# Patient Record
Sex: MALE | Race: WHITE | NOT HISPANIC OR LATINO | Employment: OTHER | URBAN - METROPOLITAN AREA
[De-identification: names, ages, dates, MRNs, and addresses within clinical notes are randomized per-mention and may not be internally consistent; named-entity substitution may affect disease eponyms.]

---

## 2017-01-03 ENCOUNTER — TELEPHONE (OUTPATIENT)
Dept: PHARMACY | Facility: CLINIC | Age: 72
End: 2017-01-03

## 2017-01-31 ENCOUNTER — TELEPHONE (OUTPATIENT)
Dept: PHARMACY | Facility: CLINIC | Age: 72
End: 2017-01-31

## 2017-01-31 NOTE — TELEPHONE ENCOUNTER
Patient would like us to ship medication on Monday 1/31/17 for delivery on Tuesday 2/1/17.  Patient would like us to bill them at the end of the month.  Verified shipping address of [address] [address].  Patient verbalized understanding

## 2017-02-21 ENCOUNTER — PROCEDURE VISIT (OUTPATIENT)
Dept: PULMONOLOGY | Facility: CLINIC | Age: 72
End: 2017-02-21
Payer: MEDICARE

## 2017-02-21 ENCOUNTER — HOSPITAL ENCOUNTER (OUTPATIENT)
Dept: RADIOLOGY | Facility: HOSPITAL | Age: 72
Discharge: HOME | End: 2017-02-21
Attending: INTERNAL MEDICINE
Payer: MEDICARE

## 2017-02-21 ENCOUNTER — OFFICE VISIT (OUTPATIENT)
Dept: PULMONOLOGY | Facility: CLINIC | Age: 72
End: 2017-02-21
Payer: MEDICARE

## 2017-02-21 VITALS
SYSTOLIC BLOOD PRESSURE: 124 MMHG | DIASTOLIC BLOOD PRESSURE: 56 MMHG | OXYGEN SATURATION: 96 % | HEIGHT: 68 IN | HEART RATE: 65 BPM | BODY MASS INDEX: 24.05 KG/M2 | RESPIRATION RATE: 18 BRPM | WEIGHT: 158.69 LBS

## 2017-02-21 DIAGNOSIS — J44.9 MODERATE COPD (CHRONIC OBSTRUCTIVE PULMONARY DISEASE): Chronic | ICD-10-CM

## 2017-02-21 DIAGNOSIS — G47.33 OSA ON CPAP: Chronic | ICD-10-CM

## 2017-02-21 DIAGNOSIS — J44.9 MODERATE COPD (CHRONIC OBSTRUCTIVE PULMONARY DISEASE): Primary | ICD-10-CM

## 2017-02-21 LAB
PRE FEV1 FVC: 54.97 % (ref 63.93–83.28)
PRE FEV1: 2.04 L (ref 2.26–3.75)
PRE FVC: 3.71 L (ref 3.22–4.97)
PRE PEF: 3.86 L/S (ref 5.73–10.1)

## 2017-02-21 PROCEDURE — 99999 PR PBB SHADOW E&M-EST. PATIENT-LVL III: CPT | Mod: PBBFAC,,, | Performed by: INTERNAL MEDICINE

## 2017-02-21 PROCEDURE — 71020 XR CHEST PA AND LATERAL: CPT | Mod: 26,,, | Performed by: RADIOLOGY

## 2017-02-21 PROCEDURE — 99214 OFFICE O/P EST MOD 30 MIN: CPT | Mod: 25,S$PBB,, | Performed by: INTERNAL MEDICINE

## 2017-02-21 PROCEDURE — 94060 EVALUATION OF WHEEZING: CPT | Mod: PBBFAC

## 2017-02-21 PROCEDURE — 94060 EVALUATION OF WHEEZING: CPT | Mod: 26,S$PBB,, | Performed by: INTERNAL MEDICINE

## 2017-02-21 RX ORDER — ALBUTEROL SULFATE 90 UG/1
2 AEROSOL, METERED RESPIRATORY (INHALATION) EVERY 4 HOURS PRN
Qty: 1 INHALER | Refills: 11 | Status: SHIPPED | OUTPATIENT
Start: 2017-02-21 | End: 2018-08-22 | Stop reason: SDUPTHER

## 2017-02-21 NOTE — ASSESSMENT & PLAN NOTE
CAT score 6  No cough except with thin liquids  FEV1 declined from 2.52 to 2.04L  FVC  Declined from 4.28 to 3.70L    Adherence with Tudorza and rescue inhaler  Immunisations current

## 2017-02-21 NOTE — PROGRESS NOTES
Subjective:      Noble Tripp is a 71 y.o. male with known COPD who presents without exacerbation.   Current symptoms include: None.  He still has easily provoked coughing when he takes periodically.  He discontinued his speech therapy.  I've encouraged him to consider going back to speech therapy given his prior radiation surgery for vocal cord cancer  CAT score 6.  Spirometry indicates that his measurements have declined overall.  He has Tudorza at home which he hasn't used  He also had a significant improvement with bronchodilator on spirometry today.  I encouraged him to try to use her rescue inhaler   Quit smoking  and currently on  Chantix  CXR reviewed      The following portions of the patient's history were reviewed and updated as appropriate:   He  has a past medical history of Adrenal mass; Aspiration pneumonia (10/15); Benign prostate hyperplasia; Chronic pain; COPD (chronic obstructive pulmonary disease); Ex-smoker; Hyperlipidemia; Osteopenia (1/15 tran 1/18); PVD (peripheral vascular disease); Pyriform sinus cancer; Sleep apnea; Squamous cell carcinoma of skin; Tongue cancer; Vocal cord cancer (2011); and Xerostomia.  He  does not have any pertinent problems on file.  He  has a past surgical history that includes Appendectomy; Vocal cord lateralization, endoscopic approach w/ MLB; and tongue cancer excision (11/14).  His family history includes Cancer in his brother and father.  He  reports that he has quit smoking. He does not have any smokeless tobacco history on file. He reports that he does not drink alcohol or use illicit drugs.  He has a current medication list which includes the following prescription(s): albuterol, aspirin, chantix continuing month box, ferrous sulfate, fish oil-omega-3 fatty acids, hydrocodone-acetaminophen 10-325mg, loratadine, multivitamin, pantoprazole, pilocarpine, repatha sureclick, and gabapentin.  Current Outpatient Prescriptions on File Prior to Visit  "  Medication Sig Dispense Refill    aspirin 81 mg Tab Take 1 tablet by mouth Daily. Over the counter to help prevent stroke/heart attack      CHANTIX CONTINUING MONTH BOX 1 mg Tab TAKE 1 TABLET (1 MG TOTAL) BY MOUTH ONCE DAILY. 56 tablet 3    ferrous sulfate 325 (65 FE) MG EC tablet Take 1 tablet (325 mg total) by mouth 2 times daily 2 hours after meal. 60 tablet 3    fish oil-omega-3 fatty acids 300-1,000 mg capsule Take 2 g by mouth.      hydrocodone-acetaminophen 10-325mg (NORCO)  mg Tab Take 1 tablet by mouth 3 (three) times daily as needed. 90 tablet 0    loratadine (CLARITIN) 10 mg tablet Take 1 tablet by mouth Daily.      multivitamin capsule Take 1 capsule by mouth once daily.      pantoprazole (PROTONIX) 40 MG tablet Take 40 mg by mouth once daily.   5    pilocarpine (SALAGEN) 5 MG Tab TAKE 1/2 HOUR PRIOR TO EACH MEAL 90 tablet 8    REPATHA SURECLICK 140 mg/mL PnIj INJECT 140 MG INTO THE SKIN EVERY 14 DAYS 2 mL 3    [DISCONTINUED] albuterol 90 mcg/actuation inhaler Inhale 2 puffs into the lungs every 4 (four) hours as needed for Wheezing or Shortness of Breath. 1 Inhaler 11    [DISCONTINUED] TUDORZA PRESSAIR 400 mcg/actuation AePB INHALE 1 PUFF INTO THE LUNGS 2 TIMES DAILY. 1 each 5    gabapentin (NEURONTIN) 800 MG tablet Take 1 tablet (800 mg total) by mouth 3 (three) times daily. 90 tablet 2     No current facility-administered medications on file prior to visit.      He is allergic to contrast media; iodinated contrast media - iv dye; neomycin-bacitracin-polymyxin; pravastatin; rosuvastatin; simvastatin; and statins-hmg-coa reductase inhibitors..    Review of Systems  A comprehensive review of systems was negative.       Objective:      FEV1: 2.04 L, 68 % of predicted  FEV1/FVC: 54 %  Visit Vitals    BP (!) 124/56    Pulse 65    Resp 18    Ht 5' 8" (1.727 m)    Wt 72 kg (158 lb 11.2 oz)    SpO2 96%    BMI 24.13 kg/m2     General appearance: alert, appears stated age, cooperative " and no distress  Head: atraumatic  Eyes: negative findings: conjunctivae and sclerae normal  Throat: lips, mucosa, and tongue normal; teeth and gums normal and hoarse voice  Lungs: clear to auscultation bilaterally, normal percussion bilaterally and No wheezes, No rales  Heart: regular rate and rhythm, S1, S2 normal, no murmur, click, rub or gallop  Extremities: extremities normal, atraumatic, no cyanosis or edema  Pulses: 2+ and symmetric  Lymph nodes: Cervical, supraclavicular, and axillary nodes normal.  Neurologic: Grossly normal       Chest x-ray was reviewed with patient  Clear hyperinflation was noted    Spirometry reviewed  FEV1 declined from 2.52 down to 2.04  FVC declined from 4.28 down to 3.70.  Spirometry done today there was a robust improvement in FEV1 by 11%.  FEV1 was 2.04 (60% predicted) FVC was 3.70 (90% predicted) FEV1/FVC was 54.    Home sleep study    Adequate study, duration 5 hours 49 minutes. Apnea-hypopnea index was 16.4 events per hour.Lowest oxygen  saturation was 87%. Total events were 90. Invalid O2 signal 15 minutes, invalid respiratory signal 50 minutes.  Maximal heart rate was 99 bpm, minimum heart rate was 66 bpm. Snoring was recorded greater than 50 dB 69%  of the time.  Obstructive sleep apnea hypopnea syndrome is detected. Continue treatment with CPAP is indicated.  Considering significant weight loss in lab titration required to adjust pressure.  Assessment:      Problem List Items Addressed This Visit     JUANITO on CPAP (Chronic)     No using CPAP  Had HSAT 02/2016  Will let me          Moderate COPD (chronic obstructive pulmonary disease) - Primary (Chronic)     CAT score 6  No cough except with thin liquids  FEV1 declined from 2.52 to 2.04L  FVC  Declined from 4.28 to 3.70L    Adherence with Tudorza and rescue inhaler  Immunisations current         Relevant Medications    albuterol 90 mcg/actuation inhaler    Other Relevant Orders    X-Ray Chest PA And Lateral    Spirometry  with/without bronchodilator         Plan:      Return in about 1 year (around 2/21/2018) for cxr, dali, CPAP titration.    This note was prepared using voice recognition system and is likely to have sound alike errors that may have been overlooked even after proof reading.  Please call me with any questions    Discussed diagnosis, its evaluation, treatment and usual course. All questions answered.    Santos Cardozo MD

## 2017-02-23 ENCOUNTER — TELEPHONE (OUTPATIENT)
Dept: CARDIOLOGY | Facility: CLINIC | Age: 72
End: 2017-02-23

## 2017-02-23 ENCOUNTER — TELEPHONE (OUTPATIENT)
Dept: PHARMACY | Facility: CLINIC | Age: 72
End: 2017-02-23

## 2017-02-23 DIAGNOSIS — E78.2 MIXED HYPERLIPIDEMIA: Primary | ICD-10-CM

## 2017-02-23 NOTE — TELEPHONE ENCOUNTER
----- Message from Jael Jimenes sent at 2/23/2017  9:17 AM CST -----  Contact: Patients wife, Arleen Bacon wants to know if her  needs labs before his appointment on 3/6/17, please call her back at [phone] or [phone]. Thank you

## 2017-02-23 NOTE — TELEPHONE ENCOUNTER
Called and notified patient's wife that we received her payment of $80.00.    Ms. Anton was very thankful for the call.

## 2017-02-28 ENCOUNTER — LAB VISIT (OUTPATIENT)
Dept: LAB | Facility: HOSPITAL | Age: 72
End: 2017-02-28
Attending: INTERNAL MEDICINE
Payer: MEDICARE

## 2017-02-28 ENCOUNTER — OFFICE VISIT (OUTPATIENT)
Dept: PAIN MEDICINE | Facility: CLINIC | Age: 72
End: 2017-02-28
Payer: MEDICARE

## 2017-02-28 VITALS
WEIGHT: 158 LBS | DIASTOLIC BLOOD PRESSURE: 67 MMHG | RESPIRATION RATE: 16 BRPM | SYSTOLIC BLOOD PRESSURE: 117 MMHG | HEART RATE: 81 BPM | HEIGHT: 68 IN | BODY MASS INDEX: 23.95 KG/M2

## 2017-02-28 DIAGNOSIS — D50.9 MICROCYTIC ANEMIA: ICD-10-CM

## 2017-02-28 DIAGNOSIS — G89.4 CHRONIC PAIN DISORDER: ICD-10-CM

## 2017-02-28 DIAGNOSIS — M51.36 DDD (DEGENERATIVE DISC DISEASE), LUMBAR: ICD-10-CM

## 2017-02-28 DIAGNOSIS — M47.817 SPONDYLOSIS OF LUMBOSACRAL REGION WITHOUT MYELOPATHY OR RADICULOPATHY: Primary | ICD-10-CM

## 2017-02-28 DIAGNOSIS — E78.2 MIXED HYPERLIPIDEMIA: ICD-10-CM

## 2017-02-28 DIAGNOSIS — M47.819 FACET ARTHROPATHY: ICD-10-CM

## 2017-02-28 LAB
ALBUMIN SERPL BCP-MCNC: 3.4 G/DL
ALBUMIN SERPL BCP-MCNC: 3.4 G/DL
ALP SERPL-CCNC: 59 U/L
ALP SERPL-CCNC: 59 U/L
ALT SERPL W/O P-5'-P-CCNC: 9 U/L
ALT SERPL W/O P-5'-P-CCNC: 9 U/L
ANION GAP SERPL CALC-SCNC: 7 MMOL/L
ANION GAP SERPL CALC-SCNC: 7 MMOL/L
AST SERPL-CCNC: 17 U/L
AST SERPL-CCNC: 17 U/L
BASOPHILS # BLD AUTO: 0.11 K/UL
BASOPHILS NFR BLD: 0.6 %
BILIRUB SERPL-MCNC: 0.4 MG/DL
BILIRUB SERPL-MCNC: 0.4 MG/DL
BUN SERPL-MCNC: 11 MG/DL
BUN SERPL-MCNC: 11 MG/DL
CALCIUM SERPL-MCNC: 9.6 MG/DL
CALCIUM SERPL-MCNC: 9.6 MG/DL
CHLORIDE SERPL-SCNC: 107 MMOL/L
CHLORIDE SERPL-SCNC: 107 MMOL/L
CHOLEST/HDLC SERPL: 2.5 {RATIO}
CO2 SERPL-SCNC: 27 MMOL/L
CO2 SERPL-SCNC: 27 MMOL/L
CREAT SERPL-MCNC: 0.9 MG/DL
CREAT SERPL-MCNC: 0.9 MG/DL
DIFFERENTIAL METHOD: ABNORMAL
EOSINOPHIL # BLD AUTO: 0.4 K/UL
EOSINOPHIL NFR BLD: 2.1 %
ERYTHROCYTE [DISTWIDTH] IN BLOOD BY AUTOMATED COUNT: 17.6 %
EST. GFR  (AFRICAN AMERICAN): >60 ML/MIN/1.73 M^2
EST. GFR  (AFRICAN AMERICAN): >60 ML/MIN/1.73 M^2
EST. GFR  (NON AFRICAN AMERICAN): >60 ML/MIN/1.73 M^2
EST. GFR  (NON AFRICAN AMERICAN): >60 ML/MIN/1.73 M^2
FERRITIN SERPL-MCNC: 140 NG/ML
GLUCOSE SERPL-MCNC: 103 MG/DL
GLUCOSE SERPL-MCNC: 103 MG/DL
HCT VFR BLD AUTO: 37.9 %
HDL/CHOLESTEROL RATIO: 39.8 %
HDLC SERPL-MCNC: 133 MG/DL
HDLC SERPL-MCNC: 53 MG/DL
HGB BLD-MCNC: 12.6 G/DL
IRON SERPL-MCNC: 24 UG/DL
LDLC SERPL CALC-MCNC: 65 MG/DL
LYMPHOCYTES # BLD AUTO: 1.6 K/UL
LYMPHOCYTES NFR BLD: 8.2 %
MCH RBC QN AUTO: 27.6 PG
MCHC RBC AUTO-ENTMCNC: 33.2 %
MCV RBC AUTO: 83 FL
MONOCYTES # BLD AUTO: 0.9 K/UL
MONOCYTES NFR BLD: 4.8 %
NEUTROPHILS # BLD AUTO: 16.1 K/UL
NEUTROPHILS NFR BLD: 84.3 %
NONHDLC SERPL-MCNC: 80 MG/DL
PLATELET # BLD AUTO: 412 K/UL
PMV BLD AUTO: 10.3 FL
POTASSIUM SERPL-SCNC: 4.8 MMOL/L
POTASSIUM SERPL-SCNC: 4.8 MMOL/L
PROT SERPL-MCNC: 7.6 G/DL
PROT SERPL-MCNC: 7.6 G/DL
RBC # BLD AUTO: 4.57 M/UL
SATURATED IRON: 10 %
SODIUM SERPL-SCNC: 141 MMOL/L
SODIUM SERPL-SCNC: 141 MMOL/L
TOTAL IRON BINDING CAPACITY: 243 UG/DL
TRANSFERRIN SERPL-MCNC: 164 MG/DL
TRIGL SERPL-MCNC: 75 MG/DL
WBC # BLD AUTO: 19.09 K/UL

## 2017-02-28 PROCEDURE — 99999 PR PBB SHADOW E&M-EST. PATIENT-LVL III: CPT | Mod: PBBFAC,,, | Performed by: PHYSICIAN ASSISTANT

## 2017-02-28 PROCEDURE — 99213 OFFICE O/P EST LOW 20 MIN: CPT | Mod: PBBFAC | Performed by: PHYSICIAN ASSISTANT

## 2017-02-28 PROCEDURE — 99214 OFFICE O/P EST MOD 30 MIN: CPT | Mod: S$PBB,,, | Performed by: PHYSICIAN ASSISTANT

## 2017-02-28 RX ORDER — HYDROCODONE BITARTRATE AND ACETAMINOPHEN 10; 325 MG/1; MG/1
1 TABLET ORAL 3 TIMES DAILY PRN
Qty: 90 TABLET | Refills: 0 | Status: SHIPPED | OUTPATIENT
Start: 2017-03-29 | End: 2017-02-28 | Stop reason: SDUPTHER

## 2017-02-28 RX ORDER — HYDROCODONE BITARTRATE AND ACETAMINOPHEN 10; 325 MG/1; MG/1
1 TABLET ORAL 3 TIMES DAILY PRN
Qty: 90 TABLET | Refills: 0 | Status: SHIPPED | OUTPATIENT
Start: 2017-04-27 | End: 2017-05-25 | Stop reason: SDUPTHER

## 2017-02-28 RX ORDER — HYDROCODONE BITARTRATE AND ACETAMINOPHEN 10; 325 MG/1; MG/1
1 TABLET ORAL 3 TIMES DAILY PRN
Qty: 90 TABLET | Refills: 0 | Status: SHIPPED | OUTPATIENT
Start: 2017-02-28 | End: 2017-02-28 | Stop reason: SDUPTHER

## 2017-02-28 RX ORDER — GABAPENTIN 800 MG/1
800 TABLET ORAL 3 TIMES DAILY
Qty: 90 TABLET | Refills: 2 | Status: SHIPPED | OUTPATIENT
Start: 2017-02-28 | End: 2017-05-26 | Stop reason: SDUPTHER

## 2017-02-28 NOTE — MR AVS SNAPSHOT
[facility]  [address]  [place] [address]  Phone: [phone]  Fax: [phone]                  Noble Tripp   2017 8:00 AM   Office Visit    Description:  Male : 1945   Provider:  Joselin Castellon PA-C   Department:  [facility]           Reason for Visit     Low-back Pain           Diagnoses this Visit        Comments    Spondylosis of lumbosacral region without myelopathy or radiculopathy    -  Primary     DDD (degenerative disc disease), lumbar         Facet arthropathy         Chronic pain disorder                To Do List           Future Appointments        Provider Department Dept Phone    2017 10:10 AM LABORATORY, [address] [facility] [phone]    3/6/2017 9:00 AM Sue Cross MD [facility] [phone]    3/6/2017 9:40 AM Andres Whitfield MD '[place] - Cardiology [phone]    2017 9:00 AM Joselin Castellon PA-C [facility] [phone]    11/3/2017 8:40 AM Dwaine Davis MD [facility] - Internal Medicine [phone]      Goals (5 Years of Data)     None       These Medications        Disp Refills Start End    naloxegol 12.5 mg Tab 30 tablet 2 2017 3/30/2017    Take 12.5 mg by mouth once daily. - Oral    Pharmacy: [facility] [address] [place] [address] [address] AT [facility] Ph #: [phone]       gabapentin (NEURONTIN) 800 MG tablet 90 tablet 2 2017 3/30/2017    Take 1 tablet (800 mg total) by mouth 3 (three) times daily. - Oral    [facility] [address] [place] [address] [address] AT [facility] Ph #: [phone]       hydrocodone-acetaminophen 10-325mg (NORCO)  mg Tab 90 tablet 0 2017    Take 1 tablet by mouth 3 (three) times daily as needed. - Oral    Pharmacy: [id number] - [place],  [place] - [address] AT [facility] Ph #: [phone]         [facility] On Call     [facility] Nurse Care Line - 24/7 Assistance  Registered nurses in the [facility] provide clinical advisement, health education, appointment booking, and other advisory services.  Call for this free service at [phone].             Medications           Message regarding Medications     Verify the changes and/or additions to your medication regime listed below are the same as discussed with your clinician today.  If any of these changes or additions are incorrect, please notify your healthcare provider.        START taking these NEW medications        Refills    naloxegol 12.5 mg Tab 2    Sig: Take 12.5 mg by mouth once daily.    Class: Normal    Route: Oral           Verify that the below list of medications is an accurate representation of the medications you are currently taking.  If none reported, the list may be blank. If incorrect, please contact your healthcare provider. Carry this list with you in case of emergency.           Current Medications     albuterol 90 mcg/actuation inhaler Inhale 2 puffs into the lungs every 4 (four) hours as needed for Wheezing or Shortness of Breath.    aspirin 81 mg Tab Take 1 tablet by mouth Daily. Over the counter to help prevent stroke/heart attack    CHANTIX CONTINUING MONTH BOX 1 mg Tab TAKE 1 TABLET (1 MG TOTAL) BY MOUTH ONCE DAILY.    ferrous sulfate 325 (65 FE) MG EC tablet Take 1 tablet (325 mg total) by mouth 2 times daily 2 hours after meal.    fish oil-omega-3 fatty acids 300-1,000 mg capsule Take 2 g by mouth.    gabapentin (NEURONTIN) 800 MG tablet Take 1 tablet (800 mg total) by mouth 3 (three) times daily.    hydrocodone-acetaminophen 10-325mg (NORCO)  mg Tab Starting on Apr 27, 2017. Take 1 tablet by mouth 3 (three) times daily as needed.    loratadine (CLARITIN) 10 mg tablet Take 1 tablet by mouth Daily.    multivitamin  capsule Take 1 capsule by mouth once daily.    naloxegol 12.5 mg Tab Take 12.5 mg by mouth once daily.    pantoprazole (PROTONIX) 40 MG tablet Take 40 mg by mouth once daily.     pilocarpine (SALAGEN) 5 MG Tab TAKE 1/2 HOUR PRIOR TO EACH MEAL    REPATHA SURECLICK 140 mg/mL PnIj INJECT 140 MG INTO THE SKIN EVERY 14 DAYS           Clinical Reference Information           Your Vitals Were     BP                   117/67 (BP Location: Right arm, Patient Position: Sitting, BP Method: Automatic)           Blood Pressure          Most Recent Value    BP  117/67      Allergies as of 2/28/2017     Contrast Media    Iodinated Contrast Media - Iv Dye    Neomycin-bacitracin-polymyxin    Pravastatin    Rosuvastatin    Simvastatin    Statins-hmg-coa Reductase Inhibitors      Immunizations Administered on Date of Encounter - 2/28/2017     None      Language Assistance Services     ATTENTION: Language assistance services are available, free of charge. Please call [phone].      ATENCIÓN: Si tobyla shantanu, tiene a pennington disposición servicios gratuitos de asistencia lingüística. Llame al [phone].     CHÚ Ý: N?u b?n nói Ti?ng Vi?t, có các d?ch v? h? tr? ngôn ng? mi?n phí dành cho b?n. G?i s? [phone].         [facility] complies with applicable Federal civil rights laws and does not discriminate on the basis of race, color, national origin, age, disability, or sex.

## 2017-03-06 ENCOUNTER — OFFICE VISIT (OUTPATIENT)
Dept: CARDIOLOGY | Facility: CLINIC | Age: 72
End: 2017-03-06
Payer: MEDICARE

## 2017-03-06 ENCOUNTER — OFFICE VISIT (OUTPATIENT)
Dept: HEMATOLOGY/ONCOLOGY | Facility: CLINIC | Age: 72
End: 2017-03-06
Payer: MEDICARE

## 2017-03-06 VITALS
SYSTOLIC BLOOD PRESSURE: 100 MMHG | TEMPERATURE: 97 F | HEIGHT: 68 IN | RESPIRATION RATE: 18 BRPM | WEIGHT: 154.75 LBS | DIASTOLIC BLOOD PRESSURE: 60 MMHG | OXYGEN SATURATION: 94 % | BODY MASS INDEX: 23.45 KG/M2 | HEART RATE: 76 BPM

## 2017-03-06 VITALS
WEIGHT: 154.88 LBS | HEIGHT: 68 IN | DIASTOLIC BLOOD PRESSURE: 64 MMHG | BODY MASS INDEX: 23.47 KG/M2 | SYSTOLIC BLOOD PRESSURE: 122 MMHG | HEART RATE: 63 BPM

## 2017-03-06 DIAGNOSIS — I47.10 PSVT (PAROXYSMAL SUPRAVENTRICULAR TACHYCARDIA): ICD-10-CM

## 2017-03-06 DIAGNOSIS — J44.9 MODERATE COPD (CHRONIC OBSTRUCTIVE PULMONARY DISEASE): Chronic | ICD-10-CM

## 2017-03-06 DIAGNOSIS — Z86.010 HISTORY OF COLONIC POLYPS: ICD-10-CM

## 2017-03-06 DIAGNOSIS — D63.8 ANEMIA OF OTHER CHRONIC DISEASE: ICD-10-CM

## 2017-03-06 DIAGNOSIS — I25.10 CORONARY ARTERY DISEASE INVOLVING NATIVE CORONARY ARTERY OF NATIVE HEART WITHOUT ANGINA PECTORIS: Primary | ICD-10-CM

## 2017-03-06 DIAGNOSIS — I10 ESSENTIAL HYPERTENSION: ICD-10-CM

## 2017-03-06 DIAGNOSIS — D72.829 LEUKOCYTOSIS, UNSPECIFIED TYPE: ICD-10-CM

## 2017-03-06 DIAGNOSIS — E78.5 HYPERLIPIDEMIA, UNSPECIFIED HYPERLIPIDEMIA TYPE: ICD-10-CM

## 2017-03-06 DIAGNOSIS — C32.0 VOCAL CORD CANCER: Primary | ICD-10-CM

## 2017-03-06 DIAGNOSIS — C32.0 VOCAL CORD CANCER: ICD-10-CM

## 2017-03-06 PROCEDURE — 99999 PR PBB SHADOW E&M-EST. PATIENT-LVL IV: CPT | Mod: PBBFAC,,, | Performed by: INTERNAL MEDICINE

## 2017-03-06 PROCEDURE — 99999 PR PBB SHADOW E&M-EST. PATIENT-LVL III: CPT | Mod: PBBFAC,,, | Performed by: INTERNAL MEDICINE

## 2017-03-06 PROCEDURE — 99214 OFFICE O/P EST MOD 30 MIN: CPT | Mod: S$PBB,,, | Performed by: INTERNAL MEDICINE

## 2017-03-06 PROCEDURE — 93010 ELECTROCARDIOGRAM REPORT: CPT | Mod: S$PBB,,, | Performed by: NUCLEAR MEDICINE

## 2017-03-06 PROCEDURE — 99213 OFFICE O/P EST LOW 20 MIN: CPT | Mod: PBBFAC,27 | Performed by: INTERNAL MEDICINE

## 2017-03-06 PROCEDURE — 93005 ELECTROCARDIOGRAM TRACING: CPT | Mod: PBBFAC | Performed by: NUCLEAR MEDICINE

## 2017-03-06 NOTE — MR AVS SNAPSHOT
[facility]  [address] [facility] [address]  Phone: [phone]  Fax: [phone]                  Noble Tripp   3/6/2017 9:40 AM   Office Visit    Description:  Male : 1945   Provider:  Andres Whitfield MD   Department:  [facility]           Reason for Visit     Coronary Artery Disease           Diagnoses this Visit        Comments    Coronary artery disease involving native coronary artery of native heart without angina pectoris    -  Primary     Essential hypertension         PSVT (paroxysmal supraventricular tachycardia)         Hyperlipidemia, unspecified hyperlipidemia type         Vocal cord cancer                To Do List           Future Appointments        Provider Department Dept Phone    2017 9:00 AM Joselin Castellon PA-C [facility] - Interventional Pain [phone]    2017 9:50 AM LABORATORY, [facility] [phone]    2017 10:15 AM ONLH XR1- [facility] [phone]    2017 1:00 PM Sue Cross MD [facility] - Hematology Oncology [phone]    11/3/2017 8:40 AM Dwaine Davis MD [facility] Internal Medicine [phone]      Goals (5 Years of Data)     None      Follow-Up and Disposition     Return in about 6 months (around 2017).       These Medications        Disp Refills Start End    evolocumab (REPATHA SURECLICK) 140 mg/mL PnIj 6 Syringe 3 3/6/2017 3/6/2018    Inject 1 Syringe into the skin every 14 (fourteen) days. - Subcutaneous    Pharmacy: [facility] [address] [place] [address] Ph #: [phone]         [facility]     [facility] Care [facility] -  Assistance  Registered nurses in the [facility] provide clinical advisement, health education, appointment booking, and other advisory services.  Call for this free service at [phone].             Medications           Message regarding Medications      Verify the changes and/or additions to your medication regime listed below are the same as discussed with your clinician today.  If any of these changes or additions are incorrect, please notify your healthcare provider.        CHANGE how you are taking these medications     Start Taking Instead of    evolocumab (REPATHA SURECLICK) 140 mg/mL PnIj REPATHA SURECLICK 140 mg/mL PnIj    Dosage:  Inject 1 Syringe into the skin every 14 (fourteen) days. Dosage:  INJECT 140 MG INTO THE SKIN EVERY 14 DAYS    Reason for Change:  Reorder            Verify that the below list of medications is an accurate representation of the medications you are currently taking.  If none reported, the list may be blank. If incorrect, please contact your healthcare provider. Carry this list with you in case of emergency.           Current Medications     albuterol 90 mcg/actuation inhaler Inhale 2 puffs into the lungs every 4 (four) hours as needed for Wheezing or Shortness of Breath.    aspirin 81 mg Tab Take 1 tablet by mouth Daily. Over the counter to help prevent stroke/heart attack    CHANTIX CONTINUING MONTH BOX 1 mg Tab TAKE 1 TABLET (1 MG TOTAL) BY MOUTH ONCE DAILY.    evolocumab (REPATHA SURECLICK) 140 mg/mL PnIj Inject 1 Syringe into the skin every 14 (fourteen) days.    ferrous sulfate 325 (65 FE) MG EC tablet Take 1 tablet (325 mg total) by mouth 2 times daily 2 hours after meal.    fish oil-omega-3 fatty acids 300-1,000 mg capsule Take 2 g by mouth.    gabapentin (NEURONTIN) 800 MG tablet Take 1 tablet (800 mg total) by mouth 3 (three) times daily.    hydrocodone-acetaminophen 10-325mg (NORCO)  mg Tab Starting on Apr 27, 2017. Take 1 tablet by mouth 3 (three) times daily as needed.    loratadine (CLARITIN) 10 mg tablet Take 1 tablet by mouth Daily.    multivitamin capsule Take 1 capsule by mouth once daily.    naloxegol 12.5 mg Tab Take 12.5 mg by mouth once daily.    pantoprazole (PROTONIX) 40 MG tablet Take 40 mg by mouth  "once daily.     pilocarpine (SALAGEN) 5 MG Tab TAKE 1/2 HOUR PRIOR TO EACH MEAL           Clinical Reference Information           Your Vitals Were     BP Pulse Height Weight BMI    122/64 (BP Location: Left arm) 63 5' 8" (1.727 m) 70.2 kg (154 lb 14 oz) 23.55 kg/m2      Blood Pressure          Most Recent Value    BP  122/64      Allergies as of 3/6/2017     Contrast Media    Iodinated Contrast Media - Iv Dye    Neomycin-bacitracin-polymyxin    Pravastatin    Rosuvastatin    Simvastatin    Statins-hmg-coa Reductase Inhibitors      Immunizations Administered on Date of Encounter - 3/6/2017     None      Orders Placed During Today's Visit      Normal Orders This Visit    IN OFFICE EKG 12-LEAD (to [place])     Future Labs/Procedures Expected by Expires    Lipid panel  3/6/2017 5/5/2018      Language Assistance Services     ATTENTION: Language assistance services are available, free of charge. Please call [phone].      ATENCIÓN: Si habla shantanu, tiene a pennington disposición servicios gratuitos de asistencia lingüística. Llame al [phone].     CHÚ Ý: N?u b?n nói Ti?ng Vi?t, có các d?ch v? h? tr? ngôn ng? mi?n phí dành cho b?n. G?i s? [phone].         [facility] complies with applicable Federal civil rights laws and does not discriminate on the basis of race, color, national origin, age, disability, or sex.        "

## 2017-03-06 NOTE — PROGRESS NOTES
Hematology/Oncology Established Visit    [facility] Diagnosis: Head and neck cancer    Stage: Likely Stage I, but pending review of outside records    Pathology: Unknown    Prior Treatment:  1. Laryngeal / vocal cord tumor resection and sublingual tumor resection by Dr. Oleary at [facility]   2. Selective neck dissection by Dr. Oleary  3. Definitive radiation to posterior pharynx vs pyriform sinus x 7 weeks by Dr. Garrett Kam at [facility]  4. Surgical resection of tongue lesion 11/2014 by Dr. Varela  5. Surgical resection of another tongue region lesion 10/2015 by Dr. Varela.  6. Surgical resection of esophagus lesion - found to be noncancerous by Dr. Oleary in 3/2016    Current Treatment: Surveillance    History of Present Illness:  Mr. Tripp is a 71 y/o male with recurrent Head and Neck cancer who comes in to establish care with [facility]. 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 He later had recurrent disease of the posterior pharynx region and was treated with definitive radiation, no concurrent chemotherapy. He was then found to have recurrence on the tongue, which was resected in 11/2014. He was recently found to have a new lesion on his right lateral tongue which demonstrated abnormal findings (noninvasive cancer or high grade dysplasia). He was scheduled to undergo resection by Dr. Meghna Varela at [facility] next week. However, in the meantime, he was admitted with A-fib/SVT with reentrant phenomenon  thought to be 2/2 sepsis, found to have bilateral PNA. CT of the neck was negative for LAD. Patient was treated with vancomycin, Zosyn, Cipro. Given improvement, he was discharged on Levaquin to be completed approx 10/22/15. Today, he states he feels much better with regards to shortness of breath.  He reports a chronic cough ever since radiation.   Denies any productive cough.  He also complains of a rash for cold sores above his upper lip to which he has been applying Abreva ointment.  He denies any unintentional weight loss or recent appetite changes. Patient states that he underwent resection of a tumor under the right side of his tongue as well as a lesion in his esophagus in fall 2015.     Today, patient comes in for follow up. He was recently seen by Dr. Ponce (ENT) at [facility] who states patient is doing well overall based on physical exam. He feels well today without any n/v/d, SOB, CP, weight loss. He does tend to cough when he drinks thin liquids. No recent infections, antibiotics, steroids.     ROS:  General:  No wt loss, fever/chills, fatigue, night sweats  Eyes: No vision problems, pain or inflammation.   Ears/Nose: No difficultly hearing, ear pain, rhinorrhea, or epistaxis  Oropharynx: No ulcers, dysphagia, or odynophagia. Coughs when drinking thin liquids  Cardiovascular: No chest pains, sob, PND or dyspnea on exertion  Pulmonary: No sob, hemoptysis  Gastrointestional: No n/v/d, melena, hematochezia, or change in bowel habits  : No dysuria, hematuria, pelvic pain or flank pain  Musculoskeletal: No myalgias, weakness, or arthralgias  Neurological: No headaches, focal deficits, or seizure activity  Endocrine: No heat or cold intolerance   Skin: No pruritus, or lesions  Psychiatric: No symptoms of mood disorders  Heme/Lymph: No lymph node enlargment    Past Medical History:   Diagnosis Date    Adrenal mass     david    Aspiration pneumonia 10/15    puree/honey    Benign prostate hyperplasia     Chronic pain     dr santos    COPD (chronic obstructive pulmonary disease)     papi    Ex-smoker     11/13    Hyperlipidemia     Osteopenia 1/15 tran 1/18    PVD (peripheral vascular disease)     noobs 07 elizabethuri    Pyriform sinus cancer     dr ponce radiation 1/2-14 dr king perales    Sleep apnea     cpap    Squamous cell carcinoma of  "skin     roberto    Tongue cancer     "superficial" removed 11/14    Vocal cord cancer 2011    Xerostomia     radiation       Social History:  to Arleen. 6 children. Quit smoking 2013 after approx 100 pk-yrs.    Family History: family history includes Cancer in his brother and father. Father and older brother had lung cancer. Younger brother had colon cancer.     Physical Exam:  Vitals:    03/06/17 0850   BP: 100/60   Pulse: 76   Resp: 18   Temp: 97.3 °F (36.3 °C)     Body mass index is 23.53 kg/(m^2).  General:  AAOx4, no acute distress  HEENT: EOMI. Normocephalic and atraumatic. No maxillary sinus tenderness. External auditory canals clear and TMs intact without lesions. Nasal and oral mucosal membranes moist. Normal dentition and gums. No discrete lesions identified in OP.  Neck: no LAD, thyromegaly, normal ROM  Pulmonary: Bilaterally clear to auscultation, Normal effort with no accessory muscle use, no wheezes/rales/rhonchi  CV: Normal rate, regular rhythm, no murmurs/rubs/gallops, no edema  ABD:  Soft, nontender, nondistended, no mass, and without hepatosplenomegaly   Ext: No clubbing, cyanosis, or edema, normal ROM  Skin: No bruising or petechiae, lesions, rashes.  Neurological: No focal deficits, CN II to XII grossly intact, normal coordination    Psychiatric:  Normal mood, affect and judgement  Hem/Lymph:  No submandibular, cervical, supraclavicular, axillary, or inguinal LAD.    Labs:    Lab Results   Component Value Date    WBC 19.09 (H) 02/28/2017    HGB 12.6 (L) 02/28/2017    HCT 37.9 (L) 02/28/2017    MCV 83 02/28/2017     (H) 02/28/2017     Lab Results   Component Value Date     02/28/2017     02/28/2017    K 4.8 02/28/2017    K 4.8 02/28/2017     02/28/2017     02/28/2017    CO2 27 02/28/2017    CO2 27 02/28/2017    BUN 11 02/28/2017    BUN 11 02/28/2017    CREATININE 0.9 02/28/2017    CREATININE 0.9 02/28/2017    CALCIUM 9.6 02/28/2017    CALCIUM 9.6 " 02/28/2017    ANIONGAP 7 (L) 02/28/2017    ANIONGAP 7 (L) 02/28/2017    ESTGFRAFRICA >60 02/28/2017    ESTGFRAFRICA >60 02/28/2017    EGFRNONAA >60 02/28/2017    EGFRNONAA >60 02/28/2017     Lab Results   Component Value Date    ALT 9 (L) 02/28/2017    ALT 9 (L) 02/28/2017    AST 17 02/28/2017    AST 17 02/28/2017    ALKPHOS 59 02/28/2017    ALKPHOS 59 02/28/2017    BILITOT 0.4 02/28/2017    BILITOT 0.4 02/28/2017       Lab Results   Component Value Date    IRON 24 (L) 02/28/2017    TIBC 243 (L) 02/28/2017    FERRITIN 140 02/28/2017     Lab Results   Component Value Date    MSFSCYMJ90 1164 (H) 10/09/2013     Lab Results   Component Value Date    FOLATE 17.6 10/09/2013     Lab Results   Component Value Date    TSH 0.994 05/19/2014       Imaging:  Neck CT 10/10/15:  No prior studies for comparison. No abnormality of the nasopharynx or oropharynx is seen. No abnormality of the airway. No soft tissue fluid collections. No evidence of pathologic cervical lymphadenopathy. No abnormality of the glands. The submandibular glands are either very small or absent.. No abnormality of the thyroid gland. There is atherosclerotic calcification at the carotid bifurcations.    Chest CT 10/10/15:   CTA axial images were obtained following intravenous contrast and MIPS reconstructions obtained.  There is good opacification of vascular structures and no evidence of pulmonary embolus, aortic dissection, or aneurysm. There are bilateral infiltrates throughout the lower lobes and in the posterior left upper lobe, lingula, and middle lobe. Small bilateral pleural effusions. Some of the previously demonstrated small pulmonary nodules in the right lung are visualized and appear stable. No new nodules seen. There are mildly prominent mediastinal lymph nodes, the largest 17 mm, slightly increased in size from prior CT.  Impression: Bilateral infiltrates. Nonspecific mild mediastinal lymphadenopathy increased slightly.    CXR 2/21/17: The lungs  are clear and free of infiltrate.  No pleural effusion or pneumothorax is identified.  The heart is not enlarged.A calcified granulomas noted within the right lung base.  The lungs are hyperexpanded.  There is some mild pleural thickening noted within the right lung apex.  There is a stable calcified granuloma noted within the right upper lung near the apex as well.    Assessment / Plan:  Noble Tripp is a 71 y.o. male with history of recurrent head and neck cancer comes in for HemOnc evaluation.    1.  Recurrent head and neck cancer: Initial diagnosis was likely a stage I laryngeal cancer treated with surgery.  Possible piriform sinus recurrence treated with definitive radiation.  Tongue recurrence treated with surgery.  Will obtain records from [facility] to verify all of these details and treatment history.  Have discussed with patient that head and neck cancers are related to risk factors such as smoking.  Given the field effect from prior smoking history/exposure, patient is at risk for recurrent head and neck cancers as well as new primary lung cancers and needs to undergo active surveillance.  -- Continue to f/u with ENT docs Dr. Varela/Dr. Oleary for surveillance with fiberoptic exam every 3-6 months  -- Recommend CXR every 6 months, next due in Sept 2017. Will f/u with patient afterwards.  -- CT imaging of chest and neck only as clinically indicated based on signs/symptoms  -- Return in 6 months    2. Mild iron deficiency anemia, anemia of chronic disease: Had some improvement in ferritin and Hgb with once a day ferrous sulfate. Recent iron profile consistent with anemia of chronic disease.  -- Continue ferrous sulfate 325mg PO daily with meals. Take stool softeners as needed.  -- Recheck CBC and iron profile in 6 months    3. COPD: Stable and managed by Dr. Cardozo. Uses Albuterol inhaler as needed.    4. Health maintenance, history of colonic polyps: Last colonoscopy 3/2014. Repeat due to  2017.  -- Ordered colonoscopy today    5. Leukocytosis: No signs/symptoms of infection. This is most likely due to occasional aspiration causing a chemical pneumonitis. Asked patient to be careful with thin liquids, take smaller sips, thicken as needed.    Sue Cross M.D.  Hematology Oncology

## 2017-03-06 NOTE — PROGRESS NOTES
"Subjective:   Patient ID:  Noble Tripp is a 71 y.o. male who presents for follow up of Coronary Artery Disease      HPI Comments: 69 yo, male, PMH PSVT, CAD HLD, COPD, JUANITO on CPAP, vocal cord cancer s/p surgery and subsequent XRT in 2011, and recent tongue precancer mass removal.   He was admitted to [facility] for sepsis/PNA and PSVT on 10-. EKG  Showed extensive St depression of 1 mm on anterolateral leads. EF 60%. MPi showed fixed inferior perfusion defect. cTn was up to 0.3. PSVT was covnerted to sinus O/N after BB was added. He had prolonged ATx Rx.  Started Rapetha 3 months ago and LDL 65.  Today, he states that he dose not episode of palpitation.   Feel fatigue, no chest pain, dizziness, dyspnea, dizziness. Do gardening.     MPI in :  A small to moderate size fixed defect of moderate to severe intensity that extends from the apical to the base inferior wall of the left ventricle, consistent with myocardial injury.   ECHO in :  1 - Concentric hypertrophy.   2 - Normal left ventricular systolic function (EF 60-65%).   3 - Left ventricular diastolic dysfunction.   4 - Normal right ventricular systolic function .   5 - The estimated PA systolic pressure is 34 mmHg.   6 - Trivial tricuspid regurgitation.   7 - Increased central venous pressure.   EKG on 10-: PSVT, st depression on V3 to V6, I, II and avL.        Past Medical History:   Diagnosis Date    Adrenal mass     david    Aspiration pneumonia 10/15    puree/honey    Benign prostate hyperplasia     Chronic pain     dr santos    COPD (chronic obstructive pulmonary disease)     papi    Ex-smoker     11/13    Hyperlipidemia     Osteopenia 1/15 tran 1/18    [facility]    Pyriform sinus cancer     dr ponce radiation 1/2-14 dr king perales    Sleep apnea     cpap    Squamous cell carcinoma of skin     roberto    Tongue cancer     "superficial" removed 11/14    Vocal " cord cancer 2011    Xerostomia     radiation       Past Surgical History:   Procedure Laterality Date    APPENDECTOMY      tongue cancer excision  11/14    dr vitale    VOCAL CORD LATERALIZATION, ENDOSCOPIC APPROACH W/ MLB      kris       Social History   Substance Use Topics    Smoking status: Former Smoker    Smokeless tobacco: None    Alcohol use No       Family History   Problem Relation Age of Onset    Cancer Father     Cancer Brother          Review of Systems   Constitution: Positive for malaise/fatigue. Negative for decreased appetite, diaphoresis, fever, weakness and night sweats.   HENT: Negative for headaches and nosebleeds.    Eyes: Negative for blurred vision and double vision.   Cardiovascular: Negative for chest pain, claudication, dyspnea on exertion, irregular heartbeat, leg swelling, near-syncope, orthopnea, palpitations, paroxysmal nocturnal dyspnea and syncope.   Respiratory: Negative for cough, shortness of breath, sleep disturbances due to breathing, snoring, sputum production and wheezing.    Endocrine: Negative for cold intolerance and polyuria.   Hematologic/Lymphatic: Does not bruise/bleed easily.   Skin: Negative for rash.   Musculoskeletal: Negative for back pain, falls, joint pain, joint swelling and neck pain.   Gastrointestinal: Negative for abdominal pain, heartburn, nausea and vomiting.   Genitourinary: Negative for dysuria, frequency and hematuria.   Neurological: Negative for difficulty with concentration, dizziness, focal weakness, light-headedness, numbness and seizures.   Psychiatric/Behavioral: Negative for depression, memory loss and substance abuse. The patient does not have insomnia.    Allergic/Immunologic: Negative for HIV exposure and hives.       Objective:   Physical Exam   Constitutional: He is oriented to person, place, and time. He appears well-nourished.   HENT:   Head: Normocephalic.   Eyes: Pupils are equal, round, and reactive to light.   Neck: Normal  carotid pulses and no JVD present. Carotid bruit is not present. No thyromegaly present.   Cardiovascular: Normal rate, regular rhythm, normal heart sounds and normal pulses.   No extrasystoles are present. PMI is not displaced.  Exam reveals no gallop and no S3.    No murmur heard.  Pulses:       Carotid pulses are 2+ on the right side, and 2+ on the left side.       Radial pulses are 2+ on the right side, and 2+ on the left side.   Pulmonary/Chest: Breath sounds normal. No stridor. No respiratory distress.   B/l decreased BS, no bronchi   Abdominal: Soft. Bowel sounds are normal. There is no tenderness. There is no rebound.   Musculoskeletal: Normal range of motion.   Neurological: He is alert and oriented to person, place, and time.   Skin: Skin is intact. No rash noted.   Psychiatric: His behavior is normal.       Lab Results   Component Value Date    CHOL 133 02/28/2017    CHOL 150 10/04/2016    CHOL 188 08/22/2016     Lab Results   Component Value Date    HDL 53 02/28/2017    HDL 47 10/04/2016    HDL 44 08/22/2016     Lab Results   Component Value Date    LDLCALC 65.0 02/28/2017    LDLCALC 86.2 10/04/2016    LDLCALC 130.2 08/22/2016     Lab Results   Component Value Date    TRIG 75 02/28/2017    TRIG 84 10/04/2016    TRIG 69 08/22/2016     Lab Results   Component Value Date    CHOLHDL 39.8 02/28/2017    CHOLHDL 31.3 10/04/2016    CHOLHDL 23.4 08/22/2016       Chemistry        Component Value Date/Time     02/28/2017 0750     02/28/2017 0750    K 4.8 02/28/2017 0750    K 4.8 02/28/2017 0750     02/28/2017 0750     02/28/2017 0750    CO2 27 02/28/2017 0750    CO2 27 02/28/2017 0750    BUN 11 02/28/2017 0750    BUN 11 02/28/2017 0750    CREATININE 0.9 02/28/2017 0750    CREATININE 0.9 02/28/2017 0750     02/28/2017 0750     02/28/2017 0750        Component Value Date/Time    CALCIUM 9.6 02/28/2017 0750    CALCIUM 9.6 02/28/2017 0750    ALKPHOS 59 02/28/2017 0750    ALKPHOS 59  02/28/2017 0750    AST 17 02/28/2017 0750    AST 17 02/28/2017 0750    ALT 9 (L) 02/28/2017 0750    ALT 9 (L) 02/28/2017 0750    BILITOT 0.4 02/28/2017 0750    BILITOT 0.4 02/28/2017 0750          Lab Results   Component Value Date    TSH 0.994 05/19/2014     Lab Results   Component Value Date    INR 1.1 10/10/2015    INR 1.0 10/10/2015    INR 1.0 07/26/2005     Lab Results   Component Value Date    WBC 19.09 (H) 02/28/2017    HGB 12.6 (L) 02/28/2017    HCT 37.9 (L) 02/28/2017    MCV 83 02/28/2017     (H) 02/28/2017     BMP  Sodium   Date Value Ref Range Status   02/28/2017 141 136 - 145 mmol/L Final   02/28/2017 141 136 - 145 mmol/L Final     Potassium   Date Value Ref Range Status   02/28/2017 4.8 3.5 - 5.1 mmol/L Final   02/28/2017 4.8 3.5 - 5.1 mmol/L Final     Chloride   Date Value Ref Range Status   02/28/2017 107 95 - 110 mmol/L Final   02/28/2017 107 95 - 110 mmol/L Final     CO2   Date Value Ref Range Status   02/28/2017 27 23 - 29 mmol/L Final   02/28/2017 27 23 - 29 mmol/L Final     BUN, Bld   Date Value Ref Range Status   02/28/2017 11 8 - 23 mg/dL Final   02/28/2017 11 8 - 23 mg/dL Final     Creatinine   Date Value Ref Range Status   02/28/2017 0.9 0.5 - 1.4 mg/dL Final   02/28/2017 0.9 0.5 - 1.4 mg/dL Final     Calcium   Date Value Ref Range Status   02/28/2017 9.6 8.7 - 10.5 mg/dL Final   02/28/2017 9.6 8.7 - 10.5 mg/dL Final     Anion Gap   Date Value Ref Range Status   02/28/2017 7 (L) 8 - 16 mmol/L Final   02/28/2017 7 (L) 8 - 16 mmol/L Final     eGFR if    Date Value Ref Range Status   02/28/2017 >60 >60 mL/min/1.73 m^2 Final   02/28/2017 >60 >60 mL/min/1.73 m^2 Final     eGFR if non    Date Value Ref Range Status   02/28/2017 >60 >60 mL/min/1.73 m^2 Final     Comment:     Calculation used to obtain the estimated glomerular filtration  rate (eGFR) is the CKD-EPI equation. Since race is unknown   in our information system, the eGFR values for    -American and Non--American patients are given   for each creatinine result.     02/28/2017 >60 >60 mL/min/1.73 m^2 Final     Comment:     Calculation used to obtain the estimated glomerular filtration  rate (eGFR) is the CKD-EPI equation. Since race is unknown   in our information system, the eGFR values for   -American and Non--American patients are given   for each creatinine result.       Estimated Creatinine Clearance: 73.9 mL/min (based on Cr of 0.9).     Assessment:      1. Coronary artery disease involving native coronary artery of native heart without angina pectoris    2. Essential hypertension    3. PSVT (paroxysmal supraventricular tachycardia)    4. Hyperlipidemia, unspecified hyperlipidemia type    5. Vocal cord cancer        Plan:   Refill Rapetha and repeat lipid profile in  6 months.  Continue current meds.  Recommend heart-healthy diet, weight control and regular exercise.  Yamile. Risk modification.   RTC in 6 months    I have reviewed all pertinent labs and cardiac studies. Plans and recommendations have been formulated under my direct supervision. All questions answered and patient voiced understanding. Patient to continue current medications.

## 2017-03-23 ENCOUNTER — TELEPHONE (OUTPATIENT)
Dept: PHARMACY | Facility: CLINIC | Age: 72
End: 2017-03-23

## 2017-03-23 NOTE — TELEPHONE ENCOUNTER
Called and spoke with patient's wife, Sloane, to notify her Mr. Dickey's Repatha is requiring a reauthorization PA for the new year.  Told Ms. Anton that PA has submitted to his insurance [employer] [phone] and I will call her as soon as I hear back from the insurance company.  Patient and wife are both concerned about the $80.00 copayment.  I explained we can discuss that once we receive the PA decision.  Ms. Anton verbalized understanding.

## 2017-04-19 ENCOUNTER — TELEPHONE (OUTPATIENT)
Dept: PHARMACY | Facility: CLINIC | Age: 72
End: 2017-04-19

## 2017-04-19 NOTE — TELEPHONE ENCOUNTER
Called and spoke with patient's wife, Sloane.  Patient decided not to continue Repatha due to co-pay cost of $80.00 and he is using this next six months off the medication as a trial period to see if the medication really made a difference.    I explained to Ms. Anton that I would send her an application for [employer] for them sign and complete a few questions.  I explained that if approved for the program Mr. Dickey could get free medication.  She said to send it and she would have him look at it.  Verified patient address and application will be mailed on 4/20/17.    Ms. Anton verbalized understanding and will call me with any questions.

## 2017-04-24 ENCOUNTER — TELEPHONE (OUTPATIENT)
Dept: INTERNAL MEDICINE | Facility: CLINIC | Age: 72
End: 2017-04-24

## 2017-04-25 NOTE — TELEPHONE ENCOUNTER
Need clarification: salagen for dry mouth;chlorhexidine is antib (used for gum inflammation)  If related to tongue cancer they may want to check with their surg

## 2017-04-28 ENCOUNTER — TELEPHONE (OUTPATIENT)
Dept: PHARMACY | Facility: CLINIC | Age: 72
End: 2017-04-28

## 2017-04-28 NOTE — TELEPHONE ENCOUNTER
Patient's wife called.    Indicated she received Repatha patient assistance packet including instruction letter, [employer] Application and Extra Help With Medicare Drug Costs (LIS) Application.      Ms. Anton indicated that she showed Mr. Dickey the applications and spoke to him about them.  She said Mr. Dickey has maintained his decision not to restart Repatha at this time.  He was to wait and do labs in six months to see if Repatha made any difference.  She indicated that the patient has made up his mind at this time.  I told her I would relay the message to Dr. Whitfield, patient verbalized understanding.

## 2017-05-01 ENCOUNTER — ANESTHESIA (OUTPATIENT)
Dept: ENDOSCOPY | Facility: HOSPITAL | Age: 72
End: 2017-05-01
Payer: MEDICARE

## 2017-05-01 ENCOUNTER — HOSPITAL ENCOUNTER (OUTPATIENT)
Facility: HOSPITAL | Age: 72
Discharge: HOME | End: 2017-05-01
Attending: INTERNAL MEDICINE | Admitting: INTERNAL MEDICINE
Payer: MEDICARE

## 2017-05-01 ENCOUNTER — ANESTHESIA EVENT (OUTPATIENT)
Dept: ENDOSCOPY | Facility: HOSPITAL | Age: 72
End: 2017-05-01
Payer: MEDICARE

## 2017-05-01 ENCOUNTER — SURGERY (OUTPATIENT)
Age: 72
End: 2017-05-01

## 2017-05-01 VITALS
DIASTOLIC BLOOD PRESSURE: 65 MMHG | SYSTOLIC BLOOD PRESSURE: 136 MMHG | HEART RATE: 59 BPM | OXYGEN SATURATION: 94 % | RESPIRATION RATE: 16 BRPM | TEMPERATURE: 98 F

## 2017-05-01 DIAGNOSIS — Z12.11 COLON CANCER SCREENING: Primary | ICD-10-CM

## 2017-05-01 PROCEDURE — 25000003 PHARM REV CODE 250: Performed by: NURSE ANESTHETIST, CERTIFIED REGISTERED

## 2017-05-01 PROCEDURE — G0105 COLORECTAL SCRN; HI RISK IND: HCPCS | Mod: ,,, | Performed by: INTERNAL MEDICINE

## 2017-05-01 PROCEDURE — 37000008 HC ANESTHESIA 1ST 15 MINUTES: Performed by: INTERNAL MEDICINE

## 2017-05-01 PROCEDURE — 25000003 PHARM REV CODE 250: Performed by: INTERNAL MEDICINE

## 2017-05-01 PROCEDURE — 63600175 PHARM REV CODE 636 W HCPCS: Performed by: NURSE ANESTHETIST, CERTIFIED REGISTERED

## 2017-05-01 PROCEDURE — 37000009 HC ANESTHESIA EA ADD 15 MINS: Performed by: INTERNAL MEDICINE

## 2017-05-01 PROCEDURE — G0105 COLORECTAL SCRN; HI RISK IND: HCPCS | Performed by: INTERNAL MEDICINE

## 2017-05-01 RX ORDER — SODIUM CHLORIDE, SODIUM LACTATE, POTASSIUM CHLORIDE, CALCIUM CHLORIDE 600; 310; 30; 20 MG/100ML; MG/100ML; MG/100ML; MG/100ML
INJECTION, SOLUTION INTRAVENOUS CONTINUOUS
Status: DISCONTINUED | OUTPATIENT
Start: 2017-05-01 | End: 2017-05-01 | Stop reason: HOSPADM

## 2017-05-01 RX ORDER — LIDOCAINE HYDROCHLORIDE 10 MG/ML
INJECTION INFILTRATION; PERINEURAL
Status: DISCONTINUED | OUTPATIENT
Start: 2017-05-01 | End: 2017-05-01

## 2017-05-01 RX ORDER — PROPOFOL 10 MG/ML
VIAL (ML) INTRAVENOUS
Status: DISCONTINUED | OUTPATIENT
Start: 2017-05-01 | End: 2017-05-01

## 2017-05-01 RX ADMIN — PROPOFOL 50 MG: 10 INJECTION, EMULSION INTRAVENOUS at 07:05

## 2017-05-01 RX ADMIN — LIDOCAINE HYDROCHLORIDE 50 MG: 10 INJECTION, SOLUTION INFILTRATION; PERINEURAL at 07:05

## 2017-05-01 RX ADMIN — SODIUM CHLORIDE, SODIUM LACTATE, POTASSIUM CHLORIDE, AND CALCIUM CHLORIDE: .6; .31; .03; .02 INJECTION, SOLUTION INTRAVENOUS at 06:05

## 2017-05-01 NOTE — DISCHARGE INSTRUCTIONS
Diverticulosis    Diverticulosis means that small pouches have formed in the wall of your large intestine (colon). Most often, this problem causes no symptoms and is common as people age. But the pouches in the colon are at risk of becoming infected. When this happens, the condition is called diverticulitis. Although most people with diverticulosis never develop diverticulitis, it is still not uncommon. Rectal bleeding can also occur and in less common situations, a type of colon inflammation called colitis.  While most people do not have symptoms, some people with diverticulosis may have:  · Abdominal cramps and pain  · Bloating  · Constipation  · Change in bowel habits  Causes  The exact cause of diverticulosis (and diverticulitis) has not been proved, but a few things are associated with the condition:  · Low-fiber diet  · Constipation  · Lack of exercise  Your healthcare provider will talk with you about how to manage your condition. Diet changes may be all that are needed to help control diverticulosis and prevent progression to diverticulitis. If you develop diverticulitis, you will likely need other treatments.  Home care  You may be told to take fiber supplements daily. Fiber adds bulk to the stool so that it passes through the colon more easily. Stool softeners may be recommended. You may also be given medications for pain relief. Be sure to take all medications as directed.  In the past, people were told to avoid corn, nuts, and seeds. This is no longer necessary.  Follow these guidelines when caring for yourself at home:  · Eat unprocessed foods that are high in fiber. Whole grains, fruits, and vegetables are good choices.  · Drink 6 to 8 glasses of water every day unless your healthcare provider has you limit how much fluid you should have.  · Watch for changes in your bowel movements. Tell your provider if you notice any changes.  · Begin an exercise program. Ask your provider how to get started.  Generally, walking is the best.  · Get plenty of rest and sleep.  Follow-up care  Follow up with your healthcare provider, or as advised. Regular visits may be needed to check on your health. Sometimes special procedures such as colonoscopy, are needed after an episode of diverticulitis or blooding. Be sure to keep all your appointments.  If a stool sample was taken, or cultures were done, you should be told if they are positive, or if your treatment needs to be changed. You can call as directed for the results.  If X-rays were done, a radiologist will look at them. You will be told if there is a change in your treatment.  If antibiotics were prescribed, be sure to finish them all.  When to seek medical advice  Call your healthcare provider right away if any of these occur:  · Fever of 100.4°F (38°C) or higher, or as directed by your healthcare provider  · Severe cramps in the lower left side of the abdomen or pain that is getting worse  · Tenderness in the lower left side of the abdomen or worsening pain throughout the abdomen  · Diarrhea or constipation that doesn't get better within 24 hours  · Nausea and vomiting  · Bleeding from the rectum  Call 911  Call emergency services if any of the following occur:  · Trouble breathing  · Confusion  · Very drowsy or trouble awakening  · Fainting or loss of consciousness  · Rapid heart rate  · Chest pain  Date Last Reviewed: 12/30/2015 © 2000-2016 [employer]. [address] [place] [id number]. All rights reserved. This information is not intended as a substitute for professional medical care. Always follow your healthcare professional's instructions.        Hemorrhoids    Hemorrhoids are swollen and inflamed veins inside the rectum and near the anus. The rectum is the last several inches of the colon. The anus is the passage between the rectum and the outside of the body.  Causes  The veins can become swollen due to increased pressure in them. This  is most often caused by:  · Chronic constipation or diarrhea  · Straining when having a bowel movement  · Sitting too long on the toilet  · A low-fiber diet  · Pregnancy  Symptoms  · Bleeding from the rectum (this may be noticeable after bowel movements)  · Lump near the anus  · Itching around the anus  · Pain around the anus  There are different types of hemorrhoids. Depending on the type you have and the severity, you may be able to treat yourself at home. In some cases, a procedure may be the best treatment option. Your healthcare provider can tell you more about this, if needed.  Home care  General care  · To get relief from pain or itching, try:  ¨ Topical products. Your healthcare provider may prescribe or recommend creams, ointments, or pads that can be applied to the hemorrhoid. Use these exactly as directed.  ¨ Medicines. Your healthcare provider may recommend stool softeners, suppositories, or laxatives to help manage constipation. Use these exactly as directed.  ¨ Sitz baths. A sitz bath involves sitting in a few inches of warm bath water. Be careful not to make the water so hot that you burn yourself--test it before sitting in it. Soak for about 10 to 15 minutes a few times a day. This may help relieve pain.  Tips to help prevent hemorrhoids  · Eat more fiber. Fiber adds bulk to stool and absorbs water as it moves through your colon. This makes stool softer and easier to pass.  ¨ Increase the fiber in your diet with more fiber-rich foods. These include fresh fruit, vegetables, and whole grains.  ¨ Take a fiber supplement or bulking agent, if advised to by your provider. These include products such as psyllium or methylcellulose.  · Drink plenty of water, if directed to by your provider. This can help keep stool soft.  · Be more active. Frequent exercise aids digestion and helps prevent constipation. It may also help make bowel movements more regular.  · Dont strain during bowel movements. This can make  hemorrhoids more likely. Also, dont sit on the toilet for long periods of time.  Follow-up care  Follow up with your healthcare provider, or as advised. If a culture or imaging tests were done, you will be notified of the results when they are ready. This may take a few days or longer.  When to seek medical advice  Call your healthcare provider right away if any of these occur:  · Increased bleeding from the rectum  · Increased pain around the rectum or anus  · Weakness or dizziness  Call 911  Call 911 or return to the emergency department right away if any of these occur:  · Trouble breathing or swallowing  · Fainting or loss of consciousness  · Unusually fast heart rate  · Vomiting blood  · Large amounts of blood in stool  Date Last Reviewed: 6/22/2015 © 2000-2016 [employer]. [address] [place] [address], [place] [address]. All rights reserved. This information is not intended as a substitute for professional medical care. Always follow your healthcare professional's instructions.

## 2017-05-01 NOTE — H&P
Short Stay Endoscopy History and Physical    PCP - Dwaine Davis MD    Procedure - Colonoscopy    H/O colon polyp(s), denies any acute issues. Needs surveillance colonoscopy.    ROS:  Constitutional: No fevers, chills, No weight loss  ENT: No allergies  CV: No chest pain  Pulm: No cough, No shortness of breath  Ophtho: No vision changes  GI: see HPI  Derm: No rash  Heme: No lymphadenopathy, No bruising  MSK: No arthritis  : No dysuria, No hematuria  Endo: No hot or cold intolerance  Neuro: No syncope, No seizure  Psych: No anxiety, No depression    Medical History:  has a past medical history of Adrenal mass; Aspiration pneumonia (10/15); Benign prostate hyperplasia; Chronic pain; COPD (chronic obstructive pulmonary disease); Ex-smoker; Hyperlipidemia; Osteopenia (1/15 tran 1/18); PVD (peripheral vascular disease); Pyriform sinus cancer; Sleep apnea; Squamous cell carcinoma of skin; Tongue cancer; Vocal cord cancer (2011); and Xerostomia.    Surgical History:  has a past surgical history that includes Appendectomy; Vocal cord lateralization, endoscopic approach w/ MLB; and tongue cancer excision (11/14).    Family History: family history includes Cancer in his brother and father.. Otherwise no colon cancer, inflammatory bowel disease, or GI malignancies.    Social History:  reports that he has quit smoking. He does not have any smokeless tobacco history on file. He reports that he does not drink alcohol or use illicit drugs.    Review of patient's allergies indicates:   Allergen Reactions    Contrast media     Iodinated contrast media - oral and iv dye      Other reaction(s): Itching  Other reaction(s): Hives    Neomycin-bacitracin-polymyxin      Other reaction(s): Rash    Pravastatin      Other reaction(s): fatigue    Rosuvastatin      Other reaction(s): fatigue    Simvastatin      Other reaction(s): fatigue    Statins-hmg-coa reductase inhibitors        Medications:   Prescriptions Prior to Admission    Medication Sig Dispense Refill Last Dose    albuterol 90 mcg/actuation inhaler Inhale 2 puffs into the lungs every 4 (four) hours as needed for Wheezing or Shortness of Breath. 1 Inhaler 11 Past Week at Unknown time    aspirin 81 mg Tab Take 1 tablet by mouth Daily. Over the counter to help prevent stroke/heart attack   4/29/2017 at Unknown time    CHANTIX CONTINUING MONTH BOX 1 mg Tab TAKE 1 TABLET (1 MG TOTAL) BY MOUTH ONCE DAILY. 56 tablet 3 Past Week at Unknown time    evolocumab (REPATHA SURECLICK) 140 mg/mL PnIj Inject 1 Syringe into the skin every 14 (fourteen) days. 6 Syringe 3 Past Month at Unknown time    ferrous sulfate 325 (65 FE) MG EC tablet Take 1 tablet (325 mg total) by mouth 2 times daily 2 hours after meal. 60 tablet 3 4/30/2017 at Unknown time    fish oil-omega-3 fatty acids 300-1,000 mg capsule Take 2 g by mouth.   Past Month at Unknown time    hydrocodone-acetaminophen 10-325mg (NORCO)  mg Tab Take 1 tablet by mouth 3 (three) times daily as needed. 90 tablet 0 4/30/2017 at Unknown time    loratadine (CLARITIN) 10 mg tablet Take 1 tablet by mouth Daily.   Past Week at Unknown time    multivitamin capsule Take 1 capsule by mouth once daily.   Past Month at Unknown time    pilocarpine (SALAGEN) 5 MG Tab TAKE 1/2 HOUR PRIOR TO EACH MEAL 90 tablet 8 4/30/2017 at Unknown time    gabapentin (NEURONTIN) 800 MG tablet Take 1 tablet (800 mg total) by mouth 3 (three) times daily. 90 tablet 2 Taking    pantoprazole (PROTONIX) 40 MG tablet Take 40 mg by mouth once daily.   5 4/29/2017       Objective Findings:    Vital Signs:   Vitals:    05/01/17 0649   BP: (!) 140/69   Pulse:    Resp:    Temp:          Physical Exam:  General Appearance: Well appearing in no acute distress  Eyes:    No scleral icterus  ENT: Neck supple, Lips, mucosa, and tongue normal; teeth and gums normal  Lungs: CTA bilaterally in anterior and posterior fields, no wheezes, no crackles.  Heart:  Regular rate, S1, S2  normal, no murmurs heard.  Abdomen: Soft, non tender, non distended with normal bowel sounds. No hepatosplenomegaly, ascites, or mass.  Extremities: No clubbing, cyanosis or edema  Skin: No rash    Labs:  Lab Results   Component Value Date    WBC 19.09 (H) 02/28/2017    HGB 12.6 (L) 02/28/2017    HCT 37.9 (L) 02/28/2017     (H) 02/28/2017    CHOL 133 02/28/2017    TRIG 75 02/28/2017    HDL 53 02/28/2017    ALT 9 (L) 02/28/2017    ALT 9 (L) 02/28/2017    AST 17 02/28/2017    AST 17 02/28/2017     02/28/2017     02/28/2017    K 4.8 02/28/2017    K 4.8 02/28/2017     02/28/2017     02/28/2017    CREATININE 0.9 02/28/2017    CREATININE 0.9 02/28/2017    BUN 11 02/28/2017    BUN 11 02/28/2017    CO2 27 02/28/2017    CO2 27 02/28/2017    TSH 0.994 05/19/2014    PSA 1.6 10/08/2015    INR 1.1 10/10/2015    GLUF 90 01/14/2010    HGBA1C 6.0 01/17/2013       I have explained the risks and benefits of endoscopy procedures to the patient including but not limited to bleeding, perforation, infection, and death.    Proceed with colonoscopy for h/o colon polyp(s).

## 2017-05-01 NOTE — ANESTHESIA POSTPROCEDURE EVALUATION
Anesthesia Post Evaluation    Patient: Noble Tripp    Procedure(s) Performed: Procedure(s) (LRB):  Due for screening colonoscopy (N/A)    Final Anesthesia Type: MAC  Patient location during evaluation: GI PACU  Patient participation: Yes- Able to Participate  Level of consciousness: awake and alert and oriented  Post-procedure vital signs: reviewed and stable  Pain management: adequate  Airway patency: patent  PONV status at discharge: No PONV  Anesthetic complications: no      Cardiovascular status: blood pressure returned to baseline  Respiratory status: unassisted, room air and spontaneous ventilation  Hydration status: euvolemic  Follow-up not needed.        Visit Vitals    /65    Pulse (!) 59    Temp 36.7 °C (98.1 °F)    Resp 16    SpO2 (!) 94%       Pain/Tal Score: Pain Assessment Performed: Yes (5/1/2017  6:51 AM)  Presence of Pain: denies (5/1/2017  7:48 AM)  Tal Score: 10 (5/1/2017  7:48 AM)

## 2017-05-01 NOTE — TRANSFER OF CARE
Anesthesia Transfer of Care Note    Patient: Noble Tripp    Procedure(s) Performed: Procedure(s) (LRB):  Due for screening colonoscopy (N/A)    Patient location: GI    Anesthesia Type: MAC    Transport from OR: Transported from OR on room air with adequate spontaneous ventilation    Post pain: adequate analgesia    Post assessment: no apparent anesthetic complications    Post vital signs: stable    Level of consciousness: awake, oriented and alert    Nausea/Vomiting: no nausea/vomiting    Complications: none          Last vitals:   Visit Vitals    BP (!) 140/69    Pulse 67    Temp 36.7 °C (98.1 °F) (Oral)    Resp 18    SpO2 96%

## 2017-05-01 NOTE — ANESTHESIA RELEASE NOTE
Anesthesia Release from PACU Note    Patient: Noble Tripp    Procedure(s) Performed: Procedure(s) (LRB):  Due for screening colonoscopy (N/A)    Anesthesia type: MAC    Post pain: Adequate analgesia    Post assessment: no apparent anesthetic complications, tolerated procedure well and no evidence of recall    Last Vitals:   Visit Vitals    /65    Pulse (!) 59    Temp 36.7 °C (98.1 °F)    Resp 16    SpO2 (!) 94%       Post vital signs: stable    Level of consciousness: awake, alert  and oriented    Nausea/Vomiting: no nausea/no vomiting    Complications: none    Airway Patency: patent    Respiratory: unassisted, spontaneous ventilation, room air    Cardiovascular: stable and blood pressure at baseline    Hydration: euvolemic

## 2017-05-01 NOTE — ANESTHESIA PREPROCEDURE EVALUATION
05/01/2017  Noble Tripp is a 71 y.o., male.    Anesthesia Evaluation    I have reviewed the Patient Summary Reports.    I have reviewed the Nursing Notes.   I have reviewed the Medications.     Review of Systems  Anesthesia Hx:  No problems with previous Anesthesia    Social:  Former Smoker    Cardiovascular:   Hypertension CAD   ECG has been reviewed.     TEST DESCRIPTION   Technical Quality: This is a technically challenging study.     Aorta: The aortic root is normal in size, measuring 3.1 cm at sinotubular junction and 3.7 cm at Sinuses of Valsalva.     Left Atrium: The left atrium is normal in size, measuring 5.2 cm across in the apical view.     Left Ventricle: The left ventricle is normal in size, with an end-diastolic diameter of 4.2 cm, and an end-systolic diameter of 3.1 cm. LV wall thickness is normal, with the septum measuring 1.7 cm and the posterior wall measuring 1.4 cm across. Relative   wall thickness was increased at 0.67, and the LV mass index was increased at 171.6 g/m2 consistent with concentric left ventricular hypertrophy. Global left ventricular systolic function appears normal. Visually estimated ejection fraction is 60-65%.   The LV Doppler derived stroke volume equals 66.0 ccs.   Left atrial pressures are normal. The E/e'(lat) is 8.  This along with the following abnormalities (LVMI = 171.60) suggests diastolic dysfunction secondary to relaxation abnormality.     Right Atrium: The right atrium is normal in size, measuring 5.1 cm in length and 4.1 cm in width in the apical view.     Right Ventricle: The right ventricle is normal in size. Global right ventricular systolic function appears normal. The estimated PA systolic pressure is 34 mmHg.     Aortic Valve:  The aortic valve is normal in structure.     Mitral Valve:  The mitral valve is normal in structure.     Tricuspid  Valve:  The tricuspid valve is normal in structure. There is trivial tricuspid regurgitation.     Pulmonary Valve:  The pulmonic valve is not well seen. There is mild pulmonic regurgitation.     IVC: IVC is enlarged and collapses < 50% with a sniff, suggesting high right atrial pressure of 15 mmHg.     Intracavitary: There is no evidence of pericardial effusion, intracavity mass, thrombi, or vegetation.         CONCLUSIONS     1 - Concentric hypertrophy.     2 - Normal left ventricular systolic function (EF 60-65%).     3 - Left ventricular diastolic dysfunction.     4 - Normal right ventricular systolic function .     5 - The estimated PA systolic pressure is 34 mmHg.     6 - Trivial tricuspid regurgitation.     7 - Increased central venous pressure.  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  Obstructive Sleep Apnea (JUANITO), CPAP used.   Hepatic/GI:   Bowel Prep.        Physical Exam  General:  Well nourished    Airway/Jaw/Neck:  Airway Findings: Mouth Opening: Normal Tongue: Normal  General Airway Assessment: Adult       Chest/Lungs:  Chest/Lungs Findings: Normal Respiratory Rate     Heart/Vascular:  Heart Findings: Rate: Normal             Anesthesia Plan  Type of Anesthesia, risks & benefits discussed:  Anesthesia Type:  MAC  Patient's Preference:   Intra-op Monitoring Plan:   Intra-op Monitoring Plan Comments:   Post Op Pain Control Plan:   Post Op Pain Control Plan Comments:   Induction:   IV  Beta Blocker:  Patient is not currently on a Beta-Blocker (No further documentation required).       Informed Consent: Patient understands risks and agrees with Anesthesia plan.  Questions answered. Anesthesia consent signed with patient.  ASA Score: 3     Day of Surgery Review of History & Physical: I have interviewed and examined  the patient. I have reviewed the patient's H&P dated:  There are no significant changes.          Ready For Surgery From Anesthesia Perspective.

## 2017-05-01 NOTE — IP AVS SNAPSHOT
[address] [place] [address]           Patient Discharge Instructions   Our goal is to set you up for success. This packet includes information on your condition, medications, and your home care.  It will help you care for yourself to prevent having to return to the hospital.     Please ask your nurse if you have any questions.      There are many details to remember when preparing to leave the hospital. Here is what you will need to do:    1. Take your medicine. If you are prescribed medications, review your Medication List on the following pages. You may have new medications to  at the pharmacy and others that you'll need to stop taking. Review the instructions for how and when to take your medications. Talk with your doctor or nurses if you are unsure of what to do.     2. Go to your follow-up appointments. Specific follow-up information is listed in the following pages. Your may be contacted by a nurse or clinical provider about future appointments. Be sure we have all of the phone numbers to reach you. Please contact your provider's office if you are unable to make an appointment.     3. Watch for warning signs. Your doctor or nurse will give you detailed warning signs to watch for and when to call for assistance. These instructions may also include educational information about your condition. If you experience any of warning signs to your health, call your doctor.               ** Verify the list of medication(s) below is accurate and up to date. Carry this with you in case of emergency. If your medications have changed, please notify your healthcare provider.             Medication List      CONTINUE taking these medications        Additional Info                      albuterol 90 mcg/actuation inhaler   Quantity:  1 Inhaler   Refills:  11   Dose:  2 puff    Instructions:  Inhale 2 puffs into the lungs every 4 (four) hours as needed for Wheezing or  Shortness of Breath.     Begin Date    AM    Noon    PM    Bedtime       aspirin 81 mg Tab   Refills:  0   Dose:  1 tablet    Instructions:  Take 1 tablet by mouth Daily. Over the counter to help prevent stroke/heart attack     Begin Date    AM    Noon    PM    Bedtime       CHANTIX CONTINUING MONTH BOX 1 mg Tab   Quantity:  56 tablet   Refills:  3   Generic drug:  varenicline    Instructions:  TAKE 1 TABLET (1 MG TOTAL) BY MOUTH ONCE DAILY.     Begin Date    AM    Noon    PM    Bedtime       evolocumab 140 mg/mL Pnij   Commonly known as:  REPATHA SURECLICK   Quantity:  6 Syringe   Refills:  3   Dose:  1 Syringe    Instructions:  Inject 1 Syringe into the skin every 14 (fourteen) days.     Begin Date    AM    Noon    PM    Bedtime       ferrous sulfate 325 (65 FE) MG EC tablet   Quantity:  60 tablet   Refills:  3   Dose:  325 mg    Instructions:  Take 1 tablet (325 mg total) by mouth 2 times daily 2 hours after meal.     Begin Date    AM    Noon    PM    Bedtime       fish oil-omega-3 fatty acids 300-1,000 mg capsule   Refills:  0   Dose:  2 g    Instructions:  Take 2 g by mouth.     Begin Date    AM    Noon    PM    Bedtime       gabapentin 800 MG tablet   Commonly known as:  NEURONTIN   Quantity:  90 tablet   Refills:  2   Dose:  800 mg    Instructions:  Take 1 tablet (800 mg total) by mouth 3 (three) times daily.     Begin Date    AM    Noon    PM    Bedtime       hydrocodone-acetaminophen 10-325mg  mg Tab   Commonly known as:  NORCO   Quantity:  90 tablet   Refills:  0   Dose:  1 tablet    Instructions:  Take 1 tablet by mouth 3 (three) times daily as needed.     Begin Date    AM    Noon    PM    Bedtime       loratadine 10 mg tablet   Commonly known as:  CLARITIN   Refills:  0   Dose:  1 tablet    Instructions:  Take 1 tablet by mouth Daily.     Begin Date    AM    Noon    PM    Bedtime       multivitamin capsule   Refills:  0   Dose:  1 capsule    Instructions:  Take 1 capsule by mouth once daily.      Begin Date    AM    Noon    PM    Bedtime       pantoprazole 40 MG tablet   Commonly known as:  PROTONIX   Refills:  5   Dose:  40 mg    Instructions:  Take 40 mg by mouth once daily.     Begin Date    AM    Noon    PM    Bedtime       pilocarpine 5 MG Tab   Commonly known as:  SALAGEN   Quantity:  90 tablet   Refills:  8    Instructions:  TAKE 1/2 HOUR PRIOR TO EACH MEAL     Begin Date    AM    Noon    PM    Bedtime                  Please bring to all follow up appointments:    1. A copy of your discharge instructions.  2. All medicines you are currently taking in their original bottles.  3. Identification and insurance card.    Please arrive 15 minutes ahead of scheduled appointment time.    Please call 24 hours in advance if you must reschedule your appointment and/or time.        Your Scheduled Appointments     May 26, 2017  9:00 AM CDT   Established Patient Visit with JIMENEZ Zayas - Interventional Pain ([facility])    [address] [place] [address]   [phone]            Aug 24, 2017  9:50 AM CDT   Non-Fasting Lab [facility]   [facility]    [address] [place] [address]   [phone]            Aug 24, 2017 10:15 AM CDT   Diagnostic Xray with ONLMARIO ALBERTO XR1-   [facility]    [address] [place] [address]   [phone]            Aug 31, 2017  1:00 PM CDT   Established Patient Visit with MD Jesse Whitaker - Hematology Oncology ([facility])    [address] [place] [address]   [phone]            Nov 03, 2017  8:40 AM CDT   Established Patient Visit with Dwaine Davis MD   [facility] - Internal Medicine ([facility])    [address] [facility] [address]   [phone]              Follow-up Information     Follow up with Dwaine Davis MD.    Specialty:  Family Medicine    Why:   As needed    Contact information:    [address] [place] [address] [phone]          Discharge Instructions     Future Orders    Activity as tolerated     Call MD for:  severe uncontrolled pain     Call MD for:  temperature >100.4     Diet general     Questions:    Total calories:      Fat restriction, if any:      Protein restriction, if any:      Na restriction, if any:      Fluid restriction:      Additional restrictions:          Discharge Instructions         Diverticulosis    Diverticulosis means that small pouches have formed in the wall of your large intestine (colon). Most often, this problem causes no symptoms and is common as people age. But the pouches in the colon are at risk of becoming infected. When this happens, the condition is called diverticulitis. Although most people with diverticulosis never develop diverticulitis, it is still not uncommon. Rectal bleeding can also occur and in less common situations, a type of colon inflammation called colitis.  While most people do not have symptoms, some people with diverticulosis may have:  · Abdominal cramps and pain  · Bloating  · Constipation  · Change in bowel habits  Causes  The exact cause of diverticulosis (and diverticulitis) has not been proved, but a few things are associated with the condition:  · Low-fiber diet  · Constipation  · Lack of exercise  Your healthcare provider will talk with you about how to manage your condition. Diet changes may be all that are needed to help control diverticulosis and prevent progression to diverticulitis. If you develop diverticulitis, you will likely need other treatments.  Home care  You may be told to take fiber supplements daily. Fiber adds bulk to the stool so that it passes through the colon more easily. Stool softeners may be recommended. You may also be given medications for pain relief. Be sure to take all medications as directed.  In the past, people were told to avoid corn, nuts, and  seeds. This is no longer necessary.  Follow these guidelines when caring for yourself at home:  · Eat unprocessed foods that are high in fiber. Whole grains, fruits, and vegetables are good choices.  · Drink 6 to 8 glasses of water every day unless your healthcare provider has you limit how much fluid you should have.  · Watch for changes in your bowel movements. Tell your provider if you notice any changes.  · Begin an exercise program. Ask your provider how to get started. Generally, walking is the best.  · Get plenty of rest and sleep.  Follow-up care  Follow up with your healthcare provider, or as advised. Regular visits may be needed to check on your health. Sometimes special procedures such as colonoscopy, are needed after an episode of diverticulitis or blooding. Be sure to keep all your appointments.  If a stool sample was taken, or cultures were done, you should be told if they are positive, or if your treatment needs to be changed. You can call as directed for the results.  If X-rays were done, a radiologist will look at them. You will be told if there is a change in your treatment.  If antibiotics were prescribed, be sure to finish them all.  When to seek medical advice  Call your healthcare provider right away if any of these occur:  · Fever of 100.4°F (38°C) or higher, or as directed by your healthcare provider  · Severe cramps in the lower left side of the abdomen or pain that is getting worse  · Tenderness in the lower left side of the abdomen or worsening pain throughout the abdomen  · Diarrhea or constipation that doesn't get better within 24 hours  · Nausea and vomiting  · Bleeding from the rectum  Call 911  Call emergency services if any of the following occur:  · Trouble breathing  · Confusion  · Very drowsy or trouble awakening  · Fainting or loss of consciousness  · Rapid heart rate  · Chest pain  Date Last Reviewed: 12/30/2015  © 2698-4672 [employer]. [address] [facility],  [place] [address]. All rights reserved. This information is not intended as a substitute for professional medical care. Always follow your healthcare professional's instructions.        Hemorrhoids    Hemorrhoids are swollen and inflamed veins inside the rectum and near the anus. The rectum is the last several inches of the colon. The anus is the passage between the rectum and the outside of the body.  Causes  The veins can become swollen due to increased pressure in them. This is most often caused by:  · Chronic constipation or diarrhea  · Straining when having a bowel movement  · Sitting too long on the toilet  · A low-fiber diet  · Pregnancy  Symptoms  · Bleeding from the rectum (this may be noticeable after bowel movements)  · Lump near the anus  · Itching around the anus  · Pain around the anus  There are different types of hemorrhoids. Depending on the type you have and the severity, you may be able to treat yourself at home. In some cases, a procedure may be the best treatment option. Your healthcare provider can tell you more about this, if needed.  Home care  General care  · To get relief from pain or itching, try:  ¨ Topical products. Your healthcare provider may prescribe or recommend creams, ointments, or pads that can be applied to the hemorrhoid. Use these exactly as directed.  ¨ Medicines. Your healthcare provider may recommend stool softeners, suppositories, or laxatives to help manage constipation. Use these exactly as directed.  ¨ Sitz baths. A sitz bath involves sitting in a few inches of warm bath water. Be careful not to make the water so hot that you burn yourself--test it before sitting in it. Soak for about 10 to 15 minutes a few times a day. This may help relieve pain.  Tips to help prevent hemorrhoids  · Eat more fiber. Fiber adds bulk to stool and absorbs water as it moves through your colon. This makes stool softer and easier to pass.  ¨ Increase the fiber in your diet with more  fiber-rich foods. These include fresh fruit, vegetables, and whole grains.  ¨ Take a fiber supplement or bulking agent, if advised to by your provider. These include products such as psyllium or methylcellulose.  · Drink plenty of water, if directed to by your provider. This can help keep stool soft.  · Be more active. Frequent exercise aids digestion and helps prevent constipation. It may also help make bowel movements more regular.  · Dont strain during bowel movements. This can make hemorrhoids more likely. Also, dont sit on the toilet for long periods of time.  Follow-up care  Follow up with your healthcare provider, or as advised. If a culture or imaging tests were done, you will be notified of the results when they are ready. This may take a few days or longer.  When to seek medical advice  Call your healthcare provider right away if any of these occur:  · Increased bleeding from the rectum  · Increased pain around the rectum or anus  · Weakness or dizziness  Call 911  Call 911 or return to the emergency department right away if any of these occur:  · Trouble breathing or swallowing  · Fainting or loss of consciousness  · Unusually fast heart rate  · Vomiting blood  · Large amounts of blood in stool  Date Last Reviewed: 6/22/2015  © 5732-7550 [employer]. [address] [place] [address], [place] [address]. All rights reserved. This information is not intended as a substitute for professional medical care. Always follow your healthcare professional's instructions.            Primary Diagnosis     Your primary diagnosis was:  Screen For Colon Cancer      Admission Information     Date & Time Provider Department CSN    5/1/2017  6:13 AM Anushka Yoder MD [facility] -  [id number]      Care Providers     Provider Role Specialty Primary office phone    Anushka Yoder MD Attending Provider Gastroenterology [phone]    Anushka Yoder MD Surgeon  Gastroenterology [phone]      Your Vitals Were      BP Pulse Temp Resp SpO2       128/60 (BP Location: Left arm, Patient Position: Lying, BP Method: Automatic) 65 98.1 °F (36.7 °C) 18 96%       Recent Lab Values        1/17/2013                           8:04 AM           A1C 6.0                       Allergies as of 5/1/2017        Reactions    Contrast Media     Iodinated Contrast Media - Oral And Iv Dye     Other reaction(s): Itching  Other reaction(s): Hives    Neomycin-bacitracin-polymyxin     Other reaction(s): Rash    Pravastatin     Other reaction(s): fatigue    Rosuvastatin     Other reaction(s): fatigue    Simvastatin     Other reaction(s): fatigue    Statins-hmg-coa Reductase Inhibitors       [facility]     [facility] Nurse Care Line - 24/7 Assistance  Unless otherwise directed by your provider, please contact [facility], our nurse care line that is available for 24/7 assistance.     Registered nurses in the [facility] provide clinical advisement, health education, appointment booking, and other advisory services.  Call for this free service at [phone].        Advance Directives     An advance directive is a document which, in the event you are no longer able to make decisions for yourself, tells your healthcare team what kind of treatment you do or do not want to receive, or who you would like to make those decisions for you.  If you do not currently have an advance directive, [facility] encourages you to create one.  For more information call:  [phone]), [phone] ([phone]),  or log on to [email or web address].        Smoking Cessation     If you would like to quit smoking:   You may be eligible for free services if you are a [place] resident and started smoking cigarettes before September 1, 1988.  Call the Smoking Cessation Trust (SCT) toll free at (249) 241-2386 or (695) 265-0471.   Call 2-718-QUIT-NOW if you do not meet the above criteria.   Contact us via email:  [email or web address]   View our website for more information: [email or web address]        Language Assistance Services     ATTENTION: Language assistance services are available, free of charge. Please call [phone].      ATENCIÓN: Si habla shantanu, tiene a pennington disposición servicios gratuitos de asistencia lingüística. Llame al [phone].     CHÚ Ý: N?u b?n nói Ti?ng Vi?t, có các d?ch v? h? tr? ngôn ng? mi?n phí dành cho b?n. G?i s? [phone].        Pneumonmia Discharge Instructions                 [facility] complies with applicable Federal civil rights laws and does not discriminate on the basis of race, color, national origin, age, disability, or sex.

## 2017-05-01 NOTE — BRIEF OP NOTE
[facility] -   Brief Operative Note     SUMMARY     Surgery Date: 5/1/2017     Surgeon(s) and Role:     * Anushka Yoder MD - Primary    Assisting Surgeon: None    Pre-op Diagnosis:  History of colonic polyps [Z86.010]    Post-op Diagnosis:  Post-Op Diagnosis Codes:     * History of colonic polyps [Z86.010]    Procedure(s) (LRB):  Due for screening colonoscopy (N/A)    Anesthesia: Monitor Anesthesia Care    Description of the findings of the procedure: Patient examined at the end of exam and was stable.  Procedure was well tolerated.  Details of findings can be found in the procedure note.    Findings/Key Components: Details of findings can be found in the procedure note.    Estimated Blood Loss: Minimal         Specimens:   Specimen     None          Discharge Note    SUMMARY     Admit Date: 5/1/2017    Discharge Date and Time: 5/1/2017    Hospital Course (synopsis of major diagnoses, care, treatment, and services provided during the course of the hospital stay): Patient examined at the end of exam and was stable.  Procedure was well tolerated.  Details of findings can be found in the procedure note.    Final Diagnosis: Post-Op Diagnosis Codes:     * History of colonic polyps [Z86.010]    Disposition: Home or Self Care    Follow Up/Patient Instructions:     Medications:  Reconciled Home Medications:   Current Discharge Medication List      CONTINUE these medications which have NOT CHANGED    Details   albuterol 90 mcg/actuation inhaler Inhale 2 puffs into the lungs every 4 (four) hours as needed for Wheezing or Shortness of Breath.  Qty: 1 Inhaler, Refills: 11    Associated Diagnoses: Moderate COPD (chronic obstructive pulmonary disease)      aspirin 81 mg Tab Take 1 tablet by mouth Daily. Over the counter to help prevent stroke/heart attack      CHANTIX CONTINUING MONTH BOX 1 mg Tab TAKE 1 TABLET (1 MG TOTAL) BY MOUTH ONCE DAILY.  Qty: 56 tablet, Refills: 3      evolocumab (REPATHA SURECLICK) 140 mg/mL  PnIj Inject 1 Syringe into the skin every 14 (fourteen) days.  Qty: 6 Syringe, Refills: 3    Associated Diagnoses: Coronary artery disease involving native coronary artery of native heart without angina pectoris; Hyperlipidemia, unspecified hyperlipidemia type      ferrous sulfate 325 (65 FE) MG EC tablet Take 1 tablet (325 mg total) by mouth 2 times daily 2 hours after meal.  Qty: 60 tablet, Refills: 3    Associated Diagnoses: Microcytic anemia      fish oil-omega-3 fatty acids 300-1,000 mg capsule Take 2 g by mouth.      hydrocodone-acetaminophen 10-325mg (NORCO)  mg Tab Take 1 tablet by mouth 3 (three) times daily as needed.  Qty: 90 tablet, Refills: 0    Associated Diagnoses: Facet arthropathy      loratadine (CLARITIN) 10 mg tablet Take 1 tablet by mouth Daily.      multivitamin capsule Take 1 capsule by mouth once daily.      pilocarpine (SALAGEN) 5 MG Tab TAKE 1/2 HOUR PRIOR TO EACH MEAL  Qty: 90 tablet, Refills: 8      gabapentin (NEURONTIN) 800 MG tablet Take 1 tablet (800 mg total) by mouth 3 (three) times daily.  Qty: 90 tablet, Refills: 2    Associated Diagnoses: DDD (degenerative disc disease), lumbar      pantoprazole (PROTONIX) 40 MG tablet Take 40 mg by mouth once daily.   Refills: 5             Discharge Procedure Orders  Diet general     Activity as tolerated     Call MD for:  temperature >100.4     Call MD for:  severe uncontrolled pain       Follow-up Information     Follow up with Dwaine Davis MD.    Specialty:  Family Medicine    Why:  As needed    Contact information:    [address] [place] [address] [phone]

## 2017-05-16 ENCOUNTER — TELEPHONE (OUTPATIENT)
Dept: PHARMACY | Facility: CLINIC | Age: 72
End: 2017-05-16

## 2017-05-16 NOTE — TELEPHONE ENCOUNTER
Patient's wife called to indicate she was going send payment for balance owed on copayment.    She is going to talk to Radha Noble about filling out the [employer] application for [employer] again.  She is  Unsure if patient will complete.

## 2017-05-25 DIAGNOSIS — M47.819 FACET ARTHROPATHY: ICD-10-CM

## 2017-05-25 RX ORDER — HYDROCODONE BITARTRATE AND ACETAMINOPHEN 10; 325 MG/1; MG/1
1 TABLET ORAL 3 TIMES DAILY PRN
Qty: 90 TABLET | Refills: 0 | Status: SHIPPED | OUTPATIENT
Start: 2017-06-24 | End: 2017-07-24

## 2017-05-25 RX ORDER — HYDROCODONE BITARTRATE AND ACETAMINOPHEN 10; 325 MG/1; MG/1
1 TABLET ORAL 3 TIMES DAILY PRN
Qty: 90 TABLET | Refills: 0 | Status: SHIPPED | OUTPATIENT
Start: 2017-05-26 | End: 2017-05-25 | Stop reason: SDUPTHER

## 2017-05-25 RX ORDER — HYDROCODONE BITARTRATE AND ACETAMINOPHEN 10; 325 MG/1; MG/1
1 TABLET ORAL 3 TIMES DAILY PRN
Qty: 90 TABLET | Refills: 0 | Status: SHIPPED | OUTPATIENT
Start: 2017-07-23 | End: 2017-08-18 | Stop reason: SDUPTHER

## 2017-05-26 ENCOUNTER — OFFICE VISIT (OUTPATIENT)
Dept: PAIN MEDICINE | Facility: CLINIC | Age: 72
End: 2017-05-26
Payer: MEDICARE

## 2017-05-26 VITALS
HEART RATE: 74 BPM | SYSTOLIC BLOOD PRESSURE: 130 MMHG | DIASTOLIC BLOOD PRESSURE: 68 MMHG | WEIGHT: 154 LBS | RESPIRATION RATE: 16 BRPM | BODY MASS INDEX: 23.34 KG/M2 | HEIGHT: 68 IN

## 2017-05-26 DIAGNOSIS — M47.817 SPONDYLOSIS OF LUMBOSACRAL REGION WITHOUT MYELOPATHY OR RADICULOPATHY: Primary | ICD-10-CM

## 2017-05-26 DIAGNOSIS — M48.061 LUMBAR CANAL STENOSIS: ICD-10-CM

## 2017-05-26 DIAGNOSIS — M47.819 FACET ARTHROPATHY: ICD-10-CM

## 2017-05-26 DIAGNOSIS — M54.16 LEFT LUMBAR RADICULOPATHY: ICD-10-CM

## 2017-05-26 DIAGNOSIS — M51.36 DDD (DEGENERATIVE DISC DISEASE), LUMBAR: ICD-10-CM

## 2017-05-26 PROCEDURE — 99214 OFFICE O/P EST MOD 30 MIN: CPT | Mod: PBBFAC | Performed by: PHYSICIAN ASSISTANT

## 2017-05-26 PROCEDURE — 99214 OFFICE O/P EST MOD 30 MIN: CPT | Mod: S$PBB,,, | Performed by: PHYSICIAN ASSISTANT

## 2017-05-26 PROCEDURE — 99999 PR PBB SHADOW E&M-EST. PATIENT-LVL IV: CPT | Mod: PBBFAC,,, | Performed by: PHYSICIAN ASSISTANT

## 2017-05-26 RX ORDER — GABAPENTIN 800 MG/1
800 TABLET ORAL 3 TIMES DAILY
Qty: 90 TABLET | Refills: 2 | Status: SHIPPED | OUTPATIENT
Start: 2017-05-26 | End: 2017-06-12 | Stop reason: SDUPTHER

## 2017-05-26 NOTE — TELEPHONE ENCOUNTER
Patient's prescriptions were printed and signed by Dr. Branch prior to the patient's clinic appointment.

## 2017-05-26 NOTE — PROGRESS NOTES
Chief Pain Complaint:  Low Back Pain, Leg pain (left > right)     History of Present Illness:  This patient is a 71 y.o. male who presents today complaining of the above noted pain/s. The patient describes this pain as follows.    - duration of pain: pain for years   - timing: constant   - character: aching, burning  - radiating, dermatomal: extends into left leg, along L5 pattern at times  - antecedent trauma, prior spinal surgery: none  - pertinent negatives: No fever, No chills, No weight loss, No bladder dysfunction, No bowel dysfunction, No extremity weakness, No saddle anesthesia  - pertinent positives: none    - medications, other therapies tried (physical therapy, injections):     >> gabapentin, Norco    >> Has previously undergone Physical Therapy    >> Has previously undergone spinal injection/s: including lumbar MBB and Left TFESI with Dr Taylor in 2014 & 2015 (see EMR Surgeries for full details)      IMAGING / Labs / Studies (reviewed on 5/26/2017):    10/30/14 MRI Lumbar Spine Without Contrast    Narrative Exam: MRI of the lumbar spine without contrast.  History:     Low back pain. Status post SHEY, with S1-S2 radicular symptoms  Comparison: Prior study dated 04/02/13  Findings:     A convex right scoliosis again noted.  No compression fracture or subluxation.  The conus medullaris has a normal appearance.  The paraspinous soft tissues are unremarkable.  The T12 -- L1 and L1 -- 2 disk levels are essentially unremarkable.  L2 -- 3: Mild annular bulging again noted.    L3 -- 4: Moderate narrowing of the right lateral disk space, with right greater than left facet arthropathy, unchanged.    L4 -- 5: Marked bilateral facet arthropathy, with mild annular bulging, causing moderate central canal stenosis, unchanged.  L5 -- S1: Disk desiccation, with moderate disk space narrowing.  Significant degenerative narrowing of the left L5 neural foramen is apparent, unchanged.         Review of  "Systems:  CONSTITUTIONAL: patient denies any fever, chills, or weight loss  SKIN: patient denies any rash or itching  RESPIRATORY: patient denies having any shortness of breath  GASTROINTESTINAL: patient denies having any diarrhea, constipation, or bowel incontinence  GENITOURINARY: patient denies having any abnormal bladder function    MUSCULOSKELETAL:  - patient reports low back pain    NEUROLOGICAL:   - pain as above  - strength in Lower extremities is intact, BILATERALLY  - sensation in Lower extremities is intact, BILATERALLY  - patient denies any loss of bowel or bladder control      PSYCHIATRIC: patient denies any suicidal or homicidal ideations      Physical Exam:    Vitals:  /68 (BP Location: Right arm, Patient Position: Sitting, BP Method: Automatic)   Pulse 74   Resp 16   Ht 5' 8" (1.727 m)   Wt 69.9 kg (154 lb)   BMI 23.42 kg/m²    (reviewed on 5/26/2017)    General: alert and oriented, in no apparent distress  Gait: normal gait  Skin: No rashes, No discoloration, No obvious lesions  HEENT: EOMI  Respiratory: respirations nonlabored    Musculoskeletal:  - Any pain on flexion, extension, rotation:     >> pain on flexion  - Straight Leg Raise:     >> negative on the LEFT    >> negative on the RIGHT  - Any tenderness to palpation across paraspinal muscles, joints, bursae:     >> across lumbar paraspinals     Neuro:  - Lower Extremity Strength:     >> 5/5 throughout on the LEFT    >> 5/5 throughout on the RIGHT  - Lower Extremity Reflexes:    >> 2+ on the LEFT    >> 2+ on the RIGHT  - Sensory (sensation to light touch):    >> intact on the LEFT    >> intact on the RIGHT     Psych:  Mood and affect appropriate    Assessment:  Lumbar Spondylosis  Lumbar radiculopathy  Lumbar DDD  Chronic Pain Syndrome      Plan:  Patient returns for follow-up.  He complains of chronic low back pain.   - Refill Norco 10mg TID PRN pain and gabapentin 800mg TID x 3 months. The combination of medications seem to control " his pain, and he tolerates them well.  - His OIC has been better controlled with Senokot + fiber. I prescribed Movantik 12.5mg QD last visit, but it was too expensive. Will consider Amitiza in the future.   - LA  appropriate, last filled on 4-28-17.  - Last UDS was appropriate. Will order UDS next visit to ensure medication compliance.    RTC in 3 months for med refill. I discussed the risks, benefits, and alternatives to potential treatment options. All questions and concerns were fully addressed today in clinic. Dr. Branch was consulted regarding the patient plan and agrees.            >> Pain Disability Questionnaire:  6-28-16 :: 28  12/20/2016 :: 55    >> Pain Disability Index:  2/28/2017 :: 18  5/26/2017 :: 16    >> UDS:  6-28-16 :: appropriate  2/28/2017 :: appropriate

## 2017-06-12 DIAGNOSIS — M51.36 DDD (DEGENERATIVE DISC DISEASE), LUMBAR: ICD-10-CM

## 2017-06-12 RX ORDER — GABAPENTIN 800 MG/1
TABLET ORAL
Qty: 90 TABLET | Refills: 0 | Status: SHIPPED | OUTPATIENT
Start: 2017-06-12 | End: 2017-07-13 | Stop reason: SDUPTHER

## 2017-06-12 RX ORDER — GABAPENTIN 800 MG/1
TABLET ORAL
Qty: 90 TABLET | Refills: 0 | Status: SHIPPED | OUTPATIENT
Start: 2017-06-12 | End: 2017-06-12 | Stop reason: SDUPTHER

## 2017-07-06 DIAGNOSIS — D50.9 MICROCYTIC ANEMIA: ICD-10-CM

## 2017-07-06 RX ORDER — FERROUS SULFATE 325(65) MG
325 TABLET, DELAYED RELEASE (ENTERIC COATED) ORAL
Qty: 60 TABLET | Refills: 3 | Status: SHIPPED | OUTPATIENT
Start: 2017-07-06 | End: 2018-07-06

## 2017-07-13 DIAGNOSIS — M51.36 DDD (DEGENERATIVE DISC DISEASE), LUMBAR: ICD-10-CM

## 2017-07-14 RX ORDER — GABAPENTIN 800 MG/1
TABLET ORAL
Qty: 90 TABLET | Refills: 0 | Status: SHIPPED | OUTPATIENT
Start: 2017-07-14 | End: 2017-08-11 | Stop reason: SDUPTHER

## 2017-07-17 ENCOUNTER — TELEPHONE (OUTPATIENT)
Dept: HEMATOLOGY/ONCOLOGY | Facility: CLINIC | Age: 72
End: 2017-07-17

## 2017-07-17 NOTE — TELEPHONE ENCOUNTER
----- Message from Liz Travis sent at 7/17/2017  8:51 AM CDT -----  Contact: Arleen/wife  Arleen called to speak with the nurse about his appt in August. She wants to know if the patient can have all his tests on the same day that he sees the doctor. She can be contacted at [phone].    Thanks,  Liz

## 2017-08-07 ENCOUNTER — OFFICE VISIT (OUTPATIENT)
Dept: CARDIOLOGY | Facility: CLINIC | Age: 72
End: 2017-08-07
Payer: MEDICARE

## 2017-08-07 ENCOUNTER — LAB VISIT (OUTPATIENT)
Dept: LAB | Facility: HOSPITAL | Age: 72
End: 2017-08-07
Attending: INTERNAL MEDICINE
Payer: MEDICARE

## 2017-08-07 VITALS
HEIGHT: 68 IN | SYSTOLIC BLOOD PRESSURE: 120 MMHG | DIASTOLIC BLOOD PRESSURE: 64 MMHG | BODY MASS INDEX: 23.29 KG/M2 | HEART RATE: 60 BPM | WEIGHT: 153.69 LBS

## 2017-08-07 DIAGNOSIS — I47.10 PSVT (PAROXYSMAL SUPRAVENTRICULAR TACHYCARDIA): ICD-10-CM

## 2017-08-07 DIAGNOSIS — E78.2 MIXED HYPERLIPIDEMIA: ICD-10-CM

## 2017-08-07 DIAGNOSIS — E78.5 HYPERLIPIDEMIA, UNSPECIFIED HYPERLIPIDEMIA TYPE: ICD-10-CM

## 2017-08-07 DIAGNOSIS — I25.10 CORONARY ARTERY DISEASE INVOLVING NATIVE CORONARY ARTERY OF NATIVE HEART WITHOUT ANGINA PECTORIS: Primary | ICD-10-CM

## 2017-08-07 DIAGNOSIS — I10 ESSENTIAL HYPERTENSION: ICD-10-CM

## 2017-08-07 LAB
CHOLEST/HDLC SERPL: 4.3 {RATIO}
HDL/CHOLESTEROL RATIO: 23.5 %
HDLC SERPL-MCNC: 196 MG/DL
HDLC SERPL-MCNC: 46 MG/DL
LDLC SERPL CALC-MCNC: 128.2 MG/DL
NONHDLC SERPL-MCNC: 150 MG/DL
TRIGL SERPL-MCNC: 109 MG/DL

## 2017-08-07 PROCEDURE — 99213 OFFICE O/P EST LOW 20 MIN: CPT | Mod: PBBFAC | Performed by: INTERNAL MEDICINE

## 2017-08-07 PROCEDURE — 3074F SYST BP LT 130 MM HG: CPT | Mod: ,,, | Performed by: INTERNAL MEDICINE

## 2017-08-07 PROCEDURE — 99999 PR PBB SHADOW E&M-EST. PATIENT-LVL III: CPT | Mod: PBBFAC,,, | Performed by: INTERNAL MEDICINE

## 2017-08-07 PROCEDURE — 3078F DIAST BP <80 MM HG: CPT | Mod: ,,, | Performed by: INTERNAL MEDICINE

## 2017-08-07 PROCEDURE — 99213 OFFICE O/P EST LOW 20 MIN: CPT | Mod: S$PBB,,, | Performed by: INTERNAL MEDICINE

## 2017-08-07 PROCEDURE — 3008F BODY MASS INDEX DOCD: CPT | Mod: ,,, | Performed by: INTERNAL MEDICINE

## 2017-08-07 PROCEDURE — 1159F MED LIST DOCD IN RCRD: CPT | Mod: ,,, | Performed by: INTERNAL MEDICINE

## 2017-08-07 NOTE — PROGRESS NOTES
"Subjective:   Patient ID:  Noble Tripp is a 72 y.o. male who presents for follow up of Coronary Artery Disease      71 yo, male, PMH PSVT, CAD HLD, COPD, JUANITO on CPAP, vocal cord cancer s/p surgery and subsequent XRT in 2011, and recent tongue precancer mass removal.   He was admitted to [facility] for sepsis/PNA and PSVT on 10-. EKG  Showed extensive St depression of 1 mm on anterolateral leads. EF 60%. MPi showed fixed inferior perfusion defect. cTn was up to 0.3. PSVT was covnerted to sinus O/N after BB was added. He had prolonged ATx Rx.  Started Rapetha 3 months ago and LDL 65.  Today, he states that he dose not episode of palpitation.   Feel fatigue, no chest pain, dizziness, dyspnea, dizziness. Exercise regularly daily.    Echo in :normal EF, DD.  MPI in :  A small to moderate size fixed defect of moderate to severe intensity that extends from the apical to the base inferior wall of the left ventricle, consistent with myocardial injury.   EKG on 10-: PSVT, st depression on V3 to V6, I, II and avL.          Past Medical History:   Diagnosis Date    Adrenal mass     david    Aspiration pneumonia 10/15    puree/honey    Benign prostate hyperplasia     Chronic pain     dr santos    COPD (chronic obstructive pulmonary disease)     papi    Ex-smoker     11/13    Hyperlipidemia     Osteopenia 1/15 tran 1/18    PVD (peripheral vascular disease)     noobs 07 khuri    Pyriform sinus cancer     dr ponce radiation 1/2-14 dr king perales    Sleep apnea     cpap    Squamous cell carcinoma of skin     roberto    Tongue cancer     "superficial" removed 11/14    Vocal cord cancer 2011    Xerostomia     radiation       Past Surgical History:   Procedure Laterality Date    APPENDECTOMY      COLONOSCOPY N/A 5/1/2017    Procedure: Due for screening colonoscopy;  Surgeon: Anushka Yoder MD;  Location: [facility];  Service: Endoscopy;  Laterality: N/A;    tongue cancer " excision  11/14    dr vitale    VOCAL CORD LATERALIZATION, ENDOSCOPIC APPROACH W/ MLB      kris       Social History   Substance Use Topics    Smoking status: Former Smoker    Smokeless tobacco: Not on file    Alcohol use No       Family History   Problem Relation Age of Onset    Cancer Father     Cancer Brother          Review of Systems   Constitution: Positive for malaise/fatigue. Negative for decreased appetite, diaphoresis, fever, weakness and night sweats.   HENT: Negative for headaches and nosebleeds.    Eyes: Negative for blurred vision and double vision.   Cardiovascular: Negative for chest pain, claudication, dyspnea on exertion, irregular heartbeat, leg swelling, near-syncope, orthopnea, palpitations, paroxysmal nocturnal dyspnea and syncope.   Respiratory: Negative for cough, shortness of breath, sleep disturbances due to breathing, snoring, sputum production and wheezing.    Endocrine: Negative for cold intolerance and polyuria.   Hematologic/Lymphatic: Does not bruise/bleed easily.   Skin: Negative for rash.   Musculoskeletal: Negative for back pain, falls, joint pain, joint swelling and neck pain.   Gastrointestinal: Negative for abdominal pain, heartburn, nausea and vomiting.   Genitourinary: Negative for dysuria, frequency and hematuria.   Neurological: Negative for difficulty with concentration, dizziness, focal weakness, light-headedness, numbness and seizures.   Psychiatric/Behavioral: Negative for depression, memory loss and substance abuse. The patient does not have insomnia.    Allergic/Immunologic: Negative for HIV exposure and hives.       Objective:   Physical Exam   Constitutional: He is oriented to person, place, and time. He appears well-nourished.   HENT:   Head: Normocephalic.   Eyes: Pupils are equal, round, and reactive to light.   Neck: Normal carotid pulses and no JVD present. Carotid bruit is not present. No thyromegaly present.   Cardiovascular: Normal rate, regular  rhythm, normal heart sounds and normal pulses.   No extrasystoles are present. PMI is not displaced.  Exam reveals no gallop and no S3.    No murmur heard.  Pulses:       Carotid pulses are 2+ on the right side, and 2+ on the left side.       Radial pulses are 2+ on the right side, and 2+ on the left side.   Pulmonary/Chest: Breath sounds normal. No stridor. No respiratory distress.   B/l decreased BS, no bronchi   Abdominal: Soft. Bowel sounds are normal. There is no tenderness. There is no rebound.   Musculoskeletal: Normal range of motion.   Neurological: He is alert and oriented to person, place, and time.   Skin: Skin is intact. No rash noted.   Psychiatric: His behavior is normal.       Lab Results   Component Value Date    CHOL 196 08/07/2017    CHOL 133 02/28/2017    CHOL 150 10/04/2016     Lab Results   Component Value Date    HDL 46 08/07/2017    HDL 53 02/28/2017    HDL 47 10/04/2016     Lab Results   Component Value Date    LDLCALC 128.2 08/07/2017    LDLCALC 65.0 02/28/2017    LDLCALC 86.2 10/04/2016     Lab Results   Component Value Date    TRIG 109 08/07/2017    TRIG 75 02/28/2017    TRIG 84 10/04/2016     Lab Results   Component Value Date    CHOLHDL 23.5 08/07/2017    CHOLHDL 39.8 02/28/2017    CHOLHDL 31.3 10/04/2016       Chemistry        Component Value Date/Time     02/28/2017 0750     02/28/2017 0750    K 4.8 02/28/2017 0750    K 4.8 02/28/2017 0750     02/28/2017 0750     02/28/2017 0750    CO2 27 02/28/2017 0750    CO2 27 02/28/2017 0750    BUN 11 02/28/2017 0750    BUN 11 02/28/2017 0750    CREATININE 0.9 02/28/2017 0750    CREATININE 0.9 02/28/2017 0750     02/28/2017 0750     02/28/2017 0750        Component Value Date/Time    CALCIUM 9.6 02/28/2017 0750    CALCIUM 9.6 02/28/2017 0750    ALKPHOS 59 02/28/2017 0750    ALKPHOS 59 02/28/2017 0750    AST 17 02/28/2017 0750    AST 17 02/28/2017 0750    ALT 9 (L) 02/28/2017 0750    ALT 9 (L) 02/28/2017 0750     BILITOT 0.4 02/28/2017 0750    BILITOT 0.4 02/28/2017 0750    ESTGFRAFRICA >60 02/28/2017 0750    ESTGFRAFRICA >60 02/28/2017 0750    EGFRNONAA >60 02/28/2017 0750    EGFRNONAA >60 02/28/2017 0750          Lab Results   Component Value Date    TSH 0.994 05/19/2014     Lab Results   Component Value Date    INR 1.1 10/10/2015    INR 1.0 10/10/2015    INR 1.0 07/26/2005     Lab Results   Component Value Date    WBC 19.09 (H) 02/28/2017    HGB 12.6 (L) 02/28/2017    HCT 37.9 (L) 02/28/2017    MCV 83 02/28/2017     (H) 02/28/2017     BMP  Sodium   Date Value Ref Range Status   02/28/2017 141 136 - 145 mmol/L Final   02/28/2017 141 136 - 145 mmol/L Final     Potassium   Date Value Ref Range Status   02/28/2017 4.8 3.5 - 5.1 mmol/L Final   02/28/2017 4.8 3.5 - 5.1 mmol/L Final     Chloride   Date Value Ref Range Status   02/28/2017 107 95 - 110 mmol/L Final   02/28/2017 107 95 - 110 mmol/L Final     CO2   Date Value Ref Range Status   02/28/2017 27 23 - 29 mmol/L Final   02/28/2017 27 23 - 29 mmol/L Final     BUN, Bld   Date Value Ref Range Status   02/28/2017 11 8 - 23 mg/dL Final   02/28/2017 11 8 - 23 mg/dL Final     Creatinine   Date Value Ref Range Status   02/28/2017 0.9 0.5 - 1.4 mg/dL Final   02/28/2017 0.9 0.5 - 1.4 mg/dL Final     Calcium   Date Value Ref Range Status   02/28/2017 9.6 8.7 - 10.5 mg/dL Final   02/28/2017 9.6 8.7 - 10.5 mg/dL Final     Anion Gap   Date Value Ref Range Status   02/28/2017 7 (L) 8 - 16 mmol/L Final   02/28/2017 7 (L) 8 - 16 mmol/L Final     eGFR if    Date Value Ref Range Status   02/28/2017 >60 >60 mL/min/1.73 m^2 Final   02/28/2017 >60 >60 mL/min/1.73 m^2 Final     eGFR if non    Date Value Ref Range Status   02/28/2017 >60 >60 mL/min/1.73 m^2 Final     Comment:     Calculation used to obtain the estimated glomerular filtration  rate (eGFR) is the CKD-EPI equation. Since race is unknown   in our information system, the eGFR values for    -American and Non--American patients are given   for each creatinine result.     02/28/2017 >60 >60 mL/min/1.73 m^2 Final     Comment:     Calculation used to obtain the estimated glomerular filtration  rate (eGFR) is the CKD-EPI equation. Since race is unknown   in our information system, the eGFR values for   -American and Non--American patients are given   for each creatinine result.       CrCl cannot be calculated (Patient's most recent sCr result is older than the maximum 7 days allowed.).     Assessment:      1. Coronary artery disease involving native coronary artery of native heart without angina pectoris    2. Mixed hyperlipidemia    3. Essential hypertension    4. PSVT (paroxysmal supraventricular tachycardia)        Plan:   Continue current meds.  Recommend heart-healthy diet, weight control and regular exercise.  Yamile. Risk modification.   RTC in 6 months    I have reviewed all pertinent labs and cardiac studies. Plans and recommendations have been formulated under my direct supervision. All questions answered and patient voiced understanding. Patient to continue current medications.

## 2017-08-10 ENCOUNTER — TELEPHONE (OUTPATIENT)
Dept: CARDIOLOGY | Facility: CLINIC | Age: 72
End: 2017-08-10

## 2017-08-10 ENCOUNTER — TELEPHONE (OUTPATIENT)
Dept: PULMONOLOGY | Facility: CLINIC | Age: 72
End: 2017-08-10

## 2017-08-10 DIAGNOSIS — E78.2 MIXED HYPERLIPIDEMIA: Primary | ICD-10-CM

## 2017-08-10 NOTE — TELEPHONE ENCOUNTER
----- Message from Isabel Pedro sent at 8/10/2017 10:53 AM CDT -----  Contact: mrs [phone]  Two inhalers . Please call back at [phone].      Thanks,  Isabel Pedro

## 2017-08-10 NOTE — TELEPHONE ENCOUNTER
----- Message from Darshana Chung sent at 8/10/2017  8:11 AM CDT -----  Contact: rachel/wife  Would like to speak to nurse about pt labs. Please call back at [phone]. thanks

## 2017-08-11 DIAGNOSIS — M51.36 DDD (DEGENERATIVE DISC DISEASE), LUMBAR: ICD-10-CM

## 2017-08-11 RX ORDER — GABAPENTIN 800 MG/1
TABLET ORAL
Qty: 90 TABLET | Refills: 0 | Status: SHIPPED | OUTPATIENT
Start: 2017-08-11 | End: 2017-08-18 | Stop reason: SDUPTHER

## 2017-08-14 DIAGNOSIS — J44.9 COPD, MILD: ICD-10-CM

## 2017-08-16 DIAGNOSIS — J44.9 CHRONIC OBSTRUCTIVE PULMONARY DISEASE, UNSPECIFIED COPD TYPE: ICD-10-CM

## 2017-08-18 ENCOUNTER — OFFICE VISIT (OUTPATIENT)
Dept: PAIN MEDICINE | Facility: CLINIC | Age: 72
End: 2017-08-18
Payer: MEDICARE

## 2017-08-18 VITALS
SYSTOLIC BLOOD PRESSURE: 118 MMHG | DIASTOLIC BLOOD PRESSURE: 64 MMHG | WEIGHT: 153 LBS | RESPIRATION RATE: 16 BRPM | BODY MASS INDEX: 23.19 KG/M2 | HEART RATE: 70 BPM | HEIGHT: 68 IN

## 2017-08-18 DIAGNOSIS — G89.4 CHRONIC PAIN DISORDER: ICD-10-CM

## 2017-08-18 DIAGNOSIS — M47.817 SPONDYLOSIS OF LUMBOSACRAL REGION WITHOUT MYELOPATHY OR RADICULOPATHY: Primary | ICD-10-CM

## 2017-08-18 DIAGNOSIS — M47.819 FACET ARTHROPATHY: ICD-10-CM

## 2017-08-18 DIAGNOSIS — M54.16 LEFT LUMBAR RADICULOPATHY: ICD-10-CM

## 2017-08-18 DIAGNOSIS — M48.061 LUMBAR CANAL STENOSIS: ICD-10-CM

## 2017-08-18 DIAGNOSIS — M51.36 DDD (DEGENERATIVE DISC DISEASE), LUMBAR: ICD-10-CM

## 2017-08-18 PROCEDURE — 99214 OFFICE O/P EST MOD 30 MIN: CPT | Mod: S$PBB,,, | Performed by: PHYSICIAN ASSISTANT

## 2017-08-18 PROCEDURE — 3078F DIAST BP <80 MM HG: CPT | Mod: ,,, | Performed by: PHYSICIAN ASSISTANT

## 2017-08-18 PROCEDURE — 99999 PR PBB SHADOW E&M-EST. PATIENT-LVL IV: CPT | Mod: PBBFAC,,, | Performed by: PHYSICIAN ASSISTANT

## 2017-08-18 PROCEDURE — 1125F AMNT PAIN NOTED PAIN PRSNT: CPT | Mod: ,,, | Performed by: PHYSICIAN ASSISTANT

## 2017-08-18 PROCEDURE — 3074F SYST BP LT 130 MM HG: CPT | Mod: ,,, | Performed by: PHYSICIAN ASSISTANT

## 2017-08-18 PROCEDURE — 99214 OFFICE O/P EST MOD 30 MIN: CPT | Mod: PBBFAC | Performed by: PHYSICIAN ASSISTANT

## 2017-08-18 PROCEDURE — 1159F MED LIST DOCD IN RCRD: CPT | Mod: ,,, | Performed by: PHYSICIAN ASSISTANT

## 2017-08-18 RX ORDER — HYDROCODONE BITARTRATE AND ACETAMINOPHEN 10; 325 MG/1; MG/1
1 TABLET ORAL 3 TIMES DAILY PRN
Qty: 90 TABLET | Refills: 0 | Status: SHIPPED | OUTPATIENT
Start: 2017-08-23 | End: 2017-09-22

## 2017-08-18 RX ORDER — GABAPENTIN 800 MG/1
800 TABLET ORAL 3 TIMES DAILY
Qty: 90 TABLET | Refills: 2 | Status: SHIPPED | OUTPATIENT
Start: 2017-08-18 | End: 2017-10-05 | Stop reason: SDUPTHER

## 2017-08-18 NOTE — PROGRESS NOTES
Chief Pain Complaint:  Low Back Pain, Leg pain (left > right)     History of Present Illness:  This patient is a 72 y.o. male who presents today complaining of the above noted pain/s. The patient describes this pain as follows.    - duration of pain: pain for years   - timing: constant   - character: aching, burning  - radiating, dermatomal: extends into left leg, along L5 pattern at times  - antecedent trauma, prior spinal surgery: none  - pertinent negatives: No fever, No chills, No weight loss, No bladder dysfunction, No bowel dysfunction, No extremity weakness, No saddle anesthesia  - pertinent positives: none    - medications, other therapies tried (physical therapy, injections):     >> gabapentin, Norco    >> Has previously undergone Physical Therapy    >> Has previously undergone spinal injection/s: including lumbar MBB and Left TFESI with Dr Taylor in 2014 & 2015 (see EMR Surgeries for full details)      IMAGING / Labs / Studies (reviewed on 8/18/2017):    10/30/14 MRI Lumbar Spine Without Contrast    Narrative Exam: MRI of the lumbar spine without contrast.  History:     Low back pain. Status post SHEY, with S1-S2 radicular symptoms  Comparison: Prior study dated 04/02/13  Findings:     A convex right scoliosis again noted.  No compression fracture or subluxation.  The conus medullaris has a normal appearance.  The paraspinous soft tissues are unremarkable.  The T12 -- L1 and L1 -- 2 disk levels are essentially unremarkable.  L2 -- 3: Mild annular bulging again noted.    L3 -- 4: Moderate narrowing of the right lateral disk space, with right greater than left facet arthropathy, unchanged.    L4 -- 5: Marked bilateral facet arthropathy, with mild annular bulging, causing moderate central canal stenosis, unchanged.  L5 -- S1: Disk desiccation, with moderate disk space narrowing.  Significant degenerative narrowing of the left L5 neural foramen is apparent, unchanged.         Review of  "Systems:  CONSTITUTIONAL: patient denies any fever, chills, or weight loss  SKIN: patient denies any rash or itching  RESPIRATORY: patient denies having any shortness of breath  GASTROINTESTINAL: patient denies having any diarrhea, constipation, or bowel incontinence  GENITOURINARY: patient denies having any abnormal bladder function    MUSCULOSKELETAL:  - patient reports low back pain    NEUROLOGICAL:   - pain as above  - strength in Lower extremities is intact, BILATERALLY  - sensation in Lower extremities is intact, BILATERALLY  - patient denies any loss of bowel or bladder control      PSYCHIATRIC: patient denies any suicidal or homicidal ideations      Physical Exam:  Vitals:  /64 (BP Location: Right arm, Patient Position: Sitting, BP Method: Large (Automatic))   Pulse 70   Resp 16   Ht 5' 8" (1.727 m)   Wt 69.4 kg (153 lb)   BMI 23.26 kg/m²    (reviewed on 8/18/2017)    General: alert and oriented, in no apparent distress  Gait: normal gait  Skin: No rashes, No discoloration, No obvious lesions  HEENT: EOMI  Respiratory: respirations nonlabored    Musculoskeletal:  - Any pain on flexion, extension, rotation:     >> pain on flexion  - Straight Leg Raise:     >> negative on the LEFT    >> negative on the RIGHT  - Any tenderness to palpation across paraspinal muscles, joints, bursae:     >> across lumbar paraspinals     Neuro:  - Lower Extremity Strength:     >> 5/5 throughout on the LEFT    >> 5/5 throughout on the RIGHT  - Lower Extremity Reflexes:    >> 2+ on the LEFT    >> 2+ on the RIGHT  - Sensory (sensation to light touch):    >> intact on the LEFT    >> intact on the RIGHT     Psych:  Mood and affect appropriate    Assessment:  Lumbar Spondylosis  Lumbar radiculopathy  Lumbar DDD  Chronic Pain Syndrome      Plan:  Patient returns for follow-up.  He complains of chronic low back pain.   - Refill Norco 10mg TID PRN pain and gabapentin 800mg TID x 3 months. The combination of medications seem to " control his pain, and he tolerates them well. Will have to send Norco in electronically today, and in 4 weeks and 8 weeks when he is due for refill.  - His OIC has been better controlled with Senokot + fiber.  Movantik 12.5mg QD was too expensive. Will consider Amitiza in the future.   - LA  appropriate, last filled on 7-25-17.  - Will order UDS today to ensure medication compliance.      RTC in 3 months for med refill. I discussed the risks, benefits, and alternatives to potential treatment options. All questions and concerns were fully addressed today in clinic. Dr. Branch was consulted regarding the patient plan and agrees.            >> Pain Disability Index:  2/28/2017 :: 18  5/26/2017 :: 16  8/18/2017 :: 26    >> UDS:  6-28-16 :: appropriate  2/28/2017 :: appropriate  8/18/2017 :: pending

## 2017-08-21 ENCOUNTER — HOSPITAL ENCOUNTER (OUTPATIENT)
Dept: RADIOLOGY | Facility: HOSPITAL | Age: 72
Discharge: HOME | End: 2017-08-21
Attending: INTERNAL MEDICINE
Payer: MEDICARE

## 2017-08-21 DIAGNOSIS — Z85.89 HISTORY OF HEAD AND NECK CANCER: ICD-10-CM

## 2017-08-21 DIAGNOSIS — J44.9 CHRONIC OBSTRUCTIVE PULMONARY DISEASE, UNSPECIFIED COPD TYPE: ICD-10-CM

## 2017-08-21 PROCEDURE — 71020 XR CHEST PA AND LATERAL: CPT | Mod: TC

## 2017-08-21 PROCEDURE — 71020 XR CHEST PA AND LATERAL: CPT | Mod: 26,,, | Performed by: RADIOLOGY

## 2017-08-28 ENCOUNTER — OFFICE VISIT (OUTPATIENT)
Dept: HEMATOLOGY/ONCOLOGY | Facility: CLINIC | Age: 72
End: 2017-08-28
Payer: MEDICARE

## 2017-08-28 VITALS
OXYGEN SATURATION: 94 % | TEMPERATURE: 98 F | WEIGHT: 153 LBS | BODY MASS INDEX: 22.66 KG/M2 | HEART RATE: 62 BPM | SYSTOLIC BLOOD PRESSURE: 119 MMHG | DIASTOLIC BLOOD PRESSURE: 68 MMHG | HEIGHT: 69 IN

## 2017-08-28 DIAGNOSIS — C02.9 CANCER OF TONGUE: ICD-10-CM

## 2017-08-28 DIAGNOSIS — C32.0 VOCAL CORD CANCER: Primary | ICD-10-CM

## 2017-08-28 DIAGNOSIS — D50.0 IRON DEFICIENCY ANEMIA DUE TO CHRONIC BLOOD LOSS: ICD-10-CM

## 2017-08-28 PROCEDURE — 99214 OFFICE O/P EST MOD 30 MIN: CPT | Mod: S$PBB,,, | Performed by: INTERNAL MEDICINE

## 2017-08-28 PROCEDURE — 1126F AMNT PAIN NOTED NONE PRSNT: CPT | Mod: ,,, | Performed by: INTERNAL MEDICINE

## 2017-08-28 PROCEDURE — 3078F DIAST BP <80 MM HG: CPT | Mod: ,,, | Performed by: INTERNAL MEDICINE

## 2017-08-28 PROCEDURE — 99999 PR PBB SHADOW E&M-EST. PATIENT-LVL III: CPT | Mod: PBBFAC,,, | Performed by: INTERNAL MEDICINE

## 2017-08-28 PROCEDURE — 3074F SYST BP LT 130 MM HG: CPT | Mod: ,,, | Performed by: INTERNAL MEDICINE

## 2017-08-28 PROCEDURE — 99213 OFFICE O/P EST LOW 20 MIN: CPT | Mod: PBBFAC,PO | Performed by: INTERNAL MEDICINE

## 2017-08-28 PROCEDURE — 1159F MED LIST DOCD IN RCRD: CPT | Mod: ,,, | Performed by: INTERNAL MEDICINE

## 2017-08-28 NOTE — PROGRESS NOTES
Subjective:       Patient ID: Noble Tripp is a 72 y.o. male.    This is  A 2 year old  [facility]\james male who come for follow up of his previously treated head and neck cancers and his iron deficiency anemia  Chief Complaint: No chief complaint on file.    HPI .  History of Present Illness:  Mr. Tripp is a 69 y/o male  who is a former patient of dr Haynes and whom I am seeing today for the first time.   . 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 He later had recurrent disease of the posterior pharynx region and was treated with definitive radiation, no concurrent chemotherapy. He was then found to have recurrence on the tongue, which was resected in 11/2014. He was recently found to have a new lesion on his right lateral tongue which demonstrated abnormal findings (noninvasive cancer or high grade dysplasia). He was scheduled to undergo resection by Dr. Meghna Varela at [facility] next week. However, in the meantime, he was admitted with A-fib/SVT with reentrant phenomenon  thought to be 2/2 sepsis, found to have bilateral PNA. CT of the neck was negative for LAD. Patient was treated with vancomycin, Zosyn, Cipro. Given improvement, he was discharged on Levaquin to be completed approx 10/22/15. Today, he states he feels much better with regards to shortness of breath.  He reports a chronic cough ever since radiation.  Denies any productive cough.  He also complains of a rash for cold sores above his upper lip to which he has been applying Abreva ointment.  He denies any unintentional weight loss or recent appetite changes. Patient states that he underwent resection of a tumor under the right side of his tongue as well as a lesion in his esophagus in fall 2015.        During the last visit he was placed on oral iron because of indexes suggestive of iron  "deficiency anemia Last colonoscopy was 5/2017  Today, patient comes in for follow up. He is followed by Dr. Oleary (ENT) at [facility] who states patient is doing well overall based on physical exam. He feels well today without any n/v/d, SOB, CP, weight loss. He does tend to cough when he drinks thin liquids. No recent infections, antibiotics, steroids.    Laryngeal / vocal cord tumor resection and sublingual tumor resection by Dr. Oleary at [facility]   2. Selective neck dissection by Dr. Oleary  3. Definitive radiation to posterior pharynx vs pyriform sinus x 7 weeks by Dr. Garrett Kam at [facility]  4. Surgical resection of tongue lesion 11/2014 by Dr. Varela  5. Surgical resection of another tongue region lesion 10/2015 by Dr. Varela.  6. Surgical resection of esophagus lesion - found to be noncancerous by Dr. Oleary in 3/2016               Past Medical History:   Diagnosis Date    Adrenal mass       david    Aspiration pneumonia 10/15     puree/honey    Benign prostate hyperplasia      Chronic pain       dr santos    COPD (chronic obstructive pulmonary disease)       papi    Ex-smoker       11/13    Hyperlipidemia      Osteopenia 1/15 tran 1/18    PVD (peripheral vascular disease)       noobs 07 khuri    Pyriform sinus cancer       dr oleary radiation 1/2-14 dr kam [facility]    Sleep apnea       cpap    Squamous cell carcinoma of skin       roberto    Tongue cancer       "superficial" removed 11/14    Vocal cord cancer 2011    Xerostomia       radiation         Social History:  to Arleen. 6 children. Quit smoking 2013 after approx 100 pk-yrs.     Family History: family history includes Cancer in his brother and father. Father and older brother had lung cancer. Younger brother had colon cancer.     Review of Systems   Constitutional: Negative.  Negative for fatigue.   Eyes: Negative.    Cardiovascular: Negative.  Negative for chest pain.   Gastrointestinal: Negative " for abdominal pain and nausea.   Genitourinary: Negative.  Negative for hematuria.   Musculoskeletal: Negative.    Skin: Negative.    Neurological: Negative.    Psychiatric/Behavioral: Negative.        Objective:      Physical Exam   Constitutional: He is oriented to person, place, and time. He appears well-developed and well-nourished.   HENT:   Head: Normocephalic.   Mouth/Throat: No oropharyngeal exudate.   Eyes: Pupils are equal, round, and reactive to light.   Neck: No thyromegaly present.   Cardiovascular: Normal rate, regular rhythm and normal heart sounds.  Exam reveals no gallop.    No murmur heard.  Pulmonary/Chest: No respiratory distress. He has no wheezes. He has no rales.   Abdominal: Soft. Bowel sounds are normal. He exhibits no distension and no mass. There is no rebound and no guarding.   Musculoskeletal: Normal range of motion. He exhibits no edema.   Lymphadenopathy:     He has no cervical adenopathy.   Neurological: He is alert and oriented to person, place, and time.   Skin: Skin is warm and dry.   Psychiatric: He has a normal mood and affect. His behavior is normal.       Wt Readings from Last 3 Encounters:   08/28/17 69.4 kg (153 lb)   08/18/17 69.4 kg (153 lb)   08/07/17 69.7 kg (153 lb 10.6 oz)     Temp Readings from Last 3 Encounters:   08/28/17 97.6 °F (36.4 °C) (Oral)   05/01/17 98.1 °F (36.7 °C)   03/06/17 97.3 °F (36.3 °C) (Oral)     BP Readings from Last 3 Encounters:   08/28/17 119/68   08/18/17 118/64   08/07/17 120/64     Pulse Readings from Last 3 Encounters:   08/28/17 62   08/18/17 70   08/07/17 60       Assessment:       1. Vocal cord cancer    2. Cancer of tongue    3. Iron deficiency anemia due to chronic blood loss        Plan:       Lab Results   Component Value Date    WBC 13.16 (H) 08/21/2017    HGB 11.6 (L) 08/21/2017    HCT 35.1 (L) 08/21/2017    MCV 81 (L) 08/21/2017     (H) 08/21/2017     Lab Results   Component Value Date    CREATININE 0.8 08/21/2017    BUN 11  08/21/2017     08/21/2017    K 5.1 08/21/2017     08/21/2017    CO2 29 08/21/2017   \cr  Lab Results   Component Value Date    ALT 7 (L) 08/21/2017    AST 12 08/21/2017    ALKPHOS 64 08/21/2017    BILITOT 0.2 08/21/2017     Lab Results   Component Value Date    IRON 21 (L) 08/21/2017    TIBC 231 (L) 08/21/2017    FERRITIN 163 08/21/2017     He seems stable from the head and neck cancers point of view. CXR reviewed . Shows stable, chronic changes.  See me in 2 months with na cbc, ferritin and tibc. Continue iron orally for the time being

## 2017-09-07 ENCOUNTER — TELEPHONE (OUTPATIENT)
Dept: INTERNAL MEDICINE | Facility: CLINIC | Age: 72
End: 2017-09-07

## 2017-09-07 NOTE — TELEPHONE ENCOUNTER
----- Message from Yulia Fox sent at 9/7/2017  3:01 PM CDT -----  Contact: Arleen/wife  Arleen called in to see if patient needs to have labs done before next appointment, Please call her at [phone].    Thanks  td

## 2017-09-12 ENCOUNTER — TELEPHONE (OUTPATIENT)
Dept: PAIN MEDICINE | Facility: CLINIC | Age: 72
End: 2017-09-12

## 2017-09-12 NOTE — TELEPHONE ENCOUNTER
Informed patient that he will have to come in for a clinic visit in order to discuss med change. Patient acknowledged and appt made for Thursday.

## 2017-09-12 NOTE — TELEPHONE ENCOUNTER
----- Message from Hugo Sargent sent at 9/12/2017  1:37 PM CDT -----  Contact: Pt   Pt called and requested a call back pt has questions about his back..[phone] (home)   Or  [phone]

## 2017-09-14 ENCOUNTER — OFFICE VISIT (OUTPATIENT)
Dept: PAIN MEDICINE | Facility: CLINIC | Age: 72
End: 2017-09-14
Payer: MEDICARE

## 2017-09-14 ENCOUNTER — TELEPHONE (OUTPATIENT)
Dept: INTERNAL MEDICINE | Facility: CLINIC | Age: 72
End: 2017-09-14

## 2017-09-14 VITALS
RESPIRATION RATE: 16 BRPM | WEIGHT: 153 LBS | BODY MASS INDEX: 22.66 KG/M2 | DIASTOLIC BLOOD PRESSURE: 61 MMHG | HEART RATE: 70 BPM | SYSTOLIC BLOOD PRESSURE: 121 MMHG | HEIGHT: 69 IN

## 2017-09-14 DIAGNOSIS — M54.16 LEFT LUMBAR RADICULOPATHY: Primary | ICD-10-CM

## 2017-09-14 DIAGNOSIS — M51.36 DDD (DEGENERATIVE DISC DISEASE), LUMBAR: ICD-10-CM

## 2017-09-14 DIAGNOSIS — Z12.5 SCREENING FOR PROSTATE CANCER: Primary | ICD-10-CM

## 2017-09-14 DIAGNOSIS — M47.817 SPONDYLOSIS OF LUMBOSACRAL REGION WITHOUT MYELOPATHY OR RADICULOPATHY: ICD-10-CM

## 2017-09-14 DIAGNOSIS — G89.4 CHRONIC PAIN DISORDER: ICD-10-CM

## 2017-09-14 DIAGNOSIS — M48.061 LUMBAR CANAL STENOSIS: ICD-10-CM

## 2017-09-14 DIAGNOSIS — M47.819 FACET ARTHROPATHY: ICD-10-CM

## 2017-09-14 PROCEDURE — 3078F DIAST BP <80 MM HG: CPT | Mod: ,,, | Performed by: PHYSICIAN ASSISTANT

## 2017-09-14 PROCEDURE — 99215 OFFICE O/P EST HI 40 MIN: CPT | Mod: PBBFAC | Performed by: PHYSICIAN ASSISTANT

## 2017-09-14 PROCEDURE — 3074F SYST BP LT 130 MM HG: CPT | Mod: ,,, | Performed by: PHYSICIAN ASSISTANT

## 2017-09-14 PROCEDURE — 99214 OFFICE O/P EST MOD 30 MIN: CPT | Mod: S$PBB,,, | Performed by: PHYSICIAN ASSISTANT

## 2017-09-14 PROCEDURE — 1125F AMNT PAIN NOTED PAIN PRSNT: CPT | Mod: ,,, | Performed by: PHYSICIAN ASSISTANT

## 2017-09-14 PROCEDURE — 1159F MED LIST DOCD IN RCRD: CPT | Mod: ,,, | Performed by: PHYSICIAN ASSISTANT

## 2017-09-14 PROCEDURE — 99999 PR PBB SHADOW E&M-EST. PATIENT-LVL V: CPT | Mod: PBBFAC,,, | Performed by: PHYSICIAN ASSISTANT

## 2017-09-14 RX ORDER — OXYCODONE AND ACETAMINOPHEN 7.5; 325 MG/1; MG/1
1 TABLET ORAL 3 TIMES DAILY PRN
Qty: 90 TABLET | Refills: 0 | Status: SHIPPED | OUTPATIENT
Start: 2017-09-14 | End: 2017-10-05 | Stop reason: SDUPTHER

## 2017-09-14 NOTE — PROGRESS NOTES
Chief Pain Complaint:  Low Back Pain, Leg pain (left > right)     History of Present Illness:  This patient is a 72 y.o. male who presents today complaining of the above noted pain/s. The patient describes this pain as follows.    - duration of pain: pain for years   - timing: constant   - character: aching, burning  - radiating, dermatomal: extends into left leg, along L5 pattern at times, mostly non-radiating  - antecedent trauma, prior spinal surgery: none  - pertinent negatives: No fever, No chills, No weight loss, No bladder dysfunction, No bowel dysfunction, No extremity weakness, No saddle anesthesia  - pertinent positives: none    - medications, other therapies tried (physical therapy, injections):     >> gabapentin, Norco    >> Has previously undergone Physical Therapy    >> Has previously undergone spinal injection/s: including lumbar MBB and Left TFESI with Dr Taylor in 2014 & 2015 (see EMR Surgeries for full details)      IMAGING / Labs / Studies (reviewed on 9/14/2017):    10/30/14 MRI Lumbar Spine Without Contrast    Narrative Exam: MRI of the lumbar spine without contrast.  History:     Low back pain. Status post SHEY, with S1-S2 radicular symptoms  Comparison: Prior study dated 04/02/13  Findings:     A convex right scoliosis again noted.  No compression fracture or subluxation.  The conus medullaris has a normal appearance.  The paraspinous soft tissues are unremarkable.  The T12 -- L1 and L1 -- 2 disk levels are essentially unremarkable.  L2 -- 3: Mild annular bulging again noted.    L3 -- 4: Moderate narrowing of the right lateral disk space, with right greater than left facet arthropathy, unchanged.    L4 -- 5: Marked bilateral facet arthropathy, with mild annular bulging, causing moderate central canal stenosis, unchanged.  L5 -- S1: Disk desiccation, with moderate disk space narrowing.  Significant degenerative narrowing of the left L5 neural foramen is apparent, unchanged.         Review of  "Systems:  CONSTITUTIONAL: patient denies any fever, chills, or weight loss  SKIN: patient denies any rash or itching  RESPIRATORY: patient denies having any shortness of breath  GASTROINTESTINAL: patient denies having any diarrhea, constipation, or bowel incontinence  GENITOURINARY: patient denies having any abnormal bladder function    MUSCULOSKELETAL:  - patient reports low back pain    NEUROLOGICAL:   - pain as above  - strength in Lower extremities is intact, BILATERALLY  - sensation in Lower extremities is intact, BILATERALLY  - patient denies any loss of bowel or bladder control      PSYCHIATRIC: patient denies any suicidal or homicidal ideations      Physical Exam:  Vitals:  /61 (BP Location: Right arm, Patient Position: Sitting, BP Method: Large (Automatic))   Pulse 70   Resp 16   Ht 5' 9" (1.753 m)   Wt 69.4 kg (153 lb)   BMI 22.59 kg/m²    (reviewed on 9/14/2017)    General: alert and oriented, in no apparent distress  Gait: normal gait  Skin: No rashes, No discoloration, No obvious lesions  HEENT: EOMI  Respiratory: respirations nonlabored    Musculoskeletal:  - Any pain on flexion, extension, rotation:     >> pain on flexion  - Straight Leg Raise:     >> negative on the LEFT    >> negative on the RIGHT  - Any tenderness to palpation across paraspinal muscles, joints, bursae:     >> across lumbar paraspinals     Neuro:  - Lower Extremity Strength:     >> 5/5 throughout on the LEFT    >> 5/5 throughout on the RIGHT  - Lower Extremity Reflexes:    >> 2+ on the LEFT    >> 2+ on the RIGHT  - Sensory (sensation to light touch):    >> intact on the LEFT    >> intact on the RIGHT     Psych:  Mood and affect appropriate    Assessment:  Lumbar Spondylosis  Lumbar radiculopathy  Lumbar DDD  Chronic Pain Syndrome      Plan:  Patient returns for follow-up.  He complains of chronic low back pain.   - Start Percocet 7.5mg TID PRN. D/c Norco 10mg TID PRN pain - due to inadequate pain relief.  - Order lumbar " MRI to evaluate worsening back pain.  - Continue gabapentin 800mg TID.  - LA  appropriate, last filled on 8-23-17.  RTC in 3 weeks for med refill + rad review. I discussed the risks, benefits, and alternatives to potential treatment options. All questions and concerns were fully addressed today in clinic. Dr. Branch was consulted regarding the patient plan and agrees.            >> Pain Disability Index:  2/28/2017 :: 18  5/26/2017 :: 16  8/18/2017 :: 26    >> UDS:  6-28-16 :: appropriate  2/28/2017 :: appropriate  8/18/2017 :: pending

## 2017-09-14 NOTE — TELEPHONE ENCOUNTER
----- Message from Darshana Chung sent at 9/14/2017  4:00 PM CDT -----  Contact: rachel/wife  Would like to speak to nurse about pt. Please call back at [phone]. thanks

## 2017-09-15 ENCOUNTER — HOSPITAL ENCOUNTER (OUTPATIENT)
Dept: RADIOLOGY | Facility: HOSPITAL | Age: 72
Discharge: HOME | End: 2017-09-15
Attending: PHYSICIAN ASSISTANT
Payer: MEDICARE

## 2017-09-15 DIAGNOSIS — M54.16 LEFT LUMBAR RADICULOPATHY: ICD-10-CM

## 2017-09-15 PROCEDURE — 72148 MRI LUMBAR SPINE W/O DYE: CPT | Mod: TC

## 2017-09-18 ENCOUNTER — TELEPHONE (OUTPATIENT)
Dept: PULMONOLOGY | Facility: CLINIC | Age: 72
End: 2017-09-18

## 2017-09-18 NOTE — TELEPHONE ENCOUNTER
----- Message from Naomi Pichardoite sent at 9/18/2017 10:04 AM CDT -----  Contact: Sloane aQsim (Spouse)  Sloane called and stated she needed to speak to the nurse. She stated that she is checking the status of a prior auth for [employer]. She can be reached at [phone].    Thanks,  TF

## 2017-10-05 ENCOUNTER — OFFICE VISIT (OUTPATIENT)
Dept: PAIN MEDICINE | Facility: CLINIC | Age: 72
End: 2017-10-05
Payer: MEDICARE

## 2017-10-05 ENCOUNTER — TELEPHONE (OUTPATIENT)
Dept: INTERNAL MEDICINE | Facility: CLINIC | Age: 72
End: 2017-10-05

## 2017-10-05 VITALS
DIASTOLIC BLOOD PRESSURE: 62 MMHG | HEART RATE: 63 BPM | WEIGHT: 153 LBS | RESPIRATION RATE: 18 BRPM | SYSTOLIC BLOOD PRESSURE: 124 MMHG | HEIGHT: 69 IN | BODY MASS INDEX: 22.66 KG/M2

## 2017-10-05 DIAGNOSIS — M47.817 SPONDYLOSIS OF LUMBOSACRAL REGION WITHOUT MYELOPATHY OR RADICULOPATHY: Primary | ICD-10-CM

## 2017-10-05 DIAGNOSIS — M48.061 SPINAL STENOSIS OF LUMBAR REGION WITHOUT NEUROGENIC CLAUDICATION: ICD-10-CM

## 2017-10-05 DIAGNOSIS — M47.819 FACET ARTHROPATHY: ICD-10-CM

## 2017-10-05 DIAGNOSIS — M54.16 LEFT LUMBAR RADICULOPATHY: ICD-10-CM

## 2017-10-05 DIAGNOSIS — M51.36 DDD (DEGENERATIVE DISC DISEASE), LUMBAR: ICD-10-CM

## 2017-10-05 PROCEDURE — 99999 PR PBB SHADOW E&M-EST. PATIENT-LVL III: CPT | Mod: PBBFAC,,, | Performed by: PHYSICIAN ASSISTANT

## 2017-10-05 PROCEDURE — 99213 OFFICE O/P EST LOW 20 MIN: CPT | Mod: PBBFAC | Performed by: PHYSICIAN ASSISTANT

## 2017-10-05 PROCEDURE — 99214 OFFICE O/P EST MOD 30 MIN: CPT | Mod: S$PBB,,, | Performed by: PHYSICIAN ASSISTANT

## 2017-10-05 RX ORDER — OXYCODONE AND ACETAMINOPHEN 7.5; 325 MG/1; MG/1
1 TABLET ORAL 3 TIMES DAILY PRN
Qty: 90 TABLET | Refills: 0 | Status: SHIPPED | OUTPATIENT
Start: 2017-10-13 | End: 2017-10-05 | Stop reason: SDUPTHER

## 2017-10-05 RX ORDER — GABAPENTIN 800 MG/1
800 TABLET ORAL 3 TIMES DAILY
Qty: 90 TABLET | Refills: 1 | Status: SHIPPED | OUTPATIENT
Start: 2017-10-05 | End: 2017-12-12 | Stop reason: SDUPTHER

## 2017-10-05 RX ORDER — OXYCODONE AND ACETAMINOPHEN 7.5; 325 MG/1; MG/1
1 TABLET ORAL 3 TIMES DAILY PRN
Qty: 90 TABLET | Refills: 0 | Status: SHIPPED | OUTPATIENT
Start: 2017-11-11 | End: 2017-12-07 | Stop reason: SDUPTHER

## 2017-10-05 NOTE — PROGRESS NOTES
Chief Pain Complaint:  Low Back Pain, Leg pain (left > right)     History of Present Illness:  This patient is a 72 y.o. male who presents today complaining of the above noted pain/s. The patient describes this pain as follows.    - duration of pain: pain for years   - timing: constant   - character: aching, burning  - radiating, dermatomal: extends into left leg, along L5 pattern at times, mostly non-radiating  - antecedent trauma, prior spinal surgery: none  - pertinent negatives: No fever, No chills, No weight loss, No bladder dysfunction, No bowel dysfunction, No extremity weakness, No saddle anesthesia  - pertinent positives: none    - medications, other therapies tried (physical therapy, injections):     >> gabapentin, Norco    >> Has previously undergone Physical Therapy, which made pain worse    >> Has previously undergone spinal injection/s: including lumbar MBB and Left TFESI with Dr Taylor in 2014 & 2015 (see EMR Surgeries for full details)      IMAGING / Labs / Studies (reviewed on 10/5/2017):    9/15/17 MRI LUMBAR SPINE WITHOUT CONTRAST  History: Lower back pain.  Left lower extremity pain  Technique: Standard multiplanar pulse sequences without IV contrast.  Comparison:  No prior  studies available for comparison.  Findings:   Mild-moderate scoliosis, convex to the left and centered at L3.  All lumbar vertebral bodies are normal in height.  Scattered degenerative endplate marrow signal identified at the L2-L3 and L3-L4 L5-S1 levels.  No fracture.  No suspicious osseous lesion is identified.  Mild retrolisthesis of L2-L3.  Distal spinal cord and conus medullaris have normal appearance but the conus terminates at the T12-L1 level.  T12-L1: Minimal central protrusion.  Negative for focal nerve root impingement or central canal narrowing.  Neural foramina widely patent.  L1-2: Mild disc height loss.  Prominent intraosseous eyes.  Mild disc bulging.  Mild facet arthropathy and ligament flavum hypertrophy.   The central canal neural foramina patent.  L2-L3: Minimal retrolisthesis of L2 on L3.  There is moderate disc height loss.  Prominent anterior osteophytes.  Mild-moderate diffuse generalized disc bulging.  Mild facet arthropathy and ligament flavum hypertrophy.  The central canal is patent.  Neural foramina patent.  L3-L4: Disc desiccation and moderate disc height loss.  Large anterior osteophytes are present.  There is mild disc bulging and osteophytic ridging at the posterior disc margin.  Moderate right and mild left facet arthropathy and ligamentum flavum hypertrophy.  The central canal is patent.  Mild neural foraminal narrowing.  L4-L5: Very minimal grade 1 spondylolisthesis of L4-L5.  Mild generalized disc bulge is present.  Severe facet arthropathy and ligamentum flavum hypertrophy.  There is severe central canal and lateral recess stenosis.  Moderate neural foraminal stenosis.  L5-S1: Disc desiccation and moderate to severe disc height loss.  Prominent intraocular heights.  There is mild disc bulging and osteophytic ridging at the posterior disc margin.  Severe left and moderate right neural foraminal stenosis.  The central canal is patent.   Paravertebral soft tissues and musculature are normal.  The visualized intra-abdominal and intrapelvic contents are normal.       10/30/14 MRI Lumbar Spine Without Contrast    Narrative Exam: MRI of the lumbar spine without contrast.  History:     Low back pain. Status post SHEY, with S1-S2 radicular symptoms  Comparison: Prior study dated 04/02/13  Findings:     A convex right scoliosis again noted.  No compression fracture or subluxation.  The conus medullaris has a normal appearance.  The paraspinous soft tissues are unremarkable.  The T12 -- L1 and L1 -- 2 disk levels are essentially unremarkable.  L2 -- 3: Mild annular bulging again noted.    L3 -- 4: Moderate narrowing of the right lateral disk space, with right greater than left facet arthropathy, unchanged.   "  L4 -- 5: Marked bilateral facet arthropathy, with mild annular bulging, causing moderate central canal stenosis, unchanged.  L5 -- S1: Disk desiccation, with moderate disk space narrowing.  Significant degenerative narrowing of the left L5 neural foramen is apparent, unchanged.         Review of Systems:  CONSTITUTIONAL: patient denies any fever, chills, or weight loss  SKIN: patient denies any rash or itching  RESPIRATORY: patient denies having any shortness of breath  GASTROINTESTINAL: patient denies having any diarrhea, constipation, or bowel incontinence  GENITOURINARY: patient denies having any abnormal bladder function    MUSCULOSKELETAL:  - patient reports low back pain    NEUROLOGICAL:   - pain as above  - strength in Lower extremities is intact, BILATERALLY  - sensation in Lower extremities is intact, BILATERALLY  - patient denies any loss of bowel or bladder control      PSYCHIATRIC: patient denies any suicidal or homicidal ideations      Physical Exam:  Vitals:  /62 (BP Location: Right arm, Patient Position: Sitting, BP Method: Large (Automatic))   Pulse 63   Resp 18   Ht 5' 9" (1.753 m)   Wt 69.4 kg (153 lb)   BMI 22.59 kg/m²    (reviewed on 10/5/2017)    General: alert and oriented, in no apparent distress  Gait: normal gait  Skin: No rashes, No discoloration, No obvious lesions  HEENT: EOMI  Respiratory: respirations nonlabored    Musculoskeletal:  - Any pain on flexion, extension, rotation:     >> pain on flexion  - Straight Leg Raise:     >> negative on the LEFT    >> negative on the RIGHT  - Any tenderness to palpation across paraspinal muscles, joints, bursae:     >> across lumbar paraspinals     Neuro:  - Lower Extremity Strength:     >> 5/5 throughout on the LEFT    >> 5/5 throughout on the RIGHT  - Lower Extremity Reflexes:    >> 2+ on the LEFT    >> 2+ on the RIGHT  - Sensory (sensation to light touch):    >> intact on the LEFT    >> intact on the RIGHT     Psych:  Mood and affect " appropriate        Assessment:  Lumbar Spondylosis  Lumbar radiculopathy  Lumbar DDD  Chronic Pain Syndrome      Plan:  Patient returns for follow-up.  He complains of chronic low back pain.   - Lumbar MRI reviewed, detailed above, which shows advanced diffuse spondylosis, greatest at the L4-L5 level with severe central canal stenosis and lateral recess stenosis.    - We discussed trying an injection for additional pain relief and also physical therapy, but he wants to hold off. He feels injections weren't long lasting in the past.   - Refill Percocet 7.5mg TID PRN and  gabapentin 800mg TID x 2 months.  - LA  appropriate, last filled on 9-14-17.  - Order UDS next visit.  RTC in 9 weeks for medication refill. I discussed the risks, benefits, and alternatives to potential treatment options. All questions and concerns were fully addressed today in clinic. Dr. Branch was consulted regarding the patient plan and agrees.            >> Pain Disability Index:  2/28/2017 :: 18  5/26/2017 :: 16  8/18/2017 :: 26    >> UDS:  6-28-16 :: appropriate  2/28/2017 :: appropriate  8/18/2017 :: not in EMR

## 2017-10-13 ENCOUNTER — LAB VISIT (OUTPATIENT)
Dept: LAB | Facility: HOSPITAL | Age: 72
End: 2017-10-13
Attending: INTERNAL MEDICINE
Payer: MEDICARE

## 2017-10-13 DIAGNOSIS — D50.0 IRON DEFICIENCY ANEMIA DUE TO CHRONIC BLOOD LOSS: ICD-10-CM

## 2017-10-13 DIAGNOSIS — Z12.5 SCREENING FOR PROSTATE CANCER: ICD-10-CM

## 2017-10-13 LAB
ALBUMIN SERPL BCP-MCNC: 3.2 G/DL
ALP SERPL-CCNC: 65 U/L
ALT SERPL W/O P-5'-P-CCNC: 6 U/L
ANION GAP SERPL CALC-SCNC: 9 MMOL/L
AST SERPL-CCNC: 16 U/L
BASOPHILS # BLD AUTO: 0.19 K/UL
BASOPHILS NFR BLD: 2.3 %
BILIRUB SERPL-MCNC: 0.4 MG/DL
BUN SERPL-MCNC: 14 MG/DL
CALCIUM SERPL-MCNC: 9.5 MG/DL
CHLORIDE SERPL-SCNC: 103 MMOL/L
CO2 SERPL-SCNC: 26 MMOL/L
COMPLEXED PSA SERPL-MCNC: 1.2 NG/ML
CREAT SERPL-MCNC: 0.8 MG/DL
DIFFERENTIAL METHOD: ABNORMAL
EOSINOPHIL # BLD AUTO: 0.5 K/UL
EOSINOPHIL NFR BLD: 5.7 %
ERYTHROCYTE [DISTWIDTH] IN BLOOD BY AUTOMATED COUNT: 18.9 %
EST. GFR  (AFRICAN AMERICAN): >60 ML/MIN/1.73 M^2
EST. GFR  (NON AFRICAN AMERICAN): >60 ML/MIN/1.73 M^2
FERRITIN SERPL-MCNC: 137 NG/ML
GLUCOSE SERPL-MCNC: 98 MG/DL
HCT VFR BLD AUTO: 35.8 %
HGB BLD-MCNC: 11.6 G/DL
IRON SERPL-MCNC: 34 UG/DL
LYMPHOCYTES # BLD AUTO: 1.1 K/UL
LYMPHOCYTES NFR BLD: 13.8 %
MCH RBC QN AUTO: 26.3 PG
MCHC RBC AUTO-ENTMCNC: 32.4 G/DL
MCV RBC AUTO: 81 FL
MONOCYTES # BLD AUTO: 0.4 K/UL
MONOCYTES NFR BLD: 5.3 %
NEUTROPHILS # BLD AUTO: 6 K/UL
NEUTROPHILS NFR BLD: 72.9 %
PLATELET # BLD AUTO: 400 K/UL
PMV BLD AUTO: 10.3 FL
POTASSIUM SERPL-SCNC: 4.5 MMOL/L
PROT SERPL-MCNC: 7.9 G/DL
RBC # BLD AUTO: 4.41 M/UL
SATURATED IRON: 14 %
SODIUM SERPL-SCNC: 138 MMOL/L
TOTAL IRON BINDING CAPACITY: 240 UG/DL
TRANSFERRIN SERPL-MCNC: 162 MG/DL
WBC # BLD AUTO: 8.28 K/UL

## 2017-10-13 PROCEDURE — 36415 COLL VENOUS BLD VENIPUNCTURE: CPT | Mod: PO

## 2017-10-13 PROCEDURE — 84153 ASSAY OF PSA TOTAL: CPT

## 2017-10-13 PROCEDURE — 85025 COMPLETE CBC W/AUTO DIFF WBC: CPT

## 2017-10-13 PROCEDURE — 82728 ASSAY OF FERRITIN: CPT

## 2017-10-13 PROCEDURE — 80053 COMPREHEN METABOLIC PANEL: CPT

## 2017-10-13 PROCEDURE — 83540 ASSAY OF IRON: CPT

## 2017-10-17 ENCOUNTER — OFFICE VISIT (OUTPATIENT)
Dept: HEMATOLOGY/ONCOLOGY | Facility: CLINIC | Age: 72
End: 2017-10-17
Payer: MEDICARE

## 2017-10-17 VITALS
DIASTOLIC BLOOD PRESSURE: 68 MMHG | HEART RATE: 57 BPM | OXYGEN SATURATION: 97 % | HEIGHT: 68 IN | TEMPERATURE: 97 F | BODY MASS INDEX: 23.44 KG/M2 | SYSTOLIC BLOOD PRESSURE: 122 MMHG | WEIGHT: 154.63 LBS

## 2017-10-17 DIAGNOSIS — D51.0 VITAMIN B12 DEFICIENCY ANEMIA DUE TO INTRINSIC FACTOR DEFICIENCY: ICD-10-CM

## 2017-10-17 DIAGNOSIS — D50.0 IRON DEFICIENCY ANEMIA DUE TO CHRONIC BLOOD LOSS: Primary | ICD-10-CM

## 2017-10-17 PROCEDURE — 99213 OFFICE O/P EST LOW 20 MIN: CPT | Mod: PBBFAC,PO | Performed by: INTERNAL MEDICINE

## 2017-10-17 PROCEDURE — G0008 ADMIN INFLUENZA VIRUS VAC: HCPCS | Mod: PBBFAC,PO

## 2017-10-17 PROCEDURE — 99214 OFFICE O/P EST MOD 30 MIN: CPT | Mod: S$PBB,,, | Performed by: INTERNAL MEDICINE

## 2017-10-17 PROCEDURE — 99999 PR PBB SHADOW E&M-EST. PATIENT-LVL III: CPT | Mod: PBBFAC,,, | Performed by: INTERNAL MEDICINE

## 2017-10-17 NOTE — PROGRESS NOTES
Subjective:       Patient ID: Noble Tripp is a 72 y.o. male.    Chief Complaint: No chief complaint on file.    HPI Mr. Tripp is a 73 y/o male   who comes for follow up of his previously diagnosed head and neck cancer and his iron deficiency anemia.  He   is a former patient of dr Haynes   . 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 He later had recurrent disease of the posterior pharynx region and was treated with definitive radiation, no concurrent chemotherapy. He was then found to have recurrence on the tongue, which was resected in 11/2014.  He says he is stable from the [facility] and [facility] point of view. Was seen at [facility] in spet 2017 and told things were Ok.     He was on  oral iron because of indexes suggestive of iron deficiency anemia Last colonoscopy was 5/2017  Today, patient comes in for follow up. He is followed by Dr. Oleary (ENT) at [facility] who states patient is doing well overall based on physical exam. He feels well today without any n/v/d, SOB, CP, weight loss. He does tend to cough when he drinks thin liquids. No recent infections, antibiotics, steroids.    Laryngeal / vocal cord tumor resection and sublingual tumor resection by Dr. Oleary at [facility]   2. Selective neck dissection by Dr. Oleary  3. Definitive radiation to posterior pharynx vs pyriform sinus x 7 weeks by Dr. Garrett Kam at [facility]  4. Surgical resection of tongue lesion 11/2014 by Dr. Varela  5. Surgical resection of another tongue region lesion 10/2015 by Dr. Varela.  6. Surgical resection of esophagus lesion - found to be noncancerous by Dr. Oleary in 3/2016                   Past Medical History:   Diagnosis Date    Adrenal mass       david    Aspiration pneumonia 10/15     puree/honey    Benign prostate hyperplasia      Chronic  "pain       dr santos    COPD (chronic obstructive pulmonary disease)       papi    Ex-smoker       11/13    Hyperlipidemia      Osteopenia 1/15 tran 1/18    PVD (peripheral vascular disease)       noobs 07 elizabethuri    Pyriform sinus cancer       dr ponce radiation 1/2-14 dr king perales    Sleep apnea       cpap    Squamous cell carcinoma of skin       roberto    Tongue cancer       "superficial" removed 11/14    Vocal cord cancer 2011    Xerostomia       radiation         Social History:  to Arleen. 6 children. Quit smoking 2013 after approx 100 pk-yrs.     Family History: family history includes Cancer in his brother and father. Father and older brother had lung cancer. Younger brother had colon cancer.     Review of Systems   Constitutional: Negative.  Negative for fatigue.   Eyes: Negative.    Cardiovascular: Negative.  Negative for chest pain.   Gastrointestinal: Negative for abdominal pain and nausea.   Genitourinary: Negative.  Negative for hematuria.   Musculoskeletal: Negative.    Skin: Negative.    Neurological: Negative.    Psychiatric/Behavioral: Negative.        Objective:      Physical Exam   Constitutional: He is oriented to person, place, and time. He appears well-developed and well-nourished.   HENT:   Head: Normocephalic.   Mouth/Throat: No oropharyngeal exudate.   Eyes: Pupils are equal, round, and reactive to light.   Neck: No thyromegaly present.   Cardiovascular: Normal rate, regular rhythm and normal heart sounds.  Exam reveals no gallop.    No murmur heard.  Pulmonary/Chest: No respiratory distress. He has no wheezes. He has no rales.   Abdominal: Soft. Bowel sounds are normal. He exhibits no distension and no mass. There is no rebound and no guarding.   Musculoskeletal: Normal range of motion. He exhibits no edema.   Lymphadenopathy:     He has no cervical adenopathy.   Neurological: He is alert and oriented to person, place, and time.   Skin: Skin is warm and dry. "   Psychiatric: He has a normal mood and affect. His behavior is normal.       Wt Readings from Last 3 Encounters:   10/17/17 70.1 kg (154 lb 10.4 oz)   10/05/17 69.4 kg (153 lb)   09/14/17 69.4 kg (153 lb)     Temp Readings from Last 3 Encounters:   10/17/17 97.3 °F (36.3 °C) (Oral)   08/28/17 97.6 °F (36.4 °C) (Oral)   05/01/17 98.1 °F (36.7 °C)     BP Readings from Last 3 Encounters:   10/17/17 122/68   10/05/17 124/62   09/14/17 121/61     Pulse Readings from Last 3 Encounters:   10/17/17 (!) 57   10/05/17 63   09/14/17 70       Assessment:       1. Iron deficiency anemia due to chronic blood loss      2-hx of head and neck cancer  Plan:       Lab Results   Component Value Date    WBC 8.28 10/13/2017    HGB 11.6 (L) 10/13/2017    HCT 35.8 (L) 10/13/2017    MCV 81 (L) 10/13/2017     (H) 10/13/2017     Lab Results   Component Value Date    IRON 34 (L) 10/13/2017    TIBC 240 (L) 10/13/2017    FERRITIN 137 10/13/2017       He will be asked to continuer the oral  iron fnor the time being  See me in 3 months with a cbc, cmp, ferriitn, tibc, folate and b12     Flu shot today  Contineu follow up with the head and neck cancer   as scheduled

## 2017-10-31 RX ORDER — PILOCARPINE HYDROCHLORIDE 5 MG/1
TABLET, FILM COATED ORAL
Qty: 90 TABLET | Refills: 2 | Status: SHIPPED | OUTPATIENT
Start: 2017-10-31 | End: 2018-04-22 | Stop reason: SDUPTHER

## 2017-11-10 ENCOUNTER — OFFICE VISIT (OUTPATIENT)
Dept: INTERNAL MEDICINE | Facility: CLINIC | Age: 72
End: 2017-11-10
Payer: MEDICARE

## 2017-11-10 VITALS
BODY MASS INDEX: 23.45 KG/M2 | TEMPERATURE: 98 F | HEART RATE: 62 BPM | DIASTOLIC BLOOD PRESSURE: 52 MMHG | OXYGEN SATURATION: 95 % | SYSTOLIC BLOOD PRESSURE: 112 MMHG | WEIGHT: 154.75 LBS | HEIGHT: 68 IN

## 2017-11-10 DIAGNOSIS — J44.9 MODERATE COPD (CHRONIC OBSTRUCTIVE PULMONARY DISEASE): Chronic | ICD-10-CM

## 2017-11-10 DIAGNOSIS — I25.10 CORONARY ARTERY DISEASE, ANGINA PRESENCE UNSPECIFIED, UNSPECIFIED VESSEL OR LESION TYPE, UNSPECIFIED WHETHER NATIVE OR TRANSPLANTED HEART: ICD-10-CM

## 2017-11-10 DIAGNOSIS — I25.10 CORONARY ARTERY DISEASE INVOLVING NATIVE CORONARY ARTERY OF NATIVE HEART WITHOUT ANGINA PECTORIS: ICD-10-CM

## 2017-11-10 DIAGNOSIS — I10 ESSENTIAL HYPERTENSION: Primary | ICD-10-CM

## 2017-11-10 PROCEDURE — 99214 OFFICE O/P EST MOD 30 MIN: CPT | Mod: S$PBB,,, | Performed by: FAMILY MEDICINE

## 2017-11-10 PROCEDURE — 99999 PR PBB SHADOW E&M-EST. PATIENT-LVL III: CPT | Mod: PBBFAC,,, | Performed by: FAMILY MEDICINE

## 2017-11-10 PROCEDURE — 99213 OFFICE O/P EST LOW 20 MIN: CPT | Mod: PBBFAC,PO | Performed by: FAMILY MEDICINE

## 2017-11-10 NOTE — PROGRESS NOTES
"Subjective:       Patient ID: Noble Tripp is a  male.    Chief Complaint: Multiple issues see below    HPI1.Hypertension: blood pressures at home normal. Tolerating medicine.     2Throat ca utd specialists; /Dr guerra  Follow anemia also  3. Hx smoker:see prior notes  . radha intermtt cpap  6.chronic pain well controlled w  pain meds.  Chol prev on repatha  Copd utd dr brown . Stable  PSVT, CAD HLD:admitted to [facility] for sepsis/PNA and PSVT on 10-. EKG  Showed extensive St depression of 1 mm on anterolateral leads. EF 60%. MPi showed fixed inferior perfusion defect; utd dr padilla  Past Medical History:   Diagnosis Date    Adrenal mass     david    Aspiration pneumonia 10/15    puree/honey    Benign prostate hyperplasia     CAD (coronary artery disease)     dr padilla    Chronic pain     dr santos    COPD (chronic obstructive pulmonary disease)     papi    Ex-smoker     11/13    Hyperlipidemia     Osteopenia 1/15 tran 1/18    PVD (peripheral vascular disease)     noobs 07 khuri    Pyriform sinus cancer     dr ponce radiation 1/2-14 dr king perales    Sleep apnea     cpap    Squamous cell carcinoma of skin     roberto    Tongue cancer     "superficial" removed 11/14    Vocal cord cancer 2011    Xerostomia     radiation     Past Surgical History:   Procedure Laterality Date    APPENDECTOMY      COLONOSCOPY N/A 5/1/2017    Procedure: Due for screening colonoscopy;  Surgeon: Anushka Yoder MD;  Location: [facility];  Service: Endoscopy;  Laterality: N/A;    tongue cancer excision  11/14    dr vitale    VOCAL CORD LATERALIZATION, ENDOSCOPIC APPROACH W/ MLB      kris     Family History   Problem Relation Age of Onset    Cancer Father     Cancer Brother      Social History     Social History    Marital status:      Spouse name: JYOTI    Number of children: 6    Years of education: N/A     Social History Main Topics    Smoking status: Former Smoker    Smokeless " tobacco: Never Used    Alcohol use No    Drug use: No    Sexual activity: Not Currently     Other Topics Concern    None     Social History Narrative    None     Review of Systems  no cp  No inc sob  Objective:      Physical Exam wife here  Constitutional: He is oriented to person, place, and time. He appears well-developed and well-nourished.   Head: Normocephalic and atraumatic.   Mouth/Throat: No oropharyngeal exudate.   Eyes: Conjunctivae and EOM are normal. Pupils are equal, round, and reactive to light.   Neck: Normal range of motion. Neck supple. Carotid bruit is not present.   Cardiovascular: Normal rate and regular rhythm.    Pulmonary/Chest: cta bilat  Abdominal: Soft. Bowel sounds are normal. He exhibits no distension and no mass. There is no tenderness. There is no rebound and no guarding.   marcelina to person, place, and time.   Skin: Skin is warm and dry.   Psychiatric: He has a normal mood and affect. His behavior is normal. Judgment and thought content normal.       Assessment:      htn  hyperchol  Throat cancer  bph  anemia     cad  Copd  Ex smoker    Plan:     Due f/u dr brown as sched  *Dr Coyle f.u when due  F/u card when due  F/u one yr          F/u one yr

## 2017-11-14 ENCOUNTER — LAB VISIT (OUTPATIENT)
Dept: LAB | Facility: HOSPITAL | Age: 72
End: 2017-11-14
Attending: INTERNAL MEDICINE
Payer: MEDICARE

## 2017-11-14 DIAGNOSIS — E78.2 MIXED HYPERLIPIDEMIA: ICD-10-CM

## 2017-11-14 LAB
CHOLEST SERPL-MCNC: 204 MG/DL
CHOLEST/HDLC SERPL: 4.7 {RATIO}
HDLC SERPL-MCNC: 43 MG/DL
HDLC SERPL: 21.1 %
LDLC SERPL CALC-MCNC: 147.8 MG/DL
NONHDLC SERPL-MCNC: 161 MG/DL
TRIGL SERPL-MCNC: 66 MG/DL

## 2017-11-14 PROCEDURE — 80061 LIPID PANEL: CPT

## 2017-11-14 PROCEDURE — 36415 COLL VENOUS BLD VENIPUNCTURE: CPT | Mod: PO

## 2017-12-05 ENCOUNTER — OFFICE VISIT (OUTPATIENT)
Dept: INTERNAL MEDICINE | Facility: CLINIC | Age: 72
DRG: 190 | End: 2017-12-05
Payer: MEDICARE

## 2017-12-05 VITALS
WEIGHT: 155.63 LBS | TEMPERATURE: 97 F | DIASTOLIC BLOOD PRESSURE: 60 MMHG | BODY MASS INDEX: 23.59 KG/M2 | HEART RATE: 75 BPM | OXYGEN SATURATION: 90 % | SYSTOLIC BLOOD PRESSURE: 116 MMHG | HEIGHT: 68 IN

## 2017-12-05 DIAGNOSIS — J06.9 VIRAL UPPER RESPIRATORY ILLNESS: Primary | ICD-10-CM

## 2017-12-05 PROCEDURE — 99213 OFFICE O/P EST LOW 20 MIN: CPT | Mod: 25,S$PBB,, | Performed by: INTERNAL MEDICINE

## 2017-12-05 PROCEDURE — 99214 OFFICE O/P EST MOD 30 MIN: CPT | Mod: PBBFAC,PO | Performed by: INTERNAL MEDICINE

## 2017-12-05 PROCEDURE — 99999 PR PBB SHADOW E&M-EST. PATIENT-LVL IV: CPT | Mod: PBBFAC,,, | Performed by: INTERNAL MEDICINE

## 2017-12-05 PROCEDURE — 96372 THER/PROPH/DIAG INJ SC/IM: CPT | Mod: PBBFAC,PO

## 2017-12-05 RX ORDER — HYDROCODONE BITARTRATE AND HOMATROPINE METHYLBROMIDE ORAL SOLUTION 5; 1.5 MG/5ML; MG/5ML
5 LIQUID ORAL EVERY 4 HOURS PRN
Qty: 240 ML | Refills: 0 | Status: SHIPPED | OUTPATIENT
Start: 2017-12-05 | End: 2017-12-15

## 2017-12-05 RX ORDER — METHYLPREDNISOLONE ACETATE 80 MG/ML
80 INJECTION, SUSPENSION INTRA-ARTICULAR; INTRALESIONAL; INTRAMUSCULAR; SOFT TISSUE
Status: COMPLETED | OUTPATIENT
Start: 2017-12-05 | End: 2017-12-05

## 2017-12-05 RX ADMIN — METHYLPREDNISOLONE ACETATE 80 MG: 80 INJECTION, SUSPENSION INTRALESIONAL; INTRAMUSCULAR; INTRASYNOVIAL; SOFT TISSUE at 10:12

## 2017-12-05 NOTE — PROGRESS NOTES
Depo-Medrol 80 mg/ml IM left ventrogluteal.  Lot# [id number] exp 05/2020 [facility] [employer]. Pt advised to wait in clinic 15 minutes to monitor for side effects.  Pt voiced understanding and tolerated injection well.

## 2017-12-05 NOTE — PROGRESS NOTES
"Patient is a 72-year-old man who comes in today with complaints of cough, congestion for last 3 days.  He denies any fever.  He denies any sputum production.  He's been using his rescue inhaler several times per day.  He's been very faithful with his maintenance inhaler.        Current meds have been verified and updated per the EMR  Exam:/60 (BP Location: Right arm)   Pulse 75   Temp 97.2 °F (36.2 °C) (Tympanic)   Ht 5' 8" (1.727 m)   Wt 70.6 kg (155 lb 10.3 oz)   SpO2 (!) 90%   BMI 23.67 kg/m²    nonlabored breathing.  Respirations 16-18   chest completely clear       Lab Results   Component Value Date    WBC 8.28 10/13/2017    HGB 11.6 (L) 10/13/2017    HCT 35.8 (L) 10/13/2017     (H) 10/13/2017    CHOL 204 (H) 11/14/2017    TRIG 66 11/14/2017    HDL 43 11/14/2017    ALT 6 (L) 10/13/2017    AST 16 10/13/2017     10/13/2017    K 4.5 10/13/2017     10/13/2017    CREATININE 0.8 10/13/2017    BUN 14 10/13/2017    CO2 26 10/13/2017    TSH 0.994 05/19/2014    PSA 1.2 10/13/2017    INR 1.1 10/10/2015    GLUF 90 01/14/2010    HGBA1C 6.0 01/17/2013       Impression:   upper respiratory illness   Patient Active Problem List   Diagnosis    Thoracic or lumbosacral neuritis or radiculitis, unspecified    Neuropathy    Benign prostate hyperplasia    Hyperlipidemia    Adrenal mass    Osteopenia    Hypertension    Chronic pain    History of head and neck cancer    Personal history of colonic polyps    JUANITO on CPAP    Cancer of tongue    Adrenal benign neoplasm    Ex-smoker    Hypomagnesemia    Oropharyngeal aspiration    PSVT (paroxysmal supraventricular tachycardia)    Moderate COPD (chronic obstructive pulmonary disease)    Coronary artery disease involving native coronary artery of native heart without angina pectoris    Allergy to statin medication    Statin intolerance    Colon cancer screening    Iron deficiency anemia due to chronic blood loss    CAD (coronary artery " disease)       Plan:  Orders Placed This Encounter    methylPREDNISolone acetate injection 80 mg    hydrocodone-homatropine 5-1.5 mg/5 ml (HYCODAN) 5-1.5 mg/5 mL Syrp      patient was given 1 cc Depo-Medrol 80 and placed on Hycodan cough syrup.  He was told to take over-the-counter Mucinex DM and to force fluids.  If he's not had any improvement within next 3-5 days, he should call his primary care physician

## 2017-12-07 ENCOUNTER — HOSPITAL ENCOUNTER (INPATIENT)
Facility: HOSPITAL | Age: 72
LOS: 2 days | Discharge: HOME | DRG: 190 | End: 2017-12-09
Attending: EMERGENCY MEDICINE | Admitting: FAMILY MEDICINE
Payer: MEDICARE

## 2017-12-07 ENCOUNTER — TELEPHONE (OUTPATIENT)
Dept: PULMONOLOGY | Facility: CLINIC | Age: 72
End: 2017-12-07

## 2017-12-07 DIAGNOSIS — R06.02 SOB (SHORTNESS OF BREATH): Primary | ICD-10-CM

## 2017-12-07 DIAGNOSIS — R50.9 FEVER, UNSPECIFIED FEVER CAUSE: ICD-10-CM

## 2017-12-07 DIAGNOSIS — R09.02 HYPOXIA: Primary | ICD-10-CM

## 2017-12-07 DIAGNOSIS — R07.9 CHEST PAIN: ICD-10-CM

## 2017-12-07 DIAGNOSIS — J44.1 COPD WITH ACUTE EXACERBATION: ICD-10-CM

## 2017-12-07 LAB
ALBUMIN SERPL BCP-MCNC: 2.7 G/DL
ALLENS TEST: ABNORMAL
ALP SERPL-CCNC: 54 U/L
ALT SERPL W/O P-5'-P-CCNC: 11 U/L
ANION GAP SERPL CALC-SCNC: 11 MMOL/L
AST SERPL-CCNC: 15 U/L
BASOPHILS # BLD AUTO: 0.03 K/UL
BASOPHILS NFR BLD: 0.2 %
BILIRUB SERPL-MCNC: 0.5 MG/DL
BNP SERPL-MCNC: 406 PG/ML
BUN SERPL-MCNC: 18 MG/DL
CALCIUM SERPL-MCNC: 9.5 MG/DL
CHLORIDE SERPL-SCNC: 102 MMOL/L
CO2 SERPL-SCNC: 25 MMOL/L
CREAT SERPL-MCNC: 0.7 MG/DL
D DIMER PPP IA.FEU-MCNC: 2.07 MG/L FEU
DACRYOCYTES BLD QL SMEAR: ABNORMAL
DELSYS: ABNORMAL
DIFFERENTIAL METHOD: ABNORMAL
EOSINOPHIL # BLD AUTO: 0.1 K/UL
EOSINOPHIL NFR BLD: 0.3 %
ERYTHROCYTE [DISTWIDTH] IN BLOOD BY AUTOMATED COUNT: 18.8 %
EST. GFR  (AFRICAN AMERICAN): >60 ML/MIN/1.73 M^2
EST. GFR  (NON AFRICAN AMERICAN): >60 ML/MIN/1.73 M^2
FIO2: 21
FLUAV AG SPEC QL IA: NEGATIVE
FLUBV AG SPEC QL IA: NEGATIVE
GLUCOSE SERPL-MCNC: 97 MG/DL
HCO3 UR-SCNC: 24 MMOL/L (ref 24–28)
HCT VFR BLD AUTO: 34.2 %
HGB BLD-MCNC: 11.4 G/DL
LACTATE SERPL-SCNC: 1 MMOL/L
LYMPHOCYTES # BLD AUTO: 0.9 K/UL
LYMPHOCYTES NFR BLD: 4.8 %
MCH RBC QN AUTO: 27 PG
MCHC RBC AUTO-ENTMCNC: 33.3 G/DL
MCV RBC AUTO: 81 FL
MODE: ABNORMAL
MONOCYTES # BLD AUTO: 0.6 K/UL
MONOCYTES NFR BLD: 3.5 %
NEUTROPHILS # BLD AUTO: 16.2 K/UL
NEUTROPHILS NFR BLD: 91.6 %
PCO2 BLDA: 34.4 MMHG (ref 35–45)
PH SMN: 7.45 [PH] (ref 7.35–7.45)
PLATELET # BLD AUTO: 331 K/UL
PMV BLD AUTO: 10 FL
PO2 BLDA: 55 MMHG (ref 80–100)
POC BE: 0 MMOL/L
POC SATURATED O2: 90 % (ref 95–100)
POIKILOCYTOSIS BLD QL SMEAR: SLIGHT
POTASSIUM SERPL-SCNC: 4.4 MMOL/L
PROT SERPL-MCNC: 7.5 G/DL
RBC # BLD AUTO: 4.22 M/UL
SAMPLE: ABNORMAL
SCHISTOCYTES BLD QL SMEAR: PRESENT
SITE: ABNORMAL
SODIUM SERPL-SCNC: 138 MMOL/L
SPECIMEN SOURCE: NORMAL
TROPONIN I SERPL DL<=0.01 NG/ML-MCNC: 0.03 NG/ML
WBC # BLD AUTO: 17.75 K/UL

## 2017-12-07 PROCEDURE — 80053 COMPREHEN METABOLIC PANEL: CPT

## 2017-12-07 PROCEDURE — 25000242 PHARM REV CODE 250 ALT 637 W/ HCPCS: Performed by: NURSE PRACTITIONER

## 2017-12-07 PROCEDURE — 93005 ELECTROCARDIOGRAM TRACING: CPT

## 2017-12-07 PROCEDURE — 99900035 HC TECH TIME PER 15 MIN (STAT)

## 2017-12-07 PROCEDURE — 83605 ASSAY OF LACTIC ACID: CPT

## 2017-12-07 PROCEDURE — 83880 ASSAY OF NATRIURETIC PEPTIDE: CPT

## 2017-12-07 PROCEDURE — 84484 ASSAY OF TROPONIN QUANT: CPT

## 2017-12-07 PROCEDURE — 85025 COMPLETE CBC W/AUTO DIFF WBC: CPT

## 2017-12-07 PROCEDURE — 93010 ELECTROCARDIOGRAM REPORT: CPT | Mod: ,,, | Performed by: INTERNAL MEDICINE

## 2017-12-07 PROCEDURE — 25000003 PHARM REV CODE 250: Performed by: NURSE PRACTITIONER

## 2017-12-07 PROCEDURE — 96374 THER/PROPH/DIAG INJ IV PUSH: CPT

## 2017-12-07 PROCEDURE — 25000242 PHARM REV CODE 250 ALT 637 W/ HCPCS: Performed by: EMERGENCY MEDICINE

## 2017-12-07 PROCEDURE — 63600175 PHARM REV CODE 636 W HCPCS: Performed by: EMERGENCY MEDICINE

## 2017-12-07 PROCEDURE — 85347 COAGULATION TIME ACTIVATED: CPT

## 2017-12-07 PROCEDURE — 36600 WITHDRAWAL OF ARTERIAL BLOOD: CPT

## 2017-12-07 PROCEDURE — 21400001 HC TELEMETRY ROOM

## 2017-12-07 PROCEDURE — 87400 INFLUENZA A/B EACH AG IA: CPT

## 2017-12-07 PROCEDURE — 25000003 PHARM REV CODE 250: Performed by: EMERGENCY MEDICINE

## 2017-12-07 PROCEDURE — 87040 BLOOD CULTURE FOR BACTERIA: CPT

## 2017-12-07 PROCEDURE — 63600175 PHARM REV CODE 636 W HCPCS: Performed by: NURSE PRACTITIONER

## 2017-12-07 PROCEDURE — 36415 COLL VENOUS BLD VENIPUNCTURE: CPT

## 2017-12-07 PROCEDURE — 94640 AIRWAY INHALATION TREATMENT: CPT

## 2017-12-07 PROCEDURE — 85379 FIBRIN DEGRADATION QUANT: CPT

## 2017-12-07 PROCEDURE — 99285 EMERGENCY DEPT VISIT HI MDM: CPT | Mod: 25

## 2017-12-07 RX ORDER — VARENICLINE TARTRATE 1 MG/1
TABLET, FILM COATED ORAL
Qty: 56 TABLET | Refills: 0 | Status: SHIPPED | OUTPATIENT
Start: 2017-12-07 | End: 2017-12-09 | Stop reason: HOSPADM

## 2017-12-07 RX ORDER — GABAPENTIN 400 MG/1
800 CAPSULE ORAL 3 TIMES DAILY
Status: DISCONTINUED | OUTPATIENT
Start: 2017-12-07 | End: 2017-12-09 | Stop reason: HOSPADM

## 2017-12-07 RX ORDER — DIPHENHYDRAMINE HYDROCHLORIDE 50 MG/ML
6.25 INJECTION INTRAMUSCULAR; INTRAVENOUS EVERY 4 HOURS PRN
Status: DISCONTINUED | OUTPATIENT
Start: 2017-12-07 | End: 2017-12-09 | Stop reason: HOSPADM

## 2017-12-07 RX ORDER — METHYLPREDNISOLONE SOD SUCC 125 MG
125 VIAL (EA) INJECTION
Status: COMPLETED | OUTPATIENT
Start: 2017-12-07 | End: 2017-12-07

## 2017-12-07 RX ORDER — METHYLPREDNISOLONE SOD SUCC 125 MG
80 VIAL (EA) INJECTION EVERY 8 HOURS
Status: DISCONTINUED | OUTPATIENT
Start: 2017-12-08 | End: 2017-12-08

## 2017-12-07 RX ORDER — OXYCODONE AND ACETAMINOPHEN 7.5; 325 MG/1; MG/1
1 TABLET ORAL 3 TIMES DAILY PRN
Qty: 90 TABLET | Refills: 0 | Status: SHIPPED | OUTPATIENT
Start: 2017-12-08 | End: 2018-01-07

## 2017-12-07 RX ORDER — HYDROCODONE BITARTRATE AND ACETAMINOPHEN 5; 325 MG/1; MG/1
1 TABLET ORAL EVERY 4 HOURS PRN
Status: DISCONTINUED | OUTPATIENT
Start: 2017-12-07 | End: 2017-12-09 | Stop reason: HOSPADM

## 2017-12-07 RX ORDER — MOXIFLOXACIN HYDROCHLORIDE 400 MG/1
400 TABLET ORAL DAILY
Status: DISCONTINUED | OUTPATIENT
Start: 2017-12-07 | End: 2017-12-09 | Stop reason: HOSPADM

## 2017-12-07 RX ORDER — IPRATROPIUM BROMIDE AND ALBUTEROL SULFATE 2.5; .5 MG/3ML; MG/3ML
3 SOLUTION RESPIRATORY (INHALATION)
Status: COMPLETED | OUTPATIENT
Start: 2017-12-07 | End: 2017-12-07

## 2017-12-07 RX ORDER — NAPROXEN SODIUM 220 MG/1
81 TABLET, FILM COATED ORAL DAILY
Status: DISCONTINUED | OUTPATIENT
Start: 2017-12-08 | End: 2017-12-09 | Stop reason: HOSPADM

## 2017-12-07 RX ORDER — IPRATROPIUM BROMIDE AND ALBUTEROL SULFATE 2.5; .5 MG/3ML; MG/3ML
3 SOLUTION RESPIRATORY (INHALATION) EVERY 4 HOURS PRN
Status: DISCONTINUED | OUTPATIENT
Start: 2017-12-07 | End: 2017-12-07

## 2017-12-07 RX ORDER — ENOXAPARIN SODIUM 100 MG/ML
40 INJECTION SUBCUTANEOUS EVERY 24 HOURS
Status: DISCONTINUED | OUTPATIENT
Start: 2017-12-07 | End: 2017-12-09 | Stop reason: HOSPADM

## 2017-12-07 RX ORDER — BUDESONIDE 0.5 MG/2ML
0.5 INHALANT ORAL EVERY 12 HOURS
Status: DISCONTINUED | OUTPATIENT
Start: 2017-12-07 | End: 2017-12-09 | Stop reason: HOSPADM

## 2017-12-07 RX ORDER — ONDANSETRON 2 MG/ML
4 INJECTION INTRAMUSCULAR; INTRAVENOUS EVERY 8 HOURS PRN
Status: DISCONTINUED | OUTPATIENT
Start: 2017-12-07 | End: 2017-12-09 | Stop reason: HOSPADM

## 2017-12-07 RX ORDER — HYDRALAZINE HYDROCHLORIDE 20 MG/ML
10 INJECTION INTRAMUSCULAR; INTRAVENOUS EVERY 6 HOURS PRN
Status: DISCONTINUED | OUTPATIENT
Start: 2017-12-07 | End: 2017-12-09 | Stop reason: HOSPADM

## 2017-12-07 RX ORDER — ARFORMOTEROL TARTRATE 15 UG/2ML
15 SOLUTION RESPIRATORY (INHALATION) EVERY 12 HOURS
Status: DISCONTINUED | OUTPATIENT
Start: 2017-12-07 | End: 2017-12-09 | Stop reason: HOSPADM

## 2017-12-07 RX ORDER — PILOCARPINE HYDROCHLORIDE 5 MG/1
5 TABLET, FILM COATED ORAL
Status: DISCONTINUED | OUTPATIENT
Start: 2017-12-08 | End: 2017-12-09 | Stop reason: HOSPADM

## 2017-12-07 RX ORDER — IPRATROPIUM BROMIDE 0.5 MG/2.5ML
500 SOLUTION RESPIRATORY (INHALATION) EVERY 6 HOURS
Status: DISCONTINUED | OUTPATIENT
Start: 2017-12-07 | End: 2017-12-07

## 2017-12-07 RX ORDER — IBUPROFEN 400 MG/1
400 TABLET ORAL EVERY 6 HOURS PRN
Status: DISCONTINUED | OUTPATIENT
Start: 2017-12-07 | End: 2017-12-09 | Stop reason: HOSPADM

## 2017-12-07 RX ORDER — IPRATROPIUM BROMIDE AND ALBUTEROL SULFATE 2.5; .5 MG/3ML; MG/3ML
3 SOLUTION RESPIRATORY (INHALATION) EVERY 6 HOURS
Status: DISCONTINUED | OUTPATIENT
Start: 2017-12-07 | End: 2017-12-09 | Stop reason: HOSPADM

## 2017-12-07 RX ORDER — METHYLPREDNISOLONE SOD SUCC 125 MG
125 VIAL (EA) INJECTION EVERY 8 HOURS
Status: DISCONTINUED | OUTPATIENT
Start: 2017-12-07 | End: 2017-12-07

## 2017-12-07 RX ORDER — PANTOPRAZOLE SODIUM 40 MG/1
40 TABLET, DELAYED RELEASE ORAL DAILY
Status: DISCONTINUED | OUTPATIENT
Start: 2017-12-07 | End: 2017-12-09 | Stop reason: HOSPADM

## 2017-12-07 RX ORDER — HYDROCODONE BITARTRATE AND ACETAMINOPHEN 10; 325 MG/1; MG/1
1 TABLET ORAL EVERY 4 HOURS PRN
Status: DISCONTINUED | OUTPATIENT
Start: 2017-12-07 | End: 2017-12-09 | Stop reason: HOSPADM

## 2017-12-07 RX ORDER — PANTOPRAZOLE SODIUM 40 MG/1
40 TABLET, DELAYED RELEASE ORAL DAILY
Status: DISCONTINUED | OUTPATIENT
Start: 2017-12-08 | End: 2017-12-07

## 2017-12-07 RX ORDER — ACETAMINOPHEN 325 MG/1
650 TABLET ORAL EVERY 6 HOURS PRN
Status: DISCONTINUED | OUTPATIENT
Start: 2017-12-07 | End: 2017-12-09 | Stop reason: HOSPADM

## 2017-12-07 RX ORDER — FERROUS SULFATE 325(65) MG
325 TABLET, DELAYED RELEASE (ENTERIC COATED) ORAL
Status: DISCONTINUED | OUTPATIENT
Start: 2017-12-07 | End: 2017-12-09 | Stop reason: HOSPADM

## 2017-12-07 RX ORDER — METHYLPREDNISOLONE SOD SUCC 125 MG
80 VIAL (EA) INJECTION EVERY 8 HOURS
Status: DISCONTINUED | OUTPATIENT
Start: 2017-12-07 | End: 2017-12-07

## 2017-12-07 RX ORDER — MAG HYDROX/ALUMINUM HYD/SIMETH 200-200-20
30 SUSPENSION, ORAL (FINAL DOSE FORM) ORAL EVERY 6 HOURS PRN
Status: DISCONTINUED | OUTPATIENT
Start: 2017-12-07 | End: 2017-12-09 | Stop reason: HOSPADM

## 2017-12-07 RX ORDER — OXYCODONE AND ACETAMINOPHEN 7.5; 325 MG/1; MG/1
1 TABLET ORAL 3 TIMES DAILY PRN
Qty: 90 TABLET | Refills: 0 | Status: ON HOLD | OUTPATIENT
Start: 2018-01-06 | End: 2017-12-09 | Stop reason: HOSPADM

## 2017-12-07 RX ADMIN — HYDROCODONE BITARTRATE AND ACETAMINOPHEN 1 TABLET: 10; 325 TABLET ORAL at 09:12

## 2017-12-07 RX ADMIN — BUDESONIDE 0.5 MG: 0.5 SUSPENSION RESPIRATORY (INHALATION) at 07:12

## 2017-12-07 RX ADMIN — IPRATROPIUM BROMIDE AND ALBUTEROL SULFATE 3 ML: .5; 3 SOLUTION RESPIRATORY (INHALATION) at 02:12

## 2017-12-07 RX ADMIN — ARFORMOTEROL TARTRATE 15 MCG: 15 SOLUTION RESPIRATORY (INHALATION) at 07:12

## 2017-12-07 RX ADMIN — GABAPENTIN 800 MG: 400 CAPSULE ORAL at 09:12

## 2017-12-07 RX ADMIN — IPRATROPIUM BROMIDE AND ALBUTEROL SULFATE 3 ML: .5; 3 SOLUTION RESPIRATORY (INHALATION) at 07:12

## 2017-12-07 RX ADMIN — METHYLPREDNISOLONE SODIUM SUCCINATE 125 MG: 125 INJECTION, POWDER, FOR SOLUTION INTRAMUSCULAR; INTRAVENOUS at 02:12

## 2017-12-07 RX ADMIN — ENOXAPARIN SODIUM 40 MG: 100 INJECTION SUBCUTANEOUS at 06:12

## 2017-12-07 RX ADMIN — MOXIFLOXACIN HYDROCHLORIDE 400 MG: 400 TABLET, FILM COATED ORAL at 03:12

## 2017-12-07 RX ADMIN — FERROUS SULFATE TAB EC 325 MG (65 MG FE EQUIVALENT) 325 MG: 325 (65 FE) TABLET DELAYED RESPONSE at 07:12

## 2017-12-07 RX ADMIN — PANTOPRAZOLE SODIUM 40 MG: 40 TABLET, DELAYED RELEASE ORAL at 06:12

## 2017-12-07 NOTE — ED PROVIDER NOTES
SCRIBE #1 NOTE: I, Corinne Mack, am scribing for, and in the presence of, Chris Fine MD. I have scribed the entire note.      History      Chief Complaint   Patient presents with    Shortness of Breath     shortness of breath, cough, fever       Review of patient's allergies indicates:   Allergen Reactions    Contrast media     Iodinated contrast- oral and iv dye      Other reaction(s): Itching  Other reaction(s): Hives    Neomycin-bacitracin-polymyxin      Other reaction(s): Rash  Other reaction(s): Rash    Pravastatin      Other reaction(s): fatigue  Other reaction(s): fatigue    Rosuvastatin      Other reaction(s): fatigue  Other reaction(s): fatigue    Simvastatin      Other reaction(s): fatigue  Other reaction(s): fatigue    Statins-hmg-coa reductase inhibitors         HPI   HPI    12/7/2017, 2:00 PM   History obtained from the patient      History of Present Illness: Noble Tripp is a 72 y.o. male patient who presents to the Emergency Department for SOB which onset gradually 5 days ago. Symptoms are constant and moderate in severity. Pt was seen by his PCP 2 days ago and given cough medicine and a steroid shot. No mitigating or exacerbating factors reported. Associated sxs include cough and fever(101 at home). Patient denies any chills, congestion, rhinorrhea, CP, N/V/D, back pain, HA, dizziness, and all other sxs at this time. No other prior Tx reported. Pt has Hx of pneumonia, CAD, COPD, and PVD. No further complaints or concerns at this time.         Arrival mode: AAS    PCP: Dwaine Davis MD       Past Medical History:  Past Medical History:   Diagnosis Date    Adrenal mass     david    Aspiration pneumonia 10/15    puree/honey    Benign prostate hyperplasia     CAD (coronary artery disease)     dr padilla    Chronic pain     dr santos    COPD (chronic obstructive pulmonary disease)     papi    Ex-smoker     11/13    Hyperlipidemia     Osteopenia 1/15 tran 1/18    PVD  "(peripheral vascular disease)     noobs 07 latrell    Pyriform sinus cancer     dr ponce radiation 1/2-14 dr king bryson    Sleep apnea     cpap    Squamous cell carcinoma of skin     roberto    Tongue cancer     "superficial" removed 11/14    Vocal cord cancer 2011    Xerostomia     radiation       Past Surgical History:  Past Surgical History:   Procedure Laterality Date    APPENDECTOMY      COLONOSCOPY N/A 5/1/2017    Procedure: Due for screening colonoscopy;  Surgeon: Anushka Yoder MD;  Location: [facility];  Service: Endoscopy;  Laterality: N/A;    tongue cancer excision  11/14    dr vitale    VOCAL CORD LATERALIZATION, ENDOSCOPIC APPROACH W/ MLB      kris         Family History:  Family History   Problem Relation Age of Onset    Cancer Father     Cancer Brother        Social History:  Social History     Social History Main Topics    Smoking status: Former Smoker    Smokeless tobacco: Never Used    Alcohol use No    Drug use: No    Sexual activity: Not Currently       ROS   Review of Systems   Constitutional: Positive for fever (subjective; 101 at home). Negative for chills.   HENT: Negative for congestion, rhinorrhea and sore throat.    Respiratory: Positive for cough and shortness of breath.    Cardiovascular: Negative for chest pain and leg swelling.   Gastrointestinal: Negative for abdominal pain, diarrhea, nausea and vomiting.   Musculoskeletal: Negative for back pain, neck pain and neck stiffness.   Skin: Negative for rash and wound.   Neurological: Negative for dizziness, light-headedness, numbness and headaches.   All other systems reviewed and are negative.    Physical Exam      Initial Vitals [12/07/17 1332]   BP Pulse Resp Temp SpO2   (!) 160/84 88 (!) 22 98.1 °F (36.7 °C) 95 %      MAP       109.33          Physical Exam  Nursing Notes and Vital Signs Reviewed.  Constitutional: Patient is in no apparent distress. Well-developed and well-nourished.  Head: Atraumatic. " Normocephalic.  Eyes: PERRL. EOM intact. Conjunctivae are not pale. No scleral icterus.  ENT: Mucous membranes are moist. Oropharynx is clear and symmetric.    Neck: Supple. Full ROM. No lymphadenopathy.  Cardiovascular: Regular rate. Regular rhythm. No murmurs, rubs, or gallops. Distal pulses are 2+ and symmetric.  Pulmonary/Chest: No respiratory distress. Expiratory wheezes bilaterally with mild accessory muscle use. No rales.  Abdominal: Soft and non-distended.  There is no tenderness.  No rebound, guarding, or rigidity.  Musculoskeletal: Moves all extremities. No obvious deformities. No edema.   Skin: Warm and dry.  Neurological:  Alert, awake, and appropriate.  Normal speech.  No acute focal neurological deficits are appreciated.  Psychiatric: Normal affect. Good eye contact. Appropriate in content.    ED Course    Procedures  ED Vital Signs:  Vitals:    12/07/17 1332 12/07/17 1407 12/07/17 1412 12/07/17 1420   BP: (!) 160/84      Pulse: 88 70 74 74   Resp: (!) 22 16 14 18   Temp: 98.1 °F (36.7 °C)      TempSrc: Oral      SpO2: 95% (!) 92% 97% 100%    12/07/17 1432 12/07/17 1458 12/07/17 1502   BP: (!) 141/59  135/67   Pulse: 80 84 85   Resp: 17 18 17   Temp:      TempSrc:      SpO2: 98% (!) 90% (!) 93%       Abnormal Lab Results:  Labs Reviewed   CBC W/ AUTO DIFFERENTIAL - Abnormal; Notable for the following:        Result Value    WBC 17.75 (*)     RBC 4.22 (*)     Hemoglobin 11.4 (*)     Hematocrit 34.2 (*)     MCV 81 (*)     RDW 18.8 (*)     Gran # 16.2 (*)     Lymph # 0.9 (*)     Gran% 91.6 (*)     Lymph% 4.8 (*)     Mono% 3.5 (*)     All other components within normal limits   COMPREHENSIVE METABOLIC PANEL - Abnormal; Notable for the following:     Albumin 2.7 (*)     Alkaline Phosphatase 54 (*)     All other components within normal limits   TROPONIN I - Abnormal; Notable for the following:     Troponin I 0.031 (*)     All other components within normal limits   B-TYPE NATRIURETIC PEPTIDE - Abnormal;  Notable for the following:      (*)     All other components within normal limits   D DIMER, QUANTITATIVE - Abnormal; Notable for the following:     D-Dimer 2.07 (*)     All other components within normal limits   ISTAT PROCEDURE - Abnormal; Notable for the following:     POC PH 7.452 (*)     POC PCO2 34.4 (*)     POC PO2 55 (*)     POC SATURATED O2 90 (*)     All other components within normal limits   CULTURE, BLOOD   CULTURE, BLOOD   INFLUENZA A AND B ANTIGEN   LACTIC ACID, PLASMA   D DIMER, QUANTITATIVE        All Lab Results:  Results for orders placed or performed during the hospital encounter of 12/07/17   CBC auto differential   Result Value Ref Range    WBC 17.75 (H) 3.90 - 12.70 K/uL    RBC 4.22 (L) 4.60 - 6.20 M/uL    Hemoglobin 11.4 (L) 14.0 - 18.0 g/dL    Hematocrit 34.2 (L) 40.0 - 54.0 %    MCV 81 (L) 82 - 98 fL    MCH 27.0 27.0 - 31.0 pg    MCHC 33.3 32.0 - 36.0 g/dL    RDW 18.8 (H) 11.5 - 14.5 %    Platelets 331 150 - 350 K/uL    MPV 10.0 9.2 - 12.9 fL    Gran # 16.2 (H) 1.8 - 7.7 K/uL    Lymph # 0.9 (L) 1.0 - 4.8 K/uL    Mono # 0.6 0.3 - 1.0 K/uL    Eos # 0.1 0.0 - 0.5 K/uL    Baso # 0.03 0.00 - 0.20 K/uL    Gran% 91.6 (H) 38.0 - 73.0 %    Lymph% 4.8 (L) 18.0 - 48.0 %    Mono% 3.5 (L) 4.0 - 15.0 %    Eosinophil% 0.3 0.0 - 8.0 %    Basophil% 0.2 0.0 - 1.9 %    Poik Slight     Tear Drop Cells Occasional     Schistocytes Present     Differential Method Automated    Comprehensive metabolic panel   Result Value Ref Range    Sodium 138 136 - 145 mmol/L    Potassium 4.4 3.5 - 5.1 mmol/L    Chloride 102 95 - 110 mmol/L    CO2 25 23 - 29 mmol/L    Glucose 97 70 - 110 mg/dL    BUN, Bld 18 8 - 23 mg/dL    Creatinine 0.7 0.5 - 1.4 mg/dL    Calcium 9.5 8.7 - 10.5 mg/dL    Total Protein 7.5 6.0 - 8.4 g/dL    Albumin 2.7 (L) 3.5 - 5.2 g/dL    Total Bilirubin 0.5 0.1 - 1.0 mg/dL    Alkaline Phosphatase 54 (L) 55 - 135 U/L    AST 15 10 - 40 U/L    ALT 11 10 - 44 U/L    Anion Gap 11 8 - 16 mmol/L    eGFR if  African American >60 >60 mL/min/1.73 m^2    eGFR if non African American >60 >60 mL/min/1.73 m^2   Troponin I #1   Result Value Ref Range    Troponin I 0.031 (H) 0.000 - 0.026 ng/mL   B-Type natriuretic peptide (BNP)   Result Value Ref Range     (H) 0 - 99 pg/mL   Influenza antigen Nasopharyngeal Swab   Result Value Ref Range    Influenza A Ag, EIA Negative Negative    Influenza B Ag, EIA Negative Negative    Flu A & B Source Nasopharyngeal Swab    Lactic acid, plasma   Result Value Ref Range    Lactate (Lactic Acid) 1.0 0.5 - 2.2 mmol/L   D dimer, quantitative   Result Value Ref Range    D-Dimer 2.07 (H) <0.50 mg/L FEU   ISTAT PROCEDURE   Result Value Ref Range    POC PH 7.452 (H) 7.35 - 7.45    POC PCO2 34.4 (L) 35 - 45 mmHg    POC PO2 55 (LL) 80 - 100 mmHg    POC HCO3 24.0 24 - 28 mmol/L    POC BE 0 -2 to 2 mmol/L    POC SATURATED O2 90 (L) 95 - 100 %    Sample ARTERIAL     Site LR     Allens Test Pass     DelSys Room Air     Mode SPONT     FiO2 21        Imaging Results:  Imaging Results          X-Ray Chest AP Portable (Final result)  Result time 12/07/17 14:34:52    Final result by Cely Be III, MD (12/07/17 14:34:52)                 Impression:     No acute disease identified in the chest.        Electronically signed by: CELY BE MD  Date:     12/07/17  Time:    14:34              Narrative:    XR CHEST AP PORTABLE    Clinical history: Asthma.      Findings: Cardiomediastinal silhouette is within normal limits for AP technique.  Atherosclerotic ossifications of the aorta are again noted.  Hazy opacity along the left heart border is consistent with an epicardial fat-pad.  Stable small calcified granuloma in the right lower lobe.  The lungs appear clear of active disease. No acute appearing infiltrate, pleural effusion, pneumothorax or other acute disease identified.                             The EKG was ordered, reviewed, and independently interpreted by the ED provider.  Interpretation  time: 1404  Rate: 74 BPM  Rhythm: normal sinus rhythm and 1st degree AV block with occasional PVCs  Interpretation: Nonspecific ST and T wave abnormality. No STEMI.           The Emergency Provider reviewed the vital signs and test results, which are outlined above.    ED Discussion     3:25 PM: Discussed case with DESIREE Farrell ([facility]). Dr. Knowels agrees with current care and management of pt and accepts admission. Pt is hypoxic and may have COPD exacerbation. Pt is allergic to contrast media.  Admitting Service: Hospital medicine   Admitting Physician: Dr. Knowles  Admit to: In-patient Tele    3:31 PM: Re-evaluated pt. I have discussed test results, shared treatment plan, and the need for admission with patient and family at bedside. Pt and family express understanding at this time and agree with all information. All questions answered. Pt and family have no further questions or concerns at this time. Pt is ready for admit.      ED Medication(s):  Medications   moxifloxacin tablet 400 mg (400 mg Oral Given 12/7/17 1541)   albuterol-ipratropium 2.5mg-0.5mg/3mL nebulizer solution 3 mL (3 mLs Nebulization Given 12/7/17 1420)   methylPREDNISolone sodium succinate injection 125 mg (125 mg Intravenous Given 12/7/17 1408)       New Prescriptions    No medications on file             Medical Decision Making    Medical Decision Making:   Clinical Tests:   Lab Tests: Ordered and Reviewed  Radiological Study: Ordered and Reviewed  Medical Tests: Ordered and Reviewed           Scribe Attestation:   Scribe #1: I performed the above scribed service and the documentation accurately describes the services I performed. I attest to the accuracy of the note.    Attending:   Physician Attestation Statement for Scribe #1: I, Chris Fine MD, personally performed the services described in this documentation, as scribed by Corinne Mack, in my presence, and it is both accurate and complete.          Clinical Impression        ICD-10-CM ICD-9-CM   1. COPD with acute exacerbation J44.1 491.21   2. Chest pain R07.9 786.50   3. Hypoxia R09.02 799.02   4. Fever, unspecified fever cause R50.9 780.60       Disposition:   Disposition: Admitted  Condition: Julia Fine MD  12/08/17 7099

## 2017-12-07 NOTE — TELEPHONE ENCOUNTER
----- Message from Omid Gross sent at 12/7/2017  7:57 AM CST -----  Contact: Pt Spouse/Sloane  Caller request a call from the nurse to ask a question about an issue pt is having right now, caller declined to give any further details, please contact the caller at [phone] or [phone]

## 2017-12-07 NOTE — SUBJECTIVE & OBJECTIVE
"Past Medical History:   Diagnosis Date    Adrenal mass     david    Aspiration pneumonia 10/15    puree/honey    Benign prostate hyperplasia     CAD (coronary artery disease)     dr padilla    Chronic pain     dr santos    COPD (chronic obstructive pulmonary disease)     papi    Ex-smoker     11/13    Hyperlipidemia     Osteopenia 1/15 tran 1/18    PVD (peripheral vascular disease)     noobs 07 khuri    Pyriform sinus cancer     dr ponce radiation 1/2-14 dr king bryson    Sleep apnea     cpap    Squamous cell carcinoma of skin     roberto    Tongue cancer     "superficial" removed 11/14    Vocal cord cancer 2011    Xerostomia     radiation       Past Surgical History:   Procedure Laterality Date    APPENDECTOMY      COLONOSCOPY N/A 5/1/2017    Procedure: Due for screening colonoscopy;  Surgeon: Anushka Yoder MD;  Location[facility];  Service: Endoscopy;  Laterality: N/A;    tongue cancer excision  11/14    dr vitale    VOCAL CORD LATERALIZATION, ENDOSCOPIC APPROACH W/ MLB      kris       Review of patient's allergies indicates:   Allergen Reactions    Contrast media     Iodinated contrast- oral and iv dye      Other reaction(s): Itching  Other reaction(s): Hives    Neomycin-bacitracin-polymyxin      Other reaction(s): Rash  Other reaction(s): Rash    Pravastatin      Other reaction(s): fatigue  Other reaction(s): fatigue    Rosuvastatin      Other reaction(s): fatigue  Other reaction(s): fatigue    Simvastatin      Other reaction(s): fatigue  Other reaction(s): fatigue    Statins-hmg-coa reductase inhibitors        No current facility-administered medications on file prior to encounter.      Current Outpatient Prescriptions on File Prior to Encounter   Medication Sig    aclidinium bromide (TUDORZA) 400 mcg/actuation AePB Inhale 1 puff into the lungs 2 (two) times daily.    albuterol 90 mcg/actuation inhaler Inhale 2 puffs into the lungs every 4 (four) hours as needed for " Wheezing or Shortness of Breath.    aspirin 81 mg Tab Take 1 tablet by mouth Daily. Over the counter to help prevent stroke/heart attack    CHANTIX CONTINUING MONTH BOX 1 mg Tab TAKE 1 TABLET (1 MG TOTAL) BY MOUTH ONCE DAILY.    ferrous sulfate 325 (65 FE) MG EC tablet Take 1 tablet (325 mg total) by mouth 2 times daily 2 hours after meal.    fish oil-omega-3 fatty acids 300-1,000 mg capsule Take 2 g by mouth.    gabapentin (NEURONTIN) 800 MG tablet Take 1 tablet (800 mg total) by mouth 3 (three) times daily.    hydrocodone-homatropine 5-1.5 mg/5 ml (HYCODAN) 5-1.5 mg/5 mL Syrp Take 5 mLs by mouth every 4 (four) hours as needed.    loratadine (CLARITIN) 10 mg tablet Take 1 tablet by mouth Daily.    multivitamin capsule Take 1 capsule by mouth once daily.    [START ON 12/8/2017] oxyCODONE-acetaminophen (PERCOCET) 7.5-325 mg per tablet Take 1 tablet by mouth 3 (three) times daily as needed for Pain.    pantoprazole (PROTONIX) 40 MG tablet Take 40 mg by mouth once daily.     pilocarpine (SALAGEN) 5 MG Tab TAKE ONE TABLET BY MOUTH 30 MINUTES PRIOR TO EACH MEAL.    [START ON 1/6/2018] oxyCODONE-acetaminophen (PERCOCET) 7.5-325 mg per tablet Take 1 tablet by mouth 3 (three) times daily as needed for Pain.    [DISCONTINUED] oxycodone-acetaminophen (PERCOCET) 7.5-325 mg per tablet Take 1 tablet by mouth 3 (three) times daily as needed for Pain.     Family History     Problem Relation (Age of Onset)    Cancer Father, Brother        Social History Main Topics    Smoking status: Former Smoker    Smokeless tobacco: Never Used    Alcohol use No    Drug use: No    Sexual activity: Not Currently     Review of Systems   Constitutional: Positive for activity change, appetite change, fatigue and fever.   HENT: Positive for congestion. Negative for ear pain, postnasal drip, rhinorrhea and sore throat.    Eyes: Negative for pain and redness.   Respiratory: Positive for cough, shortness of breath and wheezing.     Cardiovascular: Negative for chest pain, palpitations and leg swelling.   Gastrointestinal: Negative for abdominal pain, diarrhea, nausea and vomiting.   Genitourinary: Negative for dysuria, frequency and hematuria.   Musculoskeletal: Positive for back pain. Negative for gait problem.   Skin: Negative for pallor, rash and wound.   Neurological: Negative for dizziness, weakness, light-headedness and headaches.     Objective:     Vital Signs (Most Recent):  Temp: 97.6 °F (36.4 °C) (12/07/17 1737)  Pulse: 78 (12/07/17 1737)  Resp: (!) 22 (12/07/17 1737)  BP: (!) 148/66 (12/07/17 1737)  SpO2: (!) 94 % (12/07/17 1737) Vital Signs (24h Range):  Temp:  [97.6 °F (36.4 °C)-98.1 °F (36.7 °C)] 97.6 °F (36.4 °C)  Pulse:  [70-88] 78  Resp:  [14-22] 22  SpO2:  [90 %-100 %] 94 %  BP: (135-160)/(59-84) 148/66     Weight: 66.5 kg (146 lb 9.7 oz)  Body mass index is 22.29 kg/m².    Physical Exam   Constitutional: He is oriented to person, place, and time. He appears well-developed and well-nourished. No distress.   HENT:   Head: Normocephalic and atraumatic.   Eyes: Conjunctivae are normal. No scleral icterus.   Neck: Normal range of motion. Neck supple.   Cardiovascular: Normal rate, regular rhythm and normal heart sounds.  Exam reveals no gallop and no friction rub.    No murmur heard.  Pulmonary/Chest: Effort normal. He has wheezes in the left upper field, the left middle field and the left lower field. He has rhonchi.   Abdominal: Soft. Bowel sounds are normal.   Musculoskeletal: Normal range of motion. He exhibits no edema or tenderness.   Neurological: He is alert and oriented to person, place, and time.   Skin: Skin is warm and dry.   Psychiatric: He has a normal mood and affect. His behavior is normal.   Nursing note and vitals reviewed.          Significant Labs:   CBC:   Recent Labs  Lab 12/07/17  1353   WBC 17.75*   HGB 11.4*   HCT 34.2*        CMP:   Recent Labs  Lab 12/07/17  1353      K 4.4      CO2  25   GLU 97   BUN 18   CREATININE 0.7   CALCIUM 9.5   PROT 7.5   ALBUMIN 2.7*   BILITOT 0.5   ALKPHOS 54*   AST 15   ALT 11   ANIONGAP 11   EGFRNONAA >60     All pertinent labs within the past 24 hours have been reviewed.    Significant Imaging: I have reviewed all pertinent imaging results/findings within the past 24 hours.

## 2017-12-07 NOTE — TELEPHONE ENCOUNTER
----- Message from Cash Posadas sent at 12/7/2017  3:07 PM CST -----  Contact: Pt wife Sloane  Pt wife's states that she would like to receive a callback due to the pt had to go to the hospital and missed his appt. Please contact her at [phone]

## 2017-12-07 NOTE — HPI
Noble Tripp is a 73 yo male with PMHx of COPD, Aspiration PNA, hx of Head/Neck Cancer, CAD and chronic lower back pain who presents to the ED with reports of SOB onset 5 days ago with associated cough, wheezing and fever Temp max 101. SOB worsened therefore, he presented for evaluation. He eats a regular diet and does have coughing with clear liquids. In the ED, respiratory rate = 22 and pulse ox 95 % on 2 L. ABGs found pH 7.456, PO2 55 and PCO2 34, CXR is negative for acute findings and VQ scan is pending as d dimer is elevated. Pt is admitted for supplemental oxygen and COPD Exacerbation.

## 2017-12-08 ENCOUNTER — TELEPHONE (OUTPATIENT)
Dept: PULMONOLOGY | Facility: CLINIC | Age: 72
End: 2017-12-08

## 2017-12-08 LAB
ALBUMIN SERPL BCP-MCNC: 2.5 G/DL
ALP SERPL-CCNC: 47 U/L
ALT SERPL W/O P-5'-P-CCNC: 11 U/L
ANION GAP SERPL CALC-SCNC: 10 MMOL/L
AST SERPL-CCNC: 13 U/L
BASOPHILS # BLD AUTO: 0.02 K/UL
BASOPHILS NFR BLD: 0.1 %
BILIRUB SERPL-MCNC: 0.2 MG/DL
BUN SERPL-MCNC: 17 MG/DL
CALCIUM SERPL-MCNC: 9.3 MG/DL
CHLORIDE SERPL-SCNC: 102 MMOL/L
CO2 SERPL-SCNC: 26 MMOL/L
CREAT SERPL-MCNC: 0.8 MG/DL
DIFFERENTIAL METHOD: ABNORMAL
EOSINOPHIL # BLD AUTO: 0 K/UL
EOSINOPHIL NFR BLD: 0 %
ERYTHROCYTE [DISTWIDTH] IN BLOOD BY AUTOMATED COUNT: 19 %
EST. GFR  (AFRICAN AMERICAN): >60 ML/MIN/1.73 M^2
EST. GFR  (NON AFRICAN AMERICAN): >60 ML/MIN/1.73 M^2
GLUCOSE SERPL-MCNC: 156 MG/DL
HCT VFR BLD AUTO: 31.5 %
HGB BLD-MCNC: 10.4 G/DL
LYMPHOCYTES # BLD AUTO: 0.5 K/UL
LYMPHOCYTES NFR BLD: 3.3 %
MCH RBC QN AUTO: 26.6 PG
MCHC RBC AUTO-ENTMCNC: 33 G/DL
MCV RBC AUTO: 81 FL
MONOCYTES # BLD AUTO: 0.4 K/UL
MONOCYTES NFR BLD: 2.6 %
NEUTROPHILS # BLD AUTO: 15.3 K/UL
NEUTROPHILS NFR BLD: 94.6 %
PLATELET # BLD AUTO: 348 K/UL
PMV BLD AUTO: 10.3 FL
POTASSIUM SERPL-SCNC: 3.9 MMOL/L
PROT SERPL-MCNC: 7.2 G/DL
RBC # BLD AUTO: 3.91 M/UL
SODIUM SERPL-SCNC: 138 MMOL/L
WBC # BLD AUTO: 16.27 K/UL

## 2017-12-08 PROCEDURE — 94640 AIRWAY INHALATION TREATMENT: CPT

## 2017-12-08 PROCEDURE — 27000221 HC OXYGEN, UP TO 24 HOURS

## 2017-12-08 PROCEDURE — 36415 COLL VENOUS BLD VENIPUNCTURE: CPT

## 2017-12-08 PROCEDURE — 25000003 PHARM REV CODE 250: Performed by: NURSE PRACTITIONER

## 2017-12-08 PROCEDURE — 25000242 PHARM REV CODE 250 ALT 637 W/ HCPCS: Performed by: NURSE PRACTITIONER

## 2017-12-08 PROCEDURE — 25000003 PHARM REV CODE 250: Performed by: EMERGENCY MEDICINE

## 2017-12-08 PROCEDURE — 63600175 PHARM REV CODE 636 W HCPCS: Performed by: NURSE PRACTITIONER

## 2017-12-08 PROCEDURE — 80053 COMPREHEN METABOLIC PANEL: CPT

## 2017-12-08 PROCEDURE — 92610 EVALUATE SWALLOWING FUNCTION: CPT

## 2017-12-08 PROCEDURE — 85025 COMPLETE CBC W/AUTO DIFF WBC: CPT

## 2017-12-08 PROCEDURE — 21400001 HC TELEMETRY ROOM

## 2017-12-08 RX ORDER — ALBUTEROL SULFATE 90 UG/1
2 AEROSOL, METERED RESPIRATORY (INHALATION) EVERY 6 HOURS PRN
Qty: 1 INHALER | Refills: 0 | Status: SHIPPED | OUTPATIENT
Start: 2017-12-08 | End: 2017-12-13 | Stop reason: SDUPTHER

## 2017-12-08 RX ORDER — SENNOSIDES 8.6 MG/1
8.6 TABLET ORAL NIGHTLY
Status: DISCONTINUED | OUTPATIENT
Start: 2017-12-08 | End: 2017-12-09 | Stop reason: HOSPADM

## 2017-12-08 RX ORDER — DOCUSATE SODIUM 100 MG/1
100 CAPSULE, LIQUID FILLED ORAL DAILY
Status: DISCONTINUED | OUTPATIENT
Start: 2017-12-09 | End: 2017-12-09 | Stop reason: HOSPADM

## 2017-12-08 RX ORDER — METHYLPREDNISOLONE SOD SUCC 125 MG
60 VIAL (EA) INJECTION EVERY 8 HOURS
Status: DISCONTINUED | OUTPATIENT
Start: 2017-12-09 | End: 2017-12-09 | Stop reason: HOSPADM

## 2017-12-08 RX ADMIN — METHYLPREDNISOLONE SODIUM SUCCINATE 80 MG: 125 INJECTION, POWDER, FOR SOLUTION INTRAMUSCULAR; INTRAVENOUS at 05:12

## 2017-12-08 RX ADMIN — HYDROCODONE BITARTRATE AND ACETAMINOPHEN 1 TABLET: 5; 325 TABLET ORAL at 08:12

## 2017-12-08 RX ADMIN — GABAPENTIN 800 MG: 400 CAPSULE ORAL at 02:12

## 2017-12-08 RX ADMIN — ENOXAPARIN SODIUM 40 MG: 100 INJECTION SUBCUTANEOUS at 05:12

## 2017-12-08 RX ADMIN — HYDROCODONE BITARTRATE AND ACETAMINOPHEN 1 TABLET: 5; 325 TABLET ORAL at 05:12

## 2017-12-08 RX ADMIN — GABAPENTIN 800 MG: 400 CAPSULE ORAL at 05:12

## 2017-12-08 RX ADMIN — ARFORMOTEROL TARTRATE 15 MCG: 15 SOLUTION RESPIRATORY (INHALATION) at 07:12

## 2017-12-08 RX ADMIN — METHYLPREDNISOLONE SODIUM SUCCINATE 80 MG: 125 INJECTION, POWDER, FOR SOLUTION INTRAMUSCULAR; INTRAVENOUS at 02:12

## 2017-12-08 RX ADMIN — FERROUS SULFATE TAB EC 325 MG (65 MG FE EQUIVALENT) 325 MG: 325 (65 FE) TABLET DELAYED RESPONSE at 06:12

## 2017-12-08 RX ADMIN — PILOCARPINE HYDROCHLORIDE 5 MG: 5 TABLET, FILM COATED ORAL at 05:12

## 2017-12-08 RX ADMIN — FERROUS SULFATE TAB EC 325 MG (65 MG FE EQUIVALENT) 325 MG: 325 (65 FE) TABLET DELAYED RESPONSE at 09:12

## 2017-12-08 RX ADMIN — PANTOPRAZOLE SODIUM 40 MG: 40 TABLET, DELAYED RELEASE ORAL at 08:12

## 2017-12-08 RX ADMIN — BUDESONIDE 0.5 MG: 0.5 SUSPENSION RESPIRATORY (INHALATION) at 07:12

## 2017-12-08 RX ADMIN — IPRATROPIUM BROMIDE AND ALBUTEROL SULFATE 3 ML: .5; 3 SOLUTION RESPIRATORY (INHALATION) at 01:12

## 2017-12-08 RX ADMIN — MOXIFLOXACIN HYDROCHLORIDE 400 MG: 400 TABLET, FILM COATED ORAL at 08:12

## 2017-12-08 RX ADMIN — IPRATROPIUM BROMIDE AND ALBUTEROL SULFATE 3 ML: .5; 3 SOLUTION RESPIRATORY (INHALATION) at 07:12

## 2017-12-08 RX ADMIN — METHYLPREDNISOLONE SODIUM SUCCINATE 80 MG: 125 INJECTION, POWDER, FOR SOLUTION INTRAMUSCULAR; INTRAVENOUS at 09:12

## 2017-12-08 RX ADMIN — GABAPENTIN 800 MG: 400 CAPSULE ORAL at 09:12

## 2017-12-08 RX ADMIN — SENNOSIDES 8.6 MG: 8.6 TABLET, FILM COATED ORAL at 10:12

## 2017-12-08 RX ADMIN — PILOCARPINE HYDROCHLORIDE 5 MG: 5 TABLET, FILM COATED ORAL at 11:12

## 2017-12-08 RX ADMIN — HYDROCODONE BITARTRATE AND ACETAMINOPHEN 1 TABLET: 5; 325 TABLET ORAL at 09:12

## 2017-12-08 RX ADMIN — HYDROCODONE BITARTRATE AND ACETAMINOPHEN 1 TABLET: 5; 325 TABLET ORAL at 12:12

## 2017-12-08 RX ADMIN — ASPIRIN 81 MG CHEWABLE TABLET 81 MG: 81 TABLET CHEWABLE at 08:12

## 2017-12-08 NOTE — TELEPHONE ENCOUNTER
----- Message from Dayton Mccall sent at 12/8/2017  7:13 AM CST -----  Contact: pt wife - john   States she's calling rg pt going to the hospital on [address] yesterday. Pt is currently in pt there   and can be reached at [phone] or pt's number [phone] or home [phone]//thanks/dbw

## 2017-12-08 NOTE — PROGRESS NOTES
Home Oxygen Evaluation    Date Performed: 2017    1) Patient's Home O2 Sat on room air, while at rest: 94        If O2 sats on room air at rest are 88% or below, patient qualifies. No additional testing needed. Document N/A in steps 2 and 3. If 89% or above, complete steps 2.      2) Patient's O2 Sat on room air while exercisin        If O2 sats on room air while exercising remain 89% or above patient does not qualify, no further testing needed Document N/A in step 3. If O2 sats on room air while exercising are 88% or below, continue to step 3.      3) Patient's O2 Sat while exercising on O2: na   at na LPM         (Must show improvement from #2 for patients to qualify)    If O2 sats improve on oxygen, patient qualifies for portable oxygen. If not, the patient does not qualify.

## 2017-12-08 NOTE — PLAN OF CARE
Problem: Patient Care Overview  Goal: Plan of Care Review  Outcome: Ongoing (interventions implemented as appropriate)  Pt free of falls during shift. VSS.  PIV intact and saline locked. SR on tele monitor. Pt on Nasal cannula, no SOB noted. Pt c/o back pain (chronic) PRN pain meds given. Call light in reach, hourly rounding made, wife at bedside, will continue to monitor.

## 2017-12-08 NOTE — ASSESSMENT & PLAN NOTE
COPD - oxygen therapy to maintain sats > 92 %, budesonide treatments, Solumedrol 80 mg IV every 8 hours, Avelox and Duo Nebs

## 2017-12-08 NOTE — PLAN OF CARE
Problem: Patient Care Overview  Goal: Plan of Care Review  Outcome: Ongoing (interventions implemented as appropriate)  Received to room 218 from ED. DX: COPD exac. BBS diminished , expiratory wheezes. Family at bedside.tolerated diet.  Will monitor.

## 2017-12-08 NOTE — H&P
"[facility] Hospital Medicine  History & Physical    Patient Name: Noble Tripp  MRN: [id number]  Admission Date: 12/7/2017  Attending Physician: Stan Knowles MD   Primary Care Provider: Dwaine Davis MD         Patient information was obtained from patient, spouse/SO and ER records.     Subjective:     Principal Problem:COPD with acute exacerbation    Chief Complaint:   Chief Complaint   Patient presents with    Shortness of Breath     shortness of breath, cough, fever        HPI: Noble Tripp is a 71 yo male with PMHx of COPD, Aspiration PNA, hx of Head/Neck Cancer, CAD and chronic lower back pain who presents to the ED with reports of SOB onset 5 days ago with associated cough, wheezing and fever Temp max 101. SOB worsened therefore, he presented for evaluation. He eats a regular diet and does have coughing with clear liquids. In the ED, respiratory rate = 22 and pulse ox 95 % on 2 L. ABGs found pH 7.456, PO2 55 and PCO2 34, CXR is negative for acute findings and VQ scan is pending as d dimer is elevated. Pt is admitted for supplemental oxygen and COPD Exacerbation.     Past Medical History:   Diagnosis Date    Adrenal mass     david    Aspiration pneumonia 10/15    puree/honey    Benign prostate hyperplasia     CAD (coronary artery disease)     dr padilla    Chronic pain     dr santos    COPD (chronic obstructive pulmonary disease)     papi    Ex-smoker     11/13    Hyperlipidemia     Osteopenia 1/15 tran 1/18    PVD (peripheral vascular disease)     noobs 07 khuri    Pyriform sinus cancer     dr ponce radiation 1/2-14 dr king perales    Sleep apnea     cpap    Squamous cell carcinoma of skin     roberto    Tongue cancer     "superficial" removed 11/14    Vocal cord cancer 2011    Xerostomia     radiation       Past Surgical History:   Procedure Laterality Date    APPENDECTOMY      COLONOSCOPY N/A 5/1/2017    Procedure: Due for screening colonoscopy;  " Surgeon: Anushka Yoder MD;  Location: [facility];  Service: Endoscopy;  Laterality: N/A;    tongue cancer excision  11/14    dr vitale    VOCAL CORD LATERALIZATION, ENDOSCOPIC APPROACH W/ MORENA ponce       Review of patient's allergies indicates:   Allergen Reactions    Contrast media     Iodinated contrast- oral and iv dye      Other reaction(s): Itching  Other reaction(s): Hives    Neomycin-bacitracin-polymyxin      Other reaction(s): Rash  Other reaction(s): Rash    Pravastatin      Other reaction(s): fatigue  Other reaction(s): fatigue    Rosuvastatin      Other reaction(s): fatigue  Other reaction(s): fatigue    Simvastatin      Other reaction(s): fatigue  Other reaction(s): fatigue    Statins-hmg-coa reductase inhibitors        No current facility-administered medications on file prior to encounter.      Current Outpatient Prescriptions on File Prior to Encounter   Medication Sig    aclidinium bromide (TUDORZA) 400 mcg/actuation AePB Inhale 1 puff into the lungs 2 (two) times daily.    albuterol 90 mcg/actuation inhaler Inhale 2 puffs into the lungs every 4 (four) hours as needed for Wheezing or Shortness of Breath.    aspirin 81 mg Tab Take 1 tablet by mouth Daily. Over the counter to help prevent stroke/heart attack    CHANTIX CONTINUING MONTH BOX 1 mg Tab TAKE 1 TABLET (1 MG TOTAL) BY MOUTH ONCE DAILY.    ferrous sulfate 325 (65 FE) MG EC tablet Take 1 tablet (325 mg total) by mouth 2 times daily 2 hours after meal.    fish oil-omega-3 fatty acids 300-1,000 mg capsule Take 2 g by mouth.    gabapentin (NEURONTIN) 800 MG tablet Take 1 tablet (800 mg total) by mouth 3 (three) times daily.    hydrocodone-homatropine 5-1.5 mg/5 ml (HYCODAN) 5-1.5 mg/5 mL Syrp Take 5 mLs by mouth every 4 (four) hours as needed.    loratadine (CLARITIN) 10 mg tablet Take 1 tablet by mouth Daily.    multivitamin capsule Take 1 capsule by mouth once daily.    [START ON 12/8/2017] oxyCODONE-acetaminophen  (PERCOCET) 7.5-325 mg per tablet Take 1 tablet by mouth 3 (three) times daily as needed for Pain.    pantoprazole (PROTONIX) 40 MG tablet Take 40 mg by mouth once daily.     pilocarpine (SALAGEN) 5 MG Tab TAKE ONE TABLET BY MOUTH 30 MINUTES PRIOR TO EACH MEAL.    [START ON 1/6/2018] oxyCODONE-acetaminophen (PERCOCET) 7.5-325 mg per tablet Take 1 tablet by mouth 3 (three) times daily as needed for Pain.    [DISCONTINUED] oxycodone-acetaminophen (PERCOCET) 7.5-325 mg per tablet Take 1 tablet by mouth 3 (three) times daily as needed for Pain.     Family History     Problem Relation (Age of Onset)    Cancer Father, Brother        Social History Main Topics    Smoking status: Former Smoker    Smokeless tobacco: Never Used    Alcohol use No    Drug use: No    Sexual activity: Not Currently     Review of Systems   Constitutional: Positive for activity change, appetite change, fatigue and fever.   HENT: Positive for congestion. Negative for ear pain, postnasal drip, rhinorrhea and sore throat.    Eyes: Negative for pain and redness.   Respiratory: Positive for cough, shortness of breath and wheezing.    Cardiovascular: Negative for chest pain, palpitations and leg swelling.   Gastrointestinal: Negative for abdominal pain, diarrhea, nausea and vomiting.   Genitourinary: Negative for dysuria, frequency and hematuria.   Musculoskeletal: Positive for back pain. Negative for gait problem.   Skin: Negative for pallor, rash and wound.   Neurological: Negative for dizziness, weakness, light-headedness and headaches.     Objective:     Vital Signs (Most Recent):  Temp: 97.6 °F (36.4 °C) (12/07/17 1737)  Pulse: 78 (12/07/17 1737)  Resp: (!) 22 (12/07/17 1737)  BP: (!) 148/66 (12/07/17 1737)  SpO2: (!) 94 % (12/07/17 1737) Vital Signs (24h Range):  Temp:  [97.6 °F (36.4 °C)-98.1 °F (36.7 °C)] 97.6 °F (36.4 °C)  Pulse:  [70-88] 78  Resp:  [14-22] 22  SpO2:  [90 %-100 %] 94 %  BP: (135-160)/(59-84) 148/66     Weight: 66.5 kg  (146 lb 9.7 oz)  Body mass index is 22.29 kg/m².    Physical Exam   Constitutional: He is oriented to person, place, and time. He appears well-developed and well-nourished. No distress.   HENT:   Head: Normocephalic and atraumatic.   Eyes: Conjunctivae are normal. No scleral icterus.   Neck: Normal range of motion. Neck supple.   Cardiovascular: Normal rate, regular rhythm and normal heart sounds.  Exam reveals no gallop and no friction rub.    No murmur heard.  Pulmonary/Chest: Effort normal. He has wheezes in the left upper field, the left middle field and the left lower field. He has rhonchi.   Abdominal: Soft. Bowel sounds are normal.   Musculoskeletal: Normal range of motion. He exhibits no edema or tenderness.   Neurological: He is alert and oriented to person, place, and time.   Skin: Skin is warm and dry.   Psychiatric: He has a normal mood and affect. His behavior is normal.   Nursing note and vitals reviewed.          Significant Labs:   CBC:   Recent Labs  Lab 12/07/17  1353   WBC 17.75*   HGB 11.4*   HCT 34.2*        CMP:   Recent Labs  Lab 12/07/17  1353      K 4.4      CO2 25   GLU 97   BUN 18   CREATININE 0.7   CALCIUM 9.5   PROT 7.5   ALBUMIN 2.7*   BILITOT 0.5   ALKPHOS 54*   AST 15   ALT 11   ANIONGAP 11   EGFRNONAA >60     All pertinent labs within the past 24 hours have been reviewed.    Significant Imaging: I have reviewed all pertinent imaging results/findings within the past 24 hours.    Assessment/Plan:     * COPD with acute exacerbation    COPD - oxygen therapy to maintain sats > 92 %, budesonide treatments, Solumedrol 80 mg IV every 8 hours, Avelox and Duo Nebs          CAD (coronary artery disease)    Continue ASA          JUANITO on CPAP    Resume CPAP at night          Chronic pain    Continue prn analgesia            VTE Risk Mitigation         Ordered     enoxaparin injection 40 mg  Daily     Route:  Subcutaneous        12/07/17 1805     Medium Risk of VTE  Once       12/07/17 1728     Place JULITO hose  Until discontinued      12/07/17 1728     Place sequential compression device  Until discontinued      12/07/17 1728             Марина Melgar NP  Department of Hospital Medicine   [facility]

## 2017-12-08 NOTE — PT/OT/SLP PROGRESS
Speech Language Pathology Treatment    Patient Name:  Noble Tripp   MRN:  [id number]  Admitting Diagnosis: COPD with acute exacerbation    Recommendations:                 General Recommendations:  Pt refuses ST services  Diet recommendations:   ,     Aspiration Precautions: 1 bite/sip at a time, HOB to 90 degrees and Remain upright 30 minutes post meal   General Precautions: Standard,    Communication strategies:  none    Subjective     Pt was seen at bedside with no family present.   Patient goals: to go home     Pain/Comfort:  ·  Pt did not c/o pain    Objective:     Pt currently on nasal cannula which he is not on at home.  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  He received radiation tx and chemo which he completed over 2 years ago.  He also stated that he received ST services for dysphagia at that time and was recommended to be NPO with PEG, however he refused.  Currently Pt reports difficulty with thin liquids but is not interested in ST services or alternative diet.  ST educated pt on dysphagia, aspiration, safe swallow strategies, and role of ST.  Pt verbalized understanding and politely declined ST services.     Assessment:     Noble Tripp is a 72 y.o. male with an SLP diagnosis of Dysphagia.  He presents with choosing to continue to eat a regular diet knowing the risks.        Plan:   No further ST    Time Tracking:     SLP Treatment Date:   12/08/17  Speech Start Time:  0950  Speech Stop Time:  1000     Speech Total Time (min):  10 min    Billable Minutes: Nusrat 10     Tabby Addison CCC-SLP  12/08/2017

## 2017-12-08 NOTE — PLAN OF CARE
CM met with patient at the bedside to assess for discharge needs.  Patient lives at home with his wife.  He is independent and does not anticipate any discharge needs.  CM provided a transitional care folder, information on advanced directives, information on pharmacy bedside delivery, and discharge planning begins on admission with contact information for YUSUF Tyson    D/C Plan:  Home with no needs    KATYH handed off to YUSUF Tyson     12/08/17 1140   Discharge Assessment   Assessment Type Discharge Planning Assessment   Confirmed/corrected address and phone number on facesheet? Yes   Assessment information obtained from? Patient;Medical Record   Expected Length of Stay (days) (TBD)   Communicated expected length of stay with patient/caregiver yes   Prior to hospitilization cognitive status: Alert/Oriented   Prior to hospitalization functional status: Independent   Current cognitive status: Alert/Oriented   Current Functional Status: Independent   Facility Arrived From: home   Lives With spouse   Able to Return to Prior Arrangements yes   Is patient able to care for self after discharge? Yes   Who are your caregiver(s) and their phone number(s)? Sloane Tripp, spouse [phone]   Patient's perception of discharge disposition home or selfcare   Readmission Within The Last 30 Days no previous admission in last 30 days   Patient currently being followed by outpatient case management? No   Patient currently receives any other outside agency services? No   Equipment Currently Used at Home CPAP   Do you have any problems affording any of your prescribed medications? No   Is the patient taking medications as prescribed? yes   Does the patient have transportation home? Yes   Transportation Available family or friend will provide;car   Dialysis Name and Scheduled days NA   Does the patient receive services at the [facility]? No   Discharge Plan A Home with family   Discharge Plan B Home with family   Patient/Family In  Agreement With Plan yes

## 2017-12-08 NOTE — PLAN OF CARE
Problem: Patient Care Overview  Goal: Plan of Care Review  Outcome: Ongoing (interventions implemented as appropriate)  Pt tolerates txs and oxygen therapy well.

## 2017-12-08 NOTE — PLAN OF CARE
Problem: Patient Care Overview  Goal: Plan of Care Review  Outcome: Ongoing (interventions implemented as appropriate)  The patient has been sinus rhythm on the monitor. Pt has had an uneventful night and is resting quietly, will continue to monitor.

## 2017-12-09 VITALS
RESPIRATION RATE: 18 BRPM | SYSTOLIC BLOOD PRESSURE: 131 MMHG | HEART RATE: 57 BPM | DIASTOLIC BLOOD PRESSURE: 65 MMHG | OXYGEN SATURATION: 92 % | WEIGHT: 146.19 LBS | HEIGHT: 68 IN | BODY MASS INDEX: 22.16 KG/M2 | TEMPERATURE: 98 F

## 2017-12-09 PROBLEM — R09.02 HYPOXIA: Status: RESOLVED | Noted: 2017-12-09 | Resolved: 2017-12-09

## 2017-12-09 PROBLEM — R09.02 HYPOXIA: Status: ACTIVE | Noted: 2017-12-09

## 2017-12-09 PROBLEM — J44.1 COPD WITH ACUTE EXACERBATION: Status: RESOLVED | Noted: 2017-12-07 | Resolved: 2017-12-09

## 2017-12-09 LAB
ALBUMIN SERPL BCP-MCNC: 2.4 G/DL
ALP SERPL-CCNC: 45 U/L
ALT SERPL W/O P-5'-P-CCNC: 13 U/L
ANION GAP SERPL CALC-SCNC: 9 MMOL/L
AST SERPL-CCNC: 17 U/L
BASOPHILS # BLD AUTO: 0.04 K/UL
BASOPHILS NFR BLD: 0.2 %
BILIRUB SERPL-MCNC: 0.2 MG/DL
BUN SERPL-MCNC: 25 MG/DL
CALCIUM SERPL-MCNC: 9 MG/DL
CHLORIDE SERPL-SCNC: 102 MMOL/L
CO2 SERPL-SCNC: 26 MMOL/L
CREAT SERPL-MCNC: 0.8 MG/DL
DIFFERENTIAL METHOD: ABNORMAL
EOSINOPHIL # BLD AUTO: 0 K/UL
EOSINOPHIL NFR BLD: 0 %
ERYTHROCYTE [DISTWIDTH] IN BLOOD BY AUTOMATED COUNT: 19 %
EST. GFR  (AFRICAN AMERICAN): >60 ML/MIN/1.73 M^2
EST. GFR  (NON AFRICAN AMERICAN): >60 ML/MIN/1.73 M^2
GLUCOSE SERPL-MCNC: 144 MG/DL
HCT VFR BLD AUTO: 31.1 %
HGB BLD-MCNC: 10.5 G/DL
LYMPHOCYTES # BLD AUTO: 0.7 K/UL
LYMPHOCYTES NFR BLD: 3.8 %
MCH RBC QN AUTO: 26.9 PG
MCHC RBC AUTO-ENTMCNC: 33.8 G/DL
MCV RBC AUTO: 80 FL
MONOCYTES # BLD AUTO: 0.3 K/UL
MONOCYTES NFR BLD: 1.7 %
NEUTROPHILS # BLD AUTO: 17.6 K/UL
NEUTROPHILS NFR BLD: 96.7 %
PLATELET # BLD AUTO: 374 K/UL
PMV BLD AUTO: 10.4 FL
POTASSIUM SERPL-SCNC: 4 MMOL/L
PROT SERPL-MCNC: 6.9 G/DL
RBC # BLD AUTO: 3.9 M/UL
SODIUM SERPL-SCNC: 137 MMOL/L
WBC # BLD AUTO: 18.61 K/UL

## 2017-12-09 PROCEDURE — 25000003 PHARM REV CODE 250: Performed by: NURSE PRACTITIONER

## 2017-12-09 PROCEDURE — 80053 COMPREHEN METABOLIC PANEL: CPT

## 2017-12-09 PROCEDURE — 27000221 HC OXYGEN, UP TO 24 HOURS

## 2017-12-09 PROCEDURE — 63600175 PHARM REV CODE 636 W HCPCS: Performed by: FAMILY MEDICINE

## 2017-12-09 PROCEDURE — 94761 N-INVAS EAR/PLS OXIMETRY MLT: CPT

## 2017-12-09 PROCEDURE — 36415 COLL VENOUS BLD VENIPUNCTURE: CPT

## 2017-12-09 PROCEDURE — 25000242 PHARM REV CODE 250 ALT 637 W/ HCPCS: Performed by: NURSE PRACTITIONER

## 2017-12-09 PROCEDURE — 25000003 PHARM REV CODE 250: Performed by: EMERGENCY MEDICINE

## 2017-12-09 PROCEDURE — 94640 AIRWAY INHALATION TREATMENT: CPT

## 2017-12-09 PROCEDURE — 85025 COMPLETE CBC W/AUTO DIFF WBC: CPT

## 2017-12-09 RX ORDER — PREDNISONE 10 MG/1
10 TABLET ORAL 2 TIMES DAILY
Qty: 30 TABLET | Refills: 0 | Status: SHIPPED | OUTPATIENT
Start: 2017-12-09 | End: 2017-12-19

## 2017-12-09 RX ORDER — AMOXICILLIN 250 MG
2 CAPSULE ORAL DAILY
Qty: 30 TABLET | Refills: 1 | Status: ON HOLD | OUTPATIENT
Start: 2017-12-09 | End: 2018-08-29 | Stop reason: HOSPADM

## 2017-12-09 RX ORDER — LEVOFLOXACIN 500 MG/1
500 TABLET, FILM COATED ORAL DAILY
Qty: 7 TABLET | Refills: 0 | Status: SHIPPED | OUTPATIENT
Start: 2017-12-09 | End: 2018-02-06

## 2017-12-09 RX ADMIN — BUDESONIDE 0.5 MG: 0.5 SUSPENSION RESPIRATORY (INHALATION) at 07:12

## 2017-12-09 RX ADMIN — PANTOPRAZOLE SODIUM 40 MG: 40 TABLET, DELAYED RELEASE ORAL at 08:12

## 2017-12-09 RX ADMIN — HYDROCODONE BITARTRATE AND ACETAMINOPHEN 1 TABLET: 5; 325 TABLET ORAL at 01:12

## 2017-12-09 RX ADMIN — ASPIRIN 81 MG CHEWABLE TABLET 81 MG: 81 TABLET CHEWABLE at 08:12

## 2017-12-09 RX ADMIN — IPRATROPIUM BROMIDE AND ALBUTEROL SULFATE 3 ML: .5; 3 SOLUTION RESPIRATORY (INHALATION) at 12:12

## 2017-12-09 RX ADMIN — DOCUSATE SODIUM 100 MG: 100 CAPSULE, LIQUID FILLED ORAL at 08:12

## 2017-12-09 RX ADMIN — GABAPENTIN 800 MG: 400 CAPSULE ORAL at 05:12

## 2017-12-09 RX ADMIN — MOXIFLOXACIN HYDROCHLORIDE 400 MG: 400 TABLET, FILM COATED ORAL at 08:12

## 2017-12-09 RX ADMIN — HYDROCODONE BITARTRATE AND ACETAMINOPHEN 1 TABLET: 5; 325 TABLET ORAL at 05:12

## 2017-12-09 RX ADMIN — PILOCARPINE HYDROCHLORIDE 5 MG: 5 TABLET, FILM COATED ORAL at 10:12

## 2017-12-09 RX ADMIN — FERROUS SULFATE TAB EC 325 MG (65 MG FE EQUIVALENT) 325 MG: 325 (65 FE) TABLET DELAYED RESPONSE at 08:12

## 2017-12-09 RX ADMIN — GABAPENTIN 800 MG: 400 CAPSULE ORAL at 01:12

## 2017-12-09 RX ADMIN — METHYLPREDNISOLONE SODIUM SUCCINATE 60 MG: 125 INJECTION, POWDER, FOR SOLUTION INTRAMUSCULAR; INTRAVENOUS at 05:12

## 2017-12-09 RX ADMIN — PILOCARPINE HYDROCHLORIDE 5 MG: 5 TABLET, FILM COATED ORAL at 05:12

## 2017-12-09 RX ADMIN — METHYLPREDNISOLONE SODIUM SUCCINATE 60 MG: 125 INJECTION, POWDER, FOR SOLUTION INTRAMUSCULAR; INTRAVENOUS at 01:12

## 2017-12-09 RX ADMIN — ARFORMOTEROL TARTRATE 15 MCG: 15 SOLUTION RESPIRATORY (INHALATION) at 07:12

## 2017-12-09 RX ADMIN — IPRATROPIUM BROMIDE AND ALBUTEROL SULFATE 3 ML: .5; 3 SOLUTION RESPIRATORY (INHALATION) at 07:12

## 2017-12-09 NOTE — SUBJECTIVE & OBJECTIVE
Review of Systems   Constitutional: Positive for activity change, appetite change, fatigue and fever.   HENT: Positive for congestion. Negative for ear pain, postnasal drip, rhinorrhea and sore throat.    Eyes: Negative for pain and redness.   Respiratory: Positive for cough, shortness of breath and wheezing.    Cardiovascular: Negative for chest pain, palpitations and leg swelling.   Gastrointestinal: Negative for abdominal pain, diarrhea, nausea and vomiting.   Genitourinary: Negative for dysuria, frequency and hematuria.   Musculoskeletal: Positive for back pain. Negative for gait problem.   Skin: Negative for pallor, rash and wound.   Neurological: Negative for dizziness, weakness, light-headedness and headaches.     Objective:     Vital Signs (Most Recent):  Temp: 97.7 °F (36.5 °C) (12/08/17 1958)  Pulse: 76 (12/08/17 1958)  Resp: 18 (12/08/17 1958)  BP: 129/60 (12/08/17 1958)  SpO2: (!) 93 % (12/08/17 1958) Vital Signs (24h Range):  Temp:  [97.3 °F (36.3 °C)-97.8 °F (36.6 °C)] 97.7 °F (36.5 °C)  Pulse:  [66-78] 76  Resp:  [18-20] 18  SpO2:  [91 %-97 %] 93 %  BP: (128-149)/(60-67) 129/60     Weight: 66.3 kg (146 lb 2.6 oz)  Body mass index is 22.22 kg/m².  No intake or output data in the 24 hours ending 12/08/17 9531   Physical Exam   Constitutional: He is oriented to person, place, and time. He appears well-developed and well-nourished. No distress.   HENT:   Head: Normocephalic and atraumatic.   Eyes: Conjunctivae are normal. No scleral icterus.   Neck: Normal range of motion. Neck supple.   Cardiovascular: Normal rate, regular rhythm and normal heart sounds.  Exam reveals no gallop and no friction rub.    No murmur heard.  Pulmonary/Chest: Effort normal. He has wheezes in the left upper field, the left middle field and the left lower field. He has rhonchi.   wheezing on expiration worse on R, + rhonchi, good flow volume to bilat bases   Abdominal: Soft. Bowel sounds are normal.   Musculoskeletal: Normal  range of motion. He exhibits no edema or tenderness.   Neurological: He is alert and oriented to person, place, and time.   Skin: Skin is warm and dry.   Psychiatric: He has a normal mood and affect. His behavior is normal.   Nursing note and vitals reviewed.      Significant Labs: All pertinent labs within the past 24 hours have been reviewed.    Significant Imaging: I have reviewed and interpreted all pertinent imaging results/findings within the past 24 hours.

## 2017-12-09 NOTE — PLAN OF CARE
Problem: Patient Care Overview  Goal: Plan of Care Review  Outcome: Ongoing (interventions implemented as appropriate)  POC discussed w/patient, verbalized understanding. NSR on monitor. VSS. Voids per BRP. Pt c/o pain Norco administered relief obtained. Frequent weight change encouraged. IV steroids administered. Patient turns independently in bed. Fall precautions in place, bed alarm on. Patient denies needs at this time.

## 2017-12-09 NOTE — PLAN OF CARE
Problem: Patient Care Overview  Goal: Plan of Care Review  Outcome: Ongoing (interventions implemented as appropriate)  Patient on O2 tolerating well. No distress noted at this time.

## 2017-12-09 NOTE — ASSESSMENT & PLAN NOTE
COPD - oxygen therapy to maintain sats > 92 %, budesonide treatments, Solumedrol 80 mg IV every 8 hours, Avelox and Duo Nebs    12/08 - Rx for albuterol with spacer sent, will begin to taper steroids

## 2017-12-09 NOTE — PLAN OF CARE
Problem: Patient Care Overview  Goal: Plan of Care Review  Outcome: Ongoing (interventions implemented as appropriate)  The patient has been sinus rhythm on the monitor. Pt has remained free from falls this shift. Pain managed with PRN medications. Pt has had an uneventful night, pt is resting quietly, will continue to monitor.

## 2017-12-09 NOTE — HOSPITAL COURSE
Tx for L lung PNA, no PE by VQ scan. Pt WBC improving, continune to require 4-5L O2, not on O2 at home, f/u home eval. No fever, will taper steroids dose and resume avelox.  Pt not compliant with home inhalers, and requesting spacer for rescue inhaler (sent to pharmacy).   Pt continued to improve and is feeling lot better, walking around well without SOB or SUAREZ. No cough or sputum or CP and all his Sx have significantly improved. His Pulse ox was 95% on RA. He requests to be discharged home today. He was seen and examined today and deemed stable for discharge.

## 2017-12-09 NOTE — DISCHARGE SUMMARY
[facility] Hospital Medicine  Discharge Summary      Patient Name: Noble Tripp  MRN: [id number]  Admission Date: 12/7/2017  Hospital Length of Stay: 2 days  Discharge Date and Time:  12/09/2017 4:17 PM  Attending Physician: Harish Mak MD   Discharging Provider: Harish Mak MD  Primary Care Provider: Dwaine Davis MD      HPI:   Noble Tripp is a 71 yo male with PMHx of COPD, Aspiration PNA, hx of Head/Neck Cancer, CAD and chronic lower back pain who presents to the ED with reports of SOB onset 5 days ago with associated cough, wheezing and fever Temp max 101. SOB worsened therefore, he presented for evaluation. He eats a regular diet and does have coughing with clear liquids. In the ED, respiratory rate = 22 and pulse ox 95 % on 2 L. ABGs found pH 7.456, PO2 55 and PCO2 34, CXR is negative for acute findings and VQ scan is pending as d dimer is elevated. Pt is admitted for supplemental oxygen and COPD Exacerbation.     * No surgery found *      Hospital Course:   Tx for L lung PNA, no PE by VQ scan. Pt WBC improving, continune to require 4-5L O2, not on O2 at home, f/u home eval. No fever, will taper steroids dose and resume avelox.  Pt not compliant with home inhalers, and requesting spacer for rescue inhaler (sent to pharmacy).   Pt continued to improve and is feeling lot better, walking around well without SOB or SUAREZ. No cough or sputum or CP and all his Sx have significantly improved. His Pulse ox was 95% on RA. He requests to be discharged home today. He was seen and examined today and deemed stable for discharge.       Consults:   Consults         Status Ordering Provider     Inpatient consult to Hospitalist  Once     Provider:  (Not yet assigned)    Acknowledged LILY KENNEDY          No new Assessment & Plan notes have been filed under this hospital service since the last note was generated.  Service: Hospital Medicine    Final Active Diagnoses:    Diagnosis Date Noted POA     CAD (coronary artery disease) [I25.10]  Yes    Statin intolerance [Z78.9] 09/06/2016 Yes    JUANITO on CPAP [G47.33, Z99.89] 10/07/2014 Not Applicable     Chronic    Chronic pain [G89.29]  Yes      Problems Resolved During this Admission:    Diagnosis Date Noted Date Resolved POA    PRINCIPAL PROBLEM:  COPD with acute exacerbation [J44.1] 12/07/2017 12/09/2017 Yes    Hypoxia [R09.02] 12/09/2017 12/09/2017 Yes       Discharged Condition: stable    Disposition: Home or Self Care    Follow Up:  Follow-up Information     Dwaine Davis MD. Call in 3 days.    Specialty:  Family Medicine  Why:  Hospital follow up  Contact information:  [address] [place] [address] [phone]             Santos Cardozo MD. Schedule an appointment as soon as possible for a visit in 2 weeks.    Specialty:  Pulmonary Disease  Why:  Hospital follow up for COPD exacerbation  Contact information:  [address]  [place] [id number] [phone]                 Patient Instructions:     Diet general   Order Specific Question Answer Comments   Additional restrictions: Cardiac (Low Na/Chol)      Activity as tolerated         Significant Diagnostic Studies: Labs:   BMP:   Recent Labs  Lab 12/08/17  0353 12/09/17  0403   * 144*    137   K 3.9 4.0    102   CO2 26 26   BUN 17 25*   CREATININE 0.8 0.8   CALCIUM 9.3 9.0   , CMP   Recent Labs  Lab 12/08/17  0353 12/09/17  0403    137   K 3.9 4.0    102   CO2 26 26   * 144*   BUN 17 25*   CREATININE 0.8 0.8   CALCIUM 9.3 9.0   PROT 7.2 6.9   ALBUMIN 2.5* 2.4*   BILITOT 0.2 0.2   ALKPHOS 47* 45*   AST 13 17   ALT 11 13   ANIONGAP 10 9   ESTGFRAFRICA >60 >60   EGFRNONAA >60 >60   , CBC   Recent Labs  Lab 12/08/17  0353 12/09/17  0403   WBC 16.27* 18.61*   HGB 10.4* 10.5*   HCT 31.5* 31.1*    374*   , Lipid Panel   Lab Results   Component Value Date    CHOL 204 (H) 11/14/2017    HDL 43 11/14/2017    LDLCALC 147.8 11/14/2017    TRIG  66 11/14/2017    CHOLHDL 21.1 11/14/2017   , Troponin   Recent Labs  Lab 12/07/17  1353   TROPONINI 0.031*     All labs within the past 24 hours have been reviewed    Imaging Results          NM Lung Ventilation Perfusion Imaging (Final result)  Result time 12/07/17 19:18:38    Final result by BRIANNA Grace Sr., MD (12/07/17 19:18:38)                 Impression:      1. The perfusion images are normal in appearance. There is no evidence of a pulmonary embolus.  2. There are patchy areas of photopenia scattered throughout both lungs on the ventilation portion of the study. This is characteristic of chronic obstructive pulmonary disease.  3. There is a mild amount of haziness in the base of the left lung on the chest x-ray. This is characteristic of atelectasis or pneumonia.  4. There is a 6 mm oval-shaped calcification projected over the base of the right lung on the chest x-ray. This is characteristic of a prior granulomatous infection.      Electronically signed by: BRIANNA GRACE MD  Date:     12/07/17  Time:    19:18              Narrative:    Nuclear Medicine V/Q Scan    History: Hypoxia    Technique: After the administration of 1 mCi of technetium  labeled DTPA, scintigraphic images of the lungs were obtained. After the administration of 6 mCi of technetium 99m labeled MAA, scintigraphic images of the lungs were obtained.    Finding: Comparison was made to a chest x-ray performed on 12/7/2017. There is a mild amount of haziness in the base of the left lung on the chest x-ray. There is a 6 mm oval-shaped calcification projected over the base of the right lung on the chest x-ray. There are patchy areas of photopenia scattered throughout both lungs on the ventilation portion of the study. The perfusion images are normal in appearance.                             X-Ray Chest AP Portable (Final result)  Result time 12/07/17 14:34:52    Final result by David Han III, MD (12/07/17 14:34:52)                  Impression:     No acute disease identified in the chest.        Electronically signed by: CELY BE MD  Date:     12/07/17  Time:    14:34              Narrative:    XR CHEST AP PORTABLE    Clinical history: Asthma.      Findings: Cardiomediastinal silhouette is within normal limits for AP technique.  Atherosclerotic ossifications of the aorta are again noted.  Hazy opacity along the left heart border is consistent with an epicardial fat-pad.  Stable small calcified granuloma in the right lower lobe.  The lungs appear clear of active disease. No acute appearing infiltrate, pleural effusion, pneumothorax or other acute disease identified.                                Pending Diagnostic Studies:     None         Medications:  Reconciled Home Medications:   Current Discharge Medication List      START taking these medications    Details   !! albuterol 90 mcg/actuation inhaler Inhale 2 puffs into the lungs every 6 (six) hours as needed for Wheezing. Rescue.  Please include spacer with instructions  Qty: 1 Inhaler, Refills: 0      predniSONE (DELTASONE) 10 MG tablet Take 1 tablet (10 mg total) by mouth 2 (two) times daily. Take 2 tab twice daily for 3 days then 1 tab twice daily x 3 days then 1 tab daily x 3 days and stop  Qty: 30 tablet, Refills: 0      senna-docusate 8.6-50 mg (GARCIA-COLACE) 8.6-50 mg per tablet Take 2 tablets by mouth once daily.  Qty: 30 tablet, Refills: 1       !! - Potential duplicate medications found. Please discuss with provider.      CONTINUE these medications which have NOT CHANGED    Details   aclidinium bromide (TUDORZA) 400 mcg/actuation AePB Inhale 1 puff into the lungs 2 (two) times daily.  Qty: 1 each, Refills: 11    Comments: Please call patient to pick prescription once it is ready.  Associated Diagnoses: Chronic obstructive pulmonary disease, unspecified COPD type      !! albuterol 90 mcg/actuation inhaler Inhale 2 puffs into the lungs every 4 (four) hours as needed for Wheezing  or Shortness of Breath.  Qty: 1 Inhaler, Refills: 11    Associated Diagnoses: Moderate COPD (chronic obstructive pulmonary disease)      aspirin 81 mg Tab Take 1 tablet by mouth Daily. Over the counter to help prevent stroke/heart attack      CHANTIX CONTINUING MONTH BOX 1 mg Tab TAKE 1 TABLET (1 MG TOTAL) BY MOUTH ONCE DAILY.  Qty: 56 tablet, Refills: 3      ferrous sulfate 325 (65 FE) MG EC tablet Take 1 tablet (325 mg total) by mouth 2 times daily 2 hours after meal.  Qty: 60 tablet, Refills: 3    Associated Diagnoses: Microcytic anemia      fish oil-omega-3 fatty acids 300-1,000 mg capsule Take 2 g by mouth.      gabapentin (NEURONTIN) 800 MG tablet Take 1 tablet (800 mg total) by mouth 3 (three) times daily.  Qty: 90 tablet, Refills: 1    Associated Diagnoses: DDD (degenerative disc disease), lumbar      hydrocodone-homatropine 5-1.5 mg/5 ml (HYCODAN) 5-1.5 mg/5 mL Syrp Take 5 mLs by mouth every 4 (four) hours as needed.  Qty: 240 mL, Refills: 0      loratadine (CLARITIN) 10 mg tablet Take 1 tablet by mouth Daily.      multivitamin capsule Take 1 capsule by mouth once daily.      oxyCODONE-acetaminophen (PERCOCET) 7.5-325 mg per tablet Take 1 tablet by mouth 3 (three) times daily as needed for Pain.  Qty: 90 tablet, Refills: 0      pantoprazole (PROTONIX) 40 MG tablet Take 40 mg by mouth once daily.   Refills: 5      pilocarpine (SALAGEN) 5 MG Tab TAKE ONE TABLET BY MOUTH 30 MINUTES PRIOR TO EACH MEAL.  Qty: 90 tablet, Refills: 2    Comments: Please consider 90 day supplies to promote better adherence       !! - Potential duplicate medications found. Please discuss with provider.          Indwelling Lines/Drains at time of discharge:   Lines/Drains/Airways          No matching active lines, drains, or airways          Time spent on the discharge of patient: 37 minutes  Patient was seen and examined on the date of discharge and determined to be suitable for discharge.         Harish Mak MD  Department of  [facility]  [facility]

## 2017-12-09 NOTE — PROGRESS NOTES
Discharge instructions explained. PT verbalized understanding. IV removed. Tele monitor removed. No signs of acute distress noted. PT transported via wheelchair to front entrance. Assisted to car with family member.

## 2017-12-11 ENCOUNTER — TELEPHONE (OUTPATIENT)
Dept: INTERNAL MEDICINE | Facility: CLINIC | Age: 72
End: 2017-12-11

## 2017-12-11 ENCOUNTER — TELEPHONE (OUTPATIENT)
Dept: PULMONOLOGY | Facility: CLINIC | Age: 72
End: 2017-12-11

## 2017-12-11 ENCOUNTER — PATIENT OUTREACH (OUTPATIENT)
Dept: ADMINISTRATIVE | Facility: CLINIC | Age: 72
End: 2017-12-11

## 2017-12-11 NOTE — TELEPHONE ENCOUNTER
----- Message from Candace Yeager sent at 12/11/2017  8:13 AM CST -----  Contact: pt   Pt wife calling to get a hospital follow up appt ,, refused for me to schedule wanted to speak with office ,,,  please call pt wife back at [phone]  Or   [phone]

## 2017-12-11 NOTE — PLAN OF CARE
12/11/17 0901   Final Note   Assessment Type Final Discharge Note   Discharge Disposition Home

## 2017-12-11 NOTE — TELEPHONE ENCOUNTER
----- Message from Candace Yeager sent at 12/11/2017  8:09 AM CST -----  Contact: pt wife  Pt wife calling to get a sooner hospital follow up appt before this friday,,, please call pt wife back at [phone]  Or   [phone]

## 2017-12-11 NOTE — PATIENT INSTRUCTIONS
Discharge Instructions: COPD  You have been diagnosed with chronic obstructive pulmonary disease (COPD). This is a name given to a group of diseases that limit the flow of air in and out of your lungs. This makes it harder to breathe. With COPD, you are also more likely to get lung infections. COPD includes chronic bronchitis and emphysema. COPD is most often caused by heavy, long-term cigarette smoking.  Home care  Quit smoking  · If you smoke, quit. It is the best thing you can do for your COPD and your overall health.  · Join a stop-smoking program. There are even telephone, text message, and Internet programs to help you quit.  · Ask your healthcare provider about medicines or other methods to help you quit.  · Ask family members to quit smoking as well.  · Don't allow people to smoke in your home, in your car, or when they are around you.  Protect yourself from infection  · Wash your hands often. Do your best to keep your hands away from your face. Most germs are spread from your hands to your mouth.  · Get a flu shot every year. Also ask your provider about pneumonia vaccines.  · Avoid crowds. It's especially important to do this in the winter when more people have colds and flu.  · To stay healthy, get enough sleep, exercise regularly, and eat a balanced diet. You should:  ¨ Get about 8 hours of sleep every night.  ¨ Try to exercise for at least 30 minutes on most days.  ¨ Have healthy foods including fruits and vegetables, 100% whole grains, lean meats and fish, and low-fat dairy products. Try to stay away from foods high in fats and sugar.  Take your medicines  Take your medicines exactly as directed. Don't skip doses.  Manage your stress  Stress can make COPD worse. Use this stress management technique:  · Find a quiet place and sit or lie in a comfortable position.  · Close your eyes and perform breathing exercises for several minutes. Ask your provider about the best way to breathe.  Pulmonary  rehabilitation  · Pulmonary rehab can help you feel better. These programs include exercise, breathing techniques, information about COPD, counseling, and help for smokers.  · Ask your provider or your local hospital about programs in your area.  When to call your healthcare provider  Call your provider immediately if you have any of the following:  · Shortness of breath, wheezing, or coughing  · Increased mucus  · Yellow, green, bloody, or smelly mucus  · Fever or chills  · Tightness in your chest that does not go away with rest or medicine  · An irregular heartbeat or a feeling that your heart is beating very fast  · Swollen ankles   Date Last Reviewed: 5/1/2016  © 8474-3977 [employer]. [address] [place] [address], [place] [address]. All rights reserved. This information is not intended as a substitute for professional medical care. Always follow your healthcare professional's instructions.

## 2017-12-12 ENCOUNTER — OFFICE VISIT (OUTPATIENT)
Dept: PAIN MEDICINE | Facility: CLINIC | Age: 72
End: 2017-12-12
Payer: MEDICARE

## 2017-12-12 VITALS
SYSTOLIC BLOOD PRESSURE: 137 MMHG | WEIGHT: 146 LBS | HEART RATE: 82 BPM | HEIGHT: 68 IN | BODY MASS INDEX: 22.13 KG/M2 | RESPIRATION RATE: 16 BRPM | DIASTOLIC BLOOD PRESSURE: 71 MMHG

## 2017-12-12 DIAGNOSIS — M51.36 DDD (DEGENERATIVE DISC DISEASE), LUMBAR: ICD-10-CM

## 2017-12-12 DIAGNOSIS — M54.16 LEFT LUMBAR RADICULOPATHY: ICD-10-CM

## 2017-12-12 DIAGNOSIS — M48.061 SPINAL STENOSIS OF LUMBAR REGION WITHOUT NEUROGENIC CLAUDICATION: ICD-10-CM

## 2017-12-12 DIAGNOSIS — M47.817 SPONDYLOSIS OF LUMBOSACRAL REGION WITHOUT MYELOPATHY OR RADICULOPATHY: Primary | ICD-10-CM

## 2017-12-12 DIAGNOSIS — G89.4 CHRONIC PAIN DISORDER: ICD-10-CM

## 2017-12-12 DIAGNOSIS — M47.819 FACET ARTHROPATHY: ICD-10-CM

## 2017-12-12 LAB
BACTERIA BLD CULT: NORMAL
BACTERIA BLD CULT: NORMAL

## 2017-12-12 PROCEDURE — 99214 OFFICE O/P EST MOD 30 MIN: CPT | Mod: S$PBB,,, | Performed by: PHYSICIAN ASSISTANT

## 2017-12-12 PROCEDURE — 99999 PR PBB SHADOW E&M-EST. PATIENT-LVL IV: CPT | Mod: PBBFAC,,, | Performed by: PHYSICIAN ASSISTANT

## 2017-12-12 PROCEDURE — 99214 OFFICE O/P EST MOD 30 MIN: CPT | Mod: PBBFAC | Performed by: PHYSICIAN ASSISTANT

## 2017-12-12 RX ORDER — GABAPENTIN 800 MG/1
800 TABLET ORAL 3 TIMES DAILY
Qty: 90 TABLET | Refills: 1 | Status: SHIPPED | OUTPATIENT
Start: 2017-12-12 | End: 2018-02-09 | Stop reason: SDUPTHER

## 2017-12-12 NOTE — PROGRESS NOTES
Chief Pain Complaint:  Low Back Pain, Leg pain (left > right)     History of Present Illness:  This patient is a 72 y.o. male who presents today complaining of the above noted pain/s. The patient describes this pain as follows.    - duration of pain: pain for years   - timing: constant   - character: aching, burning  - radiating, dermatomal: extends into left leg, along L5 pattern at times, mostly non-radiating  - antecedent trauma, prior spinal surgery: none  - pertinent negatives: No fever, No chills, No weight loss, No bladder dysfunction, No bowel dysfunction, No extremity weakness, No saddle anesthesia  - pertinent positives: none    - medications, other therapies tried (physical therapy, injections):     >> gabapentin, Norco    >> Has previously undergone Physical Therapy, which made pain worse    >> Has previously undergone spinal injection/s: including lumbar MBB and Left TFESI with Dr Taylor in 2014 & 2015 (see EMR Surgeries for full details)      IMAGING / Labs / Studies (reviewed on 12/12/2017):    9/15/17 MRI LUMBAR SPINE WITHOUT CONTRAST  History: Lower back pain.  Left lower extremity pain  Technique: Standard multiplanar pulse sequences without IV contrast.  Comparison:  No prior  studies available for comparison.  Findings:   Mild-moderate scoliosis, convex to the left and centered at L3.  All lumbar vertebral bodies are normal in height.  Scattered degenerative endplate marrow signal identified at the L2-L3 and L3-L4 L5-S1 levels.  No fracture.  No suspicious osseous lesion is identified.  Mild retrolisthesis of L2-L3.  Distal spinal cord and conus medullaris have normal appearance but the conus terminates at the T12-L1 level.  T12-L1: Minimal central protrusion.  Negative for focal nerve root impingement or central canal narrowing.  Neural foramina widely patent.  L1-2: Mild disc height loss.  Prominent intraosseous eyes.  Mild disc bulging.  Mild facet arthropathy and ligament flavum hypertrophy.   The central canal neural foramina patent.  L2-L3: Minimal retrolisthesis of L2 on L3.  There is moderate disc height loss.  Prominent anterior osteophytes.  Mild-moderate diffuse generalized disc bulging.  Mild facet arthropathy and ligament flavum hypertrophy.  The central canal is patent.  Neural foramina patent.  L3-L4: Disc desiccation and moderate disc height loss.  Large anterior osteophytes are present.  There is mild disc bulging and osteophytic ridging at the posterior disc margin.  Moderate right and mild left facet arthropathy and ligamentum flavum hypertrophy.  The central canal is patent.  Mild neural foraminal narrowing.  L4-L5: Very minimal grade 1 spondylolisthesis of L4-L5.  Mild generalized disc bulge is present.  Severe facet arthropathy and ligamentum flavum hypertrophy.  There is severe central canal and lateral recess stenosis.  Moderate neural foraminal stenosis.  L5-S1: Disc desiccation and moderate to severe disc height loss.  Prominent intraocular heights.  There is mild disc bulging and osteophytic ridging at the posterior disc margin.  Severe left and moderate right neural foraminal stenosis.  The central canal is patent.   Paravertebral soft tissues and musculature are normal.  The visualized intra-abdominal and intrapelvic contents are normal.       10/30/14 MRI Lumbar Spine Without Contrast    Narrative Exam: MRI of the lumbar spine without contrast.  History:     Low back pain. Status post SHEY, with S1-S2 radicular symptoms  Comparison: Prior study dated 04/02/13  Findings:     A convex right scoliosis again noted.  No compression fracture or subluxation.  The conus medullaris has a normal appearance.  The paraspinous soft tissues are unremarkable.  The T12 -- L1 and L1 -- 2 disk levels are essentially unremarkable.  L2 -- 3: Mild annular bulging again noted.    L3 -- 4: Moderate narrowing of the right lateral disk space, with right greater than left facet arthropathy, unchanged.   "  L4 -- 5: Marked bilateral facet arthropathy, with mild annular bulging, causing moderate central canal stenosis, unchanged.  L5 -- S1: Disk desiccation, with moderate disk space narrowing.  Significant degenerative narrowing of the left L5 neural foramen is apparent, unchanged.         Review of Systems:  CONSTITUTIONAL: patient denies any fever, chills, or weight loss  SKIN: patient denies any rash or itching  RESPIRATORY: patient denies having any shortness of breath  GASTROINTESTINAL: patient denies having any diarrhea, constipation, or bowel incontinence  GENITOURINARY: patient denies having any abnormal bladder function    MUSCULOSKELETAL:  - patient reports low back pain    NEUROLOGICAL:   - pain as above  - strength in Lower extremities is intact, BILATERALLY  - sensation in Lower extremities is intact, BILATERALLY  - patient denies any loss of bowel or bladder control      PSYCHIATRIC: patient denies any suicidal or homicidal ideations      Physical Exam:  Vitals:  /71 (BP Location: Right arm, Patient Position: Sitting, BP Method: Medium (Automatic))   Pulse 82   Resp 16   Ht 5' 8" (1.727 m)   Wt 66.2 kg (146 lb)   BMI 22.20 kg/m²    (reviewed on 12/12/2017)    General: alert and oriented, in no apparent distress  Gait: normal gait  Skin: No rashes, No discoloration, No obvious lesions  HEENT: EOMI  Respiratory: respirations nonlabored    Musculoskeletal:  - Any pain on flexion, extension, rotation:     >> pain on flexion of lumbar spine    >> mild pain with extension/rotation  - Straight Leg Raise:     >> negative on the LEFT    >> negative on the RIGHT  - Any tenderness to palpation across paraspinal muscles, joints, bursae:     >> across lumbar paraspinals     Neuro:  - Lower Extremity Strength:     >> 5/5 throughout on the LEFT    >> 5/5 throughout on the RIGHT  - Lower Extremity Reflexes:    >> 2+ on the LEFT    >> 2+ on the RIGHT  - Sensory (sensation to light touch):    >> intact on the " LEFT    >> intact on the RIGHT     Psych:  Mood and affect appropriate        Assessment:  Lumbar Spondylosis  Lumbar radiculopathy  Lumbar DDD  Chronic Pain Syndrome      Plan:  Patient returns for follow-up.  He complains of chronic low back pain. Lumbar MRI shows advanced diffuse spondylosis, greatest at the L4-L5 level with severe central canal stenosis and lateral recess stenosis.    - He recently had pneumonia, was hospitalized for this. He is doing much better now.  - Refill Percocet 7.5mg TID PRN and  gabapentin 800mg TID x 2 months. He has been stable on this, doing better than previously on Norco.   - We discussed trying an injection for additional pain relief and also physical therapy, but he wants to hold off. He feels injections weren't long lasting in the past. We will consider this if pain not controlled with medication.  - [place]  appropriate, last filled on 11-10-17. He has not run out though because he was given his normal dose in the hospital admission.  - Will order UDS today to ensure medication compliance.    RTC in 9 weeks for medication refill. I discussed the risks, benefits, and alternatives to potential treatment options. All questions and concerns were fully addressed today in clinic. Dr. Branch was consulted regarding the patient plan and agrees.            >> Pain Disability Index:  2/28/2017 :: 18  5/26/2017 :: 16  8/18/2017 :: 26  12/12/2017 :: 22    >> UDS:  6-28-16 :: appropriate  2/28/2017 :: appropriate  8/18/2017 :: not in EMR  12/12/2017 :: pending

## 2017-12-13 ENCOUNTER — OFFICE VISIT (OUTPATIENT)
Dept: PULMONOLOGY | Facility: CLINIC | Age: 72
End: 2017-12-13
Payer: MEDICARE

## 2017-12-13 VITALS
HEIGHT: 68 IN | WEIGHT: 156.31 LBS | DIASTOLIC BLOOD PRESSURE: 64 MMHG | BODY MASS INDEX: 23.69 KG/M2 | HEART RATE: 60 BPM | OXYGEN SATURATION: 91 % | SYSTOLIC BLOOD PRESSURE: 118 MMHG | RESPIRATION RATE: 18 BRPM

## 2017-12-13 DIAGNOSIS — J44.9 MODERATE COPD (CHRONIC OBSTRUCTIVE PULMONARY DISEASE): Primary | Chronic | ICD-10-CM

## 2017-12-13 DIAGNOSIS — G47.33 OSA ON CPAP: Chronic | ICD-10-CM

## 2017-12-13 PROCEDURE — 99215 OFFICE O/P EST HI 40 MIN: CPT | Mod: PBBFAC,25 | Performed by: INTERNAL MEDICINE

## 2017-12-13 PROCEDURE — G0009 ADMIN PNEUMOCOCCAL VACCINE: HCPCS | Mod: PBBFAC

## 2017-12-13 PROCEDURE — 99214 OFFICE O/P EST MOD 30 MIN: CPT | Mod: 25,S$PBB,, | Performed by: INTERNAL MEDICINE

## 2017-12-13 PROCEDURE — 99999 PR PBB SHADOW E&M-EST. PATIENT-LVL V: CPT | Mod: PBBFAC,,, | Performed by: INTERNAL MEDICINE

## 2017-12-13 PROCEDURE — 90732 PPSV23 VACC 2 YRS+ SUBQ/IM: CPT | Mod: PBBFAC

## 2017-12-13 NOTE — ASSESSMENT & PLAN NOTE
COPD ROS: taking medications as instructed, no medication side effects noted, no significant ongoing wheezing or shortness of breath, using bronchodilator MDI less than twice a week.   CAT score 9  FEV1 was 2.04 ( 68%)  Recent exacerbation discharged on Levaquin and prednisone    New concerns: None.     Exam: appears well, vitals normal, no respiratory distress, acyanotic, normal RR, chest clear, no wheezing, crepitations, rhonchi, normal symmetric air entry.   Meds: Tudorza and Albuterol HFA  Assessment: COPD reasonably well controlled.   immunizations current  Plan: current treatment plan is effective, no change in therapy.

## 2017-12-13 NOTE — PROGRESS NOTES
Subjective:       Patient ID: Noble Tripp is a 72 y.o. male.    Chief Complaint: Moderate , COPD and radha on cpap    Discharge note: reveiwed  Patient Name: Noble Tripp  MRN: [id number]  Admission Date: 12/7/2017  Hospital Length of Stay: 2 days  Discharge Date and Time:  12/09/2017 4:17 PM  Attending Physician: Harish Mak MD   Discharging Provider: Harish Mak MD  Primary Care Provider: Dwaine Davis MD        HPI:   Noble Tripp is a 73 yo male with PMHx of COPD, Aspiration PNA, hx of Head/Neck Cancer, CAD and chronic lower back pain who presents to the ED with reports of SOB onset 5 days ago with associated cough, wheezing and fever Temp max 101. SOB worsened therefore, he presented for evaluation. He eats a regular diet and does have coughing with clear liquids. In the ED, respiratory rate = 22 and pulse ox 95 % on 2 L. ABGs found pH 7.456, PO2 55 and PCO2 34, CXR is negative for acute findings and VQ scan is pending as d dimer is elevated. Pt is admitted for supplemental oxygen and COPD Exacerbation.      * No surgery found *       Hospital Course:   Tx for L lung PNA, no PE by VQ scan. Pt WBC improving, continune to require 4-5L O2, not on O2 at home, f/u home eval. No fever, will taper steroids dose and resume avelox.  Pt not compliant with home inhalers, and requesting spacer for rescue inhaler (sent to pharmacy).   Pt continued to improve and is feeling lot better, walking around well without SOB or SUAREZ. No cough or sputum or CP and all his Sx have significantly improved. His Pulse ox was 95% on RA. He requests to be discharged home today. He was seen and examined today and deemed stable for discharge.         Review of Systems   Constitutional: Positive for fatigue.   HENT: Negative.    Eyes: Negative.    Respiratory: Positive for dyspnea on extertion. Negative for snoring, sputum production, shortness of breath and wheezing.    Cardiovascular: Negative.    Genitourinary: Negative.   "  Endocrine: endocrine negative   Musculoskeletal: Negative.    Skin: Negative.    Gastrointestinal: Negative.    Neurological: Negative.    Psychiatric/Behavioral: Negative.        Objective:       Vitals:    12/13/17 1417   BP: 118/64   Pulse: 60   Resp: 18   SpO2: (!) 91%   Weight: 70.9 kg (156 lb 4.9 oz)   Height: 5' 8" (1.727 m)     Physical Exam   Constitutional: He is oriented to person, place, and time.   Cardiovascular: Normal rate, regular rhythm and normal heart sounds.    Pulmonary/Chest: Normal expansion and effort normal. He has decreased breath sounds in the left lower field.           Abdominal: Bowel sounds are normal.   Musculoskeletal: Normal range of motion. He exhibits no edema.   Lymphadenopathy:     He has no cervical adenopathy.   Neurological: He is alert and oriented to person, place, and time. He has normal reflexes. No cranial nerve deficit.   Skin: Skin is warm and dry.   Psychiatric: He has a normal mood and affect.   Nursing note and vitals reviewed.    Personal Diagnostic Review  Chest x-ray:   Findings: Cardiomediastinal silhouette is within normal limits for AP technique.  Atherosclerotic ossifications of the aorta are again noted.  Hazy opacity along the left heart border is consistent with an epicardial fat-pad.  Stable small calcified granuloma in the right lower lobe.  The lungs appear clear of active disease. No acute appearing infiltrate, pleural effusion, pneumothorax or other acute disease identified.  No flowsheet data found.      Assessment:       Problem List Items Addressed This Visit     JUANITO on CPAP (Chronic)     CPAP 14cm  Has not used CPAP for a while  Mask leak  [place] score 11           Relevant Orders    MyChart Patient Entered CPAP Usage    CPAP/BIPAP SUPPLIES    Moderate COPD (chronic obstructive pulmonary disease) - Primary (Chronic)     COPD ROS: taking medications as instructed, no medication side effects noted, no significant ongoing wheezing or shortness of " breath, using bronchodilator MDI less than twice a week.   CAT score 9  FEV1 was 2.04 ( 68%)  Recent exacerbation discharged on Levaquin and prednisone    New concerns: None.     Exam: appears well, vitals normal, no respiratory distress, acyanotic, normal RR, chest clear, no wheezing, crepitations, rhonchi, normal symmetric air entry.   Meds: Tudorza and Albuterol HFA  Assessment: COPD reasonably well controlled.   immunizations current  Plan: current treatment plan is effective, no change in therapy.              Relevant Orders    X-Ray Chest PA And Lateral    Spirometry without Bronchodilator    Pneumococcal polysaccharide vaccine 23-valent greater than or equal to 3yo subcutaneous/IM (Completed)    X-Ray Chest PA And Lateral    Complete PFT without bronchodilator - Clinic    Stress test, pulmonary        Plan:       Complete Abx and steroids  Call if fever > 101     Return in about 6 weeks (around 1/24/2018) for PFT, 6MWD test, pneumo 23 vacination, Spirometry and CXR next visit.    This note was prepared using voice recognition system and is likely to have sound alike errors that may have been overlooked even after proof reading.  Please call me with any questions    Discussed diagnosis, its evaluation, treatment and usual course. All questions answered.    Thank you for the courtesy of participating in the care of this patient    Santos Cardozo MD

## 2017-12-15 ENCOUNTER — OFFICE VISIT (OUTPATIENT)
Dept: INTERNAL MEDICINE | Facility: CLINIC | Age: 72
End: 2017-12-15
Payer: MEDICARE

## 2017-12-15 VITALS
HEIGHT: 68 IN | TEMPERATURE: 97 F | BODY MASS INDEX: 23.95 KG/M2 | WEIGHT: 158.06 LBS | SYSTOLIC BLOOD PRESSURE: 128 MMHG | HEART RATE: 70 BPM | DIASTOLIC BLOOD PRESSURE: 56 MMHG

## 2017-12-15 DIAGNOSIS — J18.9 COMMUNITY ACQUIRED PNEUMONIA OF LEFT LOWER LOBE OF LUNG: ICD-10-CM

## 2017-12-15 PROCEDURE — 99999 PR PBB SHADOW E&M-EST. PATIENT-LVL II: CPT | Mod: PBBFAC,,, | Performed by: FAMILY MEDICINE

## 2017-12-15 PROCEDURE — 99214 OFFICE O/P EST MOD 30 MIN: CPT | Mod: S$PBB,,, | Performed by: FAMILY MEDICINE

## 2017-12-15 PROCEDURE — 99212 OFFICE O/P EST SF 10 MIN: CPT | Mod: PBBFAC,PO | Performed by: FAMILY MEDICINE

## 2017-12-15 NOTE — PROGRESS NOTES
"Subjective:       Patient ID: Noble Tripp is a 72 y.o. male.    Chief Complaint: No chief complaint on file.    HPIs/p hosp pneumonia req o2 but able to come off; no home health. Wife here. Finishing prd and levaquin. Feeling back to baseline; also saw dr brown    Past Medical History:   Diagnosis Date    Adrenal mass     david    Aspiration pneumonia 10/15    puree/honey    Benign prostate hyperplasia     CAD (coronary artery disease)     dr padilla    Chronic pain     dr santos    COPD (chronic obstructive pulmonary disease)     papi    Ex-smoker     11/13    Hyperlipidemia     Osteopenia 1/15 tran 1/18    PVD (peripheral vascular disease)     noobs 07 khuri    Pyriform sinus cancer     dr ponce radiation 1/2-14 dr king perales    Sleep apnea     cpap    Squamous cell carcinoma of skin     roberto    Tongue cancer     "superficial" removed 11/14    Vocal cord cancer 2011    Xerostomia     radiation     Past Surgical History:   Procedure Laterality Date    APPENDECTOMY      COLONOSCOPY N/A 5/1/2017    Procedure: Due for screening colonoscopy;  Surgeon: Anushka Yoder MD;  Location: [facility];  Service: Endoscopy;  Laterality: N/A;    tongue cancer excision  11/14    dr vitale    VOCAL CORD LATERALIZATION, ENDOSCOPIC APPROACH W/ MLB      kris     Family History   Problem Relation Age of Onset    Cancer Father     Cancer Brother      Social History     Social History    Marital status:      Spouse name: JYOIT    Number of children: 6    Years of education: N/A     Social History Main Topics    Smoking status: Former Smoker    Smokeless tobacco: Never Used    Alcohol use No    Drug use: No    Sexual activity: Not Currently     Other Topics Concern    None     Social History Narrative    None       Review of Systems  no new sob no c/o cp  Objective:      Physical Exam  cvrrr  Chest no wheeze. lll crackles    Assessment:       1. Community acquired pneumonia of " left lower lobe of lung      copd exac  Plan:       **has f/u pulm feb w cxr  F/u next fall*    Cont regular inhal

## 2017-12-20 ENCOUNTER — TELEPHONE (OUTPATIENT)
Dept: PULMONOLOGY | Facility: CLINIC | Age: 72
End: 2017-12-20

## 2017-12-20 NOTE — TELEPHONE ENCOUNTER
----- Message from Darshana Ortiz sent at 12/20/2017 11:57 AM CST -----  Contact: Kiley Cao would like to speak to the nurse regarding CPAP machine and supplies. Please adv/call [phone].//thanks. cw

## 2017-12-20 NOTE — TELEPHONE ENCOUNTER
Pt wife calling to notify you that since [employer] does not take Medicare they were [place] [facility]

## 2017-12-28 ENCOUNTER — TELEPHONE (OUTPATIENT)
Dept: PULMONOLOGY | Facility: CLINIC | Age: 72
End: 2017-12-28

## 2017-12-28 NOTE — TELEPHONE ENCOUNTER
Returned patient call, [place] [facility] [place].

## 2017-12-29 ENCOUNTER — TELEPHONE (OUTPATIENT)
Dept: PULMONOLOGY | Facility: CLINIC | Age: 72
End: 2017-12-29

## 2017-12-29 NOTE — TELEPHONE ENCOUNTER
----- Message from Arelis Gillespie sent at 12/29/2017 10:11 AM CST -----  Contact: Arleen, pt's wife  She's calling to discuss CPAP face mask for pt, please advise [phone] (home)

## 2018-01-08 ENCOUNTER — TELEPHONE (OUTPATIENT)
Dept: PULMONOLOGY | Facility: CLINIC | Age: 73
End: 2018-01-08

## 2018-01-08 NOTE — TELEPHONE ENCOUNTER
----- Message from Omid Gross sent at 1/8/2018  9:53 AM CST -----  Contact: Pt Spouse  Caller request a call from the nurse to get orders for a c pap mask and supplies, please contact the caller at [phone] or [phone]

## 2018-01-09 ENCOUNTER — TELEPHONE (OUTPATIENT)
Dept: CARDIOLOGY | Facility: CLINIC | Age: 73
End: 2018-01-09

## 2018-01-09 NOTE — TELEPHONE ENCOUNTER
----- Message from Adrianna Tamez sent at 1/9/2018  3:49 PM CST -----  Contact: Arleen - wife  Patient has an appointment in February and she wants to know if he needs labs?  Call her at [phone].                                                 teran

## 2018-01-11 RX ORDER — PANTOPRAZOLE SODIUM 40 MG/1
40 TABLET, DELAYED RELEASE ORAL DAILY
Qty: 90 TABLET | Refills: 3 | Status: SHIPPED | OUTPATIENT
Start: 2018-01-11 | End: 2019-05-13 | Stop reason: SDUPTHER

## 2018-01-30 ENCOUNTER — OFFICE VISIT (OUTPATIENT)
Dept: HEMATOLOGY/ONCOLOGY | Facility: CLINIC | Age: 73
End: 2018-01-30
Payer: MEDICARE

## 2018-01-30 ENCOUNTER — LAB VISIT (OUTPATIENT)
Dept: LAB | Facility: HOSPITAL | Age: 73
End: 2018-01-30
Attending: INTERNAL MEDICINE
Payer: MEDICARE

## 2018-01-30 VITALS
DIASTOLIC BLOOD PRESSURE: 61 MMHG | WEIGHT: 162.94 LBS | BODY MASS INDEX: 24.7 KG/M2 | HEART RATE: 63 BPM | HEIGHT: 68 IN | SYSTOLIC BLOOD PRESSURE: 111 MMHG | TEMPERATURE: 97 F | OXYGEN SATURATION: 97 %

## 2018-01-30 DIAGNOSIS — D50.0 IRON DEFICIENCY ANEMIA DUE TO CHRONIC BLOOD LOSS: Primary | ICD-10-CM

## 2018-01-30 DIAGNOSIS — D51.0 VITAMIN B12 DEFICIENCY ANEMIA DUE TO INTRINSIC FACTOR DEFICIENCY: ICD-10-CM

## 2018-01-30 DIAGNOSIS — D50.0 IRON DEFICIENCY ANEMIA DUE TO CHRONIC BLOOD LOSS: ICD-10-CM

## 2018-01-30 DIAGNOSIS — E87.5 HYPERKALEMIA: ICD-10-CM

## 2018-01-30 DIAGNOSIS — Z85.89 HISTORY OF HEAD AND NECK CANCER: ICD-10-CM

## 2018-01-30 LAB
ALBUMIN SERPL BCP-MCNC: 3.2 G/DL
ALP SERPL-CCNC: 59 U/L
ALT SERPL W/O P-5'-P-CCNC: 11 U/L
ANION GAP SERPL CALC-SCNC: 10 MMOL/L
AST SERPL-CCNC: 13 U/L
BASOPHILS # BLD AUTO: 0.13 K/UL
BASOPHILS NFR BLD: 1.4 %
BILIRUB SERPL-MCNC: 0.3 MG/DL
BUN SERPL-MCNC: 14 MG/DL
CALCIUM SERPL-MCNC: 9.6 MG/DL
CHLORIDE SERPL-SCNC: 103 MMOL/L
CO2 SERPL-SCNC: 28 MMOL/L
CREAT SERPL-MCNC: 0.8 MG/DL
DIFFERENTIAL METHOD: ABNORMAL
EOSINOPHIL # BLD AUTO: 0.4 K/UL
EOSINOPHIL NFR BLD: 4 %
ERYTHROCYTE [DISTWIDTH] IN BLOOD BY AUTOMATED COUNT: 19.6 %
EST. GFR  (AFRICAN AMERICAN): >60 ML/MIN/1.73 M^2
EST. GFR  (NON AFRICAN AMERICAN): >60 ML/MIN/1.73 M^2
FERRITIN SERPL-MCNC: 187 NG/ML
FOLATE SERPL-MCNC: 19.2 NG/ML
GLUCOSE SERPL-MCNC: 92 MG/DL
HCT VFR BLD AUTO: 34.4 %
HGB BLD-MCNC: 11 G/DL
IRON SERPL-MCNC: 52 UG/DL
LYMPHOCYTES # BLD AUTO: 1.7 K/UL
LYMPHOCYTES NFR BLD: 18.6 %
MCH RBC QN AUTO: 27 PG
MCHC RBC AUTO-ENTMCNC: 32 G/DL
MCV RBC AUTO: 84 FL
MONOCYTES # BLD AUTO: 0.6 K/UL
MONOCYTES NFR BLD: 6 %
NEUTROPHILS # BLD AUTO: 6.4 K/UL
NEUTROPHILS NFR BLD: 70 %
PLATELET # BLD AUTO: 263 K/UL
PMV BLD AUTO: 9.7 FL
POTASSIUM SERPL-SCNC: 5.2 MMOL/L
PROT SERPL-MCNC: 7.3 G/DL
RBC # BLD AUTO: 4.08 M/UL
SATURATED IRON: 20 %
SODIUM SERPL-SCNC: 141 MMOL/L
TOTAL IRON BINDING CAPACITY: 258 UG/DL
TRANSFERRIN SERPL-MCNC: 174 MG/DL
VIT B12 SERPL-MCNC: 1427 PG/ML
WBC # BLD AUTO: 9.15 K/UL

## 2018-01-30 PROCEDURE — 99999 PR PBB SHADOW E&M-EST. PATIENT-LVL III: CPT | Mod: PBBFAC,,, | Performed by: INTERNAL MEDICINE

## 2018-01-30 PROCEDURE — 83540 ASSAY OF IRON: CPT

## 2018-01-30 PROCEDURE — 82607 VITAMIN B-12: CPT

## 2018-01-30 PROCEDURE — 1126F AMNT PAIN NOTED NONE PRSNT: CPT | Mod: ,,, | Performed by: INTERNAL MEDICINE

## 2018-01-30 PROCEDURE — 99213 OFFICE O/P EST LOW 20 MIN: CPT | Mod: PBBFAC | Performed by: INTERNAL MEDICINE

## 2018-01-30 PROCEDURE — 85025 COMPLETE CBC W/AUTO DIFF WBC: CPT

## 2018-01-30 PROCEDURE — 82746 ASSAY OF FOLIC ACID SERUM: CPT

## 2018-01-30 PROCEDURE — 36415 COLL VENOUS BLD VENIPUNCTURE: CPT

## 2018-01-30 PROCEDURE — 1159F MED LIST DOCD IN RCRD: CPT | Mod: ,,, | Performed by: INTERNAL MEDICINE

## 2018-01-30 PROCEDURE — 99215 OFFICE O/P EST HI 40 MIN: CPT | Mod: S$PBB,,, | Performed by: INTERNAL MEDICINE

## 2018-01-30 PROCEDURE — 80053 COMPREHEN METABOLIC PANEL: CPT

## 2018-01-30 PROCEDURE — 82728 ASSAY OF FERRITIN: CPT

## 2018-01-30 RX ORDER — GUAIFENESIN AND PHENYLEPHRINE HCL 400; 10 MG/1; MG/1
1 TABLET ORAL 2 TIMES DAILY
Status: ON HOLD | COMMUNITY
End: 2019-10-27 | Stop reason: HOSPADM

## 2018-01-30 RX ORDER — OXYCODONE AND ACETAMINOPHEN 7.5; 325 MG/1; MG/1
1 TABLET ORAL
COMMUNITY
End: 2018-02-09 | Stop reason: SDUPTHER

## 2018-01-30 NOTE — PROGRESS NOTES
Reason for visit: Follow-up for iron deficiency anemia and history of head and neck cancer    HPI:   The patient is a 72-year-old  male who presents to the hematology oncology clinic today for follow-up for iron deficiency anemia and history of head and neck cancer.  The patient is followed in the outpatient hematology oncology clinic by Dr. Hussein Coyle and I'm evaluating the patient today for the first time.  I have reviewed all of the patient's relevant clinical history available in the medical record and have utilized this in my evaluation and management recommendations today.  Today the patient reports that he feels well and has no specific complaints.  He reports that he is up-to-date with follow-up with his ENT physician Dr. Varela at [facility].  He denies any fevers, chills or night sweats.  He denies any loss of appetite or unintentional weight loss.  He denies any chest pain or shortness of breath.  He denies any melena, hematochezia, hematemesis, hemoptysis or hematuria.  He denies any bowel or urinary complains.  He denies any nausea, vomiting or abdominal pain.    PAST MEDICAL HISTORY/SURGICAL HISTORY/FAMILY HISTORY/SOCIAL HISTORY: Reviewed and updated from Dr. Coyle's prior clinic notes.    ALLERGIES: Reviewed on medication card.    MEDICATIONS: [Medcard has been reviewed and/or reconciled.]    REVIEW OF SYSTEMS:   GENERAL: [No fevers, chills or sweats. No fatigue, weight loss or loss of appetite.]  HEENT: [No blurred vision, tinnitus, nasal discharge, sorethroat or dysphagia.]  HEART: [No chest pain, palpitations or shortness of breath.]    LUNGS: [No cough, hemoptysis or breathing problems.]  ABDOMEN: [No abdominal pain, nausea, vomiting, diarrhea, constipation or melena.]  GENITOURINARY: [No dysuria, bleeding or malodorous discharge.]  NEURO: [No headache, dizziness or vertigo.]  HEMATOLOGY: [No easy bruising, spontaneous bleeding or blood clots in the past].  MUSCULOSKELETAL: [No arthralgias,  myalgias or bone pains.]  SKIN: [No rashes or skin lesions.]  PSYCHIATRY: [No depression or anxiety.]    PHYSICAL EXAMINATION:   VS: Reviewed on nurse's notes.  APPEARANCE: The patient is a well-developed, well-nourished and well-groomed  male who appears in no acute distress.  HEENT: No scleral icterus. Both external auditory canals clear. No oral ulcers, lesions. Throat clear  HEAD: No sinus tenderness.  NECK: Supple. No palpable lymphadenopathy.   CHEST: Breath sounds clear bilaterally. No rales. No rhonchi. Unlabored respirations.  CARDIOVASCULAR: Normal S1, S2. Normal rate. Regular rhythm.  ABDOMEN: Bowel sounds normal. No tenderness. No abdominal distention. No hepatomegaly. No splenomegaly.  LYMPHATIC: No palpable supraclavicular, axillary nodes  EXTREMITIES: No clubbing, cyanosis, edema  SKIN: No lesions. No petechiae. No ecchymoses. No induration or nodules  NEUROLOGIC: No focal findings. Alert & Oriented x 3. Mood appropriate to affect    LABS:   Reviewed    IMAGING:  Reviewed    IMPRESSION:  1.  Iron deficiency anemia  2.  History of head and neck cancer    PLAN:  1.  I had a detailed discussion with the patient today with regard to his current clinical situation.  His CBC from today shows stable hemoglobin/hematocrit.  All other lab results essentially look unremarkable.  He does have mild hyperkalemia.  He states that he likes and eat a lot of bananas.  He will stop/moderate this.  2.  He will continue follow-up with Dr. Varela as scheduled for his history of head and neck cancer to continue surveillance.  3.  I will follow-up with all pending labs from today including iron studies.  4.  He knows to seek immediate medical attention for any signs/symptoms of increased bleeding.    Return to clinic in 3 months with labs 2 days before clinic visit to follow-up with Dr. Coyle.  He knows to call sooner for any new problems or questions.    Jaxson Perry MD

## 2018-01-31 ENCOUNTER — PATIENT MESSAGE (OUTPATIENT)
Dept: HEMATOLOGY/ONCOLOGY | Facility: CLINIC | Age: 73
End: 2018-01-31

## 2018-02-02 ENCOUNTER — PROCEDURE VISIT (OUTPATIENT)
Dept: PULMONOLOGY | Facility: CLINIC | Age: 73
End: 2018-02-02
Payer: MEDICARE

## 2018-02-02 VITALS — HEIGHT: 68 IN | WEIGHT: 160.06 LBS | BODY MASS INDEX: 24.26 KG/M2

## 2018-02-02 DIAGNOSIS — J44.9 MODERATE COPD (CHRONIC OBSTRUCTIVE PULMONARY DISEASE): Chronic | ICD-10-CM

## 2018-02-02 LAB
POST FEF 25 75: 0.75 L/S (ref 1.49–3.01)
POST FET 100: 18.4 S
POST FEV1 FVC: 58 %
POST FEV1: 2.33 L (ref 2.61–3.35)
POST FIF 50: 2.15 L/S
POST FVC: 3.99 L (ref 3.63–4.52)
POST PEF: 6.04 L/S (ref 6.74–8.93)
PRE DLCO: 12.16 ML/MMHG/MIN (ref 14.57–22.86)
PRE ERV: 1.36 L
PRE FEF 25 75: 0.55 L/S (ref 1.49–3.01)
PRE FET 100: 24.84 S
PRE FEV1 FVC: 56 %
PRE FEV1: 2.28 L (ref 2.61–3.35)
PRE FIF 50: 4.03 L/S
PRE FRC PL: 4.91 L (ref 2.96–4.17)
PRE FVC: 4.09 L (ref 3.63–4.52)
PRE KROGHS K: 2.13 1/MIN
PRE PEF: 5.61 L/S (ref 6.74–8.93)
PRE RV: 3.56 L (ref 2.03–2.82)
PRE SVC: 4.09 L
PRE TLC: 7.64 L (ref 5.52–6.52)
PREDICTED DLCO: 18.72 ML/MMHG/MIN (ref 14.57–22.86)
PREDICTED FEV1 FVC: 73.4 % (ref 68.56–78.24)
PREDICTED FEV1: 2.98 L (ref 2.61–3.35)
PREDICTED FRC N2: 3.57 L (ref 2.96–4.17)
PREDICTED FRC PL: 3.57 L (ref 2.96–4.17)
PREDICTED FVC: 4.08 L (ref 3.63–4.52)
PREDICTED RV: 2.42 L (ref 2.03–2.82)
PREDICTED SVC: 3.7 L
PREDICTED TLC: 6.02 L (ref 5.52–6.52)
PROVOCATION PROTOCOL: ABNORMAL

## 2018-02-02 PROCEDURE — 94060 EVALUATION OF WHEEZING: CPT | Mod: PBBFAC

## 2018-02-02 PROCEDURE — 94729 DIFFUSING CAPACITY: CPT | Mod: PBBFAC

## 2018-02-02 PROCEDURE — 94060 EVALUATION OF WHEEZING: CPT | Mod: 26,59,S$PBB, | Performed by: INTERNAL MEDICINE

## 2018-02-02 PROCEDURE — 94618 PULMONARY STRESS TESTING: CPT | Mod: PBBFAC

## 2018-02-02 PROCEDURE — 94618 PULMONARY STRESS TESTING: CPT | Mod: 26,S$PBB,, | Performed by: INTERNAL MEDICINE

## 2018-02-02 PROCEDURE — 94726 PLETHYSMOGRAPHY LUNG VOLUMES: CPT | Mod: 26,S$PBB,, | Performed by: INTERNAL MEDICINE

## 2018-02-02 PROCEDURE — 94729 DIFFUSING CAPACITY: CPT | Mod: 26,S$PBB,, | Performed by: INTERNAL MEDICINE

## 2018-02-02 PROCEDURE — 94726 PLETHYSMOGRAPHY LUNG VOLUMES: CPT | Mod: PBBFAC

## 2018-02-02 NOTE — PROCEDURES
"[facility]  Six Minute Walk     SUMMARY     Ordering Provider: Dr. Cardozo   Interpreting Provider: Dr. Cardozo  Performing nurse/tech/RT: Luisa RRT  Diagnosis: COPD  Height: 5' 8" (172.7 cm)  Weight: 72.6 kg (160 lb 0.9 oz)  BMI (Calculated): 24.4   Patient Race:             Phase Oxygen Assessment Supplemental O2 Heart   Rate Blood Pressure Tasneem Dyspnea Scale Rating   Resting 98 % Room Air 66 bpm 131/59 0   Exercise        Minute        1 96 % Room Air 76 bpm     2 96 % Room Air 77 bpm     3 96 % Room Air 82 bpm     4 97 % Room Air 85 bpm     5 97 % Room Air 86 bpm     6  97 % Room Air 87 bpm 129/57 3   Recovery        Minute        1 97 % Room Air 78 bpm     2 100 % Room Air 77 bpm     3 100 % Room Air 68 bpm     4 99 % Room Air 65 bpm 122/58 2     Six Minute Walk Summary  6MWT Status: completed without stopping  Patient Reported: Leg pain, Dyspnea     Interpretation:  Did the patient stop or pause?: No              Total Time Walked (Calculated): 360 seconds  Final Partial Lap Distance (feet): 0 feet  Total Distance Meters (Calculated): 365.76 meters  Predicted Distance Meters (Calculated): 514.14 meters  Percentage of Predicted (Calculated): 71.14  Peak VO2 (Calculated): 14.95  Mets: 4.27  Has The Patient Had a Previous Six Minute Walk Test?: No       Previous 6MWT Results  Has The Patient Had a Previous Six Minute Walk Test?: No      REPORT  No exercise desaturation  Correlate for leg pain symptoms  Mil dyspnea  Systolic BP dropped slightly  Normal exercise capacity    Santos Cardozo MD    "

## 2018-02-05 ENCOUNTER — OFFICE VISIT (OUTPATIENT)
Dept: CARDIOLOGY | Facility: CLINIC | Age: 73
End: 2018-02-05
Payer: MEDICARE

## 2018-02-05 VITALS
BODY MASS INDEX: 24.83 KG/M2 | HEIGHT: 68 IN | SYSTOLIC BLOOD PRESSURE: 118 MMHG | HEART RATE: 72 BPM | WEIGHT: 163.81 LBS | DIASTOLIC BLOOD PRESSURE: 58 MMHG

## 2018-02-05 DIAGNOSIS — I10 ESSENTIAL HYPERTENSION: ICD-10-CM

## 2018-02-05 DIAGNOSIS — I47.10 PSVT (PAROXYSMAL SUPRAVENTRICULAR TACHYCARDIA): ICD-10-CM

## 2018-02-05 DIAGNOSIS — J44.9 MODERATE COPD (CHRONIC OBSTRUCTIVE PULMONARY DISEASE): Chronic | ICD-10-CM

## 2018-02-05 DIAGNOSIS — Z78.9 STATIN INTOLERANCE: ICD-10-CM

## 2018-02-05 DIAGNOSIS — I25.10 CORONARY ARTERY DISEASE INVOLVING NATIVE CORONARY ARTERY OF NATIVE HEART WITHOUT ANGINA PECTORIS: Primary | ICD-10-CM

## 2018-02-05 DIAGNOSIS — E78.2 MIXED HYPERLIPIDEMIA: ICD-10-CM

## 2018-02-05 PROCEDURE — 99213 OFFICE O/P EST LOW 20 MIN: CPT | Mod: PBBFAC | Performed by: INTERNAL MEDICINE

## 2018-02-05 PROCEDURE — 99999 PR PBB SHADOW E&M-EST. PATIENT-LVL III: CPT | Mod: PBBFAC,,, | Performed by: INTERNAL MEDICINE

## 2018-02-05 PROCEDURE — 99214 OFFICE O/P EST MOD 30 MIN: CPT | Mod: S$PBB,,, | Performed by: INTERNAL MEDICINE

## 2018-02-05 PROCEDURE — 1159F MED LIST DOCD IN RCRD: CPT | Mod: ,,, | Performed by: INTERNAL MEDICINE

## 2018-02-05 RX ORDER — VARENICLINE TARTRATE 1 MG/1
TABLET, FILM COATED ORAL
Qty: 56 TABLET | Refills: 0 | Status: SHIPPED | OUTPATIENT
Start: 2018-02-05 | End: 2018-02-06 | Stop reason: SDUPTHER

## 2018-02-05 NOTE — PROGRESS NOTES
"Subjective:   Patient ID:  Noble Tripp is a 72 y.o. male who presents for follow up of Carotid Artery Disease and Follow-up      69 yo, male, PMH PSVT, CAD HLD, COPD, JUANITO on CPAP, vocal cord cancer s/p surgery and subsequent XRT in 2011, and recent tongue precancer mass removal.   He was admitted to [facility] for sepsis/PNA and PSVT on 10-. EKG  Showed extensive St depression of 1 mm on anterolateral leads. EF 60%. MPi showed fixed inferior perfusion defect. cTn was up to 0.3. PSVT was covnerted to sinus O/N after BB was added. He had prolonged ATx Rx.  Today, he states that he dose not episode of palpitation.   Feel fatigue, no chest pain, dizziness, dyspnea, dizziness. Exercise regularly daily.  Off [place] for a while.    Echo in :normal EF, DD.  MPI in :  A small to moderate size fixed defect of moderate to severe intensity that extends from the apical to the base inferior wall of the left ventricle, consistent with myocardial injury.   EKG on 10-: PSVT, st depression on V3 to V6, I, II and avL.          Past Medical History:   Diagnosis Date    Adrenal mass     david    Aspiration pneumonia 10/15    puree/honey    Benign prostate hyperplasia     CAD (coronary artery disease)     dr padilla    Chronic pain     dr santos    COPD (chronic obstructive pulmonary disease)     papi    Ex-smoker     11/13    Hyperlipidemia     Osteopenia 1/15 tran 1/18    PVD (peripheral vascular disease)     noobs 07 khuri    Pyriform sinus cancer     dr ponce radiation 1/2-14 dr king perales    Sleep apnea     cpap    Squamous cell carcinoma of skin     roberto    Tongue cancer     "superficial" removed 11/14    Vocal cord cancer 2011    Xerostomia     radiation       Past Surgical History:   Procedure Laterality Date    APPENDECTOMY      COLONOSCOPY N/A 5/1/2017    Procedure: Due for screening colonoscopy;  Surgeon: Anushka Yoder MD;  Location: [facility];  Service: Endoscopy; "  Laterality: N/A;    tongue cancer excision  11/14    dr vitale    VOCAL CORD LATERALIZATION, ENDOSCOPIC APPROACH W/ MLB      kris       Social History   Substance Use Topics    Smoking status: Former Smoker    Smokeless tobacco: Never Used    Alcohol use No       Family History   Problem Relation Age of Onset    Cancer Father     Cancer Brother          Review of Systems   Constitution: Positive for malaise/fatigue. Negative for decreased appetite, diaphoresis, fever, weakness and night sweats.   HENT: Negative for nosebleeds.    Eyes: Negative for blurred vision and double vision.   Cardiovascular: Negative for chest pain, claudication, dyspnea on exertion, irregular heartbeat, leg swelling, near-syncope, orthopnea, palpitations, paroxysmal nocturnal dyspnea and syncope.   Respiratory: Negative for cough, shortness of breath, sleep disturbances due to breathing, snoring, sputum production and wheezing.    Endocrine: Negative for cold intolerance and polyuria.   Hematologic/Lymphatic: Does not bruise/bleed easily.   Skin: Negative for rash.   Musculoskeletal: Negative for back pain, falls, joint pain, joint swelling and neck pain.   Gastrointestinal: Negative for abdominal pain, heartburn, nausea and vomiting.   Genitourinary: Negative for dysuria, frequency and hematuria.   Neurological: Negative for difficulty with concentration, dizziness, focal weakness, headaches, light-headedness, numbness and seizures.   Psychiatric/Behavioral: Negative for depression, memory loss and substance abuse. The patient does not have insomnia.    Allergic/Immunologic: Negative for HIV exposure and hives.       Objective:   Physical Exam   Constitutional: He is oriented to person, place, and time. He appears well-nourished.   HENT:   Head: Normocephalic.   Eyes: Pupils are equal, round, and reactive to light.   Neck: Normal carotid pulses and no JVD present. Carotid bruit is not present. No thyromegaly present.    Cardiovascular: Normal rate, regular rhythm, normal heart sounds and normal pulses.   No extrasystoles are present. PMI is not displaced.  Exam reveals no gallop and no S3.    No murmur heard.  Pulses:       Carotid pulses are 2+ on the right side, and 2+ on the left side.       Radial pulses are 2+ on the right side, and 2+ on the left side.   Pulmonary/Chest: Breath sounds normal. No stridor. No respiratory distress.   B/l decreased BS, no bronchi   Abdominal: Soft. Bowel sounds are normal. There is no tenderness. There is no rebound.   Musculoskeletal: Normal range of motion.   Neurological: He is alert and oriented to person, place, and time.   Skin: Skin is intact. No rash noted.   Psychiatric: His behavior is normal.       Lab Results   Component Value Date    CHOL 204 (H) 11/14/2017    CHOL 196 08/07/2017    CHOL 133 02/28/2017     Lab Results   Component Value Date    HDL 43 11/14/2017    HDL 46 08/07/2017    HDL 53 02/28/2017     Lab Results   Component Value Date    LDLCALC 147.8 11/14/2017    LDLCALC 128.2 08/07/2017    LDLCALC 65.0 02/28/2017     Lab Results   Component Value Date    TRIG 66 11/14/2017    TRIG 109 08/07/2017    TRIG 75 02/28/2017     Lab Results   Component Value Date    CHOLHDL 21.1 11/14/2017    CHOLHDL 23.5 08/07/2017    CHOLHDL 39.8 02/28/2017       Chemistry        Component Value Date/Time     01/30/2018 0915    K 5.2 (H) 01/30/2018 0915     01/30/2018 0915    CO2 28 01/30/2018 0915    BUN 14 01/30/2018 0915    CREATININE 0.8 01/30/2018 0915    GLU 92 01/30/2018 0915        Component Value Date/Time    CALCIUM 9.6 01/30/2018 0915    ALKPHOS 59 01/30/2018 0915    AST 13 01/30/2018 0915    ALT 11 01/30/2018 0915    BILITOT 0.3 01/30/2018 0915    ESTGFRAFRICA >60 01/30/2018 0915    EGFRNONAA >60 01/30/2018 0915          Lab Results   Component Value Date    TSH 0.994 05/19/2014     Lab Results   Component Value Date    INR 1.1 10/10/2015    INR 1.0 10/10/2015    INR 1.0  07/26/2005     Lab Results   Component Value Date    WBC 9.15 01/30/2018    HGB 11.0 (L) 01/30/2018    HCT 34.4 (L) 01/30/2018    MCV 84 01/30/2018     01/30/2018     BMP  Sodium   Date Value Ref Range Status   01/30/2018 141 136 - 145 mmol/L Final     Potassium   Date Value Ref Range Status   01/30/2018 5.2 (H) 3.5 - 5.1 mmol/L Final     Chloride   Date Value Ref Range Status   01/30/2018 103 95 - 110 mmol/L Final     CO2   Date Value Ref Range Status   01/30/2018 28 23 - 29 mmol/L Final     BUN, Bld   Date Value Ref Range Status   01/30/2018 14 8 - 23 mg/dL Final     Creatinine   Date Value Ref Range Status   01/30/2018 0.8 0.5 - 1.4 mg/dL Final     Calcium   Date Value Ref Range Status   01/30/2018 9.6 8.7 - 10.5 mg/dL Final     Anion Gap   Date Value Ref Range Status   01/30/2018 10 8 - 16 mmol/L Final     eGFR if    Date Value Ref Range Status   01/30/2018 >60 >60 mL/min/1.73 m^2 Final     eGFR if non    Date Value Ref Range Status   01/30/2018 >60 >60 mL/min/1.73 m^2 Final     Comment:     Calculation used to obtain the estimated glomerular filtration  rate (eGFR) is the CKD-EPI equation.        Estimated Creatinine Clearance: 81.9 mL/min (based on SCr of 0.8 mg/dL).     Assessment:      1. Coronary artery disease involving native coronary artery of native heart without angina pectoris    2. Mixed hyperlipidemia    3. Essential hypertension    4. PSVT (paroxysmal supraventricular tachycardia)    5. Moderate COPD (chronic obstructive pulmonary disease)    6. Statin intolerance        Plan:   Resume Repatha  Continue ASA  Continue current meds.  Recommend heart-healthy diet, weight control and regular exercise.  Yamile. Risk modification.   RTC in 6 months  I have reviewed all pertinent labs and cardiac studies. Plans and recommendations have been formulated under my direct supervision. All questions answered and patient voiced understanding. Patient to continue current  medications.

## 2018-02-05 NOTE — PROGRESS NOTES
"Subjective:       Patient ID: Noble Tripp is a 72 y.o. male.    Chief Complaint: COPD (rev cxr pft walk)    Noble Tripp is 72 years old  Follow-up appointment.  Accompanied by his wife.    Last visit in December he had been the hospital with pneumonia  No interval exacerbations  COPD test score is 0  MRC score 1  History on oxygen  Immunizations are up-to-date  Current Spirometry is reviewed mild obstructive defect  He is compliant and using his medications TUDORZAR and albuterol  He has not been smoking  He asked for refill for Chantix; This was sent  Download of his CPAP was reviewed  His machine is set at 14 cm water pressure  Usage greater than 4 hours was 63%  This is slightly below the threshold but I believe patient is doing well and benefits from the machine  Patient is COPD group A           Review of Systems   Constitutional: Positive for fatigue.   HENT: Negative.    Eyes: Negative.    Respiratory: Positive for dyspnea on extertion. Negative for snoring, sputum production, shortness of breath and wheezing.    Cardiovascular: Negative.    Genitourinary: Negative.    Endocrine: endocrine negative   Musculoskeletal: Negative.    Skin: Negative.    Gastrointestinal: Negative.    Neurological: Negative.    Psychiatric/Behavioral: Negative.        Objective:       Vitals:    02/06/18 0821   BP: (!) 120/56   Pulse: 61   Resp: 18   SpO2: 97%   Weight: 73.2 kg (161 lb 7.8 oz)   Height: 5' 8" (1.727 m)     Physical Exam   Constitutional: He is oriented to person, place, and time. He appears well-nourished. He is not obese.   HENT:   Head: Normocephalic.   Nose: Nose normal.   Neck: Normal range of motion. Neck supple.   Cardiovascular: Normal rate, regular rhythm and normal heart sounds.    No murmur heard.  Pulmonary/Chest: Normal expansion and effort normal. He has no decreased breath sounds. He has no wheezes. He has no rhonchi. Negative for tactile fremitus.   Abdominal: Bowel sounds are " normal.   Musculoskeletal: Normal range of motion. He exhibits no edema.   Lymphadenopathy:     He has no cervical adenopathy.   Neurological: He is alert and oriented to person, place, and time. He has normal reflexes. No cranial nerve deficit.   Skin: Skin is warm and dry.   Psychiatric: He has a normal mood and affect.   Nursing note and vitals reviewed.    Personal Diagnostic Review  Chest x-ray:   Findings: There has been partial clearing of prior infiltrate in the retrocardiac left lung base.    Some persistent reticular opacity noted which may represent residual infiltrate versus fibrotic change.    Hyperexpansion of lungs suggesting COPD.  Small calcified granulomas in the right lung.    No pleural effusion.  No acute osseous abnormality      6MWD  Total Time Walked (Calculated): 360 seconds  Final Partial Lap Distance (feet): 0 feet  Total Distance Meters (Calculated): 365.76 meters  Predicted Distance Meters (Calculated): 514.14 meters  Percentage of Predicted (Calculated): 71.14  Peak VO2 (Calculated): 14.95  Mets: 4.27  Has The Patient Had a Previous Six Minute Walk Test?:     Lab Results   Component Value Date    PREFVC 4.09 02/02/2018    QOCQTM3828 0.55 (L) 02/02/2018    PREPEF 5.61 (L) 02/02/2018    GZLQWQ092 24.84 02/02/2018    GUNWLF1OXI 56 02/02/2018    POSTFVC 3.99 02/02/2018    POSTFEV1 2.33 (L) 02/02/2018    JMCKORD0025 0.75 (L) 02/02/2018    POSTPEF 6.04 (L) 02/02/2018    WSTUKLA084 18.4 02/02/2018    SSOJCOJ73 2.15 02/02/2018    DQYRUKX8JPT 58 02/02/2018    XJJQBPT9RDQ 73.4 02/02/2018    PREDICTEDFVC 4.08 02/02/2018    PREDICTEDFEV 2.98 02/02/2018       Flow volume loop: Obstructive defect  Mild Obstructive Defect / Physiology  There is no significant response to inhaled bronchodilators, although the lack of response does not preclude clinical use  Plethysmographic lung volumes show moderate overinflation.  Diffusing capacity is mildly decreased ( Adjusted for Hb)  (Physician 02/02/  Ashley  flowsheet data found.        DOWNLOAD  Compliance Report  Usage 12/19/2017 - 02/05/2018  Usage days 33/49 days (67%)  >= 4 hours 31 days (63%)  < 4 hours 2 days (4%)  Usage hours 196 hours 31 minutes  Average usage (total days) 4 hours 1 minutes  Average usage (days used) 5 hours 57 minutes  Median usage (days used) 6 hours 18 minutes  S9 Escape  Serial number [id number]  Mode CPAP  Set pressure 14 cmH2O  EPR Fulltime  EPR level 2  Assessment:       Problem List Items Addressed This Visit     JUANITO on CPAP (Chronic)     Strawberry score 11  Bed time 10-11 pm  Wake time 4 am to 7 am  CPAP 14 cm  Using machine and benefits  Encourage adherence         Moderate COPD (chronic obstructive pulmonary disease) - Primary (Chronic)     COPD ROS: taking medications as instructed, no medication side effects noted, no significant ongoing wheezing or shortness of breath, using bronchodilator MDI less than twice a week.   CAT score 0  FEV1 was 2.28 ( 77%)  mRC 1  Not on oxygen  Tudorza and Albuterol HFA  New concerns: None.      Exam: appears well, vitals normal, no respiratory distress, acyanotic, normal RR, chest clear, no wheezing, crepitations, rhonchi, normal symmetric air entry.     Meds: Tudorza and Albuterol HFA  Assessment: COPD reasonably well controlled.   immunizations current  Plan: current treatment plan is effective, no change in therapy.          Ex-smoker     Refill Chantix  Adherence to cessation         Relevant Medications    varenicline (CHANTIX CONTINUING MONTH BOX) 1 mg Tab        Plan:       Doing well : Group A: mRC 1, Gold 1  Normal exercise capacity  Improved CPAP use to 70%( currently 63%)    Follow-up in about 6 months (around 8/6/2018) for Spirometry and CXR next visit, Download, CPAP supplies.    This note was prepared using voice recognition system and is likely to have sound alike errors that may have been overlooked even after proof reading.  Please call me with any questions    Discussed diagnosis,  its evaluation, treatment and usual course. All questions answered.    Thank you for the courtesy of participating in the care of this patient    Santos Cardozo MD

## 2018-02-06 ENCOUNTER — OFFICE VISIT (OUTPATIENT)
Dept: PULMONOLOGY | Facility: CLINIC | Age: 73
End: 2018-02-06
Payer: MEDICARE

## 2018-02-06 ENCOUNTER — HOSPITAL ENCOUNTER (OUTPATIENT)
Dept: RADIOLOGY | Facility: HOSPITAL | Age: 73
Discharge: HOME | End: 2018-02-06
Attending: INTERNAL MEDICINE
Payer: MEDICARE

## 2018-02-06 VITALS
WEIGHT: 161.5 LBS | RESPIRATION RATE: 18 BRPM | HEIGHT: 68 IN | BODY MASS INDEX: 24.48 KG/M2 | SYSTOLIC BLOOD PRESSURE: 120 MMHG | DIASTOLIC BLOOD PRESSURE: 56 MMHG | HEART RATE: 61 BPM | OXYGEN SATURATION: 97 %

## 2018-02-06 DIAGNOSIS — Z87.891 EX-SMOKER: ICD-10-CM

## 2018-02-06 DIAGNOSIS — J44.9 MODERATE COPD (CHRONIC OBSTRUCTIVE PULMONARY DISEASE): ICD-10-CM

## 2018-02-06 DIAGNOSIS — J44.9 MODERATE COPD (CHRONIC OBSTRUCTIVE PULMONARY DISEASE): Primary | Chronic | ICD-10-CM

## 2018-02-06 DIAGNOSIS — G47.33 OSA ON CPAP: Chronic | ICD-10-CM

## 2018-02-06 PROBLEM — J18.9 COMMUNITY ACQUIRED PNEUMONIA OF LEFT LUNG: Status: RESOLVED | Noted: 2017-12-15 | Resolved: 2018-02-06

## 2018-02-06 PROCEDURE — 71046 X-RAY EXAM CHEST 2 VIEWS: CPT | Mod: 26,,, | Performed by: RADIOLOGY

## 2018-02-06 PROCEDURE — 99213 OFFICE O/P EST LOW 20 MIN: CPT | Mod: PBBFAC | Performed by: INTERNAL MEDICINE

## 2018-02-06 PROCEDURE — 1159F MED LIST DOCD IN RCRD: CPT | Mod: ,,, | Performed by: INTERNAL MEDICINE

## 2018-02-06 PROCEDURE — 99999 PR PBB SHADOW E&M-EST. PATIENT-LVL III: CPT | Mod: PBBFAC,,, | Performed by: INTERNAL MEDICINE

## 2018-02-06 PROCEDURE — 71046 X-RAY EXAM CHEST 2 VIEWS: CPT | Mod: TC

## 2018-02-06 PROCEDURE — 99214 OFFICE O/P EST MOD 30 MIN: CPT | Mod: S$PBB,,, | Performed by: INTERNAL MEDICINE

## 2018-02-06 RX ORDER — VARENICLINE TARTRATE 1 MG/1
TABLET, FILM COATED ORAL
Qty: 56 TABLET | Refills: 3 | Status: SHIPPED | OUTPATIENT
Start: 2018-02-06 | End: 2018-10-29 | Stop reason: SDUPTHER

## 2018-02-06 NOTE — ASSESSMENT & PLAN NOTE
Giltner score 11  Bed time 10-11 pm  Wake time 4 am to 7 am  CPAP 14 cm  Using machine and benefits  Encourage adherence

## 2018-02-06 NOTE — ASSESSMENT & PLAN NOTE
COPD ROS: taking medications as instructed, no medication side effects noted, no significant ongoing wheezing or shortness of breath, using bronchodilator MDI less than twice a week.   CAT score 0  FEV1 was 2.28 ( 77%)  mRC 1  Not on oxygen  Tudorza and Albuterol HFA  New concerns: None.      Exam: appears well, vitals normal, no respiratory distress, acyanotic, normal RR, chest clear, no wheezing, crepitations, rhonchi, normal symmetric air entry.     Meds: Tudorza and Albuterol HFA  Assessment: COPD reasonably well controlled.   immunizations current  Plan: current treatment plan is effective, no change in therapy.

## 2018-02-07 ENCOUNTER — TELEPHONE (OUTPATIENT)
Dept: PHARMACY | Facility: CLINIC | Age: 73
End: 2018-02-07

## 2018-02-09 ENCOUNTER — OFFICE VISIT (OUTPATIENT)
Dept: PAIN MEDICINE | Facility: CLINIC | Age: 73
End: 2018-02-09
Payer: MEDICARE

## 2018-02-09 VITALS
HEIGHT: 68 IN | SYSTOLIC BLOOD PRESSURE: 116 MMHG | RESPIRATION RATE: 16 BRPM | HEART RATE: 64 BPM | DIASTOLIC BLOOD PRESSURE: 65 MMHG | BODY MASS INDEX: 24.25 KG/M2 | WEIGHT: 160 LBS

## 2018-02-09 DIAGNOSIS — G89.4 CHRONIC PAIN DISORDER: ICD-10-CM

## 2018-02-09 DIAGNOSIS — M48.061 SPINAL STENOSIS OF LUMBAR REGION, UNSPECIFIED WHETHER NEUROGENIC CLAUDICATION PRESENT: ICD-10-CM

## 2018-02-09 DIAGNOSIS — M54.16 LEFT LUMBAR RADICULOPATHY: ICD-10-CM

## 2018-02-09 DIAGNOSIS — M51.36 DDD (DEGENERATIVE DISC DISEASE), LUMBAR: ICD-10-CM

## 2018-02-09 DIAGNOSIS — M47.817 SPONDYLOSIS OF LUMBOSACRAL REGION WITHOUT MYELOPATHY OR RADICULOPATHY: Primary | ICD-10-CM

## 2018-02-09 DIAGNOSIS — M48.061 SPINAL STENOSIS OF LUMBAR REGION WITHOUT NEUROGENIC CLAUDICATION: ICD-10-CM

## 2018-02-09 DIAGNOSIS — M47.819 FACET ARTHROPATHY: ICD-10-CM

## 2018-02-09 PROCEDURE — 99214 OFFICE O/P EST MOD 30 MIN: CPT | Mod: S$PBB,,, | Performed by: PHYSICIAN ASSISTANT

## 2018-02-09 PROCEDURE — 1125F AMNT PAIN NOTED PAIN PRSNT: CPT | Mod: ,,, | Performed by: PHYSICIAN ASSISTANT

## 2018-02-09 PROCEDURE — 99999 PR PBB SHADOW E&M-EST. PATIENT-LVL III: CPT | Mod: PBBFAC,,, | Performed by: PHYSICIAN ASSISTANT

## 2018-02-09 PROCEDURE — 99213 OFFICE O/P EST LOW 20 MIN: CPT | Performed by: PHYSICIAN ASSISTANT

## 2018-02-09 PROCEDURE — 1159F MED LIST DOCD IN RCRD: CPT | Mod: ,,, | Performed by: PHYSICIAN ASSISTANT

## 2018-02-09 RX ORDER — GABAPENTIN 800 MG/1
800 TABLET ORAL 3 TIMES DAILY
Qty: 90 TABLET | Refills: 1 | Status: SHIPPED | OUTPATIENT
Start: 2018-02-09 | End: 2018-03-20 | Stop reason: SDUPTHER

## 2018-02-09 NOTE — PROGRESS NOTES
Chief Pain Complaint:  Low Back Pain, Leg pain (left > right)     History of Present Illness:  This patient is a 72 y.o. male who presents today complaining of the above noted pain/s. The patient describes this pain as follows.    - duration of pain: pain for years   - timing: constant   - character: aching, burning  - radiating, dermatomal: extends into left leg, along L5 pattern at times, mostly non-radiating  - antecedent trauma, prior spinal surgery: none  - pertinent negatives: No fever, No chills, No weight loss, No bladder dysfunction, No bowel dysfunction, No extremity weakness, No saddle anesthesia  - pertinent positives: none    - medications, other therapies tried (physical therapy, injections):     >> gabapentin, Norco    >> Has previously undergone Physical Therapy, which made pain worse    >> Has previously undergone spinal injection/s: including lumbar MBB and Left TFESI with Dr Taylor in 2014 & 2015 (see EMR Surgeries for full details) with only short term relief      IMAGING / Labs / Studies (reviewed on 2/9/2018):    9/15/17 MRI LUMBAR SPINE WITHOUT CONTRAST  History: Lower back pain.  Left lower extremity pain  Technique: Standard multiplanar pulse sequences without IV contrast.  Comparison:  No prior  studies available for comparison.  Findings:   Mild-moderate scoliosis, convex to the left and centered at L3.  All lumbar vertebral bodies are normal in height.  Scattered degenerative endplate marrow signal identified at the L2-L3 and L3-L4 L5-S1 levels.  No fracture.  No suspicious osseous lesion is identified.  Mild retrolisthesis of L2-L3.  Distal spinal cord and conus medullaris have normal appearance but the conus terminates at the T12-L1 level.  T12-L1: Minimal central protrusion.  Negative for focal nerve root impingement or central canal narrowing.  Neural foramina widely patent.  L1-2: Mild disc height loss.  Prominent intraosseous eyes.  Mild disc bulging.  Mild facet arthropathy and  ligament flavum hypertrophy.  The central canal neural foramina patent.  L2-L3: Minimal retrolisthesis of L2 on L3.  There is moderate disc height loss.  Prominent anterior osteophytes.  Mild-moderate diffuse generalized disc bulging.  Mild facet arthropathy and ligament flavum hypertrophy.  The central canal is patent.  Neural foramina patent.  L3-L4: Disc desiccation and moderate disc height loss.  Large anterior osteophytes are present.  There is mild disc bulging and osteophytic ridging at the posterior disc margin.  Moderate right and mild left facet arthropathy and ligamentum flavum hypertrophy.  The central canal is patent.  Mild neural foraminal narrowing.  L4-L5: Very minimal grade 1 spondylolisthesis of L4-L5.  Mild generalized disc bulge is present.  Severe facet arthropathy and ligamentum flavum hypertrophy.  There is severe central canal and lateral recess stenosis.  Moderate neural foraminal stenosis.  L5-S1: Disc desiccation and moderate to severe disc height loss.  Prominent intraocular heights.  There is mild disc bulging and osteophytic ridging at the posterior disc margin.  Severe left and moderate right neural foraminal stenosis.  The central canal is patent.   Paravertebral soft tissues and musculature are normal.  The visualized intra-abdominal and intrapelvic contents are normal.       10/30/14 MRI Lumbar Spine Without Contrast    Narrative Exam: MRI of the lumbar spine without contrast.  History:     Low back pain. Status post SHEY, with S1-S2 radicular symptoms  Comparison: Prior study dated 04/02/13  Findings:     A convex right scoliosis again noted.  No compression fracture or subluxation.  The conus medullaris has a normal appearance.  The paraspinous soft tissues are unremarkable.  The T12 -- L1 and L1 -- 2 disk levels are essentially unremarkable.  L2 -- 3: Mild annular bulging again noted.    L3 -- 4: Moderate narrowing of the right lateral disk space, with right greater than left  "facet arthropathy, unchanged.    L4 -- 5: Marked bilateral facet arthropathy, with mild annular bulging, causing moderate central canal stenosis, unchanged.  L5 -- S1: Disk desiccation, with moderate disk space narrowing.  Significant degenerative narrowing of the left L5 neural foramen is apparent, unchanged.         Review of Systems:  CONSTITUTIONAL: patient denies any fever, chills, or weight loss  SKIN: patient denies any rash or itching  RESPIRATORY: patient denies having any shortness of breath  GASTROINTESTINAL: patient denies having any diarrhea, constipation, or bowel incontinence  GENITOURINARY: patient denies having any abnormal bladder function    MUSCULOSKELETAL:  - patient reports low back pain    NEUROLOGICAL:   - pain as above  - strength in Lower extremities is intact, BILATERALLY  - sensation in Lower extremities is intact, BILATERALLY  - patient denies any loss of bowel or bladder control      PSYCHIATRIC: patient denies any suicidal or homicidal ideations      Physical Exam:  Vitals:  /65 (BP Location: Right arm, Patient Position: Sitting)   Pulse 64   Resp 16   Ht 5' 8" (1.727 m)   Wt 72.6 kg (160 lb)   BMI 24.33 kg/m²    (reviewed on 2/9/2018)    General: alert and oriented, in no apparent distress  Gait: normal gait  Skin: No rashes, No discoloration, No obvious lesions  HEENT: EOMI  Respiratory: respirations nonlabored    Musculoskeletal:  - Any pain on flexion, extension, rotation:     >> pain on flexion of lumbar spine    >> mild pain with extension/rotation  - Straight Leg Raise:     >> negative on the LEFT    >> negative on the RIGHT  - Any tenderness to palpation across paraspinal muscles, joints, bursae:     >> across lumbar paraspinals     Neuro:  - Lower Extremity Strength:     >> 5/5 throughout on the LEFT    >> 5/5 throughout on the RIGHT  - Lower Extremity Reflexes:    >> 2+ on the LEFT    >> 2+ on the RIGHT  - Sensory (sensation to light touch):    >> intact on the " LEFT    >> intact on the RIGHT     Psych:  Mood and affect appropriate          Assessment:  Lumbar Spondylosis  Lumbar radiculopathy  Lumbar DDD  Chronic Pain Syndrome      Plan:  Patient returns for follow-up.  He complains of chronic low back pain. Lumbar MRI shows advanced diffuse spondylosis, greatest at the L4-L5 level with severe central canal stenosis and lateral recess stenosis.    - Refill Percocet 7.5mg TID PRN and gabapentin 800mg TID x 1 month. He has been stable on this, doing better than previously on [facility]. He will see Dr. Mueller on 3-20-18 to establish care. We may have to send in short-term Rx in the meantime if needed.   - We discussed trying an injection for additional pain relief and also physical therapy, but he wants to hold off. He feels injections weren't long lasting in the past. We will consider this if pain not controlled with medication.  - LA  appropriate, last filled on 1-11-18 from Dr. Branch.  - UDS results from last visit are not in system. Will check these once uploaded.   - I discussed the risks, benefits, and alternatives to potential treatment options. All questions and concerns were fully addressed today in clinic. Dr. Cabrera consulted regarding the patient plan and agrees.            >> Pain Disability Index:  2/28/2017 :: 18  5/26/2017 :: 16  8/18/2017 :: 26  12/12/2017 :: 22    >> UDS:  6-28-16 :: appropriate  2/28/2017 :: appropriate  8/18/2017 :: not in EMR  12/12/2017 :: not uploaded into EMR yet

## 2018-02-12 RX ORDER — OXYCODONE AND ACETAMINOPHEN 7.5; 325 MG/1; MG/1
1 TABLET ORAL 3 TIMES DAILY PRN
Qty: 90 TABLET | Refills: 0 | Status: SHIPPED | OUTPATIENT
Start: 2018-02-12 | End: 2018-03-09 | Stop reason: SDUPTHER

## 2018-02-14 NOTE — TELEPHONE ENCOUNTER
Documentation only:    Prior authorization for Repatha on file through 03/22/2018.    Patient co-pay: $80.00

## 2018-02-21 NOTE — TELEPHONE ENCOUNTER
Called  to inform patient of [employer] Hicksmaulik approval through insurance - $80 copay. He states that this would not be affordable. He was informed of [facility] [employer], and is interested in applying with [employer]'s assistance. He will look for his tax documents/SSI award letter. [employer] FA team will reach out to patient to assist in filling out the application. TTN    Previously filled with [facility] - [facility] DAS 2/14/17 - been off for almost a year. Would like to proceed with [employer].

## 2018-03-07 NOTE — TELEPHONE ENCOUNTER
FOR DOCUMENTATION ONLY:  Financial Assistance for Repatha approved from 3/6/18 to 12/31/18  Source: [employer] [facility]  Case ID: [id number]  Phone: [phone]  Fax: [phone]

## 2018-03-07 NOTE — TELEPHONE ENCOUNTER
Patient notified of Repatha assistance approval. He will call to schedule delivery. Made patient aware to call [employer] or MD if renewal assistance is needed. Patient verbalized understanding.

## 2018-03-09 RX ORDER — OXYCODONE AND ACETAMINOPHEN 7.5; 325 MG/1; MG/1
1 TABLET ORAL 3 TIMES DAILY PRN
Qty: 30 TABLET | Refills: 0 | Status: SHIPPED | OUTPATIENT
Start: 2018-03-11 | End: 2018-03-20 | Stop reason: SDUPTHER

## 2018-03-20 ENCOUNTER — OFFICE VISIT (OUTPATIENT)
Dept: PAIN MEDICINE | Facility: CLINIC | Age: 73
End: 2018-03-20
Payer: MEDICARE

## 2018-03-20 VITALS
HEART RATE: 73 BPM | WEIGHT: 160 LBS | BODY MASS INDEX: 24.25 KG/M2 | HEIGHT: 68 IN | RESPIRATION RATE: 16 BRPM | DIASTOLIC BLOOD PRESSURE: 70 MMHG | SYSTOLIC BLOOD PRESSURE: 133 MMHG

## 2018-03-20 DIAGNOSIS — M47.816 LUMBAR SPONDYLOSIS: Primary | ICD-10-CM

## 2018-03-20 DIAGNOSIS — M51.36 DDD (DEGENERATIVE DISC DISEASE), LUMBAR: ICD-10-CM

## 2018-03-20 DIAGNOSIS — M53.3 SACROILIAC JOINT PAIN: ICD-10-CM

## 2018-03-20 DIAGNOSIS — M47.816 LUMBAR FACET ARTHROPATHY: ICD-10-CM

## 2018-03-20 DIAGNOSIS — G89.4 CHRONIC PAIN DISORDER: ICD-10-CM

## 2018-03-20 PROCEDURE — 99214 OFFICE O/P EST MOD 30 MIN: CPT | Mod: S$PBB,,, | Performed by: ANESTHESIOLOGY

## 2018-03-20 PROCEDURE — 99213 OFFICE O/P EST LOW 20 MIN: CPT | Mod: PBBFAC,PO | Performed by: ANESTHESIOLOGY

## 2018-03-20 PROCEDURE — 99999 PR PBB SHADOW E&M-EST. PATIENT-LVL III: CPT | Mod: PBBFAC,,, | Performed by: ANESTHESIOLOGY

## 2018-03-20 RX ORDER — GABAPENTIN 800 MG/1
800 TABLET ORAL 3 TIMES DAILY
Qty: 90 TABLET | Refills: 1 | Status: SHIPPED | OUTPATIENT
Start: 2018-03-20 | End: 2018-05-16 | Stop reason: SDUPTHER

## 2018-03-20 RX ORDER — OXYCODONE AND ACETAMINOPHEN 7.5; 325 MG/1; MG/1
1 TABLET ORAL EVERY 8 HOURS PRN
Qty: 90 TABLET | Refills: 0 | Status: SHIPPED | OUTPATIENT
Start: 2018-04-20 | End: 2018-05-14 | Stop reason: SDUPTHER

## 2018-03-20 RX ORDER — OXYCODONE AND ACETAMINOPHEN 7.5; 325 MG/1; MG/1
1 TABLET ORAL EVERY 8 HOURS PRN
Qty: 90 TABLET | Refills: 0 | Status: SHIPPED | OUTPATIENT
Start: 2018-03-20 | End: 2018-03-20 | Stop reason: SDUPTHER

## 2018-03-20 NOTE — PROGRESS NOTES
Chief Pain Complaint:  Low Back Pain, Leg pain (left > right)     History of Present Illness:  This patient is a 72 y.o. male who presents today complaining of the above noted pain/s. The patient describes this pain as follows.    - duration of pain: pain for years   - timing: constant   - character: aching, burning  - radiating, dermatomal: extends into left leg, along L5 pattern at times, mostly non-radiating  - antecedent trauma, prior spinal surgery: none  - pertinent negatives: No fever, No chills, No weight loss, No bladder dysfunction, No bowel dysfunction, No extremity weakness, No saddle anesthesia  - pertinent positives: none    - medications, other therapies tried (physical therapy, injections):     >> gabapentin, Norco    >> Has previously undergone Physical Therapy, which made pain worse    >> Has previously undergone spinal injection/s: including lumbar MBB and Left TFESI with Dr Taylor in 2014 & 2015 (see EMR Surgeries for full details) with only short term relief      IMAGING / Labs / Studies (reviewed on 3/20/2018):    9/15/17 MRI LUMBAR SPINE WITHOUT CONTRAST  History: Lower back pain.  Left lower extremity pain  Technique: Standard multiplanar pulse sequences without IV contrast.  Comparison:  No prior  studies available for comparison.  Findings:   Mild-moderate scoliosis, convex to the left and centered at L3.  All lumbar vertebral bodies are normal in height.  Scattered degenerative endplate marrow signal identified at the L2-L3 and L3-L4 L5-S1 levels.  No fracture.  No suspicious osseous lesion is identified.  Mild retrolisthesis of L2-L3.  Distal spinal cord and conus medullaris have normal appearance but the conus terminates at the T12-L1 level.  T12-L1: Minimal central protrusion.  Negative for focal nerve root impingement or central canal narrowing.  Neural foramina widely patent.  L1-2: Mild disc height loss.  Prominent intraosseous eyes.  Mild disc bulging.  Mild facet arthropathy and  ligament flavum hypertrophy.  The central canal neural foramina patent.  L2-L3: Minimal retrolisthesis of L2 on L3.  There is moderate disc height loss.  Prominent anterior osteophytes.  Mild-moderate diffuse generalized disc bulging.  Mild facet arthropathy and ligament flavum hypertrophy.  The central canal is patent.  Neural foramina patent.  L3-L4: Disc desiccation and moderate disc height loss.  Large anterior osteophytes are present.  There is mild disc bulging and osteophytic ridging at the posterior disc margin.  Moderate right and mild left facet arthropathy and ligamentum flavum hypertrophy.  The central canal is patent.  Mild neural foraminal narrowing.  L4-L5: Very minimal grade 1 spondylolisthesis of L4-L5.  Mild generalized disc bulge is present.  Severe facet arthropathy and ligamentum flavum hypertrophy.  There is severe central canal and lateral recess stenosis.  Moderate neural foraminal stenosis.  L5-S1: Disc desiccation and moderate to severe disc height loss.  Prominent intraocular heights.  There is mild disc bulging and osteophytic ridging at the posterior disc margin.  Severe left and moderate right neural foraminal stenosis.  The central canal is patent.   Paravertebral soft tissues and musculature are normal.  The visualized intra-abdominal and intrapelvic contents are normal.       10/30/14 MRI Lumbar Spine Without Contrast    Narrative Exam: MRI of the lumbar spine without contrast.  History:     Low back pain. Status post SHEY, with S1-S2 radicular symptoms  Comparison: Prior study dated 04/02/13  Findings:     A convex right scoliosis again noted.  No compression fracture or subluxation.  The conus medullaris has a normal appearance.  The paraspinous soft tissues are unremarkable.  The T12 -- L1 and L1 -- 2 disk levels are essentially unremarkable.  L2 -- 3: Mild annular bulging again noted.    L3 -- 4: Moderate narrowing of the right lateral disk space, with right greater than left  "facet arthropathy, unchanged.    L4 -- 5: Marked bilateral facet arthropathy, with mild annular bulging, causing moderate central canal stenosis, unchanged.  L5 -- S1: Disk desiccation, with moderate disk space narrowing.  Significant degenerative narrowing of the left L5 neural foramen is apparent, unchanged.         Review of Systems:  CONSTITUTIONAL: patient denies any fever, chills, or weight loss  SKIN: patient denies any rash or itching  RESPIRATORY: patient denies having any shortness of breath  GASTROINTESTINAL: patient denies having any diarrhea, constipation, or bowel incontinence  GENITOURINARY: patient denies having any abnormal bladder function    MUSCULOSKELETAL:  - patient reports low back pain    NEUROLOGICAL:   - pain as above  - strength in Lower extremities is intact, BILATERALLY  - sensation in Lower extremities is intact, BILATERALLY  - patient denies any loss of bowel or bladder control      PSYCHIATRIC: patient denies any suicidal or homicidal ideations    Physical Exam:  /70 (BP Location: Right arm, Patient Position: Sitting, BP Method: Medium (Automatic))   Pulse 73   Resp 16   Ht 5' 8" (1.727 m)   Wt 72.6 kg (160 lb)   BMI 24.33 kg/m²  (reviewed on 3/20/2018)  General: Alert and oriented, in no apparent distress.  Gait: normal gait.  Skin: No rashes, No discoloration, No obvious lesions  HEENT: Normocephalic, atraumatic. Pupils equal and round.  Cardiovascular: Regular rate and rhythm , no significant peripheral edema present  Respiratory: Without audible wheezing, without use of accessory muscles of respiration.    Musculoskeletal:    Lumbar Spine    - Pain on flexion of lumbar spine Absent  - Straight Leg Raise:  Absent    - Pain on extension of lumbar spine Present   - TTP over the lumbar facet joints Present  Bilateral L5-S1   - Lumbar facet loading Present     -Pain on palpation over the SI joint  Present on left   - NOELLE: Present      Neuro:    Strength:  LE R/L: HF: 5/5, " HE: 5/5, KF: 5/5; KE: 5/5; FE: 5/5; FF: 5/5    Extremity Reflexes: Brisk and symmetric throughout.      Extremity Sensory: Sensation to pinprick and temperature symmetric. Proprioception intact.      Psych:  Mood and affect is appropriate      Assessment:    Noble Tripp is a 72 y.o. year old male who is presenting with     Encounter Diagnosis   Name Primary?    DDD (degenerative disc disease), lumbar      Patient returns for follow-up.  He complains of chronic low back pain. Lumbar MRI shows advanced diffuse spondylosis, greatest at the L4-L5 level with severe central canal stenosis and lateral recess stenosis. We discussed trying an injection for additional pain relief and also physical therapy, but he wants to hold off. He feels injections weren't long lasting in the past. We will consider this if pain not controlled with medication.    Plan:    1. Interventional: None for now. Consider Bilateral L3, L4, L5 MBB with local.     2. Pharmacologic: Continue Percocet 7.5/325 mg PO TID PRN (90tabs) x 1 refill. Continue Gabapentin 800mg TID (90 tabs) x 1 refill.     3. Rehabilitative: Encouraged Pt.     4. Diagnostic: None for now.     5. Follow up: Follow-up in about 8 weeks (around 5/15/2018).    >> Pain Disability Index:  2/28/2017 :: 18  5/26/2017 :: 16  8/18/2017 :: 26  12/12/2017 :: 22    >> UDS:  6-28-16 :: appropriate  2/28/2017 :: appropriate  8/18/2017 :: not in EMR    20 minutes were spent in this encounter with more than 50% of the time used for counseling and review of the plan.  Imaging / studies reviewed, detailed above.  I discussed in detail the risks, benefits, and alternatives to any and all potential treatment options.  All questions and concerns were fully addressed today in clinic. Medical decision making moderate.    Thank you for the opportunity to assist in the care of this patient.    Best wishes,    Signed:    Yves Mueller MD          Disclaimer:  This note may have been prepared  using voice recognition software, it may have not been extensively proofed, as such there could be errors within the text such as sound alike errors.

## 2018-04-18 ENCOUNTER — LAB VISIT (OUTPATIENT)
Dept: LAB | Facility: HOSPITAL | Age: 73
End: 2018-04-18
Attending: INTERNAL MEDICINE
Payer: MEDICARE

## 2018-04-18 DIAGNOSIS — D50.0 IRON DEFICIENCY ANEMIA DUE TO CHRONIC BLOOD LOSS: ICD-10-CM

## 2018-04-18 DIAGNOSIS — Z85.89 HISTORY OF HEAD AND NECK CANCER: ICD-10-CM

## 2018-04-18 LAB
ALBUMIN SERPL BCP-MCNC: 3.3 G/DL
ALP SERPL-CCNC: 64 U/L
ALT SERPL W/O P-5'-P-CCNC: 8 U/L
ANION GAP SERPL CALC-SCNC: 8 MMOL/L
AST SERPL-CCNC: 12 U/L
BASOPHILS # BLD AUTO: 0.31 K/UL
BASOPHILS NFR BLD: 3.2 %
BILIRUB SERPL-MCNC: 0.3 MG/DL
BUN SERPL-MCNC: 15 MG/DL
CALCIUM SERPL-MCNC: 9 MG/DL
CHLORIDE SERPL-SCNC: 106 MMOL/L
CO2 SERPL-SCNC: 28 MMOL/L
CREAT SERPL-MCNC: 0.8 MG/DL
CRP SERPL-MCNC: 18.8 MG/L
DIFFERENTIAL METHOD: ABNORMAL
EOSINOPHIL # BLD AUTO: 0.6 K/UL
EOSINOPHIL NFR BLD: 5.8 %
ERYTHROCYTE [DISTWIDTH] IN BLOOD BY AUTOMATED COUNT: 17.4 %
EST. GFR  (AFRICAN AMERICAN): >60 ML/MIN/1.73 M^2
EST. GFR  (NON AFRICAN AMERICAN): >60 ML/MIN/1.73 M^2
FERRITIN SERPL-MCNC: 74 NG/ML
GLUCOSE SERPL-MCNC: 112 MG/DL
HCT VFR BLD AUTO: 37.9 %
HGB BLD-MCNC: 12.3 G/DL
IRON SERPL-MCNC: 43 UG/DL
LYMPHOCYTES # BLD AUTO: 1.5 K/UL
LYMPHOCYTES NFR BLD: 15.5 %
MCH RBC QN AUTO: 27.6 PG
MCHC RBC AUTO-ENTMCNC: 32.5 G/DL
MCV RBC AUTO: 85 FL
MONOCYTES # BLD AUTO: 0.7 K/UL
MONOCYTES NFR BLD: 7.3 %
NEUTROPHILS # BLD AUTO: 6.6 K/UL
NEUTROPHILS NFR BLD: 68.2 %
PLATELET # BLD AUTO: 333 K/UL
PMV BLD AUTO: 9.8 FL
POTASSIUM SERPL-SCNC: 4 MMOL/L
PROT SERPL-MCNC: 7.2 G/DL
RBC # BLD AUTO: 4.46 M/UL
SATURATED IRON: 16 %
SODIUM SERPL-SCNC: 142 MMOL/L
TOTAL IRON BINDING CAPACITY: 266 UG/DL
TRANSFERRIN SERPL-MCNC: 180 MG/DL
WBC # BLD AUTO: 9.66 K/UL

## 2018-04-18 PROCEDURE — 36415 COLL VENOUS BLD VENIPUNCTURE: CPT

## 2018-04-18 PROCEDURE — 85025 COMPLETE CBC W/AUTO DIFF WBC: CPT

## 2018-04-18 PROCEDURE — 82728 ASSAY OF FERRITIN: CPT

## 2018-04-18 PROCEDURE — 83540 ASSAY OF IRON: CPT

## 2018-04-18 PROCEDURE — 86140 C-REACTIVE PROTEIN: CPT

## 2018-04-18 PROCEDURE — 80053 COMPREHEN METABOLIC PANEL: CPT

## 2018-04-20 ENCOUNTER — OFFICE VISIT (OUTPATIENT)
Dept: HEMATOLOGY/ONCOLOGY | Facility: CLINIC | Age: 73
End: 2018-04-20
Payer: MEDICARE

## 2018-04-20 VITALS
BODY MASS INDEX: 25.96 KG/M2 | HEART RATE: 67 BPM | OXYGEN SATURATION: 95 % | TEMPERATURE: 98 F | WEIGHT: 171.31 LBS | HEIGHT: 68 IN | DIASTOLIC BLOOD PRESSURE: 61 MMHG | SYSTOLIC BLOOD PRESSURE: 121 MMHG

## 2018-04-20 DIAGNOSIS — D50.0 IRON DEFICIENCY ANEMIA DUE TO CHRONIC BLOOD LOSS: Primary | ICD-10-CM

## 2018-04-20 DIAGNOSIS — Z85.89 HISTORY OF HEAD AND NECK CANCER: ICD-10-CM

## 2018-04-20 PROCEDURE — 99214 OFFICE O/P EST MOD 30 MIN: CPT | Mod: S$PBB,,, | Performed by: INTERNAL MEDICINE

## 2018-04-20 PROCEDURE — 99999 PR PBB SHADOW E&M-EST. PATIENT-LVL IV: CPT | Mod: PBBFAC,,, | Performed by: INTERNAL MEDICINE

## 2018-04-20 PROCEDURE — 99214 OFFICE O/P EST MOD 30 MIN: CPT | Mod: PBBFAC | Performed by: INTERNAL MEDICINE

## 2018-04-23 RX ORDER — PILOCARPINE HYDROCHLORIDE 5 MG/1
TABLET, FILM COATED ORAL
Qty: 90 TABLET | Refills: 2 | Status: SHIPPED | OUTPATIENT
Start: 2018-04-23 | End: 2018-09-23 | Stop reason: SDUPTHER

## 2018-04-25 NOTE — PROGRESS NOTES
Reason for visit: Follow-up for iron deficiency anemia and history of head and neck cancer    HPI:   The patient is a 72-year-old  male who presents to the hematology oncology clinic today for follow-up for iron deficiency anemia and history of head and neck cancer.  The patient is followed in the outpatient hematology oncology clinic by Dr. Hussein Coyle.  I have reviewed all of the patient's relevant clinical history available in the medical record and have utilized this in my evaluation and management recommendations today.  Today the patient reports that he feels well and has no specific complaints.  He reports that he is up-to-date with follow-up with his ENT physician Dr. Varela at [facility].  He denies any fevers, chills or night sweats.  He denies any loss of appetite or unintentional weight loss.  He denies any chest pain or shortness of breath.  He denies any melena, hematochezia, hematemesis, hemoptysis or hematuria.  He denies any bowel or urinary complains.  He denies any nausea, vomiting or abdominal pain.    PAST MEDICAL HISTORY/SURGICAL HISTORY/FAMILY HISTORY/SOCIAL HISTORY: Reviewed and updated from Dr. Coyle's prior clinic notes.    ALLERGIES: Reviewed on medication card.    MEDICATIONS: [Medcard has been reviewed and/or reconciled.]    REVIEW OF SYSTEMS:   GENERAL: [No fevers, chills or sweats. No fatigue, weight loss or loss of appetite.]  HEENT: [No blurred vision, tinnitus, nasal discharge, sorethroat or dysphagia.]  HEART: [No chest pain, palpitations or shortness of breath.]    LUNGS: [No cough, hemoptysis or breathing problems.]  ABDOMEN: [No abdominal pain, nausea, vomiting, diarrhea, constipation or melena.]  GENITOURINARY: [No dysuria, bleeding or malodorous discharge.]  NEURO: [No headache, dizziness or vertigo.]  HEMATOLOGY: [No easy bruising, spontaneous bleeding or blood clots in the past].  MUSCULOSKELETAL: [No arthralgias, myalgias or bone pains.]  SKIN: [No rashes or skin  lesions.]  PSYCHIATRY: [No depression or anxiety.]    PHYSICAL EXAMINATION:   VS: Reviewed on nurse's notes.  APPEARANCE: The patient is a well-developed, well-nourished and well-groomed  male who appears in no acute distress.  He was accompanied to this clinic visit by his wife.  HEENT: No scleral icterus. Both external auditory canals clear. No oral ulcers, lesions. Throat clear  HEAD: No sinus tenderness.  NECK: Supple. No palpable lymphadenopathy.   CHEST: Breath sounds clear bilaterally. No rales. No rhonchi. Unlabored respirations.  CARDIOVASCULAR: Normal S1, S2. Normal rate. Regular rhythm.  ABDOMEN: Bowel sounds normal. No tenderness. No abdominal distention. No hepatomegaly. No splenomegaly.  LYMPHATIC: No palpable supraclavicular, axillary nodes  EXTREMITIES: No clubbing, cyanosis, edema  SKIN: No lesions. No petechiae. No ecchymoses. No induration or nodules  NEUROLOGIC: No focal findings. Alert & Oriented x 3. Mood appropriate to affect    LABS:   Reviewed    IMAGING:  Reviewed    IMPRESSION:  1.  Iron deficiency anemia  2.  History of head and neck cancer    PLAN:  1.  I had a detailed discussion with the patient today with regard to his current clinical situation.  Review of the patient's lab results from April 2018 shows stable iron levels and stable hemoglobin/hematocrit.  He will continue with ferrous sulfate 325 mg by mouth once daily.  He reports that he is tolerating this medication well without any significant side effects.  2.  He will continue follow-up with Dr. aVrela as scheduled for his history of head and neck cancer to continue surveillance.  3.  He knows to seek immediate medical attention for any signs/symptoms of increased bleeding.  4.  GI consult to discuss endoscopic evaluation for iron deficiency.  The rationale for this was discussed in detail with the patient and his wife and they expressed understanding and agreement with this recommendation.  Appointment will be  scheduled.    Return to clinic in 3 months with labs 2 days before clinic visit to follow-up.  He knows to call sooner for any new problems or questions.    Jaxson Perry MD

## 2018-04-25 NOTE — PROGRESS NOTES
Subjective:       Patient ID: Noble Tripp is a 72 y.o. male.    Chief Complaint: Anemia    HPI   The patient has been referred for iron deficiency anemia. This is a chronic problem and is followed by Dr. Coyle. Iron studies suggest a mixed picture. He had a colonoscopy 05/2017 which showed hemorrhoids and diverticulosis. No prior EGD on file here. Last Hgb 12.3 which is a slight improvement. The patient denies NSAID use. He has dysphagia with thin liquids and did speech therapy for awhile. He is supposed to use a thickener but doesn't. He has been taking a daily iron supplement for years, but recently increased it to two. He takes Protonix 40 mg daily. He denies nausea, vomiting, abdominal pain, hematochezia or melena.     Review of Systems    As per HPI.     Objective:      Physical Exam   Constitutional: He is oriented to person, place, and time. He appears well-developed and well-nourished. No distress.   HENT:   Head: Normocephalic and atraumatic.   Eyes: EOM are normal.   Cardiovascular: Normal rate and regular rhythm.    Pulmonary/Chest: Effort normal and breath sounds normal. No respiratory distress. He has no wheezes.   Abdominal: Soft. Bowel sounds are normal. He exhibits no distension. There is no tenderness.   Neurological: He is alert and oriented to person, place, and time. No cranial nerve deficit.   Skin: He is not diaphoretic.   Psychiatric: His behavior is normal.       Assessment:       1. Iron deficiency anemia, unspecified iron deficiency anemia type        Plan:       No significant change in Hgb since his recent colonoscopy. Therefore, I would recommend only an EGD. If negative, could consider VCE. Will defer to Hematology. Follow with them as scheduled.   EGD -  I have explained the risks, benefits, and alternatives of the procedure(s) in detail. The patient voices understanding and all questions have been answered. The patient agrees to proceed as planned.     Follow-up if symptoms  worsen or fail to improve.    Thank you for the opportunity to participate in the care of this patient.   Shawn Virgen PA-C.

## 2018-04-26 ENCOUNTER — INITIAL CONSULT (OUTPATIENT)
Dept: GASTROENTEROLOGY | Facility: CLINIC | Age: 73
End: 2018-04-26
Payer: MEDICARE

## 2018-04-26 VITALS
WEIGHT: 170 LBS | HEIGHT: 68 IN | HEART RATE: 77 BPM | BODY MASS INDEX: 25.76 KG/M2 | DIASTOLIC BLOOD PRESSURE: 69 MMHG | SYSTOLIC BLOOD PRESSURE: 124 MMHG

## 2018-04-26 DIAGNOSIS — D50.9 IRON DEFICIENCY ANEMIA, UNSPECIFIED IRON DEFICIENCY ANEMIA TYPE: Primary | ICD-10-CM

## 2018-04-26 PROCEDURE — 99999 PR PBB SHADOW E&M-EST. PATIENT-LVL IV: CPT | Mod: PBBFAC,,, | Performed by: PHYSICIAN ASSISTANT

## 2018-04-26 PROCEDURE — 99214 OFFICE O/P EST MOD 30 MIN: CPT | Mod: S$PBB,,, | Performed by: PHYSICIAN ASSISTANT

## 2018-04-26 PROCEDURE — 99214 OFFICE O/P EST MOD 30 MIN: CPT | Mod: PBBFAC | Performed by: PHYSICIAN ASSISTANT

## 2018-04-26 NOTE — LETTER
April 30, 2018      Jaxson Perry MD  [address] [facility] [address]           [facility]  [address] [place], [place] [address]on [facility] [address]  Phone: [phone]  Fax: [phone]          Patient: Noble Tripp   MR Number: [id number]   YOB: 1945   Date of Visit: 4/26/2018       Dear Dr. Jaxson Perry:    Thank you for referring Noble Tripp to me for evaluation. Attached you will find relevant portions of my assessment and plan of care.    If you have questions, please do not hesitate to call me. I look forward to following Noble Tripp along with you.    Sincerely,    Shawn Virgen PA-C    Enclosure  CC:  No Recipients    If you would like to receive this communication electronically, please contact [email or web address] or [phone] to request more information on [email or web address] Link access.    For providers and/or their staff who would like to refer a patient to [facility], please contact us through our one-stop-shop provider referral line, [facility] , at [phone].    If you feel you have received this communication in error or would no longer like to receive these types of communications, please e-mail [email or web address]

## 2018-05-14 RX ORDER — OXYCODONE AND ACETAMINOPHEN 7.5; 325 MG/1; MG/1
1 TABLET ORAL EVERY 8 HOURS PRN
Qty: 90 TABLET | Refills: 0 | Status: SHIPPED | OUTPATIENT
Start: 2018-05-21 | End: 2018-05-16 | Stop reason: SDUPTHER

## 2018-05-14 RX ORDER — OXYCODONE AND ACETAMINOPHEN 7.5; 325 MG/1; MG/1
1 TABLET ORAL EVERY 8 HOURS PRN
Qty: 90 TABLET | Refills: 0 | Status: SHIPPED | OUTPATIENT
Start: 2018-06-16 | End: 2018-05-16 | Stop reason: SDUPTHER

## 2018-05-14 NOTE — TELEPHONE ENCOUNTER
Patient's prescriptions were printed and signed by Dr. Mueller prior to the patient's clinic appointment.

## 2018-05-16 ENCOUNTER — OFFICE VISIT (OUTPATIENT)
Dept: PAIN MEDICINE | Facility: CLINIC | Age: 73
End: 2018-05-16
Payer: MEDICARE

## 2018-05-16 VITALS
BODY MASS INDEX: 25.46 KG/M2 | HEART RATE: 67 BPM | SYSTOLIC BLOOD PRESSURE: 137 MMHG | DIASTOLIC BLOOD PRESSURE: 66 MMHG | RESPIRATION RATE: 16 BRPM | WEIGHT: 168 LBS | HEIGHT: 68 IN

## 2018-05-16 DIAGNOSIS — M47.816 LUMBAR SPONDYLOSIS: Primary | ICD-10-CM

## 2018-05-16 DIAGNOSIS — M47.819 FACET ARTHROPATHY: ICD-10-CM

## 2018-05-16 DIAGNOSIS — M53.3 SACROILIAC JOINT PAIN: ICD-10-CM

## 2018-05-16 DIAGNOSIS — M48.061 SPINAL STENOSIS OF LUMBAR REGION WITHOUT NEUROGENIC CLAUDICATION: ICD-10-CM

## 2018-05-16 DIAGNOSIS — M47.817 SPONDYLOSIS OF LUMBOSACRAL REGION WITHOUT MYELOPATHY OR RADICULOPATHY: ICD-10-CM

## 2018-05-16 DIAGNOSIS — M51.36 DDD (DEGENERATIVE DISC DISEASE), LUMBAR: ICD-10-CM

## 2018-05-16 DIAGNOSIS — M47.816 LUMBAR FACET ARTHROPATHY: ICD-10-CM

## 2018-05-16 PROCEDURE — 99999 PR PBB SHADOW E&M-EST. PATIENT-LVL IV: CPT | Mod: PBBFAC,,, | Performed by: PHYSICIAN ASSISTANT

## 2018-05-16 PROCEDURE — 99214 OFFICE O/P EST MOD 30 MIN: CPT | Mod: PBBFAC | Performed by: PHYSICIAN ASSISTANT

## 2018-05-16 PROCEDURE — 99214 OFFICE O/P EST MOD 30 MIN: CPT | Mod: S$PBB,,, | Performed by: PHYSICIAN ASSISTANT

## 2018-05-16 RX ORDER — OXYCODONE AND ACETAMINOPHEN 7.5; 325 MG/1; MG/1
1 TABLET ORAL EVERY 8 HOURS PRN
Qty: 90 TABLET | Refills: 0 | Status: ON HOLD | OUTPATIENT
Start: 2018-06-16 | End: 2018-05-29 | Stop reason: HOSPADM

## 2018-05-16 RX ORDER — OXYCODONE AND ACETAMINOPHEN 7.5; 325 MG/1; MG/1
1 TABLET ORAL EVERY 8 HOURS PRN
Qty: 90 TABLET | Refills: 0 | Status: SHIPPED | OUTPATIENT
Start: 2018-05-21 | End: 2018-06-20

## 2018-05-16 RX ORDER — GABAPENTIN 800 MG/1
800 TABLET ORAL 3 TIMES DAILY
Qty: 90 TABLET | Refills: 1 | Status: SHIPPED | OUTPATIENT
Start: 2018-05-16 | End: 2018-09-11 | Stop reason: SDUPTHER

## 2018-05-16 NOTE — PROGRESS NOTES
Chief Pain Complaint:  Low Back Pain, Leg pain (left > right)       History of Present Illness:  This patient is a 72 y.o. male who presents today complaining of the above noted pain/s. The patient describes this pain as follows.    - duration of pain: pain for years   - timing: constant   - character: aching, burning  - radiating, dermatomal: extends into left leg, along L5 pattern at times, mostly non-radiating  - antecedent trauma, prior spinal surgery: none  - pertinent negatives: No fever, No chills, No weight loss, No bladder dysfunction, No bowel dysfunction, No extremity weakness, No saddle anesthesia  - pertinent positives: none    - medications, other therapies tried (physical therapy, injections):     >> gabapentin, Norco    >> Has previously undergone Physical Therapy, which made pain worse    >> Has previously undergone spinal injection/s: including lumbar MBB and Left TFESI with Dr Taylor in 2014 & 2015 (see EMR Surgeries for full details) with only short term relief      IMAGING / Labs / Studies (reviewed on 5/16/2018):    9/15/17 MRI LUMBAR SPINE WITHOUT CONTRAST  History: Lower back pain.  Left lower extremity pain  Technique: Standard multiplanar pulse sequences without IV contrast.  Comparison:  No prior  studies available for comparison.  Findings:   Mild-moderate scoliosis, convex to the left and centered at L3.  All lumbar vertebral bodies are normal in height.  Scattered degenerative endplate marrow signal identified at the L2-L3 and L3-L4 L5-S1 levels.  No fracture.  No suspicious osseous lesion is identified.  Mild retrolisthesis of L2-L3.  Distal spinal cord and conus medullaris have normal appearance but the conus terminates at the T12-L1 level.  T12-L1: Minimal central protrusion.  Negative for focal nerve root impingement or central canal narrowing.  Neural foramina widely patent.  L1-2: Mild disc height loss.  Prominent intraosseous eyes.  Mild disc bulging.  Mild facet arthropathy and  ligament flavum hypertrophy.  The central canal neural foramina patent.  L2-L3: Minimal retrolisthesis of L2 on L3.  There is moderate disc height loss.  Prominent anterior osteophytes.  Mild-moderate diffuse generalized disc bulging.  Mild facet arthropathy and ligament flavum hypertrophy.  The central canal is patent.  Neural foramina patent.  L3-L4: Disc desiccation and moderate disc height loss.  Large anterior osteophytes are present.  There is mild disc bulging and osteophytic ridging at the posterior disc margin.  Moderate right and mild left facet arthropathy and ligamentum flavum hypertrophy.  The central canal is patent.  Mild neural foraminal narrowing.  L4-L5: Very minimal grade 1 spondylolisthesis of L4-L5.  Mild generalized disc bulge is present.  Severe facet arthropathy and ligamentum flavum hypertrophy.  There is severe central canal and lateral recess stenosis.  Moderate neural foraminal stenosis.  L5-S1: Disc desiccation and moderate to severe disc height loss.  Prominent intraocular heights.  There is mild disc bulging and osteophytic ridging at the posterior disc margin.  Severe left and moderate right neural foraminal stenosis.  The central canal is patent.   Paravertebral soft tissues and musculature are normal.  The visualized intra-abdominal and intrapelvic contents are normal.       10/30/14 MRI Lumbar Spine Without Contrast    Narrative Exam: MRI of the lumbar spine without contrast.  History:     Low back pain. Status post SHEY, with S1-S2 radicular symptoms  Comparison: Prior study dated 04/02/13  Findings:     A convex right scoliosis again noted.  No compression fracture or subluxation.  The conus medullaris has a normal appearance.  The paraspinous soft tissues are unremarkable.  The T12 -- L1 and L1 -- 2 disk levels are essentially unremarkable.  L2 -- 3: Mild annular bulging again noted.    L3 -- 4: Moderate narrowing of the right lateral disk space, with right greater than left  "facet arthropathy, unchanged.    L4 -- 5: Marked bilateral facet arthropathy, with mild annular bulging, causing moderate central canal stenosis, unchanged.  L5 -- S1: Disk desiccation, with moderate disk space narrowing.  Significant degenerative narrowing of the left L5 neural foramen is apparent, unchanged.         Review of Systems:  CONSTITUTIONAL: patient denies any fever, chills, or weight loss  SKIN: patient denies any rash or itching  RESPIRATORY: patient denies having any shortness of breath  GASTROINTESTINAL: patient denies having any diarrhea, constipation, or bowel incontinence  GENITOURINARY: patient denies having any abnormal bladder function    MUSCULOSKELETAL:  - patient reports low back pain    NEUROLOGICAL:   - pain as above  - strength in Lower extremities is intact, BILATERALLY  - sensation in Lower extremities is intact, BILATERALLY  - patient denies any loss of bowel or bladder control      PSYCHIATRIC: patient denies any suicidal or homicidal ideations        Physical Exam:  Vitals:  /66 (BP Location: Right arm, Patient Position: Sitting, BP Method: Medium (Automatic))   Pulse 67   Resp 16   Ht 5' 8" (1.727 m)   Wt 76.2 kg (168 lb)   BMI 25.54 kg/m²   (reviewed on 5/16/2018)    General: alert and oriented, in no apparent distress.  Gait: normal gait.  Skin: no rashes, no discoloration, no obvious lesions  HEENT: normocephalic, atraumatic. Pupils equal and round.  Cardiovascular: no significant peripheral edema present.  Respiratory: without use of accessory muscles of respiration.    Musculoskeletal - Lumbar Spine:  - Pain on flexion of lumbar spine: Absent   - Pain on extension of lumbar spine: Present  - Lumbar facet loading: Present  - TTP over the lumbar facet joints: Present Bilaterally at L5-S1   - TTP over the SI joints:  Present on left   - Straight Leg Raise: Negative  - NOELLE: Present    Neuro - Lower Extremities:  - BLE Strength: R/L: HF: 5/5, HE: 5/5, KF: 5/5; KE: 5/5; " FE: 5/5; FF: 5/5  - Extremity Reflexes: Brisk and symmetric throughout  - Sensory: Sensation to light touch intact bilaterally      Psych:  Mood and affect is appropriate        Assessment:  Noble Tripp is a 72 y.o. year old male who is presenting with     Encounter Diagnoses   Name Primary?    Lumbar spondylosis Yes    DDD (degenerative disc disease), lumbar     Lumbar facet arthropathy     Sacroiliac joint pain     Spondylosis of lumbosacral region without myelopathy or radiculopathy     Facet arthropathy     Spinal stenosis of lumbar region without neurogenic claudication      Patient returns for follow-up.  He complains of chronic low back pain. Lumbar MRI shows advanced diffuse spondylosis, greatest at the L4-L5 level with severe central canal stenosis and lateral recess stenosis. We discussed trying an injection for additional pain relief and also physical therapy, but he wants to hold off. He feels injections weren't long lasting in the past. We will consider this if pain not controlled with medication.      Plan:  1. Interventional: None for now. Consider Bilateral L3, L4, L5 MBB with local.     2. Pharmacologic:   - Refill Percocet 7.5/325 mg PO TID PRN (90tabs) x 2 months. Paper Rx given. Patient tolerating opioids with no side effects, obtaining good pain control with functional improvement.   - Refill Gabapentin 800mg TID.  - Opioid contract signed on 3/20/2018.     - LA  reviewed and appropriate. Last filled 4-22-18.  - Will order UDS today to ensure medication compliance.      3. Rehabilitative: Encouraged regular exercise.    4. Diagnostic: None for now.    5. Follow up: 8 weeks for med refill.    - I discussed the risks, benefits, and alternatives to potential treatment options. All questions and concerns were fully addressed today in clinic. Dr. Mueller was consulted regarding the patient plan and agrees.             >> UDS:  6-28-16 :: appropriate  2/28/2017 ::  appropriate  8/18/2017 :: not in EMR  5/16/2018 :: pending    >> Pain Disability Index:  2/28/2017 :: 18  5/26/2017 :: 16  8/18/2017 :: 26  12/12/2017 :: 22

## 2018-05-29 ENCOUNTER — HOSPITAL ENCOUNTER (OUTPATIENT)
Facility: HOSPITAL | Age: 73
Discharge: HOME | End: 2018-05-29
Attending: INTERNAL MEDICINE | Admitting: INTERNAL MEDICINE
Payer: MEDICARE

## 2018-05-29 ENCOUNTER — ANESTHESIA (OUTPATIENT)
Dept: ENDOSCOPY | Facility: HOSPITAL | Age: 73
End: 2018-05-29
Payer: MEDICARE

## 2018-05-29 ENCOUNTER — SURGERY (OUTPATIENT)
Age: 73
End: 2018-05-29

## 2018-05-29 ENCOUNTER — ANESTHESIA EVENT (OUTPATIENT)
Dept: ENDOSCOPY | Facility: HOSPITAL | Age: 73
End: 2018-05-29
Payer: MEDICARE

## 2018-05-29 VITALS
DIASTOLIC BLOOD PRESSURE: 65 MMHG | BODY MASS INDEX: 25.16 KG/M2 | HEART RATE: 61 BPM | TEMPERATURE: 98 F | WEIGHT: 166 LBS | SYSTOLIC BLOOD PRESSURE: 119 MMHG | RESPIRATION RATE: 18 BRPM | OXYGEN SATURATION: 96 % | HEIGHT: 68 IN

## 2018-05-29 DIAGNOSIS — K29.30 CHRONIC SUPERFICIAL GASTRITIS WITHOUT BLEEDING: ICD-10-CM

## 2018-05-29 DIAGNOSIS — K22.70 BARRETT'S ESOPHAGUS WITHOUT DYSPLASIA: ICD-10-CM

## 2018-05-29 DIAGNOSIS — L90.5 HEALING SCAR: ICD-10-CM

## 2018-05-29 DIAGNOSIS — K21.9 GASTROESOPHAGEAL REFLUX DISEASE WITHOUT ESOPHAGITIS: ICD-10-CM

## 2018-05-29 DIAGNOSIS — D50.0 IRON DEFICIENCY ANEMIA DUE TO CHRONIC BLOOD LOSS: Primary | ICD-10-CM

## 2018-05-29 PROCEDURE — 43239 EGD BIOPSY SINGLE/MULTIPLE: CPT | Performed by: INTERNAL MEDICINE

## 2018-05-29 PROCEDURE — 25000003 PHARM REV CODE 250: Performed by: INTERNAL MEDICINE

## 2018-05-29 PROCEDURE — 88305 TISSUE EXAM BY PATHOLOGIST: CPT | Mod: 26,,, | Performed by: PATHOLOGY

## 2018-05-29 PROCEDURE — 88305 TISSUE EXAM BY PATHOLOGIST: CPT | Mod: 59 | Performed by: PATHOLOGY

## 2018-05-29 PROCEDURE — 63600175 PHARM REV CODE 636 W HCPCS: Performed by: NURSE ANESTHETIST, CERTIFIED REGISTERED

## 2018-05-29 PROCEDURE — 27201012 HC FORCEPS, HOT/COLD, DISP: Performed by: INTERNAL MEDICINE

## 2018-05-29 PROCEDURE — 43239 EGD BIOPSY SINGLE/MULTIPLE: CPT | Mod: ,,, | Performed by: INTERNAL MEDICINE

## 2018-05-29 PROCEDURE — 37000009 HC ANESTHESIA EA ADD 15 MINS: Performed by: INTERNAL MEDICINE

## 2018-05-29 PROCEDURE — 37000008 HC ANESTHESIA 1ST 15 MINUTES: Performed by: INTERNAL MEDICINE

## 2018-05-29 RX ORDER — PROPOFOL 10 MG/ML
INJECTION, EMULSION INTRAVENOUS
Status: DISCONTINUED | OUTPATIENT
Start: 2018-05-29 | End: 2018-05-29

## 2018-05-29 RX ORDER — SODIUM CHLORIDE, SODIUM LACTATE, POTASSIUM CHLORIDE, CALCIUM CHLORIDE 600; 310; 30; 20 MG/100ML; MG/100ML; MG/100ML; MG/100ML
INJECTION, SOLUTION INTRAVENOUS CONTINUOUS
Status: DISCONTINUED | OUTPATIENT
Start: 2018-05-29 | End: 2018-05-29 | Stop reason: HOSPADM

## 2018-05-29 RX ORDER — LIDOCAINE HCL/PF 100 MG/5ML
SYRINGE (ML) INTRAVENOUS
Status: DISCONTINUED | OUTPATIENT
Start: 2018-05-29 | End: 2018-05-29

## 2018-05-29 RX ADMIN — SODIUM CHLORIDE, SODIUM LACTATE, POTASSIUM CHLORIDE, AND CALCIUM CHLORIDE: 600; 310; 30; 20 INJECTION, SOLUTION INTRAVENOUS at 07:05

## 2018-05-29 RX ADMIN — PROPOFOL 130 MG: 10 INJECTION, EMULSION INTRAVENOUS at 07:05

## 2018-05-29 RX ADMIN — PROPOFOL 30 MG: 10 INJECTION, EMULSION INTRAVENOUS at 07:05

## 2018-05-29 RX ADMIN — LIDOCAINE HYDROCHLORIDE 100 MG: 20 INJECTION, SOLUTION INTRAVENOUS at 07:05

## 2018-05-29 NOTE — TRANSFER OF CARE
"Anesthesia Transfer of Care Note    Patient: Noble Tripp    Procedure(s) Performed: Procedure(s) (LRB):  ESOPHAGOGASTRODUODENOSCOPY (EGD) (N/A)    Patient location: PACU    Anesthesia Type: MAC    Transport from OR: Transported from OR on room air with adequate spontaneous ventilation    Post pain: adequate analgesia    Post assessment: no apparent anesthetic complications    Post vital signs: stable    Level of consciousness: sedated    Nausea/Vomiting: no nausea/vomiting    Complications: none    Transfer of care protocol was followed      Last vitals:   Visit Vitals  BP (!) 93/49 (BP Location: Left arm, Patient Position: Lying)   Pulse 60   Temp 36.7 °C (98.1 °F) (Oral)   Resp 16   Ht 5' 8" (1.727 m)   Wt 75.3 kg (166 lb)   SpO2 95%   BMI 25.24 kg/m²     "

## 2018-05-29 NOTE — H&P
"  Short Stay Endoscopy History and Physical    PCP - Dwaine Davis MD    Procedure - EGD  ASA - 3  Mallampati - per anesthesia  History of Anesthesia problems - no  Family history Anesthesia problems -  no     HPI:  This is a 72 y.o.male here for evaluation of : Iron Deficiency  Anemia    Reflux - no  Dysphagia - no  Abdominal pain - no  Diarrhea - no  Anemia - yes  GI bleeding - no  Nausea and vomiting-no  Early satiety-no  aversion to sight or smell of food-no    ROS:  Constitutional: No fevers, chills, No weight loss  ENT: No allergies  CV: No chest pain  Pulm: No cough, No shortness of breath  Ophtho: No vision changes  GI: see HPI  Derm: No rash  Heme: No lymphadenopathy, No bruising  MSK: No arthritis  : No dysuria, No hematuria  Endo: No hot or cold intolerance  Neuro: No syncope, No seizure  Psych: No anxiety, No depression    Medical History:  Past Medical History:   Diagnosis Date    Adrenal mass     david    Aspiration pneumonia 10/15    puree/honey    Benign prostate hyperplasia     CAD (coronary artery disease)     dr padilla    Chronic pain     dr santos    COPD (chronic obstructive pulmonary disease)     papi    Ex-smoker     11/13    Hyperlipidemia     Osteopenia 1/15 tran 1/18    PVD (peripheral vascular disease)     noobs 07 khuri    Pyriform sinus cancer     dr ponce radiation 1/2-14 dr king perales    Sleep apnea     cpap    Squamous cell carcinoma of skin     roberto    Tongue cancer     "superficial" removed 11/14    Vocal cord cancer 2011    Xerostomia     radiation       Surgical History:  Past Surgical History:   Procedure Laterality Date    APPENDECTOMY      COLONOSCOPY N/A 5/1/2017    Procedure: Due for screening colonoscopy;  Surgeon: Anushka Yoder MD;  Location: [facility];  Service: Endoscopy;  Laterality: N/A;    tongue cancer excision  11/14    dr vitale    VOCAL CORD LATERALIZATION, ENDOSCOPIC APPROACH W/ SIMONEB      kris       Family History:  Family " History   Problem Relation Age of Onset    Cancer Father     Cancer Brother        Social History:  Social History     Social History    Marital status:      Spouse name: JYOTI    Number of children: 6    Years of education: N/A     Occupational History    Not on file.     Social History Main Topics    Smoking status: Former Smoker    Smokeless tobacco: Never Used    Alcohol use No    Drug use: No    Sexual activity: Not Currently     Other Topics Concern    Not on file     Social History Narrative    No narrative on file       Allergies:   Review of patient's allergies indicates:   Allergen Reactions    Contrast media     Iodinated contrast- oral and iv dye      Other reaction(s): Itching  Other reaction(s): Hives    Neomycin-bacitracin-polymyxin      Other reaction(s): Rash  Other reaction(s): Rash    Pravastatin      Other reaction(s): fatigue  Other reaction(s): fatigue    Rosuvastatin      Other reaction(s): fatigue  Other reaction(s): fatigue    Simvastatin      Other reaction(s): fatigue  Other reaction(s): fatigue    Statins-hmg-coa reductase inhibitors        Medications:   No current facility-administered medications on file prior to encounter.      Current Outpatient Prescriptions on File Prior to Encounter   Medication Sig Dispense Refill    aclidinium bromide (TUDORZA) 400 mcg/actuation AePB Inhale 1 puff into the lungs 2 (two) times daily. 1 each 11    albuterol 90 mcg/actuation inhaler Inhale 2 puffs into the lungs every 4 (four) hours as needed for Wheezing or Shortness of Breath. 1 Inhaler 11    aspirin 81 mg Tab Take 1 tablet by mouth Daily. Over the counter to help prevent stroke/heart attack      evolocumab 420 mg/3.5 mL Injt Inject into the skin.      ferrous sulfate 325 (65 FE) MG EC tablet Take 1 tablet (325 mg total) by mouth 2 times daily 2 hours after meal. 60 tablet 3    fish oil-omega-3 fatty acids 300-1,000 mg capsule Take 2 g by mouth.      garlic  2,000 mg Cap Take by mouth.      Lactobacillus rhamnosus GG (CULTURELLE) 10 billion cell capsule Take 1 capsule by mouth once daily.      loratadine (CLARITIN) 10 mg tablet Take 1 tablet by mouth Daily.      multivitamin capsule Take 1 capsule by mouth once daily.      pantoprazole (PROTONIX) 40 MG tablet Take 1 tablet (40 mg total) by mouth once daily. 90 tablet 3    pilocarpine (SALAGEN) 5 MG Tab TAKE 1 TABLET BY MOUTH 30 MINUTES PRIOR TO EACH MEAL 90 tablet 2    senna-docusate 8.6-50 mg (GARCIA-COLACE) 8.6-50 mg per tablet Take 2 tablets by mouth once daily. 30 tablet 1    turmeric root extract 500 mg Cap Take by mouth.      varenicline (CHANTIX CONTINUING MONTH BOX) 1 mg Tab TAKE 1 TABLET (1 MG TOTAL) BY MOUTH ONCE DAILY. 56 tablet 3       Objective Findings:    Vital Signs:There were no vitals filed for this visit.        Physical Exam:  General Appearance: Well appearing in no acute distress  Eyes:    No scleral icterus  ENT: Neck supple, Lips, mucosa, and tongue normal; teeth and gums normal  Lungs: CTA bilaterally in anterior and posterior fields, no wheezes, no crackles.  Heart:  Regular rate, S1, S2 normal, no murmurs heard.  Abdomen: Soft, non tender, non distended with normal bowel sounds. No hepatosplenomegaly, ascites, or mass.  Extremities: No clubbing, cyanosis or edema  Skin: No rash    Labs:  Reviewed    Plan:EGD  I have explained the risks and benefits of endoscopy procedures to the patient including but not limited to bleeding, perforation, infection, and death. The patient wishes to proceed.

## 2018-05-29 NOTE — ANESTHESIA PREPROCEDURE EVALUATION
05/29/2018  Noble Tripp is a 72 y.o., male.    Pre-op Assessment    I have reviewed the Patient Summary Reports.     I have reviewed the Nursing Notes.   I have reviewed the Medications.     Review of Systems  Anesthesia Hx:  No problems with previous Anesthesia  Denies Family Hx of Anesthesia complications.   Denies Personal Hx of Anesthesia complications.   Social:  Former Smoker    Hematology/Oncology:         -- Cancer in past history: Oncology Comments: H/O vocal cord CA coughs when drinking thin liquids     Cardiovascular:   Exercise tolerance: good Hypertension (denies HBP) Denies Valvular problems/Murmurs. Past MI (Silent MI did not seek treatment) CAD   Dysrhythmias (PSVT)   Denies Angina. hyperlipidemia ECG has been reviewed. See Cards every 6 months no changes last time he saw them    TEST DESCRIPTION   Technical Quality: This is a technically challenging study.     Aorta: The aortic root is normal in size, measuring 3.1 cm at sinotubular junction and 3.7 cm at Sinuses of Valsalva.     Left Atrium: The left atrium is normal in size, measuring 5.2 cm across in the apical view.     Left Ventricle: The left ventricle is normal in size, with an end-diastolic diameter of 4.2 cm, and an end-systolic diameter of 3.1 cm. LV wall thickness is normal, with the septum measuring 1.7 cm and the posterior wall measuring 1.4 cm across. Relative   wall thickness was increased at 0.67, and the LV mass index was increased at 171.6 g/m2 consistent with concentric left ventricular hypertrophy. Global left ventricular systolic function appears normal. Visually estimated ejection fraction is 60-65%.   The LV Doppler derived stroke volume equals 66.0 ccs.   Left atrial pressures are normal. The E/e'(lat) is 8.  This along with the following abnormalities (LVMI = 171.60) suggests diastolic dysfunction secondary  to relaxation abnormality.     Right Atrium: The right atrium is normal in size, measuring 5.1 cm in length and 4.1 cm in width in the apical view.     Right Ventricle: The right ventricle is normal in size. Global right ventricular systolic function appears normal. The estimated PA systolic pressure is 34 mmHg.     Aortic Valve:  The aortic valve is normal in structure.     Mitral Valve:  The mitral valve is normal in structure.     Tricuspid Valve:  The tricuspid valve is normal in structure. There is trivial tricuspid regurgitation.     Pulmonary Valve:  The pulmonic valve is not well seen. There is mild pulmonic regurgitation.     IVC: IVC is enlarged and collapses < 50% with a sniff, suggesting high right atrial pressure of 15 mmHg.     Intracavitary: There is no evidence of pericardial effusion, intracavity mass, thrombi, or vegetation.         CONCLUSIONS     1 - Concentric hypertrophy.     2 - Normal left ventricular systolic function (EF 60-65%).     3 - Left ventricular diastolic dysfunction.     4 - Normal right ventricular systolic function .     5 - The estimated PA systolic pressure is 34 mmHg.     6 - Trivial tricuspid regurgitation.     7 - Increased central venous pressure.  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  Obstructive Sleep Apnea (JUANITO), CPAP used.   Renal/:  Renal/ Normal     Hepatic/GI:  Hepatic/GI Normal    Musculoskeletal:  Musculoskeletal Normal    Neurological:  Neurology Normal    Endocrine:  Endocrine Normal    Psych:  Psychiatric Normal           Physical Exam  General:  Well nourished    Airway/Jaw/Neck:  Airway Findings: Mouth Opening: Normal Tongue: Normal  General Airway Assessment: Adult       Chest/Lungs:  Chest/Lungs Findings: Normal Respiratory Rate      Heart/Vascular:  Heart Findings: Rate: Normal             Anesthesia Plan  Type of Anesthesia, risks & benefits discussed:  Anesthesia Type:  MAC  Patient's Preference:   Intra-op Monitoring Plan:   Intra-op Monitoring Plan Comments:   Post Op Pain Control Plan:   Post Op Pain Control Plan Comments:   Induction:   IV  Beta Blocker:  Patient is not currently on a Beta-Blocker (No further documentation required).       Informed Consent: Patient understands risks and agrees with Anesthesia plan.  Questions answered. Anesthesia consent signed with patient.  ASA Score: 3     Day of Surgery Review of History & Physical: I have interviewed and examined the patient. I have reviewed the patient's H&P dated:  There are no significant changes.          Ready For Surgery From Anesthesia Perspective.

## 2018-05-29 NOTE — DISCHARGE SUMMARY
[facility]  Brief Operative Note     SUMMARY     Surgery Date: 5/29/2018     Surgeon(s) and Role:     * Audie Hill III, MD - Primary    Assisting Surgeon: None    Pre-op Diagnosis:  Iron deficiency anemia, unspecified iron deficiency anemia type [D50.9]    Post-op Diagnosis:  Post-Op Diagnosis Codes:     * Iron deficiency anemia, unspecified iron deficiency anemia type [D50.9]      - GERD      - Gastritis      - Rai's Esophagus      - Ulcer Scar  Procedure(s) (LRB):  ESOPHAGOGASTRODUODENOSCOPY (EGD) (N/A)    Anesthesia: Choice    Description of the findings of the procedure: Procedures completed. See Procedure note for full details.    Findings/Key Components: Procedures completed. See Procedure note for full details.    Prosthetics/Devices: None    Estimated Blood Loss: * No values recorded between 5/29/2018 12:00 AM and 5/29/2018  8:23 AM *         Specimens:   Specimen (12h ago through future)    Start     Ordered    05/29/18 0759  Specimen to Pathology - Surgery  Once     Comments:  1. Antrum gastritis bx, r/o H.pylori2. Barretts esophagus bx, 39cm      05/29/18 0804          Discharge Note    SUMMARY     Admit Date: 5/29/2018    Discharge Date and Time: 5/29/2018    Hospital Course (synopsis of major diagnoses, care, treatment, and services provided during the course of the hospital stay):  Procedures completed. See Procedure note for full details. Discharge patient when discharge criteria met.    Final Diagnosis: Post-Op Diagnosis Codes:     * Iron deficiency anemia, unspecified iron deficiency anemia type [D50.9]      - Rai's Esophagus      - GERD      - Gastritis      - Ulcer scar  Disposition: Discharge patient when discharge criteria met.    Follow Up/Patient Instructions:       Medications:  Reconciled Home Medications: Current Discharge Medication List      CONTINUE these medications which have NOT CHANGED    Details   aclidinium bromide (TUDORZA) 400 mcg/actuation AePB  Inhale 1 puff into the lungs 2 (two) times daily.  Qty: 1 each, Refills: 11    Comments: Please call patient to pick prescription once it is ready.  Associated Diagnoses: Chronic obstructive pulmonary disease, unspecified COPD type      albuterol 90 mcg/actuation inhaler Inhale 2 puffs into the lungs every 4 (four) hours as needed for Wheezing or Shortness of Breath.  Qty: 1 Inhaler, Refills: 11    Associated Diagnoses: Moderate COPD (chronic obstructive pulmonary disease)      aspirin 81 mg Tab Take 1 tablet by mouth Daily. Over the counter to help prevent stroke/heart attack      evolocumab 420 mg/3.5 mL Injt Inject into the skin.      ferrous sulfate 325 (65 FE) MG EC tablet Take 1 tablet (325 mg total) by mouth 2 times daily 2 hours after meal.  Qty: 60 tablet, Refills: 3    Associated Diagnoses: Microcytic anemia      fish oil-omega-3 fatty acids 300-1,000 mg capsule Take 2 g by mouth.      gabapentin (NEURONTIN) 800 MG tablet Take 1 tablet (800 mg total) by mouth 3 (three) times daily.  Qty: 90 tablet, Refills: 1    Associated Diagnoses: DDD (degenerative disc disease), lumbar      garlic 2,000 mg Cap Take by mouth.      Lactobacillus rhamnosus GG (CULTURELLE) 10 billion cell capsule Take 1 capsule by mouth once daily.      loratadine (CLARITIN) 10 mg tablet Take 1 tablet by mouth Daily.      multivitamin capsule Take 1 capsule by mouth once daily.      oxyCODONE-acetaminophen (PERCOCET) 7.5-325 mg per tablet Take 1 tablet by mouth every 8 (eight) hours as needed for Pain.  Qty: 90 tablet, Refills: 0      pantoprazole (PROTONIX) 40 MG tablet Take 1 tablet (40 mg total) by mouth once daily.  Qty: 90 tablet, Refills: 3      pilocarpine (SALAGEN) 5 MG Tab TAKE 1 TABLET BY MOUTH 30 MINUTES PRIOR TO EACH MEAL  Qty: 90 tablet, Refills: 2    Comments: Please consider 90 day supplies to promote better adherence      senna-docusate 8.6-50 mg (GARCIA-COLACE) 8.6-50 mg per tablet Take 2 tablets by mouth once daily.  Qty:  30 tablet, Refills: 1      turmeric root extract 500 mg Cap Take by mouth.      varenicline (CHANTIX CONTINUING MONTH BOX) 1 mg Tab TAKE 1 TABLET (1 MG TOTAL) BY MOUTH ONCE DAILY.  Qty: 56 tablet, Refills: 3    Associated Diagnoses: Ex-smoker              Discharge Procedure Orders  Diet general     Activity as tolerated

## 2018-05-29 NOTE — OR NURSING
Final time out performed, patient and procedure verification agreed on by all staff - RN, Tech, MD and CRNA.

## 2018-05-29 NOTE — ANESTHESIA RELEASE NOTE
"Anesthesia Release from PACU Note    Patient: Noble Tripp    Procedure(s) Performed: Procedure(s) (LRB):  ESOPHAGOGASTRODUODENOSCOPY (EGD) (N/A)    Anesthesia type: MAC    Post pain: Adequate analgesia    Post assessment: no apparent anesthetic complications, tolerated procedure well and no evidence of recall    Last Vitals:   Visit Vitals  BP (!) 93/49 (BP Location: Left arm, Patient Position: Lying)   Pulse 60   Temp 36.7 °C (98.1 °F) (Oral)   Resp 16   Ht 5' 8" (1.727 m)   Wt 75.3 kg (166 lb)   SpO2 95%   BMI 25.24 kg/m²       Post vital signs: stable    Level of consciousness: awake and alert     Nausea/Vomiting: no nausea/no vomiting    Complications: none    Airway Patency: patent    Respiratory: unassisted, spontaneous ventilation, room air    Cardiovascular: stable    Hydration: euvolemic  "

## 2018-05-29 NOTE — INTERVAL H&P NOTE
The patient has been examined and the H&P has been reviewed:I have reviewed this note and I agree with this assessment. The patient remains stable for endoscopy at the time of this present evaluation.         Anesthesia/Surgery risks, benefits and alternative options discussed and understood by patient/family.          Active Hospital Problems    Diagnosis  POA    Iron deficiency anemia due to chronic blood loss [D50.0]  Yes      Resolved Hospital Problems    Diagnosis Date Resolved POA   No resolved problems to display.

## 2018-05-29 NOTE — DISCHARGE INSTRUCTIONS
Gastritis (Adult)    Gastritis is inflammation and irritation of the stomach lining. It can be present for a short time (acute) or be long lasting (chronic). Gastritis is often caused by infection with bacteria called H pylori. More than a third of people in the [place] have this bacteria in their bodies. In many cases, H pylori causes no problems or symptoms. In some people, though, the infection irritates the stomach lining and causes gastritis. Other causes of stomach irritation include drinking alcohol or taking pain-relieving medicines called NSAIDs (such as aspirin or ibuprofen).   Symptoms of gastritis can include:  · Abdominal pain or bloating  · Loss of appetite  · Nausea or vomiting  · Vomiting blood or having black stools  · Feeling more tired than usual  An inflamed and irritated stomach lining is more likely to develop a sore called an ulcer. To help prevent this, gastritis should be treated.  Home care  If needed, medicines may be prescribed. If you have H pylori infection, treating it will likely relieve your symptoms. Other changes can help reduce stomach irritation and help it heal.  · If you have been prescribed medicines for H pylori infection, take them as directed. Take all of the medicine until it is finished or your healthcare provider tells you to stop, even if you feel better.  · Your healthcare provider may recommend avoiding NSAIDs. If you take daily aspirin for your heart or other medical reasons, do not stop without talking to your healthcare provider first.  · Avoid drinking alcohol.  · Stop smoking. Smoking can irritate the stomach and delay healing. As much as possible, stay away from second hand smoke.  Follow-up care  Follow up with your healthcare provider, or as advised by our staff. Testing may be needed to check for inflammation or an ulcer.  When to seek medical advice  Call your healthcare provider for any of the following:  · Stomach pain that gets worse or moves to the lower  right abdomen (appendix area)  · Chest pain that appears or gets worse, or spreads to the back, neck, shoulder, or arm  · Frequent vomiting (cant keep down liquids)  · Blood in the stool or vomit (red or black in color)  · Feeling weak or dizzy  · Fever of 100.4ºF (38ºC) or higher, or as directed by your healthcare provider  Date Last Reviewed: 6/22/2015  © 6073-2642 [employer]. [address] [place] [address]. All rights reserved. This information is not intended as a substitute for professional medical care. Always follow your healthcare professional's instructions.

## 2018-05-29 NOTE — ANESTHESIA POSTPROCEDURE EVALUATION
"Anesthesia Post Evaluation    Patient: Noble Tripp    Procedure(s) Performed: Procedure(s) (LRB):  ESOPHAGOGASTRODUODENOSCOPY (EGD) (N/A)    Final Anesthesia Type: MAC  Patient location during evaluation: PACU  Patient participation: Yes- Able to Participate  Level of consciousness: awake and alert and oriented  Post-procedure vital signs: reviewed and stable  Pain management: adequate  Airway patency: patent  PONV status at discharge: No PONV  Anesthetic complications: no      Cardiovascular status: blood pressure returned to baseline  Respiratory status: unassisted, room air and spontaneous ventilation  Hydration status: euvolemic  Follow-up not needed.        Visit Vitals  BP (!) 93/49 (BP Location: Left arm, Patient Position: Lying)   Pulse 60   Temp 36.7 °C (98.1 °F) (Oral)   Resp 16   Ht 5' 8" (1.727 m)   Wt 75.3 kg (166 lb)   SpO2 95%   BMI 25.24 kg/m²       Pain/Tal Score: Pain Assessment Performed: Yes (5/29/2018  7:03 AM)  Presence of Pain: complains of pain/discomfort (5/29/2018  7:03 AM)  Tal Score: 9 (5/29/2018  8:09 AM)      "

## 2018-05-29 NOTE — [employer] PATIENT INSTRUCTIONS
Discharge Summary/Instructions after an Endoscopic Procedure  Patient Name: Noble Tripp  Patient MRN: [id number]  Patient YOB: 1945  Tuesday, May 29, 2018 Audie Hill III, MD  RESTRICTIONS:  During your procedure today, you received medications for sedation.  These   medications may affect your judgment, balance and coordination.  Therefore,   for 24 hours, you have the following restrictions:   - DO NOT drive a car, operate machinery, make legal/financial decisions,   sign important papers or drink alcohol.    ACTIVITY:  The following day: return to full activity including work, except no heavy   lifting, straining or running for 3 days if polyps were removed.  DIET:  Eat and drink normally unless instructed otherwise.     TREATMENT FOR COMMON SIDE EFFECTS:  - Mild abdominal pain, nausea, belching, bloating or excessive gas:  rest,   eat lightly and use a heating pad.  - Sore Throat: treat with throat lozenges and/or gargle with warm salt   water.  - Because air was used during the procedure, expelling large amounts of air   from your rectum or belching is normal.  - If a bowel prep was taken, you may not have a bowel movement for 1-3 days.    This is normal.  SYMPTOMS TO WATCH FOR AND REPORT TO YOUR PHYSICIAN:  1. Abdominal pain or bloating, other than gas cramps.  2. Chest pain.  3. Back pain.  4. Signs of infection such as: chills or fever occurring within 24 hours   after the procedure.  5. Rectal bleeding, which would show as bright red, maroon, or black stools.   (A tablespoon of blood from the rectum is not serious, especially if   hemorrhoids are present.)  6. Vomiting.  7. Weakness or dizziness.  GO DIRECTLY TO THE NEAREST EMERGENCY ROOM IF YOU HAVE ANY OF THE FOLLOWING:      Difficulty breathing              Chills and/or fever over 101 F   Persistent vomiting and/or vomiting blood   Severe abdominal pain   Severe chest pain   Black, tarry stools   Bleeding- more than one tablespoon   Any  other symptom or condition that you feel may need urgent attention  Your doctor recommends these additional instructions:  If any biopsies were taken, your doctors clinic will contact you in 1 to 2   weeks with any results.  - Discharge patient to home (via wheelchair).   - Resume previous diet.   - Continue present medications.   - Await pathology results.   - Return to GI clinic as previously scheduled.   - Return to referring physician as previously scheduled.  For questions, problems or results please call your physician Audie Hill III, MD at Work:  [phone]  If you have any questions about the above instructions, call the GI   department at [phone] or call the endoscopy unit at [phone]   from 7am until 3 pm.  [facility], EMERGENCY ROOM PHONE NUMBER:   [phone]  IF A COMPLICATION OR EMERGENCY SITUATION ARISES AND YOU ARE UNABLE TO REACH   YOUR PHYSICIAN - GO DIRECTLY TO THE EMERGENCY ROOM.  I have read or have had read to me these discharge instructions for my   procedure and have received a written copy.  I understand these   instructions and will follow-up with my physician if I have any questions.     __________________________________       _____________________________________  Nurse Signature                                          Patient/Designated   Responsible Party Signature  Audie Hill III, MD  5/29/2018 8:18:10 AM  This report has been verified and signed electronically.  [employer]

## 2018-06-07 DIAGNOSIS — K20.90 ESOPHAGITIS: Primary | ICD-10-CM

## 2018-06-22 ENCOUNTER — TELEPHONE (OUTPATIENT)
Dept: PULMONOLOGY | Facility: CLINIC | Age: 73
End: 2018-06-22

## 2018-06-22 NOTE — TELEPHONE ENCOUNTER
----- Message from Bhumika Muse sent at 6/22/2018  2:20 PM CDT -----  Contact: leila/wife [phone]  States that she is returning call. Please call back at [phone]//thank you acc

## 2018-07-05 ENCOUNTER — TELEPHONE (OUTPATIENT)
Dept: PULMONOLOGY | Facility: CLINIC | Age: 73
End: 2018-07-05

## 2018-07-05 DIAGNOSIS — J44.9 MODERATE COPD (CHRONIC OBSTRUCTIVE PULMONARY DISEASE): Primary | Chronic | ICD-10-CM

## 2018-07-05 RX ORDER — TIOTROPIUM BROMIDE 18 UG/1
18 CAPSULE ORAL; RESPIRATORY (INHALATION) DAILY
Qty: 90 CAPSULE | Refills: 3 | Status: SHIPPED | OUTPATIENT
Start: 2018-07-05 | End: 2018-08-07

## 2018-07-05 NOTE — TELEPHONE ENCOUNTER
Pt wife requesting to change Turdorza to Symbicort, Spririva, Combivent or Advair because they cost less.

## 2018-07-05 NOTE — TELEPHONE ENCOUNTER
----- Message from She Hernandez sent at 7/5/2018  9:23 AM CDT -----  Pt is requesting a call from nurse to discuss a prescription.        Please call pt back at [phone]

## 2018-07-12 RX ORDER — OXYCODONE AND ACETAMINOPHEN 7.5; 325 MG/1; MG/1
1 TABLET ORAL EVERY 8 HOURS PRN
Qty: 90 TABLET | Refills: 0 | Status: SHIPPED | OUTPATIENT
Start: 2018-07-20 | End: 2018-07-12 | Stop reason: SDUPTHER

## 2018-07-12 RX ORDER — OXYCODONE AND ACETAMINOPHEN 7.5; 325 MG/1; MG/1
1 TABLET ORAL EVERY 8 HOURS PRN
Qty: 90 TABLET | Refills: 0 | Status: SHIPPED | OUTPATIENT
Start: 2018-08-19 | End: 2018-09-18

## 2018-07-12 RX ORDER — OXYCODONE AND ACETAMINOPHEN 7.5; 325 MG/1; MG/1
1 TABLET ORAL EVERY 8 HOURS PRN
Qty: 90 TABLET | Refills: 0 | Status: SHIPPED | OUTPATIENT
Start: 2018-08-19 | End: 2018-07-12 | Stop reason: SDUPTHER

## 2018-07-12 RX ORDER — OXYCODONE AND ACETAMINOPHEN 7.5; 325 MG/1; MG/1
1 TABLET ORAL EVERY 8 HOURS PRN
Qty: 90 TABLET | Refills: 0 | Status: SHIPPED | OUTPATIENT
Start: 2018-07-20 | End: 2018-08-07 | Stop reason: SDUPTHER

## 2018-07-13 ENCOUNTER — OFFICE VISIT (OUTPATIENT)
Dept: PAIN MEDICINE | Facility: CLINIC | Age: 73
End: 2018-07-13
Payer: MEDICARE

## 2018-07-13 VITALS
WEIGHT: 166 LBS | BODY MASS INDEX: 25.16 KG/M2 | RESPIRATION RATE: 16 BRPM | DIASTOLIC BLOOD PRESSURE: 70 MMHG | SYSTOLIC BLOOD PRESSURE: 130 MMHG | HEART RATE: 76 BPM | HEIGHT: 68 IN

## 2018-07-13 DIAGNOSIS — M53.3 SACROILIAC JOINT PAIN: ICD-10-CM

## 2018-07-13 DIAGNOSIS — M47.816 LUMBAR FACET ARTHROPATHY: ICD-10-CM

## 2018-07-13 DIAGNOSIS — M47.816 LUMBAR SPONDYLOSIS: Primary | ICD-10-CM

## 2018-07-13 DIAGNOSIS — M47.819 FACET ARTHROPATHY: ICD-10-CM

## 2018-07-13 DIAGNOSIS — M48.061 SPINAL STENOSIS OF LUMBAR REGION WITHOUT NEUROGENIC CLAUDICATION: ICD-10-CM

## 2018-07-13 DIAGNOSIS — M51.36 DDD (DEGENERATIVE DISC DISEASE), LUMBAR: ICD-10-CM

## 2018-07-13 PROCEDURE — 99214 OFFICE O/P EST MOD 30 MIN: CPT | Mod: S$PBB,,, | Performed by: PHYSICIAN ASSISTANT

## 2018-07-13 PROCEDURE — 99214 OFFICE O/P EST MOD 30 MIN: CPT | Mod: PBBFAC | Performed by: PHYSICIAN ASSISTANT

## 2018-07-13 PROCEDURE — 99999 PR PBB SHADOW E&M-EST. PATIENT-LVL IV: CPT | Mod: PBBFAC,,, | Performed by: PHYSICIAN ASSISTANT

## 2018-07-13 NOTE — PROGRESS NOTES
Chief Pain Complaint:  Low Back Pain, Leg pain (left > right)       History of Present Illness:  This patient is a 72 y.o. male who presents today complaining of the above noted pain/s. The patient describes this pain as follows.    - duration of pain: pain for years   - timing: constant   - character: aching, burning  - radiating, dermatomal: extends into left leg, along L5 pattern at times, mostly non-radiating  - antecedent trauma, prior spinal surgery: none  - pertinent negatives: No fever, No chills, No weight loss, No bladder dysfunction, No bowel dysfunction, No extremity weakness, No saddle anesthesia  - pertinent positives: none    - medications, other therapies tried (physical therapy, injections):     >> gabapentin, Norco    >> Has previously undergone Physical Therapy, which made pain worse    >> Has previously undergone spinal injection/s: including lumbar MBB and Left TFESI with Dr Taylor in 2014 & 2015 (see EMR Surgeries for full details) with only short term relief    _________________________________________________________________________________________________________________________________________________________________________________________________________________________      IMAGING / Labs / Studies (reviewed on 7/13/2018):    9/15/17 MRI LUMBAR SPINE WITHOUT CONTRAST  History: Lower back pain.  Left lower extremity pain  Technique: Standard multiplanar pulse sequences without IV contrast.  Comparison:  No prior  studies available for comparison.  Findings:   Mild-moderate scoliosis, convex to the left and centered at L3.  All lumbar vertebral bodies are normal in height.  Scattered degenerative endplate marrow signal identified at the L2-L3 and L3-L4 L5-S1 levels.  No fracture.  No suspicious osseous lesion is identified.  Mild retrolisthesis of L2-L3.  Distal spinal cord and conus medullaris have normal appearance but the conus terminates at the T12-L1 level.  T12-L1: Minimal central  protrusion.  Negative for focal nerve root impingement or central canal narrowing.  Neural foramina widely patent.  L1-2: Mild disc height loss.  Prominent intraosseous eyes.  Mild disc bulging.  Mild facet arthropathy and ligament flavum hypertrophy.  The central canal neural foramina patent.  L2-L3: Minimal retrolisthesis of L2 on L3.  There is moderate disc height loss.  Prominent anterior osteophytes.  Mild-moderate diffuse generalized disc bulging.  Mild facet arthropathy and ligament flavum hypertrophy.  The central canal is patent.  Neural foramina patent.  L3-L4: Disc desiccation and moderate disc height loss.  Large anterior osteophytes are present.  There is mild disc bulging and osteophytic ridging at the posterior disc margin.  Moderate right and mild left facet arthropathy and ligamentum flavum hypertrophy.  The central canal is patent.  Mild neural foraminal narrowing.  L4-L5: Very minimal grade 1 spondylolisthesis of L4-L5.  Mild generalized disc bulge is present.  Severe facet arthropathy and ligamentum flavum hypertrophy.  There is severe central canal and lateral recess stenosis.  Moderate neural foraminal stenosis.  L5-S1: Disc desiccation and moderate to severe disc height loss.  Prominent intraocular heights.  There is mild disc bulging and osteophytic ridging at the posterior disc margin.  Severe left and moderate right neural foraminal stenosis.  The central canal is patent.   Paravertebral soft tissues and musculature are normal.  The visualized intra-abdominal and intrapelvic contents are normal.       10/30/14 MRI Lumbar Spine Without Contrast    Narrative Exam: MRI of the lumbar spine without contrast.  History:     Low back pain. Status post SHEY, with S1-S2 radicular symptoms  Comparison: Prior study dated 04/02/13  Findings:     A convex right scoliosis again noted.  No compression fracture or subluxation.  The conus medullaris has a normal appearance.  The paraspinous soft tissues are  "unremarkable.  The T12 -- L1 and L1 -- 2 disk levels are essentially unremarkable.  L2 -- 3: Mild annular bulging again noted.    L3 -- 4: Moderate narrowing of the right lateral disk space, with right greater than left facet arthropathy, unchanged.    L4 -- 5: Marked bilateral facet arthropathy, with mild annular bulging, causing moderate central canal stenosis, unchanged.  L5 -- S1: Disk desiccation, with moderate disk space narrowing.  Significant degenerative narrowing of the left L5 neural foramen is apparent, unchanged.     _________________________________________________________________________________________________________________________________________________________________________________________________________________________      Review of Systems:  CONSTITUTIONAL: patient denies any fever, chills, or weight loss  SKIN: patient denies any rash or itching  RESPIRATORY: patient denies having any shortness of breath  GASTROINTESTINAL: patient denies having any diarrhea, constipation, or bowel incontinence  GENITOURINARY: patient denies having any abnormal bladder function    MUSCULOSKELETAL:  - patient reports low back pain    NEUROLOGICAL:   - pain as above  - strength in Lower extremities is intact, BILATERALLY  - sensation in Lower extremities is intact, BILATERALLY  - patient denies any loss of bowel or bladder control      PSYCHIATRIC: patient denies any suicidal or homicidal ideations    _________________________________________________________________________________________________________________________________________________________________________________________________________________________      Physical Exam:  Vitals:  /70 (BP Location: Right arm, Patient Position: Sitting, BP Method: Medium (Automatic))   Pulse 76   Resp 16   Ht 5' 8" (1.727 m)   Wt 75.3 kg (166 lb)   BMI 25.24 kg/m²   (reviewed on 7/13/2018)    General: alert and oriented, in no apparent " distress.  Gait: normal gait.  Skin: no rashes, no discoloration, no obvious lesions  HEENT: normocephalic, atraumatic. Pupils equal and round.  Cardiovascular: no significant peripheral edema present.  Respiratory: without use of accessory muscles of respiration.    Musculoskeletal - Lumbar Spine:  - Pain on flexion of lumbar spine: Absent   - Pain on extension of lumbar spine: Present  - Lumbar facet loading: Present  - TTP over the lumbar facet joints: Present Bilaterally at L5-S1   - TTP over the SI joints:  Present on left   - Straight Leg Raise: Negative  - NOELLE: Present    Neuro - Lower Extremities:  - BLE Strength: R/L: HF: 5/5, HE: 5/5, KF: 5/5; KE: 5/5; FE: 5/5; FF: 5/5  - Extremity Reflexes: Brisk and symmetric throughout  - Sensory: Sensation to light touch intact bilaterally      Psych:  Mood and affect is appropriate    _________________________________________________________________________________________________________________________________________________________________________________________________________________________     Assessment:  Noble Tripp is a 72 y.o. year old male who is presenting with   Encounter Diagnoses   Name Primary?    Lumbar spondylosis Yes    DDD (degenerative disc disease), lumbar     Sacroiliac joint pain     Lumbar facet arthropathy     Spinal stenosis of lumbar region without neurogenic claudication     Facet arthropathy      Patient returns for follow-up.  He complains of chronic low back pain. Lumbar MRI shows advanced diffuse spondylosis, greatest at the L4-L5 level with severe central canal stenosis and lateral recess stenosis.       Plan:  1. Interventional: None for now. Consider Bilateral L3, L4, L5 MBB with local. He feels injections weren't long lasting in the past. We will consider this if pain not controlled with medication.    2. Pharmacologic:   - Refill Percocet 7.5/325 mg PO TID PRN (90 tabs) x 2 months. Paper Rx given. Patient  tolerating opioids with no side effects, obtaining good pain control with functional improvement.   - Refill Gabapentin 800mg TID.  - Opioid contract signed on 3/20/2018.     - LA  reviewed and appropriate. Last filled 6-20-18. Last UDS was appropriate.  - Will order UDS next visit to ensure medication compliance.      3. Rehabilitative: Encouraged regular exercise.    4. Diagnostic: None for now.    5. Follow up: 8 weeks for med refill.    - I discussed the risks, benefits, and alternatives to potential treatment options. All questions and concerns were fully addressed today in clinic. Dr. Mueller was consulted regarding the patient plan and agrees.             >> UDS:  6-28-16 :: appropriate  2/28/2017 :: appropriate  8/18/2017 :: not in EMR  5/16/2018 :: appropriate    >> Pain Disability Index:  2/28/2017 :: 18  5/26/2017 :: 16  8/18/2017 :: 26  12/12/2017 :: 22

## 2018-07-18 ENCOUNTER — LAB VISIT (OUTPATIENT)
Dept: LAB | Facility: HOSPITAL | Age: 73
End: 2018-07-18
Attending: INTERNAL MEDICINE
Payer: MEDICARE

## 2018-07-18 DIAGNOSIS — D50.0 IRON DEFICIENCY ANEMIA DUE TO CHRONIC BLOOD LOSS: ICD-10-CM

## 2018-07-18 LAB
ALBUMIN SERPL BCP-MCNC: 3.4 G/DL
ALP SERPL-CCNC: 62 U/L
ALT SERPL W/O P-5'-P-CCNC: 11 U/L
ANION GAP SERPL CALC-SCNC: 7 MMOL/L
AST SERPL-CCNC: 13 U/L
BASOPHILS # BLD AUTO: 0.27 K/UL
BASOPHILS NFR BLD: 3.1 %
BILIRUB SERPL-MCNC: 0.6 MG/DL
BUN SERPL-MCNC: 12 MG/DL
CALCIUM SERPL-MCNC: 9.3 MG/DL
CHLORIDE SERPL-SCNC: 106 MMOL/L
CO2 SERPL-SCNC: 28 MMOL/L
CREAT SERPL-MCNC: 0.8 MG/DL
CRP SERPL-MCNC: 15.8 MG/L
DIFFERENTIAL METHOD: ABNORMAL
EOSINOPHIL # BLD AUTO: 0.6 K/UL
EOSINOPHIL NFR BLD: 6.6 %
ERYTHROCYTE [DISTWIDTH] IN BLOOD BY AUTOMATED COUNT: 19.6 %
EST. GFR  (AFRICAN AMERICAN): >60 ML/MIN/1.73 M^2
EST. GFR  (NON AFRICAN AMERICAN): >60 ML/MIN/1.73 M^2
FERRITIN SERPL-MCNC: 101 NG/ML
GLUCOSE SERPL-MCNC: 94 MG/DL
HCT VFR BLD AUTO: 37.6 %
HGB BLD-MCNC: 12.2 G/DL
IRON SERPL-MCNC: 57 UG/DL
LYMPHOCYTES # BLD AUTO: 1.5 K/UL
LYMPHOCYTES NFR BLD: 17.6 %
MCH RBC QN AUTO: 27.7 PG
MCHC RBC AUTO-ENTMCNC: 32.4 G/DL
MCV RBC AUTO: 86 FL
MONOCYTES # BLD AUTO: 0.6 K/UL
MONOCYTES NFR BLD: 7.1 %
NEUTROPHILS # BLD AUTO: 5.6 K/UL
NEUTROPHILS NFR BLD: 65.6 %
PLATELET # BLD AUTO: 330 K/UL
PMV BLD AUTO: 9.9 FL
POTASSIUM SERPL-SCNC: 4.5 MMOL/L
PROT SERPL-MCNC: 7.2 G/DL
RBC # BLD AUTO: 4.4 M/UL
SATURATED IRON: 22 %
SODIUM SERPL-SCNC: 141 MMOL/L
TOTAL IRON BINDING CAPACITY: 260 UG/DL
TRANSFERRIN SERPL-MCNC: 176 MG/DL
WBC # BLD AUTO: 8.58 K/UL

## 2018-07-18 PROCEDURE — 36415 COLL VENOUS BLD VENIPUNCTURE: CPT

## 2018-07-18 PROCEDURE — 86140 C-REACTIVE PROTEIN: CPT

## 2018-07-18 PROCEDURE — 85025 COMPLETE CBC W/AUTO DIFF WBC: CPT

## 2018-07-18 PROCEDURE — 83540 ASSAY OF IRON: CPT

## 2018-07-18 PROCEDURE — 82728 ASSAY OF FERRITIN: CPT

## 2018-07-18 PROCEDURE — 80053 COMPREHEN METABOLIC PANEL: CPT

## 2018-07-23 ENCOUNTER — OFFICE VISIT (OUTPATIENT)
Dept: HEMATOLOGY/ONCOLOGY | Facility: CLINIC | Age: 73
End: 2018-07-23
Payer: MEDICARE

## 2018-07-23 VITALS
OXYGEN SATURATION: 94 % | BODY MASS INDEX: 25.76 KG/M2 | WEIGHT: 170 LBS | DIASTOLIC BLOOD PRESSURE: 61 MMHG | HEIGHT: 68 IN | TEMPERATURE: 97 F | SYSTOLIC BLOOD PRESSURE: 128 MMHG | HEART RATE: 64 BPM

## 2018-07-23 DIAGNOSIS — Z86.39 HISTORY OF IRON DEFICIENCY: ICD-10-CM

## 2018-07-23 DIAGNOSIS — D64.9 CHRONIC ANEMIA: Primary | ICD-10-CM

## 2018-07-23 PROCEDURE — 99214 OFFICE O/P EST MOD 30 MIN: CPT | Mod: S$PBB,,, | Performed by: INTERNAL MEDICINE

## 2018-07-23 PROCEDURE — 99213 OFFICE O/P EST LOW 20 MIN: CPT | Mod: PBBFAC | Performed by: INTERNAL MEDICINE

## 2018-07-23 PROCEDURE — 99999 PR PBB SHADOW E&M-EST. PATIENT-LVL III: CPT | Mod: PBBFAC,,, | Performed by: INTERNAL MEDICINE

## 2018-07-23 NOTE — PROGRESS NOTES
Reason for visit: Follow-up for iron deficiency anemia and history of head and neck cancer    HPI:   The patient is a 72-year-old  male who presents to the hematology oncology clinic today for follow-up for iron deficiency anemia and history of head and neck cancer.  The patient is followed in the outpatient hematology oncology clinic by Dr. Hussein Coyle.  I have reviewed all of the patient's relevant clinical history available in the medical record and have utilized this in my evaluation and management recommendations today.  Today the patient reports that he feels well and has no specific complaints.  He reports that he is up-to-date with follow-up with his ENT physician Dr. V[facility]la at [facility].  He denies any fevers, chills or night sweats.  He denies any loss of appetite or unintentional weight loss.  He denies any chest pain or shortness of breath.  He denies any melena, hematochezia, hematemesis, hemoptysis or hematuria.  He denies any bowel or urinary complains.  He denies any nausea, vomiting or abdominal pain.  Result of interval EGD and follow up recommendations noted.    PAST MEDICAL HISTORY/SURGICAL HISTORY/FAMILY HISTORY/SOCIAL HISTORY: Reviewed and updated from Dr. Coyle's prior clinic notes.    ALLERGIES: Reviewed on medication card.    MEDICATIONS: [Medcard has been reviewed and/or reconciled.]    REVIEW OF SYSTEMS:   GENERAL: [No fevers, chills or sweats. No fatigue, weight loss or loss of appetite.]  HEENT: [No blurred vision, tinnitus, nasal discharge, sorethroat or dysphagia.]  HEART: [No chest pain, palpitations or shortness of breath.]    LUNGS: [No cough, hemoptysis or breathing problems.]  ABDOMEN: [No abdominal pain, nausea, vomiting, diarrhea, constipation or melena.]  GENITOURINARY: [No dysuria, bleeding or malodorous discharge.]  NEURO: [No headache, dizziness or vertigo.]  HEMATOLOGY: [No easy bruising, spontaneous bleeding or blood clots in the past].  MUSCULOSKELETAL: [No  arthralgias, myalgias or bone pains.]  SKIN: [No rashes or skin lesions.]  PSYCHIATRY: [No depression or anxiety.]    PHYSICAL EXAMINATION:   VS: Reviewed on nurse's notes.  APPEARANCE: The patient is a well-developed, well-nourished and well-groomed  male who appears in no acute distress.  He was accompanied to this clinic visit by his wife.  HEENT: No scleral icterus. Both external auditory canals clear. No oral ulcers, lesions. Throat clear  HEAD: No sinus tenderness.  NECK: Supple. No palpable lymphadenopathy.   CHEST: Breath sounds clear bilaterally. No rales. No rhonchi. Unlabored respirations.  CARDIOVASCULAR: Normal S1, S2. Normal rate. Regular rhythm.  ABDOMEN: Bowel sounds normal. No tenderness. No abdominal distention. No hepatomegaly. No splenomegaly.  LYMPHATIC: No palpable supraclavicular, axillary nodes  EXTREMITIES: No clubbing, cyanosis, edema  SKIN: No lesions. No petechiae. No ecchymoses. No induration or nodules  NEUROLOGIC: No focal findings. Alert & Oriented x 3. Mood appropriate to affect    LABS:   Reviewed    IMAGING:  Reviewed    IMPRESSION:  1.  Iron deficiency anemia  2.  History of head and neck cancer    PLAN:  1.  I had a detailed discussion with the patient today with regard to his current clinical situation.  Review of the patient's lab results from April 2018 shows stable iron levels and stable hemoglobin/hematocrit.  He will continue with ferrous sulfate 325 mg by mouth once daily.  He reports that he is tolerating this medication well without any significant side effects.  2.  He will continue follow-up with Dr. Oleary as scheduled for his history of head and neck cancer to continue surveillance.  3.  He knows to seek immediate medical attention for any signs/symptoms of increased bleeding.    Return to clinic in 4 months with labs 2 days before clinic visit to follow-up.  He knows to call sooner for any new problems or questions.    Jaxson Perry MD

## 2018-08-06 NOTE — PROGRESS NOTES
"Subjective:       Patient ID: Noble Tripp is a 73 y.o. male.    Chief Complaint: COPD    Noble Tripp is 73 years old  Follow-up appointment.  Accompanied by his wife.    Last viosit was 02/16/2018  CXR shows left pleural effusion  No cough, no wheezing, No fever  Has aspiration risk since radiation for neck cancer  FEV1 and FVC declined also  Has difficultly with medication cost  Will switch to TRELEGY  CAT score 12  [place] score 13  Downlaod not available however validates using and benefits from machine  He is on Chantix; This was sent           Review of Systems   Constitutional: Negative for fatigue.   HENT: Negative.    Eyes: Negative.    Respiratory: Negative for snoring, sputum production, shortness of breath, wheezing and dyspnea on extertion.    Cardiovascular: Negative.    Genitourinary: Negative.    Endocrine: endocrine negative   Musculoskeletal: Negative.    Skin: Negative.    Gastrointestinal: Negative.    Neurological: Negative.    Psychiatric/Behavioral: Negative.        Objective:       Vitals:    08/07/18 0941   BP: 120/64   Pulse: 62   Resp: 18   SpO2: 95%   Weight: 76.7 kg (169 lb)   Height: 5' 8" (1.727 m)     Physical Exam   Constitutional: He is oriented to person, place, and time. He appears well-nourished. He is not obese.   HENT:   Head: Normocephalic.   Nose: Nose normal.   Neck: Normal range of motion. Neck supple.   Cardiovascular: Normal rate, regular rhythm and normal heart sounds.    No murmur heard.  Pulmonary/Chest: Normal expansion and effort normal. He has no decreased breath sounds. He has no wheezes. He has no rhonchi.         Negative for tactile fremitus.   Abdominal: Bowel sounds are normal.   Musculoskeletal: Normal range of motion. He exhibits no edema.   Lymphadenopathy:     He has no cervical adenopathy.   Neurological: He is alert and oriented to person, place, and time. He has normal reflexes. No cranial nerve deficit.   Skin: Skin is warm and dry. "   Psychiatric: He has a normal mood and affect.   Nursing note and vitals reviewed.    Personal Diagnostic Review  Chest x-ray:   There is interval development of a left-sided pleural effusion as well as patchy opacities at the left lung base.  Findings can be seen in the setting of pneumonia.  Recommend follow-up to resolution.  Right lung is clear aside from a calcified granuloma at the lung base.  Aortic atherosclerosis is noted.  Cardiomediastinal silhouette and osseous structures are stable in appearance.  Bones are demineralized.    Decubitus  Chest x-ray from earlier the same day    FINDINGS:  On decubitus views, there is some layering of the effusion.  Again seen are ill-defined parenchymal opacities in the left lower lobe.  Right lung is clear.  Remainder stable      Spirometry:  FEv1  1.70 ( 57.5%), FVC 2.86( 73.2%)  FEV1/FVC 59    Lab Results   Component Value Date    PREFVC 4.09 02/02/2018    [id number] 0.55 (L) 02/02/2018    PREPEF 5.61 (L) 02/02/2018    HWTNCO261 24.84 02/02/2018    ALADWN2MUZ 56 02/02/2018    POSTFVC 3.99 02/02/2018    POSTFEV1 2.33 (L) 02/02/2018    [id number] 0.75 (L) 02/02/2018    POSTPEF 6.04 (L) 02/02/2018    MVZBTDJ939 18.4 02/02/2018    SXCKCOR11 2.15 02/02/2018    BEMEPFQ3NBU 58 02/02/2018    HTXCWIK5JPM 73.4 02/02/2018    PREDICTEDFVC 4.08 02/02/2018    PREDICTEDFEV 2.98 02/02/2018         Assessment:       Problem List Items Addressed This Visit     JUANITO on CPAP (Chronic)     [place] score 12  Bed time 10-11 pm  Wake time 4 am to 7 am  CPAP 14 cm  Using machine and benefits  Encourage adherence         Moderate COPD (chronic obstructive pulmonary disease) (Chronic)     COPD ROS: taking medications as instructed, no medication side effects noted, no significant ongoing wheezing or shortness of breath, using bronchodilator MDI less than twice a week.   CAT score 12  FEV1 was 1.70 (  57.5%)  mRC 1  Not on oxygen  Tudorza ( too expensive) Spiriva hardly using and Albuterol  HFA  New concerns: FEV1 and FVC declined.      Exam: appears well, vitals normal, no respiratory distress, acyanotic, normal RR, chest clear, no wheezing, crepitations, rhonchi, normal symmetric air entry.      Meds: Added TRELEGY  Assessment: COPD stable but flow declined.   immunizations current  Plan: change in therapy.            Other Visit Diagnoses     Pleural effusion on left    -  Primary    Relevant Orders    Case request GI: Thoracentesis (Completed)        Plan:       Complications of the procedure discussed in detail with patient. Complications including but not limited to infection that may require hospital admission, bleeding that may require blood transfusion and or hospital admission, perforation of the lung which may require surgery. Patient expressed and verbalized understanding. Alternate treatments and material risks associated with such alternatives were discussed with pateint. These include radiologic surveillance with minimal risk and sugery with an indeterminate risk. The material risks of refusing the procedure was discussed in detail. This includes no diagnosis or confirmation of diagnisis and rendering of appropriate treatment the risk of which depends on the nature of the diagnosed illness. Patient expressed and verbalized understanding. Pleural fluid will be sent for chenistry, microbiology and cytology.      Follow-up in about 3 months (around 11/7/2018) for Spirometry and CXR next visit, Incruse/breo/anoro / Trelegy coupon, Labs today.    This note was prepared using voice recognition system and is likely to have sound alike errors that may have been overlooked even after proof reading.  Please call me with any questions    Discussed diagnosis, its evaluation, treatment and usual course. All questions answered.    Thank you for the courtesy of participating in the care of this patient    Santos Cardozo MD

## 2018-08-07 ENCOUNTER — HOSPITAL ENCOUNTER (OUTPATIENT)
Dept: RADIOLOGY | Facility: HOSPITAL | Age: 73
Discharge: HOME | End: 2018-08-07
Attending: INTERNAL MEDICINE
Payer: MEDICARE

## 2018-08-07 ENCOUNTER — SURGERY (OUTPATIENT)
Age: 73
End: 2018-08-07

## 2018-08-07 ENCOUNTER — PROCEDURE VISIT (OUTPATIENT)
Dept: PULMONOLOGY | Facility: CLINIC | Age: 73
End: 2018-08-07
Payer: MEDICARE

## 2018-08-07 ENCOUNTER — HOSPITAL ENCOUNTER (OUTPATIENT)
Facility: HOSPITAL | Age: 73
Discharge: HOME | End: 2018-08-07
Attending: INTERNAL MEDICINE | Admitting: INTERNAL MEDICINE
Payer: MEDICARE

## 2018-08-07 ENCOUNTER — OFFICE VISIT (OUTPATIENT)
Dept: PULMONOLOGY | Facility: CLINIC | Age: 73
End: 2018-08-07
Payer: MEDICARE

## 2018-08-07 VITALS
OXYGEN SATURATION: 95 % | SYSTOLIC BLOOD PRESSURE: 120 MMHG | DIASTOLIC BLOOD PRESSURE: 64 MMHG | HEIGHT: 68 IN | BODY MASS INDEX: 25.61 KG/M2 | RESPIRATION RATE: 18 BRPM | HEART RATE: 62 BPM | WEIGHT: 169 LBS

## 2018-08-07 DIAGNOSIS — J44.9 MODERATE COPD (CHRONIC OBSTRUCTIVE PULMONARY DISEASE): Chronic | ICD-10-CM

## 2018-08-07 DIAGNOSIS — J90 PLEURAL EFFUSION: ICD-10-CM

## 2018-08-07 DIAGNOSIS — J90 PLEURAL EFFUSION: Primary | ICD-10-CM

## 2018-08-07 DIAGNOSIS — J90 PLEURAL EFFUSION ON LEFT: Primary | ICD-10-CM

## 2018-08-07 DIAGNOSIS — J90 PLEURAL EFFUSION ON LEFT: ICD-10-CM

## 2018-08-07 DIAGNOSIS — G47.33 OSA ON CPAP: Chronic | ICD-10-CM

## 2018-08-07 LAB
ALBUMIN FLD-MCNC: 2.5 G/DL
AMYLASE, BODY FLUID: 25 U/L
APPEARANCE FLD: NORMAL
BASOPHILS NFR FLD MANUAL: 1 %
BODY FLD TYPE: NORMAL
BODY FLUID SOURCE AMYLASE: NORMAL
BODY FLUID SOURCE, LDH: NORMAL
BRPFT: ABNORMAL
COLOR FLD: YELLOW
EOSINOPHIL NFR FLD MANUAL: 2 %
FEF 25 75 CHG: 10.9 %
FEF 25 75 LLN: 0.95
FEF 25 75 POST REF: 33.1 %
FEF 25 75 POST: 0.74 L/S (ref 0.95–3.54)
FEF 25 75 PRE REF: 29.8 %
FEF 25 75 PRE: 0.67 L/S (ref 0.95–3.54)
FEF 25 75 REF: 2.25
FET100 CHG: 23.2 %
FET100 POST: 15.73 SEC
FET100 PRE: 12.77 SEC
FEV1 CHG: 8.3 %
FEV1 FVC CHG: 2.2 %
FEV1 FVC LLN: 62
FEV1 FVC POST REF: 79.9 %
FEV1 FVC POST: 60.74 % (ref 62.29–89.75)
FEV1 FVC PRE REF: 78.2 %
FEV1 FVC PRE: 59.44 % (ref 62.29–89.75)
FEV1 FVC REF: 76
FEV1 LLN: 2.12
FEV1 POST REF: 62.2 %
FEV1 POST: 1.84 L (ref 2.12–3.8)
FEV1 PRE REF: 57.5 %
FEV1 PRE: 1.7 L (ref 2.12–3.8)
FEV1 REF: 2.96
FEV6 CHG: 6.9 %
FEV6 LLN: 2.93
FEV6 POST REF: 72.2 %
FEV6 POST: 2.74 L (ref 2.93–4.65)
FEV6 PRE REF: 67.6 %
FEV6 PRE: 2.56 L (ref 2.93–4.65)
FEV6 REF: 3.79
FVC CHG: 6 %
FVC LLN: 2.88
FVC POST REF: 77.6 %
FVC POST: 3.03 L (ref 2.88–4.93)
FVC PRE REF: 73.2 %
FVC PRE: 2.86 L (ref 2.88–4.93)
FVC REF: 3.91
GLUCOSE FLD-MCNC: 98 MG/DL
GLUCOSE SERPL-MCNC: 94 MG/DL
KOH PREP SPEC: NORMAL
LDH FLD L TO P-CCNC: 220 U/L
LDH SERPL L TO P-CCNC: 173 U/L
LYMPHOCYTES NFR FLD MANUAL: 33 %
MONOS+MACROS NFR FLD MANUAL: 60 %
MVV LLN: 97
MVV REF: 114
NEUTROPHILS NFR FLD MANUAL: 4 %
PEF CHG: 16.7 %
PEF LLN: 5.54
PEF POST REF: 48.8 %
PEF POST: 3.77 L/S (ref 5.54–9.93)
PEF PRE REF: 41.8 %
PEF PRE: 3.23 L/S (ref 5.54–9.93)
PEF REF: 7.73
PROT FLD-MCNC: 4.2 G/DL
PROT SERPL-MCNC: 7 G/DL
SPECIMEN SOURCE: NORMAL
WBC # FLD: 1295 /CU MM

## 2018-08-07 PROCEDURE — 83615 LACTATE (LD) (LDH) ENZYME: CPT

## 2018-08-07 PROCEDURE — 99999 PR PBB SHADOW E&M-EST. PATIENT-LVL IV: CPT | Mod: PBBFAC,,, | Performed by: INTERNAL MEDICINE

## 2018-08-07 PROCEDURE — 87205 SMEAR GRAM STAIN: CPT

## 2018-08-07 PROCEDURE — 99214 OFFICE O/P EST MOD 30 MIN: CPT | Mod: 25,S$PBB,, | Performed by: INTERNAL MEDICINE

## 2018-08-07 PROCEDURE — 82042 OTHER SOURCE ALBUMIN QUAN EA: CPT

## 2018-08-07 PROCEDURE — 82150 ASSAY OF AMYLASE: CPT

## 2018-08-07 PROCEDURE — 32555 ASPIRATE PLEURA W/ IMAGING: CPT | Performed by: INTERNAL MEDICINE

## 2018-08-07 PROCEDURE — 87206 SMEAR FLUORESCENT/ACID STAI: CPT

## 2018-08-07 PROCEDURE — 82945 GLUCOSE OTHER FLUID: CPT

## 2018-08-07 PROCEDURE — 88112 CYTOPATH CELL ENHANCE TECH: CPT | Mod: 26,,, | Performed by: PATHOLOGY

## 2018-08-07 PROCEDURE — 88305 TISSUE EXAM BY PATHOLOGIST: CPT | Performed by: PATHOLOGY

## 2018-08-07 PROCEDURE — 83615 LACTATE (LD) (LDH) ENZYME: CPT | Mod: 91

## 2018-08-07 PROCEDURE — 89051 BODY FLUID CELL COUNT: CPT

## 2018-08-07 PROCEDURE — 71046 X-RAY EXAM CHEST 2 VIEWS: CPT | Mod: 26,,, | Performed by: RADIOLOGY

## 2018-08-07 PROCEDURE — 94060 EVALUATION OF WHEEZING: CPT | Mod: PBBFAC

## 2018-08-07 PROCEDURE — 36415 COLL VENOUS BLD VENIPUNCTURE: CPT

## 2018-08-07 PROCEDURE — 71046 X-RAY EXAM CHEST 2 VIEWS: CPT | Mod: TC

## 2018-08-07 PROCEDURE — 32555 ASPIRATE PLEURA W/ IMAGING: CPT | Mod: LT,,, | Performed by: INTERNAL MEDICINE

## 2018-08-07 PROCEDURE — 88112 CYTOPATH CELL ENHANCE TECH: CPT | Performed by: PATHOLOGY

## 2018-08-07 PROCEDURE — 71046 X-RAY EXAM CHEST 2 VIEWS: CPT | Mod: 26,76,, | Performed by: RADIOLOGY

## 2018-08-07 PROCEDURE — 87116 MYCOBACTERIA CULTURE: CPT

## 2018-08-07 PROCEDURE — 99214 OFFICE O/P EST MOD 30 MIN: CPT | Mod: PBBFAC,25 | Performed by: INTERNAL MEDICINE

## 2018-08-07 PROCEDURE — 71046 X-RAY EXAM CHEST 2 VIEWS: CPT | Mod: 50,TC,59

## 2018-08-07 PROCEDURE — 87070 CULTURE OTHR SPECIMN AEROBIC: CPT

## 2018-08-07 PROCEDURE — 94060 EVALUATION OF WHEEZING: CPT | Mod: 26,S$PBB,, | Performed by: INTERNAL MEDICINE

## 2018-08-07 PROCEDURE — 84157 ASSAY OF PROTEIN OTHER: CPT

## 2018-08-07 PROCEDURE — 88305 TISSUE EXAM BY PATHOLOGIST: CPT | Mod: 26,,, | Performed by: PATHOLOGY

## 2018-08-07 PROCEDURE — 87102 FUNGUS ISOLATION CULTURE: CPT

## 2018-08-07 PROCEDURE — 82947 ASSAY GLUCOSE BLOOD QUANT: CPT

## 2018-08-07 PROCEDURE — 84311 SPECTROPHOTOMETRY: CPT

## 2018-08-07 PROCEDURE — 87210 SMEAR WET MOUNT SALINE/INK: CPT

## 2018-08-07 PROCEDURE — 84155 ASSAY OF PROTEIN SERUM: CPT

## 2018-08-07 RX ORDER — LIDOCAINE HYDROCHLORIDE 10 MG/ML
1 INJECTION, SOLUTION EPIDURAL; INFILTRATION; INTRACAUDAL; PERINEURAL ONCE
Status: DISCONTINUED | OUTPATIENT
Start: 2018-08-07 | End: 2018-08-07 | Stop reason: HOSPADM

## 2018-08-07 RX ORDER — LIDOCAINE HYDROCHLORIDE 10 MG/ML
1 INJECTION, SOLUTION EPIDURAL; INFILTRATION; INTRACAUDAL; PERINEURAL ONCE
Status: CANCELLED | OUTPATIENT
Start: 2018-08-07 | End: 2018-08-07

## 2018-08-07 NOTE — DISCHARGE INSTRUCTIONS
Discharge Instructions for Thoracentesis  Thoracentesis is a procedure that removes extra fluid (pleural effusion) from the pleural space. This space is between the outside surface of the lungs (pleura) and the chest wall. The procedure may be done to take a sample of the fluid for testing to help find the cause. Or it may be done to drain the extra fluid if you are having trouble breathing.  Home care  · You may have some pain after the procedure. Your doctor can prescribe or recommend pain medicine for you to take at home, if needed. Take these exactly as directed. If you stopped taking other medicines before the procedure, ask your doctor when you can start them again.  · Take it easy for 48 hours after the procedure. Don't do anything active until your doctor says its OK.  · Don't do strenuous activities, such as lifting, until your doctor says its OK.  · You will have a small bandage over the puncture site. You may remove the bandage in 24 hours.  · Check the puncture site for the signs of infection listed below.  Follow-up  Make a follow-up appointment with your doctor as directed. During your follow-up visit, your doctor will check your healing. Be sure to let your doctor know how you are feeling.  When to call your doctor  Call your doctor right away if you have any of the following:  · Coughing up blood  · Chest pain. If chest pain suddenly gets worse, it may be an emergency.  · Shortness of breath  · Fever of 100.4°F (38°C) or higher, or as directed by your healthcare provider  · Pain that doesn't get better after taking pain medicine  · Signs of infection at the puncture site. These include increased pain, redness, swelling, or warmth.  · Fluid draining from the puncture site   Date Last Reviewed: 10/1/2016  © 6169-9868 [employer]. [address] [place] [address], [place] [address]. All rights reserved. This information is not intended as a substitute for professional medical care. Always follow  your healthcare professional's instructions.

## 2018-08-07 NOTE — ASSESSMENT & PLAN NOTE
[place] score 12  Bed time 10-11 pm  Wake time 4 am to 7 am  CPAP 14 cm  Using machine and benefits  Encourage adherence

## 2018-08-07 NOTE — DISCHARGE SUMMARY
"[facility] -   Discharge Summary     Patient ID:  Noble Tripp  [id number]  73 y.o.  1945    Admit date: 8/7/2018    Discharge Date and Time:  08/07/2018 4:10 PM    Admitting Physician: Santos Cardozo MD     Discharge Provider: Santos Cardozo    Reason for Admission: Pleural effusion on left [J90]  Pleural effusion on left [J90]    Admission Condition: good    Procedures Performed: Procedure(s) (LRB):  Thoracentesis (Left)    Hospital Course (synopsis of major diagnoses, care, treatment, and services provided during the course of the hospital stay):       Consults: None    Significant Diagnostic Studies: Left thoracentesis    Final Diagnoses:    Principal Problem: Pleural effusion on left   Secondary Diagnoses:   Active Hospital Problems    Diagnosis  POA    *Pleural effusion on left [J90]  Yes      Resolved Hospital Problems    Diagnosis Date Resolved POA   No resolved problems to display.       Discharged Condition: good    Discharge Exam:  BP (!) 150/69   Pulse 65   Temp 98.1 °F (36.7 °C) (Oral)   Resp 18   Ht 5' 8" (1.727 m)   Wt 75.8 kg (167 lb 1.6 oz)   SpO2 97%   BMI 25.41 kg/m²   Lungs: clear to auscultation bilaterally    Disposition: Home or Self Care    Follow Up/Patient Instructions:     Medications:  Reconciled Home Medications:      Medication List      ASK your doctor about these medications    albuterol 90 mcg/actuation inhaler  Inhale 2 puffs into the lungs every 4 (four) hours as needed for Wheezing or Shortness of Breath.     aspirin 81 mg Tab  Take 1 tablet by mouth Daily. Over the counter to help prevent stroke/heart attack     evolocumab 420 mg/3.5 mL Injt  Inject into the skin.     fish oil-omega-3 fatty acids 300-1,000 mg capsule  Take 2 g by mouth.     fluticasone-umeclidin-vilanter 100-62.5-25 mcg Dsdv  Commonly known as:  TRELEGY ELLIPTA  Inhale 1 puff into the lungs once daily.     gabapentin 800 MG tablet  Commonly known as:  NEURONTIN  Take 1 " tablet (800 mg total) by mouth 3 (three) times daily.     garlic 2,000 mg Cap  Take by mouth.     Lactobacillus rhamnosus GG 10 billion cell capsule  Commonly known as:  CULTURELLE  Take 1 capsule by mouth once daily.     loratadine 10 mg tablet  Commonly known as:  CLARITIN  Take 1 tablet by mouth Daily.     multivitamin capsule  Take 1 capsule by mouth once daily.     oxyCODONE-acetaminophen 7.5-325 mg per tablet  Commonly known as:  PERCOCET  Take 1 tablet by mouth every 8 (eight) hours as needed for Pain.  Start taking on:  8/19/2018     pantoprazole 40 MG tablet  Commonly known as:  PROTONIX  Take 1 tablet (40 mg total) by mouth once daily.     pilocarpine 5 MG Tab  Commonly known as:  SALAGEN  TAKE 1 TABLET BY MOUTH 30 MINUTES PRIOR TO EACH MEAL     senna-docusate 8.6-50 mg 8.6-50 mg per tablet  Commonly known as:  GARCIA-COLACE  Take 2 tablets by mouth once daily.     tiotropium 18 mcg inhalation capsule  Commonly known as:  SPIRIVA WITH HANDIHALER  Inhale 1 capsule (18 mcg total) into the lungs once daily.     turmeric root extract 500 mg Cap  Take by mouth.     varenicline 1 mg Tab  Commonly known as:  CHANTIX CONTINUING MONTH BOX  TAKE 1 TABLET (1 MG TOTAL) BY MOUTH ONCE DAILY.          No discharge procedures on file.    Activity: activity as tolerated  Diet: regular diet  Wound Care: keep wound clean and dry    Follow-up with ME in 2 weeks.    Signed:  Santos Cardozo  8/7/2018  4:10 PM

## 2018-08-07 NOTE — H&P (VIEW-ONLY)
"Subjective:       Patient ID: Noble Tripp is a 73 y.o. male.    Chief Complaint: COPD    Noble Tripp is 73 years old  Follow-up appointment.  Accompanied by his wife.    Last viosit was 02/16/2018  CXR shows left pleural effusion  No cough, no wheezing, No fever  Has aspiration risk since radiation for neck cancer  FEV1 and FVC declined also  Has difficultly with medication cost  Will switch to TRELEGY  CAT score 12  [place] score 13  Downlaod not available however validates using and benefits from machine  He is on Chantix; This was sent           Review of Systems   Constitutional: Negative for fatigue.   HENT: Negative.    Eyes: Negative.    Respiratory: Negative for snoring, sputum production, shortness of breath, wheezing and dyspnea on extertion.    Cardiovascular: Negative.    Genitourinary: Negative.    Endocrine: endocrine negative   Musculoskeletal: Negative.    Skin: Negative.    Gastrointestinal: Negative.    Neurological: Negative.    Psychiatric/Behavioral: Negative.        Objective:       Vitals:    08/07/18 0941   BP: 120/64   Pulse: 62   Resp: 18   SpO2: 95%   Weight: 76.7 kg (169 lb)   Height: 5' 8" (1.727 m)     Physical Exam   Constitutional: He is oriented to person, place, and time. He appears well-nourished. He is not obese.   HENT:   Head: Normocephalic.   Nose: Nose normal.   Neck: Normal range of motion. Neck supple.   Cardiovascular: Normal rate, regular rhythm and normal heart sounds.    No murmur heard.  Pulmonary/Chest: Normal expansion and effort normal. He has no decreased breath sounds. He has no wheezes. He has no rhonchi.         Negative for tactile fremitus.   Abdominal: Bowel sounds are normal.   Musculoskeletal: Normal range of motion. He exhibits no edema.   Lymphadenopathy:     He has no cervical adenopathy.   Neurological: He is alert and oriented to person, place, and time. He has normal reflexes. No cranial nerve deficit.   Skin: Skin is warm and dry. "   Psychiatric: He has a normal mood and affect.   Nursing note and vitals reviewed.    Personal Diagnostic Review  Chest x-ray:   There is interval development of a left-sided pleural effusion as well as patchy opacities at the left lung base.  Findings can be seen in the setting of pneumonia.  Recommend follow-up to resolution.  Right lung is clear aside from a calcified granuloma at the lung base.  Aortic atherosclerosis is noted.  Cardiomediastinal silhouette and osseous structures are stable in appearance.  Bones are demineralized.    Decubitus  Chest x-ray from earlier the same day    FINDINGS:  On decubitus views, there is some layering of the effusion.  Again seen are ill-defined parenchymal opacities in the left lower lobe.  Right lung is clear.  Remainder stable      Spirometry:  FEv1  1.70 ( 57.5%), FVC 2.86( 73.2%)  FEV1/FVC 59    Lab Results   Component Value Date    PREFVC 4.09 02/02/2018    QLZHEO0968 0.55 (L) 02/02/2018    PREPEF 5.61 (L) 02/02/2018    IJDNZQ658 24.84 02/02/2018    JBYVFZ3PAQ 56 02/02/2018    POSTFVC 3.99 02/02/2018    POSTFEV1 2.33 (L) 02/02/2018    WAKCSDB0655 0.75 (L) 02/02/2018    POSTPEF 6.04 (L) 02/02/2018    TVHHOJV105 18.4 02/02/2018    LCKNYLW26 2.15 02/02/2018    VFMEKWO8GXC 58 02/02/2018    TCFPVFL5UVJ 73.4 02/02/2018    PREDICTEDFVC 4.08 02/02/2018    PREDICTEDFEV 2.98 02/02/2018         Assessment:       Problem List Items Addressed This Visit     JUANITO on CPAP (Chronic)     [place] score 12  Bed time 10-11 pm  Wake time 4 am to 7 am  CPAP 14 cm  Using machine and benefits  Encourage adherence         Moderate COPD (chronic obstructive pulmonary disease) (Chronic)     COPD ROS: taking medications as instructed, no medication side effects noted, no significant ongoing wheezing or shortness of breath, using bronchodilator MDI less than twice a week.   CAT score 12  FEV1 was 1.70 (  57.5%)  mRC 1  Not on oxygen  Tudorza ( too expensive) Spiriva hardly using and Albuterol  HFA  New concerns: FEV1 and FVC declined.      Exam: appears well, vitals normal, no respiratory distress, acyanotic, normal RR, chest clear, no wheezing, crepitations, rhonchi, normal symmetric air entry.      Meds: Added TRELEGY  Assessment: COPD stable but flow declined.   immunizations current  Plan: change in therapy.            Other Visit Diagnoses     Pleural effusion on left    -  Primary    Relevant Orders    Case request GI: Thoracentesis (Completed)        Plan:       Complications of the procedure discussed in detail with patient. Complications including but not limited to infection that may require hospital admission, bleeding that may require blood transfusion and or hospital admission, perforation of the lung which may require surgery. Patient expressed and verbalized understanding. Alternate treatments and material risks associated with such alternatives were discussed with pateint. These include radiologic surveillance with minimal risk and sugery with an indeterminate risk. The material risks of refusing the procedure was discussed in detail. This includes no diagnosis or confirmation of diagnisis and rendering of appropriate treatment the risk of which depends on the nature of the diagnosed illness. Patient expressed and verbalized understanding. Pleural fluid will be sent for chenistry, microbiology and cytology.      Follow-up in about 3 months (around 11/7/2018) for Spirometry and CXR next visit, Incruse/breo/anoro / Trelegy coupon, Labs today.    This note was prepared using voice recognition system and is likely to have sound alike errors that may have been overlooked even after proof reading.  Please call me with any questions    Discussed diagnosis, its evaluation, treatment and usual course. All questions answered.    Thank you for the courtesy of participating in the care of this patient    Santos Cardozo MD

## 2018-08-07 NOTE — PLAN OF CARE
Pt discharged from unit in stable condition. Driven per family. No distress noted, denies pain or discomfort. All discharge criteria met.

## 2018-08-07 NOTE — PLAN OF CARE
VSS. No GI bleeding noted. Discharge instructions provided to pt/family w/understanding verbalized.

## 2018-08-07 NOTE — ASSESSMENT & PLAN NOTE
COPD ROS: taking medications as instructed, no medication side effects noted, no significant ongoing wheezing or shortness of breath, using bronchodilator MDI less than twice a week.   CAT score 12  FEV1 was 1.70 ( 57.5%)  mRC 1  Not on oxygen  Tudorza ( too expensive) Spiriva hardly using and Albuterol HFA  New concerns: FEV1 and FVC declined.      Exam: appears well, vitals normal, no respiratory distress, acyanotic, normal RR, chest clear, no wheezing, crepitations, rhonchi, normal symmetric air entry.      Meds: Added TRELEGY  Assessment: COPD stable but flow declined.   immunizations current  Plan: change in therapy.

## 2018-08-08 VITALS
RESPIRATION RATE: 18 BRPM | HEIGHT: 68 IN | WEIGHT: 167.13 LBS | DIASTOLIC BLOOD PRESSURE: 66 MMHG | TEMPERATURE: 98 F | HEART RATE: 68 BPM | BODY MASS INDEX: 25.33 KG/M2 | SYSTOLIC BLOOD PRESSURE: 142 MMHG | OXYGEN SATURATION: 97 %

## 2018-08-08 LAB — PATH INTERP FLD-IMP: NORMAL

## 2018-08-09 LAB
CHOLEST FLD-MCNC: 53 MG/DL
SPECIMEN SOURCE: NORMAL

## 2018-08-10 ENCOUNTER — TELEPHONE (OUTPATIENT)
Dept: CARDIOLOGY | Facility: CLINIC | Age: 73
End: 2018-08-10

## 2018-08-10 NOTE — TELEPHONE ENCOUNTER
Spoke with pt and rescheduled his appointment.    ----- Message from Lydia Browne sent at 8/10/2018  1:59 PM CDT -----  Contact: WifeSidra BaconUvaddq-[phone] or [phone]  Would like to consult with the nurse about labs.  Please call back at [phone] or [phone].  x-

## 2018-08-11 LAB
BACTERIA FLD AEROBE CULT: NO GROWTH
GRAM STN SPEC: NORMAL
GRAM STN SPEC: NORMAL

## 2018-08-13 ENCOUNTER — LAB VISIT (OUTPATIENT)
Dept: LAB | Facility: HOSPITAL | Age: 73
End: 2018-08-13
Attending: INTERNAL MEDICINE
Payer: MEDICARE

## 2018-08-13 ENCOUNTER — OFFICE VISIT (OUTPATIENT)
Dept: CARDIOLOGY | Facility: CLINIC | Age: 73
End: 2018-08-13
Payer: MEDICARE

## 2018-08-13 VITALS
BODY MASS INDEX: 26.06 KG/M2 | HEART RATE: 62 BPM | DIASTOLIC BLOOD PRESSURE: 70 MMHG | HEIGHT: 68 IN | WEIGHT: 171.94 LBS | SYSTOLIC BLOOD PRESSURE: 120 MMHG

## 2018-08-13 DIAGNOSIS — I25.10 CORONARY ARTERY DISEASE INVOLVING NATIVE CORONARY ARTERY OF NATIVE HEART WITHOUT ANGINA PECTORIS: ICD-10-CM

## 2018-08-13 DIAGNOSIS — Z78.9 STATIN INTOLERANCE: ICD-10-CM

## 2018-08-13 DIAGNOSIS — I10 ESSENTIAL HYPERTENSION: ICD-10-CM

## 2018-08-13 DIAGNOSIS — I25.10 CORONARY ARTERY DISEASE INVOLVING NATIVE CORONARY ARTERY OF NATIVE HEART WITHOUT ANGINA PECTORIS: Primary | ICD-10-CM

## 2018-08-13 DIAGNOSIS — E78.2 MIXED HYPERLIPIDEMIA: ICD-10-CM

## 2018-08-13 DIAGNOSIS — I47.10 PSVT (PAROXYSMAL SUPRAVENTRICULAR TACHYCARDIA): ICD-10-CM

## 2018-08-13 LAB
CHOLEST SERPL-MCNC: 98 MG/DL
CHOLEST/HDLC SERPL: 2.3 {RATIO}
HDLC SERPL-MCNC: 43 MG/DL
HDLC SERPL: 43.9 %
LDLC SERPL CALC-MCNC: 39.8 MG/DL
NONHDLC SERPL-MCNC: 55 MG/DL
TRIGL SERPL-MCNC: 76 MG/DL

## 2018-08-13 PROCEDURE — 99213 OFFICE O/P EST LOW 20 MIN: CPT | Mod: S$PBB,,, | Performed by: INTERNAL MEDICINE

## 2018-08-13 PROCEDURE — 80061 LIPID PANEL: CPT

## 2018-08-13 PROCEDURE — 99999 PR PBB SHADOW E&M-EST. PATIENT-LVL IV: CPT | Mod: PBBFAC,,, | Performed by: INTERNAL MEDICINE

## 2018-08-13 PROCEDURE — 36415 COLL VENOUS BLD VENIPUNCTURE: CPT

## 2018-08-13 PROCEDURE — 99214 OFFICE O/P EST MOD 30 MIN: CPT | Mod: PBBFAC | Performed by: INTERNAL MEDICINE

## 2018-08-13 NOTE — PROGRESS NOTES
"Subjective:   Patient ID:  Noble Tripp is a 73 y.o. male who presents for follow up of Follow-up and Coronary Artery Disease      71 yo, male, PMH PSVT, CAD HLD, COPD, JUANITO on CPAP, vocal cord cancer s/p surgery and subsequent XRT in 2011, and recent tongue precancer mass removal.   He was admitted to [facility] for sepsis/PNA and PSVT on 10-. EKG  Showed extensive St depression of 1 mm on anterolateral leads. EF 60%. MPi showed fixed inferior perfusion defect. cTn was up to 0.3. PSVT was covnerted to sinus O/N after BB was added.   s/p left thoracentesis d/t pleural effusion.  Feel fatigue, no chest pain, palpitation, dizziness, dyspnea, dizziness. Exercise regularly daily.  Echo in :normal EF, DD.  MPI in :  A small to moderate size fixed defect of moderate to severe intensity that extends from the apical to the base inferior wall of the left ventricle.  EKG on 10-: PSVT, st depression on V3 to V6, I, II and avL.          Past Medical History:   Diagnosis Date    Adrenal mass     david    Aspiration pneumonia 10/15    puree/honey    Benign prostate hyperplasia     CAD (coronary artery disease)     dr padilla    Chronic pain     dr santos    COPD (chronic obstructive pulmonary disease)     papi    Ex-smoker     11/13    Hyperlipidemia     Osteopenia 1/15 tran 1/18    PVD (peripheral vascular disease)     noobs 07 elizabethuri    Pyriform sinus cancer     dr ponce radiation 1/2-14 dr king bryson    Sleep apnea     cpap    Squamous cell carcinoma of skin     roberto    Tongue cancer     "superficial" removed 11/14    Vocal cord cancer 2011    Xerostomia     radiation       Past Surgical History:   Procedure Laterality Date    APPENDECTOMY      tongue cancer excision  11/14    dr vitale    VOCAL CORD LATERALIZATION, ENDOSCOPIC APPROACH W/ MLB      kris       Social History     Tobacco Use    Smoking status: Former Smoker    Smokeless tobacco: Never Used "   Substance Use Topics    Alcohol use: No    Drug use: No       Family History   Problem Relation Age of Onset    Cancer Father     Cancer Brother          Review of Systems   Constitution: Positive for malaise/fatigue. Negative for decreased appetite, diaphoresis, fever, weakness and night sweats.   HENT: Negative for nosebleeds.    Eyes: Negative for blurred vision and double vision.   Cardiovascular: Negative for chest pain, claudication, dyspnea on exertion, irregular heartbeat, leg swelling, near-syncope, orthopnea, palpitations, paroxysmal nocturnal dyspnea and syncope.   Respiratory: Negative for cough, shortness of breath, sleep disturbances due to breathing, snoring, sputum production and wheezing.    Endocrine: Negative for cold intolerance and polyuria.   Hematologic/Lymphatic: Does not bruise/bleed easily.   Skin: Negative for rash.   Musculoskeletal: Negative for back pain, falls, joint pain, joint swelling and neck pain.   Gastrointestinal: Negative for abdominal pain, heartburn, nausea and vomiting.   Genitourinary: Negative for dysuria, frequency and hematuria.   Neurological: Negative for difficulty with concentration, dizziness, focal weakness, headaches, light-headedness, numbness and seizures.   Psychiatric/Behavioral: Negative for depression, memory loss and substance abuse. The patient does not have insomnia.    Allergic/Immunologic: Negative for HIV exposure and hives.       Objective:   Physical Exam   Constitutional: He is oriented to person, place, and time. He appears well-nourished.   HENT:   Head: Normocephalic.   Eyes: Pupils are equal, round, and reactive to light.   Neck: Normal carotid pulses and no JVD present. Carotid bruit is not present. No thyromegaly present.   Cardiovascular: Normal rate, regular rhythm, normal heart sounds and normal pulses.  No extrasystoles are present. PMI is not displaced. Exam reveals no gallop and no S3.   No murmur heard.  Pulses:       Carotid  pulses are 2+ on the right side, and 2+ on the left side.       Radial pulses are 2+ on the right side, and 2+ on the left side.   Pulmonary/Chest: Breath sounds normal. No stridor. No respiratory distress.   B/l decreased BS, no bronchi   Abdominal: Soft. Bowel sounds are normal. There is no tenderness. There is no rebound.   Musculoskeletal: Normal range of motion.   Neurological: He is alert and oriented to person, place, and time.   Skin: Skin is intact. No rash noted.   Psychiatric: His behavior is normal.       Lab Results   Component Value Date    CHOL 204 (H) 11/14/2017    CHOL 196 08/07/2017    CHOL 133 02/28/2017     Lab Results   Component Value Date    HDL 43 11/14/2017    HDL 46 08/07/2017    HDL 53 02/28/2017     Lab Results   Component Value Date    LDLCALC 147.8 11/14/2017    LDLCALC 128.2 08/07/2017    LDLCALC 65.0 02/28/2017     Lab Results   Component Value Date    TRIG 66 11/14/2017    TRIG 109 08/07/2017    TRIG 75 02/28/2017     Lab Results   Component Value Date    CHOLHDL 21.1 11/14/2017    CHOLHDL 23.5 08/07/2017    CHOLHDL 39.8 02/28/2017       Chemistry        Component Value Date/Time     07/18/2018 0928    K 4.5 07/18/2018 0928     07/18/2018 0928    CO2 28 07/18/2018 0928    BUN 12 07/18/2018 0928    CREATININE 0.8 07/18/2018 0928    GLU 94 08/07/2018 1518        Component Value Date/Time    CALCIUM 9.3 07/18/2018 0928    ALKPHOS 62 07/18/2018 0928    AST 13 07/18/2018 0928    ALT 11 07/18/2018 0928    BILITOT 0.6 07/18/2018 0928    ESTGFRAFRICA >60 07/18/2018 0928    EGFRNONAA >60 07/18/2018 0928          Lab Results   Component Value Date    HGBA1C 6.0 01/17/2013     Lab Results   Component Value Date    TSH 0.994 05/19/2014     Lab Results   Component Value Date    INR 1.1 10/10/2015    INR 1.0 10/10/2015    INR 1.0 07/26/2005     Lab Results   Component Value Date    WBC 8.58 07/18/2018    HGB 12.2 (L) 07/18/2018    HCT 37.6 (L) 07/18/2018    MCV 86 07/18/2018      07/18/2018     BMP  Sodium   Date Value Ref Range Status   07/18/2018 141 136 - 145 mmol/L Final     Potassium   Date Value Ref Range Status   07/18/2018 4.5 3.5 - 5.1 mmol/L Final     Chloride   Date Value Ref Range Status   07/18/2018 106 95 - 110 mmol/L Final     CO2   Date Value Ref Range Status   07/18/2018 28 23 - 29 mmol/L Final     BUN, Bld   Date Value Ref Range Status   07/18/2018 12 8 - 23 mg/dL Final     Creatinine   Date Value Ref Range Status   07/18/2018 0.8 0.5 - 1.4 mg/dL Final     Calcium   Date Value Ref Range Status   07/18/2018 9.3 8.7 - 10.5 mg/dL Final     Anion Gap   Date Value Ref Range Status   07/18/2018 7 (L) 8 - 16 mmol/L Final     eGFR if    Date Value Ref Range Status   07/18/2018 >60 >60 mL/min/1.73 m^2 Final     eGFR if non    Date Value Ref Range Status   07/18/2018 >60 >60 mL/min/1.73 m^2 Final     Comment:     Calculation used to obtain the estimated glomerular filtration  rate (eGFR) is the CKD-EPI equation.        BNP  @LABRCNTIP(BNP,BNPTRIAGEBLO)@  @LABRCNTIP(troponini)@  CrCl cannot be calculated (Patient's most recent lab result is older than the maximum 7 days allowed.).  No results found in the last 24 hours.  No results found in the last 24 hours.  No results found in the last 24 hours.    Assessment:      1. Coronary artery disease involving native coronary artery of native heart without angina pectoris    2. Mixed hyperlipidemia    3. Essential hypertension    4. PSVT (paroxysmal supraventricular tachycardia)    5. Statin intolerance      CP free  No c/o CHF   BP wnl  Had thoracentesis recently.   Plan:   Lipid profile  Continue ASA and Rapetha  Continue current meds.  Recommend heart-healthy diet, weight control and regular exercise.  Yamile. Risk modification.     I have reviewed all pertinent labs and cardiac studies. Plans and recommendations have been formulated under my direct supervision. All questions answered and patient voiced  understanding. Patient to continue current medications.   RTC in 1 yr

## 2018-08-22 DIAGNOSIS — J44.9 MODERATE COPD (CHRONIC OBSTRUCTIVE PULMONARY DISEASE): ICD-10-CM

## 2018-08-22 RX ORDER — ALBUTEROL SULFATE 90 UG/1
AEROSOL, METERED RESPIRATORY (INHALATION)
Qty: 9 EACH | Refills: 9 | Status: SHIPPED | OUTPATIENT
Start: 2018-08-22 | End: 2019-06-05 | Stop reason: ALTCHOICE

## 2018-08-29 ENCOUNTER — ANESTHESIA EVENT (OUTPATIENT)
Dept: ENDOSCOPY | Facility: HOSPITAL | Age: 73
End: 2018-08-29
Payer: MEDICARE

## 2018-08-29 ENCOUNTER — ANESTHESIA (OUTPATIENT)
Dept: ENDOSCOPY | Facility: HOSPITAL | Age: 73
End: 2018-08-29
Payer: MEDICARE

## 2018-08-29 ENCOUNTER — HOSPITAL ENCOUNTER (OUTPATIENT)
Facility: HOSPITAL | Age: 73
Discharge: HOME | End: 2018-08-29
Attending: INTERNAL MEDICINE | Admitting: INTERNAL MEDICINE
Payer: MEDICARE

## 2018-08-29 DIAGNOSIS — K31.89 DEFORMED PYLORUS, ACQUIRED: ICD-10-CM

## 2018-08-29 DIAGNOSIS — K29.60 EROSIVE GASTRITIS: Primary | ICD-10-CM

## 2018-08-29 DIAGNOSIS — K20.90 ESOPHAGITIS: ICD-10-CM

## 2018-08-29 DIAGNOSIS — B37.81 CANDIDA ESOPHAGITIS: ICD-10-CM

## 2018-08-29 PROCEDURE — 43239 EGD BIOPSY SINGLE/MULTIPLE: CPT | Performed by: INTERNAL MEDICINE

## 2018-08-29 PROCEDURE — 25000003 PHARM REV CODE 250: Performed by: NURSE ANESTHETIST, CERTIFIED REGISTERED

## 2018-08-29 PROCEDURE — 88305 TISSUE EXAM BY PATHOLOGIST: CPT | Mod: 59 | Performed by: PATHOLOGY

## 2018-08-29 PROCEDURE — 88305 TISSUE EXAM BY PATHOLOGIST: CPT | Mod: 26,,, | Performed by: PATHOLOGY

## 2018-08-29 PROCEDURE — 37000008 HC ANESTHESIA 1ST 15 MINUTES: Performed by: INTERNAL MEDICINE

## 2018-08-29 PROCEDURE — 37000009 HC ANESTHESIA EA ADD 15 MINS: Performed by: INTERNAL MEDICINE

## 2018-08-29 PROCEDURE — 27201012 HC FORCEPS, HOT/COLD, DISP: Performed by: INTERNAL MEDICINE

## 2018-08-29 PROCEDURE — 25000003 PHARM REV CODE 250: Performed by: INTERNAL MEDICINE

## 2018-08-29 PROCEDURE — 63600175 PHARM REV CODE 636 W HCPCS: Performed by: NURSE ANESTHETIST, CERTIFIED REGISTERED

## 2018-08-29 PROCEDURE — 43239 EGD BIOPSY SINGLE/MULTIPLE: CPT | Mod: ,,, | Performed by: INTERNAL MEDICINE

## 2018-08-29 RX ORDER — SODIUM CHLORIDE, SODIUM LACTATE, POTASSIUM CHLORIDE, CALCIUM CHLORIDE 600; 310; 30; 20 MG/100ML; MG/100ML; MG/100ML; MG/100ML
INJECTION, SOLUTION INTRAVENOUS ONCE
Status: COMPLETED | OUTPATIENT
Start: 2018-08-29 | End: 2018-08-29

## 2018-08-29 RX ORDER — SODIUM CHLORIDE, SODIUM LACTATE, POTASSIUM CHLORIDE, CALCIUM CHLORIDE 600; 310; 30; 20 MG/100ML; MG/100ML; MG/100ML; MG/100ML
INJECTION, SOLUTION INTRAVENOUS CONTINUOUS PRN
Status: DISCONTINUED | OUTPATIENT
Start: 2018-08-29 | End: 2018-08-29

## 2018-08-29 RX ORDER — LIDOCAINE HYDROCHLORIDE 20 MG/ML
INJECTION, SOLUTION EPIDURAL; INFILTRATION; INTRACAUDAL; PERINEURAL
Status: DISCONTINUED | OUTPATIENT
Start: 2018-08-29 | End: 2018-08-29

## 2018-08-29 RX ORDER — SODIUM CHLORIDE, SODIUM LACTATE, POTASSIUM CHLORIDE, CALCIUM CHLORIDE 600; 310; 30; 20 MG/100ML; MG/100ML; MG/100ML; MG/100ML
INJECTION, SOLUTION INTRAVENOUS CONTINUOUS
Status: CANCELLED | OUTPATIENT
Start: 2018-08-29

## 2018-08-29 RX ORDER — SODIUM CHLORIDE 0.9 % (FLUSH) 0.9 %
3 SYRINGE (ML) INJECTION
Status: DISCONTINUED | OUTPATIENT
Start: 2018-08-29 | End: 2018-08-29 | Stop reason: HOSPADM

## 2018-08-29 RX ORDER — PROPOFOL 10 MG/ML
VIAL (ML) INTRAVENOUS
Status: DISCONTINUED | OUTPATIENT
Start: 2018-08-29 | End: 2018-08-29

## 2018-08-29 RX ADMIN — PROPOFOL 20 MG: 10 INJECTION, EMULSION INTRAVENOUS at 08:08

## 2018-08-29 RX ADMIN — SODIUM CHLORIDE, SODIUM LACTATE, POTASSIUM CHLORIDE, AND CALCIUM CHLORIDE: .6; .31; .03; .02 INJECTION, SOLUTION INTRAVENOUS at 07:08

## 2018-08-29 RX ADMIN — SODIUM CHLORIDE, SODIUM LACTATE, POTASSIUM CHLORIDE, AND CALCIUM CHLORIDE: 600; 310; 30; 20 INJECTION, SOLUTION INTRAVENOUS at 08:08

## 2018-08-29 RX ADMIN — LIDOCAINE HYDROCHLORIDE 100 MG: 20 INJECTION, SOLUTION EPIDURAL; INFILTRATION; INTRACAUDAL; PERINEURAL at 08:08

## 2018-08-29 RX ADMIN — PROPOFOL 150 MG: 10 INJECTION, EMULSION INTRAVENOUS at 08:08

## 2018-08-29 NOTE — H&P
"  Short Stay Endoscopy History and Physical    PCP - Dwaine Davis MD    Procedure - EGD  ASA - 3  Mallampati - per anesthesia  History of Anesthesia problems - no  Family history Anesthesia problems -  no     HPI:  This is a 73 y.o.male here for evaluation of : Esophagitis    Reflux - yes  Dysphagia - no  Abdominal pain - no  Diarrhea - no  Anemia - no  GI bleeding - no  Nausea and vomiting-no  Early satiety-no  aversion to sight or smell of food-no    ROS:  Constitutional: No fevers, chills, No weight loss  ENT: No allergies  CV: No chest pain  Pulm: No cough, No shortness of breath  Ophtho: No vision changes  GI: see HPI  Derm: No rash  Heme: No lymphadenopathy, No bruising  MSK: No arthritis  : No dysuria, No hematuria  Endo: No hot or cold intolerance  Neuro: No syncope, No seizure  Psych: No anxiety, No depression    Medical History:  Past Medical History:   Diagnosis Date    Adrenal mass     david    Aspiration pneumonia 10/15    puree/honey    Benign prostate hyperplasia     CAD (coronary artery disease)     dr padilla    Chronic pain     dr santos    COPD (chronic obstructive pulmonary disease)     papi    Ex-smoker     11/13    Hyperlipidemia     Osteopenia 1/15 tran 1/18    PVD (peripheral vascular disease)     noobs 07 khuri    Pyriform sinus cancer     dr ponce radiation 1/2-14 dr king perales    Sleep apnea     cpap    Squamous cell carcinoma of skin     roberto    Tongue cancer     "superficial" removed 11/14    Vocal cord cancer 2011    Xerostomia     radiation       Surgical History:  Past Surgical History:   Procedure Laterality Date    APPENDECTOMY      tongue cancer excision  11/14    dr vitale    VOCAL CORD LATERALIZATION, ENDOSCOPIC APPROACH W/ SIMONEB      kris       Family History:  Family History   Problem Relation Age of Onset    Cancer Father     Cancer Brother        Social History:  Social History     Socioeconomic History    Marital status:      " Spouse name: JYOTI    Number of children: 6    Years of education: Not on file    Highest education level: Not on file   Social Needs    Financial resource strain: Not on file    Food insecurity - worry: Not on file    Food insecurity - inability: Not on file    Transportation needs - medical: Not on file    Transportation needs - non-medical: Not on file   Occupational History    Not on file   Tobacco Use    Smoking status: Former Smoker    Smokeless tobacco: Never Used   Substance and Sexual Activity    Alcohol use: No    Drug use: No    Sexual activity: Not Currently   Other Topics Concern    Not on file   Social History Narrative    Not on file       Allergies:   Review of patient's allergies indicates:   Allergen Reactions    Contrast media     Iodinated contrast- oral and iv dye      Other reaction(s): Itching  Other reaction(s): Hives    Neomycin-bacitracin-polymyxin      Other reaction(s): Rash  Other reaction(s): Rash    Pravastatin      Other reaction(s): fatigue  Other reaction(s): fatigue    Rosuvastatin      Other reaction(s): fatigue  Other reaction(s): fatigue    Simvastatin      Other reaction(s): fatigue  Other reaction(s): fatigue    Statins-hmg-coa reductase inhibitors        Medications:   No current facility-administered medications on file prior to encounter.      Current Outpatient Medications on File Prior to Encounter   Medication Sig Dispense Refill    aspirin 81 mg Tab Take 1 tablet by mouth Daily. Over the counter to help prevent stroke/heart attack      evolocumab 420 mg/3.5 mL Injt Inject into the skin.      gabapentin (NEURONTIN) 800 MG tablet Take 1 tablet (800 mg total) by mouth 3 (three) times daily. 90 tablet 1    garlic 2,000 mg Cap Take by mouth.      Lactobacillus rhamnosus GG (CULTURELLE) 10 billion cell capsule Take 1 capsule by mouth once daily.      multivitamin capsule Take 1 capsule by mouth once daily.      pantoprazole (PROTONIX) 40 MG  tablet Take 1 tablet (40 mg total) by mouth once daily. 90 tablet 3    pilocarpine (SALAGEN) 5 MG Tab TAKE 1 TABLET BY MOUTH 30 MINUTES PRIOR TO EACH MEAL 90 tablet 2    turmeric root extract 500 mg Cap Take by mouth.      varenicline (CHANTIX CONTINUING MONTH BOX) 1 mg Tab TAKE 1 TABLET (1 MG TOTAL) BY MOUTH ONCE DAILY. 56 tablet 3    fish oil-omega-3 fatty acids 300-1,000 mg capsule Take 2 g by mouth.      loratadine (CLARITIN) 10 mg tablet Take 1 tablet by mouth Daily.      senna-docusate 8.6-50 mg (GARCIA-COLACE) 8.6-50 mg per tablet Take 2 tablets by mouth once daily. 30 tablet 1       Objective Findings:    Vital Signs:  Vitals:    08/29/18 0734   BP: 124/70   Pulse: 64   Resp: 18   Temp: 98 °F (36.7 °C)           Physical Exam:  General Appearance: Well appearing in no acute distress  Eyes:    No scleral icterus  ENT: Neck supple, Lips, mucosa, and tongue normal; teeth and gums normal  Lungs: CTA bilaterally in anterior and posterior fields, no wheezes, no crackles.  Heart:  Regular rate, S1, S2 normal, no murmurs heard.  Abdomen: Soft, non tender, non distended with normal bowel sounds. No hepatosplenomegaly, ascites, or mass.  Extremities: No clubbing, cyanosis or edema  Skin: No rash    Labs:  Reviewed    Plan:EGD  I have explained the risks and benefits of endoscopy procedures to the patient including but not limited to bleeding, perforation, infection, and death. The patient wishes to proceed.

## 2018-08-29 NOTE — DISCHARGE INSTRUCTIONS
What Is a Hiatal Hernia?    Hiatal hernia is when the area where the stomach and esophagus meet bulges up through the diaphragm into the chest cavity. In some cases, part of the stomach may bulge above the diaphragm. Stomach acid may move up into the esophagus and cause symptoms. The symptoms are often blamed on gastroesophageal reflux disease (GERD). You may only know about the hernia when it shows up on an X-ray taken for other reasons.   What you may feel  The hiatus is a normal hole in the diaphragm. The esophagus passes through this hole and leads to the stomach. In some cases, part of the stomach may bulge above the diaphragm. This bulge is called a hernia. Stomach acid may move up into the esophagus and cause symptoms.  When you eat, the muscle at the hiatus relaxes to allow food to pass into the stomach. It tightens again to keep food and digestive acids in the stomach.  Many people with hiatal hernias have mild symptoms. You may notice the following GERD symptoms:  · Heartburn or other chest discomfort  · A feeling of chest fullness after a meal  · Frequent burping  · Acid taste in the mouth  · Trouble swallowing  Treating symptoms  If you have been diagnosed with hiatal hernia, these suggestions may help improve symptoms:  · Lose excess weight. Extra weight puts pressure on the stomach and esophagus.  · Dont lie down after eating. Sit up for at least an hour after eating. Lying down after eating can increase symptoms.  · Avoid certain foods and drinks. These include fatty foods, chocolate, coffee, mint, and other foods that cause symptoms for you.  · Dont smoke or drink alcohol. These can worsen symptoms.  · Look at your medicines. Discuss your medicines with your healthcare provider. Many medicines can cause symptoms.  · Consider an antacid medicine. Ask your healthcare provider about over-the-counter and prescription medicines that may help.  · Ask about surgery, if needed. Surgery is a treatment  choice for some people. Your healthcare provider can determine if surgery is an option for you.    Date Last Reviewed: 10/1/2016  © 0197-3192 [employer]. [address] [place] [address], [place] [address]. All rights reserved. This information is not intended as a substitute for professional medical care. Always follow your healthcare professional's instructions.        Gastritis (Adult)    Gastritis is inflammation and irritation of the stomach lining. It can be present for a short time (acute) or be long lasting (chronic). Gastritis is often caused by infection with bacteria called H pylori. More than a third of people in the [place] have this bacteria in their bodies. In many cases, H pylori causes no problems or symptoms. In some people, though, the infection irritates the stomach lining and causes gastritis. Other causes of stomach irritation include drinking alcohol or taking pain-relieving medicines called NSAIDs (such as aspirin or ibuprofen).   Symptoms of gastritis can include:  · Abdominal pain or bloating  · Loss of appetite  · Nausea or vomiting  · Vomiting blood or having black stools  · Feeling more tired than usual  An inflamed and irritated stomach lining is more likely to develop a sore called an ulcer. To help prevent this, gastritis should be treated.  Home care  If needed, medicines may be prescribed. If you have H pylori infection, treating it will likely relieve your symptoms. Other changes can help reduce stomach irritation and help it heal.  · If you have been prescribed medicines for H pylori infection, take them as directed. Take all of the medicine until it is finished or your healthcare provider tells you to stop, even if you feel better.  · Your healthcare provider may recommend avoiding NSAIDs. If you take daily aspirin for your heart or other medical reasons, do not stop without talking to your healthcare provider first.  · Avoid drinking alcohol.  · Stop smoking. Smoking can  irritate the stomach and delay healing. As much as possible, stay away from second hand smoke.  Follow-up care  Follow up with your healthcare provider, or as advised by our staff. Testing may be needed to check for inflammation or an ulcer.  When to seek medical advice  Call your healthcare provider for any of the following:  · Stomach pain that gets worse or moves to the lower right abdomen (appendix area)  · Chest pain that appears or gets worse, or spreads to the back, neck, shoulder, or arm  · Frequent vomiting (cant keep down liquids)  · Blood in the stool or vomit (red or black in color)  · Feeling weak or dizzy  · Fever of 100.4ºF (38ºC) or higher, or as directed by your healthcare provider  Date Last Reviewed: 6/22/2015 © 2000-2017 [employer]. [address] [place] [address], [place] [address]. All rights reserved. This information is not intended as a substitute for professional medical care. Always follow your healthcare professional's instructions.        Esophagitis     With esophagitis, the lining of the esophagus is inflamed.   Do you often have burning pain in your chest? You may have esophagitis. This is when the lining of the esophagus becomes red and swollen (inflamed). The esophagus is the tube that connects your throat to your stomach. This sheet tells you more about esophagitis. It also explains your treatment options.  Main types of esophagitis  Reflux esophagitis. This is the more common type. It is caused by GERD (gastroesophageal reflux disease). Stomach contents with stomach acid flow back up into the esophagus. This happens over and over. It leads to inflammation. Risk factors can include:  · Being overweight  · Asthma  · Smoking  · Pregnancy  · Frequent vomiting  · Certain medicines (such as aspirin and other anti-inflammatories)  · Hiatal hernia  Infectious esophagitis. This is caused by an infection. You are more at risk for this if you have a weakened immune system and poor  nutrition. Antibiotic use can also be a factor. The infection is often due to the following:  · A type of fungus (typically candida)  · A virus, such as herpes simplex virus 1 (HSV-1) or cytomegalovirus (CMV)  Eosinophilic esophagitis. Foods or other things around you can give you an allergic reaction. This triggers an immune response and leads to esophagitis.  Pill-induced esophagitis. Certain types of medicines can cause inflammation and ulcers in the esophagus. These include doxycycline, aspirin, NSAIDs, alendronate, potassium, quinidine, iron.  Symptoms of esophagitis  The following symptoms can occur with esophagitis:  · Pain when swallowing, or trouble swallowing  · Pain behind your breastbone (heartburn)  · Acid regurgitation  · Chronic sore throat  · Gum Inflammation  · Cavities  · Bad breath  · Nausea  · Pain in your upper belly (abdomen)  · Bleeding (indicated by bright red vomit or black, tarry stool)  These symptoms occur more often with reflux esophagitis:  · Coughing, wheezing, or asthma  · Hoarseness  Diagnosis of esophagitis  Your healthcare provider will ask about your health history and symptoms. Youll also be examined. Sometimes certain tests are needed. These may include:  · Upper endoscopy. A thin, flexible tube with a tiny light and camera is used. It is inserted through the mouth down into the esophagus. This lets the provider look for damage. A small sample of tissue (biopsy) may also be removed. The sample is sent to a lab for testing.  · Upper GI X-ray with barium. An X-ray is done after you drink a substance called barium. Barium may make problems in the esophagus easier to see on an x-ray.  · Esophageal pH. A soft, thin tube is passed into the esophagus through the nose or mouth for 24 hours. It measures the acid level in the esophagus.  · Esophageal manometry. A soft, thin tube is passed into the esophagus through the nose or mouth. It measures muscle contractions in the  esophagus.  Treatment of esophagitis  Medicines. Different medicines can help treat esophagitis. The medicine used will depend on the type of esophagitis you have. Talk with your healthcare provider.  Lifestyle changes. Making the following changes can help reduce irritation and ease your symptoms:  · Avoid spicy foods (pepper, chili powder, dempsey). Also avoid hard foods (nuts, crackers, raw vegetables) and acidic foods and drinks (tomatoes, citrus fruits and juices). Other problem foods include chocolate, peppermint, nutmeg, and foods high in fat.  · Until you can swallow without pain, follow a combined liquid and soft diet. Try foods such as cooked cereals, mashed potatoes, and soups.  · Take small bites and chew your food thoroughly.  · Avoid large meals and heavy evening meals. Don't lie down within 2 to 3 hours of eating.  · Get to or stay at a healthy weight.  · Avoid alcohol, caffeine, and smoking or tobacco products.  · Brush and floss your teeth  · Raise your upper body by 4 to 6 inches when lying in bed. This can be done using a foam wedge. Or put blocks under the legs at the head of your bed.  Surgery. This may be needed for severe reflux esophagitis. Other noninvasive procedures to treat GERD and esophagitis are being studied. Your provider can tell you more.  Why treatment Is important  Without treatment, esophagitis can get worse. This is especially true with severe reflux esophagitis. For instance, continued symptoms can cause scarring of the esophagus. Over time, this can cause a narrowing the esophagus (stricture). This can make it hard to pass food down to the stomach. As symptoms go on they can also cause changes in the lining of the esophagus. These changes can put you at a slightly higher risk of cancer of the esophagus.   Date Last Reviewed: 7/1/2016  © 3565-9016 [employer]. [address] [place] [address], [place] [address]. All rights reserved. This information is not intended as a  substitute for professional medical care. Always follow your healthcare professional's instructions.

## 2018-08-29 NOTE — ANESTHESIA RELEASE NOTE
"Anesthesia Release from PACU Note    Patient: Noble Tripp    Procedure(s) Performed: Procedure(s) (LRB):  ESOPHAGOGASTRODUODENOSCOPY (EGD) (N/A)    Anesthesia type: MAC    Post pain: Adequate analgesia    Post assessment: no apparent anesthetic complications, tolerated procedure well and no evidence of recall    Last Vitals:   Visit Vitals  BP (!) 91/52   Pulse 67   Temp 36.7 °C (98 °F) (Oral)   Resp 14   Ht 5' 8" (1.727 m)   Wt 77.1 kg (170 lb)   SpO2 (!) 94%   BMI 25.85 kg/m²       Post vital signs: stable    Level of consciousness: awake, alert  and oriented    Nausea/Vomiting: no nausea/no vomiting    Complications: none    Airway Patency: patent    Respiratory: unassisted, spontaneous ventilation, room air    Cardiovascular: stable and blood pressure at baseline    Hydration: euvolemic  "

## 2018-08-29 NOTE — ANESTHESIA PREPROCEDURE EVALUATION
08/29/2018  Noble Tripp is a 73 y.o., male.    Pre-op Assessment    I have reviewed the Patient Summary Reports.     I have reviewed the Nursing Notes.   I have reviewed the Medications.     Review of Systems  Anesthesia Hx:  No problems with previous Anesthesia  Denies Family Hx of Anesthesia complications.   Denies Personal Hx of Anesthesia complications.   Social:  Former Smoker    Hematology/Oncology:         -- Cancer in past history: Oncology Comments: H/O vocal cord CA coughs when drinking thin liquids     Cardiovascular:   Exercise tolerance: good Hypertension (denies HBP) Denies Valvular problems/Murmurs. Past MI (Silent MI did not seek treatment) CAD   Dysrhythmias (PSVT)   Denies Angina. hyperlipidemia ECG has been reviewed. See Cards every 6 months no changes last time he saw them    TEST DESCRIPTION   Technical Quality: This is a technically challenging study.     Aorta: The aortic root is normal in size, measuring 3.1 cm at sinotubular junction and 3.7 cm at Sinuses of Valsalva.     Left Atrium: The left atrium is normal in size, measuring 5.2 cm across in the apical view.     Left Ventricle: The left ventricle is normal in size, with an end-diastolic diameter of 4.2 cm, and an end-systolic diameter of 3.1 cm. LV wall thickness is normal, with the septum measuring 1.7 cm and the posterior wall measuring 1.4 cm across. Relative   wall thickness was increased at 0.67, and the LV mass index was increased at 171.6 g/m2 consistent with concentric left ventricular hypertrophy. Global left ventricular systolic function appears normal. Visually estimated ejection fraction is 60-65%.   The LV Doppler derived stroke volume equals 66.0 ccs.   Left atrial pressures are normal. The E/e'(lat) is 8.  This along with the following abnormalities (LVMI = 171.60) suggests diastolic dysfunction secondary  to relaxation abnormality.     Right Atrium: The right atrium is normal in size, measuring 5.1 cm in length and 4.1 cm in width in the apical view.     Right Ventricle: The right ventricle is normal in size. Global right ventricular systolic function appears normal. The estimated PA systolic pressure is 34 mmHg.     Aortic Valve:  The aortic valve is normal in structure.     Mitral Valve:  The mitral valve is normal in structure.     Tricuspid Valve:  The tricuspid valve is normal in structure. There is trivial tricuspid regurgitation.     Pulmonary Valve:  The pulmonic valve is not well seen. There is mild pulmonic regurgitation.     IVC: IVC is enlarged and collapses < 50% with a sniff, suggesting high right atrial pressure of 15 mmHg.     Intracavitary: There is no evidence of pericardial effusion, intracavity mass, thrombi, or vegetation.         CONCLUSIONS     1 - Concentric hypertrophy.     2 - Normal left ventricular systolic function (EF 60-65%).     3 - Left ventricular diastolic dysfunction.     4 - Normal right ventricular systolic function .     5 - The estimated PA systolic pressure is 34 mmHg.     6 - Trivial tricuspid regurgitation.     7 - Increased central venous pressure.  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  Obstructive Sleep Apnea (JUANITO), CPAP used.   Renal/:  Renal/ Normal     Hepatic/GI:  Hepatic/GI Normal    Musculoskeletal:  Musculoskeletal Normal    Neurological:  Neurology Normal    Endocrine:  Endocrine Normal    Psych:  Psychiatric Normal           Physical Exam  General:  Well nourished    Airway/Jaw/Neck:  Airway Findings: Mouth Opening: Normal Tongue: Normal  General Airway Assessment: Adult       Chest/Lungs:  Chest/Lungs Findings: Normal Respiratory Rate      Heart/Vascular:  Heart Findings: Rate: Normal             Anesthesia Plan  Type of Anesthesia, risks & benefits discussed:  Anesthesia Type:  MAC  Patient's Preference:   Intra-op Monitoring Plan:   Intra-op Monitoring Plan Comments:   Post Op Pain Control Plan:   Post Op Pain Control Plan Comments:   Induction:   IV  Beta Blocker:  Patient is not currently on a Beta-Blocker (No further documentation required).       Informed Consent: Patient understands risks and agrees with Anesthesia plan.  Questions answered. Anesthesia consent signed with patient.  ASA Score: 3     Day of Surgery Review of History & Physical: I have interviewed and examined the patient. I have reviewed the patient's H&P dated:  There are no significant changes.          Ready For Surgery From Anesthesia Perspective.

## 2018-08-29 NOTE — PLAN OF CARE
Dr Hill came to bedside and discussed findings. NO N/V,  no abdominal pain, no GI bleeding, and vitals stable.  Pt discharged from unit.

## 2018-08-29 NOTE — [employer] PATIENT INSTRUCTIONS
Discharge Summary/Instructions after an Endoscopic Procedure  Patient Name: Noble Tripp  Patient MRN: [id number]  Patient YOB: 1945 Wednesday, August 29, 2018 Audie Hill III, MD  RESTRICTIONS:  During your procedure today, you received medications for sedation.  These   medications may affect your judgment, balance and coordination.  Therefore,   for 24 hours, you have the following restrictions:   - DO NOT drive a car, operate machinery, make legal/financial decisions,   sign important papers or drink alcohol.    ACTIVITY:  Today: no heavy lifting, straining or running due to procedural   sedation/anesthesia.  The following day: return to full activity including work.  DIET:  Eat and drink normally unless instructed otherwise.     TREATMENT FOR COMMON SIDE EFFECTS:  - Mild abdominal pain, nausea, belching, bloating or excessive gas:  rest,   eat lightly and use a heating pad.  - Sore Throat: treat with throat lozenges and/or gargle with warm salt   water.  - Because air was used during the procedure, expelling large amounts of air   from your rectum or belching is normal.  - If a bowel prep was taken, you may not have a bowel movement for 1-3 days.    This is normal.  SYMPTOMS TO WATCH FOR AND REPORT TO YOUR PHYSICIAN:  1. Abdominal pain or bloating, other than gas cramps.  2. Chest pain.  3. Back pain.  4. Signs of infection such as: chills or fever occurring within 24 hours   after the procedure.  5. Rectal bleeding, which would show as bright red, maroon, or black stools.   (A tablespoon of blood from the rectum is not serious, especially if   hemorrhoids are present.)  6. Vomiting.  7. Weakness or dizziness.  GO DIRECTLY TO THE NEAREST EMERGENCY ROOM IF YOU HAVE ANY OF THE FOLLOWING:      Difficulty breathing              Chills and/or fever over 101 F   Persistent vomiting and/or vomiting blood   Severe abdominal pain   Severe chest pain   Black, tarry stools   Bleeding- more than one  tablespoon   Any other symptom or condition that you feel may need urgent attention  Your doctor recommends these additional instructions:  If any biopsies were taken, your doctors clinic will contact you in 1 to 2   weeks with any results.  - Discharge patient to home (via wheelchair).   - Resume previous diet.   - Continue present medications.   - Await pathology results.   - Return to GI clinic in 3 weeks.  For questions, problems or results please call your physician Audie Hill III, MD at Work:  [phone]  If you have any questions about the above instructions, call the GI   department at [phone] or call the endoscopy unit at [phone]   from 7am until 3 pm.  [facility] [place], EMERGENCY ROOM PHONE NUMBER:   [phone]  IF A COMPLICATION OR EMERGENCY SITUATION ARISES AND YOU ARE UNABLE TO REACH   YOUR PHYSICIAN - GO DIRECTLY TO THE EMERGENCY ROOM.  I have read or have had read to me these discharge instructions for my   procedure and have received a written copy.  I understand these   instructions and will follow-up with my physician if I have any questions.     __________________________________       _____________________________________  Nurse Signature                                          Patient/Designated   Responsible Party Signature  Audie Hill III, MD  8/29/2018 8:31:16 AM  This report has been verified and signed electronically.  [employer]

## 2018-08-29 NOTE — ANESTHESIA POSTPROCEDURE EVALUATION
"Anesthesia Post Evaluation    Patient: Noble Tripp    Procedure(s) Performed: Procedure(s) (LRB):  ESOPHAGOGASTRODUODENOSCOPY (EGD) (N/A)    Final Anesthesia Type: MAC  Patient location during evaluation: PACU  Patient participation: Yes- Able to Participate  Level of consciousness: awake and alert and oriented  Post-procedure vital signs: reviewed and stable  Pain management: adequate  Airway patency: patent  PONV status at discharge: No PONV  Anesthetic complications: no      Cardiovascular status: blood pressure returned to baseline, hemodynamically stable and stable  Respiratory status: unassisted, spontaneous ventilation and room air  Hydration status: euvolemic  Follow-up not needed.        Visit Vitals  BP (!) 91/52   Pulse 67   Temp 36.7 °C (98 °F) (Oral)   Resp 14   Ht 5' 8" (1.727 m)   Wt 77.1 kg (170 lb)   SpO2 (!) 94%   BMI 25.85 kg/m²       Pain/Tal Score: Pain Assessment Performed: Yes (8/29/2018  7:32 AM)  Presence of Pain: complains of pain/discomfort (8/29/2018  7:32 AM)  Tal Score: 9 (8/29/2018  8:24 AM)        "

## 2018-08-29 NOTE — DISCHARGE SUMMARY
[facility]  Brief Operative Note     SUMMARY     Surgery Date: 8/29/2018     Surgeon(s) and Role:     * Audie Hill III, MD - Primary    Assisting Surgeon: None    Pre-op Diagnosis:  Esophagitis [K20.9]    Post-op Diagnosis:  Post-Op Diagnosis Codes:     * Esophagitis [K20.9]      - Candida Esophagitis      - Erosive Gastritis      - Deformed Pylorus  Procedure(s) (LRB):  ESOPHAGOGASTRODUODENOSCOPY (EGD) (N/A)    Anesthesia: Choice    Description of the findings of the procedure: Procedures completed. See Procedure note for full details.    Findings/Key Components: Procedures completed. See Procedure note for full details.    Prosthetics/Devices: None    Estimated Blood Loss: * No values recorded between 8/29/2018 12:00 AM and 8/29/2018  8:34 AM *         Specimens:   Specimen (12h ago, onward)    Start     Ordered    08/29/18 0808  Specimen to Pathology - Surgery  Once     Comments:  Jar 1 Antrum Gastritis BxJar 2 Corpus Gastritis BXJar 3 GE junction bx r/o Rai'sJar 4 Esophagus bx for candida     Start Status   08/29/18 0808 Collected (08/29/18 0818)       08/29/18 0817          Discharge Note    SUMMARY     Admit Date: 8/29/2018    Discharge Date and Time: 8/29/2018    Hospital Course (synopsis of major diagnoses, care, treatment, and services provided during the course of the hospital stay):  Procedures completed. See Procedure note for full details. Discharge patient when discharge criteria met.    Final Diagnosis: Post-Op Diagnosis Codes:     * Esophagitis [K20.9]      - Candida Esophagitis      - Erosive gastritis      - Deformed Pylorus  Disposition: Discharge patient when discharge criteria met.    Follow Up/Patient Instructions:       Medications:  Reconciled Home Medications:   Current Discharge Medication List      CONTINUE these medications which have NOT CHANGED    Details   aspirin 81 mg Tab Take 1 tablet by mouth Daily. Over the counter to help prevent stroke/heart attack       evolocumab 420 mg/3.5 mL Injt Inject into the skin.      fluticasone-umeclidin-vilanter (TRELEGY ELLIPTA) 100-62.5-25 mcg DsDv Inhale 1 puff into the lungs once daily.  Qty: 60 each, Refills: 11    Associated Diagnoses: Moderate COPD (chronic obstructive pulmonary disease)      gabapentin (NEURONTIN) 800 MG tablet Take 1 tablet (800 mg total) by mouth 3 (three) times daily.  Qty: 90 tablet, Refills: 1    Associated Diagnoses: DDD (degenerative disc disease), lumbar      garlic 2,000 mg Cap Take by mouth.      Lactobacillus rhamnosus GG (CULTURELLE) 10 billion cell capsule Take 1 capsule by mouth once daily.      multivitamin capsule Take 1 capsule by mouth once daily.      oxyCODONE-acetaminophen (PERCOCET) 7.5-325 mg per tablet Take 1 tablet by mouth every 8 (eight) hours as needed for Pain.  Qty: 90 tablet, Refills: 0      pantoprazole (PROTONIX) 40 MG tablet Take 1 tablet (40 mg total) by mouth once daily.  Qty: 90 tablet, Refills: 3      pilocarpine (SALAGEN) 5 MG Tab TAKE 1 TABLET BY MOUTH 30 MINUTES PRIOR TO EACH MEAL  Qty: 90 tablet, Refills: 2    Comments: Please consider 90 day supplies to promote better adherence      PROAIR HFA 90 mcg/actuation inhaler INHALE TWO PUFFS BY MOUTH EVERY 4 HOURS AS NEEDED FOR WHEEZING OR SHORTNESS OF BREATH  Qty: 9 each, Refills: 9    Comments: Please consider 90 day supplies to promote better adherence  Associated Diagnoses: Moderate COPD (chronic obstructive pulmonary disease)      turmeric root extract 500 mg Cap Take by mouth.      varenicline (CHANTIX CONTINUING MONTH BOX) 1 mg Tab TAKE 1 TABLET (1 MG TOTAL) BY MOUTH ONCE DAILY.  Qty: 56 tablet, Refills: 3    Associated Diagnoses: Ex-smoker      loratadine (CLARITIN) 10 mg tablet Take 1 tablet by mouth Daily.         STOP taking these medications       fish oil-omega-3 fatty acids 300-1,000 mg capsule Comments:   Reason for Stopping:         senna-docusate 8.6-50 mg (GARCIA-COLACE) 8.6-50 mg per tablet Comments:   Reason  for Stopping:              Discharge Procedure Orders   Diet general     Activity as tolerated

## 2018-08-29 NOTE — TRANSFER OF CARE
"Anesthesia Transfer of Care Note    Patient: Noble Tripp    Procedure(s) Performed: Procedure(s) (LRB):  ESOPHAGOGASTRODUODENOSCOPY (EGD) (N/A)    Patient location: PACU    Anesthesia Type: MAC    Transport from OR: Transported from OR on room air with adequate spontaneous ventilation    Post pain: adequate analgesia    Post assessment: no apparent anesthetic complications and tolerated procedure well    Post vital signs: stable    Level of consciousness: awake and responds to stimulation    Nausea/Vomiting: no nausea/vomiting    Complications: none    Transfer of care protocol was followed      Last vitals:   Visit Vitals  BP (!) 91/52   Pulse 67   Temp 36.7 °C (98 °F) (Oral)   Resp 14   Ht 5' 8" (1.727 m)   Wt 77.1 kg (170 lb)   SpO2 (!) 94%   BMI 25.85 kg/m²     "

## 2018-08-30 VITALS
SYSTOLIC BLOOD PRESSURE: 130 MMHG | TEMPERATURE: 98 F | HEART RATE: 68 BPM | DIASTOLIC BLOOD PRESSURE: 71 MMHG | OXYGEN SATURATION: 95 % | WEIGHT: 170 LBS | RESPIRATION RATE: 17 BRPM | HEIGHT: 68 IN | BODY MASS INDEX: 25.76 KG/M2

## 2018-09-05 LAB — FUNGUS SPEC CULT: NORMAL

## 2018-09-06 RX ORDER — OXYCODONE AND ACETAMINOPHEN 7.5; 325 MG/1; MG/1
1 TABLET ORAL EVERY 8 HOURS PRN
Qty: 90 TABLET | Refills: 0 | Status: SHIPPED | OUTPATIENT
Start: 2018-09-19 | End: 2018-10-19

## 2018-09-06 RX ORDER — OXYCODONE AND ACETAMINOPHEN 7.5; 325 MG/1; MG/1
1 TABLET ORAL EVERY 8 HOURS PRN
Qty: 90 TABLET | Refills: 0 | Status: SHIPPED | OUTPATIENT
Start: 2018-10-18 | End: 2018-11-14 | Stop reason: SDUPTHER

## 2018-09-11 ENCOUNTER — OFFICE VISIT (OUTPATIENT)
Dept: PAIN MEDICINE | Facility: CLINIC | Age: 73
End: 2018-09-11
Payer: MEDICARE

## 2018-09-11 VITALS
HEIGHT: 68 IN | WEIGHT: 160 LBS | RESPIRATION RATE: 18 BRPM | SYSTOLIC BLOOD PRESSURE: 113 MMHG | BODY MASS INDEX: 24.25 KG/M2 | DIASTOLIC BLOOD PRESSURE: 56 MMHG | HEART RATE: 72 BPM

## 2018-09-11 DIAGNOSIS — M51.36 DDD (DEGENERATIVE DISC DISEASE), LUMBAR: ICD-10-CM

## 2018-09-11 DIAGNOSIS — M47.816 LUMBAR FACET ARTHROPATHY: ICD-10-CM

## 2018-09-11 DIAGNOSIS — M48.061 SPINAL STENOSIS OF LUMBAR REGION WITHOUT NEUROGENIC CLAUDICATION: ICD-10-CM

## 2018-09-11 DIAGNOSIS — M53.3 SACROILIAC JOINT PAIN: ICD-10-CM

## 2018-09-11 DIAGNOSIS — M47.816 LUMBAR SPONDYLOSIS: Primary | ICD-10-CM

## 2018-09-11 PROCEDURE — 99999 PR PBB SHADOW E&M-EST. PATIENT-LVL IV: CPT | Mod: PBBFAC,,, | Performed by: PHYSICIAN ASSISTANT

## 2018-09-11 PROCEDURE — 99214 OFFICE O/P EST MOD 30 MIN: CPT | Mod: S$PBB,,, | Performed by: PHYSICIAN ASSISTANT

## 2018-09-11 PROCEDURE — 99214 OFFICE O/P EST MOD 30 MIN: CPT | Mod: PBBFAC | Performed by: PHYSICIAN ASSISTANT

## 2018-09-11 RX ORDER — GABAPENTIN 800 MG/1
800 TABLET ORAL 3 TIMES DAILY
Qty: 90 TABLET | Refills: 1 | Status: SHIPPED | OUTPATIENT
Start: 2018-09-11 | End: 2018-11-15 | Stop reason: SDUPTHER

## 2018-09-11 NOTE — PROGRESS NOTES
-discharge from [facility] [place] 9/28/2017 with bilateral MCA SAQIB watershed infarct     Chief Pain Complaint:  Low Back Pain, Leg pain (left > right)       History of Present Illness:  This patient is a 73 y.o. male who presents today complaining of the above noted pain/s. The patient describes this pain as follows.    - duration of pain: pain for years   - timing: constant   - character: aching, burning  - radiating, dermatomal: extends into left leg, along L5 pattern at times, mostly non-radiating  - antecedent trauma, prior spinal surgery: none  - pertinent negatives: No fever, No chills, No weight loss, No bladder dysfunction, No bowel dysfunction, No extremity weakness, No saddle anesthesia  - pertinent positives: none    - medications, other therapies tried (physical therapy, injections):     >> gabapentin, Norco    >> Has previously undergone Physical Therapy, which made pain worse    >> Has previously undergone spinal injection/s: including lumbar MBB and Left TFESI with Dr Taylor in 2014 & 2015 (see EMR Surgeries for full details) with only short term relief    _________________________________________________________________________________________________________________________________________________________________________________________________________________________      IMAGING / Labs / Studies (reviewed on 9/11/2018):    9/15/17 MRI LUMBAR SPINE WITHOUT CONTRAST  History: Lower back pain.  Left lower extremity pain  Technique: Standard multiplanar pulse sequences without IV contrast.  Comparison:  No prior  studies available for comparison.  Findings:   Mild-moderate scoliosis, convex to the left and centered at L3.  All lumbar vertebral bodies are normal in height.  Scattered degenerative endplate marrow signal identified at the L2-L3 and L3-L4 L5-S1 levels.  No fracture.  No suspicious osseous lesion is identified.  Mild retrolisthesis of L2-L3.  Distal spinal cord and conus medullaris have normal appearance but the conus terminates at the T12-L1 level.  T12-L1: Minimal central  protrusion.  Negative for focal nerve root impingement or central canal narrowing.  Neural foramina widely patent.  L1-2: Mild disc height loss.  Prominent intraosseous eyes.  Mild disc bulging.  Mild facet arthropathy and ligament flavum hypertrophy.  The central canal neural foramina patent.  L2-L3: Minimal retrolisthesis of L2 on L3.  There is moderate disc height loss.  Prominent anterior osteophytes.  Mild-moderate diffuse generalized disc bulging.  Mild facet arthropathy and ligament flavum hypertrophy.  The central canal is patent.  Neural foramina patent.  L3-L4: Disc desiccation and moderate disc height loss.  Large anterior osteophytes are present.  There is mild disc bulging and osteophytic ridging at the posterior disc margin.  Moderate right and mild left facet arthropathy and ligamentum flavum hypertrophy.  The central canal is patent.  Mild neural foraminal narrowing.  L4-L5: Very minimal grade 1 spondylolisthesis of L4-L5.  Mild generalized disc bulge is present.  Severe facet arthropathy and ligamentum flavum hypertrophy.  There is severe central canal and lateral recess stenosis.  Moderate neural foraminal stenosis.  L5-S1: Disc desiccation and moderate to severe disc height loss.  Prominent intraocular heights.  There is mild disc bulging and osteophytic ridging at the posterior disc margin.  Severe left and moderate right neural foraminal stenosis.  The central canal is patent.   Paravertebral soft tissues and musculature are normal.  The visualized intra-abdominal and intrapelvic contents are normal.       10/30/14 MRI Lumbar Spine Without Contrast    Narrative Exam: MRI of the lumbar spine without contrast.  History:     Low back pain. Status post SHEY, with S1-S2 radicular symptoms  Comparison: Prior study dated 04/02/13  Findings:     A convex right scoliosis again noted.  No compression fracture or subluxation.  The conus medullaris has a normal appearance.  The paraspinous soft tissues are  "unremarkable.  The T12 -- L1 and L1 -- 2 disk levels are essentially unremarkable.  L2 -- 3: Mild annular bulging again noted.    L3 -- 4: Moderate narrowing of the right lateral disk space, with right greater than left facet arthropathy, unchanged.    L4 -- 5: Marked bilateral facet arthropathy, with mild annular bulging, causing moderate central canal stenosis, unchanged.  L5 -- S1: Disk desiccation, with moderate disk space narrowing.  Significant degenerative narrowing of the left L5 neural foramen is apparent, unchanged.     _________________________________________________________________________________________________________________________________________________________________________________________________________________________      Review of Systems:  CONSTITUTIONAL: patient denies any fever, chills, or weight loss  SKIN: patient denies any rash or itching  RESPIRATORY: patient denies having any shortness of breath  GASTROINTESTINAL: patient denies having any diarrhea, constipation, or bowel incontinence  GENITOURINARY: patient denies having any abnormal bladder function    MUSCULOSKELETAL:  - patient reports low back pain    NEUROLOGICAL:   - pain as above  - strength in Lower extremities is intact, BILATERALLY  - sensation in Lower extremities is intact, BILATERALLY  - patient denies any loss of bowel or bladder control      PSYCHIATRIC: patient denies any suicidal or homicidal ideations    _________________________________________________________________________________________________________________________________________________________________________________________________________________________      Physical Exam:  Vitals:  BP (!) 113/56 (BP Location: Right arm, Patient Position: Sitting, BP Method: Medium (Automatic))   Pulse 72   Resp 18   Ht 5' 8" (1.727 m)   Wt 72.6 kg (160 lb)   BMI 24.33 kg/m²   (reviewed on 9/11/2018)    General: alert and oriented, in no apparent " distress.  Gait: normal gait.  Skin: no rashes, no discoloration, no obvious lesions  HEENT: normocephalic, atraumatic. Pupils equal and round.  Cardiovascular: no significant peripheral edema present.  Respiratory: without use of accessory muscles of respiration.    Musculoskeletal - Lumbar Spine:  - Pain on extension of lumbar spine: Present  - Lumbar facet loading: Present  - TTP over the lumbar facet joints: Present Bilaterally at L5-S1   - TTP over the SI joints:  Present on left   - Straight Leg Raise: Negative  - NOELLE: Present    Neuro - Lower Extremities:  - BLE Strength: R/L: HF: 5/5, HE: 5/5, KF: 5/5; KE: 5/5; FE: 5/5; FF: 5/5  - Extremity Reflexes: Brisk and symmetric throughout  - Sensory: Sensation to light touch intact bilaterally      Psych:  Mood and affect is appropriate    _________________________________________________________________________________________________________________________________________________________________________________________________________________________     Assessment:  Noble Tripp is a 73 y.o. year old male who is presenting with   Encounter Diagnoses   Name Primary?    Lumbar spondylosis Yes    DDD (degenerative disc disease), lumbar     Sacroiliac joint pain     Lumbar facet arthropathy     Spinal stenosis of lumbar region without neurogenic claudication      Patient returns for follow-up.  He complains of chronic low back pain. Lumbar MRI shows advanced diffuse spondylosis, greatest at the L4-L5 level with severe central canal stenosis and lateral recess stenosis.       Plan:  1. Interventional: None for now. Consider Bilateral L3, L4, L5 MBB with local. He feels injections weren't long lasting in the past. We will consider this if pain not controlled with medication, but he is stable at this time.    2. Pharmacologic:   - Refill Percocet 7.5/325 mg PO TID PRN (90 tabs) x 2 months. Paper Rx given. Patient tolerating opioids with no side effects,  obtaining good pain control with functional improvement.   - Refill Gabapentin 800mg TID.  - Opioid contract signed on 3/20/2018.     - LA  reviewed and appropriate. Last filled 8-21-18.  Will post date accordingly.  - Will order UDS today to ensure medication compliance.      3. Rehabilitative: Encouraged regular exercise.    4. Diagnostic: None for now.    5. Follow up: 9 weeks for med refill.    - I discussed the risks, benefits, and alternatives to potential treatment options. All questions and concerns were fully addressed today in clinic. Dr. Mueller was consulted regarding the patient plan and agrees.             >> UDS:  6-28-16 :: appropriate  2/28/2017 :: appropriate  8/18/2017 :: not in EMR  5/16/2018 :: appropriate  9/11/2018 :: pending

## 2018-09-16 DIAGNOSIS — B37.81 CANDIDA ESOPHAGITIS: Primary | ICD-10-CM

## 2018-09-16 RX ORDER — FLUCONAZOLE 100 MG/1
100 TABLET ORAL DAILY
Qty: 15 TABLET | Refills: 1 | Status: SHIPPED | OUTPATIENT
Start: 2018-09-16 | End: 2018-10-16

## 2018-09-20 ENCOUNTER — TELEPHONE (OUTPATIENT)
Dept: GASTROENTEROLOGY | Facility: CLINIC | Age: 73
End: 2018-09-20

## 2018-09-20 NOTE — TELEPHONE ENCOUNTER
----- Message from Toño Servin sent at 9/20/2018  9:34 AM CDT -----  Contact: Pt. Wife   Pt. Wife is calling regarding requesting that Dr. Hill call Pt. Pt wife did not state the reason for the call. ..[phone] (home)

## 2018-09-24 RX ORDER — PILOCARPINE HYDROCHLORIDE 5 MG/1
TABLET, FILM COATED ORAL
Qty: 90 TABLET | Refills: 2 | Status: SHIPPED | OUTPATIENT
Start: 2018-09-24 | End: 2019-02-03 | Stop reason: SDUPTHER

## 2018-10-02 ENCOUNTER — TELEPHONE (OUTPATIENT)
Dept: GASTROENTEROLOGY | Facility: CLINIC | Age: 73
End: 2018-10-02

## 2018-10-02 NOTE — TELEPHONE ENCOUNTER
----- Message from Bhumika Muse sent at 10/2/2018  8:55 AM CDT -----  Contact: Arleen/wife [phone]  States that she needs to speak to nurse regarding pt upper GI and medication. Please call back at [phone]//thank you acc

## 2018-10-10 LAB
ACID FAST MOD KINY STN SPEC: NORMAL
MYCOBACTERIUM SPEC QL CULT: NORMAL

## 2018-10-25 ENCOUNTER — OFFICE VISIT (OUTPATIENT)
Dept: GASTROENTEROLOGY | Facility: CLINIC | Age: 73
End: 2018-10-25
Payer: MEDICARE

## 2018-10-25 VITALS
SYSTOLIC BLOOD PRESSURE: 118 MMHG | DIASTOLIC BLOOD PRESSURE: 68 MMHG | BODY MASS INDEX: 25.23 KG/M2 | HEIGHT: 68 IN | WEIGHT: 166.44 LBS

## 2018-10-25 DIAGNOSIS — B37.81 CANDIDA ESOPHAGITIS: Primary | ICD-10-CM

## 2018-10-25 PROCEDURE — 99212 OFFICE O/P EST SF 10 MIN: CPT | Mod: S$PBB,,, | Performed by: INTERNAL MEDICINE

## 2018-10-25 PROCEDURE — 99999 PR PBB SHADOW E&M-EST. PATIENT-LVL II: CPT | Mod: PBBFAC,,, | Performed by: INTERNAL MEDICINE

## 2018-10-25 PROCEDURE — 99212 OFFICE O/P EST SF 10 MIN: CPT | Mod: PBBFAC | Performed by: INTERNAL MEDICINE

## 2018-10-25 RX ORDER — AMOXICILLIN 250 MG
1 CAPSULE ORAL
COMMUNITY
End: 2018-10-25

## 2018-10-25 RX ORDER — DOCUSATE SODIUM 100 MG/1
100 CAPSULE, LIQUID FILLED ORAL
Status: ON HOLD | COMMUNITY
End: 2019-02-05 | Stop reason: HOSPADM

## 2018-10-25 RX ORDER — HYDROCODONE BITARTRATE AND ACETAMINOPHEN 7.5; 325 MG/1; MG/1
1 TABLET ORAL EVERY 8 HOURS PRN
COMMUNITY
End: 2018-10-25

## 2018-10-25 RX ORDER — FERROUS SULFATE 324(65)MG
324 TABLET, DELAYED RELEASE (ENTERIC COATED) ORAL
COMMUNITY
End: 2018-11-27 | Stop reason: ALTCHOICE

## 2018-10-25 NOTE — PROGRESS NOTES
Subjective:       Patient ID: Noble Tripp is a 73 y.o. male.    Chief Complaint: EGD and Follow-up    The patient is known to our service from previous encounters.  He underwent EGD in May of this year and was found to have esophageal changes suspicious for short-segment Rai's esophagus. Biopsies were negative for Rai's, however, there were inflammatory changes suspected of being fertile ground for Rai's to develop.  He was therefore scheduled for repeat EGD which was carried on oral August 29th.    On repeat EGD, no substantial evidence of reflux damage was noted; however, there were changes compatible with Candida esophagitis.  Biopsy results are documented as being given to the patient in our records, the patient states he never received these findings.  He states he went to the pharmacy to  his other medication and that is when he found there was a prescription waiting for him that he was not aware of.  We had discussed the possibility of fungal infection at the time of the findings of his EGD.  He was told at that time that if findings were confirmed treatment would be initiated.  Things that would make him a setup for a fungal infection of this type were reviewed with the patient and his wife.  Because of the need for certain types of therapy for his COPD including steroids, the patient may come to need repeat therapy for this process at some point in the future.  This episode was unusual in that the patient had no symptomatology at the time this process was identified.      Review of Systems    Objective:      Physical Exam   Vitals reviewed.      Assessment:   Candida Esophagitis  No diagnosis found.    Plan:   Situation discussed with the patient and his wife as noted above. They were concerned about knowing if this should happen again what they should look for and what they should do. They were reminded again that this was unusual situation in that we stumbled onto this finding  and he was totally asymptomatic. If he gets complaints related to this we will have to reassess; otherwise there would be no reason to. He is presently stable.

## 2018-10-29 DIAGNOSIS — Z87.891 EX-SMOKER: ICD-10-CM

## 2018-10-29 RX ORDER — VARENICLINE TARTRATE 1 MG/1
TABLET, FILM COATED ORAL
Qty: 56 TABLET | Refills: 3 | Status: SHIPPED | OUTPATIENT
Start: 2018-10-29 | End: 2019-08-11 | Stop reason: SDUPTHER

## 2018-11-05 ENCOUNTER — PATIENT OUTREACH (OUTPATIENT)
Dept: ADMINISTRATIVE | Facility: HOSPITAL | Age: 73
End: 2018-11-05

## 2018-11-05 ENCOUNTER — TELEPHONE (OUTPATIENT)
Dept: PULMONOLOGY | Facility: CLINIC | Age: 73
End: 2018-11-05

## 2018-11-05 DIAGNOSIS — J44.9 MODERATE COPD (CHRONIC OBSTRUCTIVE PULMONARY DISEASE): Primary | Chronic | ICD-10-CM

## 2018-11-05 RX ORDER — TIOTROPIUM BROMIDE 18 UG/1
18 CAPSULE ORAL; RESPIRATORY (INHALATION) DAILY
Qty: 90 CAPSULE | Refills: 3 | Status: SHIPPED | OUTPATIENT
Start: 2018-11-05 | End: 2019-11-08 | Stop reason: SDUPTHER

## 2018-11-05 NOTE — TELEPHONE ENCOUNTER
----- Message from Blessing Isaacs sent at 11/5/2018  7:11 AM CST -----  Contact: wife  Please call pt wife @ [phone] regarding pt medication.

## 2018-11-05 NOTE — PROGRESS NOTES
Health Maintenance reviewed.  PREVISIT CHART AUDIT LETTER SENT VIA PATIENT PORTAL  Dexa order pended.

## 2018-11-05 NOTE — TELEPHONE ENCOUNTER
Pt requesting to stop Trelegy and get back on Spiriva  Also, needs rx for oral thrush from inhaler.     3

## 2018-11-07 ENCOUNTER — TELEPHONE (OUTPATIENT)
Dept: INTERNAL MEDICINE | Facility: CLINIC | Age: 73
End: 2018-11-07

## 2018-11-07 NOTE — TELEPHONE ENCOUNTER
----- Message from Ayanna Horta MA sent at 11/7/2018  1:11 PM CST -----  Contact: pt wife      ----- Message -----  From: Chica Ferreira  Sent: 11/7/2018  11:18 AM  To: Ryan REECE Staff    Caller is requesting a call back from the nurse in regards to the caller knowing if the pt needs any labs for his up coming apt  [phone]  Or [phone] (home)

## 2018-11-07 NOTE — TELEPHONE ENCOUNTER
----- Message from Ayanna Horta MA sent at 11/7/2018  9:46 AM CST -----  Contact: [phone]      ----- Message -----  From: Latanya Greenwood  Sent: 11/6/2018   2:12 PM  To: Ryan REECE Staff    Would like to know if labs are needed before appointment on 11/19.  Please call back at [phone]. Md Rich

## 2018-11-14 ENCOUNTER — TELEPHONE (OUTPATIENT)
Dept: PULMONOLOGY | Facility: CLINIC | Age: 73
End: 2018-11-14

## 2018-11-14 RX ORDER — OXYCODONE AND ACETAMINOPHEN 7.5; 325 MG/1; MG/1
1 TABLET ORAL EVERY 8 HOURS PRN
Qty: 90 TABLET | Refills: 0 | Status: SHIPPED | OUTPATIENT
Start: 2018-11-21 | End: 2018-12-21

## 2018-11-14 RX ORDER — OXYCODONE AND ACETAMINOPHEN 7.5; 325 MG/1; MG/1
1 TABLET ORAL EVERY 8 HOURS PRN
Qty: 90 TABLET | Refills: 0 | Status: SHIPPED | OUTPATIENT
Start: 2018-12-21 | End: 2019-01-20

## 2018-11-14 NOTE — TELEPHONE ENCOUNTER
----- Message from Bethanie Torrez sent at 11/14/2018  9:55 AM CST -----  Contact: Peggy pt wife   Caller would like nurse to contact her regarding appointment this afternoon.

## 2018-11-15 ENCOUNTER — OFFICE VISIT (OUTPATIENT)
Dept: PAIN MEDICINE | Facility: CLINIC | Age: 73
End: 2018-11-15
Payer: MEDICARE

## 2018-11-15 VITALS — DIASTOLIC BLOOD PRESSURE: 71 MMHG | SYSTOLIC BLOOD PRESSURE: 125 MMHG | HEART RATE: 65 BPM

## 2018-11-15 DIAGNOSIS — M51.36 DDD (DEGENERATIVE DISC DISEASE), LUMBAR: ICD-10-CM

## 2018-11-15 DIAGNOSIS — M47.819 FACET ARTHROPATHY: ICD-10-CM

## 2018-11-15 DIAGNOSIS — M53.3 SACROILIAC JOINT PAIN: ICD-10-CM

## 2018-11-15 DIAGNOSIS — M48.061 SPINAL STENOSIS OF LUMBAR REGION WITHOUT NEUROGENIC CLAUDICATION: ICD-10-CM

## 2018-11-15 DIAGNOSIS — M47.816 LUMBAR FACET ARTHROPATHY: ICD-10-CM

## 2018-11-15 DIAGNOSIS — M47.816 LUMBAR SPONDYLOSIS: Primary | ICD-10-CM

## 2018-11-15 PROCEDURE — 99999 PR PBB SHADOW E&M-EST. PATIENT-LVL III: CPT | Mod: PBBFAC,,, | Performed by: PHYSICIAN ASSISTANT

## 2018-11-15 PROCEDURE — 99213 OFFICE O/P EST LOW 20 MIN: CPT | Mod: PBBFAC | Performed by: PHYSICIAN ASSISTANT

## 2018-11-15 PROCEDURE — 99214 OFFICE O/P EST MOD 30 MIN: CPT | Mod: S$PBB,,, | Performed by: PHYSICIAN ASSISTANT

## 2018-11-15 RX ORDER — GABAPENTIN 800 MG/1
800 TABLET ORAL 3 TIMES DAILY
Qty: 90 TABLET | Refills: 1 | Status: SHIPPED | OUTPATIENT
Start: 2018-11-15 | End: 2019-01-10 | Stop reason: SDUPTHER

## 2018-11-15 NOTE — PROGRESS NOTES
Chief Pain Complaint:  Low Back Pain, Leg pain (left > right)       History of Present Illness:  This patient is a 73 y.o. male who presents today complaining of the above noted pain/s. The patient describes this pain as follows.    - duration of pain: pain for years   - timing: constant   - character: aching, burning  - radiating, dermatomal: extends into left leg, along L5 pattern at times, mostly non-radiating  - antecedent trauma, prior spinal surgery: none  - pertinent negatives: No fever, No chills, No weight loss, No bladder dysfunction, No bowel dysfunction, No extremity weakness, No saddle anesthesia  - pertinent positives: none    - medications, other therapies tried (physical therapy, injections):     >> gabapentin, Norco    >> Has previously undergone Physical Therapy, which made pain worse    >> Has previously undergone spinal injection/s: including lumbar MBB and Left TFESI with Dr Taylor in 2014 & 2015 (see EMR Surgeries for full details) with only short term relief    _________________________________________________________________________________________________________________________________________________________________________________________________________________________      IMAGING / Labs / Studies (reviewed on 11/15/2018):    9/15/17 MRI LUMBAR SPINE WITHOUT CONTRAST  History: Lower back pain.  Left lower extremity pain  Technique: Standard multiplanar pulse sequences without IV contrast.  Comparison:  No prior  studies available for comparison.  Findings:   Mild-moderate scoliosis, convex to the left and centered at L3.  All lumbar vertebral bodies are normal in height.  Scattered degenerative endplate marrow signal identified at the L2-L3 and L3-L4 L5-S1 levels.  No fracture.  No suspicious osseous lesion is identified.  Mild retrolisthesis of L2-L3.  Distal spinal cord and conus medullaris have normal appearance but the conus terminates at the T12-L1 level.  T12-L1: Minimal central  protrusion.  Negative for focal nerve root impingement or central canal narrowing.  Neural foramina widely patent.  L1-2: Mild disc height loss.  Prominent intraosseous eyes.  Mild disc bulging.  Mild facet arthropathy and ligament flavum hypertrophy.  The central canal neural foramina patent.  L2-L3: Minimal retrolisthesis of L2 on L3.  There is moderate disc height loss.  Prominent anterior osteophytes.  Mild-moderate diffuse generalized disc bulging.  Mild facet arthropathy and ligament flavum hypertrophy.  The central canal is patent.  Neural foramina patent.  L3-L4: Disc desiccation and moderate disc height loss.  Large anterior osteophytes are present.  There is mild disc bulging and osteophytic ridging at the posterior disc margin.  Moderate right and mild left facet arthropathy and ligamentum flavum hypertrophy.  The central canal is patent.  Mild neural foraminal narrowing.  L4-L5: Very minimal grade 1 spondylolisthesis of L4-L5.  Mild generalized disc bulge is present.  Severe facet arthropathy and ligamentum flavum hypertrophy.  There is severe central canal and lateral recess stenosis.  Moderate neural foraminal stenosis.  L5-S1: Disc desiccation and moderate to severe disc height loss.  Prominent intraocular heights.  There is mild disc bulging and osteophytic ridging at the posterior disc margin.  Severe left and moderate right neural foraminal stenosis.  The central canal is patent.   Paravertebral soft tissues and musculature are normal.  The visualized intra-abdominal and intrapelvic contents are normal.       10/30/14 MRI Lumbar Spine Without Contrast    Narrative Exam: MRI of the lumbar spine without contrast.  History:     Low back pain. Status post SHEY, with S1-S2 radicular symptoms  Comparison: Prior study dated 04/02/13  Findings:     A convex right scoliosis again noted.  No compression fracture or subluxation.  The conus medullaris has a normal appearance.  The paraspinous soft tissues are  unremarkable.  The T12 -- L1 and L1 -- 2 disk levels are essentially unremarkable.  L2 -- 3: Mild annular bulging again noted.    L3 -- 4: Moderate narrowing of the right lateral disk space, with right greater than left facet arthropathy, unchanged.    L4 -- 5: Marked bilateral facet arthropathy, with mild annular bulging, causing moderate central canal stenosis, unchanged.  L5 -- S1: Disk desiccation, with moderate disk space narrowing.  Significant degenerative narrowing of the left L5 neural foramen is apparent, unchanged.     _________________________________________________________________________________________________________________________________________________________________________________________________________________________      Review of Systems:  CONSTITUTIONAL: patient denies any fever, chills, or weight loss  SKIN: patient denies any rash or itching  RESPIRATORY: patient denies having any shortness of breath  GASTROINTESTINAL: patient denies having any diarrhea, constipation, or bowel incontinence  GENITOURINARY: patient denies having any abnormal bladder function    MUSCULOSKELETAL:  - patient reports low back pain    NEUROLOGICAL:   - pain as above  - strength in Lower extremities is intact, BILATERALLY  - sensation in Lower extremities is intact, BILATERALLY  - patient denies any loss of bowel or bladder control      PSYCHIATRIC: patient denies any suicidal or homicidal ideations    _________________________________________________________________________________________________________________________________________________________________________________________________________________________      Physical Exam:  Vitals:  /71 (BP Location: Left arm, Patient Position: Sitting, BP Method: Small (Automatic))   Pulse 65   (reviewed on 11/15/2018)    General: alert and oriented, in no apparent distress.  Gait: normal gait.  Skin: no rashes, no discoloration, no obvious  lesions  HEENT: normocephalic, atraumatic. Pupils equal and round.  Cardiovascular: no significant peripheral edema present.  Respiratory: without use of accessory muscles of respiration.    Musculoskeletal - Lumbar Spine:  - Pain on flexion of lumbar spine: Absent   - Pain on extension of lumbar spine: Present  - Lumbar facet loading: Present bilaterally  - TTP over the lumbar facet joints: Present Bilaterally, worse at L5-S1   - TTP over the SI joints: Present on left   - Straight Leg Raise: Negative  - NOELLE: Present    Neuro - Lower Extremities:  - BLE Strength: R/L: HF: 5/5, HE: 5/5, KF: 5/5; KE: 5/5; FE: 5/5; FF: 5/5  - Extremity Reflexes: Brisk and symmetric throughout  - Sensory: Sensation to light touch intact bilaterally      Psych:  Mood and affect is appropriate    _________________________________________________________________________________________________________________________________________________________________________________________________________________________     Assessment:  Noble Tripp is a 73 y.o. year old male who is presenting with   Encounter Diagnoses   Name Primary?    Lumbar spondylosis Yes    DDD (degenerative disc disease), lumbar     Lumbar facet arthropathy     Sacroiliac joint pain     Spinal stenosis of lumbar region without neurogenic claudication     Facet arthropathy      Patient returns for follow-up.  He complains of chronic low back pain. Lumbar MRI shows advanced diffuse spondylosis, greatest at the L4-L5 level with severe central canal stenosis and lateral recess stenosis.       Plan:  1. Interventional: Consider Bilateral L3, L4, L5 MBB with local. He feels injections weren't long lasting in the past. We will consider this if pain not controlled with medication, but he is stable at this time.    2. Pharmacologic:   - Refill Percocet 7.5/325 mg PO TID PRN (90 tabs) x 2 months. Paper Rx given. Patient tolerating opioids with no side effects,  obtaining good pain control with functional improvement.   - Refill Gabapentin 800mg TID.  - Opioid contract signed on 3/20/2018.     - [place]  reviewed and appropriate. Last filled 10-23-18.  Will post date accordingly.  - Will order UDS today to ensure medication compliance.      3. Rehabilitative: Encouraged regular exercise.    4. Diagnostic: None for now.    5. Follow up: 8 weeks for med refill.    - I discussed the risks, benefits, and alternatives to potential treatment options. All questions and concerns were fully addressed today in clinic. Dr. Mueller was consulted regarding the patient plan and agrees.             >> UDS:  6-28-16 :: appropriate  2/28/2017 :: appropriate  8/18/2017 :: not in EMR  5/16/2018 :: appropriate  9/11/2018 :: appropriate

## 2018-11-19 ENCOUNTER — OFFICE VISIT (OUTPATIENT)
Dept: INTERNAL MEDICINE | Facility: CLINIC | Age: 73
End: 2018-11-19
Payer: MEDICARE

## 2018-11-19 ENCOUNTER — TELEPHONE (OUTPATIENT)
Dept: RADIOLOGY | Facility: HOSPITAL | Age: 73
End: 2018-11-19

## 2018-11-19 VITALS
SYSTOLIC BLOOD PRESSURE: 134 MMHG | HEIGHT: 68 IN | TEMPERATURE: 98 F | WEIGHT: 164.69 LBS | HEART RATE: 65 BPM | DIASTOLIC BLOOD PRESSURE: 70 MMHG | OXYGEN SATURATION: 98 % | BODY MASS INDEX: 24.96 KG/M2

## 2018-11-19 DIAGNOSIS — M89.9 BONE DISORDER: ICD-10-CM

## 2018-11-19 DIAGNOSIS — J44.9 MODERATE COPD (CHRONIC OBSTRUCTIVE PULMONARY DISEASE): Chronic | ICD-10-CM

## 2018-11-19 DIAGNOSIS — E27.8 ADRENAL MASS: ICD-10-CM

## 2018-11-19 DIAGNOSIS — Z12.5 SCREENING FOR PROSTATE CANCER: ICD-10-CM

## 2018-11-19 DIAGNOSIS — D35.00 ADRENAL ADENOMA, UNSPECIFIED LATERALITY: ICD-10-CM

## 2018-11-19 DIAGNOSIS — I10 ESSENTIAL HYPERTENSION: Primary | ICD-10-CM

## 2018-11-19 DIAGNOSIS — Z12.9 SCREENING FOR CANCER: ICD-10-CM

## 2018-11-19 DIAGNOSIS — I25.10 CORONARY ARTERY DISEASE INVOLVING NATIVE CORONARY ARTERY OF NATIVE HEART WITHOUT ANGINA PECTORIS: ICD-10-CM

## 2018-11-19 DIAGNOSIS — C02.9 CANCER OF TONGUE: ICD-10-CM

## 2018-11-19 PROCEDURE — 99213 OFFICE O/P EST LOW 20 MIN: CPT | Mod: PBBFAC,PO | Performed by: FAMILY MEDICINE

## 2018-11-19 PROCEDURE — 90662 IIV NO PRSV INCREASED AG IM: CPT | Mod: PBBFAC,PO

## 2018-11-19 PROCEDURE — 99999 PR PBB SHADOW E&M-EST. PATIENT-LVL III: CPT | Mod: PBBFAC,,, | Performed by: FAMILY MEDICINE

## 2018-11-19 PROCEDURE — 99214 OFFICE O/P EST MOD 30 MIN: CPT | Mod: S$PBB,,, | Performed by: FAMILY MEDICINE

## 2018-11-19 RX ORDER — MUPIROCIN 20 MG/G
OINTMENT TOPICAL
COMMUNITY
Start: 2018-11-14 | End: 2019-02-11

## 2018-11-19 NOTE — PROGRESS NOTES
"Subjective:       Patient ID: Noble Tripp is a  male.    Chief Complaint: Multiple issues see below    HPI1.Hypertension: blood pressures at home normal. Tolerating medicine.     2Throat ca utd specialists; /Dr Perry;  Follow anemia also  3. Hx smoker:still taking chantix feels like it keeps him from smoking  . radha / cpap  6.chronic pain well controlled w  pain meds.    Copd utd dr brown . Stable/ s/p thoracentesis 8/18 pleural fluid no infxn or cancer    Coronary artery disease: up to date with cardiology. No chest pain, palpitations or shortness of breath. Tolerating medication.     Hx adrenal adenoma 4/13 dr hernandez note:nl fxnl w/u . Plan tran ct 6 months (couple small new adenomas?) but main adenoma stable x 5 yrs; dont see tran done there as planned      Past Medical History:   Diagnosis Date    Adrenal mass     david    Aspiration pneumonia 10/15    puree/honey    Benign prostate hyperplasia     CAD (coronary artery disease)     dr padilla    Chronic pain     dr santos    COPD (chronic obstructive pulmonary disease)     papi    Ex-smoker     11/13    Hyperlipidemia     Osteopenia 1/15 tran 1/18    PVD (peripheral vascular disease)     noobs 07 khuri    Pyriform sinus cancer     dr ponce radiation 1/2-14 dr king bryson    Sleep apnea     cpap    Squamous cell carcinoma of skin     roberto    Tongue cancer     "superficial" removed 11/14    Vocal cord cancer 2011    Xerostomia     radiation     Past Surgical History:   Procedure Laterality Date    APPENDECTOMY      COLONOSCOPY N/A 5/1/2017    Procedure: Due for screening colonoscopy;  Surgeon: Anushka Yoder MD;  Location[facility];  Service: Endoscopy;  Laterality: N/A;    tongue cancer excision  11/14    dr vitale    VOCAL CORD LATERALIZATION, ENDOSCOPIC APPROACH W/ MLB      kris     Family History   Problem Relation Age of Onset    Cancer Father     Cancer Brother      Social History     Social History    " Marital status:      Spouse name: JYOTI    Number of children: 6    Years of education: N/A     Social History Main Topics    Smoking status: Former Smoker    Smokeless tobacco: Never Used    Alcohol use No    Drug use: No    Sexual activity: Not Currently     Other Topics Concern    None     Social History Narrative    None     Review of Systems  no cp  No inc sob  Objective:      Physical Exam wife here  Constitutional: He is oriented to person, place, and time. He appears well-developed and well-nourished.   Head: Normocephalic and atraumatic.   Mouth/Throat: No oropharyngeal exudate.   Eyes: Conjunctivae and EOM are normal. Pupils are equal, round, and reactive to light.   Neck: Normal range of motion. Neck supple. Carotid bruit is not present.   Cardiovascular: Normal rate and regular rhythm.    Pulmonary/Chest: cta bilat  Abdominal: Soft. Bowel sounds are normal. He exhibits no distension and no mass. There is no tenderness. There is no rebound and no guarding.   marcelina to person, place, and time.   Skin: Skin is warm and dry.   Psychiatric: He has a normal mood and affect. His behavior is normal. Judgment and thought content normal.       Assessment:      htn  hyperchol  Throat cancer hx  bph  anemia     cad  Copd  Ex smoker  Adrenal adenoma  Plan:     Due f/u dr cardozo as sched (add: I spoke with Dr Cardozo about pleural effusion seen on recent ct. Says ok to wait til celia appjyoti)  *f/u hemonc as sched  F/u card when due  F/u one yr  Please add psa to 11/21 lab  Flu shot  Shingrix new shingles vaccine  via a pharmacy  Upon review adrenal adenoma determine tran ct if needed (note 2013 ct mentioned in endocr 4/13 note w ?2 small new adenoma)    Essential hypertension    Bone disorder  -     DXA Bone Density Spine And Hip; Future; Expected date: 11/19/2018    Screening for prostate cancer  -     PSA, Screening; Future; Expected date: 11/19/2018    Adrenal mass    Coronary artery disease involving  native coronary artery of native heart without angina pectoris    Moderate COPD (chronic obstructive pulmonary disease)    Adrenal adenoma, unspecified laterality  -     CT Abdomen Without Contrast; Future; Expected date: 11/19/2018    Screening for cancer  -     CT Chest Lung Screening Low Dose; Future; Expected date: 11/19/2018    Cancer of tongue

## 2018-11-20 ENCOUNTER — HOSPITAL ENCOUNTER (OUTPATIENT)
Dept: RADIOLOGY | Facility: HOSPITAL | Age: 73
Discharge: HOME | End: 2018-11-20
Attending: FAMILY MEDICINE
Payer: MEDICARE

## 2018-11-20 DIAGNOSIS — D35.00 ADRENAL ADENOMA, UNSPECIFIED LATERALITY: ICD-10-CM

## 2018-11-20 PROCEDURE — 74150 CT ABDOMEN W/O CONTRAST: CPT | Mod: 26,,, | Performed by: RADIOLOGY

## 2018-11-20 PROCEDURE — 74150 CT ABDOMEN W/O CONTRAST: CPT | Mod: TC,PO

## 2018-11-21 ENCOUNTER — LAB VISIT (OUTPATIENT)
Dept: LAB | Facility: HOSPITAL | Age: 73
End: 2018-11-21
Attending: INTERNAL MEDICINE
Payer: MEDICARE

## 2018-11-21 DIAGNOSIS — D64.9 CHRONIC ANEMIA: ICD-10-CM

## 2018-11-21 DIAGNOSIS — Z86.39 HISTORY OF IRON DEFICIENCY: ICD-10-CM

## 2018-11-21 DIAGNOSIS — Z12.5 SCREENING FOR PROSTATE CANCER: ICD-10-CM

## 2018-11-21 LAB
ALBUMIN SERPL BCP-MCNC: 3 G/DL
ALP SERPL-CCNC: 59 U/L
ALT SERPL W/O P-5'-P-CCNC: 7 U/L
ANION GAP SERPL CALC-SCNC: 6 MMOL/L
AST SERPL-CCNC: 13 U/L
BASOPHILS # BLD AUTO: 0.24 K/UL
BASOPHILS NFR BLD: 3 %
BILIRUB SERPL-MCNC: 0.3 MG/DL
BUN SERPL-MCNC: 9 MG/DL
CALCIUM SERPL-MCNC: 9.1 MG/DL
CHLORIDE SERPL-SCNC: 104 MMOL/L
CO2 SERPL-SCNC: 30 MMOL/L
COMPLEXED PSA SERPL-MCNC: 1.1 NG/ML
CREAT SERPL-MCNC: 0.8 MG/DL
CRP SERPL-MCNC: 27.4 MG/L
DIFFERENTIAL METHOD: ABNORMAL
EOSINOPHIL # BLD AUTO: 0.8 K/UL
EOSINOPHIL NFR BLD: 9.9 %
ERYTHROCYTE [DISTWIDTH] IN BLOOD BY AUTOMATED COUNT: 18.4 %
EST. GFR  (AFRICAN AMERICAN): >60 ML/MIN/1.73 M^2
EST. GFR  (NON AFRICAN AMERICAN): >60 ML/MIN/1.73 M^2
FERRITIN SERPL-MCNC: 373 NG/ML
GLUCOSE SERPL-MCNC: 94 MG/DL
HCT VFR BLD AUTO: 36 %
HGB BLD-MCNC: 11.3 G/DL
IRON SERPL-MCNC: 53 UG/DL
LYMPHOCYTES # BLD AUTO: 1.4 K/UL
LYMPHOCYTES NFR BLD: 17.8 %
MCH RBC QN AUTO: 27.4 PG
MCHC RBC AUTO-ENTMCNC: 31.4 G/DL
MCV RBC AUTO: 87 FL
MONOCYTES # BLD AUTO: 0.5 K/UL
MONOCYTES NFR BLD: 6.7 %
NEUTROPHILS # BLD AUTO: 5.1 K/UL
NEUTROPHILS NFR BLD: 62.6 %
PLATELET # BLD AUTO: 413 K/UL
PMV BLD AUTO: 10 FL
POTASSIUM SERPL-SCNC: 4.8 MMOL/L
PROT SERPL-MCNC: 7.4 G/DL
RBC # BLD AUTO: 4.13 M/UL
SATURATED IRON: 23 %
SODIUM SERPL-SCNC: 140 MMOL/L
TOTAL IRON BINDING CAPACITY: 226 UG/DL
TRANSFERRIN SERPL-MCNC: 153 MG/DL
WBC # BLD AUTO: 8.09 K/UL

## 2018-11-21 PROCEDURE — 80053 COMPREHEN METABOLIC PANEL: CPT

## 2018-11-21 PROCEDURE — 86140 C-REACTIVE PROTEIN: CPT

## 2018-11-21 PROCEDURE — 84153 ASSAY OF PSA TOTAL: CPT

## 2018-11-21 PROCEDURE — 85025 COMPLETE CBC W/AUTO DIFF WBC: CPT

## 2018-11-21 PROCEDURE — 82728 ASSAY OF FERRITIN: CPT

## 2018-11-21 PROCEDURE — 83540 ASSAY OF IRON: CPT

## 2018-11-21 PROCEDURE — 36415 COLL VENOUS BLD VENIPUNCTURE: CPT

## 2018-11-27 ENCOUNTER — OFFICE VISIT (OUTPATIENT)
Dept: HEMATOLOGY/ONCOLOGY | Facility: CLINIC | Age: 73
End: 2018-11-27
Payer: MEDICARE

## 2018-11-27 VITALS
DIASTOLIC BLOOD PRESSURE: 75 MMHG | TEMPERATURE: 98 F | WEIGHT: 166.25 LBS | BODY MASS INDEX: 25.2 KG/M2 | HEIGHT: 68 IN | SYSTOLIC BLOOD PRESSURE: 138 MMHG | OXYGEN SATURATION: 94 % | HEART RATE: 81 BPM

## 2018-11-27 DIAGNOSIS — Z86.39 HISTORY OF IRON DEFICIENCY: ICD-10-CM

## 2018-11-27 DIAGNOSIS — D64.9 CHRONIC ANEMIA: Primary | ICD-10-CM

## 2018-11-27 PROCEDURE — 99214 OFFICE O/P EST MOD 30 MIN: CPT | Mod: PBBFAC | Performed by: INTERNAL MEDICINE

## 2018-11-27 PROCEDURE — 99214 OFFICE O/P EST MOD 30 MIN: CPT | Mod: S$PBB,,, | Performed by: INTERNAL MEDICINE

## 2018-11-27 PROCEDURE — 99999 PR PBB SHADOW E&M-EST. PATIENT-LVL IV: CPT | Mod: PBBFAC,,, | Performed by: INTERNAL MEDICINE

## 2018-11-27 NOTE — PROGRESS NOTES
Reason for visit: Follow-up for iron deficiency anemia and history of head and neck cancer    HPI:   The patient is a 73-year-old  male who presents to the hematology oncology clinic today for follow-up for iron deficiency anemia and history of head and neck cancer.  The patient is followed in the outpatient hematology oncology clinic by Dr. Hussein Coyle.  I have reviewed all of the patient's relevant clinical history available in the medical record and have utilized this in my evaluation and management recommendations today.  Today the patient reports that he feels well and has no specific complaints.  He reports that he is up-to-date with follow-up with his ENT physician Dr. Varela at [facility].  He denies any fevers, chills or night sweats.  He denies any loss of appetite or unintentional weight loss.  He denies any chest pain or shortness of breath.  He denies any melena, hematochezia, hematemesis, hemoptysis or hematuria.  He denies any bowel or urinary complains.  He denies any nausea, vomiting or abdominal pain.  Result of interval EGD and follow up recommendations noted.    PAST MEDICAL HISTORY/SURGICAL HISTORY/FAMILY HISTORY/SOCIAL HISTORY: Reviewed and updated from Dr. Coyle's prior clinic notes.    ALLERGIES: Reviewed on medication card.    MEDICATIONS: [Medcard has been reviewed and/or reconciled.]    REVIEW OF SYSTEMS:   GENERAL: [No fevers, chills or sweats. No fatigue, weight loss or loss of appetite.]  HEENT: [No blurred vision, tinnitus, nasal discharge, sorethroat or dysphagia.]  HEART: [No chest pain, palpitations or shortness of breath.]    LUNGS: [No cough, hemoptysis or breathing problems.]  ABDOMEN: [No abdominal pain, nausea, vomiting, diarrhea, constipation or melena.]  GENITOURINARY: [No dysuria, bleeding or malodorous discharge.]  NEURO: [No headache, dizziness or vertigo.]  HEMATOLOGY: [No easy bruising, spontaneous bleeding or blood clots in the past].  MUSCULOSKELETAL: [No  arthralgias, myalgias or bone pains.]  SKIN: [No rashes or skin lesions.]  PSYCHIATRY: [No depression or anxiety.]    PHYSICAL EXAMINATION:   VS: Reviewed on nurse's notes.  APPEARANCE: The patient is a well-developed, well-nourished and well-groomed  male who appears in no acute distress.    HEENT: No scleral icterus. Both external auditory canals clear. No oral ulcers, lesions. Throat clear  HEAD: No sinus tenderness.  NECK: Supple. No palpable lymphadenopathy.   CHEST: Breath sounds clear bilaterally. No rales. No rhonchi. Unlabored respirations.  CARDIOVASCULAR: Normal S1, S2. Normal rate. Regular rhythm.  ABDOMEN: Bowel sounds normal. No tenderness. No abdominal distention. No hepatomegaly. No splenomegaly.  LYMPHATIC: No palpable supraclavicular, axillary nodes  EXTREMITIES: No clubbing, cyanosis, edema  SKIN: No lesions. No petechiae. No ecchymoses. No induration or nodules  NEUROLOGIC: No focal findings. Alert & Oriented x 3. Mood appropriate to affect    LABS:   Reviewed    IMAGING:  Reviewed    IMPRESSION:  1.  Chronic anemia  2.  History of head and neck cancer    PLAN:  1.  I had a detailed discussion with the patient today with regard to his current clinical situation.  Review of the patient's lab results from April 2018 shows stable iron levels and stable hemoglobin/hematocrit.  He will stop ferrous sulfate 325 mg by mouth once daily as his iron levels are normal at this time.  He reports that he is tolerating this medication well without any significant side effects.  2.  He will continue follow-up with Dr. Oleary as scheduled for his history of head and neck cancer to continue surveillance.  3.  He knows to seek immediate medical attention for any signs/symptoms of increased bleeding.    Return to clinic in 4 months with labs 2 days before clinic visit to follow-up.  He knows to call sooner for any new problems or questions.    Jaxson Perry MD

## 2018-11-29 ENCOUNTER — OFFICE VISIT (OUTPATIENT)
Dept: DERMATOLOGY | Facility: CLINIC | Age: 73
End: 2018-11-29
Payer: MEDICARE

## 2018-11-29 DIAGNOSIS — L57.0 ACTINIC KERATOSES: ICD-10-CM

## 2018-11-29 DIAGNOSIS — Z85.828 HISTORY OF SKIN CANCER: ICD-10-CM

## 2018-11-29 DIAGNOSIS — D48.5 NEOPLASM OF UNCERTAIN BEHAVIOR OF SKIN: ICD-10-CM

## 2018-11-29 DIAGNOSIS — L90.5 SCAR CONDITIONS/SKIN FIBROSIS: Primary | ICD-10-CM

## 2018-11-29 DIAGNOSIS — Z12.83 SCREENING, MALIGNANT NEOPLASM, SKIN: ICD-10-CM

## 2018-11-29 PROCEDURE — 99202 OFFICE O/P NEW SF 15 MIN: CPT | Mod: 25,S$PBB,, | Performed by: DERMATOLOGY

## 2018-11-29 PROCEDURE — 88305 TISSUE EXAM BY PATHOLOGIST: CPT | Mod: 26,,, | Performed by: PATHOLOGY

## 2018-11-29 PROCEDURE — 17000 DESTRUCT PREMALG LESION: CPT | Mod: PBBFAC,PO | Performed by: DERMATOLOGY

## 2018-11-29 PROCEDURE — 88305 TISSUE EXAM BY PATHOLOGIST: CPT | Performed by: PATHOLOGY

## 2018-11-29 PROCEDURE — 99999 PR PBB SHADOW E&M-EST. PATIENT-LVL II: CPT | Mod: PBBFAC,,, | Performed by: DERMATOLOGY

## 2018-11-29 PROCEDURE — 11100 PR BIOPSY OF SKIN LESION: CPT | Mod: 59,PBBFAC,PO | Performed by: DERMATOLOGY

## 2018-11-29 PROCEDURE — 17000 DESTRUCT PREMALG LESION: CPT | Mod: S$PBB,51,59, | Performed by: DERMATOLOGY

## 2018-11-29 PROCEDURE — 11100 PR BIOPSY OF SKIN LESION: CPT | Mod: 59,S$PBB,, | Performed by: DERMATOLOGY

## 2018-11-29 PROCEDURE — 99212 OFFICE O/P EST SF 10 MIN: CPT | Mod: PBBFAC,PO | Performed by: DERMATOLOGY

## 2018-11-29 NOTE — PATIENT INSTRUCTIONS
CRYOSURGERY      Your doctor has used a method called cryosurgery to treat your skin condition. Cryosurgery refers to the use of very cold substances to treat a variety of skin conditions such as warts, pre-skin cancers, molluscum contagiosum, sun spots, and several benign growths. The substance we use in cryosurgery is liquid nitrogen and is so cold (-195 degrees Celsius) that is burns when administered.     Following treatment in the office, the skin may immediately burn and become red. You may find the area around the lesion is affected as well. It is sometimes necessary to treat not only the lesion, but a small area of the surrounding normal skin to achieve a good response.     A blister, and even a blood filled blister, may form after treatment.   This is a normal response. If the blister is painful, it is acceptable to sterilize a needle and with rubbing alcohol and gently pop the blister. It is important that you gently wash the area with soap and warm water as the blister fluid may contain wart virus if a wart was treated. Do no remove the roof of the blister.     The area treated can take anywhere from 1-3 weeks to heal. Healing time depends on the kind of skin lesion treated, the location, and how aggressively the lesion was treated. It is recommended that the areas treated are covered with Vaseline or bacitracin ointment and a band-aid. If a band-aid is not practical, just ointment applied several times per day will do. Keeping these areas moist will speed the healing time.    Treatment with liquid nitrogen can leave a scar. In dark skin, it may be a light or dark scar, in light skin it may be a white or pink scar. These will generally fade with time.    If you have any concerns after your treatment, please feel free to call the office.         [facility] DERMATOLOGY  [address]  [place] [address]  Dept: [phone]  Dept Fax: [phone]     Shave Biopsy Wound Care    Your  doctor has performed a shave biopsy today.  A band aid and vaseline ointment has been placed over the site.  This should remain in place for 24 hours.  It is recommended that you keep the area dry for the first 24 hours.  After 24 hours, you may remove the band aid and wash the area with warm soap and water and apply Vaseline jelly.  Many patients prefer to use Neosporin or Bacitracin ointment.  This is acceptable; however, know that you can develop an allergy to this medication even if you have used it safely for years.  It is important to keep the area moist.  Letting it dry out and get air slows healing time, and will worsen the scar.  Band aid is optional after first 24 hours.      If you notice increasing redness, tenderness, pain, or yellow drainage at the biopsy site, please notify your doctor.  These are signs of an infection.    If your biopsy site is bleeding, apply firm pressure for 15 minutes straight.  Repeat for another 15 minutes, if it is still bleeding.   If the surgical site continues to bleed, then please contact your doctor.      [facility]   DERMATOLOGY  [address]   [place] [address]   Dept: [phone]   Dept Fax: [phone]

## 2018-11-29 NOTE — PROGRESS NOTES
Subjective:       Patient ID:  Noble Tripp is a 73 y.o. male who presents for   Chief Complaint   Patient presents with    Sores     sore of left ear x 9 months, wont heal      Hx of NMSC of the right forearm and chest    History of Present Illness: The patient presents with chief complaint of sore.  Location: left ear  Duration: 9 months  Signs/Symptoms: non healing     Prior treatments: neosporin            Review of Systems   Constitutional: Negative for fever and chills.   Gastrointestinal: Negative for nausea and vomiting.   Skin: Negative for daily sunscreen use, activity-related sunscreen use and recent sunburn.   Hematologic/Lymphatic: Does not bruise/bleed easily.        Objective:    Physical Exam   Constitutional: He appears well-developed and well-nourished. No distress.   Neurological: He is alert and oriented to person, place, and time. He is not disoriented.   Psychiatric: He has a normal mood and affect.   Skin:   Areas Examined (abnormalities noted in diagram):   Scalp / Hair Palpated and Inspected  Head / Face Inspection Performed  Neck Inspection Performed  Chest / Axilla Inspection Performed  Abdomen Inspection Performed  Back Inspection Performed  RUE Inspected  LUE Inspection Performed  Nails and Digits Inspection Performed                   Diagram Legend     Erythematous scaling macule/papule c/w actinic keratosis       Vascular papule c/w angioma      Pigmented verrucoid papule/plaque c/w seborrheic keratosis      Yellow umbilicated papule c/w sebaceous hyperplasia      Irregularly shaped tan macule c/w lentigo     1-2 mm smooth white papules consistent with Milia      Movable subcutaneous cyst with punctum c/w epidermal inclusion cyst      Subcutaneous movable cyst c/w pilar cyst      Firm pink to brown papule c/w dermatofibroma      Pedunculated fleshy papule(s) c/w skin tag(s)      Evenly pigmented macule c/w junctional nevus     Mildly variegated pigmented, slightly  irregular-bordered macule c/w mildly atypical nevus      Flesh colored to evenly pigmented papule c/w intradermal nevus       Pink pearly papule/plaque c/w basal cell carcinoma      Erythematous hyperkeratotic cursted plaque c/w SCC      Surgical scar with no sign of skin cancer recurrence      Open and closed comedones      Inflammatory papules and pustules      Verrucoid papule consistent consistent with wart     Erythematous eczematous patches and plaques     Dystrophic onycholytic nail with subungual debris c/w onychomycosis     Umbilicated papule    Erythematous-base heme-crusted tan verrucoid plaque consistent with inflamed seborrheic keratosis     Erythematous Silvery Scaling Plaque c/w Psoriasis     See annotation            Assessment / Plan:      Pathology Orders:     Normal Orders This Visit    Tissue Specimen To Pathology, Dermatology     Questions:    Directional Terms:  Other(comment)    Clinical information:  r/o SCC Vs. other    Specific Site:  left posterior ear        Scar conditions/skin fibrosis  Screening, malignant neoplasm, skin  History of skin cancer  Scar of the right forearm and chest, hx of NMSC.  No evidence of recurrence on physical exam today.  Continue routine skin surveillance. Daily sunscreen advised.    Actinic keratoses  Cryosurgery Procedure Note    The patient is informed of the precancerous quality and need for treatment of these lesions. After risks, benefits and alternatives explained, including blistering, pain, hyper- and hypopigmentation, patient verbally consents to cryotherapy to precancerous lesions. Liquid nitrogen cryosurgery is applied to the 1 actinic keratoses, as detailed in the physical exam, to produce a freeze injury. The patient is aware that blisters may form and is instructed on wound care with gentle cleansing and use of vaseline ointment to keep moist until healed. The patient is supplied a handout on cryosurgery and is instructed to call if lesions do not  completely resolve.    Neoplasm of uncertain behavior of skin  -     Tissue Specimen To Pathology, Dermatology  -     Shave biopsy(-ies) done of 1 site(s).   Patient informed to call for results within 2 weeks if have not received notification via telephone call or [facility]           Follow-up for call for results.     PROCEDURE NOTE - SHAVE BIOPSY   Location: see above    After risk, benefits, and alternatives were discussed with the patient, the patient agrees to the procedure by verbal informed consent.  The area(s) were cleansed with alcohol. 2 cc of lidocaine 1% with epinephrine was injected for local anesthesia into each lesion(s).  A sharp dermablade was used to remove part or all of the lesion(s).  The specimen(s) will be sent for tissue pathology.  Hemostasis was obtained with aluminum chloride and/or hyfrecation.  The area(s) were dressed with vaseline ointment and bandaged.  The patient tolerated the procedure well without adverse events.  Wound care instructions were given to the patient on the AVS.  The patient will be notified of pathology results once available. Results will also be available in Epic.

## 2018-12-07 ENCOUNTER — TELEPHONE (OUTPATIENT)
Dept: CARDIOLOGY | Facility: CLINIC | Age: 73
End: 2018-12-07

## 2018-12-07 NOTE — TELEPHONE ENCOUNTER
Spoke with pt who will be faxing over paper work for Dr Whitfield to sign for Repatha.    ----- Message from Sierra Brooks sent at 12/7/2018  9:54 AM CST -----  Contact: spouse  Requesting call back regarding paperwork that has been faxed over for doctor to complete for pt. Please call back at [phone] or [phone].    Thanks,  Sierra Brooks

## 2018-12-10 ENCOUNTER — TELEPHONE (OUTPATIENT)
Dept: CARDIOLOGY | Facility: CLINIC | Age: 73
End: 2018-12-10

## 2018-12-11 ENCOUNTER — TELEPHONE (OUTPATIENT)
Dept: CARDIOLOGY | Facility: CLINIC | Age: 73
End: 2018-12-11

## 2018-12-11 NOTE — TELEPHONE ENCOUNTER
Faxed application for Repatha to [phone] today.    ----- Message from She Hernandez sent at 12/10/2018  7:45 AM CST -----  Sloane ( Pt wife ) is requesting a call from nurse to f/u on a fax.        Please call pt back at [phone]

## 2018-12-12 ENCOUNTER — TELEPHONE (OUTPATIENT)
Dept: DERMATOLOGY | Facility: CLINIC | Age: 73
End: 2018-12-12

## 2018-12-12 NOTE — TELEPHONE ENCOUNTER
----- Message from Blessing Isaacs sent at 12/12/2018 12:09 PM CST -----  Contact: wife/Renatoleeroy  Please call pt wife @ [phone], have questions regarding pt surgery.

## 2018-12-13 ENCOUNTER — TELEPHONE (OUTPATIENT)
Dept: INTERNAL MEDICINE | Facility: CLINIC | Age: 73
End: 2018-12-13

## 2018-12-13 ENCOUNTER — TELEPHONE (OUTPATIENT)
Dept: DERMATOLOGY | Facility: CLINIC | Age: 73
End: 2018-12-13

## 2018-12-13 NOTE — TELEPHONE ENCOUNTER
Returned call to pt and explained the importance of having the Mohs surgery for his skin cancer.  Pt stated he will think about it some more and then make a decision.  Explained to pt he will need to contact Dr Morgan's office to inform them of his decision.  Pt verbalized understanding of all information given and had no further questions.

## 2018-12-13 NOTE — TELEPHONE ENCOUNTER
----- Message from Shannan Fox sent at 12/13/2018  7:13 AM CST -----  Contact: opt   Call pt regarding pt surgery.    [phone] ([place]) or [phone]

## 2018-12-13 NOTE — TELEPHONE ENCOUNTER
Please see my more recent chart note where I ordered ct for lung cancer screening and please contact him about appt;please remind him this is the test that might require $99 copay to pay at time of ct  thanks

## 2018-12-13 NOTE — TELEPHONE ENCOUNTER
S/w pt. Pt stated that he wanted to know if he was supposed to have a ct scan of his chest. Please advise

## 2018-12-13 NOTE — TELEPHONE ENCOUNTER
----- Message from Shannan Fox sent at 12/13/2018  7:14 AM CST -----  Contact: pt   Pt states that he has a question about CAT Scan that was done.   [phone] (home) or [phone]

## 2018-12-14 ENCOUNTER — TELEPHONE (OUTPATIENT)
Dept: INTERNAL MEDICINE | Facility: CLINIC | Age: 73
End: 2018-12-14

## 2018-12-14 DIAGNOSIS — F17.219 CIGARETTE NICOTINE DEPENDENCE WITH NICOTINE-INDUCED DISORDER: ICD-10-CM

## 2018-12-14 DIAGNOSIS — Z12.9 SCREENING FOR CANCER: ICD-10-CM

## 2018-12-14 NOTE — TELEPHONE ENCOUNTER
----- Message from Ayanna Horta MA sent at 12/14/2018  8:21 AM CST -----  Contact: Wife/Ezequiel      ----- Message -----  From: Jos Ignacio  Sent: 12/14/2018   8:11 AM  To: [facility] Staff    Ezequiel called in regarding the attached patient ().  Ezequiel stated patient had called yesterday to see if Dr. Wood could order CT of the lungs?  Ezequiel wanted to remind office to please call back today at [phone] or [phone]

## 2018-12-17 ENCOUNTER — TELEPHONE (OUTPATIENT)
Dept: INTERNAL MEDICINE | Facility: CLINIC | Age: 73
End: 2018-12-17

## 2018-12-17 NOTE — TELEPHONE ENCOUNTER
----- Message from Ivonne Ortiz sent at 12/17/2018 11:47 AM CST -----  Contact: wife  Request a call concerning a CT appt for this pt, no additional info given, can be reached at [phone] or [phone]

## 2018-12-18 ENCOUNTER — TELEPHONE (OUTPATIENT)
Dept: RADIOLOGY | Facility: HOSPITAL | Age: 73
End: 2018-12-18

## 2018-12-19 ENCOUNTER — HOSPITAL ENCOUNTER (OUTPATIENT)
Dept: RADIOLOGY | Facility: HOSPITAL | Age: 73
Discharge: HOME | End: 2018-12-19
Attending: FAMILY MEDICINE
Payer: MEDICARE

## 2018-12-19 ENCOUNTER — TELEPHONE (OUTPATIENT)
Dept: INTERNAL MEDICINE | Facility: CLINIC | Age: 73
End: 2018-12-19

## 2018-12-19 DIAGNOSIS — F17.219 CIGARETTE NICOTINE DEPENDENCE WITH NICOTINE-INDUCED DISORDER: ICD-10-CM

## 2018-12-19 DIAGNOSIS — R91.1 LUNG NODULE: ICD-10-CM

## 2018-12-19 DIAGNOSIS — Z12.9 SCREENING FOR CANCER: ICD-10-CM

## 2018-12-19 PROCEDURE — 71250 CT THORAX DX C-: CPT | Mod: 26,,, | Performed by: RADIOLOGY

## 2018-12-19 PROCEDURE — G0297 LDCT FOR LUNG CA SCREEN: HCPCS | Mod: TC,PO

## 2018-12-19 NOTE — TELEPHONE ENCOUNTER
Please let him know chest ct showed small new area not alarming but needs monitoring. Need tran ct chest 6 months  Please let her know I discussed with Dr Cardozo as well

## 2018-12-21 ENCOUNTER — TELEPHONE (OUTPATIENT)
Dept: PAIN MEDICINE | Facility: CLINIC | Age: 73
End: 2018-12-21

## 2019-01-09 RX ORDER — OXYCODONE AND ACETAMINOPHEN 7.5; 325 MG/1; MG/1
1 TABLET ORAL EVERY 8 HOURS PRN
Qty: 90 TABLET | Refills: 0 | Status: SHIPPED | OUTPATIENT
Start: 2019-02-18 | End: 2019-03-20

## 2019-01-09 RX ORDER — OXYCODONE AND ACETAMINOPHEN 7.5; 325 MG/1; MG/1
1 TABLET ORAL EVERY 8 HOURS PRN
Qty: 90 TABLET | Refills: 0 | Status: SHIPPED | OUTPATIENT
Start: 2019-01-19 | End: 2019-02-11 | Stop reason: SDUPTHER

## 2019-01-10 ENCOUNTER — OFFICE VISIT (OUTPATIENT)
Dept: PAIN MEDICINE | Facility: CLINIC | Age: 74
End: 2019-01-10
Payer: MEDICARE

## 2019-01-10 VITALS
HEIGHT: 68 IN | SYSTOLIC BLOOD PRESSURE: 131 MMHG | HEART RATE: 58 BPM | DIASTOLIC BLOOD PRESSURE: 51 MMHG | BODY MASS INDEX: 25.16 KG/M2 | WEIGHT: 166 LBS | RESPIRATION RATE: 16 BRPM

## 2019-01-10 DIAGNOSIS — M47.816 LUMBAR SPONDYLOSIS: Primary | ICD-10-CM

## 2019-01-10 DIAGNOSIS — M53.3 SACROILIAC JOINT PAIN: ICD-10-CM

## 2019-01-10 DIAGNOSIS — M48.061 SPINAL STENOSIS OF LUMBAR REGION WITHOUT NEUROGENIC CLAUDICATION: ICD-10-CM

## 2019-01-10 DIAGNOSIS — M47.816 LUMBAR FACET ARTHROPATHY: ICD-10-CM

## 2019-01-10 DIAGNOSIS — M51.36 DDD (DEGENERATIVE DISC DISEASE), LUMBAR: ICD-10-CM

## 2019-01-10 PROCEDURE — 99214 OFFICE O/P EST MOD 30 MIN: CPT | Mod: S$PBB,,, | Performed by: PHYSICIAN ASSISTANT

## 2019-01-10 PROCEDURE — 99999 PR PBB SHADOW E&M-EST. PATIENT-LVL IV: ICD-10-PCS | Mod: PBBFAC,,, | Performed by: PHYSICIAN ASSISTANT

## 2019-01-10 PROCEDURE — 99214 PR OFFICE/OUTPT VISIT, EST, LEVL IV, 30-39 MIN: ICD-10-PCS | Mod: S$PBB,,, | Performed by: PHYSICIAN ASSISTANT

## 2019-01-10 PROCEDURE — 99214 OFFICE O/P EST MOD 30 MIN: CPT | Mod: PBBFAC | Performed by: PHYSICIAN ASSISTANT

## 2019-01-10 PROCEDURE — 99999 PR PBB SHADOW E&M-EST. PATIENT-LVL IV: CPT | Mod: PBBFAC,,, | Performed by: PHYSICIAN ASSISTANT

## 2019-01-10 RX ORDER — GABAPENTIN 800 MG/1
800 TABLET ORAL 3 TIMES DAILY
Qty: 90 TABLET | Refills: 1 | Status: SHIPPED | OUTPATIENT
Start: 2019-01-10 | End: 2019-03-11 | Stop reason: SDUPTHER

## 2019-01-10 NOTE — PROGRESS NOTES
Chief Pain Complaint:  Low Back Pain, Leg pain (left > right)       History of Present Illness:  This patient is a 73 y.o. male who presents today complaining of the above noted pain/s. The patient describes this pain as follows.    - duration of pain: pain for years   - timing: constant   - character: aching, burning  - radiating, dermatomal: extends into left leg, along L5 pattern at times, mostly non-radiating  - antecedent trauma, prior spinal surgery: none  - pertinent negatives: No fever, No chills, No weight loss, No bladder dysfunction, No bowel dysfunction, No extremity weakness, No saddle anesthesia  - pertinent positives: none    - medications, other therapies tried (physical therapy, injections):     >> gabapentin, Norco    >> Has previously undergone Physical Therapy, which made pain worse    >> Has previously undergone spinal injection/s: including lumbar MBB and Left TFESI with Dr Taylor in 2014 & 2015 (see EMR Surgeries for full details) with only short term relief    _________________________________________________________________________________________________________________________________________________________________________________________________________________________      IMAGING / Labs / Studies (reviewed on 1/10/2019):    9/15/17 MRI LUMBAR SPINE WITHOUT CONTRAST  History: Lower back pain.  Left lower extremity pain  Technique: Standard multiplanar pulse sequences without IV contrast.  Comparison:  No prior  studies available for comparison.  Findings:   Mild-moderate scoliosis, convex to the left and centered at L3.  All lumbar vertebral bodies are normal in height.  Scattered degenerative endplate marrow signal identified at the L2-L3 and L3-L4 L5-S1 levels.  No fracture.  No suspicious osseous lesion is identified.  Mild retrolisthesis of L2-L3.  Distal spinal cord and conus medullaris have normal appearance but the conus terminates at the T12-L1 level.  T12-L1: Minimal central  protrusion.  Negative for focal nerve root impingement or central canal narrowing.  Neural foramina widely patent.  L1-2: Mild disc height loss.  Prominent intraosseous eyes.  Mild disc bulging.  Mild facet arthropathy and ligament flavum hypertrophy.  The central canal neural foramina patent.  L2-L3: Minimal retrolisthesis of L2 on L3.  There is moderate disc height loss.  Prominent anterior osteophytes.  Mild-moderate diffuse generalized disc bulging.  Mild facet arthropathy and ligament flavum hypertrophy.  The central canal is patent.  Neural foramina patent.  L3-L4: Disc desiccation and moderate disc height loss.  Large anterior osteophytes are present.  There is mild disc bulging and osteophytic ridging at the posterior disc margin.  Moderate right and mild left facet arthropathy and ligamentum flavum hypertrophy.  The central canal is patent.  Mild neural foraminal narrowing.  L4-L5: Very minimal grade 1 spondylolisthesis of L4-L5.  Mild generalized disc bulge is present.  Severe facet arthropathy and ligamentum flavum hypertrophy.  There is severe central canal and lateral recess stenosis.  Moderate neural foraminal stenosis.  L5-S1: Disc desiccation and moderate to severe disc height loss.  Prominent intraocular heights.  There is mild disc bulging and osteophytic ridging at the posterior disc margin.  Severe left and moderate right neural foraminal stenosis.  The central canal is patent.   Paravertebral soft tissues and musculature are normal.  The visualized intra-abdominal and intrapelvic contents are normal.       10/30/14 MRI Lumbar Spine Without Contrast    Narrative Exam: MRI of the lumbar spine without contrast.  History:     Low back pain. Status post SHEY, with S1-S2 radicular symptoms  Comparison: Prior study dated 04/02/13  Findings:     A convex right scoliosis again noted.  No compression fracture or subluxation.  The conus medullaris has a normal appearance.  The paraspinous soft tissues are  "unremarkable.  The T12 -- L1 and L1 -- 2 disk levels are essentially unremarkable.  L2 -- 3: Mild annular bulging again noted.    L3 -- 4: Moderate narrowing of the right lateral disk space, with right greater than left facet arthropathy, unchanged.    L4 -- 5: Marked bilateral facet arthropathy, with mild annular bulging, causing moderate central canal stenosis, unchanged.  L5 -- S1: Disk desiccation, with moderate disk space narrowing.  Significant degenerative narrowing of the left L5 neural foramen is apparent, unchanged.     _________________________________________________________________________________________________________________________________________________________________________________________________________________________      Review of Systems:  CONSTITUTIONAL: patient denies any fever, chills, or weight loss  SKIN: patient denies any rash or itching  RESPIRATORY: patient denies having any shortness of breath  GASTROINTESTINAL: patient denies having any diarrhea, constipation, or bowel incontinence  GENITOURINARY: patient denies having any abnormal bladder function    MUSCULOSKELETAL:  - patient reports low back pain    NEUROLOGICAL:   - pain as above  - strength in Lower extremities is intact, BILATERALLY  - sensation in Lower extremities is intact, BILATERALLY  - patient denies any loss of bowel or bladder control      PSYCHIATRIC: patient denies any suicidal or homicidal ideations    _________________________________________________________________________________________________________________________________________________________________________________________________________________________      Physical Exam:  Vitals:  BP (!) 131/51 (BP Location: Right arm, Patient Position: Sitting, BP Method: Medium (Automatic))   Pulse (!) 58   Resp 16   Ht 5' 8" (1.727 m)   Wt 75.3 kg (166 lb)   BMI 25.24 kg/m²   (reviewed on 1/10/2019)    General: alert and oriented, in no apparent " distress.  Gait: normal gait.  Skin: no rashes, no discoloration, no obvious lesions  HEENT: normocephalic, atraumatic. Pupils equal and round.  Cardiovascular: no significant peripheral edema present.  Respiratory: without use of accessory muscles of respiration.    Musculoskeletal - Lumbar Spine:  - Pain on extension of lumbar spine: Present  - Lumbar facet loading: Present bilaterally  - TTP over the lumbar facet joints: Present Bilaterally, worse at L5-S1   - TTP over the SI joints: Present on left   - Straight Leg Raise: Negative  - NOELLE: Present  - Pain with internal and external rotation of hip    Neuro - Lower Extremities:  - BLE Strength: R/L: HF: 5/5, HE: 5/5, KF: 5/5; KE: 5/5; FE: 5/5; FF: 5/5  - Extremity Reflexes: Brisk and symmetric throughout  - Sensory: Sensation to light touch intact bilaterally      Psych:  Mood and affect is appropriate    _________________________________________________________________________________________________________________________________________________________________________________________________________________________     Assessment:  Noble Tripp is a 73 y.o. year old male who is presenting with   Encounter Diagnoses   Name Primary?    Lumbar spondylosis Yes    DDD (degenerative disc disease), lumbar     Lumbar facet arthropathy     Sacroiliac joint pain     Spinal stenosis of lumbar region without neurogenic claudication      Patient returns for follow-up.  He complains of chronic low back pain. Lumbar MRI shows advanced diffuse spondylosis, greatest at the L4-L5 level with severe central canal stenosis and lateral recess stenosis.       Plan:  1. Interventional: Consider Bilateral L3, L4, L5 MBB with local. He feels injections weren't long lasting in the past. We will consider this if pain not controlled with medication, but he is stable at this time.    2. Pharmacologic:   - Refill Percocet 7.5/325 mg PO TID PRN (90 tabs) x 2 months. Paper Rx  given. Patient tolerating opioids with no side effects, obtaining good pain control with functional improvement.   - Refill Gabapentin 800mg TID.  - Opioid contract signed on 3/20/2018.     - LA  reviewed and appropriate. Last filled 12-21-18.  Will post date accordingly.  - Will order UDS next visit to ensure medication compliance.      3. Rehabilitative: Encouraged regular exercise.    4. Diagnostic: None for now.    5. Follow up: 9 weeks for med refill.    - I discussed the risks, benefits, and alternatives to potential treatment options. All questions and concerns were fully addressed today in clinic. Dr. Mueller was consulted regarding the patient plan and agrees.             >> UDS:  6-28-16 :: appropriate  2/28/2017 :: appropriate  8/18/2017 :: not in EMR  5/16/2018 :: appropriate  9/11/2018 :: appropriate

## 2019-01-14 ENCOUNTER — TELEPHONE (OUTPATIENT)
Dept: CARDIOLOGY | Facility: CLINIC | Age: 74
End: 2019-01-14

## 2019-01-14 NOTE — TELEPHONE ENCOUNTER
Spoke with pts wife who needed safety net info completed for pt.  [employer] was called at [phone].  Needed NPI number and new  address. Spoke with pts wife and let her know it was done and application was moved forward.      ----- Message from Joycelyn Lopez sent at 1/14/2019  7:57 AM CST -----  Contact: ms almonte-wife  states that tommy sent to [employer] is incomplete, pls return call..[phone] (home)

## 2019-01-16 ENCOUNTER — OFFICE VISIT (OUTPATIENT)
Dept: PULMONOLOGY | Facility: CLINIC | Age: 74
End: 2019-01-16
Payer: MEDICARE

## 2019-01-16 ENCOUNTER — TELEPHONE (OUTPATIENT)
Dept: PULMONOLOGY | Facility: CLINIC | Age: 74
End: 2019-01-16

## 2019-01-16 ENCOUNTER — HOSPITAL ENCOUNTER (OUTPATIENT)
Dept: RADIOLOGY | Facility: HOSPITAL | Age: 74
Discharge: HOME | End: 2019-01-16
Attending: INTERNAL MEDICINE
Payer: MEDICARE

## 2019-01-16 ENCOUNTER — CLINICAL SUPPORT (OUTPATIENT)
Dept: PULMONOLOGY | Facility: CLINIC | Age: 74
End: 2019-01-16
Payer: MEDICARE

## 2019-01-16 VITALS
BODY MASS INDEX: 25.31 KG/M2 | HEART RATE: 69 BPM | RESPIRATION RATE: 16 BRPM | OXYGEN SATURATION: 94 % | DIASTOLIC BLOOD PRESSURE: 60 MMHG | HEIGHT: 68 IN | WEIGHT: 167 LBS | SYSTOLIC BLOOD PRESSURE: 120 MMHG

## 2019-01-16 DIAGNOSIS — T17.208S: ICD-10-CM

## 2019-01-16 DIAGNOSIS — R59.0 MEDIASTINAL ADENOPATHY: ICD-10-CM

## 2019-01-16 DIAGNOSIS — J90 PLEURAL EFFUSION ON LEFT: ICD-10-CM

## 2019-01-16 DIAGNOSIS — J44.9 MODERATE COPD (CHRONIC OBSTRUCTIVE PULMONARY DISEASE): Chronic | ICD-10-CM

## 2019-01-16 DIAGNOSIS — G47.33 OSA ON CPAP: Chronic | ICD-10-CM

## 2019-01-16 DIAGNOSIS — R91.8 LUNG MASS: ICD-10-CM

## 2019-01-16 DIAGNOSIS — C02.9 CANCER OF TONGUE: Primary | ICD-10-CM

## 2019-01-16 DIAGNOSIS — Z85.89 HISTORY OF HEAD AND NECK CANCER: ICD-10-CM

## 2019-01-16 LAB
BRPFT: ABNORMAL
FEF 25 75 CHG: -33 %
FEF 25 75 LLN: 0.93
FEF 25 75 POST REF: 38 %
FEF 25 75 PRE REF: 56.7 %
FEF 25 75 REF: 2.22
FET100 CHG: 70.6 %
FEV1 CHG: 3.2 %
FEV1 FVC CHG: -11.7 %
FEV1 FVC LLN: 62
FEV1 FVC POST REF: 79.8 %
FEV1 FVC PRE REF: 90.4 %
FEV1 FVC REF: 76
FEV1 LLN: 2.1
FEV1 POST REF: 65.8 %
FEV1 PRE REF: 63.8 %
FEV1 REF: 2.93
FEV6 CHG: 7.8 %
FEV6 LLN: 2.92
FEV6 POST REF: 76.9 %
FEV6 POST: 2.9 L (ref 2.92–4.63)
FEV6 PRE REF: 71.3 %
FEV6 PRE: 2.69 L (ref 2.92–4.63)
FEV6 REF: 3.77
FVC CHG: 16.8 %
FVC LLN: 2.86
FVC POST REF: 82 %
FVC PRE REF: 70.2 %
FVC REF: 3.88
MVV LLN: 97
MVV REF: 114
PEF CHG: 41.5 %
PEF LLN: 5.52
PEF POST REF: 62.8 %
PEF PRE REF: 44.4 %
PEF REF: 7.71
POST FEF 25 75: 0.84 L/S (ref 0.93–4.05)
POST FET 100: 12.77 SEC
POST FEV1 FVC: 60.67 % (ref 62.18–88.25)
POST FEV1: 1.93 L (ref 2.1–3.71)
POST FVC: 3.18 L (ref 2.86–4.91)
POST PEF: 4.84 L/S (ref 5.52–9.9)
PRE FEF 25 75: 1.26 L/S (ref 0.93–4.05)
PRE FET 100: 7.49 SEC
PRE FEV1 FVC: 68.71 % (ref 62.18–88.25)
PRE FEV1: 1.87 L (ref 2.1–3.71)
PRE FVC: 2.72 L (ref 2.86–4.91)
PRE PEF: 3.42 L/S (ref 5.52–9.9)

## 2019-01-16 PROCEDURE — 99214 PR OFFICE/OUTPT VISIT, EST, LEVL IV, 30-39 MIN: ICD-10-PCS | Mod: 25,S$PBB,, | Performed by: INTERNAL MEDICINE

## 2019-01-16 PROCEDURE — 99214 OFFICE O/P EST MOD 30 MIN: CPT | Mod: 25,S$PBB,, | Performed by: INTERNAL MEDICINE

## 2019-01-16 PROCEDURE — 94060 EVALUATION OF WHEEZING: CPT | Mod: 26,S$PBB,, | Performed by: INTERNAL MEDICINE

## 2019-01-16 PROCEDURE — 99999 PR PBB SHADOW E&M-EST. PATIENT-LVL IV: CPT | Mod: PBBFAC,,, | Performed by: INTERNAL MEDICINE

## 2019-01-16 PROCEDURE — 71046 X-RAY EXAM CHEST 2 VIEWS: CPT | Mod: 26,,, | Performed by: RADIOLOGY

## 2019-01-16 PROCEDURE — 99214 OFFICE O/P EST MOD 30 MIN: CPT | Mod: PBBFAC,25 | Performed by: INTERNAL MEDICINE

## 2019-01-16 PROCEDURE — 71046 XR CHEST PA AND LATERAL: ICD-10-PCS | Mod: 26,,, | Performed by: RADIOLOGY

## 2019-01-16 PROCEDURE — 99999 PR PBB SHADOW E&M-EST. PATIENT-LVL IV: ICD-10-PCS | Mod: PBBFAC,,, | Performed by: INTERNAL MEDICINE

## 2019-01-16 PROCEDURE — 71046 X-RAY EXAM CHEST 2 VIEWS: CPT | Mod: TC

## 2019-01-16 PROCEDURE — 94060 EVALUATION OF WHEEZING: CPT | Mod: PBBFAC

## 2019-01-16 PROCEDURE — 94060 PR EVAL OF BRONCHOSPASM: ICD-10-PCS | Mod: 26,S$PBB,, | Performed by: INTERNAL MEDICINE

## 2019-01-16 RX ORDER — ALBUTEROL SULFATE 0.83 MG/ML
2.5 SOLUTION RESPIRATORY (INHALATION)
Qty: 1 BOX | Refills: 11 | Status: SHIPPED | OUTPATIENT
Start: 2019-01-16 | End: 2020-01-16

## 2019-01-16 NOTE — PATIENT INSTRUCTIONS
Using a Nebulizer (Adult)    A nebulizer turns medicine into a mist. You breathe the mist in through a mask or a mouthpiece. To use your nebulizer, follow the steps below.  With a mask  · Put the correct dose of medicine in the cup. Attach the top as directed.  · Connect one end of the tubing to the cup and the other end to the nebulizer.  · Attach the mask to the cup.  · Place the mask over your nose and mouth. Make sure it fits securely and comfortably.  · Turn on the nebulizer.  · Take slow, deep breaths, keeping the nebulizer upright, until all the medicine is gone. This takes 10-15 minutes.  Be sure to follow directions you are given for cleaning the nebulizer and the mask.   With a mouthpiece    · Put the correct dose of medicine in the cup. Attach the top as directed.  · Connect one end of the tubing to the cup and the other end to the nebulizer.  · Attach the mouthpiece to the cup.  · Put the mouthpiece between your teeth and close your lips around it. Keep your tongue below the mouthpiece.  · Turn on the nebulizer.  · Take slow, deep breaths through the mouthpiece, keeping the nebulizer upright, until all the medicine is gone. This takes 10-15 minutes.  Be sure to follow directions you are given for cleaning the nebulizer and the mouthpiece.   Date Last Reviewed: 10/1/2016  © 8823-1084 [employer]. [address] [place] [address], [place] [address]. All rights reserved. This information is not intended as a substitute for professional medical care. Always follow your healthcare professional's instructions.

## 2019-01-16 NOTE — PROGRESS NOTES
"Subjective:       Patient ID: Noble Tripp is a 73 y.o. male.    Chief Complaint: Sleep Apnea and COPD    Noble Tripp is 73 years old  Seen PCP and had screening chest CT 12/2018  Hx laryngeal cancer, chr aspiration  Moderate COPD : FEV1 1.87L( 63.8%), CAT score 0, mRC score 1-2  Waldorf : 13: CPAP 14 cm, using CPAP   Follow-up appointment.  Accompanied by his wife.    Cough, poor expectoration, Discontinued speech therapy sometime back  Suspect chr aspiration continues  Last viosit was 08/07/2018  CXR shows left pleural effusion  Has aspiration risk since radiation for neck cancer  Meds: Spiriva, PROAIR HFA , Discontuinued TRELEGY, got thrush.  Download not available however validates using and benefits from machine  He is still on Chantix;    Chest CT was revewied: pleural densitiy and mediastinal adenopathy. Unclear if atelectasis on new growth or infection         Review of Systems   Constitutional: Negative for fatigue.   HENT: Positive for trouble swallowing.    Eyes: Negative.    Respiratory: Negative for snoring, sputum production, shortness of breath, wheezing and dyspnea on extertion.    Cardiovascular: Negative.    Genitourinary: Negative.    Endocrine: endocrine negative   Musculoskeletal: Negative.    Skin: Negative.    Gastrointestinal: Negative.    Neurological: Negative.    Psychiatric/Behavioral: Negative.        Objective:       Vitals:    01/16/19 0922   BP: 120/60   Pulse: 69   Resp: 16   SpO2: (!) 94%   Weight: 75.8 kg (167 lb)   Height: 5' 8" (1.727 m)     Physical Exam   Constitutional: He is oriented to person, place, and time. Vital signs are normal. He appears ill. He is not obese.   HENT:   Head: Normocephalic.   Nose: Nose normal.   Neck: Normal range of motion. Neck supple.   Cardiovascular: Normal rate, regular rhythm and normal heart sounds.   No murmur heard.  Pulmonary/Chest: Normal expansion and effort normal. He has no decreased breath sounds. He has no wheezes. He " has rhonchi in the right lower field.         Negative for tactile fremitus.   Abdominal: Bowel sounds are normal.   Musculoskeletal: Normal range of motion. He exhibits no edema.   Lymphadenopathy:     He has no cervical adenopathy.   Neurological: He is alert and oriented to person, place, and time. He has normal reflexes. No cranial nerve deficit.   Skin: Skin is warm and dry.   Psychiatric: He has a normal mood and affect.   Nursing note and vitals reviewed.    Personal Diagnostic Review  Chest x-ray:   There Is obscuration of the left costophrenic angle secondary to a pleural effusion.  There is adjacent area of atelectasis and/or scarring.  This does appear more prominent than the chest x-ray from 08/07/2018 but similar to that noted on the chest CT from 12/19/2018.  Calcified granuloma seen in the right lower lobe.  No new areas of focal pulmonary consolidation.  There are biapical areas of scarring.  Remainder stable    Chest CT   Lung-RADS Category:  3  Lungs/pleural: There are bilateral pleural effusions, left slightly greater than right.  There is an adjacent area of round masslike consolidation which contacts the pleural surface.  This may relate to round atelectasis.  This was also present on the recent CT of the abdomen and pelvis.  An additional 2nd similar somewhat rounded area of consolidation is seen in the inferior lingula with adjacent linear strands also likely related to atelectasis and adjacent scarring.  Bronchial wall thickening in the left lower lobe and mild traction bronchiectasis is seen.  The aforementioned findings are all new since the chest CT from 02/02/2016.  Biapical areas of pleuroparenchymal scarring are noted.  Mild subsegmental atelectasis or scarring at the right lung base is visualized.  A calcified granuloma is seen in the right middle lobe.  Reticular nodule lung markings seen on the study from 02/02/2016 have resolved.  No new pulmonary nodule or mass is  identified.    Mediastinum: Mild mediastinal adenopathy is present.  For reference, there is a precarinal lymph node measuring 1.3 cm in short axis on image 41 of series 2.    Spirometry:  FEv1  1.87 ( 63.8%), FVC 2.76( 70.2%)  FEV1/FVC 69    Lab Results   Component Value Date    PREFVC 2.72 (L) 2019    YILRBZ4509 1.26 2019    PREPEF 3.42 (L) 2019    GSQKKR870 7.49 2019    IPLDTD2VTM 68.71 2019    POSTFVC 3.18 2019    POSTFEV1 1.93 (L) 2019    [id number] 0.84 (L) 2019    POSTPEF 4.84 (L) 2019    [id number] 12.77 2019    PWSRPRE91 2.15 2018    REBZZVD1HSK 60.67 (L) 2019    ULKMYXG7RJL 73.4 2018    PREDICTEDFVC 4.08 2018    PREDICTEDFEV 2.98 2018     DOWNLOAD  10/18/2018 - 01/15/2019  Patient ID: MRN: [id number]  : 1945  Age: 73 years  [address]  [place] [address]  Phone: [phone]  Email: [email or web address]  Compliance Report  Usage 10/18/2018 - 01/15/2019  Usage days 90/90 days (100%)  >= 4 hours 89 days (99%)  < 4 hours 1 days (1%)  Usage hours 609 hours 39 minutes  Average usage (total days) 6 hours 46 minutes  Average usage (days used) 6 hours 46 minutes  Median usage (days used) 6 hours 55 minutes  [address]  Serial number [id number]  Mode CPAP  Set pressure 14 cmH2O  EPR Fulltime        Assessment:       Problem List Items Addressed This Visit     JUANITO on CPAP (Chronic)    Moderate COPD (chronic obstructive pulmonary disease) (Chronic)    Relevant Medications    albuterol (PROVENTIL) 2.5 mg /3 mL (0.083 %) nebulizer solution    Other Relevant Orders    Culture, Respiratory with Gram Stain    Culture, Respiratory with Gram Stain    Culture, Respiratory with Gram Stain    NEBULIZER FOR HOME USE    History of head and neck cancer    Relevant Orders    NM PET CT Routine Skull to Mid Thigh    Cancer of tongue - Primary    Relevant Orders    NM PET CT Routine Skull to Mid Thigh     Fl Modified Barium Swallow Speech    Oropharyngeal aspiration    Relevant Orders    Fl Modified Barium Swallow Speech    Culture, Respiratory with Gram Stain    Culture, Respiratory with Gram Stain    Culture, Respiratory with Gram Stain    Pleural effusion on left      Other Visit Diagnoses     Lung mass        Relevant Orders    NM PET CT Routine Skull to Mid Thigh    Procalcitonin    CBC auto differential    Culture, Respiratory with Gram Stain    Culture, Respiratory with Gram Stain    Culture, Respiratory with Gram Stain    Mediastinal adenopathy        Relevant Orders    NM PET CT Routine Skull to Mid Thigh        Plan:       Patient self discontinued speech therapy  Swallow eval  Added neb  CPAP works well CPAP 14 cm. Usage > 4 hrs was 99%  May need bronch and biopsy    Requested Prescriptions     Signed Prescriptions Disp Refills    albuterol (PROVENTIL) 2.5 mg /3 mL (0.083 %) nebulizer solution 1 Box 11     Sig: Take 3 mLs (2.5 mg total) by nebulization every 8 (eight) hours while awake.     .      Follow-up in about 4 weeks (around 2/13/2019), or labs today, swallow eval, PET CT. , for Aspiration precaution, sputum x 3, home nebulizer, Practice Breathing Techniques, Incentive spiromet.    This note was prepared using voice recognition system and is likely to have sound alike errors that may have been overlooked even after proof reading.  Please call me with any questions    Discussed diagnosis, its evaluation, treatment and usual course. All questions answered.    Thank you for the courtesy of participating in the care of this patient    Santos Cardozo MD

## 2019-01-17 ENCOUNTER — LAB VISIT (OUTPATIENT)
Dept: LAB | Facility: HOSPITAL | Age: 74
End: 2019-01-17
Attending: INTERNAL MEDICINE
Payer: MEDICARE

## 2019-01-17 DIAGNOSIS — T17.208S: ICD-10-CM

## 2019-01-17 DIAGNOSIS — J44.9 MODERATE COPD (CHRONIC OBSTRUCTIVE PULMONARY DISEASE): Chronic | ICD-10-CM

## 2019-01-17 DIAGNOSIS — R91.8 LUNG MASS: ICD-10-CM

## 2019-01-17 PROCEDURE — 87205 SMEAR GRAM STAIN: CPT | Mod: 59

## 2019-01-17 PROCEDURE — 87070 CULTURE OTHR SPECIMN AEROBIC: CPT

## 2019-01-18 ENCOUNTER — TELEPHONE (OUTPATIENT)
Dept: PULMONOLOGY | Facility: CLINIC | Age: 74
End: 2019-01-18

## 2019-01-18 NOTE — TELEPHONE ENCOUNTER
----- Message from Ivonne Ortiz sent at 1/18/2019  9:44 AM CST -----  Contact: wife  Request a return call, no additional info given, can be reached at [phone]///thxMW

## 2019-01-21 DIAGNOSIS — J14 PNEUMONIA DUE TO HAEMOPHILUS INFLUENZAE, UNSPECIFIED LATERALITY, UNSPECIFIED PART OF LUNG: Primary | ICD-10-CM

## 2019-01-21 LAB
BACTERIA SPEC AEROBE CULT: NORMAL
GRAM STN SPEC: NORMAL

## 2019-01-21 RX ORDER — AMOXICILLIN AND CLAVULANATE POTASSIUM 875; 125 MG/1; MG/1
1 TABLET, FILM COATED ORAL EVERY 12 HOURS
Qty: 20 TABLET | Refills: 0 | Status: SHIPPED | OUTPATIENT
Start: 2019-01-21 | End: 2019-01-31

## 2019-01-22 ENCOUNTER — TELEPHONE (OUTPATIENT)
Dept: RADIOLOGY | Facility: HOSPITAL | Age: 74
End: 2019-01-22

## 2019-01-22 ENCOUNTER — TELEPHONE (OUTPATIENT)
Dept: PULMONOLOGY | Facility: CLINIC | Age: 74
End: 2019-01-22

## 2019-01-22 NOTE — TELEPHONE ENCOUNTER
----- Message from Santos Cardozo MD sent at 1/21/2019  7:41 AM CST -----  HAEMOPHILUS INFLUENZAE   Moderate   Beta Lactamase positive   Normal respiratory alexandrea also present     WILL SEND ABX to pharmacy    Santos Cardozo MD

## 2019-01-23 ENCOUNTER — HOSPITAL ENCOUNTER (OUTPATIENT)
Dept: RADIOLOGY | Facility: HOSPITAL | Age: 74
Discharge: HOME | End: 2019-01-23
Attending: INTERNAL MEDICINE
Payer: MEDICARE

## 2019-01-23 DIAGNOSIS — R59.0 MEDIASTINAL ADENOPATHY: ICD-10-CM

## 2019-01-23 DIAGNOSIS — R91.8 LUNG MASS: ICD-10-CM

## 2019-01-23 DIAGNOSIS — C02.9 CANCER OF TONGUE: ICD-10-CM

## 2019-01-23 DIAGNOSIS — Z85.89 HISTORY OF HEAD AND NECK CANCER: ICD-10-CM

## 2019-01-23 PROCEDURE — 78815 PET IMAGE W/CT SKULL-THIGH: CPT | Mod: 26,PI,, | Performed by: RADIOLOGY

## 2019-01-23 PROCEDURE — 78815 NM PET CT ROUTINE: ICD-10-PCS | Mod: 26,PI,, | Performed by: RADIOLOGY

## 2019-01-23 PROCEDURE — A9552 F18 FDG: HCPCS

## 2019-01-23 PROCEDURE — 78815 PET IMAGE W/CT SKULL-THIGH: CPT | Mod: TC,PI

## 2019-01-28 ENCOUNTER — TELEPHONE (OUTPATIENT)
Dept: PULMONOLOGY | Facility: CLINIC | Age: 74
End: 2019-01-28

## 2019-01-28 NOTE — TELEPHONE ENCOUNTER
----- Message from Santos Cardozo MD sent at 1/28/2019  8:51 AM CST -----  This week    Santos Cardozo MD    ----- Message -----  From: Howard Bourne LPN  Sent: 1/28/2019   8:46 AM  To: Santos Cardozo MD    Pt has appt 2/19/19.  That soon enough?    ----- Message -----  From: Santos Cardozo MD  Sent: 1/25/2019   6:50 PM  To: Juliane Graham Staff    Would like to see him to discuss PEtCT, will need Bronch

## 2019-01-30 ENCOUNTER — HOSPITAL ENCOUNTER (OUTPATIENT)
Dept: RADIOLOGY | Facility: HOSPITAL | Age: 74
Discharge: HOME | End: 2019-01-30
Attending: INTERNAL MEDICINE
Payer: MEDICARE

## 2019-01-30 ENCOUNTER — OFFICE VISIT (OUTPATIENT)
Dept: PULMONOLOGY | Facility: CLINIC | Age: 74
End: 2019-01-30
Payer: MEDICARE

## 2019-01-30 VITALS
HEIGHT: 68 IN | DIASTOLIC BLOOD PRESSURE: 62 MMHG | SYSTOLIC BLOOD PRESSURE: 136 MMHG | HEART RATE: 69 BPM | WEIGHT: 169.88 LBS | RESPIRATION RATE: 18 BRPM | OXYGEN SATURATION: 97 % | BODY MASS INDEX: 25.75 KG/M2

## 2019-01-30 DIAGNOSIS — C02.9 CANCER OF TONGUE: ICD-10-CM

## 2019-01-30 DIAGNOSIS — J90 PLEURAL EFFUSION ON LEFT: ICD-10-CM

## 2019-01-30 DIAGNOSIS — R91.8 LUNG MASS: Primary | ICD-10-CM

## 2019-01-30 DIAGNOSIS — R91.8 LUNG MASS: ICD-10-CM

## 2019-01-30 PROCEDURE — 71046 X-RAY EXAM CHEST 2 VIEWS: CPT | Mod: 26,,, | Performed by: RADIOLOGY

## 2019-01-30 PROCEDURE — 99999 PR PBB SHADOW E&M-EST. PATIENT-LVL IV: ICD-10-PCS | Mod: PBBFAC,,, | Performed by: INTERNAL MEDICINE

## 2019-01-30 PROCEDURE — 99214 OFFICE O/P EST MOD 30 MIN: CPT | Mod: S$PBB,,, | Performed by: INTERNAL MEDICINE

## 2019-01-30 PROCEDURE — 71046 X-RAY EXAM CHEST 2 VIEWS: CPT | Mod: TC

## 2019-01-30 PROCEDURE — 99999 PR PBB SHADOW E&M-EST. PATIENT-LVL IV: CPT | Mod: PBBFAC,,, | Performed by: INTERNAL MEDICINE

## 2019-01-30 PROCEDURE — 99214 OFFICE O/P EST MOD 30 MIN: CPT | Mod: PBBFAC,25 | Performed by: INTERNAL MEDICINE

## 2019-01-30 PROCEDURE — 71046 XR CHEST PA AND LATERAL: ICD-10-PCS | Mod: 26,,, | Performed by: RADIOLOGY

## 2019-01-30 PROCEDURE — 99214 PR OFFICE/OUTPT VISIT, EST, LEVL IV, 30-39 MIN: ICD-10-PCS | Mod: S$PBB,,, | Performed by: INTERNAL MEDICINE

## 2019-01-30 RX ORDER — SODIUM CHLORIDE, SODIUM LACTATE, POTASSIUM CHLORIDE, CALCIUM CHLORIDE 600; 310; 30; 20 MG/100ML; MG/100ML; MG/100ML; MG/100ML
INJECTION, SOLUTION INTRAVENOUS CONTINUOUS
Status: CANCELLED | OUTPATIENT
Start: 2019-01-30

## 2019-01-30 RX ORDER — LIDOCAINE HYDROCHLORIDE 40 MG/ML
4 SOLUTION TOPICAL ONCE
Status: CANCELLED | OUTPATIENT
Start: 2019-01-30 | End: 2019-01-30

## 2019-01-30 NOTE — PATIENT INSTRUCTIONS
Bronchoscopy   Call your doctor if you have shortness of breath, a temperature above 101.0° F for more than 24 hours, or bleeding from your nose or throat. If you have chest pain or severe shortness of breath, call right away.      Bronchoscopy is an exam used to help diagnose lung problems. A thin, flexible tube called a bronchoscope is used. A special light and a tiny camera are attached to the tube. This equipment lets your doctor view your breathing passages.   Before Your Test   Follow your doctors instructions carefully. If you dont, the exam may be cancelled or need to be repeated.   Have no food or drink for 6-12 hours before the test. Also, avoid smoking for 24 hours before the test.   Remove any dentures or bridgework.   Before changing into a hospital gown, empty your bladder.   Right before the test, you will be given medications to help you relax and to prevent gagging. These medications may be given by IV. In addition, your nose and throat may be numbed with a special spray.   Make sure you have an adult friend or family member available to drive you home.  During Your Test   You will lie on a table with your head raised or sit in a special chair. The room is likely to be darkened. Bronchoscopy takes 45-60 minutes and includes the following steps:   The doctor inserts the tube into your nose or mouth. You might feel a gagging sensation. To help relieve this feeling, you will be told to swallow or take deep breaths. Your airway will remain open even with the tube in place. But you wont be able to talk.   The doctor examines your breathing passages. He or she may also remove tiny tissue samples for biopsy.  After Your Test   To avoid choking, spit out any mouth secretions instead of swallowing.   Do not eat or drink until the anesthetic wears off fully.   If you had a biopsy, avoid coughing hard and clearing your throat.   The doctor will discuss your results over the phone or at your next  visit.    Please call office [phone] for any questions

## 2019-01-30 NOTE — PROGRESS NOTES
"Subjective:       Patient ID: Noble Tripp is a 73 y.o. male.    Chief Complaint: COPD (rev PET)    Noble Tripp is 73 years old  Seen PCP and had screening chest CT 12/2018  Serial Chest CT reveiwed  Hx laryngeal cancer, chr aspiration  PET shows LLL pleural based mass  Swallow study pending  Biopsy options discussed  Chest CT was reveiwed: pleural densitiy and mediastinal adenopathy. Unclear if atelectasis on new growth or infection         Review of Systems   Constitutional: Negative for fatigue.   HENT: Positive for trouble swallowing.    Eyes: Negative.    Respiratory: Negative for snoring, sputum production, shortness of breath, wheezing and dyspnea on extertion.    Cardiovascular: Negative.    Genitourinary: Negative.    Endocrine: endocrine negative   Musculoskeletal: Negative.    Skin: Negative.    Gastrointestinal: Negative.    Neurological: Negative.    Psychiatric/Behavioral: Negative.        Objective:       Vitals:    01/30/19 1259   BP: 136/62   Pulse: 69   Resp: 18   SpO2: 97%   Weight: 77 kg (169 lb 13.8 oz)   Height: 5' 8" (1.727 m)     Physical Exam   Constitutional: He is oriented to person, place, and time. Vital signs are normal. He appears ill. He is not obese.   HENT:   Head: Normocephalic.   Nose: Nose normal.   Neck: Normal range of motion. Neck supple.   Cardiovascular: Normal rate, regular rhythm and normal heart sounds.   No murmur heard.  Pulmonary/Chest: Normal expansion and effort normal. He has no decreased breath sounds. He has no wheezes. He has rhonchi in the right lower field.         Negative for tactile fremitus.   Abdominal: Bowel sounds are normal.   Musculoskeletal: Normal range of motion. He exhibits no edema.   Lymphadenopathy:     He has no cervical adenopathy.   Neurological: He is alert and oriented to person, place, and time. He has normal reflexes. No cranial nerve deficit.   Skin: Skin is warm and dry.   Psychiatric: He has a normal mood and affect. "   Nursing note and vitals reviewed.    Personal Diagnostic Review      PET CT  Head/neck: There is normal physiologic FDG uptake noted within the visualized brain parenchyma. Borderline enlarged left supraclavicular lymph node noted with SUV max of 3.6.    Chest: There is redemonstration of several rounded areas of parenchymal opacity in the periphery of the left lower lobe and lingula which exhibit no significant hypermetabolism.  Findings are favored to represent rounded atelectasis or confluent fibrosis.  Additional note is made of mildly FDG avid hazy opacity in the right lower lobe and scattered hypermetabolic areas of ill-defined subpleural nodularity and patchy parenchymal density in the lateral periphery of the right upper, lower, and middle lobes.  SUV max is 2.9. There is a mildly enlarged and weakly FDG avid precarinal lymph node measuring 1.9 x 1.6 cm and exhibiting an SUV max of only 2.1.  There are small to moderate sized left greater than right pleural effusions.    Abdomen/Pelvis: Normal physiologic FDG uptake noted within the liver, spleen, urinary tract, and bowel.Non FDG avid hypodense lesion consistent with a cyst noted in the right hepatic lobe.  Asymmetric nodular thickening of the left adrenal gland noted with low-density non FDG avid 2.4 cm nodule most likely an adenoma.  No pathologic retroperitoneal or mesenteric lymphadenopathy.    Skeletal: No FDG avid osseus lesions identified.          Assessment:       Problem List Items Addressed This Visit     Cancer of tongue    Relevant Orders    Comprehensive metabolic panel    Case request GI: Bronchoscopy (Completed)    X-Ray Chest PA And Lateral (Completed)    Pleural effusion on left    Relevant Orders    Comprehensive metabolic panel    Case request GI: Bronchoscopy (Completed)    X-Ray Chest PA And Lateral (Completed)      Other Visit Diagnoses     Lung mass    -  Primary    Relevant Orders    Comprehensive metabolic panel    Case request  GI: Bronchoscopy (Completed)    X-Ray Chest PA And Lateral (Completed)        Plan:       Complete Abx  Swallow eval  Bronch Feb 5th    My diagnostic impression and work-up plan were discussed at length with patient. Risks were discussed. BRONCHOSCOPY procedure. Complications of the procedure discussed in detail with patient. Complications including but not limited to infection that may require hospital admission, bleeding that may require blood transfusion and or hospital admission, perforation of the lung which may require surgery,chance of injury to the throat, windpipe or bronchial tubes, laryngospam, coughing, aspiration, hypoxemia or cardiac arrythmias. Patient expressed and verbalized understanding. The material risks of anesthesia in connection with the procedure including brain damage, paralysis from the neck downwards, paralysis from the waist downwards, loss of function of an arm or a leg, disfigurement (incluing scars)and death were discussed with patient who expressedand verbalized understanding. Alternate treatments and material risks associated with such alternatives were discussed with pateint. These include radiologic surveillance with minimal risk and sugery with an indeterminate risk. The material risks of refusing the procedure was discussed in detail. This includes no diagnosis or confirmation of diagnisis and rendering of appropriate treatment the risk of which depends on the nature of the diagnosed illness. Patient expressed and verbalized understanding. Procedure scheduled for date Feb 5th 2019. Consent signed. Orders entered. Coagulation studies per orders.   All questions were answered and the patient expressed understanding    .      Follow-up in about 4 weeks (around 2/27/2019), or cxr today, labs today, for Bronchoscopy consent and prep, Hold asa 5 days before procedure.    This note was prepared using voice recognition system and is likely to have sound alike errors that may have been  overlooked even after proof reading.  Please call me with any questions    Discussed diagnosis, its evaluation, treatment and usual course. All questions answered.    Thank you for the courtesy of participating in the care of this patient    Santos Cardozo MD

## 2019-01-31 ENCOUNTER — ANESTHESIA EVENT (OUTPATIENT)
Dept: ENDOSCOPY | Facility: HOSPITAL | Age: 74
DRG: 853 | End: 2019-01-31
Payer: MEDICARE

## 2019-02-04 ENCOUNTER — TELEPHONE (OUTPATIENT)
Dept: PULMONOLOGY | Facility: CLINIC | Age: 74
End: 2019-02-04

## 2019-02-04 RX ORDER — PILOCARPINE HYDROCHLORIDE 5 MG/1
TABLET, FILM COATED ORAL
Qty: 90 TABLET | Refills: 2 | Status: SHIPPED | OUTPATIENT
Start: 2019-02-04 | End: 2019-06-02 | Stop reason: SDUPTHER

## 2019-02-04 NOTE — TELEPHONE ENCOUNTER
Returned call and spoke with wife, advised that per Dr Cardozo if the patient must take pain, that it would interfere with his muscle strength. Wife voiced understanding.

## 2019-02-04 NOTE — TELEPHONE ENCOUNTER
----- Message from Santos Cardozo MD sent at 2/1/2019  4:14 PM CST -----  Contact: spouse  That may interfere with his muscle strength  If he must then proceed but needs to know that    Santos Cardozo MD      ----- Message -----  From: Марина Medel LPN  Sent: 2/1/2019   2:30 PM  To: Santos Cardozo MD    Patient wants to know if he can take his pain med (percocet and neurontin) on day of his procedure, 2/12/19  (swallow study)   ----- Message -----  From: Isabel Pedro  Sent: 2/1/2019   1:26 PM  To: Juliane Graham Staff    Patient requesting a call back regarding prescriptions and procedure. Please call back at [phone].    Thanks,  Isabel Pedro

## 2019-02-05 ENCOUNTER — ANESTHESIA (OUTPATIENT)
Dept: ENDOSCOPY | Facility: HOSPITAL | Age: 74
DRG: 853 | End: 2019-02-05
Payer: MEDICARE

## 2019-02-05 ENCOUNTER — HOSPITAL ENCOUNTER (INPATIENT)
Facility: HOSPITAL | Age: 74
LOS: 2 days | Discharge: HOME | DRG: 853 | End: 2019-02-08
Attending: EMERGENCY MEDICINE | Admitting: INTERNAL MEDICINE
Payer: MEDICARE

## 2019-02-05 DIAGNOSIS — A41.9 SEPSIS: ICD-10-CM

## 2019-02-05 DIAGNOSIS — R06.02 SOB (SHORTNESS OF BREATH): ICD-10-CM

## 2019-02-05 DIAGNOSIS — A41.9 SEPSIS, DUE TO UNSPECIFIED ORGANISM: Primary | ICD-10-CM

## 2019-02-05 DIAGNOSIS — I47.10 SVT (SUPRAVENTRICULAR TACHYCARDIA): ICD-10-CM

## 2019-02-05 DIAGNOSIS — R91.8 LUNG MASS: ICD-10-CM

## 2019-02-05 DIAGNOSIS — J90 PLEURAL EFFUSION, BILATERAL: ICD-10-CM

## 2019-02-05 DIAGNOSIS — J14 PNEUMONIA DUE TO HAEMOPHILUS INFLUENZAE, UNSPECIFIED LATERALITY, UNSPECIFIED PART OF LUNG: ICD-10-CM

## 2019-02-05 LAB
ALBUMIN SERPL BCP-MCNC: 3.3 G/DL
ALLENS TEST: ABNORMAL
ALP SERPL-CCNC: 80 U/L
ALT SERPL W/O P-5'-P-CCNC: 9 U/L
ANION GAP SERPL CALC-SCNC: 14 MMOL/L
AST SERPL-CCNC: 22 U/L
BACTERIA #/AREA URNS HPF: ABNORMAL /HPF
BASOPHILS # BLD AUTO: 0.08 K/UL
BASOPHILS NFR BLD: 0.4 %
BILIRUB SERPL-MCNC: 0.9 MG/DL
BILIRUB UR QL STRIP: NEGATIVE
BNP SERPL-MCNC: 954 PG/ML
BUN SERPL-MCNC: 12 MG/DL
CALCIUM SERPL-MCNC: 9.4 MG/DL
CHLORIDE SERPL-SCNC: 104 MMOL/L
CK SERPL-CCNC: 47 U/L
CLARITY UR: CLEAR
CO2 SERPL-SCNC: 24 MMOL/L
COLOR UR: YELLOW
CREAT SERPL-MCNC: 0.8 MG/DL
DELSYS: ABNORMAL
DIFFERENTIAL METHOD: ABNORMAL
EOSINOPHIL # BLD AUTO: 0.2 K/UL
EOSINOPHIL NFR BLD: 0.9 %
ERYTHROCYTE [DISTWIDTH] IN BLOOD BY AUTOMATED COUNT: 18.1 %
EST. GFR  (AFRICAN AMERICAN): >60 ML/MIN/1.73 M^2
EST. GFR  (NON AFRICAN AMERICAN): >60 ML/MIN/1.73 M^2
FIO2: 100
FLOW: 15
GLUCOSE SERPL-MCNC: 116 MG/DL
GLUCOSE UR QL STRIP: NEGATIVE
HCO3 UR-SCNC: 23 MMOL/L (ref 24–28)
HCT VFR BLD AUTO: 40 %
HGB BLD-MCNC: 12.8 G/DL
HGB UR QL STRIP: ABNORMAL
HYALINE CASTS #/AREA URNS LPF: 2 /LPF
KETONES UR QL STRIP: ABNORMAL
LACTATE SERPL-SCNC: 1 MMOL/L
LEUKOCYTE ESTERASE UR QL STRIP: NEGATIVE
LYMPHOCYTES # BLD AUTO: 1.7 K/UL
LYMPHOCYTES NFR BLD: 7.8 %
MCH RBC QN AUTO: 28.3 PG
MCHC RBC AUTO-ENTMCNC: 32 G/DL
MCV RBC AUTO: 88 FL
MICROSCOPIC COMMENT: ABNORMAL
MODE: ABNORMAL
MONOCYTES # BLD AUTO: 1.1 K/UL
MONOCYTES NFR BLD: 4.9 %
NEUTROPHILS # BLD AUTO: 19.3 K/UL
NEUTROPHILS NFR BLD: 86 %
NITRITE UR QL STRIP: NEGATIVE
PCO2 BLDA: 36.4 MMHG (ref 35–45)
PH SMN: 7.41 [PH] (ref 7.35–7.45)
PH UR STRIP: 7 [PH] (ref 5–8)
PLATELET # BLD AUTO: 442 K/UL
PMV BLD AUTO: 10.2 FL
PO2 BLDA: 56 MMHG (ref 80–100)
POC BE: -2 MMOL/L
POC SATURATED O2: 89 % (ref 95–100)
POTASSIUM SERPL-SCNC: 3.4 MMOL/L
PROT SERPL-MCNC: 7.8 G/DL
PROT UR QL STRIP: ABNORMAL
RBC # BLD AUTO: 4.53 M/UL
RBC #/AREA URNS HPF: 12 /HPF (ref 0–4)
SAMPLE: ABNORMAL
SITE: ABNORMAL
SODIUM SERPL-SCNC: 142 MMOL/L
SP GR UR STRIP: 1.02 (ref 1–1.03)
SP02: 90
TROPONIN I SERPL DL<=0.01 NG/ML-MCNC: 0.03 NG/ML
URN SPEC COLLECT METH UR: ABNORMAL
UROBILINOGEN UR STRIP-ACNC: NEGATIVE EU/DL
WBC # BLD AUTO: 22.45 K/UL
WBC #/AREA URNS HPF: 0 /HPF (ref 0–5)
YEAST URNS QL MICRO: ABNORMAL

## 2019-02-05 PROCEDURE — 25000003 PHARM REV CODE 250: Performed by: NURSE ANESTHETIST, CERTIFIED REGISTERED

## 2019-02-05 PROCEDURE — 84484 ASSAY OF TROPONIN QUANT: CPT

## 2019-02-05 PROCEDURE — 25000003 PHARM REV CODE 250: Performed by: EMERGENCY MEDICINE

## 2019-02-05 PROCEDURE — 27000190 HC CPAP FULL FACE MASK W/VALVE

## 2019-02-05 PROCEDURE — 63600175 PHARM REV CODE 636 W HCPCS: Performed by: EMERGENCY MEDICINE

## 2019-02-05 PROCEDURE — 63600175 PHARM REV CODE 636 W HCPCS: Performed by: NURSE ANESTHETIST, CERTIFIED REGISTERED

## 2019-02-05 PROCEDURE — 63600175 PHARM REV CODE 636 W HCPCS

## 2019-02-05 PROCEDURE — 85025 COMPLETE CBC W/AUTO DIFF WBC: CPT

## 2019-02-05 PROCEDURE — 83880 ASSAY OF NATRIURETIC PEPTIDE: CPT

## 2019-02-05 PROCEDURE — 82550 ASSAY OF CK (CPK): CPT

## 2019-02-05 PROCEDURE — 25500020 PHARM REV CODE 255: Performed by: EMERGENCY MEDICINE

## 2019-02-05 PROCEDURE — 99291 CRITICAL CARE FIRST HOUR: CPT | Mod: 25

## 2019-02-05 PROCEDURE — 81000 URINALYSIS NONAUTO W/SCOPE: CPT

## 2019-02-05 PROCEDURE — 96365 THER/PROPH/DIAG IV INF INIT: CPT

## 2019-02-05 PROCEDURE — 83605 ASSAY OF LACTIC ACID: CPT

## 2019-02-05 PROCEDURE — 93010 ELECTROCARDIOGRAM REPORT: CPT | Mod: ,,, | Performed by: INTERNAL MEDICINE

## 2019-02-05 PROCEDURE — 25000003 PHARM REV CODE 250

## 2019-02-05 PROCEDURE — 87040 BLOOD CULTURE FOR BACTERIA: CPT | Mod: 59

## 2019-02-05 PROCEDURE — 99900035 HC TECH TIME PER 15 MIN (STAT)

## 2019-02-05 PROCEDURE — 96361 HYDRATE IV INFUSION ADD-ON: CPT

## 2019-02-05 PROCEDURE — 36600 WITHDRAWAL OF ARTERIAL BLOOD: CPT

## 2019-02-05 PROCEDURE — 86803 HEPATITIS C AB TEST: CPT

## 2019-02-05 PROCEDURE — 94660 CPAP INITIATION&MGMT: CPT

## 2019-02-05 PROCEDURE — 96375 TX/PRO/DX INJ NEW DRUG ADDON: CPT

## 2019-02-05 PROCEDURE — 93010 EKG 12-LEAD: ICD-10-PCS | Mod: 76,,, | Performed by: INTERNAL MEDICINE

## 2019-02-05 PROCEDURE — 80053 COMPREHEN METABOLIC PANEL: CPT

## 2019-02-05 PROCEDURE — 93005 ELECTROCARDIOGRAM TRACING: CPT

## 2019-02-05 PROCEDURE — 93010 ELECTROCARDIOGRAM REPORT: CPT | Mod: 76,,, | Performed by: INTERNAL MEDICINE

## 2019-02-05 PROCEDURE — 82803 BLOOD GASES ANY COMBINATION: CPT

## 2019-02-05 RX ORDER — IPRATROPIUM BROMIDE AND ALBUTEROL SULFATE 2.5; .5 MG/3ML; MG/3ML
3 SOLUTION RESPIRATORY (INHALATION)
Status: ACTIVE | OUTPATIENT
Start: 2019-02-05 | End: 2019-02-05

## 2019-02-05 RX ORDER — ADENOSINE 3 MG/ML
12 INJECTION, SOLUTION INTRAVENOUS
Status: COMPLETED | OUTPATIENT
Start: 2019-02-05 | End: 2019-02-05

## 2019-02-05 RX ORDER — LIDOCAINE HCL/PF 100 MG/5ML
SYRINGE (ML) INTRAVENOUS
Status: DISCONTINUED | OUTPATIENT
Start: 2019-02-05 | End: 2019-02-05

## 2019-02-05 RX ORDER — ADENOSINE 3 MG/ML
INJECTION, SOLUTION INTRAVENOUS
Status: DISPENSED
Start: 2019-02-05 | End: 2019-02-06

## 2019-02-05 RX ORDER — ADENOSINE 3 MG/ML
INJECTION, SOLUTION INTRAVENOUS
Status: COMPLETED
Start: 2019-02-05 | End: 2019-02-05

## 2019-02-05 RX ORDER — ADENOSINE 3 MG/ML
6 INJECTION INTRAVENOUS
Status: COMPLETED | OUTPATIENT
Start: 2019-02-05 | End: 2019-02-05

## 2019-02-05 RX ORDER — ACETAMINOPHEN 500 MG
1000 TABLET ORAL
Status: COMPLETED | OUTPATIENT
Start: 2019-02-05 | End: 2019-02-05

## 2019-02-05 RX ORDER — DILTIAZEM HYDROCHLORIDE 5 MG/ML
15 INJECTION INTRAVENOUS
Status: COMPLETED | OUTPATIENT
Start: 2019-02-05 | End: 2019-02-05

## 2019-02-05 RX ORDER — SUCCINYLCHOLINE CHLORIDE 20 MG/ML
INJECTION INTRAMUSCULAR; INTRAVENOUS
Status: DISCONTINUED | OUTPATIENT
Start: 2019-02-05 | End: 2019-02-05

## 2019-02-05 RX ORDER — VANCOMYCIN HCL IN 5 % DEXTROSE 1.5G/250ML
1500 PLASTIC BAG, INJECTION (ML) INTRAVENOUS ONCE
Status: COMPLETED | OUTPATIENT
Start: 2019-02-05 | End: 2019-02-06

## 2019-02-05 RX ORDER — DILTIAZEM HCL 1 MG/ML
5 INJECTION, SOLUTION INTRAVENOUS CONTINUOUS
Status: DISCONTINUED | OUTPATIENT
Start: 2019-02-05 | End: 2019-02-06

## 2019-02-05 RX ORDER — FENTANYL CITRATE 50 UG/ML
INJECTION, SOLUTION INTRAMUSCULAR; INTRAVENOUS
Status: DISCONTINUED | OUTPATIENT
Start: 2019-02-05 | End: 2019-02-05

## 2019-02-05 RX ORDER — SODIUM CHLORIDE, SODIUM LACTATE, POTASSIUM CHLORIDE, CALCIUM CHLORIDE 600; 310; 30; 20 MG/100ML; MG/100ML; MG/100ML; MG/100ML
INJECTION, SOLUTION INTRAVENOUS CONTINUOUS PRN
Status: DISCONTINUED | OUTPATIENT
Start: 2019-02-05 | End: 2019-02-05

## 2019-02-05 RX ORDER — PROPOFOL 10 MG/ML
VIAL (ML) INTRAVENOUS
Status: DISCONTINUED | OUTPATIENT
Start: 2019-02-05 | End: 2019-02-05

## 2019-02-05 RX ORDER — DIPHENHYDRAMINE HYDROCHLORIDE 50 MG/ML
25 INJECTION INTRAMUSCULAR; INTRAVENOUS
Status: COMPLETED | OUTPATIENT
Start: 2019-02-05 | End: 2019-02-05

## 2019-02-05 RX ADMIN — SUCCINYLCHOLINE CHLORIDE 160 MG: 20 INJECTION, SOLUTION INTRAMUSCULAR; INTRAVENOUS at 01:02

## 2019-02-05 RX ADMIN — SODIUM CHLORIDE, SODIUM LACTATE, POTASSIUM CHLORIDE, AND CALCIUM CHLORIDE: .6; .31; .03; .02 INJECTION, SOLUTION INTRAVENOUS at 01:02

## 2019-02-05 RX ADMIN — ADENOSINE 12 MG: 3 INJECTION, SOLUTION INTRAVENOUS at 07:02

## 2019-02-05 RX ADMIN — IOHEXOL 100 ML: 350 INJECTION, SOLUTION INTRAVENOUS at 11:02

## 2019-02-05 RX ADMIN — ADENOSINE 6 MG: 3 INJECTION INTRAVENOUS at 07:02

## 2019-02-05 RX ADMIN — LIDOCAINE HYDROCHLORIDE 100 MG: 20 INJECTION, SOLUTION INTRAVENOUS at 01:02

## 2019-02-05 RX ADMIN — PROPOFOL 150 MG: 10 INJECTION, EMULSION INTRAVENOUS at 01:02

## 2019-02-05 RX ADMIN — DILTIAZEM HCL 5 MG/HR: 1 INJECTION, SOLUTION INTRAVENOUS at 08:02

## 2019-02-05 RX ADMIN — FENTANYL CITRATE 100 MCG: 50 INJECTION, SOLUTION INTRAMUSCULAR; INTRAVENOUS at 01:02

## 2019-02-05 RX ADMIN — DILTIAZEM HYDROCHLORIDE 15 MG: 5 INJECTION INTRAVENOUS at 08:02

## 2019-02-05 RX ADMIN — SODIUM CHLORIDE 1000 ML: 0.9 INJECTION, SOLUTION INTRAVENOUS at 07:02

## 2019-02-05 RX ADMIN — DIPHENHYDRAMINE HYDROCHLORIDE 25 MG: 50 INJECTION INTRAMUSCULAR; INTRAVENOUS at 09:02

## 2019-02-05 RX ADMIN — ACETAMINOPHEN 1000 MG: 500 TABLET ORAL at 09:02

## 2019-02-05 NOTE — ANESTHESIA PREPROCEDURE EVALUATION
02/05/2019  Noble Tripp is a 73 y.o., male.    Pre-op Assessment    I have reviewed the Patient Summary Reports.     I have reviewed the Nursing Notes.   I have reviewed the Medications.     Review of Systems  Anesthesia Hx:  No problems with previous Anesthesia  Denies Family Hx of Anesthesia complications.   Denies Personal Hx of Anesthesia complications.   Social:  Former Smoker    Hematology/Oncology:         -- Cancer in past history: Oncology Comments: H/O vocal cord CA coughs when drinking thin liquids     Cardiovascular:   Exercise tolerance: good Hypertension (denies HBP) Denies Valvular problems/Murmurs. Past MI (Silent MI did not seek treatment) CAD   Dysrhythmias (PSVT)   Denies Angina. hyperlipidemia ECG has been reviewed. See Cards every 6 months no changes last time he saw them    TEST DESCRIPTION   Technical Quality: This is a technically challenging study.     Aorta: The aortic root is normal in size, measuring 3.1 cm at sinotubular junction and 3.7 cm at Sinuses of Valsalva.     Left Atrium: The left atrium is normal in size, measuring 5.2 cm across in the apical view.     Left Ventricle: The left ventricle is normal in size, with an end-diastolic diameter of 4.2 cm, and an end-systolic diameter of 3.1 cm. LV wall thickness is normal, with the septum measuring 1.7 cm and the posterior wall measuring 1.4 cm across. Relative   wall thickness was increased at 0.67, and the LV mass index was increased at 171.6 g/m2 consistent with concentric left ventricular hypertrophy. Global left ventricular systolic function appears normal. Visually estimated ejection fraction is 60-65%.   The LV Doppler derived stroke volume equals 66.0 ccs.   Left atrial pressures are normal. The E/e'(lat) is 8.  This along with the following abnormalities (LVMI = 171.60) suggests diastolic dysfunction secondary  to relaxation abnormality.     Right Atrium: The right atrium is normal in size, measuring 5.1 cm in length and 4.1 cm in width in the apical view.     Right Ventricle: The right ventricle is normal in size. Global right ventricular systolic function appears normal. The estimated PA systolic pressure is 34 mmHg.     Aortic Valve:  The aortic valve is normal in structure.     Mitral Valve:  The mitral valve is normal in structure.     Tricuspid Valve:  The tricuspid valve is normal in structure. There is trivial tricuspid regurgitation.     Pulmonary Valve:  The pulmonic valve is not well seen. There is mild pulmonic regurgitation.     IVC: IVC is enlarged and collapses < 50% with a sniff, suggesting high right atrial pressure of 15 mmHg.     Intracavitary: There is no evidence of pericardial effusion, intracavity mass, thrombi, or vegetation.         CONCLUSIONS     1 - Concentric hypertrophy.     2 - Normal left ventricular systolic function (EF 60-65%).     3 - Left ventricular diastolic dysfunction.     4 - Normal right ventricular systolic function .     5 - The estimated PA systolic pressure is 34 mmHg.     6 - Trivial tricuspid regurgitation.     7 - Increased central venous pressure.  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  Obstructive Sleep Apnea (JUANITO), CPAP used.   Renal/:  Renal/ Normal     Hepatic/GI:  Hepatic/GI Normal    Musculoskeletal:  Musculoskeletal Normal    Neurological:  Neurology Normal    Endocrine:  Endocrine Normal    Psych:  Psychiatric Normal           Physical Exam  General:  Well nourished    Airway/Jaw/Neck:  Airway Findings: Mouth Opening: Normal Tongue: Normal  General Airway Assessment: Adult       Chest/Lungs:  Chest/Lungs Findings: Normal Respiratory Rate      Heart/Vascular:  Heart Findings: Rate: Normal             Anesthesia Plan  Type of Anesthesia, risks & benefits discussed:  Anesthesia Type:  general  Patient's Preference:   Intra-op Monitoring Plan:   Intra-op Monitoring Plan Comments:   Post Op Pain Control Plan:   Post Op Pain Control Plan Comments:   Induction:   IV  Beta Blocker:  Patient is not currently on a Beta-Blocker (No further documentation required).       Informed Consent: Patient understands risks and agrees with Anesthesia plan.  Questions answered. Anesthesia consent signed with patient.  ASA Score: 3     Day of Surgery Review of History & Physical: I have interviewed and examined the patient. I have reviewed the patient's H&P dated:  There are no significant changes.          Ready For Surgery From Anesthesia Perspective.

## 2019-02-05 NOTE — ANESTHESIA POSTPROCEDURE EVALUATION
"Anesthesia Post Evaluation    Patient: Noble Tripp    Procedure(s) Performed: Procedure(s) (LRB):  Bronchoscopy (Bilateral)    Final Anesthesia Type: general  Patient location during evaluation: PACU  Patient participation: Yes- Able to Participate  Level of consciousness: awake and alert and oriented  Post-procedure vital signs: reviewed and stable  Pain management: adequate  Airway patency: patent  PONV status at discharge: No PONV  Anesthetic complications: no      Cardiovascular status: blood pressure returned to baseline, stable and hemodynamically stable  Respiratory status: unassisted, spontaneous ventilation and room air  Hydration status: euvolemic  Follow-up not needed.        Visit Vitals  BP (!) 147/84 (BP Location: Right arm, Patient Position: Sitting)   Pulse 92   Temp 36.8 °C (98.2 °F) (Temporal)   Resp 14   Ht 5' 8" (1.727 m)   Wt 76.7 kg (169 lb)   SpO2 (!) 92%   BMI 25.70 kg/m²       Pain/Tal Score: Tal Score: 9 (2/5/2019  2:01 PM)        "

## 2019-02-05 NOTE — TRANSFER OF CARE
"Anesthesia Transfer of Care Note    Patient: Noble Tripp    Procedure(s) Performed: Procedure(s) (LRB):  Bronchoscopy (Bilateral)    Patient location: PACU    Anesthesia Type: general    Transport from OR: Transported from OR on room air with adequate spontaneous ventilation    Post pain: adequate analgesia    Post assessment: no apparent anesthetic complications and tolerated procedure well    Post vital signs: stable    Level of consciousness: awake and responds to stimulation    Nausea/Vomiting: no nausea/vomiting    Complications: none    Transfer of care protocol was followed      Last vitals:   Visit Vitals  BP (!) 147/84 (BP Location: Right arm, Patient Position: Sitting)   Pulse 92   Temp 36.8 °C (98.2 °F) (Temporal)   Resp 14   Ht 5' 8" (1.727 m)   Wt 76.7 kg (169 lb)   SpO2 (!) 92%   BMI 25.70 kg/m²     "

## 2019-02-05 NOTE — ANESTHESIA RELEASE NOTE
"Anesthesia Release from PACU Note    Patient: Noble Tripp    Procedure(s) Performed: Procedure(s) (LRB):  Bronchoscopy (Bilateral)    Anesthesia type: general    Post pain: Adequate analgesia    Post assessment: no apparent anesthetic complications, tolerated procedure well and no evidence of recall    Last Vitals:   Visit Vitals  BP (!) 147/84 (BP Location: Right arm, Patient Position: Sitting)   Pulse 92   Temp 36.8 °C (98.2 °F) (Temporal)   Resp 14   Ht 5' 8" (1.727 m)   Wt 76.7 kg (169 lb)   SpO2 (!) 92%   BMI 25.70 kg/m²       Post vital signs: stable    Level of consciousness: awake, alert  and oriented    Nausea/Vomiting: no nausea/no vomiting    Complications: none    Airway Patency: patent    Respiratory: unassisted, spontaneous ventilation, room air    Cardiovascular: stable and blood pressure at baseline    Hydration: euvolemic  "

## 2019-02-06 ENCOUNTER — DOCUMENTATION ONLY (OUTPATIENT)
Dept: PULMONOLOGY | Facility: CLINIC | Age: 74
End: 2019-02-06

## 2019-02-06 PROBLEM — A41.9 SEPSIS: Status: ACTIVE | Noted: 2019-02-06

## 2019-02-06 LAB
ANION GAP SERPL CALC-SCNC: 11 MMOL/L
BASOPHILS # BLD AUTO: ABNORMAL K/UL
BASOPHILS NFR BLD: 0 %
BUN SERPL-MCNC: 15 MG/DL
CALCIUM SERPL-MCNC: 8.2 MG/DL
CHLORIDE SERPL-SCNC: 105 MMOL/L
CHOLEST SERPL-MCNC: 77 MG/DL
CHOLEST/HDLC SERPL: 1.8 {RATIO}
CO2 SERPL-SCNC: 24 MMOL/L
CREAT SERPL-MCNC: 0.8 MG/DL
DACRYOCYTES BLD QL SMEAR: ABNORMAL
DIFFERENTIAL METHOD: ABNORMAL
EOSINOPHIL # BLD AUTO: ABNORMAL K/UL
EOSINOPHIL NFR BLD: 0 %
ERYTHROCYTE [DISTWIDTH] IN BLOOD BY AUTOMATED COUNT: 18.2 %
EST. GFR  (AFRICAN AMERICAN): >60 ML/MIN/1.73 M^2
EST. GFR  (NON AFRICAN AMERICAN): >60 ML/MIN/1.73 M^2
ESTIMATED AVG GLUCOSE: 108 MG/DL
GLUCOSE SERPL-MCNC: 98 MG/DL
HBA1C MFR BLD HPLC: 5.4 %
HCT VFR BLD AUTO: 33 %
HCV AB SERPL QL IA: NEGATIVE
HDLC SERPL-MCNC: 44 MG/DL
HDLC SERPL: 57.1 %
HGB BLD-MCNC: 10.4 G/DL
LDLC SERPL CALC-MCNC: 25 MG/DL
LYMPHOCYTES # BLD AUTO: ABNORMAL K/UL
LYMPHOCYTES NFR BLD: 2 %
MAGNESIUM SERPL-MCNC: 1.4 MG/DL
MCH RBC QN AUTO: 27.8 PG
MCHC RBC AUTO-ENTMCNC: 31.5 G/DL
MCV RBC AUTO: 88 FL
MONOCYTES # BLD AUTO: ABNORMAL K/UL
MONOCYTES NFR BLD: 4 %
NEUTROPHILS NFR BLD: 83 %
NEUTS BAND NFR BLD MANUAL: 11 %
NONHDLC SERPL-MCNC: 33 MG/DL
OVALOCYTES BLD QL SMEAR: ABNORMAL
PHOSPHATE SERPL-MCNC: 3.1 MG/DL
PLATELET # BLD AUTO: 312 K/UL
PMV BLD AUTO: 10.2 FL
POIKILOCYTOSIS BLD QL SMEAR: SLIGHT
POTASSIUM SERPL-SCNC: 3.3 MMOL/L
RBC # BLD AUTO: 3.74 M/UL
SODIUM SERPL-SCNC: 140 MMOL/L
TRIGL SERPL-MCNC: 40 MG/DL
TROPONIN I SERPL DL<=0.01 NG/ML-MCNC: 0.24 NG/ML
TROPONIN I SERPL DL<=0.01 NG/ML-MCNC: 0.35 NG/ML
TSH SERPL DL<=0.005 MIU/L-ACNC: 1.26 UIU/ML
WBC # BLD AUTO: 31.75 K/UL

## 2019-02-06 PROCEDURE — 25000003 PHARM REV CODE 250: Performed by: INTERNAL MEDICINE

## 2019-02-06 PROCEDURE — 83735 ASSAY OF MAGNESIUM: CPT

## 2019-02-06 PROCEDURE — 80061 LIPID PANEL: CPT

## 2019-02-06 PROCEDURE — 21400001 HC TELEMETRY ROOM

## 2019-02-06 PROCEDURE — 25000242 PHARM REV CODE 250 ALT 637 W/ HCPCS: Performed by: NURSE PRACTITIONER

## 2019-02-06 PROCEDURE — 63600175 PHARM REV CODE 636 W HCPCS: Performed by: EMERGENCY MEDICINE

## 2019-02-06 PROCEDURE — 27000221 HC OXYGEN, UP TO 24 HOURS

## 2019-02-06 PROCEDURE — 84100 ASSAY OF PHOSPHORUS: CPT

## 2019-02-06 PROCEDURE — 25000003 PHARM REV CODE 250: Performed by: EMERGENCY MEDICINE

## 2019-02-06 PROCEDURE — 99291 CRITICAL CARE FIRST HOUR: CPT | Mod: ,,, | Performed by: INTERNAL MEDICINE

## 2019-02-06 PROCEDURE — 92507 TX SP LANG VOICE COMM INDIV: CPT

## 2019-02-06 PROCEDURE — 96365 THER/PROPH/DIAG IV INF INIT: CPT | Mod: 59

## 2019-02-06 PROCEDURE — 94660 CPAP INITIATION&MGMT: CPT

## 2019-02-06 PROCEDURE — 80048 BASIC METABOLIC PNL TOTAL CA: CPT

## 2019-02-06 PROCEDURE — 25000003 PHARM REV CODE 250: Performed by: NURSE PRACTITIONER

## 2019-02-06 PROCEDURE — 96367 TX/PROPH/DG ADDL SEQ IV INF: CPT | Mod: 59

## 2019-02-06 PROCEDURE — 85027 COMPLETE CBC AUTOMATED: CPT

## 2019-02-06 PROCEDURE — 83036 HEMOGLOBIN GLYCOSYLATED A1C: CPT

## 2019-02-06 PROCEDURE — 99291 PR CRITICAL CARE, E/M 30-74 MINUTES: ICD-10-PCS | Mod: ,,, | Performed by: INTERNAL MEDICINE

## 2019-02-06 PROCEDURE — 99900035 HC TECH TIME PER 15 MIN (STAT)

## 2019-02-06 PROCEDURE — 94640 AIRWAY INHALATION TREATMENT: CPT

## 2019-02-06 PROCEDURE — 85007 BL SMEAR W/DIFF WBC COUNT: CPT

## 2019-02-06 PROCEDURE — 92610 EVALUATE SWALLOWING FUNCTION: CPT

## 2019-02-06 PROCEDURE — 36415 COLL VENOUS BLD VENIPUNCTURE: CPT

## 2019-02-06 PROCEDURE — 25000242 PHARM REV CODE 250 ALT 637 W/ HCPCS: Performed by: INTERNAL MEDICINE

## 2019-02-06 PROCEDURE — 63600175 PHARM REV CODE 636 W HCPCS: Performed by: NURSE PRACTITIONER

## 2019-02-06 PROCEDURE — 84484 ASSAY OF TROPONIN QUANT: CPT | Mod: 91

## 2019-02-06 PROCEDURE — 93005 ELECTROCARDIOGRAM TRACING: CPT

## 2019-02-06 PROCEDURE — 84443 ASSAY THYROID STIM HORMONE: CPT

## 2019-02-06 PROCEDURE — 27000190 HC CPAP FULL FACE MASK W/VALVE

## 2019-02-06 PROCEDURE — 63600175 PHARM REV CODE 636 W HCPCS: Performed by: INTERNAL MEDICINE

## 2019-02-06 RX ORDER — GABAPENTIN 400 MG/1
400 CAPSULE ORAL 3 TIMES DAILY
Status: DISCONTINUED | OUTPATIENT
Start: 2019-02-06 | End: 2019-02-07

## 2019-02-06 RX ORDER — SODIUM CHLORIDE 0.9 % (FLUSH) 0.9 %
5 SYRINGE (ML) INJECTION
Status: DISCONTINUED | OUTPATIENT
Start: 2019-02-06 | End: 2019-02-08 | Stop reason: HOSPADM

## 2019-02-06 RX ORDER — LEVALBUTEROL INHALATION SOLUTION 0.63 MG/3ML
0.63 SOLUTION RESPIRATORY (INHALATION) EVERY 12 HOURS
Status: DISCONTINUED | OUTPATIENT
Start: 2019-02-06 | End: 2019-02-08 | Stop reason: HOSPADM

## 2019-02-06 RX ORDER — IPRATROPIUM BROMIDE 0.5 MG/2.5ML
0.5 SOLUTION RESPIRATORY (INHALATION) EVERY 6 HOURS
Status: DISCONTINUED | OUTPATIENT
Start: 2019-02-06 | End: 2019-02-08 | Stop reason: HOSPADM

## 2019-02-06 RX ORDER — ALBUTEROL SULFATE 0.83 MG/ML
2.5 SOLUTION RESPIRATORY (INHALATION) EVERY 4 HOURS
Status: DISCONTINUED | OUTPATIENT
Start: 2019-02-06 | End: 2019-02-06

## 2019-02-06 RX ORDER — CEFEPIME HYDROCHLORIDE 1 G/50ML
1 INJECTION, SOLUTION INTRAVENOUS
Status: DISCONTINUED | OUTPATIENT
Start: 2019-02-06 | End: 2019-02-08

## 2019-02-06 RX ORDER — PANTOPRAZOLE SODIUM 40 MG/1
40 TABLET, DELAYED RELEASE ORAL DAILY
Status: DISCONTINUED | OUTPATIENT
Start: 2019-02-06 | End: 2019-02-08 | Stop reason: HOSPADM

## 2019-02-06 RX ORDER — TIOTROPIUM BROMIDE 18 UG/1
18 CAPSULE ORAL; RESPIRATORY (INHALATION) DAILY
Status: DISCONTINUED | OUTPATIENT
Start: 2019-02-06 | End: 2019-02-06

## 2019-02-06 RX ORDER — VANCOMYCIN HCL IN 5 % DEXTROSE 1G/250ML
1000 PLASTIC BAG, INJECTION (ML) INTRAVENOUS
Status: DISCONTINUED | OUTPATIENT
Start: 2019-02-06 | End: 2019-02-07

## 2019-02-06 RX ORDER — DILTIAZEM HCL 1 MG/ML
5 INJECTION, SOLUTION INTRAVENOUS CONTINUOUS
Status: DISCONTINUED | OUTPATIENT
Start: 2019-02-06 | End: 2019-02-06

## 2019-02-06 RX ORDER — OXYCODONE AND ACETAMINOPHEN 7.5; 325 MG/1; MG/1
1 TABLET ORAL EVERY 4 HOURS PRN
Status: DISCONTINUED | OUTPATIENT
Start: 2019-02-06 | End: 2019-02-07

## 2019-02-06 RX ORDER — VANCOMYCIN HCL IN 5 % DEXTROSE 1G/250ML
1000 PLASTIC BAG, INJECTION (ML) INTRAVENOUS
Status: DISCONTINUED | OUTPATIENT
Start: 2019-02-06 | End: 2019-02-06

## 2019-02-06 RX ORDER — BUDESONIDE 0.5 MG/2ML
0.5 INHALANT ORAL EVERY 12 HOURS
Status: DISCONTINUED | OUTPATIENT
Start: 2019-02-06 | End: 2019-02-08 | Stop reason: HOSPADM

## 2019-02-06 RX ORDER — NAPROXEN SODIUM 220 MG/1
81 TABLET, FILM COATED ORAL DAILY
Status: DISCONTINUED | OUTPATIENT
Start: 2019-02-06 | End: 2019-02-08 | Stop reason: HOSPADM

## 2019-02-06 RX ORDER — LEVALBUTEROL INHALATION SOLUTION 0.63 MG/3ML
0.63 SOLUTION RESPIRATORY (INHALATION) EVERY 12 HOURS PRN
Status: DISCONTINUED | OUTPATIENT
Start: 2019-02-06 | End: 2019-02-08 | Stop reason: HOSPADM

## 2019-02-06 RX ADMIN — GABAPENTIN 400 MG: 300 CAPSULE ORAL at 03:02

## 2019-02-06 RX ADMIN — PANTOPRAZOLE SODIUM 40 MG: 40 TABLET, DELAYED RELEASE ORAL at 09:02

## 2019-02-06 RX ADMIN — IPRATROPIUM BROMIDE 0.5 MG: 0.5 SOLUTION RESPIRATORY (INHALATION) at 01:02

## 2019-02-06 RX ADMIN — LEVALBUTEROL INHALATION 0.63MG/3ML 0.63 MG: 0.63 SOLUTION RESPIRATORY (INHALATION) at 08:02

## 2019-02-06 RX ADMIN — ASPIRIN 81 MG CHEWABLE TABLET 81 MG: 81 TABLET CHEWABLE at 09:02

## 2019-02-06 RX ADMIN — OXYCODONE HYDROCHLORIDE AND ACETAMINOPHEN 1 TABLET: 7.5; 325 TABLET ORAL at 10:02

## 2019-02-06 RX ADMIN — GABAPENTIN 400 MG: 300 CAPSULE ORAL at 08:02

## 2019-02-06 RX ADMIN — OXYCODONE HYDROCHLORIDE AND ACETAMINOPHEN 1 TABLET: 7.5; 325 TABLET ORAL at 11:02

## 2019-02-06 RX ADMIN — OXYCODONE HYDROCHLORIDE AND ACETAMINOPHEN 1 TABLET: 7.5; 325 TABLET ORAL at 07:02

## 2019-02-06 RX ADMIN — GABAPENTIN 400 MG: 300 CAPSULE ORAL at 09:02

## 2019-02-06 RX ADMIN — VANCOMYCIN HYDROCHLORIDE 1000 MG: 1 INJECTION, POWDER, LYOPHILIZED, FOR SOLUTION INTRAVENOUS at 06:02

## 2019-02-06 RX ADMIN — OXYCODONE HYDROCHLORIDE AND ACETAMINOPHEN 1 TABLET: 7.5; 325 TABLET ORAL at 03:02

## 2019-02-06 RX ADMIN — BUDESONIDE 0.5 MG: 0.5 SUSPENSION RESPIRATORY (INHALATION) at 08:02

## 2019-02-06 RX ADMIN — CEFEPIME HYDROCHLORIDE 1 G: 1 INJECTION, SOLUTION INTRAVENOUS at 03:02

## 2019-02-06 RX ADMIN — CEFEPIME HYDROCHLORIDE 2 G: 2 INJECTION, POWDER, FOR SOLUTION INTRAVENOUS at 12:02

## 2019-02-06 RX ADMIN — IPRATROPIUM BROMIDE 0.5 MG: 0.5 SOLUTION RESPIRATORY (INHALATION) at 08:02

## 2019-02-06 RX ADMIN — CEFEPIME HYDROCHLORIDE 1 G: 1 INJECTION, SOLUTION INTRAVENOUS at 11:02

## 2019-02-06 RX ADMIN — CEFEPIME HYDROCHLORIDE 1 G: 1 INJECTION, SOLUTION INTRAVENOUS at 09:02

## 2019-02-06 RX ADMIN — VANCOMYCIN HYDROCHLORIDE 1500 MG: 10 INJECTION, POWDER, LYOPHILIZED, FOR SOLUTION INTRAVENOUS at 12:02

## 2019-02-06 RX ADMIN — LACTOBACILLUS TAB 4 TABLET: TAB at 04:02

## 2019-02-06 NOTE — PROGRESS NOTES
Patient wore NC with no distress this morning to eat . Currently wearing bipap to take a nap , no distress noted at this time

## 2019-02-06 NOTE — ASSESSMENT & PLAN NOTE
Hold home medications. Patient low normal with cardizem drip  Monitor trends  Further evaluation/diagnostics/interventions/consults pending course

## 2019-02-06 NOTE — CONSULTS
"  [facility]  Adult Nutrition  Consult Note    SUMMARY     Recommendations    Recommendation/Intervention: 1) Continue Cardiac diet   2) Monitor/encourage  PO intake   3) RD to follow   Goals: Patient to meet >/=85% of EEN/EPN   Nutrition Goal Status: new  Communication of RD Recs: other (comment)(Reviewed with patient + family members )    Reason for Assessment    Reason For Assessment: consult  Diagnosis: (Sepsis)  Relevant Medical History: CAD, COPD, HLD, PVD   Interdisciplinary Rounds: did not attend  General Information Comments: Patient states his appetite has been good prior to admit and that he has not lost weight in the past year.  Stated that ~5 years ago he lost ~40# due to radiation treatments but gained back 20#.    Nutrition Discharge Planning: Home on PO diet with adequate intake     Nutrition Risk Screen    Nutrition Risk Screen: no indicators present    Nutrition/Diet History    Patient Reported Diet/Restrictions/Preferences: general  Typical Food/Fluid Intake: Good prior to admit per patient   Food Preferences: Reviewed   Spiritual, Cultural Beliefs, Confucianism Practices, Values that Affect Care: no  Food Allergies: NKFA  Factors Affecting Nutritional Intake: None identified at this time    Anthropometrics    Temp: 98.3 °F (36.8 °C)  Height Method: Stated  Height: 5' 8" (172.7 cm)  Height (inches): 68 in  Weight Method: Bed Scale  Weight: 77.4 kg (170 lb 10.2 oz)  Weight (lb): 170.64 lb  Ideal Body Weight (IBW), Male: 154 lb  % Ideal Body Weight, Male (lb): 110.81 lb  BMI (Calculated): 26  BMI Grade: 25 - 29.9 - overweight       Lab/Procedures/Meds    Pertinent Labs Reviewed: reviewed  Pertinent Medications Reviewed: reviewed    Physical Findings/Assessment         Estimated/Assessed Needs    Weight Used For Calorie Calculations: 77.4 kg (170 lb 10.2 oz)  Energy Calorie Requirements (kcal): 3630-0806(20-25kcals/kg/BW )  Energy Need Method: Kcal/kg  Protein Requirements: " 75-85(1.0-1.1gmsProtein/kg/BW )  Weight Used For Protein Calculations: 77.4 kg (170 lb 10.2 oz)  Fluid Requirements (mL): 1ml/1kcal or MD Rx   Estimated Fluid Requirement Method: RDA Method  RDA Method (mL): 1550         Nutrition Prescription Ordered    Current Diet Order: Cardiac   Nutrition Order Comments: Breakfast was first meal since admit.  Consumed ~50%     Evaluation of Received Nutrient/Fluid Intake    Energy Calories Required: not meeting needs  Protein Required: not meeting needs  Fluid Required: meeting needs  Tolerance: tolerating  % Intake of Estimated Energy Needs: 50 - 75 %  % Meal Intake: 50 - 75 %    Nutrition Risk    Level of Risk/Frequency of Follow-up: moderate     Assessment and Plan    No new Assessment & Plan notes have been filed under this hospital service since the last note was generated.  Service: Nutrition       Monitor and Evaluation    Food and Nutrient Intake: food and beverage intake  Food and Nutrient Adminstration: diet order  Physical Activity and Function: nutrition-related ADLs and IADLs  Anthropometric Measurements: weight, weight change  Biochemical Data, Medical Tests and Procedures: electrolyte and renal panel, gastrointestinal profile, glucose/endocrine profile, inflammatory profile  Nutrition-Focused Physical Findings: overall appearance     Malnutrition Assessment                 Orbital Region (Subcutaneous Fat Loss): well nourished   [place]man Region (Muscle Loss): well nourished  Clavicle Bone Region (Muscle Loss): well nourished                 Nutrition Follow-Up    RD Follow-up?: Yes

## 2019-02-06 NOTE — ED NOTES
Brief moment (seconds) after adenosine administration where HR lowered into 120s and pt. appeared to be converting out of SVT. This was followed by pt. Going immediately back into SVT. Third dose will be given.

## 2019-02-06 NOTE — ED NOTES
Pt. Taken off bipap and placed on 4L O2 via NC. If pt. Tolerates well, pt. Can eat per MD. MD states O2 sat goal 92% or better. RT made aware.

## 2019-02-06 NOTE — SUBJECTIVE & OBJECTIVE
"Past Medical History:   Diagnosis Date    Adrenal mass     david;nl fxn w/u    Aspiration pneumonia 10/15    puree/honey    Benign prostate hyperplasia     CAD (coronary artery disease)     dr padilla    Chronic pain     dr santos    COPD (chronic obstructive pulmonary disease)     papi    Ex-smoker     11/13    Hyperlipidemia     Osteopenia 1/15 tran 1/18    PVD (peripheral vascular disease)     noobs 07 khuri    Pyriform sinus cancer     dr ponce radiation 1/2-14 dr king perales    Sleep apnea     cpap    Squamous cell carcinoma of skin     roberto    Tongue cancer     "superficial" removed 11/14    Vocal cord cancer 2011    Xerostomia     radiation       Past Surgical History:   Procedure Laterality Date    APPENDECTOMY      COLONOSCOPY N/A 3/11/2014    Performed by Joe Goodman [facility] at [facility]    Due for screening colonoscopy N/A 5/1/2017    Performed by Anushka Yoder MD at [facility]    ESOPHAGOGASTRODUODENOSCOPY (EGD) N/A 8/29/2018    Performed by Audie Hill III, MD at [facility]    ESOPHAGOGASTRODUODENOSCOPY (EGD) N/A 5/29/2018    Performed by Audie Hill III, MD at [facility]    Thoracentesis Left 8/7/2018    Performed by Santos Cardozo MD at [facility]    tongue cancer excision  11/14    dr vitale    VOCAL CORD LATERALIZATION, ENDOSCOPIC APPROACH W/ MORENA ponce       Review of patient's allergies indicates:   Allergen Reactions    Contrast media     Iodinated contrast- oral and iv dye      Other reaction(s): Itching  Other reaction(s): Hives    Neomycin-bacitracin-polymyxin      Other reaction(s): Rash  Other reaction(s): Rash    Pravastatin      Other reaction(s): fatigue  Other reaction(s): fatigue    Rosuvastatin      Other reaction(s): fatigue  Other reaction(s): fatigue    Simvastatin      Other reaction(s): fatigue  Other reaction(s): fatigue    Statins-hmg-coa reductase inhibitors        Current Facility-Administered Medications on File " Prior to Encounter   Medication    [DISCONTINUED] fentaNYL injection    [DISCONTINUED] lactated ringers infusion    [DISCONTINUED] lactated ringers infusion    [DISCONTINUED] lidocaine (cardiac) injection    [DISCONTINUED] lidocaine HCL 4% external solution 4 mL    [DISCONTINUED] propofol (DIPRIVAN) 10 mg/mL infusion    [DISCONTINUED] succinylcholine injection     Current Outpatient Medications on File Prior to Encounter   Medication Sig    albuterol (PROVENTIL) 2.5 mg /3 mL (0.083 %) nebulizer solution Take 3 mLs (2.5 mg total) by nebulization every 8 (eight) hours while awake.    aspirin 81 mg Tab Take 1 tablet by mouth Daily. Over the counter to help prevent stroke/heart attack    CHANTIX CONTINUING MONTH BOX 1 mg Tab TAKE ONE TABLET BY MOUTH ONCE DAILY    evolocumab (REPATHA SURECLICK) 140 mg/mL PnIj Inject 140 mg into the skin every 14 (fourteen) days.    gabapentin (NEURONTIN) 800 MG tablet Take 1 tablet (800 mg total) by mouth 3 (three) times daily.    garlic 2,000 mg Cap Take 1 tablet by mouth once daily.     Lactobacillus rhamnosus GG (CULTURELLE) 10 billion cell capsule Take 1 capsule by mouth once daily.    loratadine (CLARITIN) 10 mg tablet Take 1 tablet by mouth Daily.    multivitamin capsule Take 1 capsule by mouth once daily.    nebulizer and compressor Bailey Use as directed    [START ON 2/18/2019] oxyCODONE-acetaminophen (PERCOCET) 7.5-325 mg per tablet Take 1 tablet by mouth every 8 (eight) hours as needed for Pain.    pantoprazole (PROTONIX) 40 MG tablet Take 1 tablet (40 mg total) by mouth once daily.    pilocarpine (SALAGEN) 5 MG Tab TAKE 1 TABLET BY MOUTH 30 MINUTES PRIOR TO EACH MEAL    PROAIR HFA 90 mcg/actuation inhaler INHALE TWO PUFFS BY MOUTH EVERY 4 HOURS AS NEEDED FOR WHEEZING OR SHORTNESS OF BREATH    tiotropium (SPIRIVA WITH HANDIHALER) 18 mcg inhalation capsule Inhale 1 capsule (18 mcg total) into the lungs once daily.    turmeric root extract 500 mg Cap Take 1  capsule by mouth 2 (two) times daily.     mupirocin (BACTROBAN) 2 % ointment     oxyCODONE-acetaminophen (PERCOCET) 7.5-325 mg per tablet Take 1 tablet by mouth every 8 (eight) hours as needed for Pain.     Family History     Problem Relation (Age of Onset)    Cancer Father, Brother        Tobacco Use    Smoking status: Former Smoker     Packs/day: 2.00     Years: 55.00     Pack years: 110.00     Last attempt to quit: 10/2014     Years since quittin.3    Smokeless tobacco: Never Used   Substance and Sexual Activity    Alcohol use: No    Drug use: No    Sexual activity: Not Currently     Review of Systems   Constitutional: Positive for fatigue and fever. Negative for chills and diaphoresis.   HENT: Negative for congestion, sore throat and voice change.    Eyes: Negative for photophobia and visual disturbance.   Respiratory: Positive for cough and shortness of breath. Negative for wheezing and stridor.    Cardiovascular: Negative for chest pain and leg swelling.   Gastrointestinal: Negative for abdominal distention, abdominal pain, constipation, diarrhea, nausea and vomiting.   Endocrine: Negative for polydipsia, polyphagia and polyuria.   Genitourinary: Negative for difficulty urinating, dysuria, flank pain, testicular pain and urgency.   Musculoskeletal: Negative for back pain, joint swelling, neck pain and neck stiffness.   Skin: Negative for color change and rash.   Allergic/Immunologic: Negative for immunocompromised state.   Neurological: Negative for dizziness, syncope, weakness, numbness and headaches.   Hematological: Does not bruise/bleed easily.   Psychiatric/Behavioral: Negative for agitation, behavioral problems and confusion.     Objective:     Vital Signs (Most Recent):  Temp: 97.5 °F (36.4 °C) (19 0115)  Pulse: 74 (19)  Resp: 19 (19)  BP: (!) 102/39 (19)  SpO2: 97 % (19) Vital Signs (24h Range):  Temp:  [97.5 °F (36.4 °C)-101.8 °F (38.8 °C)]  97.5 °F (36.4 °C)  Pulse:  [] 74  Resp:  [14-29] 19  SpO2:  [86 %-98 %] 97 %  BP: (102-197)/() 102/39     Weight: 77.4 kg (170 lb 10.2 oz)  Body mass index is 25.95 kg/m².    Physical Exam   Constitutional: He is oriented to person, place, and time. He appears well-developed. He has a sickly appearance. He appears ill. No distress.   eldelry   HENT:   Head: Normocephalic and atraumatic.   Nose: Nose normal.   Eyes: Conjunctivae and EOM are normal. Pupils are equal, round, and reactive to light. No scleral icterus.   Neck: Normal range of motion. Neck supple. No tracheal deviation present.   Cardiovascular: Normal rate, regular rhythm and intact distal pulses.   Murmur heard.  Pulmonary/Chest: Effort normal. No stridor. No respiratory distress. He has no wheezes. He has rales.   Barrel chest  Course, worse lower  On bipap     Abdominal: Soft. Bowel sounds are normal. He exhibits no distension. There is no tenderness. There is no guarding.   Genitourinary:   Genitourinary Comments: ua neg     Musculoskeletal: Normal range of motion. He exhibits no edema or deformity.   Neurological: He is alert and oriented to person, place, and time. No cranial nerve deficit.   Skin: Skin is warm and dry. Capillary refill takes 2 to 3 seconds. No rash noted. He is not diaphoretic.   Psychiatric: He has a normal mood and affect. His behavior is normal. Judgment and thought content normal.   Nursing note and vitals reviewed.        CRANIAL NERVES     CN III, IV, VI   Pupils are equal, round, and reactive to light.  Extraocular motions are normal.        Significant Labs: All pertinent labs within the past 24 hours have been reviewed.  Results for orders placed or performed during the hospital encounter of 02/05/19   CBC auto differential   Result Value Ref Range    WBC 22.45 (H) 3.90 - 12.70 K/uL    RBC 4.53 (L) 4.60 - 6.20 M/uL    Hemoglobin 12.8 (L) 14.0 - 18.0 g/dL    Hematocrit 40.0 40.0 - 54.0 %    MCV 88 82 - 98 fL     MCH 28.3 27.0 - 31.0 pg    MCHC 32.0 32.0 - 36.0 g/dL    RDW 18.1 (H) 11.5 - 14.5 %    Platelets 442 (H) 150 - 350 K/uL    MPV 10.2 9.2 - 12.9 fL    Gran # (ANC) 19.3 (H) 1.8 - 7.7 K/uL    Lymph # 1.7 1.0 - 4.8 K/uL    Mono # 1.1 (H) 0.3 - 1.0 K/uL    Eos # 0.2 0.0 - 0.5 K/uL    Baso # 0.08 0.00 - 0.20 K/uL    Gran% 86.0 (H) 38.0 - 73.0 %    Lymph% 7.8 (L) 18.0 - 48.0 %    Mono% 4.9 4.0 - 15.0 %    Eosinophil% 0.9 0.0 - 8.0 %    Basophil% 0.4 0.0 - 1.9 %    Differential Method Automated    Comprehensive metabolic panel   Result Value Ref Range    Sodium 142 136 - 145 mmol/L    Potassium 3.4 (L) 3.5 - 5.1 mmol/L    Chloride 104 95 - 110 mmol/L    CO2 24 23 - 29 mmol/L    Glucose 116 (H) 70 - 110 mg/dL    BUN, Bld 12 8 - 23 mg/dL    Creatinine 0.8 0.5 - 1.4 mg/dL    Calcium 9.4 8.7 - 10.5 mg/dL    Total Protein 7.8 6.0 - 8.4 g/dL    Albumin 3.3 (L) 3.5 - 5.2 g/dL    Total Bilirubin 0.9 0.1 - 1.0 mg/dL    Alkaline Phosphatase 80 55 - 135 U/L    AST 22 10 - 40 U/L    ALT 9 (L) 10 - 44 U/L    Anion Gap 14 8 - 16 mmol/L    eGFR if African American >60 >60 mL/min/1.73 m^2    eGFR if non African American >60 >60 mL/min/1.73 m^2   Urinalysis, Reflex to Urine Culture Urine, Clean Catch   Result Value Ref Range    Specimen UA Urine, Clean Catch     Color, UA Yellow Yellow, Straw, Annelise    Appearance, UA Clear Clear    pH, UA 7.0 5.0 - 8.0    Specific Gravity, UA 1.020 1.005 - 1.030    Protein, UA 1+ (A) Negative    Glucose, UA Negative Negative    Ketones, UA 1+ (A) Negative    Bilirubin (UA) Negative Negative    Occult Blood UA 2+ (A) Negative    Nitrite, UA Negative Negative    Urobilinogen, UA Negative <2.0 EU/dL    Leukocytes, UA Negative Negative   Brain natriuretic peptide   Result Value Ref Range     (H) 0 - 99 pg/mL   CK   Result Value Ref Range    CPK 47 20 - 200 U/L   Troponin I   Result Value Ref Range    Troponin I 0.032 (H) 0.000 - 0.026 ng/mL   Lactic acid, plasma   Result Value Ref Range    Lactate  (Lactic Acid) 1.0 0.5 - 2.2 mmol/L   Urinalysis Microscopic   Result Value Ref Range    RBC, UA 12 (H) 0 - 4 /hpf    WBC, UA 0 0 - 5 /hpf    Bacteria, UA Rare None-Occ /hpf    Yeast, UA None None    Hyaline Casts, UA 2 (A) 0-1/lpf /lpf    Microscopic Comment SEE COMMENT    ISTAT PROCEDURE   Result Value Ref Range    POC PH 7.410 7.35 - 7.45    POC PCO2 36.4 35 - 45 mmHg    POC PO2 56 (LL) 80 - 100 mmHg    POC HCO3 23.0 (L) 24 - 28 mmol/L    POC BE -2 -2 to 2 mmol/L    POC SATURATED O2 89 (L) 95 - 100 %    Sample ARTERIAL     Site LR     Allens Test Pass     DelSys NRB     Mode SPONT     Flow 15     FiO2 100     Sp02 90        Significant Imaging: I have reviewed all pertinent imaging results/findings within the past 24 hours.   Imaging Results          CTA Chest Non-Coronary - PE Study (Final result)  Result time 02/05/19 23:33:31    Final result by Bharathi Velez III, MD (02/05/19 23:33:31)                 Impression:      1.  Bilateral pleural effusions, greater on the right with further extensive infiltrates and atelectatic changes in the mid to lower lung fields as well as extensive underlying scarring.  Small patches of infiltrate suggested right mid to upper lung field.    2.  Grossly negative study for acute pulmonary emboli.  No evidence of thoracic aortic aneurysm or dissection.  Otherwise as above    All CT scans at this facility are performed  using dose modulation techniques as appropriate to performed exam including the following:  automated exposure control; adjustment of mA and/or kV according to the patients size (this includes techniques or standardized protocols for targeted exams where dose is matched to indication/reason for exam: i.e. extremities or head);  iterative reconstruction technique.      Electronically signed by: Bharathi Velez MD  Date:    02/05/2019  Time:    23:33             Narrative:    EXAMINATION:  CTA CHEST NON CORONARY    CLINICAL HISTORY:  Chest pain, acute, PE  suspected, high pretest prob;    TECHNIQUE:  Axial images. Multiplanar thick slab MIP images were performed utilizing MPR reformats.    Contrast:  Omnipaque <350> <100> ml    COMPARISON:  December    FINDINGS:  Multiplanar imaging obtained through the cervical chest shows scattered small nodes but no mass or bulky adenopathy.  Heart size is unchanged.  There is atheromatous change along the aorta without evidence of aneurysm formation or dissection.  Pulmonary arterial structures are moderately well opacified.  There are no gross filling defects within the major branches.    There are bilateral pleural effusions, greater on the right with moderate infiltrates and atelectatic changes in the right mid to lower lung field and to a lesser extent at the left base.  This shows significant change since December.  There is hilar adenopathy, greater on the right which may be reactive.    There is minimal patchy scattered infiltrative densities in the right mid to upper lung field and there is scarring again noted at both apices.                               X-Ray Chest AP Portable (Final result)  Result time 02/05/19 19:57:58    Final result by Rachel Mix MD (Timothy) (02/05/19 19:57:58)                 Impression:      Interval worsening with new dense consolidation involving the periphery of the right lower lobe.  Stable patchy consolidation at the left base.      Electronically signed by: Rachel Mix MD  Date:    02/05/2019  Time:    19:57             Narrative:    EXAMINATION:  XR CHEST AP PORTABLE    CLINICAL HISTORY:  <Diagnosis>, sob;    COMPARISON:  Comparison 02/05/2019.    FINDINGS:  Stable heart size.  Patchy consolidation at the left base appears stable.  There is now new dense consolidation involving the periphery of the right lower lobe.

## 2019-02-06 NOTE — ED NOTES
Pharmacy called by RN - spoke with Enedina - made aware that pt. Has order for cefepime and vancomycin and neither available in ED pyxis. Enedina to bring meds to ED.

## 2019-02-06 NOTE — ED PROVIDER NOTES
SCRIBE #1 NOTE: I, Carmen Xiong, am scribing for, and in the presence of, Ron Alvarez MD. I have scribed the HPI, ROS, and PEx.     SCRIBE #2 NOTE: I, Michelle Yang, am scribing for, and in the presence of,  Joe Mendez MD. I have scribed the remaining portions of the note not scribed by Scribe #1.     History      Chief Complaint   Patient presents with    Shortness of Breath     reports increased shortness of breath after lung biopsy today, reports decreased O2 sats on arrival        Review of patient's allergies indicates:   Allergen Reactions    Contrast media     Iodinated contrast- oral and iv dye      Other reaction(s): Itching  Other reaction(s): Hives    Neomycin-bacitracin-polymyxin      Other reaction(s): Rash  Other reaction(s): Rash    Pravastatin      Other reaction(s): fatigue  Other reaction(s): fatigue    Rosuvastatin      Other reaction(s): fatigue  Other reaction(s): fatigue    Simvastatin      Other reaction(s): fatigue  Other reaction(s): fatigue    Statins-hmg-coa reductase inhibitors         HPI   HPI    2/5/2019, 7:10 PM   History obtained from the patient      History of Present Illness: Noble Tripp is a 73 y.o. male patient with a PMHx of CAD, COPD, HLD, PVD who presents to the Emergency Department s/p bronchoscopy/lung mass biopsy today for SOB which onset suddenly this AM. Symptoms are constant and moderate in severity. No mitigating or exacerbating factors reported. Associated sxs include palpitations, fever (Tnow 101.8). Patient denies any cough, BLE edema/pain, CP, n/v, extremity weakness/numbness, dizziness, and all other sxs at this time. No further complaints or concerns at this time.     Arrival mode: EMS    PCP: Dwaine Davis MD       Past Medical History:  Past Medical History:   Diagnosis Date    Adrenal mass     david;nl fxn w/u    Aspiration pneumonia 10/15    puree/honey    Benign prostate hyperplasia     CAD (coronary artery disease)   "   dr padilla    Chronic pain     dr santos    COPD (chronic obstructive pulmonary disease)     papi    Ex-smoker         Hyperlipidemia     Osteopenia 1/15 tran     PVD (peripheral vascular disease)     noobs 07 khuri    Pyriform sinus cancer     dr ponce radiation - dr king perales    Sleep apnea     cpap    Squamous cell carcinoma of skin     roberto    Tongue cancer     "superficial" removed     Vocal cord cancer     Xerostomia     radiation       Past Surgical History:  Past Surgical History:   Procedure Laterality Date    APPENDECTOMY      COLONOSCOPY N/A 3/11/2014    Performed by [facility] at [facility]    Due for screening colonoscopy N/A 2017    Performed by Anushka Yoder MD at [facility]    ESOPHAGOGASTRODUODENOSCOPY (EGD) N/A 2018    Performed by Audie Hill III, MD at [facility]    ESOPHAGOGASTRODUODENOSCOPY (EGD) N/A 2018    Performed by Audie Hill III, MD at [facility]    Thoracentesis Left 2018    Performed by Santos Cardozo MD at [facility]    tongue cancer excision      dr vitale    VOCAL CORD LATERALIZATION, ENDOSCOPIC APPROACH W/ MLB      kris         Family History:  Family History   Problem Relation Age of Onset    Cancer Father     Cancer Brother        Social History:  Social History     Tobacco Use    Smoking status: Former Smoker     Packs/day: 2.00     Years: 55.00     Pack years: 110.00     Last attempt to quit: 10/2014     Years since quittin.3    Smokeless tobacco: Never Used   Substance and Sexual Activity    Alcohol use: No    Drug use: No    Sexual activity: Not Currently       ROS   Review of Systems   Constitutional: Positive for fever (Tnow 101.8). Negative for diaphoresis.   HENT: Negative for congestion and sore throat.    Eyes: Negative for visual disturbance.   Respiratory: Positive for shortness of breath. Negative for cough.    Cardiovascular: Positive for palpitations. " Negative for chest pain and leg swelling.   Gastrointestinal: Negative for nausea and vomiting.   Genitourinary: Negative for dysuria.   Musculoskeletal: Negative for back pain and myalgias.   Skin: Negative for rash.   Neurological: Negative for dizziness, weakness and numbness.   Hematological: Does not bruise/bleed easily.   All other systems reviewed and are negative.    Physical Exam      Initial Vitals [02/05/19 1908]   BP Pulse Resp Temp SpO2   (!) 146/104 (!) 130 (!) 26 (!) 101.8 °F (38.8 °C) (!) 94 %      MAP       --          Physical Exam  Nursing Notes and Vital Signs Reviewed.  Constitutional: Patient is in moderate distress. Well-developed and well-nourished. Elderly.   Head: Atraumatic. Normocephalic.  Eyes: PERRL. EOM intact. Conjunctivae are not pale. No scleral icterus.  ENT: Mucous membranes are moist. Oropharynx is clear and symmetric.    Neck: Supple. Full ROM. No lymphadenopathy.  Cardiovascular: Tachycardic. Regular rhythm. No murmurs, rubs, or gallops. Distal pulses are 2+ and symmetric.  Pulmonary/Chest: Tachypneic. Wheezes bilaterally.  Abdominal: Soft and non-distended.  There is no tenderness.  No rebound, guarding, or rigidity.   Musculoskeletal: Moves all extremities. No obvious deformities. No edema. No calf tenderness.  Skin: Warm and dry.  Neurological:  Alert, awake, and appropriate.  Normal speech.  No acute focal neurological deficits are appreciated.  Psychiatric: Normal affect. Good eye contact. Appropriate in content.    ED Course    Critical Care  Date/Time: 2/5/2019 7:53 PM  Performed by: Ron Alvarez MD  Authorized by: Ron Alvarez MD   Direct patient critical care time: 15 minutes  Additional history critical care time: 5 minutes  Ordering / reviewing critical care time: 10 minutes  Documentation critical care time: 5 minutes  Total critical care time (exclusive of procedural time) : 35 minutes  Critical care time was exclusive of separately billable procedures  and treating other patients and teaching time.  Critical care was necessary to treat or prevent imminent or life-threatening deterioration of the following conditions: SVT.  Critical care was time spent personally by me on the following activities: blood draw for specimens, development of treatment plan with patient or surrogate, interpretation of cardiac output measurements, evaluation of patient's response to treatment, examination of patient, obtaining history from patient or surrogate, ordering and performing treatments and interventions, ordering and review of laboratory studies, ordering and review of radiographic studies, pulse oximetry, re-evaluation of patient's condition and review of old charts.    Critical Care  Date/Time: 2/6/2019 12:06 AM  Performed by: Joe Mendez MD  Authorized by: Joe Mednez MD   Direct patient critical care time: 10 minutes  Additional history critical care time: 4 minutes  Ordering / reviewing critical care time: 4 minutes  Documentation critical care time: 4 minutes  Consulting other physicians critical care time: 4 minutes  Consult with family critical care time: 4 minutes  Total critical care time (exclusive of procedural time) : 30 minutes  Critical care time was exclusive of separately billable procedures and treating other patients and teaching time.  Critical care was necessary to treat or prevent imminent or life-threatening deterioration of the following conditions: sepsis (hypoxia, H cath).  Critical care was time spent personally by me on the following activities: blood draw for specimens, development of treatment plan with patient or surrogate, discussions with consultants, interpretation of cardiac output measurements, evaluation of patient's response to treatment, examination of patient, obtaining history from patient or surrogate, ordering and performing treatments and interventions, ordering and review of laboratory studies, ordering and review of  "radiographic studies, re-evaluation of patient's condition, review of old charts and pulse oximetry.        ED Vital Signs:  Vitals:    02/05/19 2002 02/05/19 2017 02/05/19 2122 02/05/19 2159   BP: (!) 173/79 (!) 145/65 125/60    Pulse: (!) 128 (!) 113 104    Resp: (!) 29 (!) 29 (!) 24    Temp:       TempSrc:       SpO2: 98% 96% 95%    Weight:    81.6 kg (179 lb 14.3 oz)   Height:        02/05/19 2307 02/05/19 2317 02/05/19 2332 02/05/19 2338   BP: (!) 116/57 (!) 113/59 (!) 104/56    Pulse: 83 82 81 80   Resp: 20 19 17 20   Temp:       TempSrc:       SpO2: 97% 97% (!) 91% (!) 87%   Weight:       Height:        02/05/19 2344 02/05/19 2347 02/06/19 0002 02/06/19 0017   BP:  (!) 105/59 (!) 107/59 (!) 106/58   Pulse: 79 78 78 79   Resp: 19 19 19 18   Temp: 99 °F (37.2 °C)      TempSrc: Tympanic      SpO2: (!) 94% 96% 97% 98%   Weight:       Height:        02/06/19 0032 02/06/19 0115 02/06/19 0130   BP: (!) 107/57 108/60 (!) 105/40   Pulse: 78 84 75   Resp: 20 (!) 22 20   Temp:  97.5 °F (36.4 °C)    TempSrc:  Oral    SpO2: 96% 97% 96%   Weight:  77.4 kg (170 lb 10.2 oz)    Height:  5' 8" (1.727 m)        Abnormal Lab Results:  Labs Reviewed   CBC W/ AUTO DIFFERENTIAL - Abnormal; Notable for the following components:       Result Value    WBC 22.45 (*)     RBC 4.53 (*)     Hemoglobin 12.8 (*)     RDW 18.1 (*)     Platelets 442 (*)     Gran # (ANC) 19.3 (*)     Mono # 1.1 (*)     Gran% 86.0 (*)     Lymph% 7.8 (*)     All other components within normal limits   COMPREHENSIVE METABOLIC PANEL - Abnormal; Notable for the following components:    Potassium 3.4 (*)     Glucose 116 (*)     Albumin 3.3 (*)     ALT 9 (*)     All other components within normal limits   URINALYSIS, REFLEX TO URINE CULTURE - Abnormal; Notable for the following components:    Protein, UA 1+ (*)     Ketones, UA 1+ (*)     Occult Blood UA 2+ (*)     All other components within normal limits    Narrative:     Preferred Collection Type->Urine, Clean Catch "   B-TYPE NATRIURETIC PEPTIDE - Abnormal; Notable for the following components:     (*)     All other components within normal limits   TROPONIN I - Abnormal; Notable for the following components:    Troponin I 0.032 (*)     All other components within normal limits   URINALYSIS MICROSCOPIC - Abnormal; Notable for the following components:    RBC, UA 12 (*)     Hyaline Casts, UA 2 (*)     All other components within normal limits    Narrative:     Preferred Collection Type->Urine, Clean Catch   ISTAT PROCEDURE - Abnormal; Notable for the following components:    POC PO2 56 (*)     POC HCO3 23.0 (*)     POC SATURATED O2 89 (*)     All other components within normal limits   CK   LACTIC ACID, PLASMA   HEPATITIS C ANTIBODY        All Lab Results:  Results for orders placed or performed during the hospital encounter of 02/05/19   CBC auto differential   Result Value Ref Range    WBC 22.45 (H) 3.90 - 12.70 K/uL    RBC 4.53 (L) 4.60 - 6.20 M/uL    Hemoglobin 12.8 (L) 14.0 - 18.0 g/dL    Hematocrit 40.0 40.0 - 54.0 %    MCV 88 82 - 98 fL    MCH 28.3 27.0 - 31.0 pg    MCHC 32.0 32.0 - 36.0 g/dL    RDW 18.1 (H) 11.5 - 14.5 %    Platelets 442 (H) 150 - 350 K/uL    MPV 10.2 9.2 - 12.9 fL    Gran # (ANC) 19.3 (H) 1.8 - 7.7 K/uL    Lymph # 1.7 1.0 - 4.8 K/uL    Mono # 1.1 (H) 0.3 - 1.0 K/uL    Eos # 0.2 0.0 - 0.5 K/uL    Baso # 0.08 0.00 - 0.20 K/uL    Gran% 86.0 (H) 38.0 - 73.0 %    Lymph% 7.8 (L) 18.0 - 48.0 %    Mono% 4.9 4.0 - 15.0 %    Eosinophil% 0.9 0.0 - 8.0 %    Basophil% 0.4 0.0 - 1.9 %    Differential Method Automated    Comprehensive metabolic panel   Result Value Ref Range    Sodium 142 136 - 145 mmol/L    Potassium 3.4 (L) 3.5 - 5.1 mmol/L    Chloride 104 95 - 110 mmol/L    CO2 24 23 - 29 mmol/L    Glucose 116 (H) 70 - 110 mg/dL    BUN, Bld 12 8 - 23 mg/dL    Creatinine 0.8 0.5 - 1.4 mg/dL    Calcium 9.4 8.7 - 10.5 mg/dL    Total Protein 7.8 6.0 - 8.4 g/dL    Albumin 3.3 (L) 3.5 - 5.2 g/dL    Total Bilirubin  0.9 0.1 - 1.0 mg/dL    Alkaline Phosphatase 80 55 - 135 U/L    AST 22 10 - 40 U/L    ALT 9 (L) 10 - 44 U/L    Anion Gap 14 8 - 16 mmol/L    eGFR if African American >60 >60 mL/min/1.73 m^2    eGFR if non African American >60 >60 mL/min/1.73 m^2   Urinalysis, Reflex to Urine Culture Urine, Clean Catch   Result Value Ref Range    Specimen UA Urine, Clean Catch     Color, UA Yellow Yellow, Straw, Annelise    Appearance, UA Clear Clear    pH, UA 7.0 5.0 - 8.0    Specific Gravity, UA 1.020 1.005 - 1.030    Protein, UA 1+ (A) Negative    Glucose, UA Negative Negative    Ketones, UA 1+ (A) Negative    Bilirubin (UA) Negative Negative    Occult Blood UA 2+ (A) Negative    Nitrite, UA Negative Negative    Urobilinogen, UA Negative <2.0 EU/dL    Leukocytes, UA Negative Negative   Brain natriuretic peptide   Result Value Ref Range     (H) 0 - 99 pg/mL   CK   Result Value Ref Range    CPK 47 20 - 200 U/L   Troponin I   Result Value Ref Range    Troponin I 0.032 (H) 0.000 - 0.026 ng/mL   Lactic acid, plasma   Result Value Ref Range    Lactate (Lactic Acid) 1.0 0.5 - 2.2 mmol/L   Urinalysis Microscopic   Result Value Ref Range    RBC, UA 12 (H) 0 - 4 /hpf    WBC, UA 0 0 - 5 /hpf    Bacteria, UA Rare None-Occ /hpf    Yeast, UA None None    Hyaline Casts, UA 2 (A) 0-1/lpf /lpf    Microscopic Comment SEE COMMENT    ISTAT PROCEDURE   Result Value Ref Range    POC PH 7.410 7.35 - 7.45    POC PCO2 36.4 35 - 45 mmHg    POC PO2 56 (LL) 80 - 100 mmHg    POC HCO3 23.0 (L) 24 - 28 mmol/L    POC BE -2 -2 to 2 mmol/L    POC SATURATED O2 89 (L) 95 - 100 %    Sample ARTERIAL     Site LR     Allens Test Pass     DelSys NRB     Mode SPONT     Flow 15     FiO2 100     Sp02 90        Imaging Results:  Imaging Results          CTA Chest Non-Coronary - PE Study (Final result)  Result time 02/05/19 23:33:31    Final result by Bharathi Velez III, MD (02/05/19 23:33:31)                 Impression:      1.  Bilateral pleural effusions, greater  on the right with further extensive infiltrates and atelectatic changes in the mid to lower lung fields as well as extensive underlying scarring.  Small patches of infiltrate suggested right mid to upper lung field.    2.  Grossly negative study for acute pulmonary emboli.  No evidence of thoracic aortic aneurysm or dissection.  Otherwise as above    All CT scans at this facility are performed  using dose modulation techniques as appropriate to performed exam including the following:  automated exposure control; adjustment of mA and/or kV according to the patients size (this includes techniques or standardized protocols for targeted exams where dose is matched to indication/reason for exam: i.e. extremities or head);  iterative reconstruction technique.      Electronically signed by: Bharathi Velez MD  Date:    02/05/2019  Time:    23:33             Narrative:    EXAMINATION:  CTA CHEST NON CORONARY    CLINICAL HISTORY:  Chest pain, acute, PE suspected, high pretest prob;    TECHNIQUE:  Axial images. Multiplanar thick slab MIP images were performed utilizing MPR reformats.    Contrast:  Omnipaque <350> <100> ml    COMPARISON:  December    FINDINGS:  Multiplanar imaging obtained through the cervical chest shows scattered small nodes but no mass or bulky adenopathy.  Heart size is unchanged.  There is atheromatous change along the aorta without evidence of aneurysm formation or dissection.  Pulmonary arterial structures are moderately well opacified.  There are no gross filling defects within the major branches.    There are bilateral pleural effusions, greater on the right with moderate infiltrates and atelectatic changes in the right mid to lower lung field and to a lesser extent at the left base.  This shows significant change since December.  There is hilar adenopathy, greater on the right which may be reactive.    There is minimal patchy scattered infiltrative densities in the right mid to upper lung field and  there is scarring again noted at both apices.                               X-Ray Chest AP Portable (Final result)  Result time 02/05/19 19:57:58    Final result by Rachel Mix MD (Timothy) (02/05/19 19:57:58)                 Impression:      Interval worsening with new dense consolidation involving the periphery of the right lower lobe.  Stable patchy consolidation at the left base.      Electronically signed by: Rachel Mix MD  Date:    02/05/2019  Time:    19:57             Narrative:    EXAMINATION:  XR CHEST AP PORTABLE    CLINICAL HISTORY:  <Diagnosis>, sob;    COMPARISON:  Comparison 02/05/2019.    FINDINGS:  Stable heart size.  Patchy consolidation at the left base appears stable.  There is now new dense consolidation involving the periphery of the right lower lobe.                               The EKG was ordered, reviewed, and independently interpreted by the ED provider.  Interpretation time: 1919  Rate: 148 BPM  Rhythm: SVT with PVC's or fusion complexes  Interpretation: Septal infarct. No STEMI.         The Emergency Provider reviewed the vital signs and test results, which are outlined above.    ED Discussion     7:50 PM: Pt given 6mg Adenosine with no response, 12 of Adenosine with no response, and another 12 of Adenosine with no response. Pt still in SVT. Will start pt on cardizem ddt.    8:04 PM: Dr. Alvarez transfers care of pt to Dr. Mendez, pending lab/imaging results.    8:26 PM: Dr. Mendez evaluated pt. Pt is resting comfortably and is in no acute distress.  Pt states he feels better. Reports he is on CPAP every night. D/w pt all pertinent results. D/w pt any concerns expressed at this time. Answered all questions. Pt expresses understanding at this time.    11:55 PM: Discussed case with Dr. Wilkins ([facility]). Dr. Wilkins agrees with current care and management of pt and accepts admission.   Admitting Service: Hospital medicine   Admitting Physician: Dr. Wilkins  Admit to:  ICU    12:00 AM: Re-evaluated pt. I have discussed test results, shared treatment plan, and the need for admission with patient and family at bedside. Pt and family express understanding at this time and agree with all information. All questions answered. Pt and family have no further questions or concerns at this time. Pt is ready for admit.    ED Medication(s):  Medications   albuterol-ipratropium 2.5 mg-0.5 mg/3 mL nebulizer solution 3 mL ( Nebulization Canceled Entry 2/5/19 1925)   diltiaZEM 125 mg in D5W 125 mL infusion (7.5 mg/hr Intravenous Verify Only 2/6/19 0115)   vancomycin 1.5 g in 5 % dextrose 250 mL IVPB (1,500 mg Intravenous New Bag 2/6/19 0053)   albuterol nebulizer solution 2.5 mg (not administered)   aspirin chewable tablet 81 mg (not administered)   sodium chloride 0.9% flush 5 mL (not administered)   ipratropium 0.02 % nebulizer solution 0.5 mg (not administered)   adenosine injection 6 mg (6 mg Intravenous Given 2/5/19 1935)   diltiaZEM injection 15 mg (15 mg Intravenous Given 2/5/19 2005)   adenosine injection 12 mg (12 mg Intravenous Given 2/5/19 1942)   adenosine injection 12 mg (12 mg Intravenous Given 2/5/19 1948)   diphenhydrAMINE injection 25 mg (25 mg Intravenous Given 2/5/19 2125)   sodium chloride 0.9% bolus 1,000 mL (0 mLs Intravenous Stopped 2/6/19 0039)   acetaminophen tablet 1,000 mg (1,000 mg Oral Given 2/5/19 2124)   cefepime 2 g in dextrose 5% 50 mL IVPB (ready to mix system) (0 g Intravenous Stopped 2/6/19 0052)   omnipaque 350 iohexol 100 mL (100 mLs Intravenous Given 2/5/19 2300)             Medical Decision Making    Medical Decision Making:   Clinical Tests:   Lab Tests: Ordered and Reviewed  Radiological Study: Ordered and Reviewed  Medical Tests: Ordered and Reviewed           Scribe Attestation:   Scribe #1: I performed the above scribed service and the documentation accurately describes the services I performed. I attest to the accuracy of the note.    Attending:    Physician Attestation Statement for Scribe #1: I, Ron Alvarez MD, personally performed the services described in this documentation, as scribed by Carmen Xiong, in my presence, and it is both accurate and complete.       Scribe Attestation:   Scribe #2: I performed the above scribed service and the documentation accurately describes the services I performed. I attest to the accuracy of the note.    Attending Attestation:           Physician Attestation for Scribe:    Physician Attestation Statement for Scribe #2: I, Joe Mendez MD, reviewed documentation, as scribed by Michelle Yang in my presence, and it is both accurate and complete. I also acknowledge and confirm the content of the note done by Scribe #1.          Clinical Impression       ICD-10-CM ICD-9-CM   1. Sepsis, due to unspecified organism A41.9 038.9     995.91   2. SVT (supraventricular tachycardia) I47.1 427.89   3. Lung mass R91.8 786.6       Disposition:   Disposition: Admitted  Condition: Serious         Joe Mendez MD  02/06/19 0153

## 2019-02-06 NOTE — ED NOTES
Crash cart at BS. MD; this RN; Darline, RN and RT at BS for adenosine admin. Pt. On cardiac, BP, and SPO2 monitors. On O2. GCS 15.

## 2019-02-06 NOTE — CONSULTS
"[facility] -   Critical Care Medicine  Consult Note    Patient Name: Noble Tripp  MRN: [id number]  Admission Date: 2/5/2019  Hospital Length of Stay: 0 days  Code Status: Full Code  Attending Physician: Aristides Israel MD   Primary Care Provider: Dwaine Davis MD   Principal Problem: Sepsis      Subjective:     HPI:  72 y/o WM presents to the ED for acute onset shortness of breath.  Reports bronchoscopy with transbronchial lung biopsy  today. On arrival to ED patient was febrile, tachycardic, and tachypneic. EKG shows SVT at a rate ~150 bpm that was refractory to 3 doses of adenosine.  Cardiazem drip initiated and BiPap initiated to assist ventilation. CTA was negative for PE, but reports Bilateral pleural effusions and small patchy infiltrates to mid and upper lobes of right lung. This is a significant change since previous CTA 12/2018. Patient Tmax 101.8 with WBC 22.45, Trop 0.032, and .    Hospital/ICU Course:  Admitted to ICU on Cardizem drip with NSR on monitor, BiPAP continues for ventilatory assist.   02/06 - Seen and examined at bedside. No acute interval events noted. Rested overnight on NIPPV. Cardiazem drip weaned off @ 0300 this am. Remains in NSR on monitor. Tolerating nasal cannula.    Past Medical History:   Diagnosis Date    Adrenal mass     david;nl fxn w/u    Aspiration pneumonia 10/15    puree/honey    Benign prostate hyperplasia     CAD (coronary artery disease)     dr padilla    Chronic pain     dr santos    COPD (chronic obstructive pulmonary disease)     papi    Ex-smoker     11/13    Hyperlipidemia     Osteopenia 1/15 tran 1/18    PVD (peripheral vascular disease)     noobs 07 elizabethuri    Pyriform sinus cancer     dr ponce radiation 1/2-14 dr king perales    Sleep apnea     cpap    Squamous cell carcinoma of skin     roberto    Tongue cancer     "superficial" removed 11/14    Vocal cord cancer 2011    Xerostomia     radiation "       Past Surgical History:   Procedure Laterality Date    APPENDECTOMY      Bronchoscopy Bilateral 2/5/2019    [facility]    COLONOSCOPY N/A 3/11/2014    Performed by Joe Goodman [facility] at [facility]    Due for screening colonoscopy N/A 5/1/2017    Performed by Anushka Yoder MD at [facility]    ESOPHAGOGASTRODUODENOSCOPY (EGD) N/A 8/29/2018    Performed by Audie Hill III, MD at [facility]    ESOPHAGOGASTRODUODENOSCOPY (EGD) N/A 5/29/2018    Performed by Audie Hill III, MD at [facility]    Thoracentesis Left 8/7/2018    Performed by [facility] at [facility]    tongue cancer excision  11/14    dr vitale    VOCAL CORD LATERALIZATION, ENDOSCOPIC APPROACH W/ B      [facility]       Review of patient's allergies indicates:   Allergen Reactions    Contrast media     Iodinated contrast- oral and iv dye      Other reaction(s): Itching  Other reaction(s): Hives    Neomycin-bacitracin-polymyxin      Other reaction(s): Rash  Other reaction(s): Rash    Pravastatin      Other reaction(s): fatigue  Other reaction(s): fatigue    Rosuvastatin      Other reaction(s): fatigue  Other reaction(s): fatigue    Simvastatin      Other reaction(s): fatigue  Other reaction(s): fatigue    Statins-hmg-coa reductase inhibitors        Scheduled Meds:   aspirin  81 mg Oral Daily    budesonide  0.5 mg Nebulization Q12H    ceFEPime (MAXIPIME) IVPB  1 g Intravenous Q8H    gabapentin  400 mg Oral TID    ipratropium  0.5 mg Nebulization Q6H    levalbuterol  0.63 mg Nebulization Q12H    pantoprazole  40 mg Oral Daily    vancomycin (VANCOCIN) IVPB  1,000 mg Intravenous Q18H     Continuous Infusions:   dilTIAZem Stopped (02/06/19 0300)     PRN Meds:.levalbuterol, oxyCODONE-acetaminophen, sodium chloride 0.9%       Family History     Problem Relation (Age of Onset)    Cancer Father, Brother        Tobacco Use    Smoking status: Former Smoker     Packs/day: 2.00     Years:  55.00     Pack years: 110.00     Last attempt to quit: 10/2014     Years since quittin.3    Smokeless tobacco: Never Used   Substance and Sexual Activity    Alcohol use: No    Drug use: No    Sexual activity: Not Currently         Review of Systems   HENT: Positive for trouble swallowing.    Respiratory: Positive for cough and shortness of breath. Negative for chest tightness.         Patient reports difficulty swallowing secondary to radiation therapy and has hx of frequent aspiration pneumonia.   Cardiovascular: Negative for chest pain.   Musculoskeletal: Positive for back pain.   Skin: Negative.      Objective:     Vital Signs (Most Recent):  Temp: 98.3 °F (36.8 °C) (19 0715)  Pulse: 83 (19 1130)  Resp: 19 (19 1130)  BP: (!) 123/50 (19 1030)  SpO2: 97 % (19 1130) Vital Signs (24h Range):  Temp:  [97.5 °F (36.4 °C)-101.8 °F (38.8 °C)] 98.3 °F (36.8 °C)  Pulse:  [] 83  Resp:  [14-29] 19  SpO2:  [86 %-100 %] 97 %  BP: ()/() 123/50     Weight: 77.4 kg (170 lb 10.2 oz)  Body mass index is 25.95 kg/m².      Intake/Output Summary (Last 24 hours) at 2019 1133  Last data filed at 2019 0908  Gross per 24 hour   Intake 1492.8 ml   Output 275 ml   Net 1217.8 ml       Physical Exam   Constitutional: He is oriented to person, place, and time. He appears well-nourished. Nasal cannula in place.   Rested overnight on NIPPV   HENT:   Head: Normocephalic.   Eyes: Conjunctivae are normal. Pupils are equal, round, and reactive to light.   Neck: Neck supple.   Cardiovascular: Normal rate, regular rhythm and normal heart sounds.   Pulses:       Radial pulses are 2+ on the right side, and 2+ on the left side.   NSR with 1st degree block on EKG.   Pulmonary/Chest: Effort normal. Tachypnea noted. He has rales.   Severely diminished bilateral lower lobes, with rhonchi appreciated. With rate 22-24. Conversational without any distress.   Abdominal: Soft. Bowel sounds are normal.    Musculoskeletal: Normal range of motion.   Neurological: He is alert and oriented to person, place, and time.   Skin: Skin is warm and dry. Capillary refill takes 2 to 3 seconds.   Psychiatric: He has a normal mood and affect.       Vents:  Oxygen Concentration (%): 45 (02/06/19 0836)    Lines/Drains/Airways     Peripheral Intravenous Line                 Peripheral IV - Single Lumen 02/05/19 1900 Right Hand less than 1 day         Peripheral IV - Single Lumen 02/05/19 1930 Right Antecubital less than 1 day                Significant Labs:    CBC/Anemia Profile:  Recent Labs   Lab 02/05/19 1930 02/06/19 0358   WBC 22.45* 31.75*   HGB 12.8* 10.4*   HCT 40.0 33.0*   * 312   MCV 88 88   RDW 18.1* 18.2*        Chemistries:  Recent Labs   Lab 02/05/19 1930 02/06/19 0358    140   K 3.4* 3.3*    105   CO2 24 24   BUN 12 15   CREATININE 0.8 0.8   CALCIUM 9.4 8.2*   ALBUMIN 3.3*  --    PROT 7.8  --    BILITOT 0.9  --    ALKPHOS 80  --    ALT 9*  --    AST 22  --    MG  --  1.4*   PHOS  --  3.1       Troponin:   Recent Labs   Lab 02/05/19 1930 02/06/19 0358 02/06/19  0910   TROPONINI 0.032* 0.346* 0.245*     All pertinent labs within the past 24 hours have been reviewed.    Significant Imaging:   I have reviewed all pertinent imaging results/findings within the past 24 hours.      ABG  Recent Labs   Lab 02/05/19 1926   PH 7.410   PO2 56*   PCO2 36.4   HCO3 23.0*   BE -2     Assessment/Plan:     I have reviewed all labs and imaging studies and compared to previous results. I have also discussed labs with all the teams in the medical care of the patient and my plan is outlined below     Neuro    Neurochecks per routine.     Chronic pain    Maintain home medications for analgesia.     ENT   Oropharyngeal aspiration    ST consulted, discussed aspiration prevention strategies with patient. Pateint has confirmed aspiration by MBBS.  Patient is aware that he needs thickened fluids and PEG tube but  declines recommendations.  Wife and daughter at bedside, also understand recommendations, but respect patients wishes.      Pulmonary   Lung mass    Bronchoscopy results: 02/05  Gram stain reveals GNR> Identification is pending.   Pathology and cytology are pending.     Pleural effusion, bilateral    Likely para pneumonic. Continue IV antibiotics. Continue to monitor.      Moderate COPD (chronic obstructive pulmonary disease)    Continue nasal oxygen supplementation. Wean from oxygen as tolerated. Keep SAO2 > 92%.  Continue KATYA, and LAMA as ordered with HHN's ordered prn.       Cardiac/Vascular   Sustained SVT    SVT currently resolved. Off Cardiazem. Continue cardiac monitoring. Troponin trending down. Cardiology consulted.      Hyperlipidemia    Repatha on hold. Resume on discharge.      ID   * Sepsis        Vitals:    02/06/19 1100 02/06/19 1115 02/06/19 1130 02/06/19 1325   BP:       BP Location:       Patient Position:       Pulse: 82 80 83 77   Resp: (!) 24 (!) 22 19 16   Temp:       TempSrc:       SpO2: 96% 96% 97% 98%   Weight:       Height:                 [x] Respiratory rate >20 breaths/min or PaCO2 <32 mmHg   [x]  WBC >12,000 cells/mm3, <4000 cells/mm3, or >10 percent immature (band) forms  [x] Heart rate >90 beats/min   [x] Temperature >38ºC or <36ºC    Microbiology: Panculture.   Oxygenation: Supplemental oxygen by nasal canula. Keep SAO2 > = 92%  Hemodynamics: Hemodynamically stable. Keep MAP > = 65mmHg  Source control: IV Ceftazidime, IV Vancomycin.         Hematology    Monitor hemogram. Transfuse as needed.     Endocrine    Stable Euglycemic . SBGM. Blood glucose target 60 - 180 mg/dl         GI     Pateint has confirmed aspiration by MBBS.  Patient is aware that he needs thickened fluids and PEG tube but declines recommendations.  Cardiac diet per RD recommendations.  Stress ulcer prophylaxis.      Other   Ex-smoker    Commended on quitting smoking.      JUANITO on CPAP    Contiue nocturnal CPAP 14  CMWP.          Critical Care Daily Checklist:    A: Awake: RASS Goal/Actual Goal: RASS Goal: 0-->alert and calm  Actual: Pete Agitation Sedation Scale (RASS): Alert and calm   B: Spontaneous Breathing Trial Performed?  N/A   C: SAT & SBT Coordinated?  N/A                  D: Delirium: CAM-ICU Overall CAM-ICU: Negative   E: Early Mobility Performed? Yes   F: Feeding Goal: Goals: Patient to meet >/=85% of EEN/EPN   Status: Nutrition Goal Status: new   Current Diet Order   Procedures    Diet Cardiac      AS: Analgesia/Sedation PERCOCET PRN   T: Thromboembolic Prophylaxis SCD   H: HOB > 300 Yes   U: Stress Ulcer Prophylaxis (if needed) PROTONIX   G: Glucose Control SBGM   B: Bowel Function     I: Indwelling Catheter (Lines & Cruz) Necessity NO   D: De-escalation of Antimicrobials/Pharmacotherapies YES    Plan for the day/ETD CONTIUE CURRENT    Code Status:  Family/Goals of Care: Full Code  HOME SELF CARE     Critical Care Time: 50 minutes  Critical secondary to SEPSIS, ASPIRATION, PNEUMONIA, SVT.      Critical care was time spent personally by me on the following activities: development of treatment plan with patient or surrogate and bedside caregivers, discussions with consultants, evaluation of patient's response to treatment, examination of patient, ordering and performing treatments and interventions, ordering and review of laboratory studies, ordering and review of radiographic studies, pulse oximetry, re-evaluation of patient's condition. This critical care time did not overlap with that of any other provider or involve time for any procedures.    Thank you for your consult. I will follow-up with patient. Please contact us if you have any additional questions.     Mihir Valero MD  Critical Care Medicine  [facility] -

## 2019-02-06 NOTE — ASSESSMENT & PLAN NOTE
Pateint has confirmed aspiration by MBBS.  Patient is aware that he needs thickened fluids and PEG tube but declines recommendations.  Cardiac diet per RD recommendations.  Stress ulcer prophylaxis.

## 2019-02-06 NOTE — PROGRESS NOTES
Spoke with wife  Was admitted last night  Feels better, no pneumo  Bronch findings suggest chr aspiration  Will benefit from hospital bed and Speech therapy

## 2019-02-06 NOTE — SUBJECTIVE & OBJECTIVE
"Past Medical History:   Diagnosis Date    Adrenal mass     david;nl fxn w/u    Aspiration pneumonia 10/15    puree/honey    Benign prostate hyperplasia     CAD (coronary artery disease)     dr padilla    Chronic pain     dr santos    COPD (chronic obstructive pulmonary disease)     papi    Ex-smoker     11/13    Hyperlipidemia     Osteopenia 1/15 tran 1/18    PVD (peripheral vascular disease)     noobs 07 khuri    Pyriform sinus cancer     dr ponce radiation 1/2-14 dr king perales    Sleep apnea     cpap    Squamous cell carcinoma of skin     roberto    Tongue cancer     "superficial" removed 11/14    Vocal cord cancer 2011    Xerostomia     radiation       Past Surgical History:   Procedure Laterality Date    APPENDECTOMY      Bronchoscopy Bilateral 2/5/2019    [facility]    COLONOSCOPY N/A 3/11/2014    Performed by Joe Goodman [facility] at [facility]    Due for screening colonoscopy N/A 5/1/2017    Performed by [facility]ushka Yoder MD at [facility]    ESOPHAGOGASTRODUODENOSCOPY (EGD) N/A 8/29/2018    Performed by Audie Hill III, MD at [facility]    ESOPHAGOGASTRODUODENOSCOPY (EGD) N/A 5/29/2018    Performed by Audie Hill III, MD at [facility]    Thoracentesis Left 8/7/2018    Performed by [facility] at [facility]    tongue cancer excision  11/14    dr vitale    VOCAL CORD LATERALIZATION, ENDOSCOPIC APPROACH W/ MLB      kris       Review of patient's allergies indicates:   Allergen Reactions    Contrast media     Iodinated contrast- oral and iv dye      Other reaction(s): Itching  Other reaction(s): Hives    Neomycin-bacitracin-polymyxin      Other reaction(s): Rash  Other reaction(s): Rash    Pravastatin      Other reaction(s): fatigue  Other reaction(s): fatigue    Rosuvastatin      Other reaction(s): fatigue  Other reaction(s): fatigue    Simvastatin      Other reaction(s): fatigue  Other reaction(s): fatigue    " Statins-hmg-coa reductase inhibitors        Scheduled Meds:   aspirin  81 mg Oral Daily    budesonide  0.5 mg Nebulization Q12H    ceFEPime (MAXIPIME) IVPB  1 g Intravenous Q8H    gabapentin  400 mg Oral TID    ipratropium  0.5 mg Nebulization Q6H    levalbuterol  0.63 mg Nebulization Q12H    pantoprazole  40 mg Oral Daily    vancomycin (VANCOCIN) IVPB  1,000 mg Intravenous Q18H     Continuous Infusions:   dilTIAZem Stopped (19 0300)     PRN Meds:.levalbuterol, oxyCODONE-acetaminophen, sodium chloride 0.9%       Family History     Problem Relation (Age of Onset)    Cancer Father, Brother        Tobacco Use    Smoking status: Former Smoker     Packs/day: 2.00     Years: 55.00     Pack years: 110.00     Last attempt to quit: 10/2014     Years since quittin.3    Smokeless tobacco: Never Used   Substance and Sexual Activity    Alcohol use: No    Drug use: No    Sexual activity: Not Currently         Review of Systems   HENT: Positive for trouble swallowing.    Respiratory: Positive for cough and shortness of breath. Negative for chest tightness.         Patient reports difficulty swallowing secondary to radiation therapy and has hx of frequent aspiration pneumonia.   Cardiovascular: Negative for chest pain.   Musculoskeletal: Positive for back pain.   Skin: Negative.      Objective:     Vital Signs (Most Recent):  Temp: 98.3 °F (36.8 °C) (19 0715)  Pulse: 83 (19 1130)  Resp: 19 (19 1130)  BP: (!) 123/50 (19 1030)  SpO2: 97 % (19 1130) Vital Signs (24h Range):  Temp:  [97.5 °F (36.4 °C)-101.8 °F (38.8 °C)] 98.3 °F (36.8 °C)  Pulse:  [] 83  Resp:  [14-29] 19  SpO2:  [86 %-100 %] 97 %  BP: ()/() 123/50     Weight: 77.4 kg (170 lb 10.2 oz)  Body mass index is 25.95 kg/m².      Intake/Output Summary (Last 24 hours) at 2019 1133  Last data filed at 2019 0908  Gross per 24 hour   Intake 1492.8 ml   Output 275 ml   Net 1217.8 ml       Physical Exam    Constitutional: He is oriented to person, place, and time. He appears well-nourished. Nasal cannula in place.   Rested overnight on NIPPV   HENT:   Head: Normocephalic.   Eyes: Conjunctivae are normal. Pupils are equal, round, and reactive to light.   Neck: Neck supple.   Cardiovascular: Normal rate, regular rhythm and normal heart sounds.   Pulses:       Radial pulses are 2+ on the right side, and 2+ on the left side.   NSR with 1st degree block on EKG.   Pulmonary/Chest: Effort normal. Tachypnea noted. He has rales.   Severely diminished bilateral lower lobes, with rhonchi appreciated. With rate 22-24. Conversational without any distress.   Abdominal: Soft. Bowel sounds are normal.   Musculoskeletal: Normal range of motion.   Neurological: He is alert and oriented to person, place, and time.   Skin: Skin is warm and dry. Capillary refill takes 2 to 3 seconds.   Psychiatric: He has a normal mood and affect.       Vents:  Oxygen Concentration (%): 45 (02/06/19 0836)    Lines/Drains/Airways     Peripheral Intravenous Line                 Peripheral IV - Single Lumen 02/05/19 1900 Right Hand less than 1 day         Peripheral IV - Single Lumen 02/05/19 1930 Right Antecubital less than 1 day                Significant Labs:    CBC/Anemia Profile:  Recent Labs   Lab 02/05/19 1930 02/06/19  0358   WBC 22.45* 31.75*   HGB 12.8* 10.4*   HCT 40.0 33.0*   * 312   MCV 88 88   RDW 18.1* 18.2*        Chemistries:  Recent Labs   Lab 02/05/19 1930 02/06/19  0358    140   K 3.4* 3.3*    105   CO2 24 24   BUN 12 15   CREATININE 0.8 0.8   CALCIUM 9.4 8.2*   ALBUMIN 3.3*  --    PROT 7.8  --    BILITOT 0.9  --    ALKPHOS 80  --    ALT 9*  --    AST 22  --    MG  --  1.4*   PHOS  --  3.1       Troponin:   Recent Labs   Lab 02/05/19 1930 02/06/19  0358 02/06/19  0910   TROPONINI 0.032* 0.346* 0.245*     All pertinent labs within the past 24 hours have been reviewed.    Significant Imaging:   I have reviewed all  pertinent imaging results/findings within the past 24 hours.

## 2019-02-06 NOTE — ASSESSMENT & PLAN NOTE
Pulmonary source. CT Chest:Bilateral pleural effusions, greater on the right with further extensive infiltrates and atelectatic changes in the mid to lower lung fields as well as extensive underlying scarring.  Small patches of infiltrate suggested right mid to upper lung field.  Cultures pending   Continue vanc, cefepime  Consult to pulmonary in AM  Monitor labs  Further evaluation/diagnostics/interventions/consults pending course

## 2019-02-06 NOTE — PLAN OF CARE
Assessment completed.  Met with the patient/ family. CM explained and left info in blue transition of care folder regarding Advance Directives, Living Will ( FULL CODE ) , Pamphlet on D/C planning on admission and Pharmacy bedside delivery.  The role of CM explained for ICU transitions of care/ discharge planning. Per EMR, PMHx of CAD, COPD, HLD, PVD who  for sob that stated this morning after having bronchoscopy/lung mass biopsy. No mitigating or exacerbating factors reported. Associated sxs include palpitations, fever (Tnow 101.8).  No further complaints or concerns at this time. Patient evaluted in Er. On arrival had tachycardia, fever, resp distress. Was given adenosine, improvement in heart rate and placed on Bipap. Attempt to wean off bipap in Er not able. Sepsis workup done. Blood culture pending. Cefepime and Vanc. WBC 22.45, lactic neg. ABG PO2 56. HM consulted and patient admitted to ICU.  Patient has family support of his or  her spouse , kid. Patient has Medicare insurance. Patient has no needs at this time. CM to f/u for safe transition    Dwaine Davis MD       [facility] [address] [place] [address]  [id number] [place] [id number]  Phone: [phone] Fax: [phone]         02/06/19 1347   Discharge Assessment   Assessment Type Discharge Planning Assessment   Confirmed/corrected address and phone number on facesheet? No   Assessment information obtained from? Patient;Caregiver   Expected Length of Stay (days) (TBD)   Communicated expected length of stay with patient/caregiver no   Prior to hospitilization cognitive status: Alert/Oriented   Prior to hospitalization functional status: Independent   Current cognitive status: Alert/Oriented   Current Functional Status: Independent   Lives With spouse   Able to Return to Prior Arrangements yes   Is patient able to care for self after discharge? Yes   Patient's perception of discharge disposition home or selfcare    Readmission Within the Last 30 Days no previous admission in last 30 days   Patient currently being followed by outpatient case management? No   Patient currently receives any other outside agency services? No   Equipment Currently Used at Home CPAP  (CPAP with [facility])   Do you have any problems affording any of your prescribed medications? No   Is the patient taking medications as prescribed? yes   Does the patient have transportation home? Yes   Transportation Anticipated car, drives self;family or friend will provide   Does the patient receive services at the Coumadin Clinic? No   Discharge Plan A Home with family;Home Health   Discharge Plan B Home with family   DME Needed Upon Discharge  none   Patient/Family in Agreement with Plan yes

## 2019-02-06 NOTE — PLAN OF CARE
Problem: Adult Inpatient Plan of Care  Goal: Plan of Care Review  Outcome: Ongoing (interventions implemented as appropriate)  Pt aaox3.  Pt is NSR with 1st degree AV block and PACs on the heart monitor.  Cardizem stopped this AM d/t SBP<100; HR remained stable.  Resp: pt wore bipap throughout the night, mepilex on nose to prevent breakdown.  : pt voiding via urinal.  Pt turned and repositioned independently frequently.  PIV intact with no redness, swelling or drainage.  Bed low, wheels locked, call light in reach.  Pt instructed to call for assistance.  Pt wife sleeping at bedside.  Plan of care reviewed, pt to have swallow eval today.  Pt and wife verbalizes understanding. Will continue to monitor.

## 2019-02-06 NOTE — H&P
"[facility] Hospital Medicine  History & Physical    Patient Name: Noble Tripp  MRN: [id number]  Admission Date: 2/5/2019  Attending Physician: Pranay Wilkins MD  Primary Care Provider: Dwaine Davis MD         Patient information was obtained from patient, past medical records and ER records.     Subjective:     Principal Problem:Sepsis    Chief Complaint:   Chief Complaint   Patient presents with    Shortness of Breath     reports increased shortness of breath after lung biopsy today, reports decreased O2 sats on arrival         HPI: Noble Tripp is a 73 y.o. male patient with a PMHx of CAD, COPD, HLD, PVD who  for sob that stated this morning after having bronchoscopy/lung mass biopsy. No mitigating or exacerbating factors reported. Associated sxs include palpitations, fever (Tnow 101.8).  No further complaints or concerns at this time. Patient evaluted in Er. On arrival had tachycardia, fever, resp distress. Was given adenosine, improvement in heart rate and placed on Bipap. Attempt to wean off bipap in Er not able. Sepsis workup done. Blood culture pending. Cefepime and Vanc. WBC 22.45, lactic neg. ABG PO2 56. HM consulted and patient admitted to ICU.         Past Medical History:   Diagnosis Date    Adrenal mass     david;nl fxn w/u    Aspiration pneumonia 10/15    puree/honey    Benign prostate hyperplasia     CAD (coronary artery disease)     dr padilla    Chronic pain     dr santos    COPD (chronic obstructive pulmonary disease)     papi    Ex-smoker     11/13    Hyperlipidemia     Osteopenia 1/15 tran 1/18    PVD (peripheral vascular disease)     noobs 07 latrell    Pyriform sinus cancer     dr ponce radiation 1/2-14 dr king bryson    Sleep apnea     cpap    Squamous cell carcinoma of skin     roberto    Tongue cancer     "superficial" removed 11/14    Vocal cord cancer 2011    Xerostomia     radiation       Past Surgical History:   Procedure " Laterality Date    APPENDECTOMY      COLONOSCOPY N/A 3/11/2014    Performed by Joe Goodman [facility] at [facility]    Due for screening colonoscopy N/A 5/1/2017    Performed by Anushka Yoder MD at [facility]    ESOPHAGOGASTRODUODENOSCOPY (EGD) N/A 8/29/2018    Performed by Audie Hill III, MD at [facility]    ESOPHAGOGASTRODUODENOSCOPY (EGD) N/A 5/29/2018    Performed by Audie Hill III, MD at [facility]    Thoracentesis Left 8/7/2018    Performed by Santos Cardozo MD at [facility]    tongue cancer excision  11/14    dr vitale    VOCAL CORD LATERALIZATION, ENDOSCOPIC APPROACH W/ B      kris       Review of patient's allergies indicates:   Allergen Reactions    Contrast media     Iodinated contrast- oral and iv dye      Other reaction(s): Itching  Other reaction(s): Hives    Neomycin-bacitracin-polymyxin      Other reaction(s): Rash  Other reaction(s): Rash    Pravastatin      Other reaction(s): fatigue  Other reaction(s): fatigue    Rosuvastatin      Other reaction(s): fatigue  Other reaction(s): fatigue    Simvastatin      Other reaction(s): fatigue  Other reaction(s): fatigue    Statins-hmg-coa reductase inhibitors        Current Facility-Administered Medications on File Prior to Encounter   Medication    [DISCONTINUED] fentaNYL injection    [DISCONTINUED] lactated ringers infusion    [DISCONTINUED] lactated ringers infusion    [DISCONTINUED] lidocaine (cardiac) injection    [DISCONTINUED] lidocaine HCL 4% external solution 4 mL    [DISCONTINUED] propofol (DIPRIVAN) 10 mg/mL infusion    [DISCONTINUED] succinylcholine injection     Current Outpatient Medications on File Prior to Encounter   Medication Sig    albuterol (PROVENTIL) 2.5 mg /3 mL (0.083 %) nebulizer solution Take 3 mLs (2.5 mg total) by nebulization every 8 (eight) hours while awake.    aspirin 81 mg Tab Take 1 tablet by mouth Daily. Over the counter to help prevent stroke/heart attack    CHANTIX CONTINUING MONTH  BOX 1 mg Tab TAKE ONE TABLET BY MOUTH ONCE DAILY    evolocumab (REPATHA SURECLICK) 140 mg/mL PnIj Inject 140 mg into the skin every 14 (fourteen) days.    gabapentin (NEURONTIN) 800 MG tablet Take 1 tablet (800 mg total) by mouth 3 (three) times daily.    garlic 2,000 mg Cap Take 1 tablet by mouth once daily.     Lactobacillus rhamnosus GG (CULTURELLE) 10 billion cell capsule Take 1 capsule by mouth once daily.    loratadine (CLARITIN) 10 mg tablet Take 1 tablet by mouth Daily.    multivitamin capsule Take 1 capsule by mouth once daily.    nebulizer and compressor Bailey Use as directed    [START ON 2019] oxyCODONE-acetaminophen (PERCOCET) 7.5-325 mg per tablet Take 1 tablet by mouth every 8 (eight) hours as needed for Pain.    pantoprazole (PROTONIX) 40 MG tablet Take 1 tablet (40 mg total) by mouth once daily.    pilocarpine (SALAGEN) 5 MG Tab TAKE 1 TABLET BY MOUTH 30 MINUTES PRIOR TO EACH MEAL    PROAIR HFA 90 mcg/actuation inhaler INHALE TWO PUFFS BY MOUTH EVERY 4 HOURS AS NEEDED FOR WHEEZING OR SHORTNESS OF BREATH    tiotropium (SPIRIVA WITH HANDIHALER) 18 mcg inhalation capsule Inhale 1 capsule (18 mcg total) into the lungs once daily.    turmeric root extract 500 mg Cap Take 1 capsule by mouth 2 (two) times daily.     mupirocin (BACTROBAN) 2 % ointment     oxyCODONE-acetaminophen (PERCOCET) 7.5-325 mg per tablet Take 1 tablet by mouth every 8 (eight) hours as needed for Pain.     Family History     Problem Relation (Age of Onset)    Cancer Father, Brother        Tobacco Use    Smoking status: Former Smoker     Packs/day: 2.00     Years: 55.00     Pack years: 110.00     Last attempt to quit: 10/2014     Years since quittin.3    Smokeless tobacco: Never Used   Substance and Sexual Activity    Alcohol use: No    Drug use: No    Sexual activity: Not Currently     Review of Systems   Constitutional: Positive for fatigue and fever. Negative for chills and diaphoresis.   HENT: Negative  for congestion, sore throat and voice change.    Eyes: Negative for photophobia and visual disturbance.   Respiratory: Positive for cough and shortness of breath. Negative for wheezing and stridor.    Cardiovascular: Negative for chest pain and leg swelling.   Gastrointestinal: Negative for abdominal distention, abdominal pain, constipation, diarrhea, nausea and vomiting.   Endocrine: Negative for polydipsia, polyphagia and polyuria.   Genitourinary: Negative for difficulty urinating, dysuria, flank pain, testicular pain and urgency.   Musculoskeletal: Negative for back pain, joint swelling, neck pain and neck stiffness.   Skin: Negative for color change and rash.   Allergic/Immunologic: Negative for immunocompromised state.   Neurological: Negative for dizziness, syncope, weakness, numbness and headaches.   Hematological: Does not bruise/bleed easily.   Psychiatric/Behavioral: Negative for agitation, behavioral problems and confusion.     Objective:     Vital Signs (Most Recent):  Temp: 97.5 °F (36.4 °C) (02/06/19 0115)  Pulse: 74 (02/06/19 0200)  Resp: 19 (02/06/19 0200)  BP: (!) 102/39 (02/06/19 0200)  SpO2: 97 % (02/06/19 0200) Vital Signs (24h Range):  Temp:  [97.5 °F (36.4 °C)-101.8 °F (38.8 °C)] 97.5 °F (36.4 °C)  Pulse:  [] 74  Resp:  [14-29] 19  SpO2:  [86 %-98 %] 97 %  BP: (102-197)/() 102/39     Weight: 77.4 kg (170 lb 10.2 oz)  Body mass index is 25.95 kg/m².    Physical Exam   Constitutional: He is oriented to person, place, and time. He appears well-developed. He has a sickly appearance. He appears ill. No distress.   eldelry   HENT:   Head: Normocephalic and atraumatic.   Nose: Nose normal.   Eyes: Conjunctivae and EOM are normal. Pupils are equal, round, and reactive to light. No scleral icterus.   Neck: Normal range of motion. Neck supple. No tracheal deviation present.   Cardiovascular: Normal rate, regular rhythm and intact distal pulses.   Murmur heard.  Pulmonary/Chest: Effort  normal. No stridor. No respiratory distress. He has no wheezes. He has rales.   Barrel chest  Course, worse lower  On bipap     Abdominal: Soft. Bowel sounds are normal. He exhibits no distension. There is no tenderness. There is no guarding.   Genitourinary:   Genitourinary Comments: ua neg     Musculoskeletal: Normal range of motion. He exhibits no edema or deformity.   Neurological: He is alert and oriented to person, place, and time. No cranial nerve deficit.   Skin: Skin is warm and dry. Capillary refill takes 2 to 3 seconds. No rash noted. He is not diaphoretic.   Psychiatric: He has a normal mood and affect. His behavior is normal. Judgment and thought content normal.   Nursing note and vitals reviewed.        CRANIAL NERVES     CN III, IV, VI   Pupils are equal, round, and reactive to light.  Extraocular motions are normal.        Significant Labs: All pertinent labs within the past 24 hours have been reviewed.  Results for orders placed or performed during the hospital encounter of 02/05/19   CBC auto differential   Result Value Ref Range    WBC 22.45 (H) 3.90 - 12.70 K/uL    RBC 4.53 (L) 4.60 - 6.20 M/uL    Hemoglobin 12.8 (L) 14.0 - 18.0 g/dL    Hematocrit 40.0 40.0 - 54.0 %    MCV 88 82 - 98 fL    MCH 28.3 27.0 - 31.0 pg    MCHC 32.0 32.0 - 36.0 g/dL    RDW 18.1 (H) 11.5 - 14.5 %    Platelets 442 (H) 150 - 350 K/uL    MPV 10.2 9.2 - 12.9 fL    Gran # (ANC) 19.3 (H) 1.8 - 7.7 K/uL    Lymph # 1.7 1.0 - 4.8 K/uL    Mono # 1.1 (H) 0.3 - 1.0 K/uL    Eos # 0.2 0.0 - 0.5 K/uL    Baso # 0.08 0.00 - 0.20 K/uL    Gran% 86.0 (H) 38.0 - 73.0 %    Lymph% 7.8 (L) 18.0 - 48.0 %    Mono% 4.9 4.0 - 15.0 %    Eosinophil% 0.9 0.0 - 8.0 %    Basophil% 0.4 0.0 - 1.9 %    Differential Method Automated    Comprehensive metabolic panel   Result Value Ref Range    Sodium 142 136 - 145 mmol/L    Potassium 3.4 (L) 3.5 - 5.1 mmol/L    Chloride 104 95 - 110 mmol/L    CO2 24 23 - 29 mmol/L    Glucose 116 (H) 70 - 110 mg/dL    BUN,  Bld 12 8 - 23 mg/dL    Creatinine 0.8 0.5 - 1.4 mg/dL    Calcium 9.4 8.7 - 10.5 mg/dL    Total Protein 7.8 6.0 - 8.4 g/dL    Albumin 3.3 (L) 3.5 - 5.2 g/dL    Total Bilirubin 0.9 0.1 - 1.0 mg/dL    Alkaline Phosphatase 80 55 - 135 U/L    AST 22 10 - 40 U/L    ALT 9 (L) 10 - 44 U/L    Anion Gap 14 8 - 16 mmol/L    eGFR if African American >60 >60 mL/min/1.73 m^2    eGFR if non African American >60 >60 mL/min/1.73 m^2   Urinalysis, Reflex to Urine Culture Urine, Clean Catch   Result Value Ref Range    Specimen UA Urine, Clean Catch     Color, UA Yellow Yellow, Straw, Annelise    Appearance, UA Clear Clear    pH, UA 7.0 5.0 - 8.0    Specific Gravity, UA 1.020 1.005 - 1.030    Protein, UA 1+ (A) Negative    Glucose, UA Negative Negative    Ketones, UA 1+ (A) Negative    Bilirubin (UA) Negative Negative    Occult Blood UA 2+ (A) Negative    Nitrite, UA Negative Negative    Urobilinogen, UA Negative <2.0 EU/dL    Leukocytes, UA Negative Negative   Brain natriuretic peptide   Result Value Ref Range     (H) 0 - 99 pg/mL   CK   Result Value Ref Range    CPK 47 20 - 200 U/L   Troponin I   Result Value Ref Range    Troponin I 0.032 (H) 0.000 - 0.026 ng/mL   Lactic acid, plasma   Result Value Ref Range    Lactate (Lactic Acid) 1.0 0.5 - 2.2 mmol/L   Urinalysis Microscopic   Result Value Ref Range    RBC, UA 12 (H) 0 - 4 /hpf    WBC, UA 0 0 - 5 /hpf    Bacteria, UA Rare None-Occ /hpf    Yeast, UA None None    Hyaline Casts, UA 2 (A) 0-1/lpf /lpf    Microscopic Comment SEE COMMENT    ISTAT PROCEDURE   Result Value Ref Range    POC PH 7.410 7.35 - 7.45    POC PCO2 36.4 35 - 45 mmHg    POC PO2 56 (LL) 80 - 100 mmHg    POC HCO3 23.0 (L) 24 - 28 mmol/L    POC BE -2 -2 to 2 mmol/L    POC SATURATED O2 89 (L) 95 - 100 %    Sample ARTERIAL     Site LR     Allens Test Pass     DelSys NRB     Mode SPONT     Flow 15     FiO2 100     Sp02 90        Significant Imaging: I have reviewed all pertinent imaging results/findings within the  past 24 hours.   Imaging Results          CTA Chest Non-Coronary - PE Study (Final result)  Result time 02/05/19 23:33:31    Final result by Bharathi Velez III, MD (02/05/19 23:33:31)                 Impression:      1.  Bilateral pleural effusions, greater on the right with further extensive infiltrates and atelectatic changes in the mid to lower lung fields as well as extensive underlying scarring.  Small patches of infiltrate suggested right mid to upper lung field.    2.  Grossly negative study for acute pulmonary emboli.  No evidence of thoracic aortic aneurysm or dissection.  Otherwise as above    All CT scans at this facility are performed  using dose modulation techniques as appropriate to performed exam including the following:  automated exposure control; adjustment of mA and/or kV according to the patients size (this includes techniques or standardized protocols for targeted exams where dose is matched to indication/reason for exam: i.e. extremities or head);  iterative reconstruction technique.      Electronically signed by: Bharathi Velez MD  Date:    02/05/2019  Time:    23:33             Narrative:    EXAMINATION:  CTA CHEST NON CORONARY    CLINICAL HISTORY:  Chest pain, acute, PE suspected, high pretest prob;    TECHNIQUE:  Axial images. Multiplanar thick slab MIP images were performed utilizing MPR reformats.    Contrast:  Omnipaque <350> <100> ml    COMPARISON:  December    FINDINGS:  Multiplanar imaging obtained through the cervical chest shows scattered small nodes but no mass or bulky adenopathy.  Heart size is unchanged.  There is atheromatous change along the aorta without evidence of aneurysm formation or dissection.  Pulmonary arterial structures are moderately well opacified.  There are no gross filling defects within the major branches.    There are bilateral pleural effusions, greater on the right with moderate infiltrates and atelectatic changes in the right mid to lower lung  field and to a lesser extent at the left base.  This shows significant change since December.  There is hilar adenopathy, greater on the right which may be reactive.    There is minimal patchy scattered infiltrative densities in the right mid to upper lung field and there is scarring again noted at both apices.                               X-Ray Chest AP Portable (Final result)  Result time 02/05/19 19:57:58    Final result by Rachel Mix MD (Timothy) (02/05/19 19:57:58)                 Impression:      Interval worsening with new dense consolidation involving the periphery of the right lower lobe.  Stable patchy consolidation at the left base.      Electronically signed by: Rachel Mix MD  Date:    02/05/2019  Time:    19:57             Narrative:    EXAMINATION:  XR CHEST AP PORTABLE    CLINICAL HISTORY:  <Diagnosis>, sob;    COMPARISON:  Comparison 02/05/2019.    FINDINGS:  Stable heart size.  Patchy consolidation at the left base appears stable.  There is now new dense consolidation involving the periphery of the right lower lobe.                                I have personally reviewed the patients labs, imaging, ekg and discussed the patient case in detail with the Er provider    Assessment/Plan:     * Sepsis    Pulmonary source. CT Chest:Bilateral pleural effusions, greater on the right with further extensive infiltrates and atelectatic changes in the mid to lower lung fields as well as extensive underlying scarring.  Small patches of infiltrate suggested right mid to upper lung field.  Cultures pending   Continue vanc, cefepime  Consult to pulmonary in AM  Monitor labs  Further evaluation/diagnostics/interventions/consults pending course      Sustained SVT    Pt given 6mg Adenosine with no response, 12 of Adenosine with no response, and another 12 of Adenosine with no response. Pt remained in SVT after ED treatment and was started on cardizem ddt. Improved on drip. Repeat EGK this am.  Treat  underlying suspected cause PNE/Sepsis  Consider consult to cardiology  Check electorlytes./tsh  Further evaluation/diagnostics/interventions/consults pending course        Moderate COPD (chronic obstructive pulmonary disease)    Nebs, ICS  Treat pneumonia  Monitor  Consider IV steroids pending course.   Further evaluation/diagnostics/interventions/consults pending course        Hypertension    Hold home medications. Patient low normal with cardizem drip  Monitor trends  Further evaluation/diagnostics/interventions/consults pending course        Hyperlipidemia    Check statin, a1c  No statin patient with intolerance.   ASA. Consider statin alternative pending course  Further evaluation/diagnostics/interventions/consults pending course        VTE Risk Mitigation (From admission, onward)        Ordered     IP VTE HIGH RISK PATIENT  Once      02/06/19 0115     Place sequential compression device  Until discontinued     No pharmacological due to s/p procedure.  02/06/19 0115        Critical care time spent on the evaluation and treatment of severe organ dysfunction, review of pertinent labs and imaging studies, discussions with consulting providers and discussions with patient/family: 45 minutes.     Bharathi Shelby NP  Department of Hospital Medicine   [facility]

## 2019-02-06 NOTE — PROGRESS NOTES
Patient arrived to room via stretcher and bipap from ED accompanied by nurse. Transferred to bed. Patient is aaox4 with no distress noted.  Patient has been orientated to room, fall precautions, rounding sheet, menu, and board.  Heart monitor in place, vital signs taken and stable, no questions or concerns at this time.  Pt to leave bipap on through the night, pt and wife at bedside aware.  Personal belongings placed at bedside, cell phone with pt in bed and call bell within reach.  Top and bottom dentures removed and placed in denture cup placed at bedside.  Pt reminded to call for assistance.  Bed in lowest position and locked in place.  No c/o pain at this time.  Bharathi RUVALCABA at bedside.  Will continue to monitor.

## 2019-02-06 NOTE — ASSESSMENT & PLAN NOTE
Check statin, a1c  No statin patient with intolerance.   ASA. Consider statin alternative pending course  Further evaluation/diagnostics/interventions/consults pending course

## 2019-02-06 NOTE — PLAN OF CARE
Problem: Adult Inpatient Plan of Care  Goal: Plan of Care Review  Outcome: Ongoing (interventions implemented as appropriate)  Pt remains on the bipap machine.

## 2019-02-06 NOTE — ED NOTES
Pt's respirations not labored, but pt. Sleeping and sats dropping to 88%. When pt. Awakened and takes multiple deep breaths, pt. Able to improve sats to 92% on 5L O2 via NC, but sats immediately drop back down when pt. Falls back asleep. MD made aware. RN instructed to place pt. Back on BIPAP - addressed.

## 2019-02-06 NOTE — HPI
Noble Tripp is a 73 y.o. male patient with a PMHx of CAD, COPD, HLD, PVD who for sob that stated this morning after having bronchoscopy/lung mass biopsy. No mitigating or exacerbating factors reported. Associated sxs include palpitations, fever (Tnow 101.8).  No further complaints or concerns at this time. Patient evaluted in Er. On arrival had tachycardia, fever, resp distress. Was given adenosine, improvement in heart rate and placed on Bipap. Attempt to wean off bipap in Er not able. Sepsis workup done. Blood culture pending. Cefepime and Vanc. WBC 22.45, lactic neg. ABG PO2 56. HM consulted and patient admitted to ICU.

## 2019-02-06 NOTE — PLAN OF CARE
Problem: Adult Inpatient Plan of Care  Goal: Plan of Care Review  Pt AAO x4. D/C Bipap and placed on NC, weaning as tolerated. CPAP ordered at bedtime. Out of bed to chair for 2 hours today. VSS. ST eval done, continue regular diet with thin liquids. Marginal urine output. Reviewed POC, fall and aspiration precautions with pt and family. Pt tolerated well. Transferring to tele floor when bed available.

## 2019-02-06 NOTE — ASSESSMENT & PLAN NOTE
ST consulted, discussed aspiration prevention strategies with patient. Pateint has confirmed aspiration by MBBS.  Patient is aware that he needs thickened fluids and PEG tube but declines recommendations.  Wife and daughter at bedside, also understand recommendations, but respect patients wishes.

## 2019-02-06 NOTE — ASSESSMENT & PLAN NOTE
Vitals:    02/06/19 1100 02/06/19 1115 02/06/19 1130 02/06/19 1325   BP:       BP Location:       Patient Position:       Pulse: 82 80 83 77   Resp: (!) 24 (!) 22 19 16   Temp:       TempSrc:       SpO2: 96% 96% 97% 98%   Weight:       Height:                 [x] Respiratory rate >20 breaths/min or PaCO2 <32 mmHg   [x]  WBC >12,000 cells/mm3, <4000 cells/mm3, or >10 percent immature (band) forms  [x] Heart rate >90 beats/min   [x] Temperature >38ºC or <36ºC    Microbiology: Panculture.   Oxygenation: Supplemental oxygen by nasal canula. Keep SAO2 > = 92%  Hemodynamics: Hemodynamically stable. Keep MAP > = 65mmHg  Source control: IV Ceftazidime, IV Vancomycin.

## 2019-02-06 NOTE — ASSESSMENT & PLAN NOTE
SVT currently resolved. Off Cardiazem. Continue cardiac monitoring. Troponin trending down. Cardiology consulted.

## 2019-02-06 NOTE — ASSESSMENT & PLAN NOTE
Pt given 6mg Adenosine with no response, 12 of Adenosine with no response, and another 12 of Adenosine with no response. Pt remained in SVT after ED treatment and was started on cardizem ddt. Improved on drip. Repeat EGK this am.  Treat underlying suspected cause PNE/Sepsis  Consider consult to cardiology  Check electorlytes./tsh  Further evaluation/diagnostics/interventions/consults pending course

## 2019-02-06 NOTE — ED NOTES
Pt. Transported to ICU18 via stretcher, escorted on portable BP, SPO2, cardiac monitors by this RN and RT (on BIPAP). All personal belongings to assigned room with pt. Pt's wife accompanying pt. Pt. Transferred onto assigned bed in unit without incident. Tejas, RN and ICU nursing team at BS. Pt. VSS, resp. E/u on BIPAP, GCS 15. Pt. In NAD.

## 2019-02-06 NOTE — ASSESSMENT & PLAN NOTE
Nebs, ICS  Treat pneumonia  Monitor  Consider IV steroids pending course.   Further evaluation/diagnostics/interventions/consults pending course

## 2019-02-06 NOTE — CONSULTS
Noble Tripp [id number] is a 73 y.o. male who has been consulted for vancomycin dosing.  Dx: bacteremia / goal trough 15-20  The patient has the following labs:     Date Creatinine (mg/dl)    BUN WBC Count   2/6/2019 Estimated Creatinine Clearance: 80.8 mL/min (based on SCr of 0.8 mg/dL). Lab Results   Component Value Date    BUN 12 02/05/2019     Lab Results   Component Value Date    WBC 22.45 (H) 02/05/2019      which calculates to an Estimated Creatinine Clearance: 80.8 mL/min (based on SCr of 0.8 mg/dL)..       Current weight is 77.4 kg (170 lb 10.2 oz)  Initial load = 1500mg  The patient will be started on vancomycin at a dose of 1000 mg every 12 hours. Patient will be followed by pharmacy for changes in renal function, toxicity, and efficacy.  The vancomycin trough has been ordered for 2/7 at 12:00.      Thank you for allowing us to participate in this patient's care.     Dwaine Vazquez

## 2019-02-06 NOTE — ASSESSMENT & PLAN NOTE
Bronchoscopy results: 02/05  Gram stain reveals GNR> Identification is pending.   Pathology and cytology are pending.

## 2019-02-06 NOTE — ASSESSMENT & PLAN NOTE
Continue nasal oxygen supplementation. Wean from oxygen as tolerated. Keep SAO2 > 92%.  Continue KATYA, and LAMA as ordered with HHN's ordered prn.

## 2019-02-06 NOTE — HPI
74 y/o WM presents to the ED for acute onset shortness of breath.  Reports bronchoscopy with transbronchial lung biopsy  today. On arrival to ED patient was febrile, tachycardic, and tachypneic. EKG shows SVT at a rate ~150 bpm that was refractory to 3 doses of adenosine.  Cardiazem drip initiated and BiPap initiated to assist ventilation. CTA was negative for PE, but reports Bilateral pleural effusions and small patchy infiltrates to mid and upper lobes of right lung. This is a significant change since previous CTA 12/2018. Patient Tmax 101.8 with WBC 22.45, Trop 0.032, and .

## 2019-02-06 NOTE — HOSPITAL COURSE
Admitted to ICU on Cardizem drip with NSR on monitor, BiPAP continues for ventilatory assist.   02/06 - Seen and examined at bedside. No acute interval events noted. Rested overnight on NIPPV. Cardiazem drip weaned off @ 0300 this am. Remains in NSR on monitor. Tolerating nasal cannula.  02/07 - Seen and examined at bedside. No acute interval events noted. Doing well. Transferred to telemetry. Remains in NSR.  Micro on bronchoscopy - GNR & GPC => Cefepime + Vancomycin   02/07 - Seen and examined at bedside.  No acute interval events noted. Doing well. Bronch => HAEMOPHILUS INFLUENZAE & KLEBSIELLA PNEUMONIAE = > corado sensitive.

## 2019-02-06 NOTE — ED NOTES
After third dose of adenosine, pt. Still not converted out of SVT. Pt. To be given diltiazem. Pt. Awake and alert, GCS 15.

## 2019-02-07 PROBLEM — A41.9 SEPSIS: Status: RESOLVED | Noted: 2019-02-06 | Resolved: 2019-02-07

## 2019-02-07 LAB
ANION GAP SERPL CALC-SCNC: 10 MMOL/L
BASOPHILS # BLD AUTO: 0.07 K/UL
BASOPHILS NFR BLD: 0.4 %
BUN SERPL-MCNC: 17 MG/DL
CALCIUM SERPL-MCNC: 9.4 MG/DL
CHLORIDE SERPL-SCNC: 102 MMOL/L
CO2 SERPL-SCNC: 29 MMOL/L
CREAT SERPL-MCNC: 0.9 MG/DL
DIFFERENTIAL METHOD: ABNORMAL
EOSINOPHIL # BLD AUTO: 0.2 K/UL
EOSINOPHIL NFR BLD: 1.3 %
ERYTHROCYTE [DISTWIDTH] IN BLOOD BY AUTOMATED COUNT: 18.3 %
EST. GFR  (AFRICAN AMERICAN): >60 ML/MIN/1.73 M^2
EST. GFR  (NON AFRICAN AMERICAN): >60 ML/MIN/1.73 M^2
GLUCOSE SERPL-MCNC: 95 MG/DL
HCT VFR BLD AUTO: 34.5 %
HGB BLD-MCNC: 10.8 G/DL
LYMPHOCYTES # BLD AUTO: 1.1 K/UL
LYMPHOCYTES NFR BLD: 6.1 %
MCH RBC QN AUTO: 28 PG
MCHC RBC AUTO-ENTMCNC: 31.3 G/DL
MCV RBC AUTO: 89 FL
MONOCYTES # BLD AUTO: 1.7 K/UL
MONOCYTES NFR BLD: 9.1 %
NEUTROPHILS # BLD AUTO: 15.4 K/UL
NEUTROPHILS NFR BLD: 83.1 %
PLATELET # BLD AUTO: 290 K/UL
PMV BLD AUTO: 10.5 FL
POTASSIUM SERPL-SCNC: 4.4 MMOL/L
RBC # BLD AUTO: 3.86 M/UL
SODIUM SERPL-SCNC: 141 MMOL/L
VANCOMYCIN TROUGH SERPL-MCNC: 9 UG/ML
WBC # BLD AUTO: 18.47 K/UL

## 2019-02-07 PROCEDURE — 94799 UNLISTED PULMONARY SVC/PX: CPT

## 2019-02-07 PROCEDURE — 63600175 PHARM REV CODE 636 W HCPCS: Performed by: NURSE PRACTITIONER

## 2019-02-07 PROCEDURE — 21400001 HC TELEMETRY ROOM

## 2019-02-07 PROCEDURE — 63600175 PHARM REV CODE 636 W HCPCS: Performed by: INTERNAL MEDICINE

## 2019-02-07 PROCEDURE — 25000003 PHARM REV CODE 250: Performed by: NURSE PRACTITIONER

## 2019-02-07 PROCEDURE — 25000003 PHARM REV CODE 250: Performed by: INTERNAL MEDICINE

## 2019-02-07 PROCEDURE — 80048 BASIC METABOLIC PNL TOTAL CA: CPT

## 2019-02-07 PROCEDURE — 25000003 PHARM REV CODE 250: Performed by: EMERGENCY MEDICINE

## 2019-02-07 PROCEDURE — 99233 PR SUBSEQUENT HOSPITAL CARE,LEVL III: ICD-10-PCS | Mod: ,,, | Performed by: INTERNAL MEDICINE

## 2019-02-07 PROCEDURE — 25000242 PHARM REV CODE 250 ALT 637 W/ HCPCS: Performed by: INTERNAL MEDICINE

## 2019-02-07 PROCEDURE — 94640 AIRWAY INHALATION TREATMENT: CPT

## 2019-02-07 PROCEDURE — 85025 COMPLETE CBC W/AUTO DIFF WBC: CPT

## 2019-02-07 PROCEDURE — 36415 COLL VENOUS BLD VENIPUNCTURE: CPT

## 2019-02-07 PROCEDURE — 25000242 PHARM REV CODE 250 ALT 637 W/ HCPCS: Performed by: NURSE PRACTITIONER

## 2019-02-07 PROCEDURE — 99900035 HC TECH TIME PER 15 MIN (STAT)

## 2019-02-07 PROCEDURE — 80202 ASSAY OF VANCOMYCIN: CPT

## 2019-02-07 PROCEDURE — 27000221 HC OXYGEN, UP TO 24 HOURS

## 2019-02-07 PROCEDURE — 94660 CPAP INITIATION&MGMT: CPT

## 2019-02-07 PROCEDURE — 94761 N-INVAS EAR/PLS OXIMETRY MLT: CPT

## 2019-02-07 PROCEDURE — 99233 SBSQ HOSP IP/OBS HIGH 50: CPT | Mod: ,,, | Performed by: INTERNAL MEDICINE

## 2019-02-07 RX ORDER — OXYCODONE AND ACETAMINOPHEN 7.5; 325 MG/1; MG/1
1 TABLET ORAL EVERY 8 HOURS
Status: DISCONTINUED | OUTPATIENT
Start: 2019-02-07 | End: 2019-02-07

## 2019-02-07 RX ORDER — GABAPENTIN 400 MG/1
800 CAPSULE ORAL 3 TIMES DAILY
Status: DISCONTINUED | OUTPATIENT
Start: 2019-02-07 | End: 2019-02-08 | Stop reason: HOSPADM

## 2019-02-07 RX ORDER — OXYCODONE AND ACETAMINOPHEN 7.5; 325 MG/1; MG/1
1 TABLET ORAL EVERY 4 HOURS PRN
Status: DISCONTINUED | OUTPATIENT
Start: 2019-02-07 | End: 2019-02-08 | Stop reason: HOSPADM

## 2019-02-07 RX ORDER — VANCOMYCIN HCL IN 5 % DEXTROSE 1G/250ML
1000 PLASTIC BAG, INJECTION (ML) INTRAVENOUS
Status: DISCONTINUED | OUTPATIENT
Start: 2019-02-07 | End: 2019-02-07

## 2019-02-07 RX ORDER — VANCOMYCIN HCL IN 5 % DEXTROSE 1.5G/250ML
1500 PLASTIC BAG, INJECTION (ML) INTRAVENOUS
Status: DISCONTINUED | OUTPATIENT
Start: 2019-02-07 | End: 2019-02-08

## 2019-02-07 RX ADMIN — GABAPENTIN 800 MG: 400 CAPSULE ORAL at 08:02

## 2019-02-07 RX ADMIN — LACTOBACILLUS TAB 4 TABLET: TAB at 12:02

## 2019-02-07 RX ADMIN — IPRATROPIUM BROMIDE 0.5 MG: 0.5 SOLUTION RESPIRATORY (INHALATION) at 12:02

## 2019-02-07 RX ADMIN — BUDESONIDE 0.5 MG: 0.5 SUSPENSION RESPIRATORY (INHALATION) at 08:02

## 2019-02-07 RX ADMIN — LACTOBACILLUS TAB 4 TABLET: TAB at 04:02

## 2019-02-07 RX ADMIN — PANTOPRAZOLE SODIUM 40 MG: 40 TABLET, DELAYED RELEASE ORAL at 08:02

## 2019-02-07 RX ADMIN — VANCOMYCIN HYDROCHLORIDE 1500 MG: 100 INJECTION, POWDER, LYOPHILIZED, FOR SOLUTION INTRAVENOUS at 03:02

## 2019-02-07 RX ADMIN — IPRATROPIUM BROMIDE 0.5 MG: 0.5 SOLUTION RESPIRATORY (INHALATION) at 07:02

## 2019-02-07 RX ADMIN — CEFEPIME HYDROCHLORIDE 1 G: 1 INJECTION, SOLUTION INTRAVENOUS at 08:02

## 2019-02-07 RX ADMIN — LEVALBUTEROL INHALATION 0.63MG/3ML 0.63 MG: 0.63 SOLUTION RESPIRATORY (INHALATION) at 08:02

## 2019-02-07 RX ADMIN — OXYCODONE HYDROCHLORIDE AND ACETAMINOPHEN 1 TABLET: 7.5; 325 TABLET ORAL at 05:02

## 2019-02-07 RX ADMIN — LACTOBACILLUS TAB 4 TABLET: TAB at 08:02

## 2019-02-07 RX ADMIN — IPRATROPIUM BROMIDE 0.5 MG: 0.5 SOLUTION RESPIRATORY (INHALATION) at 02:02

## 2019-02-07 RX ADMIN — CEFEPIME HYDROCHLORIDE 1 G: 1 INJECTION, SOLUTION INTRAVENOUS at 04:02

## 2019-02-07 RX ADMIN — IPRATROPIUM BROMIDE 0.5 MG: 0.5 SOLUTION RESPIRATORY (INHALATION) at 08:02

## 2019-02-07 RX ADMIN — GABAPENTIN 800 MG: 400 CAPSULE ORAL at 02:02

## 2019-02-07 RX ADMIN — OXYCODONE HYDROCHLORIDE AND ACETAMINOPHEN 1 TABLET: 7.5; 325 TABLET ORAL at 02:02

## 2019-02-07 RX ADMIN — BUDESONIDE 0.5 MG: 0.5 SUSPENSION RESPIRATORY (INHALATION) at 07:02

## 2019-02-07 RX ADMIN — LEVALBUTEROL INHALATION 0.63MG/3ML 0.63 MG: 0.63 SOLUTION RESPIRATORY (INHALATION) at 07:02

## 2019-02-07 RX ADMIN — OXYCODONE HYDROCHLORIDE AND ACETAMINOPHEN 1 TABLET: 7.5; 325 TABLET ORAL at 08:02

## 2019-02-07 RX ADMIN — GABAPENTIN 400 MG: 300 CAPSULE ORAL at 08:02

## 2019-02-07 RX ADMIN — ASPIRIN 81 MG CHEWABLE TABLET 81 MG: 81 TABLET CHEWABLE at 08:02

## 2019-02-07 NOTE — SUBJECTIVE & OBJECTIVE
Interval History: Seen and examined at bedside. No acute interval events noted. Doing well. Transferred to telemetry. Remains in NSR.  Micro on bronchoscopy - GNR & GPC => Cefepime + Vancomycin       Review of Systems   Constitutional: Negative.  Negative for chills and fever.   HENT: Negative.  Negative for congestion and sore throat.    Eyes: Negative.  Negative for visual disturbance.   Respiratory: Positive for shortness of breath. Negative for cough and wheezing.    Cardiovascular: Negative.  Negative for chest pain.   Gastrointestinal: Negative for abdominal pain, diarrhea, nausea and vomiting.   Endocrine: Negative.    Genitourinary: Negative.    Musculoskeletal: Negative.  Negative for myalgias and neck stiffness.   Skin: Negative.  Negative for color change and pallor.   Allergic/Immunologic: Negative.    Neurological: Negative.    Hematological: Negative.    Psychiatric/Behavioral: Negative.    All other systems reviewed and are negative.      Scheduled Meds:   aspirin  81 mg Oral Daily    budesonide  0.5 mg Nebulization Q12H    ceFEPime (MAXIPIME) IVPB  1 g Intravenous Q8H    gabapentin  400 mg Oral TID    ipratropium  0.5 mg Nebulization Q6H    Lactobacillus acidoph-L.bulgar  4 tablet Oral TID WM    levalbuterol  0.63 mg Nebulization Q12H    pantoprazole  40 mg Oral Daily    vancomycin (VANCOCIN) IVPB  1,000 mg Intravenous Q18H     Continuous Infusions:  PRN Meds:.levalbuterol, oxyCODONE-acetaminophen, sodium chloride 0.9%         Objective:     Vital Signs (Most Recent):  Temp: 98.5 °F (36.9 °C) (02/07/19 0313)  Pulse: 92 (02/07/19 0802)  Resp: 16 (02/07/19 0802)  BP: 133/67 (02/07/19 0802)  SpO2: (!) 92 % (02/07/19 0802) Vital Signs (24h Range):  Temp:  [97.8 °F (36.6 °C)-98.7 °F (37.1 °C)] 98.5 °F (36.9 °C)  Pulse:  [77-93] 92  Resp:  [16-26] 16  SpO2:  [85 %-100 %] 92 %  BP: ()/(38-67) 133/67     Weight: 82.5 kg (181 lb 14.1 oz)  Body mass index is 27.65 kg/m².      Intake/Output  Summary (Last 24 hours) at 2/7/2019 0925  Last data filed at 2/7/2019 0700  Gross per 24 hour   Intake 575 ml   Output 750 ml   Net -175 ml       Physical Exam   Constitutional: He is oriented to person, place, and time. He appears well-developed and well-nourished. No distress.   HENT:   Head: Normocephalic and atraumatic.   Mouth/Throat: Oropharynx is clear and moist.   Eyes: Conjunctivae and EOM are normal. Pupils are equal, round, and reactive to light.   Neck: No JVD present. No thyromegaly present.   Cardiovascular: Normal rate, regular rhythm and normal heart sounds. Exam reveals no gallop and no friction rub.   No murmur heard.  Pulmonary/Chest: Effort normal and breath sounds normal. No respiratory distress. He has no wheezes. He has no rales.   Abdominal: Soft. Bowel sounds are normal. He exhibits no distension. There is no tenderness. There is no rebound and no guarding.   Musculoskeletal: Normal range of motion. He exhibits no edema or tenderness.   Lymphadenopathy:     He has no cervical adenopathy.   Neurological: He is alert and oriented to person, place, and time. He has normal reflexes. He displays normal reflexes. No cranial nerve deficit.   Skin: Skin is warm and dry. No rash noted. He is not diaphoretic. No erythema.   Psychiatric: He has a normal mood and affect. His behavior is normal. Judgment and thought content normal.       Vents:  Oxygen Concentration (%): 28 (02/07/19 0745)    Lines/Drains/Airways     Peripheral Intravenous Line                 Peripheral IV - Single Lumen 02/05/19 1900 Right Hand 1 day         Peripheral IV - Single Lumen 02/05/19 1930 Right Antecubital 1 day                Significant Labs:    CBC/Anemia Profile:  Recent Labs   Lab 02/05/19 1930 02/06/19  0358 02/07/19  0519   WBC 22.45* 31.75* 18.47*   HGB 12.8* 10.4* 10.8*   HCT 40.0 33.0* 34.5*   * 312 290   MCV 88 88 89   RDW 18.1* 18.2* 18.3*        Chemistries:  Recent Labs   Lab 02/05/19 1930  02/06/19  0358 02/07/19 0519    140 141   K 3.4* 3.3* 4.4    105 102   CO2 24 24 29   BUN 12 15 17   CREATININE 0.8 0.8 0.9   CALCIUM 9.4 8.2* 9.4   ALBUMIN 3.3*  --   --    PROT 7.8  --   --    BILITOT 0.9  --   --    ALKPHOS 80  --   --    ALT 9*  --   --    AST 22  --   --    MG  --  1.4*  --    PHOS  --  3.1  --        ABGs:   Recent Labs   Lab 02/05/19 1926   PH 7.410   PCO2 36.4   HCO3 23.0*   POCSATURATED 89*   BE -2     Blood Culture:   Recent Labs   Lab 02/05/19 2043 02/05/19 2053   LABBLOO No Growth to date  No Growth to date No Growth to date  No Growth to date     Lactic Acid:   Recent Labs   Lab 02/05/19 2043   LACTATE 1.0     Respiratory Culture:   Recent Labs   Lab 02/05/19  1346 02/05/19  1349 02/05/19  1353   GSRESP Few WBC's  Few Gram negative rods Few WBC's  Many Gram negative rods  Few Gram positive cocci Rare WBC's  No organisms seen       Significant Imaging:    I have reviewed all pertinent imaging results/findings within the past 24 hours.

## 2019-02-07 NOTE — PLAN OF CARE
Problem: Adult Inpatient Plan of Care  Goal: Plan of Care Review  Outcome: Ongoing (interventions implemented as appropriate)  POC reviewed, verbalized understanding. Pt remained free from falls, fall precautions in place. Pt is SR on monitor, 80s VSS. No other c/o at this time. PIV intact. IV abx. Pt non compliant with thickeners despite coughing after drinking, educated on risks. Pt turned bipap off around 2 am and respiratory was informed. Ambulated with pt down the sidhu. Call bell and personal belongings within reach. Hourly rounding complete. Reminded to call for assistance. Will continue to monitor.

## 2019-02-07 NOTE — SUBJECTIVE & OBJECTIVE
Interval History:  Resting comfortably on BiPAP.    Review of Systems   Constitutional: Negative.  Negative for chills and fever.   HENT: Negative.  Negative for congestion and sore throat.    Eyes: Negative.  Negative for visual disturbance.   Respiratory: Positive for shortness of breath. Negative for cough and wheezing.    Cardiovascular: Negative.  Negative for chest pain.   Gastrointestinal: Negative for abdominal pain, diarrhea, nausea and vomiting.   Endocrine: Negative.    Genitourinary: Negative.    Musculoskeletal: Negative.  Negative for myalgias and neck stiffness.   Skin: Negative.  Negative for color change and pallor.   Allergic/Immunologic: Negative.    Neurological: Negative.    Hematological: Negative.    Psychiatric/Behavioral: Negative.    All other systems reviewed and are negative.    Objective:     Vital Signs (Most Recent):  Temp: 98.4 °F (36.9 °C) (02/06/19 1515)  Pulse: 88 (02/06/19 2013)  Resp: 16 (02/06/19 2013)  BP: 129/60 (02/06/19 1700)  SpO2: 100 % (02/06/19 2013) Vital Signs (24h Range):  Temp:  [97.5 °F (36.4 °C)-99 °F (37.2 °C)] 98.4 °F (36.9 °C)  Pulse:  [] 88  Resp:  [16-29] 16  SpO2:  [87 %-100 %] 100 %  BP: ()/(32-65) 129/60     Weight: 77.4 kg (170 lb 10.2 oz)  Body mass index is 25.95 kg/m².    Intake/Output Summary (Last 24 hours) at 2/6/2019 2014  Last data filed at 2/6/2019 1800  Gross per 24 hour   Intake 1842.8 ml   Output 375 ml   Net 1467.8 ml      Physical Exam   Constitutional: He is oriented to person, place, and time. He appears well-developed and well-nourished. No distress.   HENT:   Head: Normocephalic and atraumatic.   Mouth/Throat: Oropharynx is clear and moist.   Eyes: Conjunctivae and EOM are normal. Pupils are equal, round, and reactive to light.   Neck: No JVD present. No thyromegaly present.   Cardiovascular: Normal rate, regular rhythm and normal heart sounds. Exam reveals no gallop and no friction rub.   No murmur heard.  Pulmonary/Chest:  Effort normal and breath sounds normal. No respiratory distress. He has no wheezes. He has no rales.   Abdominal: Soft. Bowel sounds are normal. He exhibits no distension. There is no tenderness. There is no rebound and no guarding.   Musculoskeletal: Normal range of motion. He exhibits no edema or tenderness.   Lymphadenopathy:     He has no cervical adenopathy.   Neurological: He is alert and oriented to person, place, and time. He has normal reflexes. He displays normal reflexes. No cranial nerve deficit.   Skin: Skin is warm and dry. No rash noted. He is not diaphoretic. No erythema.   Psychiatric: He has a normal mood and affect. His behavior is normal. Judgment and thought content normal.       Significant Labs: All pertinent labs within the past 24 hours have been reviewed.    Significant Imaging: I have reviewed all pertinent imaging results/findings within the past 24 hours.

## 2019-02-07 NOTE — PLAN OF CARE
Problem: Adult Inpatient Plan of Care  Goal: Plan of Care Review  Outcome: Ongoing (interventions implemented as appropriate)  Patient on O2 tolerating well. No distress noted at this time.

## 2019-02-07 NOTE — PROGRESS NOTES
[facility]  Pulmonology  Progress Note    Patient Name: Noble Tripp  MRN: [id number]  Admission Date: 2/5/2019  Hospital Length of Stay: 1 days  Code Status: Full Code  Attending Provider: Aristides Israel MD  Primary Care Provider: Dwaine Davis MD   Principal Problem: Sepsis    Subjective:     Interval History: Seen and examined at bedside. No acute interval events noted. Doing well. Transferred to telemetry. Remains in NSR.  Micro on bronchoscopy - GNR & GPC => Cefepime + Vancomycin       Review of Systems   Constitutional: Negative.  Negative for chills and fever.   HENT: Negative.  Negative for congestion and sore throat.    Eyes: Negative.  Negative for visual disturbance.   Respiratory: Positive for shortness of breath. Negative for cough and wheezing.    Cardiovascular: Negative.  Negative for chest pain.   Gastrointestinal: Negative for abdominal pain, diarrhea, nausea and vomiting.   Endocrine: Negative.    Genitourinary: Negative.    Musculoskeletal: Negative.  Negative for myalgias and neck stiffness.   Skin: Negative.  Negative for color change and pallor.   Allergic/Immunologic: Negative.    Neurological: Negative.    Hematological: Negative.    Psychiatric/Behavioral: Negative.    All other systems reviewed and are negative.      Scheduled Meds:   aspirin  81 mg Oral Daily    budesonide  0.5 mg Nebulization Q12H    ceFEPime (MAXIPIME) IVPB  1 g Intravenous Q8H    gabapentin  400 mg Oral TID    ipratropium  0.5 mg Nebulization Q6H    Lactobacillus acidoph-L.bulgar  4 tablet Oral TID WM    levalbuterol  0.63 mg Nebulization Q12H    pantoprazole  40 mg Oral Daily    vancomycin (VANCOCIN) IVPB  1,000 mg Intravenous Q18H     Continuous Infusions:  PRN Meds:.levalbuterol, oxyCODONE-acetaminophen, sodium chloride 0.9%         Objective:     Vital Signs (Most Recent):  Temp: 98.5 °F (36.9 °C) (02/07/19 0313)  Pulse: 92 (02/07/19 0802)  Resp: 16 (02/07/19 0802)  BP:  133/67 (02/07/19 0802)  SpO2: (!) 92 % (02/07/19 0802) Vital Signs (24h Range):  Temp:  [97.8 °F (36.6 °C)-98.7 °F (37.1 °C)] 98.5 °F (36.9 °C)  Pulse:  [77-93] 92  Resp:  [16-26] 16  SpO2:  [85 %-100 %] 92 %  BP: ()/(38-67) 133/67     Weight: 82.5 kg (181 lb 14.1 oz)  Body mass index is 27.65 kg/m².      Intake/Output Summary (Last 24 hours) at 2/7/2019 0925  Last data filed at 2/7/2019 0700  Gross per 24 hour   Intake 575 ml   Output 750 ml   Net -175 ml       Physical Exam   Constitutional: He is oriented to person, place, and time. He appears well-developed and well-nourished. No distress.   HENT:   Head: Normocephalic and atraumatic.   Mouth/Throat: Oropharynx is clear and moist.   Eyes: Conjunctivae and EOM are normal. Pupils are equal, round, and reactive to light.   Neck: No JVD present. No thyromegaly present.   Cardiovascular: Normal rate, regular rhythm and normal heart sounds. Exam reveals no gallop and no friction rub.   No murmur heard.  Pulmonary/Chest: Effort normal and breath sounds normal. No respiratory distress. He has no wheezes. He has no rales.   Abdominal: Soft. Bowel sounds are normal. He exhibits no distension. There is no tenderness. There is no rebound and no guarding.   Musculoskeletal: Normal range of motion. He exhibits no edema or tenderness.   Lymphadenopathy:     He has no cervical adenopathy.   Neurological: He is alert and oriented to person, place, and time. He has normal reflexes. He displays normal reflexes. No cranial nerve deficit.   Skin: Skin is warm and dry. No rash noted. He is not diaphoretic. No erythema.   Psychiatric: He has a normal mood and affect. His behavior is normal. Judgment and thought content normal.       Vents:  Oxygen Concentration (%): 28 (02/07/19 0745)    Lines/Drains/Airways     Peripheral Intravenous Line                 Peripheral IV - Single Lumen 02/05/19 1900 Right Hand 1 day         Peripheral IV - Single Lumen 02/05/19 1930 Right  Antecubital 1 day                Significant Labs:    CBC/Anemia Profile:  Recent Labs   Lab 02/05/19 1930 02/06/19 0358 02/07/19 0519   WBC 22.45* 31.75* 18.47*   HGB 12.8* 10.4* 10.8*   HCT 40.0 33.0* 34.5*   * 312 290   MCV 88 88 89   RDW 18.1* 18.2* 18.3*        Chemistries:  Recent Labs   Lab 02/05/19 1930 02/06/19 0358 02/07/19 0519    140 141   K 3.4* 3.3* 4.4    105 102   CO2 24 24 29   BUN 12 15 17   CREATININE 0.8 0.8 0.9   CALCIUM 9.4 8.2* 9.4   ALBUMIN 3.3*  --   --    PROT 7.8  --   --    BILITOT 0.9  --   --    ALKPHOS 80  --   --    ALT 9*  --   --    AST 22  --   --    MG  --  1.4*  --    PHOS  --  3.1  --        ABGs:   Recent Labs   Lab 02/05/19 1926   PH 7.410   PCO2 36.4   HCO3 23.0*   POCSATURATED 89*   BE -2     Blood Culture:   Recent Labs   Lab 02/05/19 2043 02/05/19 2053   LABBLOO No Growth to date  No Growth to date No Growth to date  No Growth to date     Lactic Acid:   Recent Labs   Lab 02/05/19 2043   LACTATE 1.0     Respiratory Culture:   Recent Labs   Lab 02/05/19  1346 02/05/19  1349 02/05/19  1353   GSRESP Few WBC's  Few Gram negative rods Few WBC's  Many Gram negative rods  Few Gram positive cocci Rare WBC's  No organisms seen       Significant Imaging:    I have reviewed all pertinent imaging results/findings within the past 24 hours.      ABG  Recent Labs   Lab 02/05/19 1926   PH 7.410   PO2 56*   PCO2 36.4   HCO3 23.0*   BE -2     Assessment/Plan:     * Sepsis-resolved as of 2/7/2019        Vitals:    02/07/19 0500 02/07/19 0728 02/07/19 0745 02/07/19 0802   BP:    133/67   BP Location:       Patient Position:    Sitting   Pulse: 89 86  92   Resp:  20  16   Temp:       TempSrc:       SpO2:  95% 95% (!) 92%   Weight:       Height:                 [x] Respiratory rate >20 breaths/min or PaCO2 <32 mmHg   [x]  WBC >12,000 cells/mm3, <4000 cells/mm3, or >10 percent immature (band) forms  [x] Heart rate >90 beats/min   [x] Temperature >38ºC or  <36ºC    Microbiology: Panculture.   Oxygenation: Supplemental oxygen by nasal canula. Keep SAO2 > = 92%  Hemodynamics: Hemodynamically stable. Keep MAP > = 65mmHg  Source control: IV Ceftazidime, IV Vancomycin.         Lung mass    Bronchoscopy results: 02/05 Gram stain reveals GPC + GNR => Identification is pending.   Pathology and cytology are pending.     Pleural effusion, bilateral    Likely para pneumonic. Continue IV antibiotics. Continue to monitor.      Moderate COPD (chronic obstructive pulmonary disease)    Continue nasal oxygen supplementation. Wean from oxygen as tolerated. Keep SAO2 > 92%.  Continue KATYA, and LAMA as ordered with HHN's ordered prn.       Oropharyngeal aspiration    Pateint has confirmed aspiration by MBBS.  Patient is aware that he needs thickened fluids and PEG tube but declines recommendations.      Ex-smoker    Commended on quitting smoking.      JUANITO on CPAP    Continue nocturnal CPAP 14 CMWP.      Hyperlipidemia    Repatha on hold. Resume on discharge.      GI    Cardiac diet per RD recommendations.  Stress ulcer prophylaxis.             Mihir Valero MD  Pulmonology  [facility]

## 2019-02-07 NOTE — ASSESSMENT & PLAN NOTE
Vitals:    02/07/19 0500 02/07/19 0728 02/07/19 0745 02/07/19 0802   BP:    133/67   BP Location:       Patient Position:    Sitting   Pulse: 89 86  92   Resp:  20  16   Temp:       TempSrc:       SpO2:  95% 95% (!) 92%   Weight:       Height:                 [x] Respiratory rate >20 breaths/min or PaCO2 <32 mmHg   [x]  WBC >12,000 cells/mm3, <4000 cells/mm3, or >10 percent immature (band) forms  [x] Heart rate >90 beats/min   [x] Temperature >38ºC or <36ºC    Microbiology: Panculture.   Oxygenation: Supplemental oxygen by nasal canula. Keep SAO2 > = 92%  Hemodynamics: Hemodynamically stable. Keep MAP > = 65mmHg  Source control: IV Ceftazidime, IV Vancomycin.

## 2019-02-07 NOTE — PLAN OF CARE
CM spoke with family regarding D/C planning.  Preference form signed for [facility], copy given to the patient and the original to the blue folder. KATHY notified MD of the above. Referral placed in [facility]

## 2019-02-07 NOTE — ASSESSMENT & PLAN NOTE
Bronchoscopy results: 02/05 Gram stain reveals GPC + GNR => Identification is pending.   Pathology and cytology are pending.

## 2019-02-07 NOTE — NURSING
Pt arrive to unit via stretcher, pt ambulated from stretcher to bed without assistance. Made comfortable in bed and placed on heart monitor. Vitals and assessment completed. Educated on fall prevention and hourly rounding. No complaints at this time. Call bell and personal belongings within reach. Reminded to call for assistance. Will continue to monitor. Bedside report from YUSUF Guevara.

## 2019-02-07 NOTE — PLAN OF CARE
Referral sent to [facility]      02/07/19 0563   Post-Acute Status   Post-Acute Authorization Home Health/Hospice   Home Health/Hospice Status Referrals Sent       Patient refused HH- changed his mind

## 2019-02-07 NOTE — PROGRESS NOTES
Pt transferred to tele room 218 via wheelchair and portable monitor.  Pt ambulated to bed with no assistance.  Wife at bedside with pt personal belongings.  Pt had dentures in (top and bottom) and cell phone with charge in hand.  Bedside report given.  Monitor confirmed with monitor tech.

## 2019-02-07 NOTE — PT/OT/SLP EVAL
"Speech Language Pathology Evaluation  Bedside Swallow    Patient Name:  Noble Tripp   MRN:  [id number]  Admitting Diagnosis: Sepsis    Recommendations:                 General Recommendations:  Dysphagia therapy  Diet recommendations:  Dental Soft, Thin (this is against medical advice however pt chooses to eat)  Aspiration Precautions: 1 bite/sip at a time, Double swallow with each bite/sip, HOB to 90 degrees, Meds crushed in puree, Remain upright 30 minutes post meal and Small bites/sips   General Precautions: Standard, aspiration    History:     Past Medical History:   Diagnosis Date    Adrenal mass     david;nl fxn w/u    Aspiration pneumonia 10/15    puree/honey    Benign prostate hyperplasia     CAD (coronary artery disease)     dr padilla    Chronic pain     dr santos    COPD (chronic obstructive pulmonary disease)     papi    Ex-smoker     11/13    Hyperlipidemia     Osteopenia 1/15 tran 1/18    PVD (peripheral vascular disease)     noobs 07 khuri    Pyriform sinus cancer     dr ponce radiation 1/2-14 dr king perales    Sleep apnea     cpap    Squamous cell carcinoma of skin     roberto    Tongue cancer     "superficial" removed 11/14    Vocal cord cancer 2011    Xerostomia     radiation       Past Surgical History:   Procedure Laterality Date    APPENDECTOMY      Bronchoscopy Bilateral 2/5/2019    Performed [facility]    COLONOSCOPY N/A 3/11/2014    Performed by Joe Goodman [facility]    Due for screening colonoscopy N/A 5/1/2017    Performed by Anushka [facility]oder MD at [facility] END[facility]    ESOPHAGOGASTRODUODENOSCOPY (EGD) N/A 8/29/2018    Performed by Audie Hill III, MD at [facility] END[facility]    ESOPHAGOGASTRODUODENOSCOPY (EGD) N/A 5/29/2018    Performed by Audie Hill III, [facility] at [facility] END[facility]    Thoracentesis Left 8/7/2018    Performed by [facility] at [facility]    tongue cancer excision  11/14    dr vitale    VOCAL CORD " LATERALIZATION, ENDOSCOPIC APPROACH W/ MLB      kris       Social History: Patient is known to ST from prior admission.  He is aware of his ongoing aspiration and risk of continued po intake.  He refuses altered diet and feeding tubes.    Prior diet: NPO with PEG was recommended during last admission with aspiration pneumonia.  He opted to continue to eat by mouth knowing the risks.  Puree with thickened liquids was recommended as safest po diet, however he did not follow this at home.  Prior to admission pt was eating a regular diet with reports of frequent choking.        Subjective     Pt seated upright in a chair at bedside with his family present.    Patient goals: pt wants to continue po intake      Pain/Comfort:  · Pain Rating 1: 0/10    Objective:     Oral Musculature Evaluation  · Oral Musculature: WFL    Bedside Swallow Eval:   Consistencies Assessed:  · Thin liquids, puree, and solids     Oral Phase:   · WFL    Pharyngeal Phase:   · coughing/choking  · decreased hyolaryngeal excursion to palpation  · multiple spontaneous swallows  · throat clearing  · wet vocal quality after swallow    Compensatory Strategies  · Effortful swallow  · Multiple swallows  · Volitional cough/throat clear   · Swallow-cough-swallow    Treatment: ST educated pt and his family on aspiration, pneumonia, dysphagia, implications of continued po intake, safest po diet, crushed medication in puree for administration, and importance of continued ST for dysphagia therapy.      Assessment:     Noble Tripp is a 73 y.o. male with an SLP diagnosis of Dysphagia.  He presents with suspected aspiration at bedside and known aspiration from previous MBSS.  All risk and benefits of continued po intake were discussed as well as tube feeding options.  Pt verbalized understanding and expressed his wishes for no feeding tubes and to continue to eat by mouth.  He is agreeable to skilled ST for dysphagia therapy and utilizing compensatory  strategies listed above.      Goals:   Multidisciplinary Problems     SLP Goals        Problem: SLP Goal    Goal Priority Disciplines Outcome   SLP Goal     SLP    Description:  1. TPW complete pharyngeal ex's with S for increased strength and ROM                    Plan:     · Patient to be seen:  3 x/week   · Plan of Care expires:  02/13/19  · Plan of Care reviewed with:  patient   · SLP Follow-Up:  Yes       Discharge recommendations:  (home with outpt ST)   Barriers to Discharge:  None    Time Tracking:     SLP Treatment Date:   02/06/19  Speech Start Time:  1100  Speech Stop Time:  1130     Speech Total Time (min):  30 min    Billable Minutes: Speech Therapy Individual 15 and Eval Swallow and Oral Function 15    Tabby Addison CCC-SLP  02/06/2019

## 2019-02-07 NOTE — ASSESSMENT & PLAN NOTE
Pateint has confirmed aspiration by MBBS.  Patient is aware that he needs thickened fluids and PEG tube but declines recommendations.

## 2019-02-07 NOTE — PROGRESS NOTES
[facility] Hospital Medicine  Progress Note    Patient Name: Noble Tripp  MRN: [id number]  Patient Class: IP- Inpatient   Admission Date: 2/5/2019  Length of Stay: 0 days  Attending Physician: Aristides Israel MD  Primary Care Provider: Dwaine Davis MD        Subjective:     Principal Problem:Sepsis    HPI:  Noble Tripp is a 73 y.o. male patient with a PMHx of CAD, COPD, HLD, PVD who  for sob that stated this morning after having bronchoscopy/lung mass biopsy. No mitigating or exacerbating factors reported. Associated sxs include palpitations, fever (Tnow 101.8).  No further complaints or concerns at this time. Patient evaluted in Er. On arrival had tachycardia, fever, resp distress. Was given adenosine, improvement in heart rate and placed on Bipap. Attempt to wean off bipap in Er not able. Sepsis workup done. Blood culture pending. Cefepime and Vanc. WBC 22.45, lactic neg. ABG PO2 56. HM consulted and patient admitted to ICU.    Hospital Course:  Admitted to ICU for hypoxemic respiratory failure on continuous BiPAP.  Possible pneumonitis related to bronchoscopic biopsy.  Empirically started Vancomycin and Cefepime.  06 Feb:  Weaned off BiPAP to supplemental oxygen by nasal cannula.  Transfer to telemetry.  Heart rate controlled.    Interval History:  Resting comfortably on BiPAP.    Review of Systems   Constitutional: Negative.  Negative for chills and fever.   HENT: Negative.  Negative for congestion and sore throat.    Eyes: Negative.  Negative for visual disturbance.   Respiratory: Positive for shortness of breath. Negative for cough and wheezing.    Cardiovascular: Negative.  Negative for chest pain.   Gastrointestinal: Negative for abdominal pain, diarrhea, nausea and vomiting.   Endocrine: Negative.    Genitourinary: Negative.    Musculoskeletal: Negative.  Negative for myalgias and neck stiffness.   Skin: Negative.  Negative for color change and pallor.    Allergic/Immunologic: Negative.    Neurological: Negative.    Hematological: Negative.    Psychiatric/Behavioral: Negative.    All other systems reviewed and are negative.    Objective:     Vital Signs (Most Recent):  Temp: 98.4 °F (36.9 °C) (02/06/19 1515)  Pulse: 88 (02/06/19 2013)  Resp: 16 (02/06/19 2013)  BP: 129/60 (02/06/19 1700)  SpO2: 100 % (02/06/19 2013) Vital Signs (24h Range):  Temp:  [97.5 °F (36.4 °C)-99 °F (37.2 °C)] 98.4 °F (36.9 °C)  Pulse:  [] 88  Resp:  [16-29] 16  SpO2:  [87 %-100 %] 100 %  BP: ()/(32-65) 129/60     Weight: 77.4 kg (170 lb 10.2 oz)  Body mass index is 25.95 kg/m².    Intake/Output Summary (Last 24 hours) at 2/6/2019 2014  Last data filed at 2/6/2019 1800  Gross per 24 hour   Intake 1842.8 ml   Output 375 ml   Net 1467.8 ml      Physical Exam   Constitutional: He is oriented to person, place, and time. He appears well-developed and well-nourished. No distress.   HENT:   Head: Normocephalic and atraumatic.   Mouth/Throat: Oropharynx is clear and moist.   Eyes: Conjunctivae and EOM are normal. Pupils are equal, round, and reactive to light.   Neck: No JVD present. No thyromegaly present.   Cardiovascular: Normal rate, regular rhythm and normal heart sounds. Exam reveals no gallop and no friction rub.   No murmur heard.  Pulmonary/Chest: Effort normal and breath sounds normal. No respiratory distress. He has no wheezes. He has no rales.   Abdominal: Soft. Bowel sounds are normal. He exhibits no distension. There is no tenderness. There is no rebound and no guarding.   Musculoskeletal: Normal range of motion. He exhibits no edema or tenderness.   Lymphadenopathy:     He has no cervical adenopathy.   Neurological: He is alert and oriented to person, place, and time. He has normal reflexes. He displays normal reflexes. No cranial nerve deficit.   Skin: Skin is warm and dry. No rash noted. He is not diaphoretic. No erythema.   Psychiatric: He has a normal mood and affect.  His behavior is normal. Judgment and thought content normal.       Significant Labs: All pertinent labs within the past 24 hours have been reviewed.    Significant Imaging: I have reviewed all pertinent imaging results/findings within the past 24 hours.    Assessment/Plan:      * Sepsis    Pulmonary source. CT Chest:Bilateral pleural effusions, greater on the right with further extensive infiltrates and atelectatic changes in the mid to lower lung fields as well as extensive underlying scarring.  Small patches of infiltrate suggested right mid to upper lung field.  Cultures pending   Continue vanc, cefepime  Consult to pulmonary     Moderate COPD (chronic obstructive pulmonary disease)    Nebs, ICS  Treat pneumonia  Monitor  Consider IV steroids pending course.   Further evaluation/diagnostics/interventions/consults pending course        Sustained SVT    Pt given 6mg Adenosine with no response, 12 of Adenosine with no response, and another 12 of Adenosine with no response. Pt remained in SVT after ED treatment and was started on cardizem ddt. Improved on drip. Repeat EGK this am.  Treat underlying suspected cause PNE/Sepsis  Consider consult to cardiology  Check electorlytes./tsh       Hypertension    Hold home medications. Patient low normal with cardizem drip  Monitor trends  Further evaluation/diagnostics/interventions/consults pending course        Hyperlipidemia    Check statin, a1c  No statin patient with intolerance.   ASA. Consider statin alternative pending course  Further evaluation/diagnostics/interventions/consults pending course        VTE Risk Mitigation (From admission, onward)        Ordered     IP VTE HIGH RISK PATIENT  Once      02/06/19 0115     Place sequential compression device  Until discontinued      02/06/19 0115          Critical care time spent on the evaluation and treatment of severe organ dysfunction, review of pertinent labs and imaging studies, discussions with consulting providers  and discussions with patient/family: 30 minutes.    Aristides Israel MD  Department of Hospital Medicine   [facility]

## 2019-02-07 NOTE — ASSESSMENT & PLAN NOTE
Pt given 6mg Adenosine with no response, 12 of Adenosine with no response, and another 12 of Adenosine with no response. Pt remained in SVT after ED treatment and was started on cardizem ddt. Improved on drip. Repeat EGK this am.  Treat underlying suspected cause PNE/Sepsis  Consider consult to cardiology  Check electorlytes./tsh

## 2019-02-07 NOTE — HOSPITAL COURSE
Admitted to ICU for hypoxemic respiratory failure on continuous BiPAP.  Possible pneumonitis related to bronchoscopic biopsy.  Empirically started Vancomycin and Cefepime.  06 Feb:  Weaned off BiPAP to supplemental oxygen by nasal cannula.  Transfer to telemetry.  Heart rate controlled.  Productive cough without blood.  Respiratory cultures grew H.influenzae beta-lactamase positive.  Symptoms improving.  Comfortable on room air and remained hemodynamically stable.  No hemoptysis.  Discharge plan to return home and complete a course of Augmentin.  Follow up with Dr. Cardozo as planned.

## 2019-02-07 NOTE — PT/OT/SLP PROGRESS
Speech Language Pathology      Noble Tripp  MRN: [id number]    Patient not seen today secondary to declining therapy due to being tired and later having family visiting  . Will follow-up tomorrow.    Brooke Fried CCC-SLP

## 2019-02-08 VITALS
WEIGHT: 181.88 LBS | HEART RATE: 88 BPM | DIASTOLIC BLOOD PRESSURE: 64 MMHG | RESPIRATION RATE: 18 BRPM | BODY MASS INDEX: 27.57 KG/M2 | TEMPERATURE: 99 F | HEIGHT: 68 IN | SYSTOLIC BLOOD PRESSURE: 139 MMHG | OXYGEN SATURATION: 93 %

## 2019-02-08 LAB
ANION GAP SERPL CALC-SCNC: 9 MMOL/L
BASOPHILS # BLD AUTO: 0.07 K/UL
BASOPHILS NFR BLD: 0.7 %
BUN SERPL-MCNC: 14 MG/DL
CALCIUM SERPL-MCNC: 8.7 MG/DL
CHLORIDE SERPL-SCNC: 103 MMOL/L
CO2 SERPL-SCNC: 28 MMOL/L
CREAT SERPL-MCNC: 0.8 MG/DL
DIFFERENTIAL METHOD: ABNORMAL
EOSINOPHIL # BLD AUTO: 0.3 K/UL
EOSINOPHIL NFR BLD: 3 %
ERYTHROCYTE [DISTWIDTH] IN BLOOD BY AUTOMATED COUNT: 17.9 %
EST. GFR  (AFRICAN AMERICAN): >60 ML/MIN/1.73 M^2
EST. GFR  (NON AFRICAN AMERICAN): >60 ML/MIN/1.73 M^2
GLUCOSE SERPL-MCNC: 83 MG/DL
HCT VFR BLD AUTO: 29.4 %
HGB BLD-MCNC: 9.3 G/DL
LYMPHOCYTES # BLD AUTO: 1 K/UL
LYMPHOCYTES NFR BLD: 9.6 %
MCH RBC QN AUTO: 28.1 PG
MCHC RBC AUTO-ENTMCNC: 31.6 G/DL
MCV RBC AUTO: 89 FL
MONOCYTES # BLD AUTO: 1.3 K/UL
MONOCYTES NFR BLD: 12.6 %
NEUTROPHILS # BLD AUTO: 7.4 K/UL
NEUTROPHILS NFR BLD: 74.1 %
PLATELET # BLD AUTO: 243 K/UL
PMV BLD AUTO: 10.3 FL
POTASSIUM SERPL-SCNC: 3.5 MMOL/L
RBC # BLD AUTO: 3.31 M/UL
SODIUM SERPL-SCNC: 140 MMOL/L
WBC # BLD AUTO: 9.99 K/UL

## 2019-02-08 PROCEDURE — 92526 ORAL FUNCTION THERAPY: CPT

## 2019-02-08 PROCEDURE — 25000242 PHARM REV CODE 250 ALT 637 W/ HCPCS: Performed by: NURSE PRACTITIONER

## 2019-02-08 PROCEDURE — 94799 UNLISTED PULMONARY SVC/PX: CPT

## 2019-02-08 PROCEDURE — 63600175 PHARM REV CODE 636 W HCPCS: Performed by: NURSE PRACTITIONER

## 2019-02-08 PROCEDURE — 80048 BASIC METABOLIC PNL TOTAL CA: CPT

## 2019-02-08 PROCEDURE — 25000003 PHARM REV CODE 250: Performed by: NURSE PRACTITIONER

## 2019-02-08 PROCEDURE — 94761 N-INVAS EAR/PLS OXIMETRY MLT: CPT

## 2019-02-08 PROCEDURE — 27000221 HC OXYGEN, UP TO 24 HOURS

## 2019-02-08 PROCEDURE — 94640 AIRWAY INHALATION TREATMENT: CPT

## 2019-02-08 PROCEDURE — 25000003 PHARM REV CODE 250: Performed by: INTERNAL MEDICINE

## 2019-02-08 PROCEDURE — 25000003 PHARM REV CODE 250: Performed by: EMERGENCY MEDICINE

## 2019-02-08 PROCEDURE — 25000242 PHARM REV CODE 250 ALT 637 W/ HCPCS: Performed by: INTERNAL MEDICINE

## 2019-02-08 PROCEDURE — 99900035 HC TECH TIME PER 15 MIN (STAT)

## 2019-02-08 PROCEDURE — 36415 COLL VENOUS BLD VENIPUNCTURE: CPT

## 2019-02-08 PROCEDURE — 85025 COMPLETE CBC W/AUTO DIFF WBC: CPT

## 2019-02-08 PROCEDURE — 99233 SBSQ HOSP IP/OBS HIGH 50: CPT | Mod: ,,, | Performed by: INTERNAL MEDICINE

## 2019-02-08 PROCEDURE — 99233 PR SUBSEQUENT HOSPITAL CARE,LEVL III: ICD-10-PCS | Mod: ,,, | Performed by: INTERNAL MEDICINE

## 2019-02-08 RX ORDER — AMOXICILLIN AND CLAVULANATE POTASSIUM 875; 125 MG/1; MG/1
1 TABLET, FILM COATED ORAL EVERY 12 HOURS
Status: DISCONTINUED | OUTPATIENT
Start: 2019-02-08 | End: 2019-02-08 | Stop reason: HOSPADM

## 2019-02-08 RX ORDER — AMOXICILLIN AND CLAVULANATE POTASSIUM 875; 125 MG/1; MG/1
1 TABLET, FILM COATED ORAL EVERY 12 HOURS
Qty: 12 TABLET | Refills: 0 | Status: SHIPPED | OUTPATIENT
Start: 2019-02-08 | End: 2019-02-14

## 2019-02-08 RX ADMIN — OXYCODONE HYDROCHLORIDE AND ACETAMINOPHEN 1 TABLET: 7.5; 325 TABLET ORAL at 09:02

## 2019-02-08 RX ADMIN — OXYCODONE HYDROCHLORIDE AND ACETAMINOPHEN 1 TABLET: 7.5; 325 TABLET ORAL at 12:02

## 2019-02-08 RX ADMIN — OXYCODONE HYDROCHLORIDE AND ACETAMINOPHEN 1 TABLET: 7.5; 325 TABLET ORAL at 04:02

## 2019-02-08 RX ADMIN — LACTOBACILLUS TAB 4 TABLET: TAB at 07:02

## 2019-02-08 RX ADMIN — ASPIRIN 81 MG CHEWABLE TABLET 81 MG: 81 TABLET CHEWABLE at 09:02

## 2019-02-08 RX ADMIN — GABAPENTIN 800 MG: 400 CAPSULE ORAL at 04:02

## 2019-02-08 RX ADMIN — PANTOPRAZOLE SODIUM 40 MG: 40 TABLET, DELAYED RELEASE ORAL at 09:02

## 2019-02-08 RX ADMIN — LEVALBUTEROL INHALATION 0.63MG/3ML 0.63 MG: 0.63 SOLUTION RESPIRATORY (INHALATION) at 07:02

## 2019-02-08 RX ADMIN — AMOXICILLIN AND CLAVULANATE POTASSIUM 1 TABLET: 875; 125 TABLET, FILM COATED ORAL at 09:02

## 2019-02-08 RX ADMIN — IPRATROPIUM BROMIDE 0.5 MG: 0.5 SOLUTION RESPIRATORY (INHALATION) at 12:02

## 2019-02-08 RX ADMIN — CEFEPIME HYDROCHLORIDE 1 G: 1 INJECTION, SOLUTION INTRAVENOUS at 12:02

## 2019-02-08 RX ADMIN — BUDESONIDE 0.5 MG: 0.5 SUSPENSION RESPIRATORY (INHALATION) at 07:02

## 2019-02-08 RX ADMIN — IPRATROPIUM BROMIDE 0.5 MG: 0.5 SOLUTION RESPIRATORY (INHALATION) at 07:02

## 2019-02-08 NOTE — PLAN OF CARE
Problem: Adult Inpatient Plan of Care  Goal: Plan of Care Review  Outcome: Ongoing (interventions implemented as appropriate)  POC reviewed with pt verbalized understanding  Pt remained free from fall, precautions in place  Pt is NSR on monitor 69-75  VS monitored  Pt able to turn self and ambulated in room and is independent   IV intact abx infusing at this time.   Not other complaints at this time. Call bell in belongings in reach, hourly rounding complete, reminded to call for assist will continue monitor.

## 2019-02-08 NOTE — PLAN OF CARE
Problem: Adult Inpatient Plan of Care  Goal: Plan of Care Review  POC reviewed, verbalized understanding. Pt remained free from falls, fall precautions in place. Pt is SR on monitor, 70s. VSS. No other c/o at this time. PIV intact. Call bell and personal belongings within reach. PRNs given, IV abx. Hourly rounding complete. Reminded to call for assistance. Will continue to monitor.

## 2019-02-08 NOTE — DISCHARGE SUMMARY
[facility] Hospital Medicine  Discharge Summary      Patient Name: Noble Tripp  MRN: [id number]  Admission Date: 2/5/2019  Hospital Length of Stay: 2 days  Discharge Date and Time: 2/8/2019  1:49 PM  Attending Physician:  Kanu Israel MD  Discharging Provider: Kanu Israel MD  Primary Care Provider: Dwaine Davis MD      HPI:   Noble Tripp is a 73 y.o. male patient with a PMHx of CAD, COPD, HLD, PVD who  for sob that stated this morning after having bronchoscopy/lung mass biopsy. No mitigating or exacerbating factors reported. Associated sxs include palpitations, fever (Tnow 101.8).  No further complaints or concerns at this time. Patient evaluted in Er. On arrival had tachycardia, fever, resp distress. Was given adenosine, improvement in heart rate and placed on Bipap. Attempt to wean off bipap in Er not able. Sepsis workup done. Blood culture pending. Cefepime and Vanc. WBC 22.45, lactic neg. ABG PO2 56. HM consulted and patient admitted to ICU.    * No surgery found *      Hospital Course:   Admitted to ICU for hypoxemic respiratory failure on continuous BiPAP.  Possible pneumonitis related to bronchoscopic biopsy.  Empirically started Vancomycin and Cefepime.  06 Feb:  Weaned off BiPAP to supplemental oxygen by nasal cannula.  Transfer to telemetry.  Heart rate controlled.  Productive cough without blood.  Respiratory cultures grew H.influenzae beta-lactamase positive.  Symptoms improving.  Comfortable on room air and remained hemodynamically stable.  No hemoptysis.  Discharge plan to return home and complete a course of Augmentin.  Follow up with Dr. Cardozo as planned.     Consults:   Consults (From admission, onward)        Status Ordering Provider     Inpatient consult to Pulmonology  Once     Provider:  Mckayla Munoz NP    Completed KANU ISRAEL     Inpatient consult to Registered Dietitian/Nutritionist  Once     Provider:  (Not yet assigned)     Completed GAVI LEES new Assessment & Plan notes have been filed under this hospital service since the last note was generated.  Service: Hospital Medicine    Final Active Diagnoses:    Diagnosis Date Noted POA    PRINCIPAL PROBLEM:  Pleural effusion, bilateral [J90] 08/07/2018 Yes    Lung mass [R91.8] 02/05/2019 Yes    Moderate COPD (chronic obstructive pulmonary disease) [J44.9] 02/02/2016 Yes     Chronic    Oropharyngeal aspiration [T17.208A] 10/11/2015 Yes    JUANITO on CPAP [G47.33, Z99.89] 10/07/2014 Not Applicable     Chronic    Hyperlipidemia [E78.5]  Yes    Hypertension [I10]  Yes    Chronic pain [G89.29]  Yes      Problems Resolved During this Admission:    Diagnosis Date Noted Date Resolved POA    Sepsis [A41.9] 02/06/2019 02/07/2019 Yes    Sustained SVT [I47.1] 11/05/2015 02/07/2019 Yes    Ex-smoker [Z87.891]  02/08/2019 Not Applicable       Discharged Condition: good    Disposition: Home or Self Care    Follow Up:  Follow-up Information     Santos Cardozo MD In 1 week.    Specialty:  Pulmonary Disease  Why:  Hospital follow up and Bronchoscopy follow up.  H.influenzae pneumonia.  Contact information:  [id number]  [place] [address] [phone]                 Patient Instructions:      Diet Cardiac     Activity as tolerated       Significant Diagnostic Studies: Labs: All labs within the past 24 hours have been reviewed    Pending Diagnostic Studies:     None         Medications:  Reconciled Home Medications:      Medication List      START taking these medications    amoxicillin-clavulanate 875-125mg 875-125 mg per tablet  Commonly known as:  AUGMENTIN  Take 1 tablet by mouth every 12 (twelve) hours. for 6 days        CHANGE how you take these medications    REPATHA SURECLICK 140 mg/mL Pnij  Generic drug:  evolocumab  Inject 140 mg into the skin every 14 (fourteen) days.  What changed:  Another medication with the same name was removed. Continue taking this  medication, and follow the directions you see here.        CONTINUE taking these medications    aspirin 81 mg Tab  Take 1 tablet by mouth Daily. Over the counter to help prevent stroke/heart attack     CHANTIX CONTINUING MONTH BOX 1 mg Tab  Generic drug:  varenicline  TAKE ONE TABLET BY MOUTH ONCE DAILY     COMP-AIR NEBULIZER COMPRESSOR Bailey  Generic drug:  nebulizer and compressor  Use as directed     gabapentin 800 MG tablet  Commonly known as:  NEURONTIN  Take 1 tablet (800 mg total) by mouth 3 (three) times daily.     garlic 2,000 mg Cap  Take 1 tablet by mouth once daily.     Lactobacillus rhamnosus GG 10 billion cell capsule  Commonly known as:  CULTURELLE  Take 1 capsule by mouth once daily.     loratadine 10 mg tablet  Commonly known as:  CLARITIN  Take 1 tablet by mouth Daily.     multivitamin capsule  Take 1 capsule by mouth once daily.     mupirocin 2 % ointment  Commonly known as:  BACTROBAN     * oxyCODONE-acetaminophen 7.5-325 mg per tablet  Commonly known as:  PERCOCET  Take 1 tablet by mouth every 8 (eight) hours as needed for Pain.     * oxyCODONE-acetaminophen 7.5-325 mg per tablet  Commonly known as:  PERCOCET  Take 1 tablet by mouth every 8 (eight) hours as needed for Pain.  Start taking on:  2/18/2019     pantoprazole 40 MG tablet  Commonly known as:  PROTONIX  Take 1 tablet (40 mg total) by mouth once daily.     pilocarpine 5 MG Tab  Commonly known as:  SALAGEN  TAKE 1 TABLET BY MOUTH 30 MINUTES PRIOR TO EACH MEAL     * PROAIR HFA 90 mcg/actuation inhaler  Generic drug:  albuterol  INHALE TWO PUFFS BY MOUTH EVERY 4 HOURS AS NEEDED FOR WHEEZING OR SHORTNESS OF BREATH     * albuterol 2.5 mg /3 mL (0.083 %) nebulizer solution  Commonly known as:  PROVENTIL  Take 3 mLs (2.5 mg total) by nebulization every 8 (eight) hours while awake.     tiotropium 18 mcg inhalation capsule  Commonly known as:  SPIRIVA WITH HANDIHALER  Inhale 1 capsule (18 mcg total) into the lungs once daily.     turmeric root  extract 500 mg Cap  Take 1 capsule by mouth 2 (two) times daily.         * This list has 4 medication(s) that are the same as other medications prescribed for you. Read the directions carefully, and ask your doctor or other care provider to review them with you.            STOP taking these medications    docusate sodium 100 MG capsule  Commonly known as:  COLACE            Indwelling Lines/Drains at time of discharge:   Lines/Drains/Airways          None          Time spent on the discharge of patient: 30 minutes  Patient was seen and examined on the date of discharge and determined to be suitable for discharge.         Aristides Israel MD  Department of Hospital Medicine  [facility]

## 2019-02-08 NOTE — ASSESSMENT & PLAN NOTE
Continue nasal oxygen supplementation.  Keep SAO2 > 92%.  Continue KATYA, and LAMA as ordered with HHN's ordered prn.

## 2019-02-08 NOTE — ASSESSMENT & PLAN NOTE
Bronchoscopy results: 02/05Doing well. Bronch => HAEMOPHILUS INFLUENZAE & KLEBSIELLA PNEUMONIAE = > pan sensitive. Cytology on Bronchial wash negative for malignancy however abundant neutrophils, few reactive mesothelial cells, lymphocytes and cellular debris. Ok to discharge home on oral AUGMENTIN. Out patient follow up with pulmonology.

## 2019-02-08 NOTE — PT/OT/SLP PROGRESS
Speech Language Pathology Treatment    Patient Name:  Noble Tripp   MRN:  [id number]  Admitting Diagnosis: Pleural effusion, bilateral    Recommendations:                 General Recommendations:  Dysphagia therapy  Diet recommendations:  NPO, Liquid Diet Level: NPO   Aspiration Precautions: 1 bite/sip at a time, Alternating bites/sips, Assistance with meals and Assistance with thickening liquids, HOB to 90 degrees and Small bites/sips   General Precautions: Standard, aspiration  Communication strategies:  none    Subjective     Pt cooperative.  Patient goals: none stated    Pain/Comfort:  · Pain Rating 1: 0/10  · Pain Rating Post-Intervention 1: 0/10    Objective:     Has the patient been evaluated by SLP for swallowing?   Yes  Keep patient NPO? No   Current Respiratory Status: room air      Pt completed tongue base retraction and laryngeal elevation ex x 20 each with min cues to improve swallow function. Pt took po meds given by nurse. He chewed one pill and washed down with liquids followed by s/s of aspiration (coughing).    Assessment:     Noble Tripp is a 73 y.o. male with an SLP diagnosis of Dysphagia.  He presents with s/s of aspiration.    Goals:   Multidisciplinary Problems     SLP Goals        Problem: SLP Goal    Goal Priority Disciplines Outcome   SLP Goal     SLP Ongoing (interventions implemented as appropriate)   Description:  1. TPW complete pharyngeal ex's with S for increased strength and ROM                     Plan:     · Patient to be seen:  3 x/week   · Plan of Care expires:  02/13/19  · Plan of Care reviewed with:  patient   · SLP Follow-Up:  Yes       Discharge recommendations:  (home with outpt ST)   Barriers to Discharge:  None    Time Tracking:     SLP Treatment Date:   02/08/19  Speech Start Time:  0935  Speech Stop Time:  0945     Speech Total Time (min):  10 min    Billable Minutes: swallowing treatment 10      Brooke Fried CCC-SLP  02/08/2019

## 2019-02-08 NOTE — PLAN OF CARE
Problem: SLP Goal  Goal: SLP Goal  1. TPW complete pharyngeal ex's with S for increased strength and ROM   Outcome: Ongoing (interventions implemented as appropriate)  Pt completed tongue base retraction and laryngeal elevation ex x 20 each with min cues.

## 2019-02-08 NOTE — PROGRESS NOTES
[facility] Hospital Medicine  Progress Note    Patient Name: Noble Tripp  MRN: [id number]  Patient Class: IP- Inpatient   Admission Date: 2/5/2019  Length of Stay: 1 days  Attending Physician: Aristides Israel MD  Primary Care Provider: Dwaine Davis MD        Subjective:     Principal Problem:Pleural effusion, bilateral    HPI:  Noble Tripp is a 73 y.o. male patient with a PMHx of CAD, COPD, HLD, PVD who  for sob that stated this morning after having bronchoscopy/lung mass biopsy. No mitigating or exacerbating factors reported. Associated sxs include palpitations, fever (Tnow 101.8).  No further complaints or concerns at this time. Patient evaluted in Er. On arrival had tachycardia, fever, resp distress. Was given adenosine, improvement in heart rate and placed on Bipap. Attempt to wean off bipap in Er not able. Sepsis workup done. Blood culture pending. Cefepime and Vanc. WBC 22.45, lactic neg. ABG PO2 56. HM consulted and patient admitted to ICU.    Hospital Course:  Admitted to ICU for hypoxemic respiratory failure on continuous BiPAP.  Possible pneumonitis related to bronchoscopic biopsy.  Empirically started Vancomycin and Cefepime.  06 Feb:  Weaned off BiPAP to supplemental oxygen by nasal cannula.  Transfer to telemetry.  Heart rate controlled.  Productive cough without blood.    Interval History:  Sleeps comfortably with BiPAP.  Productive cough non-bloody sputum.    Review of Systems   Constitutional: Negative.  Negative for chills and fever.   HENT: Negative.  Negative for congestion and sore throat.    Eyes: Negative.  Negative for visual disturbance.   Respiratory: Positive for cough and shortness of breath. Negative for wheezing.    Cardiovascular: Negative.  Negative for chest pain.   Gastrointestinal: Negative for abdominal pain, diarrhea, nausea and vomiting.   Endocrine: Negative.    Genitourinary: Negative.    Musculoskeletal: Negative.  Negative for  myalgias and neck stiffness.   Skin: Negative.  Negative for color change and pallor.   Allergic/Immunologic: Negative.    Neurological: Negative.    Hematological: Negative.    Psychiatric/Behavioral: Negative.    All other systems reviewed and are negative.    Objective:     Vital Signs (Most Recent):  Temp: 97.6 °F (36.4 °C) (02/07/19 1132)  Pulse: 85 (02/07/19 1704)  Resp: 20 (02/07/19 1415)  BP: 131/69 (02/07/19 1132)  SpO2: (!) 93 % (02/07/19 1415) Vital Signs (24h Range):  Temp:  [97.6 °F (36.4 °C)-98.7 °F (37.1 °C)] 97.6 °F (36.4 °C)  Pulse:  [78-93] 85  Resp:  [16-20] 20  SpO2:  [85 %-100 %] 93 %  BP: (128-148)/(44-69) 131/69     Weight: 82.5 kg (181 lb 14.1 oz)  Body mass index is 27.65 kg/m².    Intake/Output Summary (Last 24 hours) at 2/7/2019 1902  Last data filed at 2/7/2019 0800  Gross per 24 hour   Intake 695 ml   Output 650 ml   Net 45 ml      Physical Exam   Constitutional: He is oriented to person, place, and time. He appears well-developed and well-nourished. No distress.   HENT:   Head: Normocephalic and atraumatic.   Mouth/Throat: Oropharynx is clear and moist.   Eyes: Conjunctivae and EOM are normal. Pupils are equal, round, and reactive to light.   Neck: No JVD present. No thyromegaly present.   Cardiovascular: Normal rate, regular rhythm and normal heart sounds. Exam reveals no gallop and no friction rub.   No murmur heard.  Pulmonary/Chest: Effort normal and breath sounds normal. No respiratory distress. He has no wheezes. He has no rales.   Abdominal: Soft. Bowel sounds are normal. He exhibits no distension. There is no tenderness. There is no rebound and no guarding.   Musculoskeletal: Normal range of motion. He exhibits no edema or tenderness.   Lymphadenopathy:     He has no cervical adenopathy.   Neurological: He is alert and oriented to person, place, and time. He has normal reflexes. He displays normal reflexes. No cranial nerve deficit.   Skin: Skin is warm and dry. No rash noted.  He is not diaphoretic. No erythema.   Psychiatric: He has a normal mood and affect. His behavior is normal. Judgment and thought content normal.       Significant Labs: All pertinent labs within the past 24 hours have been reviewed.    Significant Imaging: I have reviewed all pertinent imaging results/findings within the past 24 hours.    Assessment/Plan:      * Pleural effusion, bilateral    Possible para-pneumonic effusion.  Bronchial cultures pending.     Moderate COPD (chronic obstructive pulmonary disease)    Nebs, ICS  Treat pneumonia  Monitor  Consider IV steroids pending course.   Further evaluation/diagnostics/interventions/consults pending course        Hypertension    Hold home medications. Patient low normal with cardizem drip  Monitor trends  Further evaluation/diagnostics/interventions/consults pending course        Hyperlipidemia    Check statin, a1c  No statin patient with intolerance.   ASA. Consider statin alternative pending course  Further evaluation/diagnostics/interventions/consults pending course        VTE Risk Mitigation (From admission, onward)        Ordered     IP VTE HIGH RISK PATIENT  Once      02/06/19 0115     Place sequential compression device  Until discontinued      02/06/19 0115              Aristides Israel MD  Department of Hospital Medicine   [facility]

## 2019-02-08 NOTE — SUBJECTIVE & OBJECTIVE
Interval History:  Sleeps comfortably with BiPAP.  Productive cough non-bloody sputum.    Review of Systems   Constitutional: Negative.  Negative for chills and fever.   HENT: Negative.  Negative for congestion and sore throat.    Eyes: Negative.  Negative for visual disturbance.   Respiratory: Positive for cough and shortness of breath. Negative for wheezing.    Cardiovascular: Negative.  Negative for chest pain.   Gastrointestinal: Negative for abdominal pain, diarrhea, nausea and vomiting.   Endocrine: Negative.    Genitourinary: Negative.    Musculoskeletal: Negative.  Negative for myalgias and neck stiffness.   Skin: Negative.  Negative for color change and pallor.   Allergic/Immunologic: Negative.    Neurological: Negative.    Hematological: Negative.    Psychiatric/Behavioral: Negative.    All other systems reviewed and are negative.    Objective:     Vital Signs (Most Recent):  Temp: 97.6 °F (36.4 °C) (02/07/19 1132)  Pulse: 85 (02/07/19 1704)  Resp: 20 (02/07/19 1415)  BP: 131/69 (02/07/19 1132)  SpO2: (!) 93 % (02/07/19 1415) Vital Signs (24h Range):  Temp:  [97.6 °F (36.4 °C)-98.7 °F (37.1 °C)] 97.6 °F (36.4 °C)  Pulse:  [78-93] 85  Resp:  [16-20] 20  SpO2:  [85 %-100 %] 93 %  BP: (128-148)/(44-69) 131/69     Weight: 82.5 kg (181 lb 14.1 oz)  Body mass index is 27.65 kg/m².    Intake/Output Summary (Last 24 hours) at 2/7/2019 1902  Last data filed at 2/7/2019 0800  Gross per 24 hour   Intake 695 ml   Output 650 ml   Net 45 ml      Physical Exam   Constitutional: He is oriented to person, place, and time. He appears well-developed and well-nourished. No distress.   HENT:   Head: Normocephalic and atraumatic.   Mouth/Throat: Oropharynx is clear and moist.   Eyes: Conjunctivae and EOM are normal. Pupils are equal, round, and reactive to light.   Neck: No JVD present. No thyromegaly present.   Cardiovascular: Normal rate, regular rhythm and normal heart sounds. Exam reveals no gallop and no friction rub.   No  murmur heard.  Pulmonary/Chest: Effort normal and breath sounds normal. No respiratory distress. He has no wheezes. He has no rales.   Abdominal: Soft. Bowel sounds are normal. He exhibits no distension. There is no tenderness. There is no rebound and no guarding.   Musculoskeletal: Normal range of motion. He exhibits no edema or tenderness.   Lymphadenopathy:     He has no cervical adenopathy.   Neurological: He is alert and oriented to person, place, and time. He has normal reflexes. He displays normal reflexes. No cranial nerve deficit.   Skin: Skin is warm and dry. No rash noted. He is not diaphoretic. No erythema.   Psychiatric: He has a normal mood and affect. His behavior is normal. Judgment and thought content normal.       Significant Labs: All pertinent labs within the past 24 hours have been reviewed.    Significant Imaging: I have reviewed all pertinent imaging results/findings within the past 24 hours.

## 2019-02-08 NOTE — PROGRESS NOTES
[facility] -   Pulmonology  Progress Note    Patient Name: Noble Tripp  MRN: [id number]  Admission Date: 2/5/2019  Hospital Length of Stay: 2 days  Code Status: Full Code  Attending Provider: Aristides Israel MD  Primary Care Provider: Dwaine Davis MD   Principal Problem: Pleural effusion, bilateral    Subjective:     Interval History: Seen and examined at bedside. No acute interval events noted. Doing well. Transferred to telemetry. Remains in NSR.  Doing well. Bronch => HAEMOPHILUS INFLUENZAE & KLEBSIELLA PNEUMONIAE = > corado sensitive.     Review of Systems   Constitutional: Negative.  Negative for chills and fever.   HENT: Negative.  Negative for congestion and sore throat.    Eyes: Negative.  Negative for visual disturbance.   Respiratory: Positive for shortness of breath. Negative for cough and wheezing.    Cardiovascular: Negative.  Negative for chest pain.   Gastrointestinal: Negative for abdominal pain, diarrhea, nausea and vomiting.   Endocrine: Negative.    Genitourinary: Negative.    Musculoskeletal: Negative.  Negative for myalgias and neck stiffness.   Skin: Negative.  Negative for color change and pallor.   Allergic/Immunologic: Negative.    Neurological: Negative.    Hematological: Negative.    Psychiatric/Behavioral: Negative.    All other systems reviewed and are negative.      Scheduled Meds:   amoxicillin-clavulanate 875-125mg  1 tablet Oral Q12H    aspirin  81 mg Oral Daily    budesonide  0.5 mg Nebulization Q12H    gabapentin  800 mg Oral TID    ipratropium  0.5 mg Nebulization Q6H    Lactobacillus acidoph-L.bulgar  4 tablet Oral TID WM    levalbuterol  0.63 mg Nebulization Q12H    pantoprazole  40 mg Oral Daily     Continuous Infusions:  PRN Meds:.levalbuterol, oxyCODONE-acetaminophen, sodium chloride 0.9%         Objective:     Vital Signs (Most Recent):  Temp: 98.8 °F (37.1 °C) (02/08/19 1106)  Pulse: 88 (02/08/19 1107)  Resp: 18 (02/08/19 1106)  BP:  139/64 (02/08/19 1106)  SpO2: (!) 93 % (02/08/19 1107) Vital Signs (24h Range):  Temp:  [97.8 °F (36.6 °C)-98.8 °F (37.1 °C)] 98.8 °F (37.1 °C)  Pulse:  [67-89] 88  Resp:  [16-20] 18  SpO2:  [89 %-98 %] 93 %  BP: (126-154)/(58-68) 139/64     Weight: 82.5 kg (181 lb 14.1 oz)  Body mass index is 27.65 kg/m².      Intake/Output Summary (Last 24 hours) at 2/8/2019 1155  Last data filed at 2/8/2019 0830  Gross per 24 hour   Intake 890 ml   Output --   Net 890 ml       Physical Exam   Constitutional: He is oriented to person, place, and time. He appears well-developed and well-nourished. No distress.   HENT:   Head: Normocephalic and atraumatic.   Mouth/Throat: Oropharynx is clear and moist.   Eyes: Conjunctivae and EOM are normal. Pupils are equal, round, and reactive to light.   Neck: No JVD present. No thyromegaly present.   Cardiovascular: Normal rate, regular rhythm and normal heart sounds. Exam reveals no gallop and no friction rub.   No murmur heard.  Pulmonary/Chest: Effort normal and breath sounds normal. No respiratory distress. He has no wheezes. He has no rales.   Abdominal: Soft. Bowel sounds are normal. He exhibits no distension. There is no tenderness. There is no rebound and no guarding.   Musculoskeletal: Normal range of motion. He exhibits no edema or tenderness.   Lymphadenopathy:     He has no cervical adenopathy.   Neurological: He is alert and oriented to person, place, and time. He has normal reflexes. He displays normal reflexes. No cranial nerve deficit.   Skin: Skin is warm and dry. No rash noted. He is not diaphoretic. No erythema.   Psychiatric: He has a normal mood and affect. His behavior is normal. Judgment and thought content normal.       Vents:  Oxygen Concentration (%): 28 (02/08/19 0717)    Lines/Drains/Airways          None          Significant Labs:    CBC/Anemia Profile:  Recent Labs   Lab 02/07/19  0519 02/08/19  0351   WBC 18.47* 9.99   HGB 10.8* 9.3*   HCT 34.5* 29.4*     243   MCV 89 89   RDW 18.3* 17.9*        Chemistries:  Recent Labs   Lab 02/07/19  0519 02/08/19  0351    140   K 4.4 3.5    103   CO2 29 28   BUN 17 14   CREATININE 0.9 0.8   CALCIUM 9.4 8.7       Significant Imaging:    I have reviewed all pertinent imaging results/findings within the past 24 hours.      ABG  Recent Labs   Lab 02/05/19  1926   PH 7.410   PO2 56*   PCO2 36.4   HCO3 23.0*   BE -2     Assessment/Plan:     * Pleural effusion, bilateral    Likely para pneumonic. Continue antibiotics. Continue to monitor.      Lung mass    Bronchoscopy results: 02/05Doing well. Bronch => HAEMOPHILUS INFLUENZAE & KLEBSIELLA PNEUMONIAE = > pan sensitive. Cytology on Bronchial wash negative for malignancy however abundant neutrophils, few reactive mesothelial cells, lymphocytes and cellular debris. Ok to discharge home on oral AUGMENTIN. Out patient follow up with pulmonology.      Moderate COPD (chronic obstructive pulmonary disease)    Continue nasal oxygen supplementation.  Keep SAO2 > 92%.  Continue KATYA, and LAMA as ordered with HHN's ordered prn.       Oropharyngeal aspiration    Pateint has confirmed aspiration by MBBS.  Patient is aware that he needs thickened fluids and PEG tube but declines recommendations.      JUANITO on CPAP    Continue nocturnal CPAP 14 CMWP.      Chronic pain    Continue home medications for analgesia.     Hyperlipidemia    Resume Repatha on hold.       Hematology    Monitor hemogram. Transfuse as needed.     Endocrine    Stable Euglycemic . SBGM. Blood glucose target 60 - 180 mg/dl             OK to discharge from pulmonary stand point. Ok to discharge home on oral AUGMENTIN. Out patient follow up with pulmonology. Will sign off for now. Do not hesitate to re consult if further pulmonary issues arise.     Mihir Valero MD  Pulmonology  [facility] -

## 2019-02-08 NOTE — SUBJECTIVE & OBJECTIVE
Interval History: Seen and examined at bedside. No acute interval events noted. Doing well. Transferred to telemetry. Remains in NSR.  Doing well. Bronch => HAEMOPHILUS INFLUENZAE & KLEBSIELLA PNEUMONIAE = > corado sensitive.     Review of Systems   Constitutional: Negative.  Negative for chills and fever.   HENT: Negative.  Negative for congestion and sore throat.    Eyes: Negative.  Negative for visual disturbance.   Respiratory: Positive for shortness of breath. Negative for cough and wheezing.    Cardiovascular: Negative.  Negative for chest pain.   Gastrointestinal: Negative for abdominal pain, diarrhea, nausea and vomiting.   Endocrine: Negative.    Genitourinary: Negative.    Musculoskeletal: Negative.  Negative for myalgias and neck stiffness.   Skin: Negative.  Negative for color change and pallor.   Allergic/Immunologic: Negative.    Neurological: Negative.    Hematological: Negative.    Psychiatric/Behavioral: Negative.    All other systems reviewed and are negative.      Scheduled Meds:   amoxicillin-clavulanate 875-125mg  1 tablet Oral Q12H    aspirin  81 mg Oral Daily    budesonide  0.5 mg Nebulization Q12H    gabapentin  800 mg Oral TID    ipratropium  0.5 mg Nebulization Q6H    Lactobacillus acidoph-L.bulgar  4 tablet Oral TID WM    levalbuterol  0.63 mg Nebulization Q12H    pantoprazole  40 mg Oral Daily     Continuous Infusions:  PRN Meds:.levalbuterol, oxyCODONE-acetaminophen, sodium chloride 0.9%         Objective:     Vital Signs (Most Recent):  Temp: 98.8 °F (37.1 °C) (02/08/19 1106)  Pulse: 88 (02/08/19 1107)  Resp: 18 (02/08/19 1106)  BP: 139/64 (02/08/19 1106)  SpO2: (!) 93 % (02/08/19 1107) Vital Signs (24h Range):  Temp:  [97.8 °F (36.6 °C)-98.8 °F (37.1 °C)] 98.8 °F (37.1 °C)  Pulse:  [67-89] 88  Resp:  [16-20] 18  SpO2:  [89 %-98 %] 93 %  BP: (126-154)/(58-68) 139/64     Weight: 82.5 kg (181 lb 14.1 oz)  Body mass index is 27.65 kg/m².      Intake/Output Summary (Last 24 hours) at  2/8/2019 1155  Last data filed at 2/8/2019 0830  Gross per 24 hour   Intake 890 ml   Output --   Net 890 ml       Physical Exam   Constitutional: He is oriented to person, place, and time. He appears well-developed and well-nourished. No distress.   HENT:   Head: Normocephalic and atraumatic.   Mouth/Throat: Oropharynx is clear and moist.   Eyes: Conjunctivae and EOM are normal. Pupils are equal, round, and reactive to light.   Neck: No JVD present. No thyromegaly present.   Cardiovascular: Normal rate, regular rhythm and normal heart sounds. Exam reveals no gallop and no friction rub.   No murmur heard.  Pulmonary/Chest: Effort normal and breath sounds normal. No respiratory distress. He has no wheezes. He has no rales.   Abdominal: Soft. Bowel sounds are normal. He exhibits no distension. There is no tenderness. There is no rebound and no guarding.   Musculoskeletal: Normal range of motion. He exhibits no edema or tenderness.   Lymphadenopathy:     He has no cervical adenopathy.   Neurological: He is alert and oriented to person, place, and time. He has normal reflexes. He displays normal reflexes. No cranial nerve deficit.   Skin: Skin is warm and dry. No rash noted. He is not diaphoretic. No erythema.   Psychiatric: He has a normal mood and affect. His behavior is normal. Judgment and thought content normal.       Vents:  Oxygen Concentration (%): 28 (02/08/19 0717)    Lines/Drains/Airways          None          Significant Labs:    CBC/Anemia Profile:  Recent Labs   Lab 02/07/19  0519 02/08/19  0351   WBC 18.47* 9.99   HGB 10.8* 9.3*   HCT 34.5* 29.4*    243   MCV 89 89   RDW 18.3* 17.9*        Chemistries:  Recent Labs   Lab 02/07/19  0519 02/08/19  0351    140   K 4.4 3.5    103   CO2 29 28   BUN 17 14   CREATININE 0.9 0.8   CALCIUM 9.4 8.7       Significant Imaging:    I have reviewed all pertinent imaging results/findings within the past 24 hours.

## 2019-02-08 NOTE — PROGRESS NOTES
"Went over discharge instructions with patient and spouse.   Stressed importance of making and keeping all follow ups.   Patient verbalized understanding and has no questions in regards to discharge.  IV removed, catheter intact.  Telemetry box removed.  Patient wheeled out by staff to waiting vehicle. No s/s of distress noted.  /64   Pulse 88   Temp 98.8 °F (37.1 °C)   Resp 18   Ht 5' 8" (1.727 m)   Wt 82.5 kg (181 lb 14.1 oz)   SpO2 (!) 93%   BMI 27.65 kg/m²       "

## 2019-02-11 ENCOUNTER — PATIENT OUTREACH (OUTPATIENT)
Dept: ADMINISTRATIVE | Facility: CLINIC | Age: 74
End: 2019-02-11

## 2019-02-11 ENCOUNTER — TELEPHONE (OUTPATIENT)
Dept: PULMONOLOGY | Facility: CLINIC | Age: 74
End: 2019-02-11

## 2019-02-11 LAB
BACTERIA BLD CULT: NORMAL
BACTERIA BLD CULT: NORMAL

## 2019-02-11 NOTE — TELEPHONE ENCOUNTER
----- Message from Santos Cardozo MD sent at 2/11/2019  1:39 PM CST -----  Please inform patient biopsy specimens were negative for any malignancy.    ORDERING PHYSICIAN(S)  SANTOS CARDOZO  PreOperative Diagnosis  PostOperative Diagnosis  jm  SPECIMEN  1) Left lower lobe biopsy.  FINAL PATHOLOGIC DIAGNOSIS  1. Left lower lobe lung biopsy:  - Benign lung parenchyma with mild fibrosis and mild focal chronic inflammation, see comment  Comment: Multiple levels were evaluated. No evidence of malignancy is identified in the material provided. The  above findings do not account for the clinically apparent lung nodule. If radiology remains clinically concerning, a  repeat biopsy may be warranted.  [facility]  Diagnosed by: Zohra Palafox  (Electronically Signed: 2019-02-11 12:39:43)  Gross Description  ID/AP Label: [id number], designated left lower lobe lung biopsy  The specimen is received in formalin and consists of multiple tan fragment(s) of tissue measuring up to 0.3 cm.  The specimen is submitted entirely labeled [id number]  [facility]  Zohra Palafox  [facility]: [facility]

## 2019-02-11 NOTE — PATIENT INSTRUCTIONS
Understanding Sepsis  Sepsis is a severe response the body has to an infection. It is most often caused by bacteria. It is also known as septicemia, or systemic inflammatory response syndrome (SIRS). Sepsis is a medical emergency. It needs to be treated right away.  What is sepsis?  Sepsis is when the body reacts to an infection with a severe inflammatory response. It can be caused by bacteria, fungus, or a virus. Sepsis can cause many kinds of problems around the body. It can lead severe low blood pressure (shock) and organ failure. This can lead to death if not treated.  Sepsis is most common in:  · Infants and older adults  · People with an infection such as pneumonia, meningitis, or a urinary tract infection  · People who have an illness such as cancer, AIDS, or diabetes  · People being treated with chemotherapy medications or radiation  · People who have had a transplant  Symptoms of sepsis  Symptoms of sepsis can include:  · Chills and shaking  · Rapid heartbeat  · Rapid breathing  · Shortness of breath  · Severe nausea or uncontrolled vomiting  · Confusion  · Dizziness  · Decreased urination  · Severe pain, including in the back or joints   Diagnosing sepsis  If your health care provider thinks you may have sepsis, you will be given tests. You may have blood and urine tests. These are done to look for bacteria, viruses, or fungus. You may also have X-rays or other imaging tests. These may be done to look at your organs to locate the source of infection.  Treating sepsis  If you have sepsis, your health care provider will give you antibiotics through a thin, flexible tube put into a vein in your arm (IV). You will also be given fluids through the IV. You may also be given nutrition or other medications through your IV. Your health care provider will talk with you about other treatments you may need. These may include using an oxygen mask or a ventilator to help with breathing. Treatment may last at least 7  to 10 days. Sepsis must be treated in the hospital.  Date Last Reviewed: 7/15/2015  © 8853-8680 [employer]. [address] [place] [address], [place] [address]. All rights reserved. This information is not intended as a substitute for professional medical care. Always follow your healthcare professional's instructions.

## 2019-02-12 ENCOUNTER — HOSPITAL ENCOUNTER (OUTPATIENT)
Dept: RADIOLOGY | Facility: HOSPITAL | Age: 74
Discharge: HOME | End: 2019-02-12
Attending: INTERNAL MEDICINE
Payer: MEDICARE

## 2019-02-12 DIAGNOSIS — T17.208S: ICD-10-CM

## 2019-02-12 DIAGNOSIS — C02.9 CANCER OF TONGUE: ICD-10-CM

## 2019-02-12 DIAGNOSIS — R13.14 PHARYNGOESOPHAGEAL DYSPHAGIA: ICD-10-CM

## 2019-02-12 DIAGNOSIS — J69.0 ASPIRATION PNEUMONITIS: Primary | ICD-10-CM

## 2019-02-12 PROCEDURE — 74230 X-RAY XM SWLNG FUNCJ C+: CPT | Mod: TC

## 2019-02-12 PROCEDURE — G8996 SWALLOW CURRENT STATUS: HCPCS | Mod: CM

## 2019-02-12 PROCEDURE — G8997 SWALLOW GOAL STATUS: HCPCS | Mod: CM

## 2019-02-12 PROCEDURE — G8998 SWALLOW D/C STATUS: HCPCS | Mod: CM

## 2019-02-12 PROCEDURE — 92611 MOTION FLUOROSCOPY/SWALLOW: CPT

## 2019-02-12 NOTE — PROCEDURES
"Modified Barium Swallow    Patient Name:  Noble Tripp   MRN:  [id number]      Recommendations:     Recommendations:                General Recommendations:  Dysphagia therapy  Diet recommendations:   ,   NPO  Aspiration Precautions:    General Precautions: Standard,    Communication strategies:  none    Referral     Reason for Referral  Patient was referred for a Modified Barium Swallow Study to assess the efficiency of his/her swallow function, rule out aspiration and make recommendations regarding safe dietary consistencies, effective compensatory strategies, and safe eating environment.     Diagnosis: <principal problem not specified>       History:     Past Medical History:   Diagnosis Date    Adrenal mass     david;nl fxn w/u    Aspiration pneumonia 10/15    puree/honey    Benign prostate hyperplasia     CAD (coronary artery disease)     dr padilla    Chronic pain     dr santos    COPD (chronic obstructive pulmonary disease)     papi    Ex-smoker     11/13    Hyperlipidemia     Osteopenia 1/15 tran 1/18    PVD (peripheral vascular disease)     noobs 07 khuri    Pyriform sinus cancer     dr ponce radiation 1/2-14 dr king perales    Sleep apnea     cpap    Squamous cell carcinoma of skin     roberto    Tongue cancer     "superficial" removed 11/14    Vocal cord cancer 2011    Xerostomia     radiation       Objective:     Current Respiratory Status:      Alert: yes    Cooperative: yes    Follows Directions: yes    Visualization  · Patient was seen in the lateral view    Oral Peripheral Examination  ·  unremarkable    Consistencies Assessed  · Thin, puree,     Oral Preparation/Oral Phase  · WFL- Pt with adequate bolus acceptance, containment, control and timely A-P transfer across consistencies     Pharyngeal Phase   Decreased tongue base retraction  Decreased laryngeal elevation  Decreased pharyngeal constriction  Trace aspiration of thin and trace aspiration of residue of " puree  Residue of puree in valleculae and posterior pharyngeal wall    Cervical Esophageal Phase  · UES appeared to accommodate all bolus types without stasis or retrograde movement observed     Assessment:     Impressions  ·  Pt presents with severe pharyngeal dysphagia characterized by reduced tongue base retraction, reduced laryngeal elevation and pharyngeal constriction resulting in residue throughout pharynx with trace aspiration of thin and residue of puree. He was unable to clear puree residue with consecutive swallows.     Prognosis: Fair    Barriers:  · None    Plan  Compensatory swallow strategies  Speech therapy    Education  Results were discussed with patient.    Goals:       Plan:   · Patient to be seen:      · Plan of Care expires:     · Plan of Care reviewed with:           Discharge recommendations:      Barriers to Discharge:  None    Time Tracking:   SLP Treatment Date:      Speech Start Time:     Speech Stop Time:        Speech Total Time (min):       Brooke Fried CCC-SLP  02/12/2019

## 2019-02-12 NOTE — PROGRESS NOTES
"Modified Barium Swallow     Patient Name:  Noble Tripp   MRN:  [id number]        Recommendations:      Recommendations:                General Recommendations:  Dysphagia therapy  Diet recommendations:   ,   NPO  Aspiration Precautions:    General Precautions: Standard,    Communication strategies:  none     Referral      Reason for Referral  Patient was referred for a Modified Barium Swallow Study to assess the efficiency of his/her swallow function, rule out aspiration and make recommendations regarding safe dietary consistencies, effective compensatory strategies, and safe eating environment.      Diagnosis: <principal problem not specified>         History:           Past Medical History:   Diagnosis Date    Adrenal mass       david;nl fxn w/u    Aspiration pneumonia 10/15     puree/honey    Benign prostate hyperplasia      CAD (coronary artery disease)       dr padilla    Chronic pain       dr santos    COPD (chronic obstructive pulmonary disease)       papi    Ex-smoker       11/13    Hyperlipidemia      Osteopenia 1/15 tran 1/18    PVD (peripheral vascular disease)       noobs 07 khuri    Pyriform sinus cancer       dr ponce radiation 1/2-14 dr king perales    Sleep apnea       cpap    Squamous cell carcinoma of skin       roberto    Tongue cancer       "superficial" removed 11/14    Vocal cord cancer 2011    Xerostomia       radiation         Objective:      Current Respiratory Status:       Alert: yes     Cooperative: yes     Follows Directions: yes     Visualization  · Patient was seen in the lateral view     Oral Peripheral Examination  ·  unremarkable     Consistencies Assessed  · Thin, puree,      Oral Preparation/Oral Phase  · WFL- Pt with adequate bolus acceptance, containment, control and timely A-P transfer across consistencies      Pharyngeal Phase   Decreased tongue base retraction  Decreased laryngeal elevation  Decreased pharyngeal constriction  Trace aspiration of " thin and trace aspiration of residue of puree  Residue of puree in valleculae and posterior pharyngeal wall     Cervical Esophageal Phase  · UES appeared to accommodate all bolus types without stasis or retrograde movement observed      Assessment:      Impressions  ·  Pt presents with severe pharyngeal dysphagia characterized by reduced tongue base retraction, reduced laryngeal elevation and pharyngeal constriction resulting in residue throughout pharynx with trace aspiration of thin and residue of puree. He was unable to clear puree residue with consecutive swallows.      Prognosis: Fair     Barriers:  · None     Plan  Compensatory swallow strategies  Speech therapy     Education  Results were discussed with patient.     Goals:      Plan: ·   Patient to be seen:      · Plan of Care expires:     · Plan of Care reviewed with:           Discharge recommendations:      Barriers to Discharge:  None     Time Tracking:   SLP Treatment Date:      Speech Start Time:     Speech Stop Time:        Speech Total Time (min):        Brooke Fried CCC-SLP  02/12/2019            [facility] patient and spouse  Okay with speech therapy, reluctantly accepts hospital bed  Declines rec for NPO for PEG or G tube  Understands risk of continued aspiration, pneumonia, death

## 2019-02-13 ENCOUNTER — TELEPHONE (OUTPATIENT)
Dept: PULMONOLOGY | Facility: CLINIC | Age: 74
End: 2019-02-13

## 2019-02-13 NOTE — TELEPHONE ENCOUNTER
----- Message from Chica Ferreira sent at 2/13/2019  2:25 PM CST -----  Contact: pt wife  Type:  Needs Medical Advice    Who Called: DEBI CERON   Symptoms (please be specific):   How long has patient had these symptoms:    Pharmacy name and phone #:    Would the patient rather a call back or a response via [facility]? callback  Best Call Back Number: [phone] (home)   Additional Information: caller is requesting a call back from the nurse in regards to the pt knowing if it is a good ideal for the pt to have oxygen at home as needed on hand

## 2019-02-13 NOTE — PROGRESS NOTES
Chief Pain Complaint:  Low Back Pain, Leg pain (left > right)     History of Present Illness:  This patient is a 71 y.o. male who presents today complaining of the above noted pain/s. The patient describes this pain as follows.    - duration of pain: pain for years   - timing: constant   - character: aching, burning  - radiating, dermatomal: extends into left leg, along L5 pattern at times  - antecedent trauma, prior spinal surgery: none  - pertinent negatives: No fever, No chills, No weight loss, No bladder dysfunction, No bowel dysfunction, No extremity weakness, No saddle anesthesia  - pertinent positives: none    - medications, other therapies tried (physical therapy, injections):     >> gabapentin, Norco    >> Has previously undergone Physical Therapy    >> Has previously undergone spinal injection/s, including lumbar mbb and LTFESI, see emr      IMAGING / Labs / Studies (reviewed on 2/28/2017):    10/30/14 MRI Lumbar Spine Without Contrast    Narrative Exam: MRI of the lumbar spine without contrast.  History:     Low back pain. Status post SHEY, with S1-S2 radicular symptoms  Comparison: Prior study dated 04/02/13  Findings:     A convex right scoliosis again noted.  No compression fracture or subluxation.  The conus medullaris has a normal appearance.  The paraspinous soft tissues are unremarkable.  The T12 -- L1 and L1 -- 2 disk levels are essentially unremarkable.  L2 -- 3: Mild annular bulging again noted.    L3 -- 4: Moderate narrowing of the right lateral disk space, with right greater than left facet arthropathy, unchanged.    L4 -- 5: Marked bilateral facet arthropathy, with mild annular bulging, causing moderate central canal stenosis, unchanged.  L5 -- S1: Disk desiccation, with moderate disk space narrowing.  Significant degenerative narrowing of the left L5 neural foramen is apparent, unchanged.         Review of Systems:  CONSTITUTIONAL: patient denies any fever, chills, or weight loss  SKIN:  "patient denies any rash or itching  RESPIRATORY: patient denies having any shortness of breath  GASTROINTESTINAL: patient denies having any diarrhea, constipation, or bowel incontinence  GENITOURINARY: patient denies having any abnormal bladder function    MUSCULOSKELETAL:  - patient reports low back pain    NEUROLOGICAL:   - pain as above  - strength in Lower extremities is intact, BILATERALLY  - sensation in Lower extremities is intact, BILATERALLY  - patient denies any loss of bowel or bladder control      PSYCHIATRIC: patient denies any suicidal or homicidal ideations      Physical Exam:    Vitals:  /67 (BP Location: Right arm, Patient Position: Sitting, BP Method: Automatic)  Pulse 81  Resp 16  Ht 5' 8" (1.727 m)  Wt 71.7 kg (158 lb)  BMI 24.02 kg/m2   (reviewed on 2/28/2017)    General: alert and oriented, in no apparent distress  Gait: normal gait  Skin: No rashes, No discoloration, No obvious lesions  HEENT: EOMI  Respiratory: respirations nonlabored    Musculoskeletal:  - Any pain on flexion, extension, rotation:     >> pain on flexion  - Straight Leg Raise:     >> negative on the LEFT    >> negative on the RIGHT  - Any tenderness to palpation across paraspinal muscles, joints, bursae:     >> across lumbar paraspinals     Neuro:  - Lower Extremity Strength:     >> 5/5 throughout on the LEFT    >> 5/5 throughout on the RIGHT  - Lower Extremity Reflexes:    >> 2+ on the LEFT    >> 2+ on the RIGHT  - Sensory (sensation to light touch):    >> intact on the LEFT    >> intact on the RIGHT     Psych:  Mood and affect appropriate    Assessment:  Lumbar Spondylosis  Lumbar radiculopathy  Lumbar DDD  Chronic Pain Syndrome      Plan:  Patient returns for follow-up.  He complains of chronic low back pain.   - Refill Norco 10mg TID PRN pain and gabapentin x 3 months. These seem to control his pain well.  - He has been dealing with OIC. Recently started back with Senokot with no relief. Will start Movantik " 12.5mg QD.   - [place]  appropriate, last filled on 1-28-17.  - Will order UDS today to ensure medication compliance.    RTC in 3 months for med refill. I discussed the risks, benefits, and alternatives to potential treatment options. All questions and concerns were fully addressed today in clinic. Dr. Branch was consulted regarding the patient plan and agrees.            >> Pain Disability Questionnaire:  6-28-16 :: 28  12/20/2016 :: 55    >> UDS:  6-28-16 :: appropriate  2/28/2017 :: pending    >> Pain Disability Index:  2/28/2017 :: 18   <<-----Click on this checkbox to enter Procedure Cystoscopy with biopsy and fulguration of bladder  02/13/2019    Active  JLESSING

## 2019-02-13 NOTE — TELEPHONE ENCOUNTER
Answered questions for pt wife concerning O2 order.  She states they will discuss with  at appt 2/19/19.

## 2019-02-14 DIAGNOSIS — R06.02 SOB (SHORTNESS OF BREATH): Primary | ICD-10-CM

## 2019-02-15 ENCOUNTER — TELEPHONE (OUTPATIENT)
Dept: PULMONOLOGY | Facility: CLINIC | Age: 74
End: 2019-02-15

## 2019-02-15 DIAGNOSIS — B37.9 CANDIDA ALBICANS INFECTION: Primary | ICD-10-CM

## 2019-02-15 RX ORDER — FLUCONAZOLE 100 MG/1
100 TABLET ORAL DAILY
Qty: 10 TABLET | Refills: 0 | Status: SHIPPED | OUTPATIENT
Start: 2019-02-15 | End: 2019-02-25

## 2019-02-15 NOTE — TELEPHONE ENCOUNTER
----- Message from Yanely France sent at 2/15/2019 10:19 AM CST -----  Contact: Mrs almonte  Type:  Patient Returning Call    Who Called: Mrs Almonte  Who Left Message for Patient:Howard  Does the patient know what this is regarding?:  Would the patient rather a call back or a response via [facility]? Call   Best Call Back Number: [phone] (home)     Additional Information:

## 2019-02-18 ENCOUNTER — TELEPHONE (OUTPATIENT)
Dept: PULMONOLOGY | Facility: CLINIC | Age: 74
End: 2019-02-18

## 2019-02-18 NOTE — TELEPHONE ENCOUNTER
----- Message from Santos Cardozo MD sent at 2/15/2019  5:26 PM CST -----  Sputum grew Candida will send Diflucan

## 2019-02-19 ENCOUNTER — HOSPITAL ENCOUNTER (OUTPATIENT)
Dept: RADIOLOGY | Facility: HOSPITAL | Age: 74
Discharge: HOME | End: 2019-02-19
Attending: INTERNAL MEDICINE
Payer: MEDICARE

## 2019-02-19 ENCOUNTER — OFFICE VISIT (OUTPATIENT)
Dept: PULMONOLOGY | Facility: CLINIC | Age: 74
End: 2019-02-19
Payer: MEDICARE

## 2019-02-19 VITALS
OXYGEN SATURATION: 94 % | RESPIRATION RATE: 16 BRPM | SYSTOLIC BLOOD PRESSURE: 142 MMHG | HEIGHT: 68 IN | DIASTOLIC BLOOD PRESSURE: 60 MMHG | WEIGHT: 174.06 LBS | HEART RATE: 77 BPM | BODY MASS INDEX: 26.38 KG/M2

## 2019-02-19 DIAGNOSIS — T17.208S: ICD-10-CM

## 2019-02-19 DIAGNOSIS — G47.33 OSA ON CPAP: Chronic | ICD-10-CM

## 2019-02-19 DIAGNOSIS — R91.8 LUNG MASS: ICD-10-CM

## 2019-02-19 DIAGNOSIS — A49.8 KLEBSIELLA PNEUMONIAE INFECTION: Primary | ICD-10-CM

## 2019-02-19 DIAGNOSIS — E88.09 SERUM ALBUMIN DECREASED: ICD-10-CM

## 2019-02-19 DIAGNOSIS — J90 RECURRENT RIGHT PLEURAL EFFUSION: ICD-10-CM

## 2019-02-19 DIAGNOSIS — J44.9 MODERATE COPD (CHRONIC OBSTRUCTIVE PULMONARY DISEASE): Chronic | ICD-10-CM

## 2019-02-19 DIAGNOSIS — C02.9 CANCER OF TONGUE: ICD-10-CM

## 2019-02-19 PROCEDURE — 71046 X-RAY EXAM CHEST 2 VIEWS: CPT | Mod: TC

## 2019-02-19 PROCEDURE — 99215 OFFICE O/P EST HI 40 MIN: CPT | Mod: S$PBB,,, | Performed by: INTERNAL MEDICINE

## 2019-02-19 PROCEDURE — 99215 PR OFFICE/OUTPT VISIT, EST, LEVL V, 40-54 MIN: ICD-10-PCS | Mod: S$PBB,,, | Performed by: INTERNAL MEDICINE

## 2019-02-19 PROCEDURE — 99214 OFFICE O/P EST MOD 30 MIN: CPT | Mod: PBBFAC,25 | Performed by: INTERNAL MEDICINE

## 2019-02-19 PROCEDURE — 99999 PR PBB SHADOW E&M-EST. PATIENT-LVL IV: ICD-10-PCS | Mod: PBBFAC,,, | Performed by: INTERNAL MEDICINE

## 2019-02-19 PROCEDURE — 99999 PR PBB SHADOW E&M-EST. PATIENT-LVL IV: CPT | Mod: PBBFAC,,, | Performed by: INTERNAL MEDICINE

## 2019-02-19 PROCEDURE — 71046 XR CHEST PA AND LATERAL: ICD-10-PCS | Mod: 26,,, | Performed by: RADIOLOGY

## 2019-02-19 PROCEDURE — 71046 X-RAY EXAM CHEST 2 VIEWS: CPT | Mod: 26,,, | Performed by: RADIOLOGY

## 2019-02-19 NOTE — H&P (VIEW-ONLY)
"Subjective:       Patient ID: Noble Tripp is a 73 y.o. male.    Chief Complaint: COPD and Sleep Apnea    Noble Tripp is 73 years old  Was in hospital after broncho, return post procedure with SOB, No pneumo  Aspiration pneumonia, speech therapy recommended NPO HE DECLINED.  WILL NOT HAVE PEG, Albumin low: 3.3  Completed Augmentin : grew Kleb and H Flu  Also grew Candida: added Diflucan  Wife and daughter present  Frustrated about weak : poor nutrition,   Images reveiwed  Hx laryngeal cancer, chr aspiration  PET shows LLL pleural based mass  Speech therapy notes revewied  Biopsy options discussed  Chest CT was reveiwed: pleural densitiy and mediastinal adenopathy. Unclear if atelectasis on new growth or infection         Review of Systems   Constitutional: Positive for fatigue and weakness.   HENT: Positive for trouble swallowing.    Eyes: Negative.    Respiratory: Positive for shortness of breath. Negative for snoring, sputum production, wheezing and dyspnea on extertion.    Cardiovascular: Negative.    Genitourinary: Negative.    Endocrine: endocrine negative   Musculoskeletal: Negative.    Skin: Negative.    Gastrointestinal: Negative.    Psychiatric/Behavioral: Negative.        Objective:       Vitals:    02/19/19 0935   BP: (!) 142/60   Pulse: 77   Resp: 16   SpO2: (!) 94%   Weight: 78.9 kg (174 lb 0.9 oz)   Height: 5' 8" (1.727 m)     Physical Exam   Constitutional: He is oriented to person, place, and time. Vital signs are normal. He appears ill. He is not obese.   HENT:   Head: Normocephalic.   Nose: Nose normal.   Neck: Normal range of motion. Neck supple.   Cardiovascular: Normal rate, regular rhythm and normal heart sounds.   No murmur heard.  Pulmonary/Chest: Normal expansion and effort normal. He has decreased breath sounds in the right lower field and the left lower field. He has no wheezes. He has no rhonchi.         Negative for tactile fremitus.   Abdominal: Bowel sounds are normal. "   Musculoskeletal: Normal range of motion. He exhibits no edema.   Lymphadenopathy:     He has no cervical adenopathy.   Neurological: He is alert and oriented to person, place, and time. He has normal reflexes. No cranial nerve deficit.   Skin: Skin is warm and dry.   Psychiatric: He has a normal mood and affect.   Nursing note and vitals reviewed.    Personal Diagnostic Review       PAth  SPECIMEN  1) Left lower lobe biopsy.  FINAL PATHOLOGIC DIAGNOSIS  1. Left lower lobe lung biopsy:  - Benign lung parenchyma with mild fibrosis and mild focal chronic inflammation, see comment  Comment: Multiple levels were evaluated. No evidence of malignancy is identified in the material provided. The  above findings do not account for the clinically apparent lung nodule. If radiology remains clinically concerning, a  repeat biopsy may be warranted.  [facility]  Diagnosed by: Zohra Palafox  (Electronically Signed: 2019-02-11 12:39:43)  Gross Description  ID/AP Label: [id number], designated left lower lobe lung biopsy  The specimen is received in formalin and consists of multiple tan fragment(s) of tissue measuring up to 0.3 cm.  The specimen is submitted entirely labeled [id number]  [facility]  Zohra Palafox  [facility]: [facility]  ------    Notes recorded by Santos Cardozo MD on 2/8/2019 at 12:05 PM CST  No Cancer cells on cytology await other    FINAL PATHOLOGIC DIAGNOSIS  1. Bronchial wash #1, for cytology (1 ThinPrep, 1 cell block):  - Negative for malignancy  - Reactive bronchial cells, lymphocytes, few neutrophils, macrophages and cellular debris  2. Bronchial wash #2, for cytology (1 ThinPrep, 1 cell block):  - Negative for malignancy  - Abundant neutrophils, few reactive mesothelial cells, lymphocytes and cellular debris  [facility]    Respiratory Culture HAEMOPHILUS INFLUENZAE   Moderate   Beta Lactamase positive       Respiratory Culture KLEBSIELLA PNEUMONIAE   Rare       Gram Stain (Respiratory) Rare WBC's    Gram Stain (Respiratory)  No organisms seen    Resulting Agency [facility]   Susceptibility      Klebsiella pneumoniae     CULTURE, RESPIRATORY     Amox/K Clav'ate <=8/4  Sensitive     Amp/Sulbactam <=8/4  Sensitive     Cefazolin <=8  Sensitive     Cefepime <=8  Sensitive     Ceftriaxone <=8  Sensitive     Ciprofloxacin <=1  Sensitive     Ertapenem <=2  Sensitive     Gentamicin <=4  Sensitive     Meropenem <=4  Sensitive     Piperacillin/Tazo <=16  Sensitive     Tetracycline <=4  Sensitive     Tobramycin <=4  Sensitive     Trimeth/Sulfa <=2/38  Sensitive         Speech therapy note  Modified Barium Swallow     Patient Name:  Noble Tripp   MRN:  [id number]        Recommendations:      Recommendations:                General Recommendations:  Dysphagia therapy  Diet recommendations:   ,   NPO  Aspiration Precautions:    General Precautions: Standard,    Communication strategies:  none        Impressions  ·  Pt presents with severe pharyngeal dysphagia characterized by reduced tongue base retraction, reduced laryngeal elevation and pharyngeal constriction resulting in residue throughout pharynx with trace aspiration of thin and residue of puree. He was unable to clear puree residue with consecutive swallows.         CXR  FINDINGS:  Cardiac silhouette and mediastinal contours are unchanged.  Bilateral pleural effusions versus greater on the right with adjacent airspace disease/atelectasis.  Osseous structures unchanged.      Impression       Similar bilateral lower lung findings.     Component      Latest Ref Rng & Units 2/19/2019   CRP      0.0 - 8.2 mg/L 104.4 (H)       Component      Latest Ref Rng & Units 2/19/2019   Procalcitonin      <0.25 ng/mL 0.06       Assessment:       Problem List Items Addressed This Visit     JUANITO on CPAP (Chronic)    Moderate COPD (chronic obstructive pulmonary disease) (Chronic)    Relevant Orders    Stress test, pulmonary    Cancer of tongue    Oropharyngeal aspiration    Relevant Orders    X-Ray Chest PA And  Lateral    X-Ray Chest PA And Lateral (Completed)    CBC auto differential (Completed)    C-REACTIVE PROTEIN (Completed)    Procalcitonin (Completed)    Lung mass    Relevant Orders    X-Ray Chest PA And Lateral    X-Ray Chest PA And Lateral (Completed)    CBC auto differential (Completed)    C-REACTIVE PROTEIN (Completed)    Procalcitonin (Completed)      Other Visit Diagnoses     Klebsiella pneumoniae infection    -  Primary    Recurrent right pleural effusion        Relevant Orders    US Chest Mediastinum    Serum albumin decreased            Plan:       Complete Diflucan  Labs today  Aspiration precautions  HOB 30-45  Nebuliser  6MWD next visit  Declined PEG  WILL need thoracentesis  Speech therapy  Hospital bed  US for possible thora    Follow-up in about 4 weeks (around 3/19/2019), or labs today, CXR today, CXR next visit, aspiration precautions, for 6mwd.    This note was prepared using voice recognition system and is likely to have sound alike errors that may have been overlooked even after proof reading.  Please call me with any questions    Discussed diagnosis, its evaluation, treatment and usual course. All questions answered.    Thank you for the courtesy of participating in the care of this patient    Santos Cardozo MD

## 2019-02-19 NOTE — PROGRESS NOTES
"Subjective:       Patient ID: Noble Tripp is a 73 y.o. male.    Chief Complaint: COPD and Sleep Apnea    Noble Tripp is 73 years old  Was in hospital after broncho, return post procedure with SOB, No pneumo  Aspiration pneumonia, speech therapy recommended NPO HE DECLINED.  WILL NOT HAVE PEG, Albumin low: 3.3  Completed Augmentin : grew Kleb and H Flu  Also grew Candida: added Diflucan  Wife and daughter present  Frustrated about weak : poor nutrition,   Images reveiwed  Hx laryngeal cancer, chr aspiration  PET shows LLL pleural based mass  Speech therapy notes revewied  Biopsy options discussed  Chest CT was reveiwed: pleural densitiy and mediastinal adenopathy. Unclear if atelectasis on new growth or infection         Review of Systems   Constitutional: Positive for fatigue and weakness.   HENT: Positive for trouble swallowing.    Eyes: Negative.    Respiratory: Positive for shortness of breath. Negative for snoring, sputum production, wheezing and dyspnea on extertion.    Cardiovascular: Negative.    Genitourinary: Negative.    Endocrine: endocrine negative   Musculoskeletal: Negative.    Skin: Negative.    Gastrointestinal: Negative.    Psychiatric/Behavioral: Negative.        Objective:       Vitals:    02/19/19 0935   BP: (!) 142/60   Pulse: 77   Resp: 16   SpO2: (!) 94%   Weight: 78.9 kg (174 lb 0.9 oz)   Height: 5' 8" (1.727 m)     Physical Exam   Constitutional: He is oriented to person, place, and time. Vital signs are normal. He appears ill. He is not obese.   HENT:   Head: Normocephalic.   Nose: Nose normal.   Neck: Normal range of motion. Neck supple.   Cardiovascular: Normal rate, regular rhythm and normal heart sounds.   No murmur heard.  Pulmonary/Chest: Normal expansion and effort normal. He has decreased breath sounds in the right lower field and the left lower field. He has no wheezes. He has no rhonchi.         Negative for tactile fremitus.   Abdominal: Bowel sounds are normal. "   Musculoskeletal: Normal range of motion. He exhibits no edema.   Lymphadenopathy:     He has no cervical adenopathy.   Neurological: He is alert and oriented to person, place, and time. He has normal reflexes. No cranial nerve deficit.   Skin: Skin is warm and dry.   Psychiatric: He has a normal mood and affect.   Nursing note and vitals reviewed.    Personal Diagnostic Review       PAth  SPECIMEN  1) Left lower lobe biopsy.  FINAL PATHOLOGIC DIAGNOSIS  1. Left lower lobe lung biopsy:  - Benign lung parenchyma with mild fibrosis and mild focal chronic inflammation, see comment  Comment: Multiple levels were evaluated. No evidence of malignancy is identified in the material provided. The  above findings do not account for the clinically apparent lung nodule. If radiology remains clinically concerning, a  repeat biopsy may be warranted.  [facility]  Diagnosed by: Zohra Palafox  (Electronically Signed: 2019-02-11 12:39:43)  Gross Description  ID/AP Label: [id number], designated left lower lobe lung biopsy  The specimen is received in formalin and consists of multiple tan fragment(s) of tissue measuring up to 0.3 cm.  The specimen is submitted entirely labeled [id number]  [facility]  Zohra Palafox  [facility]: [facility]  ------    Notes recorded by Santos Cardozo MD on 2/8/2019 at 12:05 PM CST  No Cancer cells on cytology await other    FINAL PATHOLOGIC DIAGNOSIS  1. Bronchial wash #1, for cytology (1 ThinPrep, 1 cell block):  - Negative for malignancy  - Reactive bronchial cells, lymphocytes, few neutrophils, macrophages and cellular debris  2. Bronchial wash #2, for cytology (1 ThinPrep, 1 cell block):  - Negative for malignancy  - Abundant neutrophils, few reactive mesothelial cells, lymphocytes and cellular debris  [facility]    Respiratory Culture HAEMOPHILUS INFLUENZAE   Moderate   Beta Lactamase positive       Respiratory Culture KLEBSIELLA PNEUMONIAE   Rare       Gram Stain (Respiratory) Rare WBC's    Gram Stain (Respiratory)  No organisms seen    Resulting Agency [facility]   Susceptibility      Klebsiella pneumoniae     CULTURE, RESPIRATORY     Amox/K Clav'ate <=8/4  Sensitive     Amp/Sulbactam <=8/4  Sensitive     Cefazolin <=8  Sensitive     Cefepime <=8  Sensitive     Ceftriaxone <=8  Sensitive     Ciprofloxacin <=1  Sensitive     Ertapenem <=2  Sensitive     Gentamicin <=4  Sensitive     Meropenem <=4  Sensitive     Piperacillin/Tazo <=16  Sensitive     Tetracycline <=4  Sensitive     Tobramycin <=4  Sensitive     Trimeth/Sulfa <=2/38  Sensitive         Speech therapy note  Modified Barium Swallow     Patient Name:  Noble Tripp   MRN:  [id number]        Recommendations:      Recommendations:                General Recommendations:  Dysphagia therapy  Diet recommendations:   ,   NPO  Aspiration Precautions:    General Precautions: Standard,    Communication strategies:  none        Impressions  ·  Pt presents with severe pharyngeal dysphagia characterized by reduced tongue base retraction, reduced laryngeal elevation and pharyngeal constriction resulting in residue throughout pharynx with trace aspiration of thin and residue of puree. He was unable to clear puree residue with consecutive swallows.         CXR  FINDINGS:  Cardiac silhouette and mediastinal contours are unchanged.  Bilateral pleural effusions versus greater on the right with adjacent airspace disease/atelectasis.  Osseous structures unchanged.      Impression       Similar bilateral lower lung findings.     Component      Latest Ref Rng & Units 2/19/2019   CRP      0.0 - 8.2 mg/L 104.4 (H)       Component      Latest Ref Rng & Units 2/19/2019   Procalcitonin      <0.25 ng/mL 0.06       Assessment:       Problem List Items Addressed This Visit     JUANITO on CPAP (Chronic)    Moderate COPD (chronic obstructive pulmonary disease) (Chronic)    Relevant Orders    Stress test, pulmonary    Cancer of tongue    Oropharyngeal aspiration    Relevant Orders    X-Ray Chest PA And  Lateral    X-Ray Chest PA And Lateral (Completed)    CBC auto differential (Completed)    C-REACTIVE PROTEIN (Completed)    Procalcitonin (Completed)    Lung mass    Relevant Orders    X-Ray Chest PA And Lateral    X-Ray Chest PA And Lateral (Completed)    CBC auto differential (Completed)    C-REACTIVE PROTEIN (Completed)    Procalcitonin (Completed)      Other Visit Diagnoses     Klebsiella pneumoniae infection    -  Primary    Recurrent right pleural effusion        Relevant Orders    US Chest Mediastinum    Serum albumin decreased            Plan:       Complete Diflucan  Labs today  Aspiration precautions  HOB 30-45  Nebuliser  6MWD next visit  Declined PEG  WILL need thoracentesis  Speech therapy  Hospital bed  US for possible thora    Follow-up in about 4 weeks (around 3/19/2019), or labs today, CXR today, CXR next visit, aspiration precautions, for 6mwd.    This note was prepared using voice recognition system and is likely to have sound alike errors that may have been overlooked even after proof reading.  Please call me with any questions    Discussed diagnosis, its evaluation, treatment and usual course. All questions answered.    Thank you for the courtesy of participating in the care of this patient    Santos Cardozo MD

## 2019-02-19 NOTE — PHYSICIAN QUERY
PT Name: Noble Tripp  MR #: [id number]    Physician Query Form - Cause and Effect Relationship Clarification      CDS/: Taylor Blake   RN CCDS            Contact information:[email or web address]    This form is a permanent document in the medical record.     Query Date: February 19, 2019    By submitting this query, we are merely seeking further clarification of documentation. Please utilize your independent clinical judgment when addressing the question(s) below.    The Medical record contains the following:  Supporting Clinical Findings   Location in record                                                                        Bronch => HAEMOPHILUS INFLUENZAE & KLEBSIELLA PNEUMONIAE = > corado sensitive.                                                                                                                         CN 2/8     Sepsis Pulmonary source. CT Chest:Bilateral pleural effusions, greater on the right with further extensive infiltrates and atelectatic changes in the mid to lower lung fields as well as extensive underlying scarring. Small patches of infiltrate suggested right mid to upper lung field.   Cultures pending   Continue vanc, cefepime   Consult to pulmonary in AM   Monitor labs   Further evaluation/diagnostics/interventions/consults pending course                                                                                                                                                                                            H&P         Provider, please clarify if there is any correlation between _____Sepsis_______ and _____Haemophilus influenzae and Klebsiella pneumoniae___________.           Are the conditions:      [ X ] Due to or associated with each other   [  ] Unrelated to each other   [  ] Other (Please Specify): _________________________   [  ] Clinically Undetermined

## 2019-02-20 ENCOUNTER — TELEPHONE (OUTPATIENT)
Dept: PULMONOLOGY | Facility: CLINIC | Age: 74
End: 2019-02-20

## 2019-02-20 NOTE — TELEPHONE ENCOUNTER
----- Message from Santos Cardozo MD sent at 2/19/2019  6:36 PM CST -----  X-ray shows bilateral pleural effusions will get ultrasound to determine events enough fluid to take of

## 2019-02-21 ENCOUNTER — HOSPITAL ENCOUNTER (OUTPATIENT)
Dept: RADIOLOGY | Facility: HOSPITAL | Age: 74
Discharge: HOME | End: 2019-02-21
Attending: INTERNAL MEDICINE
Payer: MEDICARE

## 2019-02-21 DIAGNOSIS — J90 PLEURAL EFFUSION ON RIGHT: Primary | ICD-10-CM

## 2019-02-21 DIAGNOSIS — J90 RECURRENT RIGHT PLEURAL EFFUSION: ICD-10-CM

## 2019-02-21 PROCEDURE — 76604 US EXAM CHEST: CPT | Mod: 26,,, | Performed by: RADIOLOGY

## 2019-02-21 PROCEDURE — 76604 US CHEST MEDIASTINUM: ICD-10-PCS | Mod: 26,,, | Performed by: RADIOLOGY

## 2019-02-21 PROCEDURE — 76604 US EXAM CHEST: CPT | Mod: TC

## 2019-02-21 RX ORDER — LIDOCAINE HYDROCHLORIDE 10 MG/ML
1 INJECTION, SOLUTION EPIDURAL; INFILTRATION; INTRACAUDAL; PERINEURAL ONCE
Status: CANCELLED | OUTPATIENT
Start: 2019-02-21 | End: 2019-02-21

## 2019-02-21 NOTE — PHYSICIAN QUERY
PT Name: Noble Tripp  MR #: [id number]     PHYSICIAN QUERY -  ACUITY OF CONDITION CLARIFICATION      CDS/: Taylor Blake RN CCDS              Contact information:[email or web address]  This form is a permanent document in the medical record.     Query Date: February 21, 2019    By submitting this query, we are merely seeking further clarification of documentation to reflect the severity of illness of your patient. Please utilize your independent clinical judgment when addressing the question(s) below.    The Medical record reflects the following:     Indicators   Supporting Clinical Findings Location in Medical Record   x Documentation of condition Admitted to ICU for hypoxemic respiratory failure on continuous BiPAP   PN 2/6   x Lab Value(s) ISTAT PROCEDURE - Abnormal; Notable for the following components:     POC PO2 56 (*)       POC HCO3 23.0 (*)       POC SATURATED O2 89 (*)          ABG's on admit   x Radiology Findings 1. The left border of the heart is silhouetted. There is a moderate amount of alveolar consolidation in the inferior 1/3 of the left lung.  This is characteristic of pneumonia.  2. There is persistent opacification of the base of the left hemithorax.  This is characteristic of a small pleural effusion.  3. There has been interval development of a mild amount of interstitial and alveolar opacities seen in both lungs with Kerley B-lines visualized bilaterally.  This is characteristic of pulmonary edema. CXR 2/5   x Treatment                                 Medication BIPAP, CPAP NN    Other       Provider, please specify the acuity/chronicity of __Hyoxemic Respiratory Failure_______:    [   ] Acute   [  X ] Acute and/on chronic   [   ] Ruled Out   [   ]  Clinically Undetermined       Please document in your progress notes daily for the duration of treatment until resolved, and include in your discharge summary.

## 2019-02-25 ENCOUNTER — HOSPITAL ENCOUNTER (OUTPATIENT)
Facility: HOSPITAL | Age: 74
Discharge: HOME | End: 2019-02-25
Attending: INTERNAL MEDICINE | Admitting: INTERNAL MEDICINE
Payer: MEDICARE

## 2019-02-25 DIAGNOSIS — J90 PLEURAL EFFUSION ON RIGHT: ICD-10-CM

## 2019-02-25 LAB
APPEARANCE FLD: NORMAL
BODY FLD TYPE: NORMAL
COLOR FLD: YELLOW
GLUCOSE SERPL-MCNC: 92 MG/DL
LDH SERPL L TO P-CCNC: 250 U/L
LYMPHOCYTES NFR FLD MANUAL: 14 %
MONOS+MACROS NFR FLD MANUAL: 81 %
NEUTROPHILS NFR FLD MANUAL: 5 %
PATH INTERP FLD-IMP: NORMAL
PROT SERPL-MCNC: 6.9 G/DL
WBC # FLD: 738 /CU MM

## 2019-02-25 PROCEDURE — 83615 LACTATE (LD) (LDH) ENZYME: CPT

## 2019-02-25 PROCEDURE — 84155 ASSAY OF PROTEIN SERUM: CPT

## 2019-02-25 PROCEDURE — 88112 CYTOLOGY SPECIMEN- PULMONARY MEDICAL CYTOLOGY: ICD-10-PCS | Mod: 26,,, | Performed by: PATHOLOGY

## 2019-02-25 PROCEDURE — 87210 SMEAR WET MOUNT SALINE/INK: CPT

## 2019-02-25 PROCEDURE — 88305 CYTOLOGY SPECIMEN- PULMONARY MEDICAL CYTOLOGY: ICD-10-PCS | Mod: 26,,, | Performed by: PATHOLOGY

## 2019-02-25 PROCEDURE — 89051 BODY FLUID CELL COUNT: CPT

## 2019-02-25 PROCEDURE — 82945 GLUCOSE OTHER FLUID: CPT

## 2019-02-25 PROCEDURE — 84157 ASSAY OF PROTEIN OTHER: CPT

## 2019-02-25 PROCEDURE — 32555 PR THORACEN W/IMAG GUIDANCE: ICD-10-PCS | Mod: RT,,, | Performed by: INTERNAL MEDICINE

## 2019-02-25 PROCEDURE — 88112 CYTOPATH CELL ENHANCE TECH: CPT | Mod: 26,,, | Performed by: PATHOLOGY

## 2019-02-25 PROCEDURE — 87070 CULTURE OTHR SPECIMN AEROBIC: CPT

## 2019-02-25 PROCEDURE — 87116 MYCOBACTERIA CULTURE: CPT

## 2019-02-25 PROCEDURE — 87102 FUNGUS ISOLATION CULTURE: CPT

## 2019-02-25 PROCEDURE — 88305 TISSUE EXAM BY PATHOLOGIST: CPT | Performed by: PATHOLOGY

## 2019-02-25 PROCEDURE — 88305 TISSUE EXAM BY PATHOLOGIST: CPT | Mod: 26,,, | Performed by: PATHOLOGY

## 2019-02-25 PROCEDURE — 36415 COLL VENOUS BLD VENIPUNCTURE: CPT

## 2019-02-25 PROCEDURE — 32555 ASPIRATE PLEURA W/ IMAGING: CPT | Mod: RT | Performed by: INTERNAL MEDICINE

## 2019-02-25 PROCEDURE — 83615 LACTATE (LD) (LDH) ENZYME: CPT | Mod: 91

## 2019-02-25 PROCEDURE — 82947 ASSAY GLUCOSE BLOOD QUANT: CPT | Mod: 59

## 2019-02-25 PROCEDURE — 87205 SMEAR GRAM STAIN: CPT

## 2019-02-25 PROCEDURE — 32555 ASPIRATE PLEURA W/ IMAGING: CPT | Mod: RT,,, | Performed by: INTERNAL MEDICINE

## 2019-02-25 PROCEDURE — 87206 SMEAR FLUORESCENT/ACID STAI: CPT

## 2019-02-25 RX ORDER — LIDOCAINE HYDROCHLORIDE 10 MG/ML
1 INJECTION, SOLUTION EPIDURAL; INFILTRATION; INTRACAUDAL; PERINEURAL ONCE
Status: DISCONTINUED | OUTPATIENT
Start: 2019-02-25 | End: 2019-02-25 | Stop reason: HOSPADM

## 2019-02-25 NOTE — INTERVAL H&P NOTE
The patient has been examined and the H&P has been reviewed:    I concur with the findings and no changes have occurred since H&P was written.    Anesthesia/Surgery risks, benefits and alternative options discussed and understood by patient/family.          Active Hospital Problems    Diagnosis  POA    Pleural effusion on right [J90]  Yes      Resolved Hospital Problems   No resolved problems to display.

## 2019-02-25 NOTE — OP NOTE
[facility]  Thoracentesis  Procedure Note    SUMMARY     Date of Procedure: 2/25/2019     Procedure: Procedure(s) (LRB):  Thoracentesis (Right)     Surgeon(s) and Role:     * Santos Cardozo MD - Primary    Assisting Surgeon: None    Indications: RIGHT LARGE PLEURAL EFFUSION    Pre-Operative Diagnosis: RIGHT EFFUSION PLEURAL    Post-Operative Diagnosis: same    Anesthesia: Local    Technical Procedures Used:      Description of the Findings of the Procedure:      Consent: Informed consent was obtained. Risks of the procedure were discussed including: infection, bleeding, pain, pneumothorax.    Under sterile conditions the patient was positioned. Chlorhexadine solution and sterile drapes were utilized.  1% plain lidocaine was used to anesthetize between the rib space after localized under ultrasound. Fluid was obtained after catheter inserted without  difficulty and suction applied with minimal blood loss.  A dressing was applied to the wound and wound care instructions were provided.     1700.00 ml of serosanguinous pleural fluid was obtained. A sample was sent to Pathology for cytogenetics, flow, and cell counts, as well as for infection analysis.    Plan:    A follow up chest x-ray was ordered.  Bed Rest for 0 hours.  Tylenol 650 mg. for pain.    Significant Surgical Tasks Conducted by the Assistant(s), if Applicable:      Complications: None; patient tolerated the procedure well.    Total IV Fluids: 0.0ml    Estimated Blood Loss (EBL): * No values recorded between 2/25/2019 12:00 AM and 2/25/2019 12:19 PM *           Drains: none    Implants: none    Specimens: Tube 1: gram stain, AFB, Tube 2: Chemistry: Tube 3: Cytology, Lavender: Wbcc, Urine cup: cytology           Condition: stable    Disposition: PACU - hemodynamically stable.    Attestation: I was present and scrubbed for the entire procedure.

## 2019-02-25 NOTE — DISCHARGE SUMMARY
"[facility] -   Discharge Summary     Patient ID:  Noble Tripp  [id number]  73 y.o.  1945    Admit date: 2/25/2019    Discharge Date and Time:  02/25/2019 12:25 PM    Admitting Physician: Santos Cardozo MD     Discharge Provider: Santos Cardozo    Reason for Admission: Pleural effusion on right [J90]  Pleural effusion on right [J90]    Admission Condition: good    Procedures Performed: Procedure(s) (LRB):  Thoracentesis (Right)   1700mls drained    Hospital Course (synopsis of major diagnoses, care, treatment, and services provided during the course of the hospital stay):       Consults: None    Significant Diagnostic Studies: RIGHT THORACENTESIS    Final Diagnoses:    Principal Problem: Pleural effusion on right   Secondary Diagnoses:   Active Hospital Problems    Diagnosis  POA    *Pleural effusion on right [J90]  Yes      Resolved Hospital Problems   No resolved problems to display.       Discharged Condition: good    Discharge Exam:  /64   Pulse 71   Temp 98.6 °F (37 °C)   Resp 17   Ht 5' 8" (1.727 m)   Wt 72.6 kg (160 lb)   SpO2 97%   BMI 24.33 kg/m²   Lungs: clear to auscultation bilaterally    Disposition: Home or Self Care    Follow Up/Patient Instructions:     Medications:  Reconciled Home Medications:      Medication List      ASK your doctor about these medications    aspirin 81 mg Tab  Take 1 tablet by mouth Daily. Over the counter to help prevent stroke/heart attack     CHANTIX CONTINUING MONTH BOX 1 mg Tab  Generic drug:  varenicline  TAKE ONE TABLET BY MOUTH ONCE DAILY     COMP-AIR NEBULIZER COMPRESSOR Bailey  Generic drug:  nebulizer and compressor  Use as directed     fluconazole 100 MG tablet  Commonly known as:  DIFLUCAN  Take 1 tablet (100 mg total) by mouth once daily. for 10 days     gabapentin 800 MG tablet  Commonly known as:  NEURONTIN  Take 1 tablet (800 mg total) by mouth 3 (three) times daily.     garlic 2,000 mg Cap  Take 1 tablet by mouth " once daily.     Lactobacillus rhamnosus GG 10 billion cell capsule  Commonly known as:  CULTURELLE  Take 1 capsule by mouth once daily.     loratadine 10 mg tablet  Commonly known as:  CLARITIN  Take 1 tablet by mouth daily as needed.     multivitamin capsule  Take 1 capsule by mouth once daily.     oxyCODONE-acetaminophen 7.5-325 mg per tablet  Commonly known as:  PERCOCET  Take 1 tablet by mouth every 8 (eight) hours as needed for Pain.     pantoprazole 40 MG tablet  Commonly known as:  PROTONIX  Take 1 tablet (40 mg total) by mouth once daily.     pilocarpine 5 MG Tab  Commonly known as:  SALAGEN  TAKE 1 TABLET BY MOUTH 30 MINUTES PRIOR TO EACH MEAL     * PROAIR HFA 90 mcg/actuation inhaler  Generic drug:  albuterol  INHALE TWO PUFFS BY MOUTH EVERY 4 HOURS AS NEEDED FOR WHEEZING OR SHORTNESS OF BREATH     * albuterol 2.5 mg /3 mL (0.083 %) nebulizer solution  Commonly known as:  PROVENTIL  Take 3 mLs (2.5 mg total) by nebulization every 8 (eight) hours while awake.     REPATHA SURECLICK 140 mg/mL Pnij  Generic drug:  evolocumab  Inject 140 mg into the skin every 14 (fourteen) days.     tiotropium 18 mcg inhalation capsule  Commonly known as:  SPIRIVA WITH HANDIHALER  Inhale 1 capsule (18 mcg total) into the lungs once daily.     turmeric root extract 500 mg Cap  Take 1 capsule by mouth 2 (two) times daily.         * This list has 2 medication(s) that are the same as other medications prescribed for you. Read the directions carefully, and ask your doctor or other care provider to review them with you.              No discharge procedures on file.    Activity: activity as tolerated and no driving for today  Diet: clear liquids, advance as tolerated : patient has declined prior recommendations by Speech therapy, will still not consider EPG  Wound Care: keep wound clean and dry    Follow-up with me in several weeks.    Signed:  Santos Cardozo  2/25/2019  12:22 PM

## 2019-02-25 NOTE — DISCHARGE INSTRUCTIONS
Discharge Instructions for Thoracentesis  Thoracentesis is a procedure that removes extra fluid (pleural effusion) from the pleural space. This space is between the outside surface of the lungs (pleura) and the chest wall. The procedure may be done to take a sample of the fluid for testing to help find the cause. Or it may be done to drain the extra fluid if you are having trouble breathing.  Home care  · You may have some pain after the procedure. Your doctor can prescribe or recommend pain medicine for you to take at home, if needed. Take these exactly as directed. If you stopped taking other medicines before the procedure, ask your doctor when you can start them again.  · Take it easy for 48 hours after the procedure. Don't do anything active until your doctor says its OK.  · Don't do strenuous activities, such as lifting, until your doctor says its OK.  · You will have a small bandage over the puncture site. You may remove the bandage in 24 hours.  · Check the puncture site for the signs of infection listed below.  Follow-up  Make a follow-up appointment with your doctor as directed. During your follow-up visit, your doctor will check your healing. Be sure to let your doctor know how you are feeling.  When to call your doctor  Call your doctor right away if you have any of the following:  · Coughing up blood  · Chest pain. If chest pain suddenly gets worse, it may be an emergency.  · Shortness of breath  · Fever of 100.4°F (38°C) or higher, or as directed by your healthcare provider  · Pain that doesn't get better after taking pain medicine  · Signs of infection at the puncture site. These include increased pain, redness, swelling, or warmth.  · Fluid draining from the puncture site   Date Last Reviewed: 10/1/2016  © 7987-0271 [employer]. [address] [place] [address], [place] [address]. All rights reserved. This information is not intended as a substitute for professional medical care. Always follow  your healthcare professional's instructions.

## 2019-02-26 ENCOUNTER — TELEPHONE (OUTPATIENT)
Dept: PAIN MEDICINE | Facility: CLINIC | Age: 74
End: 2019-02-26

## 2019-02-26 VITALS
SYSTOLIC BLOOD PRESSURE: 115 MMHG | OXYGEN SATURATION: 96 % | TEMPERATURE: 99 F | DIASTOLIC BLOOD PRESSURE: 72 MMHG | HEIGHT: 68 IN | BODY MASS INDEX: 24.25 KG/M2 | RESPIRATION RATE: 17 BRPM | WEIGHT: 160 LBS | HEART RATE: 67 BPM

## 2019-02-26 LAB — KOH PREP SPEC: NORMAL

## 2019-02-26 NOTE — TELEPHONE ENCOUNTER
Patient was contacted to inform about location change for his upcoming appointment in Interventional Pain Management with Joselin Castellon PA-C.  Location changed from [facility] to [facility] while [place] is under construction.  Patient understands and accepts appointment change.

## 2019-02-28 ENCOUNTER — TELEPHONE (OUTPATIENT)
Dept: PULMONOLOGY | Facility: CLINIC | Age: 74
End: 2019-02-28

## 2019-02-28 LAB
GLUCOSE FLD-MCNC: 109 MG/DL
LDH FLD L TO P-CCNC: 149 U/L
PROT FLD-MCNC: 3.6 G/DL
SPECIMEN SOURCE: NORMAL

## 2019-02-28 NOTE — TELEPHONE ENCOUNTER
----- Message from Santos Cardozo MD sent at 2/28/2019  2:46 PM CST -----  NO CANCER CELLS      Santos Cardozo MD      FINAL PATHOLOGIC DIAGNOSIS  1. Right pleural fluid (cup):  Negative for malignant cells.  Benign mesothelial cells and inflammatory cells.  2. Right pleural fluid (tube):  Negative for malignant cells.  Benign mesothelial cells and inflammatory cells.  [facility]  Diagnosed by: Navid Hill M.D.  (Electronically Signed: 2019-02-28 13:31:54)

## 2019-03-01 ENCOUNTER — CLINICAL SUPPORT (OUTPATIENT)
Dept: SPEECH THERAPY | Facility: HOSPITAL | Age: 74
End: 2019-03-01
Attending: INTERNAL MEDICINE
Payer: MEDICARE

## 2019-03-01 DIAGNOSIS — R13.10 DYSPHAGIA, UNSPECIFIED TYPE: Primary | ICD-10-CM

## 2019-03-01 PROCEDURE — G8997 SWALLOW GOAL STATUS: HCPCS | Mod: CK

## 2019-03-01 PROCEDURE — G8996 SWALLOW CURRENT STATUS: HCPCS | Mod: CJ

## 2019-03-01 PROCEDURE — 92610 EVALUATE SWALLOWING FUNCTION: CPT

## 2019-03-01 NOTE — PROGRESS NOTES
1 Hour Evaluation of Speech and Language    Reason for Referral: Noble Tripp was referred for a speech/language evaluation by Dr. Santos Cardozo secondary to   1. Dysphagia, unspecified type           Mr. Tripp was accompanied by Arleen Tripp, his wife, who aided in providing the biographical and medical history. Mr. Tripp described the problem as coughing and choking when swallowing. Onset of symptoms was about 2 years ago.       Mr. Tripp reports that he was diagnosed with vocal cord cancer in 2011 and tongue and pyriform sinus cancer in 2014, at which time, he under went surgery to remove superficial portion of the tongue and completed radiation therapy. At the time of radiation therapy, he refused speech therapy services. In January 2019, he completed treatment for his 3rd diagnosis of aspiration pneumonia in 2 years. At the time, an MBS was completed. 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  He expressed his wishes for no feeding tubes and asked to continue eating orally. He continues to exhibit coughing and choking every time he eats or drinks, but especially on thin liquids.      Imaging: On 2/12/19 Mr. Tripp underwent an MBSS by Brooke Fried. The following results were copied forward from his chart:      Consistencies Assessed  · Thin, puree,      Oral Preparation/Oral Phase  · WFL- Pt with adequate bolus acceptance, containment, control and timely A-P transfer across consistencies      Pharyngeal Phase   Decreased tongue base retraction  Decreased laryngeal elevation  Decreased pharyngeal constriction  Trace aspiration of thin and trace aspiration of residue of puree  Residue of puree in valleculae and posterior pharyngeal wall     Cervical Esophageal Phase  · UES appeared to accommodate all bolus types without stasis or retrograde movement observed   ·   Impressions  ·  Pt presents with severe pharyngeal dysphagia characterized by reduced tongue base retraction, reduced laryngeal  "elevation and pharyngeal constriction resulting in residue throughout pharynx with trace aspiration of thin and residue of puree. He was unable to clear puree residue with consecutive swallows.        Medical history includes:  Past Medical History:   Diagnosis Date    Adrenal mass     david;nl fxn w/u    Aspiration pneumonia 10/15    puree/honey    Benign prostate hyperplasia     CAD (coronary artery disease)     dr padilla    Chronic pain     dr santos    COPD (chronic obstructive pulmonary disease)     papi    Ex-smoker     11/13    Hyperlipidemia     Osteopenia 1/15 tran 1/18    PVD (peripheral vascular disease)     noobs 07 khuri    Pyriform sinus [facility]    Sleep apnea     cpap    Squamous cell carcinoma of skin     roberto    Tongue cancer     "superficial" removed 11/14    Vocal cord cancer 2011    Xerostomia     radiation     Past Surgical History:   Procedure Laterality Date    APPENDECTOMY      Bronchoscopy Bilateral 2/5/2019    [facility]formed by [facility] at [facility]    COLONOSCOPY N/A 3/11/2014    Performed by Joe Goodman MD at [facility]    Due for screening colonoscopy N/A 5/1/2017    Performed by Anushka Yoder MD at [facility]    ESOPHAGOGASTRODUODENOSCOPY (EGD) N/A 8/29/2018    Performed by Audie Hill III, MD at [facility]    ESOPHAGOGASTRODUODENOSCOPY (EGD) N/A 5/29/2018    Performed by Audie Hill III, MD at [facility]    Thoracentesis Right 2/25/2019    Performed by [facility]ozo MD at [facility]    Thoracentesis Left 8/7/2018    Performed by [facility] MD at [facility]    tongue cancer excision  11/14    dr vitale    VOCAL CORD LATERALIZATION, ENDOSCOPIC APPROACH W/ MLB      kris       Social History: Mr. Tripp lives at home with his wife. Mr. Tripp is retired.  Mr. Tripp reports a history of smoking and alcohol.    Family History   Problem Relation Age of Onset    " Cancer Father     Cancer Brother      Oral-Peripheral: An oral peripheral examination was completed. Dentition was WFL. He exhibited adequate rotary mastication on trials of cracker. Velar function was WNL. Lingual/labial strength, tone and ROM were WNL. No lingual deviation or fasciculation or  noted. Mr. Tripp was able to protrude, retract, elevate, depress and lateralize the tongue as well as puff cheeks with air and smack lips together. Facial symmetry noted.       Swallowing: Mr. Tripp reported to currently on a regular consistency diet (against medical advice to adhere to dental soft and nectar thick liquids)  He denied odynophagia.     Clinical Swallow Eval:     Consistencies Assessed:  · Thin liquids, puree, and solids      Oral Phase:   · WFL     Pharyngeal Phase:   · coughing/choking  · decreased hyolaryngeal excursion to palpation  · multiple spontaneous swallows  · throat clearing  · wet vocal quality after swallow     Compensatory Strategies  · Effortful swallow  · Multiple swallows  · Volitional cough/throat clear   · Swallow-cough-swallow         Impressions: Mr. Tripp presents with dysphagia, as previously diagnosed. Prognosis with intervention is considered to be fair.     Recommendations:   1. Recommend speech therapy at initial frequency of 1 time per week for 50-60 minutes to address long term goals and short-term objectives described below.   2. Home program using techniques/strategies discussed in treatment sessions.  3. Patient to follow up with referring physician or PCP as needed or recommended.   4. Contact Speech Pathology with any further questions/concerns at [phone].    Long-term goal:  Mr. Tripp will:  1. Tolerate least restrictive diet with no overt signs or symptoms of aspiration.    Short-term objectives:  Mr. Tripp will:  1. Utilize compensatory swallow strategies with 90% accuracy during all P.O. Intake to decrease the risk of aspiration  2. Complete laryngeal  swallow exercises with 90% accuracy to increase the safety and efficiency of the swallow.  3. Participate in patient education each week to increase knowledge on making safest dietary choices, etc.      Discussed results with Mr. Tripp and his wife, who verbalized agreement with treatment plan.

## 2019-03-02 LAB
BACTERIA FLD AEROBE CULT: NO GROWTH
GRAM STN SPEC: NORMAL

## 2019-03-04 ENCOUNTER — TELEPHONE (OUTPATIENT)
Dept: CARDIOLOGY | Facility: CLINIC | Age: 74
End: 2019-03-04

## 2019-03-04 NOTE — TELEPHONE ENCOUNTER
Spoke with pts spouse who was wanting to know if pt needed to come in now.  Spouse was advised appt not due until August.  Offered to make appt now but spouse stated later would be fine.  All questions answered.     ----- Message from Jael Trinh sent at 3/4/2019  9:39 AM CST -----  Contact: Patients wife, Arleen  Type:  Needs Medical Advice    Who Called: Ms Bacon  Symptoms (please be specific):   How long has patient had these symptoms:    Pharmacy name and phone #:    Would the patient rather a call back or a response via [facility]? call  Best Call Back Number: [phone]  Additional Information: Ms Bacon needs to know if her  needs a follow up appt and labs

## 2019-03-06 ENCOUNTER — TELEPHONE (OUTPATIENT)
Dept: CARDIOLOGY | Facility: CLINIC | Age: 74
End: 2019-03-06

## 2019-03-06 NOTE — TELEPHONE ENCOUNTER
Dr Whitfield, Please write new Rx for Repatha and let me know when that's done.  Thank you.     ----- Message from Judy Vital sent at 3/6/2019  8:30 AM CST -----  OK so I think all we need is to send in a current prior auth for him. If Dr. Whitfield can send us a current prescription we can take care of that and send it in to [employer] to get him approved.  ----- Message -----  From: Rina Pizarro MA  Sent: 3/5/2019   4:28 PM  To: Judy Vital     Pt was denied for Repatha [employer].  Would Praulent be more affordable?  Rina    ----- Message -----  From: Andres Whitfield MD  Sent: 3/5/2019   2:35 PM  To: Rina Pizarro MA    Ask [facility] if should try raluent  ----- Message -----  From: Rina Pizarro MA  Sent: 3/5/2019   1:30 PM  To: Andres Whitfield MD    I received a fax stating that pt did not meet the criteria for Repatha [employer].  Would you like to try Praulent?

## 2019-03-07 RX ORDER — OXYCODONE AND ACETAMINOPHEN 7.5; 325 MG/1; MG/1
1 TABLET ORAL EVERY 8 HOURS PRN
Qty: 90 TABLET | Refills: 0 | Status: SHIPPED | OUTPATIENT
Start: 2019-04-18 | End: 2019-05-18

## 2019-03-07 RX ORDER — OXYCODONE AND ACETAMINOPHEN 7.5; 325 MG/1; MG/1
1 TABLET ORAL EVERY 8 HOURS PRN
Qty: 90 TABLET | Refills: 0 | Status: SHIPPED | OUTPATIENT
Start: 2019-03-19 | End: 2019-04-01

## 2019-03-08 ENCOUNTER — TELEPHONE (OUTPATIENT)
Dept: PHARMACY | Facility: CLINIC | Age: 74
End: 2019-03-08

## 2019-03-08 ENCOUNTER — CLINICAL SUPPORT (OUTPATIENT)
Dept: SPEECH THERAPY | Facility: HOSPITAL | Age: 74
End: 2019-03-08
Payer: MEDICARE

## 2019-03-08 DIAGNOSIS — R13.10 DYSPHAGIA, UNSPECIFIED TYPE: Primary | ICD-10-CM

## 2019-03-08 PROCEDURE — 92526 ORAL FUNCTION THERAPY: CPT

## 2019-03-11 ENCOUNTER — OFFICE VISIT (OUTPATIENT)
Dept: DERMATOLOGY | Facility: CLINIC | Age: 74
End: 2019-03-11
Payer: MEDICARE

## 2019-03-11 ENCOUNTER — OFFICE VISIT (OUTPATIENT)
Dept: PAIN MEDICINE | Facility: CLINIC | Age: 74
End: 2019-03-11
Payer: MEDICARE

## 2019-03-11 VITALS
SYSTOLIC BLOOD PRESSURE: 124 MMHG | HEIGHT: 68 IN | DIASTOLIC BLOOD PRESSURE: 57 MMHG | WEIGHT: 160 LBS | BODY MASS INDEX: 24.25 KG/M2 | RESPIRATION RATE: 18 BRPM | HEART RATE: 63 BPM

## 2019-03-11 DIAGNOSIS — M47.816 LUMBAR FACET ARTHROPATHY: ICD-10-CM

## 2019-03-11 DIAGNOSIS — Z12.83 SCREENING, MALIGNANT NEOPLASM, SKIN: ICD-10-CM

## 2019-03-11 DIAGNOSIS — D48.5 NEOPLASM OF UNCERTAIN BEHAVIOR OF SKIN: ICD-10-CM

## 2019-03-11 DIAGNOSIS — L57.0 ACTINIC KERATOSES: ICD-10-CM

## 2019-03-11 DIAGNOSIS — M51.36 DDD (DEGENERATIVE DISC DISEASE), LUMBAR: ICD-10-CM

## 2019-03-11 DIAGNOSIS — M47.816 LUMBAR SPONDYLOSIS: Primary | ICD-10-CM

## 2019-03-11 DIAGNOSIS — D04.22 SQUAMOUS CELL CARCINOMA IN SITU OF SKIN OF LEFT EAR: Primary | ICD-10-CM

## 2019-03-11 DIAGNOSIS — M53.3 SACROILIAC JOINT PAIN: ICD-10-CM

## 2019-03-11 DIAGNOSIS — M48.061 SPINAL STENOSIS OF LUMBAR REGION WITHOUT NEUROGENIC CLAUDICATION: ICD-10-CM

## 2019-03-11 DIAGNOSIS — L90.5 SCAR CONDITIONS/SKIN FIBROSIS: ICD-10-CM

## 2019-03-11 DIAGNOSIS — Z85.828 HISTORY OF SKIN CANCER: ICD-10-CM

## 2019-03-11 PROCEDURE — 99999 PR PBB SHADOW E&M-EST. PATIENT-LVL III: ICD-10-PCS | Mod: PBBFAC,,, | Performed by: DERMATOLOGY

## 2019-03-11 PROCEDURE — 17000 DESTRUCT PREMALG LESION: CPT | Mod: PBBFAC,PN | Performed by: DERMATOLOGY

## 2019-03-11 PROCEDURE — 11102 TANGNTL BX SKIN SINGLE LES: CPT | Mod: S$PBB,,, | Performed by: DERMATOLOGY

## 2019-03-11 PROCEDURE — 17003 DESTRUCT PREMALG LES 2-14: CPT | Mod: S$PBB,,, | Performed by: DERMATOLOGY

## 2019-03-11 PROCEDURE — 17000 PR DESTRUCTION(LASER SURGERY,CRYOSURGERY,CHEMOSURGERY),PREMALIGNANT LESIONS,FIRST LESION: ICD-10-PCS | Mod: 59,S$PBB,, | Performed by: DERMATOLOGY

## 2019-03-11 PROCEDURE — 99213 PR OFFICE/OUTPT VISIT, EST, LEVL III, 20-29 MIN: ICD-10-PCS | Mod: 25,S$PBB,, | Performed by: DERMATOLOGY

## 2019-03-11 PROCEDURE — 99999 PR PBB SHADOW E&M-EST. PATIENT-LVL IV: CPT | Mod: PBBFAC,,, | Performed by: PHYSICIAN ASSISTANT

## 2019-03-11 PROCEDURE — 99999 PR PBB SHADOW E&M-EST. PATIENT-LVL IV: ICD-10-PCS | Mod: PBBFAC,,, | Performed by: PHYSICIAN ASSISTANT

## 2019-03-11 PROCEDURE — 99213 OFFICE O/P EST LOW 20 MIN: CPT | Mod: 25,S$PBB,, | Performed by: DERMATOLOGY

## 2019-03-11 PROCEDURE — 17003 DESTRUCT PREMALG LES 2-14: CPT | Mod: PBBFAC,PN | Performed by: DERMATOLOGY

## 2019-03-11 PROCEDURE — 88305 TISSUE SPECIMEN TO PATHOLOGY, DERMATOLOGY: ICD-10-PCS | Mod: 26,,, | Performed by: PATHOLOGY

## 2019-03-11 PROCEDURE — 99214 PR OFFICE/OUTPT VISIT, EST, LEVL IV, 30-39 MIN: ICD-10-PCS | Mod: S$PBB,,, | Performed by: PHYSICIAN ASSISTANT

## 2019-03-11 PROCEDURE — 99214 OFFICE O/P EST MOD 30 MIN: CPT | Mod: PBBFAC,PN | Performed by: PHYSICIAN ASSISTANT

## 2019-03-11 PROCEDURE — 11102 TANGNTL BX SKIN SINGLE LES: CPT | Mod: 59,PBBFAC,PN | Performed by: DERMATOLOGY

## 2019-03-11 PROCEDURE — 99214 OFFICE O/P EST MOD 30 MIN: CPT | Mod: S$PBB,,, | Performed by: PHYSICIAN ASSISTANT

## 2019-03-11 PROCEDURE — 99999 PR PBB SHADOW E&M-EST. PATIENT-LVL III: CPT | Mod: PBBFAC,,, | Performed by: DERMATOLOGY

## 2019-03-11 PROCEDURE — 17003 DESTRUCTION, PREMALIGNANT LESIONS; SECOND THROUGH 14 LESIONS: ICD-10-PCS | Mod: S$PBB,,, | Performed by: DERMATOLOGY

## 2019-03-11 PROCEDURE — 88305 TISSUE EXAM BY PATHOLOGIST: CPT | Performed by: PATHOLOGY

## 2019-03-11 PROCEDURE — 11102 PR TANGENTIAL BIOPSY, SKIN, SINGLE LESION: ICD-10-PCS | Mod: S$PBB,,, | Performed by: DERMATOLOGY

## 2019-03-11 PROCEDURE — 17000 DESTRUCT PREMALG LESION: CPT | Mod: 59,S$PBB,, | Performed by: DERMATOLOGY

## 2019-03-11 PROCEDURE — 99213 OFFICE O/P EST LOW 20 MIN: CPT | Mod: PBBFAC,PN,27,25 | Performed by: DERMATOLOGY

## 2019-03-11 RX ORDER — FLUOROURACIL 50 MG/G
CREAM TOPICAL
Qty: 40 G | Refills: 1 | Status: SHIPPED | OUTPATIENT
Start: 2019-03-11 | End: 2019-06-05 | Stop reason: ALTCHOICE

## 2019-03-11 RX ORDER — GABAPENTIN 800 MG/1
800 TABLET ORAL 3 TIMES DAILY
Qty: 90 TABLET | Refills: 1 | Status: SHIPPED | OUTPATIENT
Start: 2019-03-11 | End: 2019-05-16 | Stop reason: SDUPTHER

## 2019-03-11 NOTE — PROGRESS NOTES
Chief Pain Complaint:  Low Back Pain, Leg pain (left > right)       History of Present Illness:  This patient is a 73 y.o. male who presents today complaining of the above noted pain/s. The patient describes this pain as follows.    - duration of pain: pain for years   - timing: constant   - character: aching, burning  - radiating, dermatomal: extends into left leg, along L5 pattern at times, mostly non-radiating  - antecedent trauma, prior spinal surgery: none  - pertinent negatives: No fever, No chills, No weight loss, No bladder dysfunction, No bowel dysfunction, No extremity weakness, No saddle anesthesia  - pertinent positives: none    - medications, other therapies tried (physical therapy, injections):     >> gabapentin, Norco    >> Has previously undergone Physical Therapy, which made pain worse    >> Has previously undergone spinal injection/s: including lumbar MBB and Left TFESI with Dr Taylor in 2014 & 2015 (see EMR Surgeries for full details) with only short term relief    _________________________________________________________________________________________________________________________________________________________________________________________________________________________      IMAGING / Labs / Studies (reviewed on 3/11/2019):    9/15/17 MRI LUMBAR SPINE WITHOUT CONTRAST  History: Lower back pain.  Left lower extremity pain  Technique: Standard multiplanar pulse sequences without IV contrast.  Comparison:  No prior  studies available for comparison.  Findings:   Mild-moderate scoliosis, convex to the left and centered at L3.  All lumbar vertebral bodies are normal in height.  Scattered degenerative endplate marrow signal identified at the L2-L3 and L3-L4 L5-S1 levels.  No fracture.  No suspicious osseous lesion is identified.  Mild retrolisthesis of L2-L3.  Distal spinal cord and conus medullaris have normal appearance but the conus terminates at the T12-L1 level.  T12-L1: Minimal central  protrusion.  Negative for focal nerve root impingement or central canal narrowing.  Neural foramina widely patent.  L1-2: Mild disc height loss.  Prominent intraosseous eyes.  Mild disc bulging.  Mild facet arthropathy and ligament flavum hypertrophy.  The central canal neural foramina patent.  L2-L3: Minimal retrolisthesis of L2 on L3.  There is moderate disc height loss.  Prominent anterior osteophytes.  Mild-moderate diffuse generalized disc bulging.  Mild facet arthropathy and ligament flavum hypertrophy.  The central canal is patent.  Neural foramina patent.  L3-L4: Disc desiccation and moderate disc height loss.  Large anterior osteophytes are present.  There is mild disc bulging and osteophytic ridging at the posterior disc margin.  Moderate right and mild left facet arthropathy and ligamentum flavum hypertrophy.  The central canal is patent.  Mild neural foraminal narrowing.  L4-L5: Very minimal grade 1 spondylolisthesis of L4-L5.  Mild generalized disc bulge is present.  Severe facet arthropathy and ligamentum flavum hypertrophy.  There is severe central canal and lateral recess stenosis.  Moderate neural foraminal stenosis.  L5-S1: Disc desiccation and moderate to severe disc height loss.  Prominent intraocular heights.  There is mild disc bulging and osteophytic ridging at the posterior disc margin.  Severe left and moderate right neural foraminal stenosis.  The central canal is patent.   Paravertebral soft tissues and musculature are normal.  The visualized intra-abdominal and intrapelvic contents are normal.       10/30/14 MRI Lumbar Spine Without Contrast    Narrative Exam: MRI of the lumbar spine without contrast.  History:     Low back pain. Status post SHEY, with S1-S2 radicular symptoms  Comparison: Prior study dated 04/02/13  Findings:     A convex right scoliosis again noted.  No compression fracture or subluxation.  The conus medullaris has a normal appearance.  The paraspinous soft tissues are  "unremarkable.  The T12 -- L1 and L1 -- 2 disk levels are essentially unremarkable.  L2 -- 3: Mild annular bulging again noted.    L3 -- 4: Moderate narrowing of the right lateral disk space, with right greater than left facet arthropathy, unchanged.    L4 -- 5: Marked bilateral facet arthropathy, with mild annular bulging, causing moderate central canal stenosis, unchanged.  L5 -- S1: Disk desiccation, with moderate disk space narrowing.  Significant degenerative narrowing of the left L5 neural foramen is apparent, unchanged.     _________________________________________________________________________________________________________________________________________________________________________________________________________________________      Review of Systems:  CONSTITUTIONAL: patient denies any fever, chills, or weight loss  SKIN: patient denies any rash or itching  RESPIRATORY: patient denies having any shortness of breath  GASTROINTESTINAL: patient denies having any diarrhea, constipation, or bowel incontinence  GENITOURINARY: patient denies having any abnormal bladder function    MUSCULOSKELETAL:  - patient reports low back pain    NEUROLOGICAL:   - pain as above  - strength in Lower extremities is intact, BILATERALLY  - sensation in Lower extremities is intact, BILATERALLY  - patient denies any loss of bowel or bladder control      PSYCHIATRIC: patient denies any suicidal or homicidal ideations    _________________________________________________________________________________________________________________________________________________________________________________________________________________________      Physical Exam:  Vitals:  BP (!) 124/57 (BP Location: Right arm, Patient Position: Sitting, BP Method: Medium (Automatic))   Pulse 63   Resp 18   Ht 5' 8" (1.727 m)   Wt 72.6 kg (160 lb)   BMI 24.33 kg/m²   (reviewed on 3/11/2019)    General: alert and oriented, in no apparent " distress.  Gait: normal gait.  Skin: no rashes, no discoloration, no obvious lesions  HEENT: normocephalic, atraumatic. Pupils equal and round.  Cardiovascular: no significant peripheral edema present.  Respiratory: without use of accessory muscles of respiration.    Musculoskeletal - Lumbar Spine:  - Pain on flexion of lumbar spine: Absent   - Pain on extension of lumbar spine: Present  - Lumbar facet loading: Present bilaterally  - TTP over the lumbar facet joints: Present Bilaterally, worse at L5-S1   - TTP over the SI joints: Present on left   - Straight Leg Raise: Negative  - NOELLE: Present  - Pain with internal and external rotation of hip    Neuro - Lower Extremities:  - BLE Strength: R/L: HF: 5/5, HE: 5/5, KF: 5/5; KE: 5/5; FE: 5/5; FF: 5/5  - Extremity Reflexes: Brisk and symmetric throughout  - Sensory: Sensation to light touch intact bilaterally      Psych:  Mood and affect is appropriate    _________________________________________________________________________________________________________________________________________________________________________________________________________________________     Assessment:  Noble Tripp is a 73 y.o. year old male who is presenting with   Encounter Diagnoses   Name Primary?    Lumbar spondylosis Yes    DDD (degenerative disc disease), lumbar     Lumbar facet arthropathy     Sacroiliac joint pain     Spinal stenosis of lumbar region without neurogenic claudication      Patient returns for follow-up.  He complains of chronic low back pain. Lumbar MRI shows advanced diffuse spondylosis, greatest at the L4-L5 level with severe central canal stenosis and lateral recess stenosis.       Plan:  1. Interventional: Consider Bilateral L3, L4, L5 MBB with local. He feels injections weren't long lasting in the past. We will consider this if pain not controlled with medication, but he is stable at this time.    2. Pharmacologic:   - Refill Percocet 7.5/325 mg  PO TID PRN (90 tabs) x 2 months. Paper Rx given. Patient tolerating opioids with no side effects, obtaining good pain control with functional improvement.   - Refill Gabapentin 800mg TID.  - Opioid contract signed on 3/20/2018.     - LA  reviewed and appropriate. Last filled 2-18-19.  Will post date accordingly.  - Will order UDS today to ensure medication compliance.      3. Rehabilitative: Encouraged regular exercise.    4. Diagnostic: None for now.    5. Follow up: 9 weeks for med refill.    - I discussed the risks, benefits, and alternatives to potential treatment options. All questions and concerns were fully addressed today in clinic. Dr. Mueller was consulted regarding the patient plan and agrees.             >> UDS:  6-28-16 :: appropriate  2/28/2017 :: appropriate  8/18/2017 :: not in EMR  5/16/2018 :: appropriate  9/11/2018 :: appropriate  3/11/2019 :: pending

## 2019-03-11 NOTE — PATIENT INSTRUCTIONS
CRYOSURGERY      Your doctor has used a method called cryosurgery to treat your skin condition. Cryosurgery refers to the use of very cold substances to treat a variety of skin conditions such as warts, pre-skin cancers, molluscum contagiosum, sun spots, and several benign growths. The substance we use in cryosurgery is liquid nitrogen and is so cold (-195 degrees Celsius) that is burns when administered.     Following treatment in the office, the skin may immediately burn and become red. You may find the area around the lesion is affected as well. It is sometimes necessary to treat not only the lesion, but a small area of the surrounding normal skin to achieve a good response.     A blister, and even a blood filled blister, may form after treatment.   This is a normal response. If the blister is painful, it is acceptable to sterilize a needle and with rubbing alcohol and gently pop the blister. It is important that you gently wash the area with soap and warm water as the blister fluid may contain wart virus if a wart was treated. Do no remove the roof of the blister.     The area treated can take anywhere from 1-3 weeks to heal. Healing time depends on the kind of skin lesion treated, the location, and how aggressively the lesion was treated. It is recommended that the areas treated are covered with Vaseline or bacitracin ointment and a band-aid. If a band-aid is not practical, just ointment applied several times per day will do. Keeping these areas moist will speed the healing time.    Treatment with liquid nitrogen can leave a scar. In dark skin, it may be a light or dark scar, in light skin it may be a white or pink scar. These will generally fade with time.    If you have any concerns after your treatment, please feel free to call the office.         [facility] DERMATOLOGY  [address] [address]  Dept: [phone]  Dept Fax: [phone]     Shave Biopsy Wound  Care    Your doctor has performed a shave biopsy today.  A band aid and vaseline ointment has been placed over the site.  This should remain in place for 24 hours.  It is recommended that you keep the area dry for the first 24 hours.  After 24 hours, you may remove the band aid and wash the area with warm soap and water and apply Vaseline jelly.  Many patients prefer to use Neosporin or Bacitracin ointment.  This is acceptable; however, know that you can develop an allergy to this medication even if you have used it safely for years.  It is important to keep the area moist.  Letting it dry out and get air slows healing time, and will worsen the scar.  Band aid is optional after first 24 hours.      If you notice increasing redness, tenderness, pain, or yellow drainage at the biopsy site, please notify your doctor.  These are signs of an infection.    If your biopsy site is bleeding, apply firm pressure for 15 minutes straight.  Repeat for another 15 minutes, if it is still bleeding.   If the surgical site continues to bleed, then please contact your doctor.      [facility] - [facility]   DERMATOLOGY  [address]   [facility] [address]   Dept: [phone]   Dept Fax: [phone]

## 2019-03-11 NOTE — PROGRESS NOTES
Subjective:       Patient ID:  Noble Tripp is a 73 y.o. male who presents for   Chief Complaint   Patient presents with    Skin Check     no concerns     Current, untreated SCCIS of the left posterior ear and hx of NMSC of the right forearm and chest, last seen on 11/29/18.  He has not had surgery of the SCCIS of the left posterior ear due to concern for Mohs surgery and thought that skin cancer would not spread.                   Review of Systems   Skin: Positive for activity-related sunscreen use. Negative for daily sunscreen use and recent sunburn.   Hematologic/Lymphatic: Does not bruise/bleed easily.        Objective:    Physical Exam   Constitutional: He appears well-developed and well-nourished. No distress.   Neurological: He is alert and oriented to person, place, and time. He is not disoriented.   Psychiatric: He has a normal mood and affect.   Skin:   Areas Examined (abnormalities noted in diagram):   Scalp / Hair Palpated and Inspected  Head / Face Inspection Performed  Neck Inspection Performed  Chest / Axilla Inspection Performed  Abdomen Inspection Performed  Back Inspection Performed  RUE Inspected  LUE Inspection Performed  Nails and Digits Inspection Performed                   Diagram Legend     Erythematous scaling macule/papule c/w actinic keratosis       Vascular papule c/w angioma      Pigmented verrucoid papule/plaque c/w seborrheic keratosis      Yellow umbilicated papule c/w sebaceous hyperplasia      Irregularly shaped tan macule c/w lentigo     1-2 mm smooth white papules consistent with Milia      Movable subcutaneous cyst with punctum c/w epidermal inclusion cyst      Subcutaneous movable cyst c/w pilar cyst      Firm pink to brown papule c/w dermatofibroma      Pedunculated fleshy papule(s) c/w skin tag(s)      Evenly pigmented macule c/w junctional nevus     Mildly variegated pigmented, slightly irregular-bordered macule c/w mildly atypical nevus      Flesh colored to  evenly pigmented papule c/w intradermal nevus       Pink pearly papule/plaque c/w basal cell carcinoma      Erythematous hyperkeratotic cursted plaque c/w SCC      Surgical scar with no sign of skin cancer recurrence      Open and closed comedones      Inflammatory papules and pustules      Verrucoid papule consistent consistent with wart     Erythematous eczematous patches and plaques     Dystrophic onycholytic nail with subungual debris c/w onychomycosis     Umbilicated papule    Erythematous-base heme-crusted tan verrucoid plaque consistent with inflamed seborrheic keratosis     Erythematous Silvery Scaling Plaque c/w Psoriasis     See annotation    FINAL PATHOLOGIC DIAGNOSIS  Skin, left posterior ear, shave biopsy:  -SQUAMOUS CELL CARCINOMA IN-SITU INVOLVING HAIR FOLLICLES  -THE LESION EXTENDS TO THE DEEP BIOPSY EDGE AND PERIPHERAL BIOPSY EDGES    Assessment / Plan:      Pathology Orders:     Normal Orders This Visit    Tissue Specimen To Pathology, Dermatology     Questions:    Directional Terms:  Other(comment)    Clinical Information:  r/o BCC vs. 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 Discussed treatment options of efudex vs. Mohs.  Pt opts for efudex.  Discussed decreased clearance rate with efudex use vs. Mohs.  Pt wishes to proceed with efudex. Will begin treatment with efudex applied to the left posterior ear BID for 6 weeks.  Side effects discussed including blistering, irritation and redness.  If patient develops blisters, patient instructed to hold efudex and use cortisone cream BID x 2-3 days until blister resolves.  Once blister resolves, pt should resume treatment with efudex.  Patient acknowledged understanding.      Scar conditions/skin fibrosis  Screening, malignant neoplasm,  skin  History of skin cancer  Scar of the right forearm, chest, hx of NMSC.  No evidence of recurrence on physical exam today.  Continue routine skin surveillance. Daily sunscreen advised.    Actinic keratoses  Cryosurgery Procedure Note    The patient is informed of the precancerous quality and need for treatment of these lesions. After risks, benefits and alternatives explained, including blistering, pain, hyper- and hypopigmentation, patient verbally consents to cryotherapy to precancerous lesions. Liquid nitrogen cryosurgery is applied to the 3 actinic keratoses, as detailed in the physical exam, to produce a freeze injury. The patient is aware that blisters may form and is instructed on wound care with gentle cleansing and use of vaseline ointment to keep moist until healed. The patient is supplied a handout on cryosurgery and is instructed to call if lesions do not completely resolve.    Neoplasm of uncertain behavior of skin  -     Tissue Specimen To Pathology, Dermatology  -     Shave biopsy(-ies) done of 3 site(s).   Patient informed to call for results within 2 weeks if have not received notification via telephone call or [facility]           Follow-up for call for results.     PROCEDURE NOTE - SHAVE BIOPSY   Location: see above    After risk, benefits, and alternatives were discussed with the patient, the patient agrees to the procedure by verbal informed consent.  The area(s) were cleansed with alcohol. 2 cc of lidocaine 1% with epinephrine was injected for local anesthesia into each lesion(s).  A sharp dermablade was used to remove part or all of the lesion(s).  The specimen(s) will be sent for tissue pathology.  Hemostasis was obtained with aluminum chloride and/or hyfrecation.  The area(s) were dressed with vaseline ointment and bandaged.  The patient tolerated the procedure well without adverse events.  Wound care instructions were given to the patient on the AVS.  The patient will be notified of  pathology results once available. Results will also be available in Epic.

## 2019-03-14 ENCOUNTER — TELEPHONE (OUTPATIENT)
Dept: DERMATOLOGY | Facility: CLINIC | Age: 74
End: 2019-03-14

## 2019-03-14 NOTE — TELEPHONE ENCOUNTER
Advised pt that he needs to apply meds twice a day. He stated he had been wiping it off so it doesn't get on his pillow I advised him to apply it a few hours before bed so It does not get on his pillow or so that he doesn't feel the need to wipe it off. Pt voiced understanding and had no further questions or concerns.

## 2019-03-15 ENCOUNTER — CLINICAL SUPPORT (OUTPATIENT)
Dept: SPEECH THERAPY | Facility: HOSPITAL | Age: 74
End: 2019-03-15
Attending: FAMILY MEDICINE
Payer: MEDICARE

## 2019-03-15 DIAGNOSIS — R13.10 DYSPHAGIA, UNSPECIFIED TYPE: Primary | ICD-10-CM

## 2019-03-15 PROCEDURE — 92526 ORAL FUNCTION THERAPY: CPT

## 2019-03-19 NOTE — TELEPHONE ENCOUNTER
FOR DOCUMENTATION ONLY: Financial Assistance for Repatha is approved from 3/19/19 to 12/31/19  Source [employer]OV[employer] .Sending a staff message to Dr Andres Whitfield regarding assistance approval.

## 2019-03-20 ENCOUNTER — TELEPHONE (OUTPATIENT)
Dept: DERMATOLOGY | Facility: CLINIC | Age: 74
End: 2019-03-20

## 2019-03-20 NOTE — TELEPHONE ENCOUNTER
----- Message from Dayton Mccall sent at 3/20/2019 12:00 PM CDT -----  Contact: pt wife - arleen   Type:  Test Results    Who Called: Arleen    Name of Test (Lab/Mammo/Etc): biopsy  Date of Test: last week   Ordering Provider: thea   Where the test was performed: [facility]   Would the patient rather a call back or a response via [facility]? Phone   Best Call Back Number: [phone] or [phone]  Additional Information:  If the test results are in

## 2019-03-22 ENCOUNTER — TELEPHONE (OUTPATIENT)
Dept: CARDIOLOGY | Facility: CLINIC | Age: 74
End: 2019-03-22

## 2019-03-22 ENCOUNTER — TELEPHONE (OUTPATIENT)
Dept: DERMATOLOGY | Facility: CLINIC | Age: 74
End: 2019-03-22

## 2019-03-22 ENCOUNTER — CLINICAL SUPPORT (OUTPATIENT)
Dept: SPEECH THERAPY | Facility: HOSPITAL | Age: 74
End: 2019-03-22
Payer: MEDICARE

## 2019-03-22 DIAGNOSIS — R13.10 DYSPHAGIA, UNSPECIFIED TYPE: Primary | ICD-10-CM

## 2019-03-22 PROCEDURE — 92526 ORAL FUNCTION THERAPY: CPT

## 2019-03-22 NOTE — TELEPHONE ENCOUNTER
----- Message from Cash Posadas sent at 3/22/2019  9:06 AM CDT -----  Contact: Pt/Wife  Please give pt wife a call at ..[phone] (home) [phone])regarding more info that's needed for the new script the pt was needed

## 2019-03-22 NOTE — TELEPHONE ENCOUNTER
----- Message from Yolanda Yeager sent at 3/22/2019  8:46 AM CDT -----  Contact: wife mrs [email or web address]-[phone] our [phone]  Would like to consult with the nurse, patients was seen in the office and was told he would be seen in 6 weeks,  Wife  would like to speak with the  Nurse  concerning this, please call back at [phone], thanks sj

## 2019-03-22 NOTE — TELEPHONE ENCOUNTER
Returned call. Spoke with spouse. Spouse stated pharmacy requiring new rx with directions for repatha, number to call  [phone].    I then called pharmacy and repatha instructions  evolocumab (REPATHA SURECLICK) 140 mg/mL PnIj 140 mg, Every 14 days           Summary: Inject 1 mL (140 mg total) into the skin every 14 (fourteen) days., Starting Wed 3/6/2019, Normal   Dose, Route, Frequency: 140 mg, Subcutaneous, Every 14 days        Dispense 6 with 3 refills.    Spouse notified

## 2019-03-27 LAB — FUNGUS SPEC CULT: NORMAL

## 2019-03-29 ENCOUNTER — CLINICAL SUPPORT (OUTPATIENT)
Dept: SPEECH THERAPY | Facility: HOSPITAL | Age: 74
End: 2019-03-29
Payer: MEDICARE

## 2019-03-29 ENCOUNTER — LAB VISIT (OUTPATIENT)
Dept: LAB | Facility: HOSPITAL | Age: 74
End: 2019-03-29
Attending: INTERNAL MEDICINE
Payer: MEDICARE

## 2019-03-29 DIAGNOSIS — D64.9 CHRONIC ANEMIA: ICD-10-CM

## 2019-03-29 DIAGNOSIS — R13.10 DYSPHAGIA, UNSPECIFIED TYPE: Primary | ICD-10-CM

## 2019-03-29 DIAGNOSIS — Z86.39 HISTORY OF IRON DEFICIENCY: ICD-10-CM

## 2019-03-29 LAB
ALBUMIN SERPL BCP-MCNC: 3.3 G/DL (ref 3.5–5.2)
ALP SERPL-CCNC: 75 U/L (ref 55–135)
ALT SERPL W/O P-5'-P-CCNC: 8 U/L (ref 10–44)
ANION GAP SERPL CALC-SCNC: 7 MMOL/L (ref 8–16)
AST SERPL-CCNC: 10 U/L (ref 10–40)
BASOPHILS # BLD AUTO: 0.14 K/UL (ref 0–0.2)
BASOPHILS NFR BLD: 2.1 % (ref 0–1.9)
BILIRUB SERPL-MCNC: 0.4 MG/DL (ref 0.1–1)
BUN SERPL-MCNC: 13 MG/DL (ref 8–23)
CALCIUM SERPL-MCNC: 9.4 MG/DL (ref 8.7–10.5)
CHLORIDE SERPL-SCNC: 102 MMOL/L (ref 95–110)
CO2 SERPL-SCNC: 29 MMOL/L (ref 23–29)
CREAT SERPL-MCNC: 0.9 MG/DL (ref 0.5–1.4)
CRP SERPL-MCNC: 17.7 MG/L (ref 0–8.2)
DIFFERENTIAL METHOD: ABNORMAL
EOSINOPHIL # BLD AUTO: 0.4 K/UL (ref 0–0.5)
EOSINOPHIL NFR BLD: 6 % (ref 0–8)
ERYTHROCYTE [DISTWIDTH] IN BLOOD BY AUTOMATED COUNT: 18.3 % (ref 11.5–14.5)
EST. GFR  (AFRICAN AMERICAN): >60 ML/MIN/1.73 M^2
EST. GFR  (NON AFRICAN AMERICAN): >60 ML/MIN/1.73 M^2
FERRITIN SERPL-MCNC: 121 NG/ML (ref 20–300)
GLUCOSE SERPL-MCNC: 111 MG/DL (ref 70–110)
HCT VFR BLD AUTO: 36.4 % (ref 40–54)
HGB BLD-MCNC: 11.5 G/DL (ref 14–18)
IRON SERPL-MCNC: 42 UG/DL (ref 45–160)
LYMPHOCYTES # BLD AUTO: 1.1 K/UL (ref 1–4.8)
LYMPHOCYTES NFR BLD: 15.8 % (ref 18–48)
MCH RBC QN AUTO: 27.2 PG (ref 27–31)
MCHC RBC AUTO-ENTMCNC: 31.6 G/DL (ref 32–36)
MCV RBC AUTO: 86 FL (ref 82–98)
MONOCYTES # BLD AUTO: 0.5 K/UL (ref 0.3–1)
MONOCYTES NFR BLD: 7.4 % (ref 4–15)
NEUTROPHILS # BLD AUTO: 4.7 K/UL (ref 1.8–7.7)
NEUTROPHILS NFR BLD: 68.7 % (ref 38–73)
PLATELET # BLD AUTO: 268 K/UL (ref 150–350)
PMV BLD AUTO: 9.7 FL (ref 9.2–12.9)
POTASSIUM SERPL-SCNC: 4.3 MMOL/L (ref 3.5–5.1)
PROT SERPL-MCNC: 7.8 G/DL (ref 6–8.4)
RBC # BLD AUTO: 4.23 M/UL (ref 4.6–6.2)
SATURATED IRON: 16 % (ref 20–50)
SODIUM SERPL-SCNC: 138 MMOL/L (ref 136–145)
TOTAL IRON BINDING CAPACITY: 259 UG/DL (ref 250–450)
TRANSFERRIN SERPL-MCNC: 175 MG/DL (ref 200–375)
WBC # BLD AUTO: 6.78 K/UL (ref 3.9–12.7)

## 2019-03-29 PROCEDURE — 92526 ORAL FUNCTION THERAPY: CPT

## 2019-03-29 PROCEDURE — 86140 C-REACTIVE PROTEIN: CPT

## 2019-03-29 PROCEDURE — 83540 ASSAY OF IRON: CPT

## 2019-03-29 PROCEDURE — 85025 COMPLETE CBC W/AUTO DIFF WBC: CPT

## 2019-03-29 PROCEDURE — 82728 ASSAY OF FERRITIN: CPT

## 2019-03-29 PROCEDURE — 36415 COLL VENOUS BLD VENIPUNCTURE: CPT

## 2019-03-29 PROCEDURE — 80053 COMPREHEN METABOLIC PANEL: CPT

## 2019-04-01 ENCOUNTER — OFFICE VISIT (OUTPATIENT)
Dept: HEMATOLOGY/ONCOLOGY | Facility: CLINIC | Age: 74
End: 2019-04-01
Payer: MEDICARE

## 2019-04-01 VITALS
HEART RATE: 73 BPM | OXYGEN SATURATION: 95 % | BODY MASS INDEX: 24.56 KG/M2 | WEIGHT: 162.06 LBS | DIASTOLIC BLOOD PRESSURE: 73 MMHG | TEMPERATURE: 98 F | HEIGHT: 68 IN | SYSTOLIC BLOOD PRESSURE: 131 MMHG

## 2019-04-01 DIAGNOSIS — I10 ESSENTIAL HYPERTENSION: ICD-10-CM

## 2019-04-01 DIAGNOSIS — D64.9 CHRONIC ANEMIA: Primary | ICD-10-CM

## 2019-04-01 DIAGNOSIS — C02.9 CANCER OF TONGUE: ICD-10-CM

## 2019-04-01 DIAGNOSIS — Z86.010 PERSONAL HISTORY OF COLONIC POLYPS: ICD-10-CM

## 2019-04-01 DIAGNOSIS — D35.00 BENIGN NEOPLASM OF ADRENAL GLAND, UNSPECIFIED LATERALITY: ICD-10-CM

## 2019-04-01 PROCEDURE — 99213 OFFICE O/P EST LOW 20 MIN: CPT | Mod: PBBFAC | Performed by: NURSE PRACTITIONER

## 2019-04-01 PROCEDURE — 99999 PR PBB SHADOW E&M-EST. PATIENT-LVL III: CPT | Mod: PBBFAC,,, | Performed by: NURSE PRACTITIONER

## 2019-04-01 PROCEDURE — 99214 OFFICE O/P EST MOD 30 MIN: CPT | Mod: S$PBB,,, | Performed by: NURSE PRACTITIONER

## 2019-04-01 PROCEDURE — 99214 PR OFFICE/OUTPT VISIT, EST, LEVL IV, 30-39 MIN: ICD-10-PCS | Mod: S$PBB,,, | Performed by: NURSE PRACTITIONER

## 2019-04-01 PROCEDURE — 99999 PR PBB SHADOW E&M-EST. PATIENT-LVL III: ICD-10-PCS | Mod: PBBFAC,,, | Performed by: NURSE PRACTITIONER

## 2019-04-01 RX ORDER — HEPARIN 100 UNIT/ML
5 SYRINGE INTRAVENOUS
Status: CANCELLED | OUTPATIENT
Start: 2019-04-08

## 2019-04-01 RX ORDER — DOCUSATE SODIUM 100 MG/1
100 CAPSULE, LIQUID FILLED ORAL
COMMUNITY
End: 2019-06-05 | Stop reason: ALTCHOICE

## 2019-04-01 RX ORDER — ALBUTEROL SULFATE 90 UG/1
AEROSOL, METERED RESPIRATORY (INHALATION)
COMMUNITY
Start: 2017-02-21 | End: 2020-04-28

## 2019-04-01 RX ORDER — SODIUM CHLORIDE 0.9 % (FLUSH) 0.9 %
10 SYRINGE (ML) INJECTION
Status: CANCELLED | OUTPATIENT
Start: 2019-04-08

## 2019-04-01 RX ORDER — SODIUM CHLORIDE 9 MG/ML
INJECTION, SOLUTION INTRAVENOUS CONTINUOUS
Status: CANCELLED | OUTPATIENT
Start: 2019-04-08

## 2019-04-01 NOTE — PROGRESS NOTES
Subjective:       Patient ID: Noble Tripp is a 73 y.o. male.    Chief Complaint: Anemia    73 year old male, presents for evaluation of anemia. Patient has history of iron deficiency treated in the past with oral iron replacement. Patient states he has had difficulty with oral iron replacement in the past due to severe constipation. Patient is seen by Dr. Oleary for ongoing monitoring of history of tongue cancer, he is UTD with followup. Patient has had an EGD and colonoscopy and these tests were negative for source of a GI bleed. Patient denies melena, hematemesis, difficulty swallowing, palpitations, dizziness, severe fatigue.      Review of Systems   Constitutional: Positive for fatigue. Negative for activity change, appetite change, chills, diaphoresis, fever and unexpected weight change.   HENT: Negative for congestion, hearing loss, nosebleeds, postnasal drip, sore throat and trouble swallowing.    Eyes: Negative for discharge and visual disturbance.   Respiratory: Negative for cough, chest tightness and shortness of breath.    Cardiovascular: Negative for chest pain, palpitations and leg swelling.   Gastrointestinal: Negative for constipation, diarrhea, nausea and vomiting.   Endocrine: Negative for cold intolerance and heat intolerance.   Genitourinary: Negative for difficulty urinating, dysuria, flank pain and hematuria.   Musculoskeletal: Positive for arthralgias and back pain. Negative for myalgias.   Skin: Negative.    Neurological: Positive for headaches. Negative for dizziness, weakness and light-headedness.   Hematological: Negative for adenopathy. Does not bruise/bleed easily.   Psychiatric/Behavioral: Negative for agitation, behavioral problems and confusion. The patient is nervous/anxious.        Medication List with Changes/Refills   Current Medications    ALBUTEROL (PROAIR HFA) 90 MCG/ACTUATION INHALER    INL 2 PFS PO INTO THE LUNGS Q 4 H PRF WHZ OR SOB    ALBUTEROL (PROVENTIL) 2.5 MG  /3 ML (0.083 %) NEBULIZER SOLUTION    Take 3 mLs (2.5 mg total) by nebulization every 8 (eight) hours while awake.    ASPIRIN 81 MG TAB    Take 1 tablet by mouth Daily. Over the counter to help prevent stroke/heart attack    CHANTIX CONTINUING MONTH BOX 1 MG TAB    TAKE ONE TABLET BY MOUTH ONCE DAILY    DOCUSATE SODIUM (COLACE) 100 MG CAPSULE    Take 100 mg by mouth.    EVOLOCUMAB (REPATHA PUSHTRONEX) 420 MG/3.5 ML INJT    Inject 1 each into the skin.    EVOLOCUMAB (REPATHA SURECLICK) 140 MG/ML PNIJ    Inject 1 mL (140 mg total) into the skin every 14 (fourteen) days.    FLUOROURACIL (EFUDEX) 5 % CREAM    AAA bid x 6 weeks    GABAPENTIN (NEURONTIN) 800 MG TABLET    Take 1 tablet (800 mg total) by mouth 3 (three) times daily.    GARLIC 2,000 MG CAP    Take 1 tablet by mouth once daily.     LACTOBACILLUS RHAMNOSUS GG (CULTURELLE) 10 BILLION CELL CAPSULE    Take 1 capsule by mouth once daily.    LORATADINE (CLARITIN) 10 MG TABLET    Take 1 tablet by mouth daily as needed.     MULTIVITAMIN CAPSULE    Take 1 capsule by mouth once daily.    NEBULIZER AND COMPRESSOR ANGELO    Use as directed    OXYCODONE-ACETAMINOPHEN (PERCOCET) 7.5-325 MG PER TABLET    Take 1 tablet by mouth every 8 (eight) hours as needed for Pain.    PANTOPRAZOLE (PROTONIX) 40 MG TABLET    Take 1 tablet (40 mg total) by mouth once daily.    PILOCARPINE (SALAGEN) 5 MG TAB    TAKE 1 TABLET BY MOUTH 30 MINUTES PRIOR TO EACH MEAL    PROAIR HFA 90 MCG/ACTUATION INHALER    INHALE TWO PUFFS BY MOUTH EVERY 4 HOURS AS NEEDED FOR WHEEZING OR SHORTNESS OF BREATH    TIOTROPIUM (SPIRIVA WITH HANDIHALER) 18 MCG INHALATION CAPSULE    Inhale 1 capsule (18 mcg total) into the lungs once daily.    TURMERIC ROOT EXTRACT 500 MG CAP    Take 1 capsule by mouth 2 (two) times daily.    Discontinued Medications    OXYCODONE-ACETAMINOPHEN (PERCOCET) 7.5-325 MG PER TABLET    Take 1 tablet by mouth every 8 (eight) hours as needed for Pain.     Objective:     Vitals:    04/01/19 1256    BP: 131/73   Pulse: 73   Temp: 97.7 °F (36.5 °C)     Physical Exam   Constitutional: He is oriented to person, place, and time. He appears well-developed and well-nourished. No distress.   HENT:   Head: Normocephalic and atraumatic.   Right Ear: Hearing and external ear normal.   Left Ear: Hearing and external ear normal.   Nose: Nose normal. No mucosal edema or rhinorrhea. No epistaxis.   Mouth/Throat: Uvula is midline, oropharynx is clear and moist and mucous membranes are normal.   Eyes: Pupils are equal, round, and reactive to light. Conjunctivae and EOM are normal. Right eye exhibits no chemosis and no discharge. Left eye exhibits no chemosis and no discharge.   Neck: Trachea normal and normal range of motion. Neck supple. No thyroid mass and no thyromegaly present.   Cardiovascular: Normal rate, regular rhythm and intact distal pulses.   Murmur heard.  Pulses:       Dorsalis pedis pulses are 2+ on the right side, and 2+ on the left side.   Pulmonary/Chest: Effort normal and breath sounds normal. No respiratory distress. He has no decreased breath sounds. He has no wheezes.   Abdominal: Soft. Bowel sounds are normal. He exhibits no distension. There is no tenderness.   Musculoskeletal: Normal range of motion. He exhibits no edema.   Lymphadenopathy:     He has no cervical adenopathy.     He has no axillary adenopathy.        Right: No supraclavicular adenopathy present.        Left: No supraclavicular adenopathy present.   Neurological: He is alert and oriented to person, place, and time. He has normal strength.   Skin: Skin is warm, dry and intact. Capillary refill takes less than 2 seconds. No rash noted. He is not diaphoretic. There is pallor.   Psychiatric: His speech is normal and behavior is normal. Judgment and thought content normal. His mood appears anxious. Cognition and memory are normal.   Vitals reviewed.      Assessment:       Problem List Items Addressed This Visit        Cardiac/Vascular     Hypertension       Oncology    Cancer of tongue    Adrenal benign neoplasm    Chronic anemia - Primary    Relevant Orders    CBC auto differential    Comprehensive metabolic panel    Ferritin    Iron and TIBC       GI    Personal history of colonic polyps          Plan:       1) Will replete with Injectafer IV x2 doses  2) Return to clinic 6-8 weeks after 2nd infusion with labs prior.     I will review assessment/plan with collaborating physician Dr. Perry.

## 2019-04-01 NOTE — PATIENT INSTRUCTIONS
Ferric carboxymaltose injection  What is this medicine?  FERRIC CARBOXYMALTOSE (ferr-ik car-box-ee-mol-toes) is an iron complex. Iron is used to make healthy red blood cells, which carry oxygen and nutrients throughout the body. This medicine is used to treat anemia in people with chronic kidney disease or people who cannot take iron by mouth.  How should I use this medicine?  This medicine is for infusion into a vein. It is given by a health care professional in a hospital or clinic setting.  Talk to your pediatrician regarding the use of this medicine in children. Special care may be needed.  What side effects may I notice from receiving this medicine?  Side effects that you should report to your doctor or health care professional as soon as possible:  · allergic reactions like skin rash, itching or hives, swelling of the face, lips, or tongue -breathing problems  · changes in blood pressure  · feeling faint or lightheaded, falls  · flushing, sweating, or hot feelings  Side effects that usually do not require medical attention (Report these to your doctor or health care professional if they continue or are bothersome.):  · changes in taste  · constipation  · dizziness  · headache  · nausea  · pain, redness, or irritation at site where injected  · vomiting  What may interact with this medicine?  Do not take this medicine with any of the following medications:  · deferoxamine  · dimercaprol  · other iron products  This medicine may also interact with the following medications:  · chloramphenicol  · deferasirox  What if I miss a dose?  It is important not to miss your dose. Call your doctor or health care professional if you are unable to keep an appointment.  Where should I keep my medicine?  This drug is given in a hospital or clinic and will not be stored at home.  What should I tell my health care provider before I take this medicine?  They need to know if you have any of these conditions:  · anemia not caused  by low iron levels  · high levels of iron in the blood  · liver disease  · an unusual or allergic reaction to iron, other medicines, foods, dyes, or preservatives  · pregnant or trying to get pregnant  · breast-feeding  What should I watch for while using this medicine?  Visit your doctor or health care professional regularly. Tell your doctor if your symptoms do not start to get better or if they get worse. You may need blood work done while you are taking this medicine.  You may need to follow a special diet. Talk to your doctor. Foods that contain iron include: whole grains/cereals, dried fruits, beans, or peas, leafy green vegetables, and organ meats (liver, kidney).  NOTE:This sheet is a summary. It may not cover all possible information. If you have questions about this medicine, talk to your doctor, pharmacist, or health care provider. Copyright© 2017 [employer]

## 2019-04-05 NOTE — PATIENT INSTRUCTIONS
Recommendations:  1. Continue ST services 1 time per week to address the goals described above.  2. Continue daily home practice as discussed in treatment sessions.  3. Continued follow-up with referring physician and/or PCP as needed for medical care/management.  4. Contact the [facility] at [phone] with any further questions or concerns.

## 2019-04-05 NOTE — PROGRESS NOTES
"  Treatment time: 1 hour for treatment of   1. Dysphagia, unspecified type        Session 2    Mr. Noble Tripp was seen today for speech therapy to address the above. He was accompanied by his wife. Mr. Tripp reported that the exercises given last week were difficult but that he performed each set 2x per day as recommended.     Mr. Tripp's performance was as follows:    Long-term goal:  Mr. Tripp will:  1. Tolerate least restrictive diet with no overt signs or symptoms of aspiration.    Short-term objectives:  Mr. Tripp will:  1. Utilize compensatory swallow strategies with 90% accuracy during all P.O. Intake to decrease the risk of aspiration  2. Complete laryngeal swallow exercises with 90% accuracy to increase the safety and efficiency of the swallow.  3. Participate in patient education each week to increase knowledge on making safest dietary choices, etc.      Assessment:     Today, Mr. Tripp completed the Mildred Maneuver, the Mendelsohn Maneuver, effortful swallows and supraglottic swallows x 10 each. He required sips of water throughout the exercises because he reported that his swallows kept "getting stuck".     During trials of water and pudding. Mr. Tripp required max verbal cues to complete multiple swallows after every bite and sip. He reported that it seemed like a lot of work for just one sip. However, even when multiple swallows were completed, he still exhibited coughing 1-4 seconds after the swallow after ever sip.     Recommendations:  1. Continue ST services 1 time per week to address the goals described above.  2. Continue daily home practice as discussed in treatment sessions.  3. Continued follow-up with referring physician and/or PCP as needed for medical care/management.  4. Contact the [facility] at [phone] with any further questions or concerns.    Mr. Tripp and his wife were instructed in the home program at the conclusion of the session " and verbalized agreement/understanding of treatment plan.

## 2019-04-05 NOTE — PROGRESS NOTES
Treatment time: 1 hour for treatment of   1. Dysphagia, unspecified type        Session 3    Mr. Noble Tripp was seen today for speech therapy to address the above. He was accompanied by his wife. Mr. Tripp reported that the exercises were not any easier this week but were actually a bit more difficult. He reports that he performed each set 2x per day as recommended.     Mr. Tripp's performance was as follows:    Long-term goal:  Mr. Tripp will:  1. Tolerate least restrictive diet with no overt signs or symptoms of aspiration.    Short-term objectives:  Mr. Tripp will:  1. Utilize compensatory swallow strategies with 90% accuracy during all P.O. Intake to decrease the risk of aspiration  2. Complete laryngeal swallow exercises with 90% accuracy to increase the safety and efficiency of the swallow.  3. Participate in patient education each week to increase knowledge on making safest dietary choices, etc.      Assessment:     Today, Mr. Tripp completed the Mildred Maneuver, the Mendelsohn Maneuver, effortful swallows and supraglottic swallows x 15 each.  He required did not require as much water to complete the exercises as he did last week and he appears to be getting stronger, so we increased each exercise by 5 repetitions.     During today's trials of water and pudding. Mr. Tripp required mod verbal cues to complete multiple swallows after every bite and sip. He exhibited more awareness of the strategies but still did not use them consistently if cues were not provided. Coughing still noted 1-4 seconds after the swallow after ever sip.     Recommendations:  1. Continue ST services 1 time per week to address the goals described above.  2. Continue daily home practice as discussed in treatment sessions.  3. Continued follow-up with referring physician and/or PCP as needed for medical care/management.  4. Contact the [facility] at [phone] with any further questions or  concerns.    Mr. Tripp and his wife were instructed in the home program at the conclusion of the session and verbalized agreement/understanding of treatment plan.

## 2019-04-05 NOTE — PATIENT INSTRUCTIONS
Recommendations:  1. Continue ST services 1 time per week to address the goals described above.  2. Continue daily home practice as discussed in treatment sessions.  3. Continued follow-up with referring physician and/or PCP as needed for medical care/management.  4. Contact the [facility] at [phone] with any further questions or concerns.

## 2019-04-05 NOTE — PATIENT INSTRUCTIONS
Recommendations:   1. Recommend speech therapy at initial frequency of 1 time per week for 50-60 minutes to address long term goals and short-term objectives described below.   2. Home program using techniques/strategies discussed in treatment sessions.  3. Patient to follow up with referring physician or PCP as needed or recommended.   4. Contact Speech Pathology with any further questions/concerns at [phone].    Long-term goal:  Mr. Tripp will:  1. Tolerate least restrictive diet with no overt signs or symptoms of aspiration.    Short-term objectives:  Mr. Tripp will:  1. Utilize compensatory swallow strategies with 90% accuracy during all P.O. Intake to decrease the risk of aspiration  2. Complete laryngeal swallow exercises with 90% accuracy to increase the safety and efficiency of the swallow.  3. Participate in patient education each week to increase knowledge on making safest dietary choices, etc.

## 2019-04-09 DIAGNOSIS — M51.36 DDD (DEGENERATIVE DISC DISEASE), LUMBAR: Primary | ICD-10-CM

## 2019-04-10 NOTE — PROGRESS NOTES
Treatment time: 1 hour for treatment of   1. Dysphagia, unspecified type        Session 3    Mr. Noble Tripp was seen today for speech therapy to address the above. He was accompanied by his wife. Mr. Tripp reported that the exercises were a little bit easier to complete this week but that he feels like he is choking when he performs the supraglottic swallows.    Mr. Tripp's performance was as follows:    Long-term goal:  Mr. Tripp will:  1. Tolerate least restrictive diet with no overt signs or symptoms of aspiration.    Short-term objectives:  Mr. Tripp will:  1. Utilize compensatory swallow strategies with 90% accuracy during all P.O. Intake to decrease the risk of aspiration  2. Complete laryngeal swallow exercises with 90% accuracy to increase the safety and efficiency of the swallow.  3. Participate in patient education each week to increase knowledge on making safest dietary choices, etc.      Assessment:     Today, Mr. Tripp completed the Mildred Maneuver, the Mendelsohn Maneuver, effortful swallows x 20 each.  He required did not require as much water to complete the exercises as he did last week and he appears to be getting stronger. Secondary to patient complaint, the supraglottic swallows have been discontinued and we have increased the other exercises by 5 repetitions each.      Mr. Tripp continued to require mod verbal cues to complete multiple swallows after every bite and sip. He exhibited awareness of the strategies but still did not use them consistently if cues were not provided. Coughing still noted 1-4 seconds after the swallow after ever sip of water.     Recommendations:  1. Continue ST services 1 time per week to address the goals described above.  2. Continue daily home practice as discussed in treatment sessions.  3. Continued follow-up with referring physician and/or PCP as needed for medical care/management.  4. Contact the [facility] at  [phone] with any further questions or concerns.    Mr. Tripp and his wife were instructed in the home program at the conclusion of the session and verbalized agreement/understanding of treatment plan.

## 2019-04-10 NOTE — PATIENT INSTRUCTIONS
Recommendations:  1. Continue ST services 1 time per week to address the goals described above.  2. Continue daily home practice as discussed in treatment sessions.  3. Continued follow-up with referring physician and/or PCP as needed for medical care/management.  4. Contact the [facility] at [phone] with any further questions or concerns.

## 2019-04-12 NOTE — PATIENT INSTRUCTIONS
Recommendations:  1. Continue ST services 1 time per week to address the goals described above.  2. Continue daily home practice as discussed in treatment sessions.  3. Continued follow-up with referring physician and/or PCP as needed for medical care/management.  4. Contact the [facility] at [phone] with any further questions or concerns.

## 2019-04-12 NOTE — PROGRESS NOTES
Treatment time: 1 hour for treatment of   1. Dysphagia, unspecified type        Session 4    Mr. Noble Tripp was seen today for speech therapy to address the above. He was accompanied by his wife. Mr. Tripp reported that the exercises were not any easier or harder than they were the previous week but that he does not require as many breaks of sips of water between exercises.    Mr. Tripp's performance was as follows:    Long-term goal:  Mr. Tripp will:  1. Tolerate least restrictive diet with no overt signs or symptoms of aspiration.    Short-term objectives:  Mr. Tripp will:  1. Utilize compensatory swallow strategies with 90% accuracy during all P.O. Intake to decrease the risk of aspiration  2. Complete laryngeal swallow exercises with 90% accuracy to increase the safety and efficiency of the swallow.  3. Participate in patient education each week to increase knowledge on making safest dietary choices, etc.      Assessment:     Today, Mr. Tripp completed the Mildred Maneuver, the Mendelsohn Maneuver, effortful swallows x 25 each.  He required did not require as much water to complete the exercises as he did last week and he appears to be getting stronger.     Mr. Tripp continued to require mod verbal cues to complete multiple swallows after every bite and sip. He exhibited awareness of the strategies but still did not use them consistently if cues were not provided. Coughing still noted 1-4 seconds after the swallow after ever sip of water. No improvement noted on signs and symptoms of aspiration following sips of water.    Recommendations:  1. Continue ST services 1 time per week to address the goals described above.  2. Continue daily home practice as discussed in treatment sessions.  3. Continued follow-up with referring physician and/or PCP as needed for medical care/management.  4. Contact the [facility] at [phone] with any further questions or concerns.      Qasim and his wife were instructed in the home program at the conclusion of the session and verbalized agreement/understanding of treatment plan.

## 2019-04-15 ENCOUNTER — INFUSION (OUTPATIENT)
Dept: INFUSION THERAPY | Facility: HOSPITAL | Age: 74
End: 2019-04-15
Attending: INTERNAL MEDICINE
Payer: MEDICARE

## 2019-04-15 VITALS
DIASTOLIC BLOOD PRESSURE: 74 MMHG | SYSTOLIC BLOOD PRESSURE: 130 MMHG | OXYGEN SATURATION: 95 % | RESPIRATION RATE: 18 BRPM | HEART RATE: 60 BPM | TEMPERATURE: 98 F

## 2019-04-15 DIAGNOSIS — D64.9 CHRONIC ANEMIA: Primary | ICD-10-CM

## 2019-04-15 PROCEDURE — 96365 THER/PROPH/DIAG IV INF INIT: CPT

## 2019-04-15 PROCEDURE — 63600175 PHARM REV CODE 636 W HCPCS: Mod: JG | Performed by: NURSE PRACTITIONER

## 2019-04-15 PROCEDURE — 96375 TX/PRO/DX INJ NEW DRUG ADDON: CPT

## 2019-04-15 PROCEDURE — 63600175 PHARM REV CODE 636 W HCPCS: Performed by: NURSE PRACTITIONER

## 2019-04-15 PROCEDURE — 25000003 PHARM REV CODE 250: Performed by: NURSE PRACTITIONER

## 2019-04-15 RX ORDER — SODIUM CHLORIDE 9 MG/ML
INJECTION, SOLUTION INTRAVENOUS CONTINUOUS
Status: CANCELLED | OUTPATIENT
Start: 2019-04-22

## 2019-04-15 RX ORDER — HEPARIN 100 UNIT/ML
5 SYRINGE INTRAVENOUS
Status: CANCELLED | OUTPATIENT
Start: 2019-04-22

## 2019-04-15 RX ORDER — SODIUM CHLORIDE 0.9 % (FLUSH) 0.9 %
10 SYRINGE (ML) INJECTION
Status: CANCELLED | OUTPATIENT
Start: 2019-04-22

## 2019-04-15 RX ORDER — METHYLPREDNISOLONE SOD SUCC 125 MG
80 VIAL (EA) INJECTION
Status: COMPLETED | OUTPATIENT
Start: 2019-04-15 | End: 2019-04-15

## 2019-04-15 RX ORDER — METHYLPREDNISOLONE SOD SUCC 125 MG
80 VIAL (EA) INJECTION
Status: CANCELLED
Start: 2019-04-22

## 2019-04-15 RX ORDER — METHYLPREDNISOLONE SOD SUCC 125 MG
80 VIAL (EA) INJECTION
Status: CANCELLED
Start: 2019-04-15

## 2019-04-15 RX ADMIN — FERRIC CARBOXYMALTOSE INJECTION 750 MG: 50 INJECTION, SOLUTION INTRAVENOUS at 01:04

## 2019-04-15 RX ADMIN — METHYLPREDNISOLONE SODIUM SUCCINATE 80 MG: 125 INJECTION, POWDER, FOR SOLUTION INTRAMUSCULAR; INTRAVENOUS at 01:04

## 2019-04-15 NOTE — PATIENT INSTRUCTIONS

## 2019-04-15 NOTE — PLAN OF CARE
Problem: Adult Inpatient Plan of Care  Goal: Plan of Care Review  Outcome: Outcome(s) achieved Date Met: 04/15/19  Pt stated feeling tired today

## 2019-04-15 NOTE — DISCHARGE INSTRUCTIONS
"[facility]  [address].  or  [address]  [phone] phone     [phone] fax  Hours of Operation: Monday- Friday 8:00am- 5:00pm  After hours phone  [phone]  Hematology / Oncology Physicians on call      Dr. Bautista Pandya      Please call with any concerns regarding your appointment today.    Support Groups/Classes    Support groups and classes are being offered at the   [facility] and [facility]!!    "Cooking with Cancer" (Nutrition Class):  Second Wednesday of each month   at noon at the [facility].  Metastatic Support Group:  Third Tuesday of each month   at noon at the [facility].  Next Steps Class/Group: Second and fourth Thursday of each month at noon at the [facility].  [facility] (Breast Cancer Support Group): First Tuesday of each month   at 5:30pm at the [facility] location.  [facility]: [facility]: Second and third Tuesday of each month from 7:30am - 2pm.  [facility]: First and fourth Tuesday of each month from 7:30am - 2pm    If you are interested in attending or would like more information please ask our social workers or your nurse!  "

## 2019-04-16 NOTE — PROGRESS NOTES
"Subjective:       Patient ID: Noble Tripp is a 73 y.o. male.    Chief Complaint: Sleep Apnea and COPD    Noble Tripp is 73 years old  chronic aspiration, appears to have recovered well after series of infection.  Doing well overall.  Going for speech therapy, thriving well.  CPAP 14 cm Works well [place] score 14.  CAT score 6.  Wife and daughter present  Frustrated about weak : poor nutrition,   Images reveiwed  Hx laryngeal cancer, chr aspiration          Review of Systems   Constitutional: Positive for fatigue and weakness.   HENT: Positive for trouble swallowing.    Eyes: Negative.    Respiratory: Negative for snoring, sputum production, shortness of breath, wheezing and dyspnea on extertion.    Cardiovascular: Negative.    Genitourinary: Negative.    Endocrine: endocrine negative   Musculoskeletal: Negative.    Skin: Negative.    Gastrointestinal: Negative.    Psychiatric/Behavioral: Negative.        Objective:       Vitals:    04/17/19 1309   BP: 132/62   Pulse: 65   Resp: 18   SpO2: 96%   Weight: 73 kg (160 lb 15 oz)   Height: 5' 8" (1.727 m)     Physical Exam   Constitutional: He is oriented to person, place, and time. Vital signs are normal. He has a sickly appearance. He does not appear ill.     He is not obese.   HENT:   Head: Normocephalic.   Nose: Nose normal.   Neck: Normal range of motion. Neck supple.   Cardiovascular: Normal rate, regular rhythm and normal heart sounds.   No murmur heard.  Pulmonary/Chest: Normal expansion and effort normal. He has decreased breath sounds in the right lower field and the left lower field. He has no wheezes. He has no rhonchi. Negative for tactile fremitus.   Abdominal: Bowel sounds are normal.   Musculoskeletal: Normal range of motion. He exhibits no edema.   Lymphadenopathy:     He has no cervical adenopathy.   Neurological: He is alert and oriented to person, place, and time. He has normal reflexes. No cranial nerve deficit.   Skin: Skin is warm " "and dry.   Psychiatric: He has a normal mood and affect.   Nursing note and vitals reviewed.    Personal Diagnostic Review  DOWNLOAD  2019 - 2019  Patient ID: MRN: [id number]  : 1945  Age: 73 years  [address]  [address], [address]  [facility], [address]  Phone: [phone]  Email: [email or web address]  Compliance Report  Usage 2019 - 2019  Usage days 86/90 days (96%)  >= 4 hours 85 days (94%)  < 4 hours 1 days (1%)  Usage hours 610 hours 42 minutes  Average usage (total days) 6 hours 47 minutes  Average usage (days used) 7 hours 6 minutes  Median usage (days used) 7 hours 11 minutes  S9 Escape  Serial number [id number]  Mode CPAP  Set pressure 14 cmH2O  EPR Fulltime  EPR level 2    6MW Test  Height: 5' 8" (172.7 cm)  Weight: 73 kg (160 lb 15 oz)  BMI (Calculated): 24.5  Predicted Distance: 349.88  Interpretation  Predicted Distance Meters (Calculated): 508.42 meters  Distance: 312.42m( 61.45%)  spO2 estefania was 93%           Assessment:       Problem List Items Addressed This Visit     JUANITO on CPAP (Chronic)    Moderate COPD (chronic obstructive pulmonary disease) (Chronic)    Relevant Orders    Spirometry without Bronchodilator    X-Ray Chest PA And Lateral    Cancer of tongue    Oropharyngeal aspiration - Primary    Pleural effusion, bilateral        Plan:        Adherence  With speech therapy  Adherence with CPAP  Follow-up x-ray and spirometry in 6 months  Nutrition improvement.  Continue current inhaler regimen rescue albuterol and Spiriva HFA.    Follow up in about 6 months (around 10/17/2019), or download, , for Spirometry and CXR next visit.    This note was prepared using voice recognition system and is likely to have sound alike errors that may have been overlooked even after proof reading.  Please call me with any questions    Discussed diagnosis, its evaluation, treatment and usual course. All questions answered.    Thank you for the courtesy of participating in " the care of this patient    Santos Cardozo MD

## 2019-04-17 ENCOUNTER — CLINICAL SUPPORT (OUTPATIENT)
Dept: PULMONOLOGY | Facility: CLINIC | Age: 74
End: 2019-04-17
Payer: MEDICARE

## 2019-04-17 ENCOUNTER — HOSPITAL ENCOUNTER (OUTPATIENT)
Dept: RADIOLOGY | Facility: HOSPITAL | Age: 74
Discharge: HOME | End: 2019-04-17
Attending: INTERNAL MEDICINE
Payer: MEDICARE

## 2019-04-17 ENCOUNTER — OFFICE VISIT (OUTPATIENT)
Dept: PULMONOLOGY | Facility: CLINIC | Age: 74
End: 2019-04-17
Payer: MEDICARE

## 2019-04-17 VITALS
SYSTOLIC BLOOD PRESSURE: 132 MMHG | BODY MASS INDEX: 24.39 KG/M2 | HEART RATE: 65 BPM | OXYGEN SATURATION: 96 % | DIASTOLIC BLOOD PRESSURE: 62 MMHG | RESPIRATION RATE: 18 BRPM | HEIGHT: 68 IN | WEIGHT: 160.94 LBS

## 2019-04-17 VITALS — BODY MASS INDEX: 24.38 KG/M2 | HEIGHT: 68 IN | WEIGHT: 160.88 LBS

## 2019-04-17 DIAGNOSIS — R91.8 LUNG MASS: ICD-10-CM

## 2019-04-17 DIAGNOSIS — C02.9 CANCER OF TONGUE: ICD-10-CM

## 2019-04-17 DIAGNOSIS — T17.208S: ICD-10-CM

## 2019-04-17 DIAGNOSIS — J44.9 MODERATE COPD (CHRONIC OBSTRUCTIVE PULMONARY DISEASE): Chronic | ICD-10-CM

## 2019-04-17 DIAGNOSIS — T17.208S: Primary | ICD-10-CM

## 2019-04-17 DIAGNOSIS — J90 PLEURAL EFFUSION, BILATERAL: ICD-10-CM

## 2019-04-17 DIAGNOSIS — G47.33 OSA ON CPAP: Chronic | ICD-10-CM

## 2019-04-17 DIAGNOSIS — R06.02 SOB (SHORTNESS OF BREATH): ICD-10-CM

## 2019-04-17 PROCEDURE — 94618 PULMONARY STRESS TESTING: CPT | Mod: PBBFAC

## 2019-04-17 PROCEDURE — 99214 PR OFFICE/OUTPT VISIT, EST, LEVL IV, 30-39 MIN: ICD-10-PCS | Mod: 25,S$PBB,, | Performed by: INTERNAL MEDICINE

## 2019-04-17 PROCEDURE — 99215 OFFICE O/P EST HI 40 MIN: CPT | Mod: PBBFAC,25 | Performed by: INTERNAL MEDICINE

## 2019-04-17 PROCEDURE — 99999 PR PBB SHADOW E&M-EST. PATIENT-LVL V: CPT | Mod: PBBFAC,,, | Performed by: INTERNAL MEDICINE

## 2019-04-17 PROCEDURE — 71046 X-RAY EXAM CHEST 2 VIEWS: CPT | Mod: TC

## 2019-04-17 PROCEDURE — 94618 PULMONARY STRESS TESTING: ICD-10-PCS | Mod: 26,S$PBB,, | Performed by: INTERNAL MEDICINE

## 2019-04-17 PROCEDURE — 71046 XR CHEST PA AND LATERAL: ICD-10-PCS | Mod: 26,,, | Performed by: RADIOLOGY

## 2019-04-17 PROCEDURE — 99999 PR PBB SHADOW E&M-EST. PATIENT-LVL V: ICD-10-PCS | Mod: PBBFAC,,, | Performed by: INTERNAL MEDICINE

## 2019-04-17 PROCEDURE — 99214 OFFICE O/P EST MOD 30 MIN: CPT | Mod: 25,S$PBB,, | Performed by: INTERNAL MEDICINE

## 2019-04-17 PROCEDURE — 71046 X-RAY EXAM CHEST 2 VIEWS: CPT | Mod: 26,,, | Performed by: RADIOLOGY

## 2019-04-17 PROCEDURE — 94618 PULMONARY STRESS TESTING: CPT | Mod: 26,S$PBB,, | Performed by: INTERNAL MEDICINE

## 2019-04-17 NOTE — PROCEDURES
"[facility]  Six Minute Walk     SUMMARY     Ordering Provider: Dr Cardozo   Interpreting Provider: Dr Cardozo  Performing nurse/tech/RT: dalia  Diagnosis: COPD  Height: 5' 8" (172.7 cm)  Weight: 73 kg (160 lb 14.4 oz)  BMI (Calculated): 24.5   Patient Race:             Phase Oxygen Assessment Supplemental O2 Heart   Rate Blood Pressure Tasneem Dyspnea Scale Rating   Resting 95 % Room Air 66 bpm 147/66 0   Exercise        Minute        1 93 % Room Air 90 bpm     2 93 % Room Air 99 bpm     3 94 % Room Air 102 bpm     4 95 % Room Air 96 bpm     5 94 % Room Air 99 bpm     6  95 % Room Air 97 bpm 150/67 2   Recovery        Minute        1 97 % Room Air 71 bpm     2 97 % Room Air 68 bpm     3 96 % Room Air 63 bpm     4 96 % Room Air 65 bpm 139/66 1     Six Minute Walk Summary  6MWT Status: completed without stopping  Patient Reported: No complaints     Interpretation:  Did the patient stop or pause?: No         Total Time Walked (Calculated): 360 seconds  Final Partial Lap Distance (feet): 25 feet  Total Distance Meters (Calculated): 312.42 meters   LLN was 355.45 m  Predicted Distance Meters (Calculated): 508.45 meters  Percentage of Predicted (Calculated): 61.45  Peak VO2 (Calculated): 13.35  Mets: 3.81  Has The Patient Had a Previous Six Minute Walk Test?: Yes       Previous 6MWT Results  Has The Patient Had a Previous Six Minute Walk Test?: Yes  Date of Previous Test: 02/02/18  Total Time Walked: 360 seconds  Total Distance (meters): 365.76  Predicted Distance (meters): 514.14 meters  Percentage of Predicted: 71.14  Percent Change (Calculated): 0.15         CLINICAL INTERPRETATION:  Six minute walk distance is 312.42m (61.45 % predicted) with very light dyspnea.  During exercise, there was no significant desaturation while breathing room air.  Both blood pressure and heart rate remained stable with walking.  The patient did complete the study, walking 360 seconds of the 360 second " test.  Since the previous study in 02/02/2018, exercise capacity may be somewhat worse.  Based upon age and body mass index, exercise capacity is less than predicted.    Santos Cardozo MD

## 2019-04-22 ENCOUNTER — INFUSION (OUTPATIENT)
Dept: INFUSION THERAPY | Facility: HOSPITAL | Age: 74
End: 2019-04-22
Attending: INTERNAL MEDICINE
Payer: MEDICARE

## 2019-04-22 VITALS
RESPIRATION RATE: 18 BRPM | OXYGEN SATURATION: 95 % | TEMPERATURE: 98 F | DIASTOLIC BLOOD PRESSURE: 78 MMHG | SYSTOLIC BLOOD PRESSURE: 148 MMHG | HEART RATE: 82 BPM

## 2019-04-22 DIAGNOSIS — D64.9 CHRONIC ANEMIA: Primary | ICD-10-CM

## 2019-04-22 PROCEDURE — 25000003 PHARM REV CODE 250: Performed by: NURSE PRACTITIONER

## 2019-04-22 PROCEDURE — 96365 THER/PROPH/DIAG IV INF INIT: CPT

## 2019-04-22 PROCEDURE — 96375 TX/PRO/DX INJ NEW DRUG ADDON: CPT

## 2019-04-22 PROCEDURE — 63600175 PHARM REV CODE 636 W HCPCS: Performed by: NURSE PRACTITIONER

## 2019-04-22 RX ORDER — METHYLPREDNISOLONE SOD SUCC 125 MG
80 VIAL (EA) INJECTION
Status: COMPLETED | OUTPATIENT
Start: 2019-04-22 | End: 2019-04-22

## 2019-04-22 RX ORDER — METHYLPREDNISOLONE SOD SUCC 125 MG
80 VIAL (EA) INJECTION
Status: CANCELLED
Start: 2019-04-29

## 2019-04-22 RX ORDER — METHYLPREDNISOLONE SOD SUCC 125 MG
80 VIAL (EA) INJECTION
Status: CANCELLED
Start: 2019-04-22

## 2019-04-22 RX ORDER — SODIUM CHLORIDE 9 MG/ML
INJECTION, SOLUTION INTRAVENOUS CONTINUOUS
Status: CANCELLED | OUTPATIENT
Start: 2019-04-29

## 2019-04-22 RX ORDER — SODIUM CHLORIDE 0.9 % (FLUSH) 0.9 %
10 SYRINGE (ML) INJECTION
Status: CANCELLED | OUTPATIENT
Start: 2019-04-29

## 2019-04-22 RX ORDER — HEPARIN 100 UNIT/ML
5 SYRINGE INTRAVENOUS
Status: CANCELLED | OUTPATIENT
Start: 2019-04-29

## 2019-04-22 RX ADMIN — METHYLPREDNISOLONE SODIUM SUCCINATE 80 MG: 125 INJECTION, POWDER, FOR SOLUTION INTRAMUSCULAR; INTRAVENOUS at 01:04

## 2019-04-22 RX ADMIN — FERRIC CARBOXYMALTOSE INJECTION 750 MG: 50 INJECTION, SOLUTION INTRAVENOUS at 01:04

## 2019-04-22 NOTE — PLAN OF CARE
Problem: Adult Inpatient Plan of Care  Goal: Plan of Care Review  Outcome: Outcome(s) achieved Date Met: 04/22/19  Pt stated still feeling weak and tired .

## 2019-04-22 NOTE — PATIENT INSTRUCTIONS

## 2019-04-22 NOTE — DISCHARGE INSTRUCTIONS
"[facility]  [address] [address].  or  [address]  [phone] phone     [phone] fax  Hours of Operation: Monday- Friday 8:00am- 5:00pm  After hours phone  [phone]  Hematology / Oncology Physicians on call      Dr. Bautista Pandya      Please call with any concerns regarding your appointment today.    Support Groups/Classes    Support groups and classes are being offered at the   [facility] and [facility]!!    "Cooking with Cancer" (Nutrition Class):  Second Wednesday of each month   at noon at the [facility].  Metastatic Support Group:  Third Tuesday of each month   at noon at the [facility].  Next Steps Class/Group: Second and fourth Thursday of each month at noon at the [facility].  [facility] (Breast Cancer Support Group): First Tuesday of each month   at 5:30pm at the [facility] location.  Ashlie Santiago Mobile: [facility]: Second and third Tuesday of each month from 7:30am - 2pm.  [facility]: First and fourth Tuesday of each month from 7:30am - 2pm    If you are interested in attending or would like more information please ask our social workers or your nurse!  "

## 2019-04-26 ENCOUNTER — CLINICAL SUPPORT (OUTPATIENT)
Dept: SPEECH THERAPY | Facility: HOSPITAL | Age: 74
End: 2019-04-26
Attending: INTERNAL MEDICINE
Payer: MEDICARE

## 2019-04-26 DIAGNOSIS — R13.10 DYSPHAGIA, UNSPECIFIED TYPE: Primary | ICD-10-CM

## 2019-04-26 PROCEDURE — 92507 TX SP LANG VOICE COMM INDIV: CPT

## 2019-04-30 LAB
ACID FAST MOD KINY STN SPEC: NORMAL
MYCOBACTERIUM SPEC QL CULT: NORMAL

## 2019-05-01 NOTE — PROGRESS NOTES
Treatment time: 1 hour for treatment of   1. Dysphagia, unspecified type        Session 5    Mr. Noble Tripp was seen today for speech therapy to address the above. He was accompanied by his wife. Mr. Tripp reported that the exercises were not any easier or harder than they were the previous week but that he does not require as many breaks of sips of water between exercises.    Mr. Tripp's performance was as follows:    Long-term goal:  Mr. Tripp will:  1. Tolerate least restrictive diet with no overt signs or symptoms of aspiration.    Short-term objectives:  Mr. Tripp will:  1. Utilize compensatory swallow strategies with 90% accuracy during all P.O. Intake to decrease the risk of aspiration  2. Complete laryngeal swallow exercises with 90% accuracy to increase the safety and efficiency of the swallow.  3. Participate in patient education each week to increase knowledge on making safest dietary choices, etc.      Assessment:     Mr. Tripp exhibited decreased coughing and s/s of aspiration on trial of thin liquids today. However, some throat clearing and intermittent coughing is still present. He reports that he is noticing less coughing during meals at home. He reports that during his last visit with Dr. Cardozo, his chest x-ray came back clear. It appears Mr. Tripp's swallow is becoming stronger and more efficient. However, given that he still exhibits some s/s of aspiration during P.O. Trials, it is felt that he is still at an increased risk for aspiration. Compensatory strategies were reviewed today and pt. Education was provided again regarding choosing foods and drinks that would be safest options. Mr. Tripp refused thickener. Speech Pathologist recommended that if he refuses thickener, that he avoid all thin liquids other than water and that he drink water utilizing compensatory strategies.    Today, Mr. Tripp completed the Mildred Maneuver, the Mendelsohn Maneuver,  effortful swallows x 25 each.  He required did not require as much water to complete the exercises as he did last week and he appears to be getting stronger.        Recommendations:  1. Continue ST services 1 time per week to address the goals described above.  2. Continue daily home practice as discussed in treatment sessions.  3. Continued follow-up with referring physician and/or PCP as needed for medical care/management.  4. Contact the [facility] at [phone] with any further questions or concerns.    Mr. Tripp and his wife were instructed in the home program at the conclusion of the session and verbalized agreement/understanding of treatment plan.

## 2019-05-07 ENCOUNTER — TELEPHONE (OUTPATIENT)
Dept: DERMATOLOGY | Facility: CLINIC | Age: 74
End: 2019-05-07

## 2019-05-07 NOTE — TELEPHONE ENCOUNTER
----- Message from Michelle Sevilla sent at 5/7/2019  9:26 AM CDT -----  Contact: pt   Pt want to know if he can use neosporin on his ear for itching, he can be reached at [phone] or [phone] Thanks

## 2019-05-13 RX ORDER — OXYCODONE AND ACETAMINOPHEN 7.5; 325 MG/1; MG/1
1 TABLET ORAL EVERY 8 HOURS PRN
Qty: 90 TABLET | Refills: 0 | Status: SHIPPED | OUTPATIENT
Start: 2019-05-21 | End: 2019-06-05 | Stop reason: SDUPTHER

## 2019-05-13 RX ORDER — OXYCODONE AND ACETAMINOPHEN 7.5; 325 MG/1; MG/1
1 TABLET ORAL EVERY 8 HOURS PRN
Qty: 90 TABLET | Refills: 0 | Status: SHIPPED | OUTPATIENT
Start: 2019-06-20 | End: 2019-07-20

## 2019-05-13 NOTE — TELEPHONE ENCOUNTER
Pts lv on 11/19/2018. I called and left a vm for the pt to contact me to discuss medication request and appt. //kah

## 2019-05-13 NOTE — TELEPHONE ENCOUNTER
----- Message from Dalila Galloway sent at 5/13/2019  2:08 PM CDT -----  Contact: Arleen  Type:  RX Refill Request    Who Called: Arleen  Refill or New Rx:new prescription  RX Name and Strength:protonox 40 mg  How is the patient currently taking it? (ex. 1XDay):1XDay  Is this a 30 day or 90 day RX:90  Preferred Pharmacy with phone number:.  [facility] [place] [address] [address] [address]  Phone: [phone] Fax: [phone]  Local or Mail Order:local  Ordering Provider:Dr Davis   Would the patient rather a call back or a response via [facility]? Call back  Best Call Back Number:[phone]  Additional Information: She's calling to check up the status.    Thank you

## 2019-05-14 ENCOUNTER — OFFICE VISIT (OUTPATIENT)
Dept: PAIN MEDICINE | Facility: CLINIC | Age: 74
End: 2019-05-14
Payer: MEDICARE

## 2019-05-14 VITALS
DIASTOLIC BLOOD PRESSURE: 71 MMHG | WEIGHT: 150 LBS | HEIGHT: 68 IN | SYSTOLIC BLOOD PRESSURE: 138 MMHG | HEART RATE: 76 BPM | RESPIRATION RATE: 18 BRPM | BODY MASS INDEX: 22.73 KG/M2

## 2019-05-14 DIAGNOSIS — M48.061 SPINAL STENOSIS OF LUMBAR REGION WITHOUT NEUROGENIC CLAUDICATION: ICD-10-CM

## 2019-05-14 DIAGNOSIS — M53.3 SACROILIAC JOINT PAIN: ICD-10-CM

## 2019-05-14 DIAGNOSIS — M47.816 LUMBAR FACET ARTHROPATHY: ICD-10-CM

## 2019-05-14 DIAGNOSIS — M51.36 DDD (DEGENERATIVE DISC DISEASE), LUMBAR: Primary | ICD-10-CM

## 2019-05-14 DIAGNOSIS — M47.819 FACET ARTHROPATHY: ICD-10-CM

## 2019-05-14 PROCEDURE — 99999 PR PBB SHADOW E&M-EST. PATIENT-LVL IV: CPT | Mod: PBBFAC,,, | Performed by: PHYSICIAN ASSISTANT

## 2019-05-14 PROCEDURE — 99999 PR PBB SHADOW E&M-EST. PATIENT-LVL IV: ICD-10-PCS | Mod: PBBFAC,,, | Performed by: PHYSICIAN ASSISTANT

## 2019-05-14 PROCEDURE — 99214 OFFICE O/P EST MOD 30 MIN: CPT | Mod: S$PBB,,, | Performed by: PHYSICIAN ASSISTANT

## 2019-05-14 PROCEDURE — 99214 PR OFFICE/OUTPT VISIT, EST, LEVL IV, 30-39 MIN: ICD-10-PCS | Mod: S$PBB,,, | Performed by: PHYSICIAN ASSISTANT

## 2019-05-14 PROCEDURE — 99214 OFFICE O/P EST MOD 30 MIN: CPT | Mod: PBBFAC | Performed by: PHYSICIAN ASSISTANT

## 2019-05-14 RX ORDER — PANTOPRAZOLE SODIUM 40 MG/1
TABLET, DELAYED RELEASE ORAL
Qty: 90 TABLET | Refills: 3 | Status: SHIPPED | OUTPATIENT
Start: 2019-05-14

## 2019-05-14 NOTE — PROGRESS NOTES
Chief Pain Complaint:  Low Back Pain, Leg pain (left > right)       History of Present Illness:  This patient is a 73 y.o. male who presents today complaining of the above noted pain/s. The patient describes this pain as follows.    - duration of pain: pain for years   - timing: constant   - character: aching, burning  - radiating, dermatomal: extends into left leg, along L5 pattern at times, mostly non-radiating  - antecedent trauma, prior spinal surgery: none  - pertinent negatives: No fever, No chills, No weight loss, No bladder dysfunction, No bowel dysfunction, No extremity weakness, No saddle anesthesia  - pertinent positives: none    - medications, other therapies tried (physical therapy, injections):     >> gabapentin, Norco    >> Has previously undergone Physical Therapy, which made pain worse    >> Has previously undergone spinal injection/s: including lumbar MBB and Left TFESI with Dr Taylor in 2014 & 2015 (see EMR Surgeries for full details) with only short term relief    _________________________________________________________________________________________________________________________________________________________________________________________________________________________      IMAGING / Labs / Studies (reviewed on 5/14/2019):    9/15/17 MRI LUMBAR SPINE WITHOUT CONTRAST  History: Lower back pain.  Left lower extremity pain  Technique: Standard multiplanar pulse sequences without IV contrast.  Comparison:  No prior  studies available for comparison.  Findings:   Mild-moderate scoliosis, convex to the left and centered at L3.  All lumbar vertebral bodies are normal in height.  Scattered degenerative endplate marrow signal identified at the L2-L3 and L3-L4 L5-S1 levels.  No fracture.  No suspicious osseous lesion is identified.  Mild retrolisthesis of L2-L3.  Distal spinal cord and conus medullaris have normal appearance but the conus terminates at the T12-L1 level.  T12-L1: Minimal central  protrusion.  Negative for focal nerve root impingement or central canal narrowing.  Neural foramina widely patent.  L1-2: Mild disc height loss.  Prominent intraosseous eyes.  Mild disc bulging.  Mild facet arthropathy and ligament flavum hypertrophy.  The central canal neural foramina patent.  L2-L3: Minimal retrolisthesis of L2 on L3.  There is moderate disc height loss.  Prominent anterior osteophytes.  Mild-moderate diffuse generalized disc bulging.  Mild facet arthropathy and ligament flavum hypertrophy.  The central canal is patent.  Neural foramina patent.  L3-L4: Disc desiccation and moderate disc height loss.  Large anterior osteophytes are present.  There is mild disc bulging and osteophytic ridging at the posterior disc margin.  Moderate right and mild left facet arthropathy and ligamentum flavum hypertrophy.  The central canal is patent.  Mild neural foraminal narrowing.  L4-L5: Very minimal grade 1 spondylolisthesis of L4-L5.  Mild generalized disc bulge is present.  Severe facet arthropathy and ligamentum flavum hypertrophy.  There is severe central canal and lateral recess stenosis.  Moderate neural foraminal stenosis.  L5-S1: Disc desiccation and moderate to severe disc height loss.  Prominent intraocular heights.  There is mild disc bulging and osteophytic ridging at the posterior disc margin.  Severe left and moderate right neural foraminal stenosis.  The central canal is patent.   Paravertebral soft tissues and musculature are normal.  The visualized intra-abdominal and intrapelvic contents are normal.       10/30/14 MRI Lumbar Spine Without Contrast    Narrative Exam: MRI of the lumbar spine without contrast.  History:     Low back pain. Status post SHEY, with S1-S2 radicular symptoms  Comparison: Prior study dated 04/02/13  Findings:     A convex right scoliosis again noted.  No compression fracture or subluxation.  The conus medullaris has a normal appearance.  The paraspinous soft tissues are  "unremarkable.  The T12 -- L1 and L1 -- 2 disk levels are essentially unremarkable.  L2 -- 3: Mild annular bulging again noted.    L3 -- 4: Moderate narrowing of the right lateral disk space, with right greater than left facet arthropathy, unchanged.    L4 -- 5: Marked bilateral facet arthropathy, with mild annular bulging, causing moderate central canal stenosis, unchanged.  L5 -- S1: Disk desiccation, with moderate disk space narrowing.  Significant degenerative narrowing of the left L5 neural foramen is apparent, unchanged.     _________________________________________________________________________________________________________________________________________________________________________________________________________________________      Review of Systems:  CONSTITUTIONAL: patient denies any fever, chills, or weight loss  SKIN: patient denies any rash or itching  RESPIRATORY: patient denies having any shortness of breath  GASTROINTESTINAL: patient denies having any diarrhea, constipation, or bowel incontinence  GENITOURINARY: patient denies having any abnormal bladder function    MUSCULOSKELETAL:  - patient reports low back pain    NEUROLOGICAL:   - pain as above  - strength in Lower extremities is intact, BILATERALLY  - sensation in Lower extremities is intact, BILATERALLY  - patient denies any loss of bowel or bladder control      PSYCHIATRIC: patient denies any suicidal or homicidal ideations    _________________________________________________________________________________________________________________________________________________________________________________________________________________________      Physical Exam:  Vitals:  /71 (BP Location: Left arm, Patient Position: Sitting, BP Method: Medium (Automatic))   Pulse 76   Resp 18   Ht 5' 8" (1.727 m)   Wt 68 kg (150 lb)   BMI 22.81 kg/m²   (reviewed on 5/14/2019)    General: alert and oriented, in no apparent distress.  Gait: " normal gait.  Skin: no rashes, no discoloration, no obvious lesions  HEENT: normocephalic, atraumatic. Pupils equal and round.  Cardiovascular: no significant peripheral edema present.  Respiratory: without use of accessory muscles of respiration.    Musculoskeletal - Lumbar Spine:  - Full ROM  - Pain on flexion of lumbar spine: Absent   - Pain on extension of lumbar spine: Present  - Lumbar facet loading: Present bilaterally  - TTP over the lumbar facet joints: Present Bilaterally, worse at L5-S1   - TTP over the lumbar paraspinals: Present, mild  - TTP over the SI joints: Present on left   - Straight Leg Raise: Negative  - NOELLE: Present  - Pain with internal and external rotation of hip    Neuro - Lower Extremities:  - BLE Strength: R/L: HF: 5/5, HE: 5/5, KF: 5/5; KE: 5/5; FE: 5/5; FF: 5/5  - Extremity Reflexes: Brisk and symmetric throughout  - Sensory: Sensation to light touch intact bilaterally      Psych:  Mood and affect is appropriate    _________________________________________________________________________________________________________________________________________________________________________________________________________________________     Assessment:  Noble Tripp is a 73 y.o. year old male who is presenting with   Encounter Diagnoses   Name Primary?    DDD (degenerative disc disease), lumbar Yes    Lumbar facet arthropathy     Sacroiliac joint pain     Spinal stenosis of lumbar region without neurogenic claudication     Facet arthropathy      Patient returns for follow-up.  He complains of chronic low back pain. Lumbar MRI shows advanced diffuse spondylosis, greatest at the L4-L5 level with severe central canal stenosis and lateral recess stenosis.       Plan:  1. Interventional: Consider Bilateral L3, L4, L5 MBB with local. He feels injections weren't long lasting in the past. We will consider this if pain not controlled with medication, but he is stable at this time.    2.  Pharmacologic:   - Refill Percocet 7.5/325 mg PO TID PRN (90 tabs) x 2 months. Paper Rx given. Patient tolerating opioids with no side effects, obtaining good pain control with functional improvement.   - Refill Gabapentin 800mg TID.  - Opioid contract signed on 3/20/2018.     - [place]  reviewed and appropriate. Last filled 4-22-19.  Will post date accordingly.  - Last UDS was compliant for medications.    3. Rehabilitative: Encouraged regular exercise. He has been using back brace; patient was informed to limit use to avoid muscle atrophy.     4. Diagnostic: None for now.    5. Follow up: 9 weeks for med refill.    - I discussed the risks, benefits, and alternatives to potential treatment options. All questions and concerns were fully addressed today in clinic. Dr. Mueller was consulted regarding the patient plan and agrees.           >> UDS:  6-28-16 :: appropriate  2/28/2017 :: appropriate  8/18/2017 :: (not in EMR)  5/16/2018 :: appropriate  9/11/2018 :: appropriate  3/11/2019 :: appropriate

## 2019-05-16 DIAGNOSIS — M51.36 DDD (DEGENERATIVE DISC DISEASE), LUMBAR: ICD-10-CM

## 2019-05-17 RX ORDER — GABAPENTIN 800 MG/1
TABLET ORAL
Qty: 90 TABLET | Refills: 1 | Status: SHIPPED | OUTPATIENT
Start: 2019-05-17 | End: 2019-07-09 | Stop reason: SDUPTHER

## 2019-05-28 ENCOUNTER — TELEPHONE (OUTPATIENT)
Dept: HEMATOLOGY/ONCOLOGY | Facility: CLINIC | Age: 74
End: 2019-05-28

## 2019-05-28 NOTE — TELEPHONE ENCOUNTER
Spoke with patient's wife who would like to r/s patient's appointment.  Appointment r/s for Friday at 8am with lab tomorrow at 9:30. Patient's wife acknowledged. Call ended well.

## 2019-05-29 ENCOUNTER — LAB VISIT (OUTPATIENT)
Dept: LAB | Facility: HOSPITAL | Age: 74
End: 2019-05-29
Attending: INTERNAL MEDICINE
Payer: MEDICARE

## 2019-05-29 DIAGNOSIS — D64.9 CHRONIC ANEMIA: ICD-10-CM

## 2019-05-29 LAB
ALBUMIN SERPL BCP-MCNC: 3.1 G/DL (ref 3.5–5.2)
ALP SERPL-CCNC: 80 U/L (ref 55–135)
ALT SERPL W/O P-5'-P-CCNC: 10 U/L (ref 10–44)
ANION GAP SERPL CALC-SCNC: 8 MMOL/L (ref 8–16)
AST SERPL-CCNC: 14 U/L (ref 10–40)
BASOPHILS # BLD AUTO: 0.13 K/UL (ref 0–0.2)
BASOPHILS NFR BLD: 1.4 % (ref 0–1.9)
BILIRUB SERPL-MCNC: 0.3 MG/DL (ref 0.1–1)
BUN SERPL-MCNC: 14 MG/DL (ref 8–23)
CALCIUM SERPL-MCNC: 9 MG/DL (ref 8.7–10.5)
CHLORIDE SERPL-SCNC: 104 MMOL/L (ref 95–110)
CO2 SERPL-SCNC: 28 MMOL/L (ref 23–29)
CREAT SERPL-MCNC: 0.8 MG/DL (ref 0.5–1.4)
DIFFERENTIAL METHOD: ABNORMAL
EOSINOPHIL # BLD AUTO: 0.5 K/UL (ref 0–0.5)
EOSINOPHIL NFR BLD: 4.8 % (ref 0–8)
ERYTHROCYTE [DISTWIDTH] IN BLOOD BY AUTOMATED COUNT: 20 % (ref 11.5–14.5)
EST. GFR  (AFRICAN AMERICAN): >60 ML/MIN/1.73 M^2
EST. GFR  (NON AFRICAN AMERICAN): >60 ML/MIN/1.73 M^2
FERRITIN SERPL-MCNC: 630 NG/ML (ref 20–300)
GLUCOSE SERPL-MCNC: 99 MG/DL (ref 70–110)
HCT VFR BLD AUTO: 38.1 % (ref 40–54)
HGB BLD-MCNC: 12.3 G/DL (ref 14–18)
IRON SERPL-MCNC: 72 UG/DL (ref 45–160)
LYMPHOCYTES # BLD AUTO: 1.3 K/UL (ref 1–4.8)
LYMPHOCYTES NFR BLD: 13.8 % (ref 18–48)
MCH RBC QN AUTO: 28.3 PG (ref 27–31)
MCHC RBC AUTO-ENTMCNC: 32.3 G/DL (ref 32–36)
MCV RBC AUTO: 88 FL (ref 82–98)
MONOCYTES # BLD AUTO: 0.8 K/UL (ref 0.3–1)
MONOCYTES NFR BLD: 7.8 % (ref 4–15)
NEUTROPHILS # BLD AUTO: 6.9 K/UL (ref 1.8–7.7)
NEUTROPHILS NFR BLD: 72.2 % (ref 38–73)
PLATELET # BLD AUTO: 248 K/UL (ref 150–350)
PMV BLD AUTO: 9.6 FL (ref 9.2–12.9)
POTASSIUM SERPL-SCNC: 3.7 MMOL/L (ref 3.5–5.1)
PROT SERPL-MCNC: 7.3 G/DL (ref 6–8.4)
RBC # BLD AUTO: 4.34 M/UL (ref 4.6–6.2)
SATURATED IRON: 31 % (ref 20–50)
SODIUM SERPL-SCNC: 140 MMOL/L (ref 136–145)
TOTAL IRON BINDING CAPACITY: 231 UG/DL (ref 250–450)
TRANSFERRIN SERPL-MCNC: 156 MG/DL (ref 200–375)
WBC # BLD AUTO: 9.57 K/UL (ref 3.9–12.7)

## 2019-05-29 PROCEDURE — 80053 COMPREHEN METABOLIC PANEL: CPT

## 2019-05-29 PROCEDURE — 83540 ASSAY OF IRON: CPT

## 2019-05-29 PROCEDURE — 36415 COLL VENOUS BLD VENIPUNCTURE: CPT

## 2019-05-29 PROCEDURE — 82728 ASSAY OF FERRITIN: CPT

## 2019-05-29 PROCEDURE — 85025 COMPLETE CBC W/AUTO DIFF WBC: CPT

## 2019-05-31 ENCOUNTER — CLINICAL SUPPORT (OUTPATIENT)
Dept: SPEECH THERAPY | Facility: HOSPITAL | Age: 74
End: 2019-05-31
Payer: MEDICARE

## 2019-05-31 DIAGNOSIS — R13.10 DYSPHAGIA, UNSPECIFIED TYPE: Primary | ICD-10-CM

## 2019-05-31 PROCEDURE — 92507 TX SP LANG VOICE COMM INDIV: CPT

## 2019-06-02 RX ORDER — PILOCARPINE HYDROCHLORIDE 5 MG/1
TABLET, FILM COATED ORAL
Qty: 90 TABLET | Refills: 2 | Status: SHIPPED | OUTPATIENT
Start: 2019-06-02 | End: 2019-10-25 | Stop reason: SDUPTHER

## 2019-06-04 ENCOUNTER — TELEPHONE (OUTPATIENT)
Dept: INTERNAL MEDICINE | Facility: CLINIC | Age: 74
End: 2019-06-04

## 2019-06-04 NOTE — TELEPHONE ENCOUNTER
----- Message from Blessing Isaacs sent at 6/3/2019  8:11 AM CDT -----  Contact: Rodolfo Mendez/Mary Kay  Type:  Pharmacy Calling to Clarify an RX    Name of Caller:Mary Kay  Pharmacy Name:[facility]  Prescription Name:Pilocarpine  What do they need to clarify?:need to clarify direction  Best Call Back Number:[phone]  Additional Information: na

## 2019-06-05 ENCOUNTER — OFFICE VISIT (OUTPATIENT)
Dept: HEMATOLOGY/ONCOLOGY | Facility: CLINIC | Age: 74
End: 2019-06-05
Payer: MEDICARE

## 2019-06-05 VITALS
HEART RATE: 64 BPM | TEMPERATURE: 98 F | DIASTOLIC BLOOD PRESSURE: 67 MMHG | HEIGHT: 68 IN | SYSTOLIC BLOOD PRESSURE: 136 MMHG | BODY MASS INDEX: 24.83 KG/M2 | OXYGEN SATURATION: 95 % | RESPIRATION RATE: 20 BRPM | WEIGHT: 163.81 LBS

## 2019-06-05 DIAGNOSIS — Z85.89 HISTORY OF HEAD AND NECK CANCER: ICD-10-CM

## 2019-06-05 DIAGNOSIS — J44.9 MODERATE COPD (CHRONIC OBSTRUCTIVE PULMONARY DISEASE): Chronic | ICD-10-CM

## 2019-06-05 DIAGNOSIS — I10 ESSENTIAL HYPERTENSION: ICD-10-CM

## 2019-06-05 DIAGNOSIS — E27.8 ADRENAL MASS: ICD-10-CM

## 2019-06-05 DIAGNOSIS — Z86.39 HISTORY OF IRON DEFICIENCY: Primary | ICD-10-CM

## 2019-06-05 PROCEDURE — 99999 PR PBB SHADOW E&M-EST. PATIENT-LVL III: CPT | Mod: PBBFAC,,, | Performed by: NURSE PRACTITIONER

## 2019-06-05 PROCEDURE — 99213 OFFICE O/P EST LOW 20 MIN: CPT | Mod: PBBFAC | Performed by: NURSE PRACTITIONER

## 2019-06-05 PROCEDURE — 99214 PR OFFICE/OUTPT VISIT, EST, LEVL IV, 30-39 MIN: ICD-10-PCS | Mod: S$PBB,,, | Performed by: NURSE PRACTITIONER

## 2019-06-05 PROCEDURE — 99214 OFFICE O/P EST MOD 30 MIN: CPT | Mod: S$PBB,,, | Performed by: NURSE PRACTITIONER

## 2019-06-05 PROCEDURE — 99999 PR PBB SHADOW E&M-EST. PATIENT-LVL III: ICD-10-PCS | Mod: PBBFAC,,, | Performed by: NURSE PRACTITIONER

## 2019-06-05 NOTE — PROGRESS NOTES
Subjective:       Patient ID: Noble Tripp is a 73 y.o. male.    Chief Complaint: Anemia    73 year old male, presents for evaluation of anemia. Patient has history of iron deficiency treated in the past with oral iron replacement. Patient states he has had difficulty with oral iron replacement in the past due to severe constipation. Patient is seen by Dr. Oleary for ongoing monitoring of history of tongue cancer, he is UTD with followup. Patient has had an EGD and colonoscopy and these tests were negative for source of a GI bleed. Patient denies melena, hematemesis, difficulty swallowing, palpitations, dizziness, severe fatigue.      Review of Systems   Constitutional: Positive for fatigue. Negative for activity change, appetite change, chills, diaphoresis, fever and unexpected weight change.   HENT: Negative for congestion, hearing loss, nosebleeds, postnasal drip, sore throat and trouble swallowing.    Eyes: Negative for discharge and visual disturbance.   Respiratory: Negative for cough, chest tightness and shortness of breath.    Cardiovascular: Negative for chest pain, palpitations and leg swelling.   Gastrointestinal: Negative for constipation, diarrhea, nausea and vomiting.   Endocrine: Negative for cold intolerance and heat intolerance.   Genitourinary: Negative for difficulty urinating, dysuria, flank pain and hematuria.   Musculoskeletal: Positive for arthralgias and back pain. Negative for myalgias.   Skin: Negative.    Neurological: Positive for headaches. Negative for dizziness, weakness and light-headedness.   Hematological: Negative for adenopathy. Does not bruise/bleed easily.   Psychiatric/Behavioral: Negative for agitation, behavioral problems and confusion. The patient is nervous/anxious.        Medication List with Changes/Refills   Current Medications    ALBUTEROL (PROAIR HFA) 90 MCG/ACTUATION INHALER    INL 2 PFS PO INTO THE LUNGS Q 4 H PRF WHZ OR SOB    ALBUTEROL (PROVENTIL) 2.5 MG  /3 ML (0.083 %) NEBULIZER SOLUTION    Take 3 mLs (2.5 mg total) by nebulization every 8 (eight) hours while awake.    ASPIRIN 81 MG TAB    Take 1 tablet by mouth Daily. Over the counter to help prevent stroke/heart attack    CHANTIX CONTINUING MONTH BOX 1 MG TAB    TAKE ONE TABLET BY MOUTH ONCE DAILY    EVOLOCUMAB (REPATHA SURECLICK) 140 MG/ML PNIJ    Inject 1 mL (140 mg total) into the skin every 14 (fourteen) days.    GABAPENTIN (NEURONTIN) 800 MG TABLET    TAKE 1 TABLET BY MOUTH THREE TIMES DAILY    GARLIC 2,000 MG CAP    Take 1 tablet by mouth once daily.     LACTOBACILLUS RHAMNOSUS GG (CULTURELLE) 10 BILLION CELL CAPSULE    Take 1 capsule by mouth once daily.    LORATADINE (CLARITIN) 10 MG TABLET    Take 1 tablet by mouth daily as needed.     NEBULIZER AND COMPRESSOR ANGELO    Use as directed    OXYCODONE-ACETAMINOPHEN (PERCOCET) 7.5-325 MG PER TABLET    Take 1 tablet by mouth every 8 (eight) hours as needed for Pain.    PANTOPRAZOLE (PROTONIX) 40 MG TABLET    TAKE ONE TABLET BY MOUTH ONCE DAILY    PILOCARPINE (SALAGEN) 5 MG TAB     TAKE 1 TABLET BY MOUTH 30 MINUTES PRIOR TO EACH MEAL    TIOTROPIUM (SPIRIVA WITH HANDIHALER) 18 MCG INHALATION CAPSULE    Inhale 1 capsule (18 mcg total) into the lungs once daily.    TURMERIC ROOT EXTRACT 500 MG CAP    Take 1 capsule by mouth 2 (two) times daily.    Discontinued Medications    DOCUSATE SODIUM (COLACE) 100 MG CAPSULE    Take 100 mg by mouth.    EVOLOCUMAB (REPATHA PUSHTRONEX) 420 MG/3.5 ML INJT    Inject 1 each into the skin.    FLUOROURACIL (EFUDEX) 5 % CREAM    AAA bid x 6 weeks    MULTIVITAMIN CAPSULE    Take 1 capsule by mouth once daily.    OXYCODONE-ACETAMINOPHEN (PERCOCET) 7.5-325 MG PER TABLET    Take 1 tablet by mouth every 8 (eight) hours as needed for Pain.    PROAIR HFA 90 MCG/ACTUATION INHALER    INHALE TWO PUFFS BY MOUTH EVERY 4 HOURS AS NEEDED FOR WHEEZING OR SHORTNESS OF BREATH     Objective:     Vitals:    06/05/19 1352   BP: 136/67   Pulse: 64    Resp: 20   Temp: 97.9 °F (36.6 °C)     Physical Exam   Constitutional: He is oriented to person, place, and time. He appears well-developed and well-nourished. No distress.   HENT:   Head: Normocephalic and atraumatic.   Right Ear: Hearing and external ear normal.   Left Ear: Hearing and external ear normal.   Nose: Nose normal. No mucosal edema or rhinorrhea. No epistaxis.   Mouth/Throat: Uvula is midline, oropharynx is clear and moist and mucous membranes are normal.   Eyes: Pupils are equal, round, and reactive to light. Conjunctivae and EOM are normal. Right eye exhibits no chemosis and no discharge. Left eye exhibits no chemosis and no discharge.   Neck: Trachea normal and normal range of motion. Neck supple. No thyroid mass and no thyromegaly present.   Cardiovascular: Normal rate, regular rhythm and intact distal pulses.   Murmur heard.  Pulses:       Dorsalis pedis pulses are 2+ on the right side, and 2+ on the left side.   Pulmonary/Chest: Effort normal and breath sounds normal. No respiratory distress. He has no decreased breath sounds. He has no wheezes.   Abdominal: Soft. Bowel sounds are normal. He exhibits no distension. There is no tenderness.   Musculoskeletal: Normal range of motion. He exhibits no edema.   Lymphadenopathy:     He has no cervical adenopathy.     He has no axillary adenopathy.        Right: No supraclavicular adenopathy present.        Left: No supraclavicular adenopathy present.   Neurological: He is alert and oriented to person, place, and time. He has normal strength.   Skin: Skin is warm, dry and intact. Capillary refill takes less than 2 seconds. No rash noted. He is not diaphoretic. There is pallor.   Psychiatric: His speech is normal and behavior is normal. Judgment and thought content normal. His mood appears anxious. Cognition and memory are normal.   Vitals reviewed.      Assessment:       Problem List Items Addressed This Visit        Pulmonary    Moderate COPD (chronic  obstructive pulmonary disease) (Chronic)       Cardiac/Vascular    Hypertension       Oncology    History of head and neck cancer    Relevant Orders    CBC auto differential    Comprehensive metabolic panel    Ferritin    Iron and TIBC       Endocrine    Adrenal mass    Relevant Orders    CBC auto differential    Comprehensive metabolic panel    Ferritin    Iron and TIBC    History of iron deficiency - Primary    Relevant Orders    CBC auto differential    Comprehensive metabolic panel    Ferritin    Iron and TIBC          Plan:       1) Patient is currently iron repleted.  2) Return to clinic in 3 months with labs prior    I will review assessment/plan with collaborating physician Dr. Hal Pandya.

## 2019-06-07 NOTE — PATIENT INSTRUCTIONS
Iron-Deficiency Anemia (Adult)  Red blood cells carry oxygen to the tissues of your body. Anemia is a condition in which you have too few red blood cells. You need iron to make red cells. Anemia makes you feel tired and run down. When anemia becomes severe, your skin becomes pale. You may feel short of breath after physical activity. Other symptoms include:  · Headaches  · Dizziness  · Leg cramps with physical activity  · Drowsiness  · Restless legs  Your anemia is caused by not having enough iron in your body. This may be because of:  · Loss of blood. This can be caused by heavy menstrual periods. It can also be caused by bleeding from the stomach or intestines.  · Poor diet. You may not be eating enough foods that contain iron.  · Inability to absorb iron from the foods you eat  · Pregnancy  If your blood count is low enough, your healthcare provider may prescribe an iron supplement. It usually takes about 2 to 3 months of treatment with iron supplements to correct anemia. Severe cases of anemia need a blood transfusion to quickly ease symptoms and deliver more oxygen to the cells.  Home care  Follow these guidelines when caring for yourself at home:  · Eat foods high in iron. This will boost the amount of iron stored in your body. It is a natural way to build up the number of blood cells. Good sources of iron include beef, liver, spinach and other dark green leafy vegetables, whole grains, beans, and nuts.  · Do not overexert yourself.  · Talk with your healthcare provider before traveling by air or traveling to high altitudes.  Follow-up care  Follow up with your healthcare provider in 2 months, or as advised. This is to have another red blood cell count to be sure your anemia has been fixed.  When to seek medical advice  Call your healthcare provider right away if any of these occur:  · Shortness of breath or chest pain  · Dizziness or fainting  · Vomiting blood or passing red or black-colored stool   Date  Last Reviewed: 2/25/2016  © 3465-2710 [employer], [employer]. [address] [place] [address], [place] [address]. All rights reserved. This information is not intended as a substitute for professional medical care. Always follow your healthcare professional's instructions.

## 2019-06-10 NOTE — PROGRESS NOTES
Treatment time: 1 hour for treatment of   1. Dysphagia, unspecified type        Session 6    Mr. Noble Tripp was seen today for speech therapy to address the above. He was accompanied by his wife. Mr. Tripp reported that he has experienced less episodes of choking over the last few weeks but that he still clears his throat after every sip of thin liquid. He reports non-compliant with diet modifications and continues to drink coffee and tea through out the day despite understanding the risk of aspiration.    Mr. Tripp's performance was as follows:    Long-term goal:  Mr. Tripp will:  1. Tolerate least restrictive diet with no overt signs or symptoms of aspiration.    Short-term objectives:  Mr. Tripp will:  1. Utilize compensatory swallow strategies with 90% accuracy during all P.O. Intake to decrease the risk of aspiration  2. Complete laryngeal swallow exercises with 90% accuracy to increase the safety and efficiency of the swallow.  3. Participate in patient education each week to increase knowledge on making safest dietary choices, etc.      Assessment:     Mr. Tripp exhibitedsome throat clearing and intermittent coughing during trials of liquids today. He reports that he is noticing less coughing during meals at home.It appears Mr. Diazs swallow is becoming stronger and more efficient. However, given that he still exhibits some s/s of aspiration during P.O. Trials, it is felt that he is still at an increased risk for aspiration. Compensatory strategies were reviewed today and pt. Education was provided again regarding choosing foods and drinks that would be safest options. Mr. Tripp refused thickener. Speech Pathologist recommended that if he refuses thickener, that he avoid all thin liquids other than water and that he drink water utilizing compensatory strategies.    Today, Mr. Tripp completed the Mildred Maneuver, the Mendelsohn Maneuver, effortful swallows x 25 each.  He  required did not require as much water to complete the exercises as he did last week and he appears to be getting stronger.        Recommendations:  1. Continue ST services 1 time per week to address the goals described above.  2. Continue daily home practice as discussed in treatment sessions.  3. Continued follow-up with referring physician and/or PCP as needed for medical care/management.  4. Contact the [facility] at [phone] with any further questions or concerns.    Mr. Tripp and his wife were instructed in the home program at the conclusion of the session and verbalized agreement/understanding of treatment plan.

## 2019-06-11 NOTE — PATIENT INSTRUCTIONS
Recommendations:  1. Continue ST services 1 time per week to address the goals described above.  2. Continue daily home practice as discussed in treatment sessions.  3. Continued follow-up with referring physician and/or PCP as needed for medical care/management.  4. Contact the [facility] at [phone] with any further questions or concerns.

## 2019-06-12 ENCOUNTER — OFFICE VISIT (OUTPATIENT)
Dept: DERMATOLOGY | Facility: CLINIC | Age: 74
End: 2019-06-12
Payer: MEDICARE

## 2019-06-12 DIAGNOSIS — D48.5 NEOPLASM OF UNCERTAIN BEHAVIOR OF SKIN: ICD-10-CM

## 2019-06-12 DIAGNOSIS — Z85.828 HISTORY OF SKIN CANCER: ICD-10-CM

## 2019-06-12 DIAGNOSIS — L90.5 SCAR CONDITIONS/SKIN FIBROSIS: Primary | ICD-10-CM

## 2019-06-12 DIAGNOSIS — Z12.83 SCREENING, MALIGNANT NEOPLASM, SKIN: ICD-10-CM

## 2019-06-12 PROCEDURE — 88305 TISSUE SPECIMEN TO PATHOLOGY, DERMATOLOGY: ICD-10-PCS | Mod: 26,,, | Performed by: PATHOLOGY

## 2019-06-12 PROCEDURE — 11102 PR TANGENTIAL BIOPSY, SKIN, SINGLE LESION: ICD-10-PCS | Mod: S$PBB,,, | Performed by: DERMATOLOGY

## 2019-06-12 PROCEDURE — 99213 OFFICE O/P EST LOW 20 MIN: CPT | Mod: 25,S$PBB,, | Performed by: DERMATOLOGY

## 2019-06-12 PROCEDURE — 88305 TISSUE EXAM BY PATHOLOGIST: CPT | Performed by: PATHOLOGY

## 2019-06-12 PROCEDURE — 11102 TANGNTL BX SKIN SINGLE LES: CPT | Mod: PBBFAC | Performed by: DERMATOLOGY

## 2019-06-12 PROCEDURE — 99999 PR PBB SHADOW E&M-EST. PATIENT-LVL III: ICD-10-PCS | Mod: PBBFAC,,, | Performed by: DERMATOLOGY

## 2019-06-12 PROCEDURE — 99999 PR PBB SHADOW E&M-EST. PATIENT-LVL III: CPT | Mod: PBBFAC,,, | Performed by: DERMATOLOGY

## 2019-06-12 PROCEDURE — 88305 TISSUE EXAM BY PATHOLOGIST: CPT | Mod: 26,,, | Performed by: PATHOLOGY

## 2019-06-12 PROCEDURE — 99213 PR OFFICE/OUTPT VISIT, EST, LEVL III, 20-29 MIN: ICD-10-PCS | Mod: 25,S$PBB,, | Performed by: DERMATOLOGY

## 2019-06-12 PROCEDURE — 11102 TANGNTL BX SKIN SINGLE LES: CPT | Mod: S$PBB,,, | Performed by: DERMATOLOGY

## 2019-06-12 PROCEDURE — 99213 OFFICE O/P EST LOW 20 MIN: CPT | Mod: PBBFAC,25 | Performed by: DERMATOLOGY

## 2019-06-12 NOTE — PROGRESS NOTES
Subjective:       Patient ID:  Noble Tripp is a 73 y.o. 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  Denies bleeding, tender, growing, or concerning lesions.     This is a high risk patient here to check for the development of new lesions.                Review of Systems   Constitutional: Negative for fever and chills.   Gastrointestinal: Negative for nausea and vomiting.   Skin: Positive for activity-related sunscreen use. Negative for daily sunscreen use and recent sunburn.   Hematologic/Lymphatic: Does not bruise/bleed easily.        Objective:    Physical Exam   Constitutional: He appears well-developed and well-nourished. No distress.   Neurological: He is alert and oriented to person, place, and time. He is not disoriented.   Psychiatric: He has a normal mood and affect.   Skin:   Areas Examined (abnormalities noted in diagram):   Scalp / Hair Palpated and Inspected  Head / Face Inspection Performed  Neck Inspection Performed  Chest / Axilla Inspection Performed  Abdomen Inspection Performed  Back Inspection Performed  RUE Inspected  LUE Inspection Performed  Nails and Digits Inspection Performed                   Diagram Legend     Erythematous scaling macule/papule c/w actinic keratosis       Vascular papule c/w angioma      Pigmented verrucoid papule/plaque c/w seborrheic keratosis      Yellow umbilicated papule c/w sebaceous hyperplasia      Irregularly shaped tan macule c/w lentigo     1-2 mm smooth white papules consistent with Milia      Movable subcutaneous cyst with punctum c/w epidermal inclusion cyst      Subcutaneous movable cyst c/w pilar cyst      Firm pink to brown papule c/w dermatofibroma      Pedunculated fleshy papule(s) c/w skin tag(s)      Evenly pigmented macule c/w junctional  nevus     Mildly variegated pigmented, slightly irregular-bordered macule c/w mildly atypical nevus      Flesh colored to evenly pigmented papule c/w intradermal nevus       Pink pearly papule/plaque c/w basal cell carcinoma      Erythematous hyperkeratotic cursted plaque c/w SCC      Surgical scar with no sign of skin cancer recurrence      Open and closed comedones      Inflammatory papules and pustules      Verrucoid papule consistent consistent with wart     Erythematous eczematous patches and plaques     Dystrophic onycholytic nail with subungual debris c/w onychomycosis     Umbilicated papule    Erythematous-base heme-crusted tan verrucoid plaque consistent with inflamed seborrheic keratosis     Erythematous Silvery Scaling Plaque c/w Psoriasis     See annotation            Assessment / Plan:      Pathology Orders:     Normal Orders This Visit    Tissue Specimen To Pathology, Dermatology     Questions:    Directional Terms:  Other(comment)    Clinical Information:  r/o SCCIS vs. superificial BCC    Specific Site:  right shoulder        Scar conditions/skin fibrosis  Screening, malignant neoplasm, skin  History of skin cancer  Scar of the left posterior ear, right forearm, chest, hx of NMSC.  No evidence of recurrence on physical exam today.  Continue routine skin surveillance. Daily sunscreen advised.    Neoplasm of uncertain behavior of skin  -     Tissue Specimen To Pathology, Dermatology  -     Shave biopsy(-ies) done of 1 site(s).   Patient informed to call for results within 2 weeks if have not [facility]           Follow up for call for results.     PROCEDURE NOTE - SHAVE BIOPSY   Location: see above    After risk, benefits, and alternatives were discussed with the patient, the patient agrees to the procedure by verbal informed consent.  The area(s) were cleansed with alcohol. 2 cc of lidocaine 1% with epinephrine was injected for local anesthesia into each  lesion(s).  A sharp dermablade was used to remove part or all of the lesion(s).  The specimen(s) will be sent for tissue pathology.  Hemostasis was obtained with aluminum chloride and/or hyfrecation.  The area(s) were dressed with vaseline ointment and bandaged.  The patient tolerated the procedure well without adverse events.  Wound care instructions were given to the patient on the AVS.  The patient will be notified of pathology results once available. Results will also be available in Epic.

## 2019-06-20 ENCOUNTER — TELEPHONE (OUTPATIENT)
Dept: DERMATOLOGY | Facility: CLINIC | Age: 74
End: 2019-06-20

## 2019-06-25 ENCOUNTER — TELEPHONE (OUTPATIENT)
Dept: SPEECH THERAPY | Facility: HOSPITAL | Age: 74
End: 2019-06-25

## 2019-06-27 ENCOUNTER — CLINICAL SUPPORT (OUTPATIENT)
Dept: SPEECH THERAPY | Facility: HOSPITAL | Age: 74
End: 2019-06-27
Payer: MEDICARE

## 2019-06-27 DIAGNOSIS — R13.10 DYSPHAGIA, UNSPECIFIED TYPE: Primary | ICD-10-CM

## 2019-06-27 PROCEDURE — 92507 TX SP LANG VOICE COMM INDIV: CPT

## 2019-06-27 NOTE — PROGRESS NOTES
Treatment time: 1 hour for treatment of   1. Dysphagia, unspecified type        Session 7    Mr. Noble Tripp was seen today for speech therapy to address the above. He was accompanied by his wife. Mr. Tripp reported less signs and symptoms of aspiration than previously. Still reports at least some coughing and choking at every meal, but not on every bite. Reports completing exercises a handful of times per week.     Mr. Tripp's performance was as follows:    Long-term goal:  Mr. Tripp will:  1. Tolerate least restrictive diet with no overt signs or symptoms of aspiration.    Short-term objectives:  Mr. Tripp will:  1. Utilize compensatory swallow strategies with 90% accuracy during all P.O. Intake to decrease the risk of aspiration  2. Complete laryngeal swallow exercises with 90% accuracy to increase the safety and efficiency of the swallow.  3. Participate in patient education each week to increase knowledge on making safest dietary choices, etc.      Assessment:     Mr. Tripp exhibitedsome throat clearing and intermittent coughing during trials of liquids today. He reports that he is noticing less coughing during meals at home.It appears Mr. Diazs swallow is becoming stronger and more efficient. However, given that he still exhibits some s/s of aspiration during P.O. Trials, it is felt that he is still at an increased risk for aspiration. Compensatory strategies were reviewed today and pt. Education was provided again regarding choosing foods and drinks that would be safest options. Mr. Tripp refused thickener. Speech Pathologist recommended that if he refuses thickener, that he avoid all thin liquids other than water and that he drink water utilizing compensatory strategies.    Today, Mr. Tripp completed the Mildred Maneuver, the Mendelsohn Maneuver, effortful swallows x 15 each.  He required did not require as much water to complete the exercises as he did last week and he  appears to be getting stronger.        Recommendations:  1. Continue ST services 1 time per week to address the goals described above.  2. Continue daily home practice as discussed in treatment sessions.  3. Continued follow-up with referring physician and/or PCP as needed for medical care/management.  4. Contact the [facility] at [phone] with any further questions or concerns.    Mr. Tripp and his wife were instructed in the home program at the conclusion of the session and verbalized agreement/understanding of treatment plan.

## 2019-06-27 NOTE — PATIENT INSTRUCTIONS
Recommendations:  1. Continue ST services 1 time per week to address the goals described above.  2. Continue daily home practice as discussed in treatment sessions.  3. Continued follow-up with referring physician and/or PCP as needed for medical care/management.  4. Contact the [facility] at [phone] with any further questions or concerns.

## 2019-07-08 RX ORDER — OXYCODONE AND ACETAMINOPHEN 7.5; 325 MG/1; MG/1
1 TABLET ORAL EVERY 8 HOURS PRN
Qty: 90 TABLET | Refills: 0 | Status: SHIPPED | OUTPATIENT
Start: 2019-08-23 | End: 2019-09-22

## 2019-07-08 RX ORDER — OXYCODONE AND ACETAMINOPHEN 7.5; 325 MG/1; MG/1
1 TABLET ORAL EVERY 8 HOURS PRN
Qty: 90 TABLET | Refills: 0 | Status: SHIPPED | OUTPATIENT
Start: 2019-07-24 | End: 2019-08-19 | Stop reason: ALTCHOICE

## 2019-07-09 ENCOUNTER — OFFICE VISIT (OUTPATIENT)
Dept: PAIN MEDICINE | Facility: CLINIC | Age: 74
End: 2019-07-09
Payer: MEDICARE

## 2019-07-09 VITALS
HEIGHT: 68 IN | DIASTOLIC BLOOD PRESSURE: 62 MMHG | HEART RATE: 91 BPM | BODY MASS INDEX: 24.71 KG/M2 | RESPIRATION RATE: 18 BRPM | SYSTOLIC BLOOD PRESSURE: 143 MMHG | WEIGHT: 163 LBS

## 2019-07-09 DIAGNOSIS — M51.36 DDD (DEGENERATIVE DISC DISEASE), LUMBAR: Primary | ICD-10-CM

## 2019-07-09 DIAGNOSIS — M48.061 SPINAL STENOSIS OF LUMBAR REGION WITHOUT NEUROGENIC CLAUDICATION: ICD-10-CM

## 2019-07-09 DIAGNOSIS — M53.3 SACROILIAC JOINT PAIN: ICD-10-CM

## 2019-07-09 DIAGNOSIS — M47.816 LUMBAR FACET ARTHROPATHY: ICD-10-CM

## 2019-07-09 DIAGNOSIS — M54.16 LEFT LUMBAR RADICULOPATHY: ICD-10-CM

## 2019-07-09 PROCEDURE — 99214 OFFICE O/P EST MOD 30 MIN: CPT | Mod: S$PBB,,, | Performed by: PHYSICIAN ASSISTANT

## 2019-07-09 PROCEDURE — 99214 OFFICE O/P EST MOD 30 MIN: CPT | Mod: PBBFAC | Performed by: PHYSICIAN ASSISTANT

## 2019-07-09 PROCEDURE — 99999 PR PBB SHADOW E&M-EST. PATIENT-LVL IV: CPT | Mod: PBBFAC,,, | Performed by: PHYSICIAN ASSISTANT

## 2019-07-09 PROCEDURE — 99214 PR OFFICE/OUTPT VISIT, EST, LEVL IV, 30-39 MIN: ICD-10-PCS | Mod: S$PBB,,, | Performed by: PHYSICIAN ASSISTANT

## 2019-07-09 PROCEDURE — 99999 PR PBB SHADOW E&M-EST. PATIENT-LVL IV: ICD-10-PCS | Mod: PBBFAC,,, | Performed by: PHYSICIAN ASSISTANT

## 2019-07-09 RX ORDER — GABAPENTIN 800 MG/1
800 TABLET ORAL 3 TIMES DAILY
Qty: 90 TABLET | Refills: 1 | Status: SHIPPED | OUTPATIENT
Start: 2019-07-09 | End: 2019-09-17 | Stop reason: SDUPTHER

## 2019-07-09 NOTE — PROGRESS NOTES
Chief Pain Complaint:  Low Back Pain, Leg pain (left > right)       History of Present Illness:  This patient is a 73 y.o. male who presents today complaining of the above noted pain/s. The patient describes this pain as follows.    - duration of pain: pain for years   - timing: constant   - character: aching, burning  - radiating, dermatomal: extends into left leg, along L5 pattern at times, mostly non-radiating  - antecedent trauma, prior spinal surgery: none  - pertinent negatives: No fever, No chills, No weight loss, No bladder dysfunction, No bowel dysfunction, No extremity weakness, No saddle anesthesia  - pertinent positives: none    - medications, other therapies tried (physical therapy, injections):     >> gabapentin, Norco    >> Has previously undergone Physical Therapy, which made pain worse    >> Has previously undergone spinal injection/s: lumbar MBB and Left TFESI with Dr Taylor in 2014 & 2015 (see EMR Surgeries for full details) with only short term relief    _________________________________________________________________________________________________________________________________________________________________________________________________________________________      IMAGING / Labs / Studies (reviewed on 7/9/2019):    9/15/17 MRI LUMBAR SPINE WITHOUT CONTRAST  History: Lower back pain.  Left lower extremity pain  Technique: Standard multiplanar pulse sequences without IV contrast.  Comparison:  No prior  studies available for comparison.  Findings:   Mild-moderate scoliosis, convex to the left and centered at L3.  All lumbar vertebral bodies are normal in height.  Scattered degenerative endplate marrow signal identified at the L2-L3 and L3-L4 L5-S1 levels.  No fracture.  No suspicious osseous lesion is identified.  Mild retrolisthesis of L2-L3.  Distal spinal cord and conus medullaris have normal appearance but the conus terminates at the T12-L1 level.  T12-L1: Minimal central protrusion.   Negative for focal nerve root impingement or central canal narrowing.  Neural foramina widely patent.  L1-2: Mild disc height loss.  Prominent intraosseous eyes.  Mild disc bulging.  Mild facet arthropathy and ligament flavum hypertrophy.  The central canal neural foramina patent.  L2-L3: Minimal retrolisthesis of L2 on L3.  There is moderate disc height loss.  Prominent anterior osteophytes.  Mild-moderate diffuse generalized disc bulging.  Mild facet arthropathy and ligament flavum hypertrophy.  The central canal is patent.  Neural foramina patent.  L3-L4: Disc desiccation and moderate disc height loss.  Large anterior osteophytes are present.  There is mild disc bulging and osteophytic ridging at the posterior disc margin.  Moderate right and mild left facet arthropathy and ligamentum flavum hypertrophy.  The central canal is patent.  Mild neural foraminal narrowing.  L4-L5: Very minimal grade 1 spondylolisthesis of L4-L5.  Mild generalized disc bulge is present.  Severe facet arthropathy and ligamentum flavum hypertrophy.  There is severe central canal and lateral recess stenosis.  Moderate neural foraminal stenosis.  L5-S1: Disc desiccation and moderate to severe disc height loss.  Prominent intraocular heights.  There is mild disc bulging and osteophytic ridging at the posterior disc margin.  Severe left and moderate right neural foraminal stenosis.  The central canal is patent.   Paravertebral soft tissues and musculature are normal.  The visualized intra-abdominal and intrapelvic contents are normal.       10/30/14 MRI Lumbar Spine Without Contrast    Narrative Exam: MRI of the lumbar spine without contrast.  History:     Low back pain. Status post SHEY, with S1-S2 radicular symptoms  Comparison: Prior study dated 04/02/13  Findings:     A convex right scoliosis again noted.  No compression fracture or subluxation.  The conus medullaris has a normal appearance.  The paraspinous soft tissues are  "unremarkable.  The T12 -- L1 and L1 -- 2 disk levels are essentially unremarkable.  L2 -- 3: Mild annular bulging again noted.    L3 -- 4: Moderate narrowing of the right lateral disk space, with right greater than left facet arthropathy, unchanged.    L4 -- 5: Marked bilateral facet arthropathy, with mild annular bulging, causing moderate central canal stenosis, unchanged.  L5 -- S1: Disk desiccation, with moderate disk space narrowing.  Significant degenerative narrowing of the left L5 neural foramen is apparent, unchanged.     _________________________________________________________________________________________________________________________________________________________________________________________________________________________      Review of Systems:  CONSTITUTIONAL: patient denies any fever, chills, or weight loss  SKIN: patient denies any rash or itching  RESPIRATORY: patient denies having any shortness of breath  GASTROINTESTINAL: patient denies having any diarrhea, constipation, or bowel incontinence  GENITOURINARY: patient denies having any abnormal bladder function    MUSCULOSKELETAL:  - patient reports low back pain    NEUROLOGICAL:   - pain as above  - strength in Lower extremities is intact, BILATERALLY  - sensation in Lower extremities is intact, BILATERALLY  - patient denies any loss of bowel or bladder control      PSYCHIATRIC: patient denies any suicidal or homicidal ideations    _________________________________________________________________________________________________________________________________________________________________________________________________________________________      Physical Exam:  Vitals:  BP (!) 143/62 (BP Location: Right arm, Patient Position: Sitting, BP Method: Medium (Automatic))   Pulse 91   Resp 18   Ht 5' 8" (1.727 m)   Wt 73.9 kg (163 lb)   BMI 24.78 kg/m²   (reviewed on 7/9/2019)    General: alert and oriented, in no apparent " distress.  Gait: normal gait.  Skin: no rashes, no discoloration, no obvious lesions  HEENT: normocephalic, atraumatic. Pupils equal and round.  Cardiovascular: no significant peripheral edema present.  Respiratory: without use of accessory muscles of respiration.    Musculoskeletal - Lumbar Spine:  - Pain on flexion of lumbar spine: Absent   - Pain on extension of lumbar spine: Present  - Lumbar facet loading: Present bilaterally  - TTP over the lumbar facet joints: Present Bilaterally, worse at L5-S1   - TTP over the lumbar paraspinals: Present, mild  - TTP over the SI joints: Present on left   - Straight Leg Raise: Negative  - NOELLE: Present  - Pain with internal and external rotation of hip    Neuro - Lower Extremities:  - BLE Strength: R/L: HF: 5/5, HE: 5/5, KF: 5/5; KE: 5/5; FE: 5/5; FF: 5/5  - Extremity Reflexes: Brisk and symmetric throughout  - Sensory: Sensation to light touch intact bilaterally      Psych:  Mood and affect is appropriate    _________________________________________________________________________________________________________________________________________________________________________________________________________________________     Assessment:  Noble Tripp is a 73 y.o. year old male who is presenting with   Encounter Diagnoses   Name Primary?    DDD (degenerative disc disease), lumbar Yes    Lumbar facet arthropathy     Sacroiliac joint pain     Spinal stenosis of lumbar region without neurogenic claudication     Left lumbar radiculopathy      Patient returns for follow-up.  He complains of chronic low back pain. Lumbar MRI shows advanced diffuse spondylosis, greatest at the L4-L5 level with severe central canal stenosis and lateral recess stenosis.       Plan:  1. Interventional: Consider Bilateral L3, L4, L5 MBB with local. We will consider this if pain not controlled with medication, but he is not interested at this time since he does not feel they have not been  long lasting in the past.    2. Pharmacologic:   - Refill Percocet 7.5/325 mg PO TID PRN (90 tabs) x 2 months. Paper Rx given. Patient tolerating opioids with no side effects, obtaining good pain control with functional improvement.   - Refill Gabapentin 800mg TID.  - Opioid contract signed on 3/20/2018.     - [place]  reviewed and appropriate. Last filled 6-25-19.  Will post date accordingly.  - Will order UDS next visit to ensure medication compliance.      3. Rehabilitative: Encouraged regular exercise. He has been using back brace; patient was informed to limit use to avoid muscle atrophy.     4. Diagnostic: None for now.    5. Follow up: 9 weeks for med refill.     - I discussed the risks, benefits, and alternatives to potential treatment options. All questions and concerns were fully addressed today in clinic. Dr. Mueller was consulted regarding the patient plan and agrees.           >> UDS:  6-28-16 :: appropriate  2/28/2017 :: appropriate  8/18/2017 :: (not in EMR)  5/16/2018 :: appropriate  9/11/2018 :: appropriate  3/11/2019 :: appropriate

## 2019-08-05 ENCOUNTER — TELEPHONE (OUTPATIENT)
Dept: CARDIOLOGY | Facility: CLINIC | Age: 74
End: 2019-08-05

## 2019-08-05 NOTE — TELEPHONE ENCOUNTER
Spoke with pt and let him know labs will be determined at visit.  Pt verbalized understanding.    ----- Message from Dana Walker sent at 8/5/2019 10:59 AM CDT -----  Contact: pt  Pt has a 1 year check up on 8/19.  Does he need to have lab before the appt? [phone]

## 2019-08-11 DIAGNOSIS — Z87.891 EX-SMOKER: ICD-10-CM

## 2019-08-12 RX ORDER — VARENICLINE TARTRATE 1 MG/1
TABLET, FILM COATED ORAL
Qty: 56 TABLET | Refills: 3 | Status: ON HOLD | OUTPATIENT
Start: 2019-08-12 | End: 2019-12-10 | Stop reason: HOSPADM

## 2019-08-19 ENCOUNTER — OFFICE VISIT (OUTPATIENT)
Dept: CARDIOLOGY | Facility: CLINIC | Age: 74
End: 2019-08-19
Payer: MEDICARE

## 2019-08-19 VITALS
SYSTOLIC BLOOD PRESSURE: 126 MMHG | WEIGHT: 155 LBS | BODY MASS INDEX: 23.49 KG/M2 | HEART RATE: 76 BPM | DIASTOLIC BLOOD PRESSURE: 62 MMHG | HEIGHT: 68 IN

## 2019-08-19 DIAGNOSIS — J44.9 MODERATE COPD (CHRONIC OBSTRUCTIVE PULMONARY DISEASE): Chronic | ICD-10-CM

## 2019-08-19 DIAGNOSIS — I25.10 CORONARY ARTERY DISEASE INVOLVING NATIVE CORONARY ARTERY OF NATIVE HEART WITHOUT ANGINA PECTORIS: Primary | ICD-10-CM

## 2019-08-19 DIAGNOSIS — I65.21 STENOSIS OF RIGHT CAROTID ARTERY: ICD-10-CM

## 2019-08-19 PROBLEM — I77.9 CAROTID ARTERY DISEASE: Status: ACTIVE | Noted: 2019-08-19

## 2019-08-19 PROCEDURE — 99213 OFFICE O/P EST LOW 20 MIN: CPT | Mod: S$PBB,,, | Performed by: INTERNAL MEDICINE

## 2019-08-19 PROCEDURE — 99999 PR PBB SHADOW E&M-EST. PATIENT-LVL III: CPT | Mod: PBBFAC,,, | Performed by: INTERNAL MEDICINE

## 2019-08-19 PROCEDURE — 99213 PR OFFICE/OUTPT VISIT, EST, LEVL III, 20-29 MIN: ICD-10-PCS | Mod: S$PBB,,, | Performed by: INTERNAL MEDICINE

## 2019-08-19 PROCEDURE — 99213 OFFICE O/P EST LOW 20 MIN: CPT | Mod: PBBFAC | Performed by: INTERNAL MEDICINE

## 2019-08-19 PROCEDURE — 99999 PR PBB SHADOW E&M-EST. PATIENT-LVL III: ICD-10-PCS | Mod: PBBFAC,,, | Performed by: INTERNAL MEDICINE

## 2019-08-19 NOTE — PROGRESS NOTES
"Subjective:   Patient ID:  Noble Tripp is a 74 y.o. male who presents for follow up of CAD F/U      73 yo, male, PMH PSVT, CAD, HLD, COPD, JUANITO on CPAP, vocal cord cancer s/p surgery and subsequent XRT in 2011, and recent tongue precancer mass removal.   10- admitted to [facility] for sepsis/PNA and PSVT on . EKG  Showed extensive St depression of 1 mm on anterolateral leads. EF 60%. MPi showed fixed inferior perfusion defect. cTn was up to 0.3. PSVT was converted to sinus O/N after BB was added.   s/p left thoracentesis d/t pleural effusion.  Echo in :normal EF, DD.  MPI in :  A small to moderate size fixed defect of moderate to severe intensity that extends from the apical to the base inferior wall of the left ventricle.  EKG on 10-: PSVT, st depression on V3 to V6, I, II and avL.  Lives with his grandson, feel fatigue, no chest pain, palpitation, dizziness, dyspnea, dizziness.   Walks regularly daily for a mile.  No smoking/drinking        Past Medical History:   Diagnosis Date    Adrenal mass     david;nl fxn w/u    Aspiration pneumonia 10/15    puree/honey    Benign prostate hyperplasia     CAD (coronary artery disease)     dr padilla    Chronic pain     dr santos    COPD (chronic obstructive pulmonary disease)     papi    Ex-smoker     11/13    Hyperlipidemia     Osteopenia 1/15 tran 1/18    PVD (peripheral vascular disease)     noobs 07 [facility]    Pyriform sinus cancer     dr ponce radiation 1/2-14 dr king bryson    Sleep apnea     cpap    Squamous cell carcinoma of skin     roberto    Tongue cancer     "superficial" removed 11/14    Vocal cord cancer 2011    Xerostomia     radiation       Past Surgical History:   Procedure Laterality Date    APPENDECTOMY      Bronchoscopy Bilateral 2/5/2019    [facility]    COLONOSCOPY N/A 3/11/2014    Performed by Joe Goodman [facility] at [facility]    Due for screening " colonoscopy N/A 2017    [facility] at [facility]    ESOPHAGOGASTRODUODENOSCOPY (EGD) N/A 2018    Performed by Audie Hill III, MD at [facility] ENDO    ESOPHAGOGASTRODUODENOSCOPY (EGD) N/A 2018    Performed by Audie Hill III, MD at [facility] ENDO    Thoracentesis Right 2019    Performed by [facility] at [facility]    Thoracentesis Left 2018    Performed by [facility] at [facility] END[facility]    tongue cancer excision      dr vitale    VOCAL CORD LATERALIZATION, ENDOSCOPIC APPROACH W/ MLB      kris       Social History     Tobacco Use    Smoking status: Former Smoker     Packs/day: 2.00     Years: 55.00     Pack years: 110.00     Last attempt to quit: 10/2014     Years since quittin.8    Smokeless tobacco: Never Used   Substance Use Topics    Alcohol use: No    Drug use: No       Family History   Problem Relation Age of Onset    Cancer Father     Cancer Brother          Review of Systems   Constitution: Positive for malaise/fatigue. Negative for decreased appetite, diaphoresis, fever and night sweats.   HENT: Negative for nosebleeds.    Eyes: Negative for blurred vision and double vision.   Cardiovascular: Negative for chest pain, claudication, dyspnea on exertion, irregular heartbeat, leg swelling, near-syncope, orthopnea, palpitations, paroxysmal nocturnal dyspnea and syncope.   Respiratory: Positive for cough. Negative for shortness of breath, sleep disturbances due to breathing, snoring, sputum production and wheezing.    Endocrine: Negative for cold intolerance and polyuria.   Hematologic/Lymphatic: Does not bruise/bleed easily.   Skin: Negative for rash.   Musculoskeletal: Negative for back pain, falls, joint pain, joint swelling and neck pain.   Gastrointestinal: Negative for abdominal pain, heartburn, nausea and vomiting.   Genitourinary: Negative for dysuria, frequency and hematuria.   Neurological: Negative for difficulty with  concentration, dizziness, focal weakness, headaches, light-headedness, numbness, seizures and weakness.   Psychiatric/Behavioral: Negative for depression, memory loss and substance abuse. The patient does not have insomnia.    Allergic/Immunologic: Negative for HIV exposure and hives.       Objective:   Physical Exam   Constitutional: He is oriented to person, place, and time. He appears well-nourished.   HENT:   Head: Normocephalic.   Eyes: Pupils are equal, round, and reactive to light.   Neck: Normal carotid pulses and no JVD present. Carotid bruit is not present. No thyromegaly present.   Cardiovascular: Normal rate, regular rhythm, normal heart sounds and normal pulses.  No extrasystoles are present. PMI is not displaced. Exam reveals no gallop and no S3.   No murmur heard.  Pulses:       Carotid pulses are on the right side with bruit.  Pulmonary/Chest: Breath sounds normal. No stridor. No respiratory distress.   Abdominal: Soft. Bowel sounds are normal. There is no tenderness. There is no rebound.   Musculoskeletal: Normal range of motion.   Neurological: He is alert and oriented to person, place, and time.   Skin: Skin is intact. No rash noted.   Psychiatric: His behavior is normal.       Lab Results   Component Value Date    CHOL 77 (L) 02/06/2019    CHOL 98 (L) 08/13/2018    CHOL 204 (H) 11/14/2017     Lab Results   Component Value Date    HDL 44 02/06/2019    HDL 43 08/13/2018    HDL 43 11/14/2017     Lab Results   Component Value Date    LDLCALC 25.0 (L) 02/06/2019    LDLCALC 39.8 (L) 08/13/2018    LDLCALC 147.8 11/14/2017     Lab Results   Component Value Date    TRIG 40 02/06/2019    TRIG 76 08/13/2018    TRIG 66 11/14/2017     Lab Results   Component Value Date    CHOLHDL 57.1 (H) 02/06/2019    CHOLHDL 43.9 08/13/2018    CHOLHDL 21.1 11/14/2017       Chemistry        Component Value Date/Time     05/29/2019 0835    K 3.7 05/29/2019 0835     05/29/2019 0835    CO2 28 05/29/2019 0835    BUN  14 05/29/2019 0835    CREATININE 0.8 05/29/2019 0835    GLU 99 05/29/2019 0835        Component Value Date/Time    CALCIUM 9.0 05/29/2019 0835    ALKPHOS 80 05/29/2019 0835    AST 14 05/29/2019 0835    ALT 10 05/29/2019 0835    BILITOT 0.3 05/29/2019 0835    ESTGFRAFRICA >60 05/29/2019 0835    EGFRNONAA >60 05/29/2019 0835          Lab Results   Component Value Date    HGBA1C 5.4 02/06/2019     Lab Results   Component Value Date    TSH 1.259 02/06/2019     Lab Results   Component Value Date    INR 1.1 10/10/2015    INR 1.0 10/10/2015    INR 1.0 07/26/2005     Lab Results   Component Value Date    WBC 9.57 05/29/2019    HGB 12.3 (L) 05/29/2019    HCT 38.1 (L) 05/29/2019    MCV 88 05/29/2019     05/29/2019     BMP  Sodium   Date Value Ref Range Status   05/29/2019 140 136 - 145 mmol/L Final     Potassium   Date Value Ref Range Status   05/29/2019 3.7 3.5 - 5.1 mmol/L Final     Chloride   Date Value Ref Range Status   05/29/2019 104 95 - 110 mmol/L Final     CO2   Date Value Ref Range Status   05/29/2019 28 23 - 29 mmol/L Final     BUN, Bld   Date Value Ref Range Status   05/29/2019 14 8 - 23 mg/dL Final     Creatinine   Date Value Ref Range Status   05/29/2019 0.8 0.5 - 1.4 mg/dL Final     Calcium   Date Value Ref Range Status   05/29/2019 9.0 8.7 - 10.5 mg/dL Final     Anion Gap   Date Value Ref Range Status   05/29/2019 8 8 - 16 mmol/L Final     eGFR if    Date Value Ref Range Status   05/29/2019 >60 >60 mL/min/1.73 m^2 Final     eGFR if non    Date Value Ref Range Status   05/29/2019 >60 >60 mL/min/1.73 m^2 Final     Comment:     Calculation used to obtain the estimated glomerular filtration  rate (eGFR) is the CKD-EPI equation.        BNP  @LABRCNTIP(BNP,BNPTRIAGEBLO)@  @LABRCNTIP(troponini)@  CrCl cannot be calculated (Patient's most recent lab result is older than the maximum 7 days allowed.).  No results found in the last 24 hours.  No results found in the last 24  hours.  No results found in the last 24 hours.    Assessment:      1. Coronary artery disease involving native coronary artery of native heart without angina pectoris    2. Moderate COPD (chronic obstructive pulmonary disease)    3. Stenosis of right carotid artery      BP, LDL and A1c wnl    Plan:   Continue ASA  No HTn and HLD med now  Supportive care  RTC in 1 yr

## 2019-08-22 ENCOUNTER — PROCEDURE VISIT (OUTPATIENT)
Dept: DERMATOLOGY | Facility: CLINIC | Age: 74
End: 2019-08-22
Payer: MEDICARE

## 2019-08-22 DIAGNOSIS — D04.61: Primary | ICD-10-CM

## 2019-08-22 PROCEDURE — 88305 TISSUE EXAM BY PATHOLOGIST: CPT | Performed by: PATHOLOGY

## 2019-08-22 PROCEDURE — 11602 EXC TR-EXT MAL+MARG 1.1-2 CM: CPT | Mod: S$PBB,59,, | Performed by: DERMATOLOGY

## 2019-08-22 PROCEDURE — 88305 TISSUE SPECIMEN TO PATHOLOGY, DERMATOLOGY: ICD-10-PCS | Mod: 26,,, | Performed by: PATHOLOGY

## 2019-08-22 PROCEDURE — 11602 EXC TR-EXT MAL+MARG 1.1-2 CM: CPT | Mod: PBBFAC | Performed by: DERMATOLOGY

## 2019-08-22 PROCEDURE — 11602 PR EXC SKIN MALIG 1.1-2 CM TRUNK,ARM,LEG: ICD-10-PCS | Mod: S$PBB,59,, | Performed by: DERMATOLOGY

## 2019-08-22 PROCEDURE — 99499 NO LOS: ICD-10-PCS | Mod: S$PBB,,, | Performed by: DERMATOLOGY

## 2019-08-22 PROCEDURE — 99499 UNLISTED E&M SERVICE: CPT | Mod: S$PBB,,, | Performed by: DERMATOLOGY

## 2019-08-22 PROCEDURE — 12032 INTMD RPR S/A/T/EXT 2.6-7.5: CPT | Mod: PBBFAC | Performed by: DERMATOLOGY

## 2019-08-22 PROCEDURE — 12032 PR LAYR CLOS WND TRUNK,ARM,LEG 2.6-7.5 CM: ICD-10-PCS | Mod: S$PBB,,, | Performed by: DERMATOLOGY

## 2019-08-22 PROCEDURE — 88305 TISSUE EXAM BY PATHOLOGIST: CPT | Mod: 26,,, | Performed by: PATHOLOGY

## 2019-08-22 PROCEDURE — 12032 INTMD RPR S/A/T/EXT 2.6-7.5: CPT | Mod: S$PBB,,, | Performed by: DERMATOLOGY

## 2019-08-22 NOTE — PATIENT INSTRUCTIONS
Wound Care    A pressure dressing and vaseline ointment has been placed over the site.  This should remain in place for 48 hours.  It is recommended that you keep the area dry for the first 48 hours.  After 48 hours, you may remove the bandage and wash the area with warm soap and water, apply Vaseline, and keep covered with a bandage.  This should be repeated every 24 hours. Many patients prefer to use Neosporin or Bacitracin ointment.  This is acceptable; however, know that you can develop an allergy to this medication even if you have used it safely for years.  It is important to keep the area moist.  Letting it dry out and get air slows healing time, and will worsen the scar.  Keep the areas covered with a bandage until sutures are removed. Sutures will be removed in 1 week for face sutures and 2 weeks for body sutures unless otherwise specified.      If you notice increasing redness, tenderness, pain, or yellow drainage at the site, please notify your doctor.  These are signs of an infection.    If your site is bleeding, apply firm pressure for 15 minutes straight.  Repeat for another 15 minutes, if it is still bleeding.   If the surgical site continues to bleed, then please contact your doctor.      [facility]   DERMATOLOGY  [address]   [place] [address]   Dept: [phone]   Dept Fax: [phone]

## 2019-08-22 NOTE — PROGRESS NOTES
Subjective:       Patient ID:  Noble Tripp is a 74 y.o. male who presents for No chief complaint on file.    HPI    Review of Systems     Objective:    Physical Exam       Diagram Legend     Erythematous scaling macule/papule c/w actinic keratosis       Vascular papule c/w angioma      Pigmented verrucoid papule/plaque c/w seborrheic keratosis      Yellow umbilicated papule c/w sebaceous hyperplasia      Irregularly shaped tan macule c/w lentigo     1-2 mm smooth white papules consistent with Milia      Movable subcutaneous cyst with punctum c/w epidermal inclusion cyst      Subcutaneous movable cyst c/w pilar cyst      Firm pink to brown papule c/w dermatofibroma      Pedunculated fleshy papule(s) c/w skin tag(s)      Evenly pigmented macule c/w junctional nevus     Mildly variegated pigmented, slightly irregular-bordered macule c/w mildly atypical nevus      Flesh colored to evenly pigmented papule c/w intradermal nevus       Pink pearly papule/plaque c/w basal cell carcinoma      Erythematous hyperkeratotic cursted plaque c/w SCC      Surgical scar with no sign of skin cancer recurrence      Open and closed comedones      Inflammatory papules and pustules      Verrucoid papule consistent consistent with wart     Erythematous eczematous patches and plaques     Dystrophic onycholytic nail with subungual debris c/w onychomycosis     Umbilicated papule    Erythematous-base heme-crusted tan verrucoid plaque consistent with inflamed seborrheic keratosis     Erythematous Silvery Scaling Plaque c/w Psoriasis     See annotation      Assessment / Plan:        There are no diagnoses linked to this encounter.         No follow-ups on file.

## 2019-08-22 NOTE — PROGRESS NOTES
PROCEDURE: Elliptical excision with intermediate layered repair    ANESTHETIC: 5 cc 2% Lidocaine with Epinephrine 1:100,000    SURGICAL PREP: Hibiclens    SURGEON: Marie Max MD    ASSISTANTS: Bhumika Pedro LPN     PREOPERATIVE DIAGNOSIS: SCCIS    POSTOPERATIVE DIAGNOSIS: SCCIS    PATHOLOGIC DIAGNOSIS: Pending    LOCATION: right shoulder    INITIAL LESION SIZE: 1.0 cm    EXCISED DIAMETER: 1.8 cm    PREPARATION:  The diagnosis, procedure, alternatives, benefits and risks, including but not limited to: drug reactions, pain, scar or cosmetic defect, local sensation disturbances, and/or recurrence of present condition were explained to the patient. The patient elected to proceed.    PROCEDURE:  The area of the right shoulder was prepped, draped, and anesthetized in the usual sterile fashion. Lesional tissue was carefully marked prior to administration of the anesthesia. An elliptical excision was drawn around the lesion with margins. A fusiform elliptical excision was done with a #15 blade carried down completely through the dermis into the subcutaneous tissues. Then, with a combination of blunt and sharp dissection, the lesion was removed.  The specimen was marked at the 12 o'clock position with suture and submitted for histologic evaluation. The operative site was widely undermined in the subcutaneous tissue plane. Then, electrocoagulation was used to obtain hemostasis. Blood loss was minimal. The wound was then approximated in a layered fashion with subcutaneous and intradermal 3-0 Vicryl sutures. The wound was then superficially closed with interrupted 3-0 Ethilon sutures.    The patient tolerated the procedure well.    The area was cleaned and dressed appropriately and the patient was given wound care instructions, as well as an appointment for follow-up evaluation.    LENGTH OF REPAIR: 5.2 cm    Follow up in about 2 weeks (around 9/5/2019).

## 2019-08-23 ENCOUNTER — CLINICAL SUPPORT (OUTPATIENT)
Dept: CARDIOLOGY | Facility: CLINIC | Age: 74
End: 2019-08-23
Attending: INTERNAL MEDICINE
Payer: MEDICARE

## 2019-08-23 DIAGNOSIS — I25.10 CORONARY ARTERY DISEASE INVOLVING NATIVE CORONARY ARTERY OF NATIVE HEART WITHOUT ANGINA PECTORIS: ICD-10-CM

## 2019-08-23 LAB — INTERNAL CAROTID STENOSIS: ABNORMAL

## 2019-08-23 PROCEDURE — 93880 EXTRACRANIAL BILAT STUDY: CPT | Mod: PBBFAC | Performed by: INTERNAL MEDICINE

## 2019-08-23 PROCEDURE — 93880 CAR US DOPPLER CAROTID BILATERAL: ICD-10-PCS | Mod: 26,S$PBB,, | Performed by: INTERNAL MEDICINE

## 2019-08-26 ENCOUNTER — TELEPHONE (OUTPATIENT)
Dept: CARDIOLOGY | Facility: CLINIC | Age: 74
End: 2019-08-26

## 2019-08-26 NOTE — TELEPHONE ENCOUNTER
Called and got recording, call can not be completed at this time , please call back later. cm----- Message from Andres Whitfield MD sent at 8/26/2019  2:17 PM CDT -----  Carotid US showed mild to moderate Dz

## 2019-08-27 ENCOUNTER — TELEPHONE (OUTPATIENT)
Dept: CARDIOLOGY | Facility: CLINIC | Age: 74
End: 2019-08-27

## 2019-08-27 NOTE — TELEPHONE ENCOUNTER
Attempted without success to contact pt with Carotid US showed mild to moderate Dz.  No vm available.       ----- Message from Andres Whitfield MD sent at 8/26Carotid US showed mild to moderate Dz /2019  2:17 PM CDT -----

## 2019-09-05 ENCOUNTER — CLINICAL SUPPORT (OUTPATIENT)
Dept: DERMATOLOGY | Facility: CLINIC | Age: 74
End: 2019-09-05
Payer: MEDICARE

## 2019-09-05 DIAGNOSIS — Z48.02 VISIT FOR SUTURE REMOVAL: Primary | ICD-10-CM

## 2019-09-05 PROCEDURE — 99024 POSTOP FOLLOW-UP VISIT: CPT | Mod: POP,,, | Performed by: DERMATOLOGY

## 2019-09-05 PROCEDURE — 99024 PR POST-OP FOLLOW-UP VISIT: ICD-10-PCS | Mod: POP,,, | Performed by: DERMATOLOGY

## 2019-09-05 PROCEDURE — 99999 PR PBB SHADOW E&M-EST. PATIENT-LVL III: CPT | Mod: PBBFAC,,,

## 2019-09-05 PROCEDURE — 99999 PR PBB SHADOW E&M-EST. PATIENT-LVL III: ICD-10-PCS | Mod: PBBFAC,,,

## 2019-09-05 PROCEDURE — 99213 OFFICE O/P EST LOW 20 MIN: CPT | Mod: PBBFAC

## 2019-09-09 ENCOUNTER — LAB VISIT (OUTPATIENT)
Dept: LAB | Facility: HOSPITAL | Age: 74
End: 2019-09-09
Attending: INTERNAL MEDICINE
Payer: MEDICARE

## 2019-09-09 DIAGNOSIS — Z86.39 HISTORY OF IRON DEFICIENCY: ICD-10-CM

## 2019-09-09 DIAGNOSIS — E27.8 ADRENAL MASS: ICD-10-CM

## 2019-09-09 DIAGNOSIS — Z85.89 HISTORY OF HEAD AND NECK CANCER: ICD-10-CM

## 2019-09-09 LAB
ALBUMIN SERPL BCP-MCNC: 2.7 G/DL (ref 3.5–5.2)
ALP SERPL-CCNC: 69 U/L (ref 55–135)
ALT SERPL W/O P-5'-P-CCNC: 6 U/L (ref 10–44)
ANION GAP SERPL CALC-SCNC: 10 MMOL/L (ref 8–16)
AST SERPL-CCNC: 11 U/L (ref 10–40)
BASOPHILS # BLD AUTO: 0.1 K/UL (ref 0–0.2)
BASOPHILS NFR BLD: 1.1 % (ref 0–1.9)
BILIRUB SERPL-MCNC: 0.6 MG/DL (ref 0.1–1)
BUN SERPL-MCNC: 12 MG/DL (ref 8–23)
CALCIUM SERPL-MCNC: 8.5 MG/DL (ref 8.7–10.5)
CHLORIDE SERPL-SCNC: 101 MMOL/L (ref 95–110)
CO2 SERPL-SCNC: 28 MMOL/L (ref 23–29)
CREAT SERPL-MCNC: 0.8 MG/DL (ref 0.5–1.4)
DIFFERENTIAL METHOD: ABNORMAL
EOSINOPHIL # BLD AUTO: 0.1 K/UL (ref 0–0.5)
EOSINOPHIL NFR BLD: 1.3 % (ref 0–8)
ERYTHROCYTE [DISTWIDTH] IN BLOOD BY AUTOMATED COUNT: 18.8 % (ref 11.5–14.5)
EST. GFR  (AFRICAN AMERICAN): >60 ML/MIN/1.73 M^2
EST. GFR  (NON AFRICAN AMERICAN): >60 ML/MIN/1.73 M^2
GLUCOSE SERPL-MCNC: 168 MG/DL (ref 70–110)
HCT VFR BLD AUTO: 42 % (ref 40–54)
HGB BLD-MCNC: 13.8 G/DL (ref 14–18)
LYMPHOCYTES # BLD AUTO: 1.1 K/UL (ref 1–4.8)
LYMPHOCYTES NFR BLD: 11.8 % (ref 18–48)
MCH RBC QN AUTO: 32.2 PG (ref 27–31)
MCHC RBC AUTO-ENTMCNC: 32.9 G/DL (ref 32–36)
MCV RBC AUTO: 98 FL (ref 82–98)
MONOCYTES # BLD AUTO: 1 K/UL (ref 0.3–1)
MONOCYTES NFR BLD: 10 % (ref 4–15)
NEUTROPHILS # BLD AUTO: 7.2 K/UL (ref 1.8–7.7)
NEUTROPHILS NFR BLD: 76 % (ref 38–73)
PLATELET # BLD AUTO: 270 K/UL (ref 150–350)
PMV BLD AUTO: 10.8 FL (ref 9.2–12.9)
POTASSIUM SERPL-SCNC: 3.4 MMOL/L (ref 3.5–5.1)
PROT SERPL-MCNC: 6.9 G/DL (ref 6–8.4)
RBC # BLD AUTO: 4.29 M/UL (ref 4.6–6.2)
SODIUM SERPL-SCNC: 139 MMOL/L (ref 136–145)
WBC # BLD AUTO: 9.52 K/UL (ref 3.9–12.7)

## 2019-09-09 PROCEDURE — 82728 ASSAY OF FERRITIN: CPT

## 2019-09-09 PROCEDURE — 36415 COLL VENOUS BLD VENIPUNCTURE: CPT

## 2019-09-09 PROCEDURE — 83540 ASSAY OF IRON: CPT

## 2019-09-09 PROCEDURE — 80053 COMPREHEN METABOLIC PANEL: CPT

## 2019-09-09 PROCEDURE — 85025 COMPLETE CBC W/AUTO DIFF WBC: CPT

## 2019-09-10 ENCOUNTER — PATIENT OUTREACH (OUTPATIENT)
Dept: ADMINISTRATIVE | Facility: HOSPITAL | Age: 74
End: 2019-09-10

## 2019-09-10 LAB
FERRITIN SERPL-MCNC: 286 NG/ML (ref 20–300)
IRON SERPL-MCNC: 30 UG/DL (ref 45–160)
SATURATED IRON: 15 % (ref 20–50)
TOTAL IRON BINDING CAPACITY: 204 UG/DL (ref 250–450)
TRANSFERRIN SERPL-MCNC: 138 MG/DL (ref 200–375)

## 2019-09-12 ENCOUNTER — OFFICE VISIT (OUTPATIENT)
Dept: HEMATOLOGY/ONCOLOGY | Facility: CLINIC | Age: 74
End: 2019-09-12
Payer: MEDICARE

## 2019-09-12 VITALS
SYSTOLIC BLOOD PRESSURE: 152 MMHG | HEIGHT: 68 IN | HEART RATE: 80 BPM | TEMPERATURE: 98 F | WEIGHT: 157.44 LBS | DIASTOLIC BLOOD PRESSURE: 78 MMHG | OXYGEN SATURATION: 94 % | BODY MASS INDEX: 23.86 KG/M2

## 2019-09-12 DIAGNOSIS — Z85.89 HISTORY OF HEAD AND NECK CANCER: ICD-10-CM

## 2019-09-12 DIAGNOSIS — I10 ESSENTIAL HYPERTENSION: ICD-10-CM

## 2019-09-12 DIAGNOSIS — D64.9 CHRONIC ANEMIA: ICD-10-CM

## 2019-09-12 DIAGNOSIS — Z86.39 HISTORY OF IRON DEFICIENCY: Primary | ICD-10-CM

## 2019-09-12 DIAGNOSIS — C02.9 CANCER OF TONGUE: ICD-10-CM

## 2019-09-12 DIAGNOSIS — M85.80 OSTEOPENIA, UNSPECIFIED LOCATION: ICD-10-CM

## 2019-09-12 PROCEDURE — 99215 PR OFFICE/OUTPT VISIT, EST, LEVL V, 40-54 MIN: ICD-10-PCS | Mod: S$PBB,,, | Performed by: NURSE PRACTITIONER

## 2019-09-12 PROCEDURE — 99999 PR PBB SHADOW E&M-EST. PATIENT-LVL III: CPT | Mod: PBBFAC,,, | Performed by: NURSE PRACTITIONER

## 2019-09-12 PROCEDURE — 99213 OFFICE O/P EST LOW 20 MIN: CPT | Mod: PBBFAC | Performed by: NURSE PRACTITIONER

## 2019-09-12 PROCEDURE — 99215 OFFICE O/P EST HI 40 MIN: CPT | Mod: S$PBB,,, | Performed by: NURSE PRACTITIONER

## 2019-09-12 PROCEDURE — 99999 PR PBB SHADOW E&M-EST. PATIENT-LVL III: ICD-10-PCS | Mod: PBBFAC,,, | Performed by: NURSE PRACTITIONER

## 2019-09-12 RX ORDER — HEPARIN 100 UNIT/ML
500 SYRINGE INTRAVENOUS
Status: CANCELLED | OUTPATIENT
Start: 2019-11-19

## 2019-09-12 RX ORDER — HEPARIN 100 UNIT/ML
500 SYRINGE INTRAVENOUS
Status: CANCELLED | OUTPATIENT
Start: 2019-09-19

## 2019-09-12 RX ORDER — SODIUM CHLORIDE 0.9 % (FLUSH) 0.9 %
10 SYRINGE (ML) INJECTION
Status: CANCELLED | OUTPATIENT
Start: 2019-09-19

## 2019-09-12 RX ORDER — SODIUM CHLORIDE 0.9 % (FLUSH) 0.9 %
10 SYRINGE (ML) INJECTION
Status: CANCELLED | OUTPATIENT
Start: 2019-11-19

## 2019-09-12 RX ORDER — METHYLPREDNISOLONE SOD SUCC 125 MG
80 VIAL (EA) INJECTION
Status: CANCELLED
Start: 2019-09-19

## 2019-09-12 RX ORDER — METHYLPREDNISOLONE SOD SUCC 125 MG
80 VIAL (EA) INJECTION
Status: CANCELLED
Start: 2019-11-19

## 2019-09-12 NOTE — PROGRESS NOTES
Subjective:       Patient ID: Noble Tripp is a 74 y.o. male.    Chief Complaint: Anemia    73 year old male, presents for evaluation of anemia. Patient has history of iron deficiency treated in the past with oral iron replacement. Patient states he has had difficulty with oral iron replacement in the past due to severe constipation. Patient is seen by Dr. Oleary for ongoing monitoring of history of tongue cancer, he is UTD with followup. Patient has had an EGD and colonoscopy and these tests were negative for source of a GI bleed. Patient denies melena, hematemesis, difficulty swallowing, palpitations, dizziness, severe fatigue.      Today's visit:    Patient reports worsened fatigue    Anemia   There has been no bruising/bleeding easily, confusion, fever, light-headedness or palpitations.     Review of Systems   Constitutional: Positive for fatigue. Negative for activity change, appetite change, chills, diaphoresis, fever and unexpected weight change.   HENT: Negative for congestion, hearing loss, nosebleeds, postnasal drip, sore throat and trouble swallowing.    Eyes: Negative for discharge and visual disturbance.   Respiratory: Negative for cough, chest tightness and shortness of breath.    Cardiovascular: Negative for chest pain, palpitations and leg swelling.   Gastrointestinal: Negative for constipation, diarrhea, nausea and vomiting.   Endocrine: Negative for cold intolerance and heat intolerance.   Genitourinary: Negative for difficulty urinating, dysuria, flank pain and hematuria.   Musculoskeletal: Positive for arthralgias and back pain. Negative for myalgias.   Skin: Negative.    Neurological: Positive for headaches. Negative for dizziness, weakness and light-headedness.   Hematological: Negative for adenopathy. Does not bruise/bleed easily.   Psychiatric/Behavioral: Negative for agitation, behavioral problems and confusion. The patient is nervous/anxious.        Medication List with  Changes/Refills   Current Medications    ALBUTEROL (PROAIR HFA) 90 MCG/ACTUATION INHALER    INL 2 PFS PO INTO THE LUNGS Q 4 H PRF WHZ OR SOB    ALBUTEROL (PROVENTIL) 2.5 MG /3 ML (0.083 %) NEBULIZER SOLUTION    Take 3 mLs (2.5 mg total) by nebulization every 8 (eight) hours while awake.    ASPIRIN 81 MG TAB    Take 1 tablet by mouth Daily. Over the counter to help prevent stroke/heart attack    CHANTIX CONTINUING MONTH BOX 1 MG TAB    TAKE 1 TABLET BY MOUTH ONCE DAILY    ELDERBERRY FRUIT AND FLOWER ORAL    Take by mouth.    EVOLOCUMAB (REPATHA SURECLICK) 140 MG/ML PNIJ    Inject 1 mL (140 mg total) into the skin every 14 (fourteen) days.    GABAPENTIN (NEURONTIN) 800 MG TABLET    Take 1 tablet (800 mg total) by mouth 3 (three) times daily.    GARLIC 2,000 MG CAP    Take 1 tablet by mouth once daily.     LACTOBACILLUS RHAMNOSUS GG (CULTURELLE) 10 BILLION CELL CAPSULE    Take 1 capsule by mouth once daily.    LORATADINE (CLARITIN) 10 MG TABLET    Take 1 tablet by mouth daily as needed.     NEBULIZER AND COMPRESSOR ANGELO    Use as directed    OXYCODONE-ACETAMINOPHEN (PERCOCET) 7.5-325 MG PER TABLET    Take 1 tablet by mouth every 8 (eight) hours as needed for Pain.    PANTOPRAZOLE (PROTONIX) 40 MG TABLET    TAKE ONE TABLET BY MOUTH ONCE DAILY    PILOCARPINE (SALAGEN) 5 MG TAB     TAKE 1 TABLET BY MOUTH 30 MINUTES PRIOR TO EACH MEAL    TIOTROPIUM (SPIRIVA WITH HANDIHALER) 18 MCG INHALATION CAPSULE    Inhale 1 capsule (18 mcg total) into the lungs once daily.    TURMERIC ROOT EXTRACT 500 MG CAP    Take 1 capsule by mouth 2 (two) times daily.      Objective:     Vitals:    09/12/19 1100   BP: (!) 152/78   Pulse: 80   Temp: 97.7 °F (36.5 °C)     Physical Exam   Constitutional: He is oriented to person, place, and time. He appears well-developed and well-nourished. No distress.   HENT:   Head: Normocephalic and atraumatic.   Right Ear: Hearing and external ear normal.   Left Ear: Hearing and external ear normal.   Nose: Nose  normal. No mucosal edema or rhinorrhea. No epistaxis.   Mouth/Throat: Uvula is midline, oropharynx is clear and moist and mucous membranes are normal.   Eyes: Pupils are equal, round, and reactive to light. Conjunctivae and EOM are normal. Right eye exhibits no chemosis and no discharge. Left eye exhibits no chemosis and no discharge.   Neck: Trachea normal and normal range of motion. Neck supple. No thyroid mass and no thyromegaly present.   Cardiovascular: Normal rate, regular rhythm and intact distal pulses.   Murmur heard.  Pulses:       Dorsalis pedis pulses are 2+ on the right side, and 2+ on the left side.   Pulmonary/Chest: Effort normal and breath sounds normal. No respiratory distress. He has no decreased breath sounds. He has no wheezes.   Abdominal: Soft. Bowel sounds are normal. He exhibits no distension. There is no tenderness.   Musculoskeletal: Normal range of motion. He exhibits no edema.   Lymphadenopathy:     He has no cervical adenopathy.     He has no axillary adenopathy.        Right: No supraclavicular adenopathy present.        Left: No supraclavicular adenopathy present.   Neurological: He is alert and oriented to person, place, and time. He has normal strength.   Skin: Skin is warm, dry and intact. Capillary refill takes less than 2 seconds. No rash noted. He is not diaphoretic. There is pallor.   Psychiatric: His speech is normal and behavior is normal. Judgment and thought content normal. His mood appears anxious. Cognition and memory are normal.   Vitals reviewed.      Assessment:       Problem List Items Addressed This Visit        Cardiac/Vascular    Hypertension       Oncology    History of head and neck cancer    Cancer of tongue    Chronic anemia       Endocrine    History of iron deficiency - Primary    Relevant Orders    CBC auto differential    Comprehensive metabolic panel    Ferritin    Iron and TIBC       Orthopedic    Osteopenia          Plan:       KIM:  --Patient is  currently iron deficienct.  --replete with IV Injectafer x2  -- Return to clinic in 2 months with labs prior    I will review assessment/plan with collaborating physician Dr. Hal Pandya.

## 2019-09-12 NOTE — PATIENT INSTRUCTIONS
Iron-Deficiency Anemia (Adult)  Red blood cells carry oxygen to the tissues of your body. Anemia is a condition in which you have too few red blood cells. You need iron to make red cells. Anemia makes you feel tired and run down. When anemia becomes severe, your skin becomes pale. You may feel short of breath after physical activity. Other symptoms include:  · Headaches  · Dizziness  · Leg cramps with physical activity  · Drowsiness  · Restless legs  Your anemia is caused by not having enough iron in your body. This may be because of:  · Loss of blood. This can be caused by heavy menstrual periods. It can also be caused by bleeding from the stomach or intestines.  · Poor diet. You may not be eating enough foods that contain iron.  · Inability to absorb iron from the foods you eat  · Pregnancy  If your blood count is low enough, your healthcare provider may prescribe an iron supplement. It usually takes about 2 to 3 months of treatment with iron supplements to correct anemia. Severe cases of anemia need a blood transfusion to quickly ease symptoms and deliver more oxygen to the cells.  Home care  Follow these guidelines when caring for yourself at home:  · Eat foods high in iron. This will boost the amount of iron stored in your body. It is a natural way to build up the number of blood cells. Good sources of iron include beef, liver, spinach and other dark green leafy vegetables, whole grains, beans, and nuts.  · Do not overexert yourself.  · Talk with your healthcare provider before traveling by air or traveling to high altitudes.  Follow-up care  Follow up with your healthcare provider in 2 months, or as advised. This is to have another red blood cell count to be sure your anemia has been fixed.  When to seek medical advice  Call your healthcare provider right away if any of these occur:  · Shortness of breath or chest pain  · Dizziness or fainting  · Vomiting blood or passing red or black-colored stool   Date  Last Reviewed: 2/25/2016  © 1980-5438 [employer], [place]. [address] [place] [address], [place] [address]. All rights reserved. This information is not intended as a substitute for professional medical care. Always follow your healthcare professional's instructions.

## 2019-09-13 ENCOUNTER — HOSPITAL ENCOUNTER (OUTPATIENT)
Facility: HOSPITAL | Age: 74
Discharge: HOME | End: 2019-09-15
Attending: EMERGENCY MEDICINE | Admitting: FAMILY MEDICINE
Payer: MEDICARE

## 2019-09-13 DIAGNOSIS — J44.1 COPD EXACERBATION: ICD-10-CM

## 2019-09-13 DIAGNOSIS — I50.9 CHF (CONGESTIVE HEART FAILURE): ICD-10-CM

## 2019-09-13 DIAGNOSIS — R09.02 HYPOXIA: ICD-10-CM

## 2019-09-13 DIAGNOSIS — R06.02 SOB (SHORTNESS OF BREATH): ICD-10-CM

## 2019-09-13 DIAGNOSIS — J90 RECURRENT RIGHT PLEURAL EFFUSION: Primary | ICD-10-CM

## 2019-09-13 LAB
ALBUMIN SERPL BCP-MCNC: 2.8 G/DL (ref 3.5–5.2)
ALLENS TEST: ABNORMAL
ALP SERPL-CCNC: 73 U/L (ref 55–135)
ALT SERPL W/O P-5'-P-CCNC: 6 U/L (ref 10–44)
ANION GAP SERPL CALC-SCNC: 12 MMOL/L (ref 8–16)
AST SERPL-CCNC: 16 U/L (ref 10–40)
BASOPHILS # BLD AUTO: 0.08 K/UL (ref 0–0.2)
BASOPHILS NFR BLD: 1.2 % (ref 0–1.9)
BILIRUB SERPL-MCNC: 0.4 MG/DL (ref 0.1–1)
BNP SERPL-MCNC: 1828 PG/ML (ref 0–99)
BUN SERPL-MCNC: 12 MG/DL (ref 8–23)
CALCIUM SERPL-MCNC: 8.7 MG/DL (ref 8.7–10.5)
CHLORIDE SERPL-SCNC: 104 MMOL/L (ref 95–110)
CO2 SERPL-SCNC: 24 MMOL/L (ref 23–29)
CREAT SERPL-MCNC: 0.7 MG/DL (ref 0.5–1.4)
DELSYS: ABNORMAL
DIFFERENTIAL METHOD: ABNORMAL
EOSINOPHIL # BLD AUTO: 0.1 K/UL (ref 0–0.5)
EOSINOPHIL NFR BLD: 1.7 % (ref 0–8)
ERYTHROCYTE [DISTWIDTH] IN BLOOD BY AUTOMATED COUNT: 18.1 % (ref 11.5–14.5)
EST. GFR  (AFRICAN AMERICAN): >60 ML/MIN/1.73 M^2
EST. GFR  (NON AFRICAN AMERICAN): >60 ML/MIN/1.73 M^2
FIO2: 21
GLUCOSE SERPL-MCNC: 91 MG/DL (ref 70–110)
HCO3 UR-SCNC: 26.2 MMOL/L (ref 24–28)
HCT VFR BLD AUTO: 42.5 % (ref 40–54)
HGB BLD-MCNC: 14 G/DL (ref 14–18)
LYMPHOCYTES # BLD AUTO: 1.1 K/UL (ref 1–4.8)
LYMPHOCYTES NFR BLD: 17.2 % (ref 18–48)
MCH RBC QN AUTO: 32.3 PG (ref 27–31)
MCHC RBC AUTO-ENTMCNC: 32.9 G/DL (ref 32–36)
MCV RBC AUTO: 98 FL (ref 82–98)
MODE: ABNORMAL
MONOCYTES # BLD AUTO: 0.7 K/UL (ref 0.3–1)
MONOCYTES NFR BLD: 10.3 % (ref 4–15)
NEUTROPHILS # BLD AUTO: 4.6 K/UL (ref 1.8–7.7)
NEUTROPHILS NFR BLD: 69.8 % (ref 38–73)
PCO2 BLDA: 40 MMHG (ref 35–45)
PH SMN: 7.42 [PH] (ref 7.35–7.45)
PLATELET # BLD AUTO: 279 K/UL (ref 150–350)
PMV BLD AUTO: 11 FL (ref 9.2–12.9)
PO2 BLDA: 57 MMHG (ref 80–100)
POC BE: 2 MMOL/L
POC SATURATED O2: 90 % (ref 95–100)
POTASSIUM SERPL-SCNC: 3.7 MMOL/L (ref 3.5–5.1)
PROT SERPL-MCNC: 7.1 G/DL (ref 6–8.4)
RBC # BLD AUTO: 4.34 M/UL (ref 4.6–6.2)
SAMPLE: ABNORMAL
SITE: ABNORMAL
SODIUM SERPL-SCNC: 140 MMOL/L (ref 136–145)
TROPONIN I SERPL DL<=0.01 NG/ML-MCNC: 0.04 NG/ML (ref 0–0.03)
WBC # BLD AUTO: 6.63 K/UL (ref 3.9–12.7)

## 2019-09-13 PROCEDURE — 96375 TX/PRO/DX INJ NEW DRUG ADDON: CPT

## 2019-09-13 PROCEDURE — 63600175 PHARM REV CODE 636 W HCPCS: Performed by: FAMILY MEDICINE

## 2019-09-13 PROCEDURE — 63600175 PHARM REV CODE 636 W HCPCS: Performed by: EMERGENCY MEDICINE

## 2019-09-13 PROCEDURE — G0378 HOSPITAL OBSERVATION PER HR: HCPCS

## 2019-09-13 PROCEDURE — 25000003 PHARM REV CODE 250: Performed by: FAMILY MEDICINE

## 2019-09-13 PROCEDURE — 93010 EKG 12-LEAD: ICD-10-PCS | Mod: ,,, | Performed by: INTERNAL MEDICINE

## 2019-09-13 PROCEDURE — 83880 ASSAY OF NATRIURETIC PEPTIDE: CPT

## 2019-09-13 PROCEDURE — 96376 TX/PRO/DX INJ SAME DRUG ADON: CPT

## 2019-09-13 PROCEDURE — 36600 WITHDRAWAL OF ARTERIAL BLOOD: CPT

## 2019-09-13 PROCEDURE — 36415 COLL VENOUS BLD VENIPUNCTURE: CPT

## 2019-09-13 PROCEDURE — 85025 COMPLETE CBC W/AUTO DIFF WBC: CPT

## 2019-09-13 PROCEDURE — 93010 ELECTROCARDIOGRAM REPORT: CPT | Mod: ,,, | Performed by: INTERNAL MEDICINE

## 2019-09-13 PROCEDURE — 99900035 HC TECH TIME PER 15 MIN (STAT)

## 2019-09-13 PROCEDURE — 99285 EMERGENCY DEPT VISIT HI MDM: CPT | Mod: 25

## 2019-09-13 PROCEDURE — 94640 AIRWAY INHALATION TREATMENT: CPT

## 2019-09-13 PROCEDURE — 27000221 HC OXYGEN, UP TO 24 HOURS

## 2019-09-13 PROCEDURE — 82803 BLOOD GASES ANY COMBINATION: CPT

## 2019-09-13 PROCEDURE — 25000242 PHARM REV CODE 250 ALT 637 W/ HCPCS: Performed by: FAMILY MEDICINE

## 2019-09-13 PROCEDURE — 25000242 PHARM REV CODE 250 ALT 637 W/ HCPCS: Performed by: EMERGENCY MEDICINE

## 2019-09-13 PROCEDURE — 80053 COMPREHEN METABOLIC PANEL: CPT

## 2019-09-13 PROCEDURE — 84484 ASSAY OF TROPONIN QUANT: CPT

## 2019-09-13 PROCEDURE — 93005 ELECTROCARDIOGRAM TRACING: CPT

## 2019-09-13 RX ORDER — PILOCARPINE HYDROCHLORIDE 5 MG/1
5 TABLET, FILM COATED ORAL 2 TIMES DAILY
Status: DISCONTINUED | OUTPATIENT
Start: 2019-09-13 | End: 2019-09-14

## 2019-09-13 RX ORDER — PANTOPRAZOLE SODIUM 40 MG/1
40 TABLET, DELAYED RELEASE ORAL DAILY
Status: DISCONTINUED | OUTPATIENT
Start: 2019-09-14 | End: 2019-09-15 | Stop reason: HOSPADM

## 2019-09-13 RX ORDER — IPRATROPIUM BROMIDE AND ALBUTEROL SULFATE 2.5; .5 MG/3ML; MG/3ML
3 SOLUTION RESPIRATORY (INHALATION) ONCE
Status: COMPLETED | OUTPATIENT
Start: 2019-09-13 | End: 2019-09-13

## 2019-09-13 RX ORDER — HYDRALAZINE HYDROCHLORIDE 25 MG/1
25 TABLET, FILM COATED ORAL ONCE
Status: COMPLETED | OUTPATIENT
Start: 2019-09-14 | End: 2019-09-13

## 2019-09-13 RX ORDER — METHYLPREDNISOLONE SOD SUCC 125 MG
125 VIAL (EA) INJECTION
Status: COMPLETED | OUTPATIENT
Start: 2019-09-13 | End: 2019-09-13

## 2019-09-13 RX ORDER — DIPHENHYDRAMINE HCL 25 MG
25 CAPSULE ORAL EVERY 6 HOURS PRN
Status: DISCONTINUED | OUTPATIENT
Start: 2019-09-14 | End: 2019-09-15 | Stop reason: HOSPADM

## 2019-09-13 RX ORDER — FUROSEMIDE 10 MG/ML
40 INJECTION INTRAMUSCULAR; INTRAVENOUS ONCE
Status: COMPLETED | OUTPATIENT
Start: 2019-09-13 | End: 2019-09-13

## 2019-09-13 RX ORDER — HYDROCODONE BITARTRATE AND ACETAMINOPHEN 5; 325 MG/1; MG/1
1 TABLET ORAL EVERY 4 HOURS PRN
Status: DISCONTINUED | OUTPATIENT
Start: 2019-09-13 | End: 2019-09-14

## 2019-09-13 RX ORDER — NAPROXEN SODIUM 220 MG/1
81 TABLET, FILM COATED ORAL DAILY
Status: DISCONTINUED | OUTPATIENT
Start: 2019-09-14 | End: 2019-09-15 | Stop reason: HOSPADM

## 2019-09-13 RX ORDER — GABAPENTIN 400 MG/1
800 CAPSULE ORAL 3 TIMES DAILY
Status: DISCONTINUED | OUTPATIENT
Start: 2019-09-14 | End: 2019-09-15 | Stop reason: HOSPADM

## 2019-09-13 RX ORDER — TIOTROPIUM BROMIDE 18 UG/1
18 CAPSULE ORAL; RESPIRATORY (INHALATION) DAILY
Status: DISCONTINUED | OUTPATIENT
Start: 2019-09-14 | End: 2019-09-13 | Stop reason: CLARIF

## 2019-09-13 RX ORDER — SODIUM CHLORIDE 0.9 % (FLUSH) 0.9 %
10 SYRINGE (ML) INJECTION
Status: DISCONTINUED | OUTPATIENT
Start: 2019-09-13 | End: 2019-09-15 | Stop reason: HOSPADM

## 2019-09-13 RX ORDER — LEVOFLOXACIN 5 MG/ML
750 INJECTION, SOLUTION INTRAVENOUS
Status: DISCONTINUED | OUTPATIENT
Start: 2019-09-13 | End: 2019-09-13

## 2019-09-13 RX ORDER — ACETAMINOPHEN 325 MG/1
650 TABLET ORAL EVERY 4 HOURS PRN
Status: DISCONTINUED | OUTPATIENT
Start: 2019-09-13 | End: 2019-09-15 | Stop reason: HOSPADM

## 2019-09-13 RX ORDER — IPRATROPIUM BROMIDE 0.5 MG/2.5ML
0.5 SOLUTION RESPIRATORY (INHALATION) EVERY 6 HOURS
Status: DISCONTINUED | OUTPATIENT
Start: 2019-09-13 | End: 2019-09-14 | Stop reason: SDUPTHER

## 2019-09-13 RX ORDER — VARENICLINE TARTRATE 0.5 MG/1
1 TABLET, FILM COATED ORAL DAILY
Status: DISCONTINUED | OUTPATIENT
Start: 2019-09-14 | End: 2019-09-15 | Stop reason: HOSPADM

## 2019-09-13 RX ORDER — IPRATROPIUM BROMIDE AND ALBUTEROL SULFATE 2.5; .5 MG/3ML; MG/3ML
3 SOLUTION RESPIRATORY (INHALATION) EVERY 4 HOURS PRN
Status: DISCONTINUED | OUTPATIENT
Start: 2019-09-13 | End: 2019-09-15 | Stop reason: HOSPADM

## 2019-09-13 RX ADMIN — FUROSEMIDE 40 MG: 10 INJECTION, SOLUTION INTRAMUSCULAR; INTRAVENOUS at 09:09

## 2019-09-13 RX ADMIN — DIPHENHYDRAMINE HYDROCHLORIDE 25 MG: 25 CAPSULE ORAL at 11:09

## 2019-09-13 RX ADMIN — HYDRALAZINE HYDROCHLORIDE 25 MG: 25 TABLET, FILM COATED ORAL at 11:09

## 2019-09-13 RX ADMIN — METHYLPREDNISOLONE SODIUM SUCCINATE 125 MG: 125 INJECTION, POWDER, FOR SOLUTION INTRAMUSCULAR; INTRAVENOUS at 06:09

## 2019-09-13 RX ADMIN — IPRATROPIUM BROMIDE AND ALBUTEROL SULFATE 3 ML: .5; 3 SOLUTION RESPIRATORY (INHALATION) at 06:09

## 2019-09-13 RX ADMIN — PIPERACILLIN AND TAZOBACTAM 4.5 G: 4; .5 INJECTION, POWDER, LYOPHILIZED, FOR SOLUTION INTRAVENOUS; PARENTERAL at 11:09

## 2019-09-13 RX ADMIN — HYDROCODONE BITARTRATE AND ACETAMINOPHEN 1 TABLET: 5; 325 TABLET ORAL at 09:09

## 2019-09-13 RX ADMIN — IPRATROPIUM BROMIDE 0.5 MG: 0.5 SOLUTION RESPIRATORY (INHALATION) at 11:09

## 2019-09-13 RX ADMIN — PILOCARPINE HYDROCHLORIDE 5 MG: 5 TABLET, FILM COATED ORAL at 11:09

## 2019-09-13 NOTE — ED NOTES
Pt c/o increasing SOB x2-3 days. Pt reports increased severity today and he has not been able to catch a good deep breath. Pt reports starting smoking and drinking again recently r/t to the stress of recently losing his wife unexpectedly in July 2019. Pt denies any chest pain, n/v.    Patient moved to ED room 10, patient assisted onto stretcher and changed into a gown. Patient placed on continuous pulse oximetry and automatic blood pressure cuff. Bed placed in low locked position, side rails up x 2, call light is within reach of patient or family, orientation to room and explanation of wait provided to family and patient, alarms set and turned on for monitor and pulse ox, awaiting MD evaluation and orders, will continue to monitor.    Patient identifies self as Noble Tripp      LOC: The patient is awake, alert and aware of environment with an appropriate affect, the patient is oriented x 3 and speaking appropriately.  APPEARANCE: Patient resting comfortably and in no acute distress, patient is clean and well groomed, patient's clothing is properly fastened.  SKIN: The skin is warm and dry, color consistent with ethnicity, patient has normal skin turgor and moist mucus membranes, skin intact, no breakdown or bruising noted.  MUSCULOSKELETAL: Patient moving all extremities well, no obvious swelling or deformities noted. Pt c/o of some stiffness, uncomfortable feelings to his left shoulder at times. Pt denies anything exacerbating factors associated with the pain.  RESPIRATORY: Airway is open and patent, respirations are spontaneous, patient has a normal effort and rate, no accessory muscle use noted. Pt has deep, dry cough - pt reports some yellow colored mucus at times. Crackles auscultated to lower lobes.  CARDIAC: Patient has a normal rate, no peripheral edema noted, capillary refill < 3 seconds.  ABDOMEN: Soft and non tender to palpation, no distention noted.  NEUROLOGIC: PERRL, eyes open spontaneously,  behavior appropriate to situation, follows commands, facial expression symmetrical, bilateral hand grasp equal and even, purposeful motor response noted, normal sensation in all extremities when touched with a finger.

## 2019-09-13 NOTE — ED PROVIDER NOTES
SCRIBE #1 NOTE: I, Leanne Tomi, am scribing for, and in the presence of, Jose A Merritt MD. I have scribed the entire note.       History     Chief Complaint   Patient presents with    Shortness of Breath     began today, states O2 dropped into the 80s% today but usually is approx 95-96%; history of COPD     Review of patient's allergies indicates:   Allergen Reactions    Contrast media     Iodinated contrast media      Other reaction(s): Itching  Other reaction(s): Hives    Neomycin-bacitracin-polymyxin      Other reaction(s): Rash  Other reaction(s): Rash    Pravastatin      Other reaction(s): fatigue  Other reaction(s): fatigue    Rosuvastatin      Other reaction(s): fatigue  Other reaction(s): fatigue    Simvastatin      Other reaction(s): fatigue  Other reaction(s): fatigue    Statins-hmg-coa reductase inhibitors          History of Present Illness     HPI    9/13/2019, 6:36 PM  History obtained from the patient      History of Present Illness: Noble Tripp is a 74 y.o. male patient with a PMHx of aspiration pneumonia, vocal cord cancer, CAD, COPD, HLD, PVD, and tongue cancer who presents to the Emergency Department for evaluation of SOB which onset gradually this morning. Pt reports that his O2 dropped into the 80s% today, but is usually around 95-96%. Symptoms are constant and moderate in severity. No mitigating or exacerbating factors reported. Associated sxs include intermittent productive cough and rales. Patient denies any CP, leg swelling, palpitations, fever, chills, numbness, weakness, n/v/d, wheezing, and all other sxs at this time. No prior tx reported. No further complaints or concerns at this time.       Arrival mode: Personal vehicle     PCP: Dwaine Davis MD        Past Medical History:  Past Medical History:   Diagnosis Date    Adrenal mass     david;nl fxn w/u    Aspiration pneumonia 10/15    puree/honey    Benign prostate hyperplasia     CAD (coronary  "artery disease)     dr padilla    Chronic pain     dr santos    COPD (chronic obstructive pulmonary disease)     papi    Ex-smoker     11/13    Hyperlipidemia     Osteopenia 1/15 tran 1/18    PVD (peripheral vascular disease)     noobs 07 uri    Pyriform sinus cancer     dr ponce radiation 1/2-14 dr king perales    Sleep apnea     cpap    Squamous cell carcinoma of skin     roberto    Tongue cancer     "superficial" removed 11/14    Vocal cord cancer 2011    Xerostomia     radiation       Past Surgical History:  Past Surgical History:   Procedure Laterality Date    APPENDECTOMY      Bronchoscopy Bilateral 2/5/2019    [facility]    COLONOSCOPY N/A 3/11/2014    [facility] Joe Goodman [facility]    Due for screening colonoscopy N/A 5/1/2017    [facility]ush[facility]oder [facility] at [facility]    ESOPHAGOGASTRODUODENOSCOPY (EGD) N/A 8/29/2018    [facility] by Audie Hill III, [facility] at [facility]    ESOPHAGOGASTRODUODENOSCOPY (EGD) N/A 5/29/2018    [facility] by Audie Hill III, [facility] at [facility]    Thoracentesis Right 2/25/2019    [facility]    Thoracentesis Left 8/7/2018    [facility]    tongue cancer excision  11/14    dr vitale    VOCAL CORD LATERALIZATION, ENDOSCOPIC APPROACH W/ MORENA ponce         Family History:  Family History   Problem Relation Age of Onset    Cancer Father     Cancer Brother        Social History:  Social History     Tobacco Use    Smoking status: Current Every Day Smoker     Packs/day: 0.50     Years: 55.00     Pack years: 27.50    Smokeless tobacco: Never Used    Tobacco comment: 6-7 cigs/day   Substance and Sexual Activity    Alcohol use: Yes     Comment: 2-3 glasses of whiskey every evening    Drug use: No    Sexual activity: Not Currently        Review of Systems     Review of Systems   Constitutional: Negative for chills and " fever.   HENT: Negative for sore throat.    Respiratory: Positive for cough (intermittent productive) and shortness of breath. Negative for wheezing.         (+) rales     Cardiovascular: Negative for chest pain, palpitations and leg swelling.   Gastrointestinal: Negative for diarrhea, nausea and vomiting.   Genitourinary: Negative for dysuria.   Musculoskeletal: Negative for back pain.   Skin: Negative for rash.   Neurological: Negative for weakness and numbness.   Hematological: Does not bruise/bleed easily.   All other systems reviewed and are negative.     Physical Exam     Initial Vitals [09/13/19 1812]   BP Pulse Resp Temp SpO2   (!) 182/78 101 20 97.8 °F (36.6 °C) (!) 90 %      MAP       --          Physical Exam  Nursing Notes and Vital Signs Reviewed.  Constitutional: Patient is in mild distress. Chronically ill appearing. Thin.   Head: Atraumatic. Normocephalic.  Eyes: PERRL. EOM intact. Conjunctivae are not pale. No scleral icterus.  ENT: Mucous membranes are moist. Oropharynx is clear and symmetric.    Neck: Supple. Full ROM. No lymphadenopathy.  Cardiovascular: Regular rate. Regular rhythm. No murmurs, rubs, or gallops. Distal pulses are 2+ and symmetric.  Pulmonary/Chest: . Crackles bilat. Coarse breath sounds bilat. No wheezing.  Abdominal: Soft and non-distended.  There is no tenderness.  No rebound, guarding, or rigidity. Good bowel sounds.  Genitourinary: No CVA tenderness  Musculoskeletal: Moves all extremities. No obvious deformities. No edema. No calf tenderness.  Skin: Warm and dry.  Neurological:  Alert, awake, and appropriate.  Normal speech.  No acute focal neurological deficits are appreciated.  Psychiatric: Normal affect. Good eye contact. Appropriate in content.     ED Course   Procedures  ED Vital Signs:  Vitals:    09/13/19 1812 09/13/19 1830 09/13/19 1838 09/13/19 1841   BP: (!) 182/78 (!) 167/84     Pulse: 101 93 89    Resp: 20 18 18    Temp: 97.8 °F (36.6 °C)      TempSrc: Oral       SpO2: (!) 90% (!) 91% (!) 91% (!) 92%   Weight: 70.7 kg (155 lb 13.8 oz)       09/13/19 1853 09/13/19 1900 09/13/19 2003   BP:  (!) 172/77 (!) 149/113   Pulse: 89 90 91   Resp:  17 20   Temp:  97.9 °F (36.6 °C)    TempSrc:  Oral    SpO2:  96% 95%   Weight:          Abnormal Lab Results:  Labs Reviewed   CBC W/ AUTO DIFFERENTIAL - Abnormal; Notable for the following components:       Result Value    RBC 4.34 (*)     Mean Corpuscular Hemoglobin 32.3 (*)     RDW 18.1 (*)     Lymph% 17.2 (*)     All other components within normal limits   COMPREHENSIVE METABOLIC PANEL - Abnormal; Notable for the following components:    Albumin 2.8 (*)     ALT 6 (*)     All other components within normal limits   B-TYPE NATRIURETIC PEPTIDE - Abnormal; Notable for the following components:    BNP 1,828 (*)     All other components within normal limits   TROPONIN I - Abnormal; Notable for the following components:    Troponin I 0.038 (*)     All other components within normal limits   ISTAT PROCEDURE - Abnormal; Notable for the following components:    POC PO2 57 (*)     POC SATURATED O2 90 (*)     All other components within normal limits        All Lab Results:  Results for orders placed or performed during the hospital encounter of 09/13/19   CBC auto differential   Result Value Ref Range    WBC 6.63 3.90 - 12.70 K/uL    RBC 4.34 (L) 4.60 - 6.20 M/uL    Hemoglobin 14.0 14.0 - 18.0 g/dL    Hematocrit 42.5 40.0 - 54.0 %    Mean Corpuscular Volume 98 82 - 98 fL    Mean Corpuscular Hemoglobin 32.3 (H) 27.0 - 31.0 pg    Mean Corpuscular Hemoglobin Conc 32.9 32.0 - 36.0 g/dL    RDW 18.1 (H) 11.5 - 14.5 %    Platelets 279 150 - 350 K/uL    MPV 11.0 9.2 - 12.9 fL    Gran # (ANC) 4.6 1.8 - 7.7 K/uL    Lymph # 1.1 1.0 - 4.8 K/uL    Mono # 0.7 0.3 - 1.0 K/uL    Eos # 0.1 0.0 - 0.5 K/uL    Baso # 0.08 0.00 - 0.20 K/uL    Gran% 69.8 38.0 - 73.0 %    Lymph% 17.2 (L) 18.0 - 48.0 %    Mono% 10.3 4.0 - 15.0 %    Eosinophil% 1.7 0.0 - 8.0 %     Basophil% 1.2 0.0 - 1.9 %    Differential Method Automated    Comprehensive metabolic panel   Result Value Ref Range    Sodium 140 136 - 145 mmol/L    Potassium 3.7 3.5 - 5.1 mmol/L    Chloride 104 95 - 110 mmol/L    CO2 24 23 - 29 mmol/L    Glucose 91 70 - 110 mg/dL    BUN, Bld 12 8 - 23 mg/dL    Creatinine 0.7 0.5 - 1.4 mg/dL    Calcium 8.7 8.7 - 10.5 mg/dL    Total Protein 7.1 6.0 - 8.4 g/dL    Albumin 2.8 (L) 3.5 - 5.2 g/dL    Total Bilirubin 0.4 0.1 - 1.0 mg/dL    Alkaline Phosphatase 73 55 - 135 U/L    AST 16 10 - 40 U/L    ALT 6 (L) 10 - 44 U/L    Anion Gap 12 8 - 16 mmol/L    eGFR if African American >60 >60 mL/min/1.73 m^2    eGFR if non African American >60 >60 mL/min/1.73 m^2   Brain natriuretic peptide   Result Value Ref Range    BNP 1,828 (H) 0 - 99 pg/mL   Troponin I   Result Value Ref Range    Troponin I 0.038 (H) 0.000 - 0.026 ng/mL   ISTAT PROCEDURE   Result Value Ref Range    POC PH 7.424 7.35 - 7.45    POC PCO2 40.0 35 - 45 mmHg    POC PO2 57 (LL) 80 - 100 mmHg    POC HCO3 26.2 24 - 28 mmol/L    POC BE 2 -2 to 2 mmol/L    POC SATURATED O2 90 (L) 95 - 100 %    Sample ARTERIAL     Site RR     Allens Test Pass     DelSys Room Air     Mode SPONT     FiO2 21      Imaging Results:  Imaging Results          X-Ray Chest 1 View (Final result)  Result time 09/13/19 19:39:33    Final result by Roly Hamilton MD (09/13/19 19:39:33)                 Impression:      1. New small to moderate size infiltrate at the right lung base with small right pleural effusion.  Pleural effusions slightly worse since 04/17/2019.  2. Stable indeterminate opacity left lung base with blunting of left costophrenic angle.  No change since 04/17/2019.      Electronically signed by: Roly Hamilton  Date:    09/13/2019  Time:    19:39             Narrative:    EXAMINATION:  XR CHEST 1 VIEW    CLINICAL HISTORY:  Shortness of breath    COMPARISON:  04/17/2019    FINDINGS:  Diffuse increased interstitial markings are present  bilaterally which thought to be chronic.  A new small to moderate size infiltrate is identified at the right lung base.  Blunting of the right costophrenic angle is present compatible with a small right pleural effusion which is slightly worse.  Increased density and blunting of the left costophrenic angle is present.  Findings are unchanged since 04/17/2019.                                 The EKG was ordered, reviewed, and independently interpreted by the ED provider.  Interpretation time: 1816  Rate: 98 BPM  Rhythm: normal sinus rhythm  Interpretation: Anterior infarct (cited on or before 05-FEB-2019). No acute ST changes. No STEMI.           The Emergency Provider reviewed the vital signs and test results, which are outlined above.     ED Discussion     8:25 PM: Re-evaluated pt. I have discussed test results, shared treatment plan, and the need for admission with patient at bedside. Pt expresses understanding at this time and agree with all information. All questions answered. Pt has no further questions or concerns at this time. Pt is ready for admit.    8:25 PM: Discussed case with Mellisa Carpenter MD ([facility]). Dr. Carpenter agrees with current care and management of pt and accepts admission.   Admitting Service: Hospital Medicine  Admitting Physician: Dr. carpenter  Admit to: Obs med/tele         Medical Decision Making:   Clinical Tests:   Lab Tests: Reviewed and Ordered  Radiological Study: Reviewed and Ordered  Medical Tests: Reviewed and Ordered     Additional MDM:   Smoking Cessation: The patient is a smoker. The patient was counseled on smoking cessation for: 3 minutes.        ED Medication(s):  Medications   albuterol-ipratropium 2.5 mg-0.5 mg/3 mL nebulizer solution 3 mL (3 mLs Nebulization Given 9/13/19 5851)   methylPREDNISolone sodium succinate injection 125 mg (125 mg Intravenous Given 9/13/19 5242)       New Prescriptions    No medications on file               Scribe Attestation:   Scribe #1: I  performed the above scribed service and the documentation accurately describes the services I performed. I attest to the accuracy of the note.     Attending:   Physician Attestation Statement for Scribe #1: I, Jose A Merritt MD, personally performed the services described in this documentation, as scribed by Leanne Krishna, in my presence, and it is both accurate and complete.           Clinical Impression       ICD-10-CM ICD-9-CM   1. Recurrent right pleural effusion J90 511.9   2. SOB (shortness of breath) R06.02 786.05   3. COPD exacerbation J44.1 491.21   4. Hypoxia R09.02 799.02       Disposition:   Disposition: Placed in Observation  Condition: Fair       Jose A Merritt MD  09/13/19 1523

## 2019-09-14 PROBLEM — I50.33 ACUTE ON CHRONIC DIASTOLIC HEART FAILURE: Status: ACTIVE | Noted: 2019-09-14

## 2019-09-14 PROBLEM — R79.89 TROPONIN LEVEL ELEVATED: Status: ACTIVE | Noted: 2019-09-14

## 2019-09-14 LAB
TROPONIN I SERPL DL<=0.01 NG/ML-MCNC: 0.05 NG/ML (ref 0–0.03)
TROPONIN I SERPL DL<=0.01 NG/ML-MCNC: 0.06 NG/ML (ref 0–0.03)

## 2019-09-14 PROCEDURE — G0378 HOSPITAL OBSERVATION PER HR: HCPCS

## 2019-09-14 PROCEDURE — 96365 THER/PROPH/DIAG IV INF INIT: CPT

## 2019-09-14 PROCEDURE — 25000003 PHARM REV CODE 250: Performed by: INTERNAL MEDICINE

## 2019-09-14 PROCEDURE — 25000242 PHARM REV CODE 250 ALT 637 W/ HCPCS: Performed by: FAMILY MEDICINE

## 2019-09-14 PROCEDURE — 84484 ASSAY OF TROPONIN QUANT: CPT

## 2019-09-14 PROCEDURE — 63600175 PHARM REV CODE 636 W HCPCS: Performed by: FAMILY MEDICINE

## 2019-09-14 PROCEDURE — 25000003 PHARM REV CODE 250: Performed by: FAMILY MEDICINE

## 2019-09-14 PROCEDURE — 25000242 PHARM REV CODE 250 ALT 637 W/ HCPCS: Performed by: INTERNAL MEDICINE

## 2019-09-14 PROCEDURE — 36415 COLL VENOUS BLD VENIPUNCTURE: CPT

## 2019-09-14 PROCEDURE — 25000003 PHARM REV CODE 250: Performed by: NURSE PRACTITIONER

## 2019-09-14 PROCEDURE — 99900035 HC TECH TIME PER 15 MIN (STAT)

## 2019-09-14 PROCEDURE — 84484 ASSAY OF TROPONIN QUANT: CPT | Mod: 91

## 2019-09-14 PROCEDURE — 94640 AIRWAY INHALATION TREATMENT: CPT

## 2019-09-14 PROCEDURE — 27000221 HC OXYGEN, UP TO 24 HOURS

## 2019-09-14 PROCEDURE — 63600175 PHARM REV CODE 636 W HCPCS: Performed by: INTERNAL MEDICINE

## 2019-09-14 PROCEDURE — 96376 TX/PRO/DX INJ SAME DRUG ADON: CPT

## 2019-09-14 RX ORDER — ARFORMOTEROL TARTRATE 15 UG/2ML
15 SOLUTION RESPIRATORY (INHALATION) EVERY 12 HOURS
Status: DISCONTINUED | OUTPATIENT
Start: 2019-09-14 | End: 2019-09-15 | Stop reason: HOSPADM

## 2019-09-14 RX ORDER — L. ACIDOPHILUS/L.BULGARICUS 100MM CELL
1 GRANULES IN PACKET (EA) ORAL DAILY
Status: DISCONTINUED | OUTPATIENT
Start: 2019-09-14 | End: 2019-09-15 | Stop reason: HOSPADM

## 2019-09-14 RX ORDER — BUDESONIDE 0.5 MG/2ML
0.5 INHALANT ORAL EVERY 12 HOURS
Status: DISCONTINUED | OUTPATIENT
Start: 2019-09-14 | End: 2019-09-15 | Stop reason: HOSPADM

## 2019-09-14 RX ORDER — FUROSEMIDE 10 MG/ML
20 INJECTION INTRAMUSCULAR; INTRAVENOUS 2 TIMES DAILY
Status: DISCONTINUED | OUTPATIENT
Start: 2019-09-14 | End: 2019-09-15

## 2019-09-14 RX ORDER — PILOCARPINE HYDROCHLORIDE 5 MG/1
5 TABLET, FILM COATED ORAL
Status: DISCONTINUED | OUTPATIENT
Start: 2019-09-15 | End: 2019-09-15 | Stop reason: HOSPADM

## 2019-09-14 RX ORDER — IPRATROPIUM BROMIDE AND ALBUTEROL SULFATE 2.5; .5 MG/3ML; MG/3ML
3 SOLUTION RESPIRATORY (INHALATION) EVERY 8 HOURS
Status: DISCONTINUED | OUTPATIENT
Start: 2019-09-14 | End: 2019-09-15 | Stop reason: HOSPADM

## 2019-09-14 RX ORDER — PREDNISONE 20 MG/1
20 TABLET ORAL DAILY
Status: DISCONTINUED | OUTPATIENT
Start: 2019-09-14 | End: 2019-09-15 | Stop reason: HOSPADM

## 2019-09-14 RX ORDER — OXYCODONE AND ACETAMINOPHEN 7.5; 325 MG/1; MG/1
1 TABLET ORAL EVERY 8 HOURS PRN
Status: DISCONTINUED | OUTPATIENT
Start: 2019-09-14 | End: 2019-09-15 | Stop reason: HOSPADM

## 2019-09-14 RX ADMIN — VARENICLINE TARTRATE 1 MG: 0.5 TABLET, FILM COATED ORAL at 10:09

## 2019-09-14 RX ADMIN — OXYCODONE HYDROCHLORIDE AND ACETAMINOPHEN 1 TABLET: 7.5; 325 TABLET ORAL at 06:09

## 2019-09-14 RX ADMIN — GABAPENTIN 800 MG: 400 CAPSULE ORAL at 09:09

## 2019-09-14 RX ADMIN — IPRATROPIUM BROMIDE 0.5 MG: 0.5 SOLUTION RESPIRATORY (INHALATION) at 01:09

## 2019-09-14 RX ADMIN — IPRATROPIUM BROMIDE 0.5 MG: 0.5 SOLUTION RESPIRATORY (INHALATION) at 07:09

## 2019-09-14 RX ADMIN — BUDESONIDE 0.5 MG: 0.5 SUSPENSION RESPIRATORY (INHALATION) at 08:09

## 2019-09-14 RX ADMIN — HYDROCODONE BITARTRATE AND ACETAMINOPHEN 1 TABLET: 5; 325 TABLET ORAL at 05:09

## 2019-09-14 RX ADMIN — PIPERACILLIN AND TAZOBACTAM 4.5 G: 4; .5 INJECTION, POWDER, LYOPHILIZED, FOR SOLUTION INTRAVENOUS; PARENTERAL at 06:09

## 2019-09-14 RX ADMIN — ARFORMOTEROL TARTRATE 15 MCG: 15 SOLUTION RESPIRATORY (INHALATION) at 08:09

## 2019-09-14 RX ADMIN — OXYCODONE HYDROCHLORIDE AND ACETAMINOPHEN 1 TABLET: 7.5; 325 TABLET ORAL at 09:09

## 2019-09-14 RX ADMIN — FUROSEMIDE 20 MG: 10 INJECTION, SOLUTION INTRAMUSCULAR; INTRAVENOUS at 03:09

## 2019-09-14 RX ADMIN — PREDNISONE 20 MG: 20 TABLET ORAL at 03:09

## 2019-09-14 RX ADMIN — PILOCARPINE HYDROCHLORIDE 5 MG: 5 TABLET, FILM COATED ORAL at 10:09

## 2019-09-14 RX ADMIN — LACTOBACILLUS ACIDOPHILUS / LACTOBACILLUS BULGARICUS 1 EACH: 100 MILLION CFU STRENGTH GRANULES at 03:09

## 2019-09-14 RX ADMIN — ASPIRIN 81 MG CHEWABLE TABLET 81 MG: 81 TABLET CHEWABLE at 09:09

## 2019-09-14 RX ADMIN — PIPERACILLIN AND TAZOBACTAM 4.5 G: 4; .5 INJECTION, POWDER, LYOPHILIZED, FOR SOLUTION INTRAVENOUS; PARENTERAL at 03:09

## 2019-09-14 RX ADMIN — PIPERACILLIN AND TAZOBACTAM 4.5 G: 4; .5 INJECTION, POWDER, LYOPHILIZED, FOR SOLUTION INTRAVENOUS; PARENTERAL at 11:09

## 2019-09-14 RX ADMIN — GABAPENTIN 800 MG: 400 CAPSULE ORAL at 03:09

## 2019-09-14 RX ADMIN — PANTOPRAZOLE SODIUM 40 MG: 40 TABLET, DELAYED RELEASE ORAL at 09:09

## 2019-09-14 NOTE — PROGRESS NOTES
[facility] Hospital Medicine  Progress Note    Patient Name: Noble Tripp  MRN: [id number]  Patient Class: OP- Observation   Admission Date: 9/13/2019  Length of Stay: 0 days  Attending Physician: Cas Hernandez, *  Primary Care Provider: Dwaine Davis MD        Subjective:     Principal Problem:Acute on chronic diastolic heart failure        HPI:  Mr. Tripp is a 75 yo WM with tongue/vocal cords cancer, COPD, HTN and JUANITO who has recurrent pleural effusions presented tonight c/o SOB associated with a cough and hypoxia. He isn't on home oxygen and states his sats decreased to the 80's. He denies any fever or chills.    Overview/Hospital Course:  75 y/o male admitted with a dx of hypoxia , acute on chronic copd exacerbation   And acute on chronic DD chf exacerbation . He was started on IV lasix , IV steroid  And IVAB  . The CXR show pulmonary congestion with moderate pleural effusion .     Interval History:     Review of Systems   Constitutional: Negative.    HENT: Negative.    Eyes: Negative.    Respiratory: Positive for cough and shortness of breath.    Cardiovascular: Negative.    Gastrointestinal: Negative.    Endocrine: Negative.    Genitourinary: Negative.    Musculoskeletal: Negative.    Skin: Negative.    Allergic/Immunologic: Negative.    Neurological: Negative.    Hematological: Negative.    Psychiatric/Behavioral: Negative.      Objective:     Vital Signs (Most Recent):  Temp: 97.7 °F (36.5 °C) (09/14/19 1216)  Pulse: 81 (09/14/19 1311)  Resp: 16 (09/14/19 1311)  BP: (!) 150/83 (09/14/19 1216)  SpO2: 96 % (09/14/19 1313) Vital Signs (24h Range):  Temp:  [97.7 °F (36.5 °C)-97.9 °F (36.6 °C)] 97.7 °F (36.5 °C)  Pulse:  [] 81  Resp:  [16-24] 16  SpO2:  [90 %-99 %] 96 %  BP: (131-182)/() 150/83     Weight: 69.1 kg (152 lb 5.4 oz)  Body mass index is 23.16 kg/m².    Intake/Output Summary (Last 24 hours) at 9/14/2019 1419  Last data filed at 9/14/2019 0752  Gross  per 24 hour   Intake 300 ml   Output --   Net 300 ml      Physical Exam   Constitutional: He is oriented to person, place, and time.   Chronically sick appearing   HENT:   Head: Normocephalic and atraumatic.   Right Ear: External ear normal.   Left Ear: External ear normal.   Nose: Nose normal.   Mouth/Throat: Oropharynx is clear and moist. No oropharyngeal exudate.   Eyes: Pupils are equal, round, and reactive to light. Conjunctivae and EOM are normal.   Neck: Neck supple.   Cardiovascular: Normal rate, regular rhythm, normal heart sounds and intact distal pulses.   Pulmonary/Chest: Effort normal. No stridor. No respiratory distress. He has no wheezes.   Crackles of bilateral base with some rhonchi    Abdominal: Soft. Bowel sounds are normal.   Genitourinary:   Genitourinary Comments: deferred   Musculoskeletal: Normal range of motion.   Neurological: He is alert and oriented to person, place, and time.   Skin: Skin is warm and dry. Capillary refill takes less than 2 seconds.   Psychiatric: He has a normal mood and affect.   Nursing note and vitals reviewed.      Significant Labs: All pertinent labs within the past 24 hours have been reviewed.    Significant Imaging: I have reviewed all pertinent imaging results/findings within the past 24 hours.      Assessment/Plan:      * Acute on chronic diastolic heart failure  TTE from 2015 show DD  Start lasix 20 mg iv bid  F/U TTE      Troponin level elevated  Trend troponin level   Most likely due to demand ischemia       Recurrent right pleural effusion  2/2  To CHF  IV lasix  If not improved will need a thoracentesis       Moderate COPD (chronic obstructive pulmonary disease)  Acute on chronic COPD exacerbation   Cont KATYA , LABA and IHS  Cont PO prednisone       Oropharyngeal aspiration  Per the patient medical staff wants to place a PEG but he isn't interested.      Acute hypoxemic respiratory failure  2/2 to acute COPD an d acute diastolic CHF exacerbation   Cont  current tx       JUANITO on CPAP  Resume CPAP        VTE Risk Mitigation (From admission, onward)        Ordered     Place sequential compression device  Until discontinued      09/13/19 2124     IP VTE HIGH RISK PATIENT  Once      09/13/19 2124                Cas Hernandez MD  Department of Hospital Medicine   [facility]

## 2019-09-14 NOTE — HPI
Mr. Tripp is a 73 yo WM with tongue/vocal cords cancer, COPD, HTN and JUANITO who has recurrent pleural effusions presented tonight c/o SOB associated with a cough and hypoxia. He isn't on home oxygen and states his sats decreased to the 80's. He denies any fever or chills.

## 2019-09-14 NOTE — CONSULTS
"  [facility]   Adult Nutrition  Consult Note    SUMMARY   Intervention: sodium modified diet and nutrition supplement therapy-commercial food     Recommendation:   1) Change diet to cardiac, low sodium   2) Add Boost Pudding BID, fluid restriction per MD   3) Weigh pt weekly or as needed, continue iron supplement  4) NFPE and nutrition education at f/u    Goals: 1) PO intakes > 50% of meals and supplements at f/u  Nutrition Goal Status: new  Communication of RD Recs: (POC, sticy note, second sign)    Reason for Assessment    Reason For Assessment: consult  Diagnosis: (acute CHF)  Relevant Medical History: CHF, COPD, CAD, HLD, PVD, iron deficiency anemia, tongue CA  Interdisciplinary Rounds: did not attend    General Information Comments: Remote RD consult: 73 y/o male admitted with acute CHF/COPD exacerbation. PTA pt lost 40 lb 5 years ago r/t xrt. and gained 20 lb back ( ~77 kg 2/2019) and has been losting slowly since then. Diet ordered, sidney zaidi recorded this afternoon. NFPE/ education to be done by in-house RD at f/u. Will attach CHF handout electronically as well.     Nutrition Discharge Planning: To be determined- Cardiac, low sodium diet + Boost Plus BID or as needed    Nutrition Risk Screen    Nutrition Risk Screen: no indicators present    Nutrition/Diet History    Patient Reported Diet/Restrictions/Preferences: low salt  Spiritual, Cultural Beliefs, Mandaen Practices, Values that Affect Care: no  Food Allergies: NKFA  Factors Affecting Nutritional Intake: decreased appetite    Anthropometrics    Temp: 97.7 °F (36.5 °C)  Height Method: Measured  Height: 5' 8"  Height (inches): 68 in  Weight Method: Standard Scale  Weight: 70.7 kg (155 lb 13.8 oz)(9/13/19)  Weight (lb): 155.87 lb  Ideal Body Weight (IBW), Male: 154 lb  % Ideal Body Weight, Male (lb): 101.21 lb  BMI (Calculated): 23.7  BMI Grade: (borderline underwight for age)  Weight Loss: unintentional  Usual Body Weight (UBW), kg: " 77.4 kg(2/6/19)  % Usual Body Weight: 91.53  % Weight Change From Usual Weight: -8.66 %       Lab/Procedures/Meds    Pertinent Labs Reviewed: reviewed  BMP  Lab Results   Component Value Date     09/13/2019    K 3.7 09/13/2019     09/13/2019    CO2 24 09/13/2019    BUN 12 09/13/2019    CREATININE 0.7 09/13/2019    CALCIUM 8.7 09/13/2019    ANIONGAP 12 09/13/2019    ESTGFRAFRICA >60 09/13/2019    EGFRNONAA >60 09/13/2019     BNP  Recent Labs   Lab 09/13/19  1844   BNP 1,828*     Lab Results   Component Value Date    ALBUMIN 2.8 (L) 09/13/2019     likely inaccurate as pt with altered LFTs  Lab Results   Component Value Date    IRON 30 (L) 09/09/2019    TIBC 204 (L) 09/09/2019    FERRITIN 286 09/09/2019       Pertinent Medications Reviewed: reviewed  Pertinent Medications Comments: lasix, mthylprednisolone, prednisone, probiotic    Estimated/Assessed Needs    Weight Used For Calorie Calculations: 69.1 kg (152 lb 5.4 oz)  Energy Calorie Requirements (kcal): Joffre St Jeor ( x 1.4 wt gain/maintenance) = 1975 kcal  Energy Need Method: Joffre-St Jeor  Protein Requirements: 1.1 g protein/kg ( age) = 77 g protein  Weight Used For Protein Calculations: 70.7 kg (155 lb 13.8 oz)  Fluid Requirements (mL): 7991-6062 ml  Estimated Fluid Requirement Method: other (see comments)(20-25 ml/kg CHF)  CHO Requirement: N/A      Nutrition Prescription Ordered    Current Diet Order: 2 gm Na diet    Evaluation of Received Nutrient/Fluid Intake    Energy Calories Required: not meeting needs  Protein Required: not meeting needs  Fluid Required: not meeting needs  Tolerance: tolerating  % Intake of Estimated Energy Needs: unable to determine just admitted  % Meal Intake: unable to obtain    Nutrition Risk    Level of Risk/Frequency of Follow-up: moderate     Assessment and Plan  Decreased need for sodium and fluid  R/t CHF  As evidenced by acute CHF exacerbation and elevated BNP  New       Monitor and Evaluation    Food and  Nutrient Intake: energy intake, food and beverage intake  Food and Nutrient Adminstration: diet order  Anthropometric Measurements: weight  Biochemical Data, Medical Tests and Procedures: electrolyte and renal panel  Nutrition-Focused Physical Findings: overall appearance     Malnutrition Assessment     Skin (Micronutrient): (Ryan = 19)  Teeth (Micronutrient): (dental appliance)   Micronutrient Evaluation: suspected deficiency  Micronutrient Evaluation Comments: iron               Edema (Fluid Accumulation): (none noted)             Nutrition Follow-Up    RD Follow-up?: Yes

## 2019-09-14 NOTE — H&P
"[facility] Hospital Medicine  History & Physical    Patient Name: Noble Tripp  MRN: [id number]  Admission Date: 9/13/2019  Attending Physician: Jose A Merritt,*   Primary Care Provider: Dwaine Davis MD         Patient information was obtained from patient, relative(s) and ER records.     Subjective:     Principal Problem:Recurrent right pleural effusion    Chief Complaint:   Chief Complaint   Patient presents with    Shortness of Breath     began today, states O2 dropped into the 80s% today but usually is approx 95-96%; history of COPD        HPI: Mr. Tripp is a 75 yo WM with tongue/vocal cords cancer, COPD, HTN and JUANITO who has recurrent pleural effusions presented tonight c/o SOB associated with a cough and hypoxia. He isn't on home oxygen and states his sats decreased to the 80's. He denies any fever or chills.    Past Medical History:   Diagnosis Date    Adrenal mass     david;nl fxn w/u    Aspiration pneumonia 10/15    puree/honey    Benign prostate hyperplasia     CAD (coronary artery disease)     dr padilla    Chronic pain     dr santos    COPD (chronic obstructive pulmonary disease)     papi    Ex-smoker     11/13    Hyperlipidemia     Osteopenia 1/15 tran 1/18    PVD (peripheral vascular disease)     noobs 07 khuri    Pyriform sinus cancer     dr ponce radiation 1/2-14 dr king perales    Sleep apnea     cpap    Squamous cell carcinoma of skin     roberto    Tongue cancer     "superficial" removed 11/14    Vocal cord cancer 2011    Xerostomia     radiation       Past Surgical History:   Procedure Laterality Date    APPENDECTOMY      Bronchoscopy Bilateral 2/5/2019    [facility]    COLONOSCOPY N/A 3/11/2014    Performed by Joe Goodman [facility] at [facility]    Due for screening colonoscopy N/A 5/1/2017    Performed by Anushka Yoder [facility] at [facility]    ESOPHAGOGASTRODUODENOSCOPY (EGD) N/A 8/29/2018    " Per[facility] by Audie Hill III, [facility] at [facility]    ESOPHAGOGASTRODUODENOSCOPY (EGD) N/A 5/29/2018    Performed by Audie Hill III, MD at [facility] END[facility]    Thoracentesis Right 2/25/2019    Performed by [facility] at [facility]    Thoracentesis Left 8/7/2018    Performed by [facility] at [facility]    tongue cancer excision  11/14    dr vitale    VOCAL CORD LATERALIZATION, ENDOSCOPIC APPROACH W/ MLB      kris       Review of patient's allergies indicates:   Allergen Reactions    Contrast media     Iodinated contrast media      Other reaction(s): Itching  Other reaction(s): Hives    Neomycin-bacitracin-polymyxin      Other reaction(s): Rash  Other reaction(s): Rash    Pravastatin      Other reaction(s): fatigue  Other reaction(s): fatigue    Rosuvastatin      Other reaction(s): fatigue  Other reaction(s): fatigue    Simvastatin      Other reaction(s): fatigue  Other reaction(s): fatigue    Statins-hmg-coa reductase inhibitors        No current facility-administered medications on file prior to encounter.      Current Outpatient Medications on File Prior to Encounter   Medication Sig    albuterol (PROAIR HFA) 90 mcg/actuation inhaler INL 2 PFS PO INTO THE LUNGS Q 4 H PRF WHZ OR SOB    albuterol (PROVENTIL) 2.5 mg /3 mL (0.083 %) nebulizer solution Take 3 mLs (2.5 mg total) by nebulization every 8 (eight) hours while awake.    aspirin 81 mg Tab Take 1 tablet by mouth Daily. Over the counter to help prevent stroke/heart attack    CHANTIX CONTINUING MONTH BOX 1 mg Tab TAKE 1 TABLET BY MOUTH ONCE DAILY    ELDERBERRY FRUIT AND FLOWER ORAL Take by mouth.    evolocumab (REPATHA SURECLICK) 140 mg/mL PnIj Inject 1 mL (140 mg total) into the skin every 14 (fourteen) days.    gabapentin (NEURONTIN) 800 MG tablet Take 1 tablet (800 mg total) by mouth 3 (three) times daily.    garlic 2,000 mg Cap Take 1 tablet by mouth once daily.     Lactobacillus rhamnosus GG (CULTURELLE) 10 billion  cell capsule Take 1 capsule by mouth once daily.    nebulizer and compressor Bailey Use as directed    oxyCODONE-acetaminophen (PERCOCET) 7.5-325 mg per tablet Take 1 tablet by mouth every 8 (eight) hours as needed for Pain.    pantoprazole (PROTONIX) 40 MG tablet TAKE ONE TABLET BY MOUTH ONCE DAILY    pilocarpine (SALAGEN) 5 MG Tab  TAKE 1 TABLET BY MOUTH 30 MINUTES PRIOR TO EACH MEAL    tiotropium (SPIRIVA WITH HANDIHALER) 18 mcg inhalation capsule Inhale 1 capsule (18 mcg total) into the lungs once daily.    turmeric root extract 500 mg Cap Take 1 capsule by mouth 2 (two) times daily.     loratadine (CLARITIN) 10 mg tablet Take 1 tablet by mouth daily as needed.      Family History     Problem Relation (Age of Onset)    Cancer Father, Brother        Tobacco Use    Smoking status: Current Every Day Smoker     Packs/day: 0.50     Years: 55.00     Pack years: 27.50    Smokeless tobacco: Never Used    Tobacco comment: 6-7 cigs/day   Substance and Sexual Activity    Alcohol use: Yes     Comment: 2-3 glasses of whiskey every evening    Drug use: No    Sexual activity: Not Currently     Review of Systems   Constitutional: Negative.    HENT: Negative.    Eyes: Negative.    Respiratory: Positive for cough and shortness of breath.    Cardiovascular: Negative.    Gastrointestinal: Negative.    Endocrine: Negative.    Genitourinary: Negative.    Musculoskeletal: Negative.    Skin: Negative.    Allergic/Immunologic: Negative.    Neurological: Negative.    Hematological: Negative.    Psychiatric/Behavioral: Negative.      Objective:     Vital Signs (Most Recent):  Temp: 97.9 °F (36.6 °C) (09/13/19 1900)  Pulse: 100 (09/13/19 2130)  Resp: (!) 21 (09/13/19 2130)  BP: (!) 175/84 (09/13/19 2130)  SpO2: 96 % (09/13/19 2130) Vital Signs (24h Range):  Temp:  [97.8 °F (36.6 °C)-97.9 °F (36.6 °C)] 97.9 °F (36.6 °C)  Pulse:  [] 100  Resp:  [17-21] 21  SpO2:  [90 %-96 %] 96 %  BP: (149-182)/() 175/84     Weight:  70.7 kg (155 lb 13.8 oz)  Body mass index is 23.7 kg/m².    Physical Exam   Constitutional: He is oriented to person, place, and time.   Chronically sick appearing   HENT:   Head: Normocephalic and atraumatic.   Right Ear: External ear normal.   Left Ear: External ear normal.   Nose: Nose normal.   Mouth/Throat: Oropharynx is clear and moist. No oropharyngeal exudate.   Eyes: Pupils are equal, round, and reactive to light. Conjunctivae and EOM are normal.   Neck: Neck supple.   Cardiovascular: Normal rate, regular rhythm, normal heart sounds and intact distal pulses.   Pulmonary/Chest: Effort normal. No stridor. No respiratory distress. He has no wheezes.   Crackles of bilateral base with some rhonchi    Abdominal: Soft. Bowel sounds are normal.   Genitourinary:   Genitourinary Comments: deferred   Musculoskeletal: Normal range of motion.   Neurological: He is alert and oriented to person, place, and time.   Skin: Skin is warm and dry. Capillary refill takes less than 2 seconds.   Psychiatric: He has a normal mood and affect.   Nursing note and vitals reviewed.        CRANIAL NERVES     CN III, IV, VI   Pupils are equal, round, and reactive to light.  Extraocular motions are normal.        Significant Labs:   Recent Lab Results       09/13/19  1915   09/13/19  1844        Albumin   2.8     Alkaline Phosphatase   73     Allens Test Pass       ALT   6     Anion Gap   12     AST   16     Baso #   0.08     Basophil%   1.2     BILIRUBIN TOTAL   0.4  Comment:  For infants and newborns, interpretation of results should be based  on gestational age, weight and in agreement with clinical  observations.  Premature Infant recommended reference ranges:  Up to 24 hours.............<8.0 mg/dL  Up to 48 hours............<12.0 mg/dL  3-5 days..................<15.0 mg/dL  6-29 days.................<15.0 mg/dL       BNP   1,828  Comment:  Values of less than 100 pg/ml are consistent with non-CHF populations.     Site RR       BUN,  Bld   12     Calcium   8.7     Chloride   104     CO2   24     Creatinine   0.7     DelSys Room Air       Differential Method   Automated     eGFR if    >60     eGFR if non    >60  Comment:  Calculation used to obtain the estimated glomerular filtration  rate (eGFR) is the CKD-EPI equation.        Eos #   0.1     Eosinophil%   1.7     FiO2 21       Glucose   91     Gran # (ANC)   4.6     Gran%   69.8     Hematocrit   42.5     Hemoglobin   14.0     Lymph #   1.1     Lymph%   17.2     MCH   32.3     MCHC   32.9     MCV   98     Mode SPONT       Mono #   0.7     Mono%   10.3     MPV   11.0     Platelets   279     POC BE 2       POC HCO3 26.2       POC PCO2 40.0       POC PH 7.424       POC PO2 57       POC SATURATED O2 90       Potassium   3.7     PROTEIN TOTAL   7.1     RBC   4.34     RDW   18.1     Sample ARTERIAL       Sodium   140     Troponin I   0.038  Comment:  The reference interval for Troponin I represents the 99th percentile   cutoff   for our facility and is consistent with 3rd generation assay   performance.       WBC   6.63           Significant Imaging:   Imaging Results          X-Ray Chest 1 View (Final result)  Result time 09/13/19 19:39:33    Final result by Roly Hamilton MD (09/13/19 19:39:33)                 Impression:      1. New small to moderate size infiltrate at the right lung base with small right pleural effusion.  Pleural effusions slightly worse since 04/17/2019.  2. Stable indeterminate opacity left lung base with blunting of left costophrenic angle.  No change since 04/17/2019.      Electronically signed by: Roly Hamilton  Date:    09/13/2019  Time:    19:39             Narrative:    EXAMINATION:  XR CHEST 1 VIEW    CLINICAL HISTORY:  Shortness of breath    COMPARISON:  04/17/2019    FINDINGS:  Diffuse increased interstitial markings are present bilaterally which thought to be chronic.  A new small to moderate size infiltrate is identified  at the right lung base.  Blunting of the right costophrenic angle is present compatible with a small right pleural effusion which is slightly worse.  Increased density and blunting of the left costophrenic angle is present.  Findings are unchanged since 04/17/2019.                                  Assessment/Plan:     * Recurrent right pleural effusion  Also per radiology a small to moderate infiltrate is seen in patient with history of chronic silent aspiration.  He does not have fever of WBC but given history cannot exclude aspiration PNA.  I am going to start zosyn but if he remains afebrile and no elevation of WBC may discontinue.  Consult for possible thoracocentesis in the am.      Moderate COPD (chronic obstructive pulmonary disease)  I don't he is having an acute exacerbation so will resume home medications      JUANITO on CPAP  Resume CPAP      Oropharyngeal aspiration  Per the patient medical staff wants to place a PEG but he isn't interested.        VTE Risk Mitigation (From admission, onward)        Ordered     Place sequential compression device  Until discontinued      09/13/19 2124     IP VTE HIGH RISK PATIENT  Once      09/13/19 2124             Mellisa Macias MD  Department of Hospital Medicine   [facility]

## 2019-09-14 NOTE — PLAN OF CARE
"Problem: Adult Inpatient Plan of Care  Goal: Plan of Care Review  Outcome: Ongoing (interventions implemented as appropriate)  POC reviewed with patient. Pt verbalized understanding. Pt remains free of injuries and falls; fall precaution in place. 2LNC. Scheduled breathing tx received. Cardiac monitor initiated. NS rhythm throughout shift. IV abx administered. Patient ambulates independently. Advised patient to reposition often while in bed. Bed low, side rails x2, call light in reach, personal belongings at bedside. Reminded to call for assistance. Chart check complete. Will continue to monitor.       Problem: Coping Ineffective  Goal: Effective Coping    Intervention: Support and Enhance Coping Strategies  Patient stated he has been depressed since his wife passed in July of 2019 and has started drinking alcohol and smoking tobacco after quitting.       Problem: Swallowing Impairment  Goal: Improved Swallowing Without Aspiration    Intervention: Optimize Eating and Swallowing  Patient states he has difficulty swallowing this liquids and tends to cough following sips. Educated patient about thickened liquids and possible speech therapy. Patient stated, "I do not want my liquids thickened. They are nasty."        "

## 2019-09-14 NOTE — SUBJECTIVE & OBJECTIVE
Interval History:     Review of Systems   Constitutional: Negative.    HENT: Negative.    Eyes: Negative.    Respiratory: Positive for cough and shortness of breath.    Cardiovascular: Negative.    Gastrointestinal: Negative.    Endocrine: Negative.    Genitourinary: Negative.    Musculoskeletal: Negative.    Skin: Negative.    Allergic/Immunologic: Negative.    Neurological: Negative.    Hematological: Negative.    Psychiatric/Behavioral: Negative.      Objective:     Vital Signs (Most Recent):  Temp: 97.7 °F (36.5 °C) (09/14/19 1216)  Pulse: 81 (09/14/19 1311)  Resp: 16 (09/14/19 1311)  BP: (!) 150/83 (09/14/19 1216)  SpO2: 96 % (09/14/19 1313) Vital Signs (24h Range):  Temp:  [97.7 °F (36.5 °C)-97.9 °F (36.6 °C)] 97.7 °F (36.5 °C)  Pulse:  [] 81  Resp:  [16-24] 16  SpO2:  [90 %-99 %] 96 %  BP: (131-182)/() 150/83     Weight: 69.1 kg (152 lb 5.4 oz)  Body mass index is 23.16 kg/m².    Intake/Output Summary (Last 24 hours) at 9/14/2019 1419  Last data filed at 9/14/2019 0752  Gross per 24 hour   Intake 300 ml   Output --   Net 300 ml      Physical Exam   Constitutional: He is oriented to person, place, and time.   Chronically sick appearing   HENT:   Head: Normocephalic and atraumatic.   Right Ear: External ear normal.   Left Ear: External ear normal.   Nose: Nose normal.   Mouth/Throat: Oropharynx is clear and moist. No oropharyngeal exudate.   Eyes: Pupils are equal, round, and reactive to light. Conjunctivae and EOM are normal.   Neck: Neck supple.   Cardiovascular: Normal rate, regular rhythm, normal heart sounds and intact distal pulses.   Pulmonary/Chest: Effort normal. No stridor. No respiratory distress. He has no wheezes.   Crackles of bilateral base with some rhonchi    Abdominal: Soft. Bowel sounds are normal.   Genitourinary:   Genitourinary Comments: deferred   Musculoskeletal: Normal range of motion.   Neurological: He is alert and oriented to person, place, and time.   Skin: Skin is warm  and dry. Capillary refill takes less than 2 seconds.   Psychiatric: He has a normal mood and affect.   Nursing note and vitals reviewed.      Significant Labs: All pertinent labs within the past 24 hours have been reviewed.    Significant Imaging: I have reviewed all pertinent imaging results/findings within the past 24 hours.

## 2019-09-14 NOTE — HOSPITAL COURSE
73 y/o male admitted with a dx of hypoxia , acute on chronic copd exacerbation   And acute on chronic DD chf exacerbation . He was started on IV lasix , IV steroid  And IVAB  . The CXR show pulmonary congestion with moderate pleural effusion .   9/15 Pt was seen and examined at bedside . He was determined to be suitable for d/c   -The TTE show normal EF with DD dysfunction   -He did not qualify for home o2   -Pt was d/c on po prednisone  20 mg po qd x 7 day  And lasix  20 mg po x 5 days and then after PRN  -Pt was advised to be compliant with thickened liquid diet   -Pt was d/c on Augmentin  -here was no acute event since admission   -Pt sx resolved before d/c

## 2019-09-14 NOTE — ASSESSMENT & PLAN NOTE
Also per radiology a small to moderate infiltrate is seen in patient with history of chronic silent aspiration.  He does not have fever of WBC but given history cannot exclude aspiration PNA.  I am going to start zosyn but if he remains afebrile and no elevation of WBC may discontinue.  Consult for possible thoracocentesis in the am.

## 2019-09-14 NOTE — PLAN OF CARE
Problem: Adult Inpatient Plan of Care  Goal: Patient-Specific Goal (Individualization)  Intervention: sodium modified diet and nutrition supplement therapy-commercial food     Recommendation:   1) Change diet to cardiac, low sodium   2) Add Boost Pudding BID, fluid restriction per MD   3) Weigh pt weekly or as needed, continue iron supplement  4) NFPE and nutrition education at f/u    Goals: 1) PO intakes > 50% of meals and supplements at f/u  Nutrition Goal Status: new  Communication of RD Recs: (POC, sticy note, second sign)

## 2019-09-15 VITALS
HEIGHT: 68 IN | OXYGEN SATURATION: 95 % | DIASTOLIC BLOOD PRESSURE: 68 MMHG | BODY MASS INDEX: 23.63 KG/M2 | SYSTOLIC BLOOD PRESSURE: 146 MMHG | RESPIRATION RATE: 16 BRPM | TEMPERATURE: 97 F | WEIGHT: 155.88 LBS | HEART RATE: 90 BPM

## 2019-09-15 PROBLEM — I50.33 ACUTE ON CHRONIC DIASTOLIC HEART FAILURE: Status: RESOLVED | Noted: 2019-09-14 | Resolved: 2019-09-15

## 2019-09-15 LAB
ANION GAP SERPL CALC-SCNC: 7 MMOL/L (ref 8–16)
BASOPHILS # BLD AUTO: 0.01 K/UL (ref 0–0.2)
BASOPHILS NFR BLD: 0.1 % (ref 0–1.9)
BNP SERPL-MCNC: 1759 PG/ML (ref 0–99)
BUN SERPL-MCNC: 15 MG/DL (ref 8–23)
CALCIUM SERPL-MCNC: 8.5 MG/DL (ref 8.7–10.5)
CHLORIDE SERPL-SCNC: 100 MMOL/L (ref 95–110)
CO2 SERPL-SCNC: 32 MMOL/L (ref 23–29)
CREAT SERPL-MCNC: 0.8 MG/DL (ref 0.5–1.4)
DIFFERENTIAL METHOD: ABNORMAL
EOSINOPHIL # BLD AUTO: 0 K/UL (ref 0–0.5)
EOSINOPHIL NFR BLD: 0.1 % (ref 0–8)
ERYTHROCYTE [DISTWIDTH] IN BLOOD BY AUTOMATED COUNT: 17.8 % (ref 11.5–14.5)
EST. GFR  (AFRICAN AMERICAN): >60 ML/MIN/1.73 M^2
EST. GFR  (NON AFRICAN AMERICAN): >60 ML/MIN/1.73 M^2
ESTIMATED PA SYSTOLIC PRESSURE: 23.98
GLUCOSE SERPL-MCNC: 117 MG/DL (ref 70–110)
HCT VFR BLD AUTO: 39.1 % (ref 40–54)
HGB BLD-MCNC: 12.9 G/DL (ref 14–18)
LYMPHOCYTES # BLD AUTO: 0.7 K/UL (ref 1–4.8)
LYMPHOCYTES NFR BLD: 6.1 % (ref 18–48)
MCH RBC QN AUTO: 32.5 PG (ref 27–31)
MCHC RBC AUTO-ENTMCNC: 33 G/DL (ref 32–36)
MCV RBC AUTO: 99 FL (ref 82–98)
MONOCYTES # BLD AUTO: 0.6 K/UL (ref 0.3–1)
MONOCYTES NFR BLD: 5.8 % (ref 4–15)
NEUTROPHILS # BLD AUTO: 9.5 K/UL (ref 1.8–7.7)
NEUTROPHILS NFR BLD: 88.1 % (ref 38–73)
PLATELET # BLD AUTO: 271 K/UL (ref 150–350)
PMV BLD AUTO: 11 FL (ref 9.2–12.9)
POTASSIUM SERPL-SCNC: 3.5 MMOL/L (ref 3.5–5.1)
RBC # BLD AUTO: 3.97 M/UL (ref 4.6–6.2)
RETIRED EF AND QEF - SEE NOTES: 55 (ref 55–65)
SODIUM SERPL-SCNC: 139 MMOL/L (ref 136–145)
TROPONIN I SERPL DL<=0.01 NG/ML-MCNC: 0.05 NG/ML (ref 0–0.03)
WBC # BLD AUTO: 10.76 K/UL (ref 3.9–12.7)

## 2019-09-15 PROCEDURE — 93306 TTE W/DOPPLER COMPLETE: CPT | Mod: 26,,, | Performed by: INTERNAL MEDICINE

## 2019-09-15 PROCEDURE — 93306 2D ECHO WITH COLOR FLOW DOPPLER: ICD-10-PCS | Mod: 26,,, | Performed by: INTERNAL MEDICINE

## 2019-09-15 PROCEDURE — 94761 N-INVAS EAR/PLS OXIMETRY MLT: CPT

## 2019-09-15 PROCEDURE — G0008 ADMIN INFLUENZA VIRUS VAC: HCPCS | Performed by: FAMILY MEDICINE

## 2019-09-15 PROCEDURE — 63600175 PHARM REV CODE 636 W HCPCS: Performed by: FAMILY MEDICINE

## 2019-09-15 PROCEDURE — 25000242 PHARM REV CODE 250 ALT 637 W/ HCPCS: Performed by: INTERNAL MEDICINE

## 2019-09-15 PROCEDURE — 94640 AIRWAY INHALATION TREATMENT: CPT

## 2019-09-15 PROCEDURE — 27000221 HC OXYGEN, UP TO 24 HOURS

## 2019-09-15 PROCEDURE — 96376 TX/PRO/DX INJ SAME DRUG ADON: CPT | Mod: 59

## 2019-09-15 PROCEDURE — 83880 ASSAY OF NATRIURETIC PEPTIDE: CPT

## 2019-09-15 PROCEDURE — 25000003 PHARM REV CODE 250: Performed by: FAMILY MEDICINE

## 2019-09-15 PROCEDURE — 63600175 PHARM REV CODE 636 W HCPCS: Performed by: INTERNAL MEDICINE

## 2019-09-15 PROCEDURE — 25000003 PHARM REV CODE 250: Performed by: INTERNAL MEDICINE

## 2019-09-15 PROCEDURE — 90662 IIV NO PRSV INCREASED AG IM: CPT | Performed by: FAMILY MEDICINE

## 2019-09-15 PROCEDURE — G0378 HOSPITAL OBSERVATION PER HR: HCPCS

## 2019-09-15 PROCEDURE — 93306 TTE W/DOPPLER COMPLETE: CPT

## 2019-09-15 PROCEDURE — 84484 ASSAY OF TROPONIN QUANT: CPT

## 2019-09-15 PROCEDURE — 36415 COLL VENOUS BLD VENIPUNCTURE: CPT

## 2019-09-15 PROCEDURE — 85025 COMPLETE CBC W/AUTO DIFF WBC: CPT

## 2019-09-15 PROCEDURE — 80048 BASIC METABOLIC PNL TOTAL CA: CPT

## 2019-09-15 PROCEDURE — 90471 IMMUNIZATION ADMIN: CPT | Performed by: FAMILY MEDICINE

## 2019-09-15 RX ORDER — PREDNISONE 20 MG/1
20 TABLET ORAL DAILY
Qty: 7 TABLET | Refills: 0 | Status: SHIPPED | OUTPATIENT
Start: 2019-09-15 | End: 2019-09-22

## 2019-09-15 RX ORDER — FUROSEMIDE 20 MG/1
TABLET ORAL
Qty: 30 TABLET | Refills: 1 | Status: SHIPPED | OUTPATIENT
Start: 2019-09-15 | End: 2019-10-24 | Stop reason: SDUPTHER

## 2019-09-15 RX ORDER — AMOXICILLIN AND CLAVULANATE POTASSIUM 875; 125 MG/1; MG/1
1 TABLET, FILM COATED ORAL 2 TIMES DAILY
Qty: 10 TABLET | Refills: 0 | Status: SHIPPED | OUTPATIENT
Start: 2019-09-15 | End: 2019-09-20

## 2019-09-15 RX ADMIN — VARENICLINE TARTRATE 1 MG: 0.5 TABLET, FILM COATED ORAL at 09:09

## 2019-09-15 RX ADMIN — BUDESONIDE 0.5 MG: 0.5 SUSPENSION RESPIRATORY (INHALATION) at 07:09

## 2019-09-15 RX ADMIN — PIPERACILLIN AND TAZOBACTAM 4.5 G: 4; .5 INJECTION, POWDER, LYOPHILIZED, FOR SOLUTION INTRAVENOUS; PARENTERAL at 07:09

## 2019-09-15 RX ADMIN — ARFORMOTEROL TARTRATE 15 MCG: 15 SOLUTION RESPIRATORY (INHALATION) at 07:09

## 2019-09-15 RX ADMIN — INFLUENZA A VIRUS A/MICHIGAN/45/2015 X-275 (H1N1) ANTIGEN (FORMALDEHYDE INACTIVATED), INFLUENZA A VIRUS A/SINGAPORE/INFIMH-16-0019/2016 IVR-186 (H3N2) ANTIGEN (FORMALDEHYDE INACTIVATED), AND INFLUENZA B VIRUS B/MARYLAND/15/2016 BX-69A (A B/COLORADO/6/2017-LIKE VIRUS) ANTIGEN (FORMALDEHYDE INACTIVATED) 0.5 ML: 60; 60; 60 INJECTION, SUSPENSION INTRAMUSCULAR at 10:09

## 2019-09-15 RX ADMIN — LACTOBACILLUS ACIDOPHILUS / LACTOBACILLUS BULGARICUS 1 EACH: 100 MILLION CFU STRENGTH GRANULES at 09:09

## 2019-09-15 RX ADMIN — PILOCARPINE HYDROCHLORIDE 5 MG: 5 TABLET, FILM COATED ORAL at 06:09

## 2019-09-15 RX ADMIN — OXYCODONE HYDROCHLORIDE AND ACETAMINOPHEN 1 TABLET: 7.5; 325 TABLET ORAL at 04:09

## 2019-09-15 RX ADMIN — IPRATROPIUM BROMIDE AND ALBUTEROL SULFATE 3 ML: .5; 3 SOLUTION RESPIRATORY (INHALATION) at 12:09

## 2019-09-15 RX ADMIN — PANTOPRAZOLE SODIUM 40 MG: 40 TABLET, DELAYED RELEASE ORAL at 09:09

## 2019-09-15 RX ADMIN — ASPIRIN 81 MG CHEWABLE TABLET 81 MG: 81 TABLET CHEWABLE at 09:09

## 2019-09-15 RX ADMIN — IPRATROPIUM BROMIDE AND ALBUTEROL SULFATE 3 ML: .5; 3 SOLUTION RESPIRATORY (INHALATION) at 07:09

## 2019-09-15 RX ADMIN — GABAPENTIN 800 MG: 400 CAPSULE ORAL at 09:09

## 2019-09-15 RX ADMIN — PREDNISONE 20 MG: 20 TABLET ORAL at 09:09

## 2019-09-15 NOTE — NURSING
Tele tech monitor called reporting patient had 7 beats of VT. Upon entering room, patient was ambulating to bathroom and reported he had just had a coughing spell. Informed Dr. Macias through secure chat. Awaiting orders. No s/s of acute distress. VSS. NSR on heart monitor. Will continue to monitor.

## 2019-09-15 NOTE — NURSING
"AVS reviewed with patient. Follow up appointment and d/c medication discussed, D/C medication rx signed per MD and given to patient. Patient verbalized understanding with teach back. No complaints at this time. No further needs. IV removed per order. Patient tolerated well. Telemetry monitor returned to tele monitor tech. Influenza vaccine administered per MAR to L deltoid. Patient aware to remain in hospital for 15 minutes to monitor for rxn. Patient tolerated well. To be transported home per patient. Patient states "I drove myself here, I can drive myself home."     "

## 2019-09-15 NOTE — PLAN OF CARE
Problem: Adult Inpatient Plan of Care  Goal: Plan of Care Review  Outcome: Ongoing (interventions implemented as appropriate)  POC reviewed, including indications and possible side effects of administered medications. Patient verbalized understanding and teach back. No adverse reactions noted. Patient c/o back and shoulder pain. Administered medications per order. Aspiration precautions maintained. NSR on heart monitor. VSS. No s/s of acute distress noted. Patient remains free of falls and injuries during shift. Family at bedside. Will continue to monitor.    Chart check complete.

## 2019-09-15 NOTE — PROGRESS NOTES
Home Oxygen Evaluation    Date Performed: 9/15/2019    1) Patient's Home O2 Sat on room air, while at rest: 94        If O2 sats on room air at rest are 88% or below, patient qualifies. No additional testing needed. Document N/A in steps 2 and 3. If 89% or above, complete steps 2.      2) Patient's O2 Sat on room air while exercisin        If O2 sats on room air while exercising remain 89% or above patient does not qualify, no further testing needed Document N/A in step 3. If O2 sats on room air while exercising are 88% or below, continue to step 3.      3) Patient's O2 Sat while exercising on O2:na at na LPM         (Must show improvement from #2 for patients to qualify)    If O2 sats improve on oxygen, patient qualifies for portable oxygen. If not, the patient does not qualify.

## 2019-09-15 NOTE — DISCHARGE SUMMARY
[facility] Hospital Medicine  Discharge Summary      Patient Name: Noble Tripp  MRN: [id number]  Admission Date: 9/13/2019  Hospital Length of Stay: 0 days  Discharge Date and Time: 9/15/2019 11:08 AM  Attending Physician: No att. providers found   Discharging Provider: Cas Hernandez MD  Primary Care Provider: Dwaine Davis MD      HPI:   Mr. Tripp is a 73 yo WM with tongue/vocal cords cancer, COPD, HTN and JUANITO who has recurrent pleural effusions presented tonight c/o SOB associated with a cough and hypoxia. He isn't on home oxygen and states his sats decreased to the 80's. He denies any fever or chills.    * No surgery found *      Hospital Course:   73 y/o male admitted with a dx of hypoxia , acute on chronic copd exacerbation   And acute on chronic DD chf exacerbation . He was started on IV lasix , IV steroid  And IVAB  . The CXR show pulmonary congestion with moderate pleural effusion .   9/15 Pt was seen and examined at bedside . He was determined to be suitable for d/c   -The TTE show normal EF with DD dysfunction   -He did not qualify for home o2   -Pt was d/c on po prednisone  20 mg po qd x 7 day  And lasix  20 mg po x 5 days and then after PRN  -Pt was advised to be compliant with thickened liquid diet   -Pt was d/c on Augmentin  -here was no acute event since admission   -Pt sx resolved before d/c         Consults:   Consults (From admission, onward)        Status Ordering Provider     Inpatient consult to Registered Dietitian/Nutritionist  Once     Provider:  (Not yet assigned)    Completed BOSTON THOMAS          Troponin level elevated  Trend troponin level   Most likely due to demand ischemia       Recurrent right pleural effusion  2/2  To CHF  IV lasix  If not improved will need a thoracentesis       Moderate COPD (chronic obstructive pulmonary disease)  Acute on chronic COPD exacerbation   Cont KATYA , LABA and IHS  Cont PO prednisone       Oropharyngeal  aspiration  Per the patient medical staff wants to place a PEG but he isn't interested.      JUANITO on CPAP  Resume CPAP        Final Active Diagnoses:    Diagnosis Date Noted POA    Troponin level elevated [R74.8] 09/14/2019 Yes    Recurrent right pleural effusion [J90] 09/13/2019 Yes    Moderate COPD (chronic obstructive pulmonary disease) [J44.9] 02/02/2016 Yes     Chronic    Oropharyngeal aspiration [T17.208A] 10/11/2015 Yes    JUANITO on CPAP [G47.33, Z99.89] 10/07/2014 Not Applicable     Chronic      Problems Resolved During this Admission:    Diagnosis Date Noted Date Resolved POA    PRINCIPAL PROBLEM:  Acute on chronic diastolic heart failure [I50.33] 09/14/2019 09/15/2019 Yes    Acute hypoxemic respiratory failure [J96.01] 10/10/2015 09/15/2019 Yes       Discharged Condition: stable    Disposition: Home or Self Care    Follow Up:  Follow-up Information     Dwaine Davis MD In 1 week.    Specialty:  Family Medicine  Contact information:  [address]  [place] [address]  [phone]             Santos Cardozo MD In 2 weeks.    Specialty:  Pulmonary Disease  Contact information:  [address]  [place] [address]  [phone]                 Patient Instructions:      Diet Cardiac     Diet Cardiac   Scheduling Instructions: Thicken fluid     Notify your health care provider if you experience any of the following:  temperature >100.4     Notify your health care provider if you experience any of the following:  persistent nausea and vomiting or diarrhea     Notify your health care provider if you experience any of the following:  severe uncontrolled pain     Notify your health care provider if you experience any of the following:  redness, tenderness, or signs of infection (pain, swelling, redness, odor or green/yellow discharge around incision site)     Notify your health care provider if you experience any of the following:  difficulty breathing or increased cough     Notify  your health care provider if you experience any of the following:  severe persistent headache     Notify your health care provider if you experience any of the following:  worsening rash     Notify your health care provider if you experience any of the following:  increased confusion or weakness     Notify your health care provider if you experience any of the following:  persistent dizziness, light-headedness, or visual disturbances     Notify your health care provider if you experience any of the following:     Notify your health care provider if you experience any of the following:  temperature >100.4     Notify your health care provider if you experience any of the following:  persistent nausea and vomiting or diarrhea     Notify your health care provider if you experience any of the following:  severe uncontrolled pain     Notify your health care provider if you experience any of the following:  redness, tenderness, or signs of infection (pain, swelling, redness, odor or green/yellow discharge around incision site)     Notify your health care provider if you experience any of the following:  difficulty breathing or increased cough     Notify your health care provider if you experience any of the following:  severe persistent headache     Notify your health care provider if you experience any of the following:  worsening rash     Notify your health care provider if you experience any of the following:  persistent dizziness, light-headedness, or visual disturbances     Notify your health care provider if you experience any of the following:  increased confusion or weakness     Notify your health care provider if you experience any of the following:     Activity as tolerated     Activity as tolerated       Significant Diagnostic Studies: Labs:   BMP:   Recent Labs   Lab 09/13/19  1844 09/15/19  0505   GLU 91 117*    139   K 3.7 3.5    100   CO2 24 32*   BUN 12 15   CREATININE 0.7 0.8   CALCIUM 8.7 8.5*    , CMP   Recent Labs   Lab 09/13/19  1844 09/15/19  0505    139   K 3.7 3.5    100   CO2 24 32*   GLU 91 117*   BUN 12 15   CREATININE 0.7 0.8   CALCIUM 8.7 8.5*   PROT 7.1  --    ALBUMIN 2.8*  --    BILITOT 0.4  --    ALKPHOS 73  --    AST 16  --    ALT 6*  --    ANIONGAP 12 7*   ESTGFRAFRICA >60 >60   EGFRNONAA >60 >60   , CBC   Recent Labs   Lab 09/13/19  1844 09/15/19  0505   WBC 6.63 10.76   HGB 14.0 12.9*   HCT 42.5 39.1*    271    and INR   Lab Results   Component Value Date    INR 1.1 10/10/2015    INR 1.0 10/10/2015    INR 1.0 07/26/2005     Microbiology:   Blood Culture   Lab Results   Component Value Date    LABBLOO No growth after 5 days. 02/05/2019       Pending Diagnostic Studies:     None         Medications:  Reconciled Home Medications:      Medication List      START taking these medications    amoxicillin-clavulanate 875-125mg 875-125 mg per tablet  Commonly known as:  AUGMENTIN  Take 1 tablet by mouth 2 (two) times daily. for 5 days     furosemide 20 MG tablet  Commonly known as:  LASIX  Take 1 tablet  Daily x 5 days , then after take PRN  For sob  Qd     predniSONE 20 MG tablet  Commonly known as:  DELTASONE  Take 1 tablet (20 mg total) by mouth once daily. for 7 days        CONTINUE taking these medications    * PROAIR HFA 90 mcg/actuation inhaler  Generic drug:  albuterol  INL 2 PFS PO INTO THE LUNGS Q 4 H PRF WHZ OR SOB     * albuterol 2.5 mg /3 mL (0.083 %) nebulizer solution  Commonly known as:  PROVENTIL  Take 3 mLs (2.5 mg total) by nebulization every 8 (eight) hours while awake.     aspirin 81 mg Tab  Take 1 tablet by mouth Daily. Over the counter to help prevent stroke/heart attack     CHANTIX CONTINUING MONTH BOX 1 mg Tab  Generic drug:  varenicline  TAKE 1 TABLET BY MOUTH ONCE DAILY     COMP-AIR NEBULIZER COMPRESSOR Bailey  Generic drug:  nebulizer and compressor  Use as directed     ELDERBERRY FRUIT AND FLOWER ORAL  Take by mouth.     evolocumab 140 mg/mL  Pnij  Commonly known as:  REPATHA SURECLICK  Inject 1 mL (140 mg total) into the skin every 14 (fourteen) days.     gabapentin 800 MG tablet  Commonly known as:  NEURONTIN  Take 1 tablet (800 mg total) by mouth 3 (three) times daily.     garlic 2,000 mg Cap  Take 1 tablet by mouth once daily.     Lactobacillus rhamnosus GG 10 billion cell capsule  Commonly known as:  CULTURELLE  Take 1 capsule by mouth once daily.     loratadine 10 mg tablet  Commonly known as:  CLARITIN  Take 1 tablet by mouth daily as needed.     oxyCODONE-acetaminophen 7.5-325 mg per tablet  Commonly known as:  PERCOCET  Take 1 tablet by mouth every 8 (eight) hours as needed for Pain.     pantoprazole 40 MG tablet  Commonly known as:  PROTONIX  TAKE ONE TABLET BY MOUTH ONCE DAILY     pilocarpine 5 MG Tab  Commonly known as:  SALAGEN  TAKE 1 TABLET BY MOUTH 30 MINUTES PRIOR TO EACH MEAL     tiotropium 18 mcg inhalation capsule  Commonly known as:  SPIRIVA WITH HANDIHALER  Inhale 1 capsule (18 mcg total) into the lungs once daily.     turmeric root extract 500 mg Cap  Take 1 capsule by mouth 2 (two) times daily.         * This list has 2 medication(s) that are the same as other medications prescribed for you. Read the directions carefully, and ask your doctor or other care provider to review them with you.                Indwelling Lines/Drains at time of discharge:   Lines/Drains/Airways          None          Time spent on the discharge of patient: 35 minutes  Patient was seen and examined on the date of discharge and determined to be suitable for discharge.         Cas Hernandez MD  Department of Hospital Medicine  [facility]

## 2019-09-16 RX ORDER — OXYCODONE AND ACETAMINOPHEN 7.5; 325 MG/1; MG/1
1 TABLET ORAL EVERY 8 HOURS PRN
Qty: 90 TABLET | Refills: 0 | Status: SHIPPED | OUTPATIENT
Start: 2019-10-22 | End: 2019-11-12 | Stop reason: CLARIF

## 2019-09-16 RX ORDER — OXYCODONE AND ACETAMINOPHEN 7.5; 325 MG/1; MG/1
1 TABLET ORAL EVERY 8 HOURS PRN
Qty: 90 TABLET | Refills: 0 | Status: SHIPPED | OUTPATIENT
Start: 2019-09-22 | End: 2019-10-22

## 2019-09-17 ENCOUNTER — OFFICE VISIT (OUTPATIENT)
Dept: PAIN MEDICINE | Facility: CLINIC | Age: 74
End: 2019-09-17
Payer: MEDICARE

## 2019-09-17 VITALS
RESPIRATION RATE: 18 BRPM | DIASTOLIC BLOOD PRESSURE: 80 MMHG | HEART RATE: 77 BPM | HEIGHT: 68 IN | SYSTOLIC BLOOD PRESSURE: 145 MMHG | BODY MASS INDEX: 22.73 KG/M2 | WEIGHT: 150 LBS

## 2019-09-17 DIAGNOSIS — M53.3 SACROILIAC JOINT PAIN: Primary | ICD-10-CM

## 2019-09-17 DIAGNOSIS — M47.816 LUMBAR FACET ARTHROPATHY: ICD-10-CM

## 2019-09-17 DIAGNOSIS — M51.36 DDD (DEGENERATIVE DISC DISEASE), LUMBAR: ICD-10-CM

## 2019-09-17 DIAGNOSIS — M47.817 SPONDYLOSIS OF LUMBOSACRAL REGION WITHOUT MYELOPATHY OR RADICULOPATHY: ICD-10-CM

## 2019-09-17 PROCEDURE — 99999 PR PBB SHADOW E&M-EST. PATIENT-LVL IV: CPT | Mod: PBBFAC,,, | Performed by: PHYSICIAN ASSISTANT

## 2019-09-17 PROCEDURE — 99214 PR OFFICE/OUTPT VISIT, EST, LEVL IV, 30-39 MIN: ICD-10-PCS | Mod: S$PBB,,, | Performed by: PHYSICIAN ASSISTANT

## 2019-09-17 PROCEDURE — 99999 PR PBB SHADOW E&M-EST. PATIENT-LVL IV: ICD-10-PCS | Mod: PBBFAC,,, | Performed by: PHYSICIAN ASSISTANT

## 2019-09-17 PROCEDURE — 99214 OFFICE O/P EST MOD 30 MIN: CPT | Mod: PBBFAC | Performed by: PHYSICIAN ASSISTANT

## 2019-09-17 PROCEDURE — 99214 OFFICE O/P EST MOD 30 MIN: CPT | Mod: S$PBB,,, | Performed by: PHYSICIAN ASSISTANT

## 2019-09-17 RX ORDER — GABAPENTIN 800 MG/1
800 TABLET ORAL 3 TIMES DAILY
Qty: 90 TABLET | Refills: 1 | Status: ON HOLD | OUTPATIENT
Start: 2019-09-17 | End: 2019-10-27 | Stop reason: SDUPTHER

## 2019-09-17 NOTE — PROGRESS NOTES
Chief Pain Complaint:  Low Back Pain, Leg pain (left > right)       History of Present Illness:  This patient is a 74 y.o. male who presents today complaining of the above noted pain/s. The patient describes this pain as follows.    - duration of pain: pain for years   - timing: constant   - character: aching, burning  - radiating, dermatomal: extends into left leg, along L5 pattern at times, mostly non-radiating  - antecedent trauma, prior spinal surgery: none  - pertinent negatives: No fever, No chills, No weight loss, No bladder dysfunction, No bowel dysfunction, No extremity weakness, No saddle anesthesia  - pertinent positives: none    - medications, other therapies tried (physical therapy, injections):     >> gabapentin, Norco    >> Has previously undergone Physical Therapy, which made pain worse    >> Has previously undergone spinal injection/s: lumbar MBB and Left TFESI with Dr Taylor in 2014 & 2015 (see EMR Surgeries for full details) with only short term relief    _________________________________________________________________________________________________________________________________________________________________________________________________________________________      IMAGING / Labs / Studies (reviewed on 9/17/2019):    9/15/17 MRI LUMBAR SPINE WITHOUT CONTRAST  History: Lower back pain.  Left lower extremity pain  Technique: Standard multiplanar pulse sequences without IV contrast.  Comparison:  No prior  studies available for comparison.  Findings:   Mild-moderate scoliosis, convex to the left and centered at L3.  All lumbar vertebral bodies are normal in height.  Scattered degenerative endplate marrow signal identified at the L2-L3 and L3-L4 L5-S1 levels.  No fracture.  No suspicious osseous lesion is identified.  Mild retrolisthesis of L2-L3.  Distal spinal cord and conus medullaris have normal appearance but the conus terminates at the T12-L1 level.  T12-L1: Minimal central protrusion.   Negative for focal nerve root impingement or central canal narrowing.  Neural foramina widely patent.  L1-2: Mild disc height loss.  Prominent intraosseous eyes.  Mild disc bulging.  Mild facet arthropathy and ligament flavum hypertrophy.  The central canal neural foramina patent.  L2-L3: Minimal retrolisthesis of L2 on L3.  There is moderate disc height loss.  Prominent anterior osteophytes.  Mild-moderate diffuse generalized disc bulging.  Mild facet arthropathy and ligament flavum hypertrophy.  The central canal is patent.  Neural foramina patent.  L3-L4: Disc desiccation and moderate disc height loss.  Large anterior osteophytes are present.  There is mild disc bulging and osteophytic ridging at the posterior disc margin.  Moderate right and mild left facet arthropathy and ligamentum flavum hypertrophy.  The central canal is patent.  Mild neural foraminal narrowing.  L4-L5: Very minimal grade 1 spondylolisthesis of L4-L5.  Mild generalized disc bulge is present.  Severe facet arthropathy and ligamentum flavum hypertrophy.  There is severe central canal and lateral recess stenosis.  Moderate neural foraminal stenosis.  L5-S1: Disc desiccation and moderate to severe disc height loss.  Prominent intraocular heights.  There is mild disc bulging and osteophytic ridging at the posterior disc margin.  Severe left and moderate right neural foraminal stenosis.  The central canal is patent.   Paravertebral soft tissues and musculature are normal.  The visualized intra-abdominal and intrapelvic contents are normal.       10/30/14 MRI Lumbar Spine Without Contrast    Narrative Exam: MRI of the lumbar spine without contrast.  History:     Low back pain. Status post SHEY, with S1-S2 radicular symptoms  Comparison: Prior study dated 04/02/13  Findings:     A convex right scoliosis again noted.  No compression fracture or subluxation.  The conus medullaris has a normal appearance.  The paraspinous soft tissues are  "unremarkable.  The T12 -- L1 and L1 -- 2 disk levels are essentially unremarkable.  L2 -- 3: Mild annular bulging again noted.    L3 -- 4: Moderate narrowing of the right lateral disk space, with right greater than left facet arthropathy, unchanged.    L4 -- 5: Marked bilateral facet arthropathy, with mild annular bulging, causing moderate central canal stenosis, unchanged.  L5 -- S1: Disk desiccation, with moderate disk space narrowing.  Significant degenerative narrowing of the left L5 neural foramen is apparent, unchanged.     _________________________________________________________________________________________________________________________________________________________________________________________________________________________      Review of Systems:  CONSTITUTIONAL: patient denies any fever, chills, or weight loss  SKIN: patient denies any rash or itching  RESPIRATORY: patient denies having any shortness of breath  GASTROINTESTINAL: patient denies having any diarrhea, constipation, or bowel incontinence  GENITOURINARY: patient denies having any abnormal bladder function    MUSCULOSKELETAL:  - patient reports low back pain    NEUROLOGICAL:   - pain as above  - strength in Lower extremities is intact, BILATERALLY  - sensation in Lower extremities is intact, BILATERALLY  - patient denies any loss of bowel or bladder control      PSYCHIATRIC: patient denies any suicidal or homicidal ideations    _________________________________________________________________________________________________________________________________________________________________________________________________________________________      Physical Exam:  Vitals:  BP (!) 145/80 (BP Location: Right arm, Patient Position: Sitting, BP Method: Medium (Automatic))   Pulse 77   Resp 18   Ht 5' 8" (1.727 m)   Wt 68 kg (150 lb)   BMI 22.81 kg/m²   (reviewed on 9/17/2019)    General: alert and oriented, in no apparent " distress.  Gait: normal gait.  Skin: no rashes, no discoloration, no obvious lesions  HEENT: normocephalic, atraumatic. Pupils equal and round.  Cardiovascular: no significant peripheral edema present.  Respiratory: without use of accessory muscles of respiration.    Musculoskeletal - Lumbar Spine:  - Pain on extension of lumbar spine: Present  - Lumbar facet loading: Present bilaterally  - TTP over the lumbar facet joints: Present Bilaterally, worse at L5-S1   - TTP over the lumbar paraspinals: Present, mild  - TTP over the SI joints: Present on left   - Straight Leg Raise: Negative  - NOELLE: Present  - Pain with internal and external rotation of hip    Neuro - Lower Extremities:  - BLE Strength: R/L: HF: 5/5, HE: 5/5, KF: 5/5; KE: 5/5; FE: 5/5; FF: 5/5  - Extremity Reflexes: Brisk and symmetric throughout  - Sensory: Sensation to light touch intact bilaterally      Psych:  Mood and affect is appropriate    _________________________________________________________________________________________________________________________________________________________________________________________________________________________     Assessment:  Noble Tripp is a 74 y.o. year old male who is presenting with   Encounter Diagnoses   Name Primary?    DDD (degenerative disc disease), lumbar     Sacroiliac joint pain Yes    Lumbar facet arthropathy     Spondylosis of lumbosacral region without myelopathy or radiculopathy      Patient returns for follow-up.  He complains of chronic low back pain. Lumbar MRI shows advanced diffuse spondylosis, greatest at the L4-L5 level with severe central canal stenosis and lateral recess stenosis.       Plan:  1. Interventional: Consider Bilateral L3, L4, L5 MBB with local. We will consider this if pain not controlled with medication, but he is not interested at this time since he does not feel they have not been long lasting in the past.    2. Pharmacologic:   - Refill Percocet  7.5/325 mg PO TID PRN (90 tabs) x 2 months. Paper Rx given. Patient tolerating opioids with no side effects, obtaining good pain control with functional improvement.   - Refill Gabapentin 800mg TID.  - Opioid contract signed on 3/20/2018.     - [place]  reviewed and appropriate. Last filled 8-23-19.  Will post date accordingly.  - Will order UDS today to ensure medication compliance.      3. Rehabilitative: Encouraged regular exercise. He has been using back brace; patient was informed to limit use to avoid muscle atrophy.     4. Diagnostic: None for now.    5. Follow up: 9 weeks for med refill.     - I discussed the risks, benefits, and alternatives to potential treatment options. All questions and concerns were fully addressed today in clinic. Dr. Mueller was consulted regarding the patient plan and agrees.           >> UDS:  6-28-16 :: appropriate  2/28/2017 :: appropriate  8/18/2017 :: (not in EMR)  5/16/2018 :: appropriate  9/11/2018 :: appropriate  3/11/2019 :: appropriate  9/17/2019 :: pending

## 2019-09-19 ENCOUNTER — PATIENT MESSAGE (OUTPATIENT)
Dept: INTERNAL MEDICINE | Facility: CLINIC | Age: 74
End: 2019-09-19

## 2019-09-19 ENCOUNTER — PATIENT OUTREACH (OUTPATIENT)
Dept: ADMINISTRATIVE | Facility: HOSPITAL | Age: 74
End: 2019-09-19

## 2019-09-19 NOTE — PROGRESS NOTES
Health maintenance reviewed.  Care Everywhere checked. Immunizations reviewed and reconciled.  appt note msg to have patient BP checked.

## 2019-09-26 ENCOUNTER — OFFICE VISIT (OUTPATIENT)
Dept: INTERNAL MEDICINE | Facility: CLINIC | Age: 74
End: 2019-09-26
Payer: MEDICARE

## 2019-09-26 VITALS
SYSTOLIC BLOOD PRESSURE: 130 MMHG | TEMPERATURE: 98 F | HEIGHT: 68 IN | WEIGHT: 157.44 LBS | BODY MASS INDEX: 23.86 KG/M2 | DIASTOLIC BLOOD PRESSURE: 62 MMHG

## 2019-09-26 DIAGNOSIS — E78.2 MIXED HYPERLIPIDEMIA: ICD-10-CM

## 2019-09-26 DIAGNOSIS — Z85.89 HISTORY OF HEAD AND NECK CANCER: ICD-10-CM

## 2019-09-26 DIAGNOSIS — J90 RECURRENT RIGHT PLEURAL EFFUSION: ICD-10-CM

## 2019-09-26 DIAGNOSIS — I10 ESSENTIAL HYPERTENSION: ICD-10-CM

## 2019-09-26 DIAGNOSIS — I65.21 STENOSIS OF RIGHT CAROTID ARTERY: ICD-10-CM

## 2019-09-26 DIAGNOSIS — J44.9 MODERATE COPD (CHRONIC OBSTRUCTIVE PULMONARY DISEASE): Primary | Chronic | ICD-10-CM

## 2019-09-26 DIAGNOSIS — Z78.9 STATIN INTOLERANCE: ICD-10-CM

## 2019-09-26 DIAGNOSIS — M85.80 OSTEOPENIA, UNSPECIFIED LOCATION: ICD-10-CM

## 2019-09-26 DIAGNOSIS — D64.9 CHRONIC ANEMIA: ICD-10-CM

## 2019-09-26 DIAGNOSIS — I25.10 CORONARY ARTERY DISEASE INVOLVING NATIVE CORONARY ARTERY OF NATIVE HEART WITHOUT ANGINA PECTORIS: ICD-10-CM

## 2019-09-26 PROCEDURE — 99213 OFFICE O/P EST LOW 20 MIN: CPT | Mod: PBBFAC | Performed by: FAMILY MEDICINE

## 2019-09-26 PROCEDURE — 99214 PR OFFICE/OUTPT VISIT, EST, LEVL IV, 30-39 MIN: ICD-10-PCS | Mod: S$PBB,,, | Performed by: FAMILY MEDICINE

## 2019-09-26 PROCEDURE — 99999 PR PBB SHADOW E&M-EST. PATIENT-LVL III: ICD-10-PCS | Mod: PBBFAC,,, | Performed by: FAMILY MEDICINE

## 2019-09-26 PROCEDURE — 99999 PR PBB SHADOW E&M-EST. PATIENT-LVL III: CPT | Mod: PBBFAC,,, | Performed by: FAMILY MEDICINE

## 2019-09-26 PROCEDURE — 99214 OFFICE O/P EST MOD 30 MIN: CPT | Mod: S$PBB,,, | Performed by: FAMILY MEDICINE

## 2019-09-26 RX ORDER — CLOTRIMAZOLE 10 MG/1
LOZENGE ORAL; TOPICAL
COMMUNITY
Start: 2019-09-25 | End: 2020-03-05 | Stop reason: SDUPTHER

## 2019-09-26 NOTE — PROGRESS NOTES
Subjective:       Patient ID: Noble Tripp is a 74 y.o. male.    Chief Complaint: Hospital Follow Up    HPI admitted with a dx of hypoxia , acute on chronic copd exacerbation   And acute on chronic DD chf exacerbation . He was started on IV lasix , IV steroid  And IVAB  . The CXR show pulmonary congestion with moderate pleural effusion .   9/15 Pt was seen and examined at bedside . He was determined to be suitable for d/c   -The TTE show normal EF with DD dysfunction   -He did not qualify for home o2   -Pt was d/c on po prednisone  20 mg po qd x 7 day  And lasix  20 mg po x 5 days and then after PRN  -Pt was advised to be compliant with thickened liquid diet   -Pt was d/c on Augmentin  -here was no acute event since admission   -Pt sx resolved before  D/c and still asympt  Has f/u dr brown and utd cardiol     and doing ok w kids nearby    etoh 2 drinks /whiskey . D/wd healthier amount    chantix helping to control urges. stil smoking few /day    Hypertension: blood pressures at home normal. Tolerating medicine.      2Throat ca utd specialists; /hxDr Vasireddy;  Follow anemia also    . radha / cpap  6.chronic pain well controlled w  pain meds.     Copd utd dr brown . Stable/ s/p thoracentesis in past    Coronary artery disease/micah: up to date with cardiology. No chest pain, palpitations or shortness of breath. Tolerating medication.       Osteopenia not needing med 11/18          Past Medical History:   Diagnosis Date    Adrenal adenoma     david;nl fxn w/u;tran 11/18    Aspiration pneumonia 10/15    puree/honey    Benign prostate hyperplasia     CAD (coronary artery disease)     dr padilla    Chronic pain     dr santos    COPD (chronic obstructive pulmonary disease)     papi    Ex-smoker     11/13    Hyperlipidemia     Osteopenia 1/15 tran 1/18    PVD (peripheral vascular disease)     noobs 07 latrell    Pyriform sinus cancer     dr ponce radiation 1/2-14 dr king perales    Sleep  "apnea     cpap    Squamous cell carcinoma of skin     roberto    Tongue cancer     "superficial" removed 11/14    Vocal cord cancer 2011    Xerostomia     radiation     Past Surgical History:   Procedure Laterality Date    APPENDECTOMY      BRONCHOSCOPY Bilateral 2/5/2019    Procedure: Bronchoscopy;  Surgeon: Santos Cardozo MD;  Location: [facility] ENDO;  Service: Endoscopy;  Laterality: Bilateral;    COLONOSCOPY N/A 5/1/2017    Procedure: Due for screening colonoscopy;  Surgeon: Anushka Yoder MD;  Location: [facility] ENDO;  Service: Endoscopy;  Laterality: N/A;    ESOPHAGOGASTRODUODENOSCOPY N/A 5/29/2018    Procedure: ESOPHAGOGASTRODUODENOSCOPY (EGD);  Surgeon: Audie Hill III, MD;  Location: [facility] ENDO;  Service: Endoscopy;  Laterality: N/A;    ESOPHAGOGASTRODUODENOSCOPY N/A 8/29/2018    Procedure: ESOPHAGOGASTRODUODENOSCOPY (EGD);  Surgeon: Audie Hill III, MD;  Location: [facility] ENDO;  Service: Endoscopy;  Laterality: N/A;    THORACENTESIS Left 8/7/2018    Procedure: Thoracentesis;  Surgeon: Santos Cardozo MD;  Location: [facility];  Service: Endoscopy;  Laterality: Left;    THORACENTESIS Right 2/25/2019    Procedure: Thoracentesis;  Surgeon: Santos Cardozo MD;  Location: [facility];  Service: Endoscopy;  Laterality: Right;    tongue cancer excision  11/14    dr vitale    VOCAL CORD LATERALIZATION, ENDOSCOPIC APPROACH W/ MLB      kris     Family History   Problem Relation Age of Onset    Cancer Father     Cancer Brother      Social History     Socioeconomic History    Marital status:      Spouse name: JYOTI    Number of children: 6    Years of education: Not on file    Highest education level: Not on file   Occupational History    Not on file   Social Needs    Financial resource strain: Not on file    Food insecurity:     Worry: Not on file     Inability: Not on file    Transportation needs:     Medical: Not on file     Non-medical: Not on file   Tobacco Use    Smoking " status: Current Every Day Smoker     Packs/day: 0.50     Years: 55.00     Pack years: 27.50    Smokeless tobacco: Never Used    Tobacco comment: 6-7 cigs/day   Substance and Sexual Activity    Alcohol use: Yes     Comment: 2-3 glasses of whiskey every evening    Drug use: No    Sexual activity: Not Currently   Lifestyle    Physical activity:     Days per week: Not on file     Minutes per session: Not on file    Stress: Not on file   Relationships    Social connections:     Talks on phone: Not on file     Gets together: Not on file     Attends Hindu service: Not on file     Active member of club or organization: Not on file     Attends meetings of clubs or organizations: Not on file     Relationship status: Not on file   Other Topics Concern    Not on file   Social History Narrative     2019;6 kids live nearby             Review of Systems  Cardiovascular: no chest pain  Chest: no shortness of breath  Abd: no abd pain  Remainder review of systems negative    Objective:      Physical Exam   Constitutional: He is oriented to person, place, and time. He appears well-developed and well-nourished.   HENT:   Head: Normocephalic and atraumatic.   Neck: Normal range of motion. Neck supple. Carotid bruit is not present.   Cardiovascular: Normal rate and regular rhythm.   Pulmonary/Chest: Effort normal and breath sounds normal.   Lymphadenopathy:     He has no cervical adenopathy.   Neurological: He is alert and oriented to person, place, and time.   Skin: Skin is warm and dry.   Psychiatric: He has a normal mood and affect. His behavior is normal. Judgment normal.   Nursing note and vitals reviewed.      Assessment:       1. Moderate COPD (chronic obstructive pulmonary disease)    2. Coronary artery disease involving native coronary artery of native heart without angina pectoris    3. Essential hypertension    4. Statin intolerance      JESSE  Plan:       **  f/u hemat, pulm. , hemonc, pain mgmt and throat  specialists when due  F/u 6 months    He defers digital med today    Shingrix new shingles vaccine  via a pharmacy

## 2019-10-10 ENCOUNTER — TELEPHONE (OUTPATIENT)
Dept: INTERNAL MEDICINE | Facility: CLINIC | Age: 74
End: 2019-10-10

## 2019-10-10 NOTE — TELEPHONE ENCOUNTER
S/w pt's dtr Ana Rosa stating pt is being transferred from [facility] in [place] back to [place], and they wanted to know how to progress to [place]. Advised dtr thru [facility] to [facility] transfers are coordinated thru [facility] Transfer Team P#[phone]. Dtr voiced understanding and confirmed to CB PRN.

## 2019-10-10 NOTE — TELEPHONE ENCOUNTER
----- Message from Chica Ferreira sent at 10/10/2019  2:19 PM CDT -----  Contact: pt daughter  .Type:  Needs Medical Advice    Who Called: Ana Rosa Matthews Daughter  Symptoms (please be specific):   How long has patient had these symptoms:   Pharmacy name and phone #:   Would the patient rather a call back or a response via [facility]? call  Best Call Back Number: .[phone]  Additional Information: caller is requesting a call back from the nurse in regards to the pt being in the [facility] with 80-90% blockage

## 2019-10-10 NOTE — TELEPHONE ENCOUNTER
----- Message from Iris Agarwal sent at 10/10/2019  3:25 PM CDT -----  Contact: Daughter-Ana Rosa  .Type:  Patient Returning Call    Who Called: Ana Rosa- patient's daughter  Who Left Message for Patient:Ivan  Does the patient know what this is regarding?  Would the patient rather a call back or a response via [facility]? Call  Best Call Back Number:[phone]  Additional Information:

## 2019-10-11 ENCOUNTER — TELEPHONE (OUTPATIENT)
Dept: CARDIOLOGY | Facility: CLINIC | Age: 74
End: 2019-10-11

## 2019-10-11 NOTE — TELEPHONE ENCOUNTER
Patient daughter Ana Rosa called to advise that patient had Heart Cath done, and was being transported to [facility], hopefully today.

## 2019-10-17 ENCOUNTER — OFFICE VISIT (OUTPATIENT)
Dept: CARDIOLOGY | Facility: CLINIC | Age: 74
End: 2019-10-17
Payer: MEDICARE

## 2019-10-17 VITALS
HEIGHT: 68 IN | WEIGHT: 139.75 LBS | HEART RATE: 60 BPM | OXYGEN SATURATION: 97 % | SYSTOLIC BLOOD PRESSURE: 118 MMHG | DIASTOLIC BLOOD PRESSURE: 56 MMHG | BODY MASS INDEX: 21.18 KG/M2

## 2019-10-17 DIAGNOSIS — D64.9 CHRONIC ANEMIA: ICD-10-CM

## 2019-10-17 DIAGNOSIS — I65.21 STENOSIS OF RIGHT CAROTID ARTERY: ICD-10-CM

## 2019-10-17 DIAGNOSIS — I25.10 CORONARY ARTERY DISEASE INVOLVING NATIVE CORONARY ARTERY OF NATIVE HEART WITHOUT ANGINA PECTORIS: Primary | ICD-10-CM

## 2019-10-17 DIAGNOSIS — I50.22 CHRONIC SYSTOLIC CONGESTIVE HEART FAILURE: ICD-10-CM

## 2019-10-17 DIAGNOSIS — Z78.9 STATIN INTOLERANCE: ICD-10-CM

## 2019-10-17 DIAGNOSIS — G47.33 OSA ON CPAP: Chronic | ICD-10-CM

## 2019-10-17 DIAGNOSIS — I10 ESSENTIAL HYPERTENSION: ICD-10-CM

## 2019-10-17 DIAGNOSIS — J44.9 MODERATE COPD (CHRONIC OBSTRUCTIVE PULMONARY DISEASE): Chronic | ICD-10-CM

## 2019-10-17 DIAGNOSIS — E78.2 MIXED HYPERLIPIDEMIA: ICD-10-CM

## 2019-10-17 PROCEDURE — 99999 PR PBB SHADOW E&M-EST. PATIENT-LVL III: CPT | Mod: PBBFAC,,, | Performed by: INTERNAL MEDICINE

## 2019-10-17 PROCEDURE — 99213 OFFICE O/P EST LOW 20 MIN: CPT | Mod: PBBFAC | Performed by: INTERNAL MEDICINE

## 2019-10-17 PROCEDURE — 99215 PR OFFICE/OUTPT VISIT, EST, LEVL V, 40-54 MIN: ICD-10-PCS | Mod: S$PBB,,, | Performed by: INTERNAL MEDICINE

## 2019-10-17 PROCEDURE — 99999 PR PBB SHADOW E&M-EST. PATIENT-LVL III: ICD-10-PCS | Mod: PBBFAC,,, | Performed by: INTERNAL MEDICINE

## 2019-10-17 PROCEDURE — 99215 OFFICE O/P EST HI 40 MIN: CPT | Mod: S$PBB,,, | Performed by: INTERNAL MEDICINE

## 2019-10-17 RX ORDER — LISINOPRIL 5 MG/1
5 TABLET ORAL DAILY
COMMUNITY
End: 2019-10-24 | Stop reason: SDUPTHER

## 2019-10-17 RX ORDER — ATORVASTATIN CALCIUM 80 MG/1
80 TABLET, FILM COATED ORAL DAILY
COMMUNITY
End: 2019-10-24 | Stop reason: SDUPTHER

## 2019-10-17 RX ORDER — CLOPIDOGREL BISULFATE 75 MG/1
75 TABLET ORAL DAILY
COMMUNITY
End: 2019-10-24 | Stop reason: SDUPTHER

## 2019-10-17 RX ORDER — AMIODARONE HYDROCHLORIDE 400 MG/1
400 TABLET ORAL DAILY
Status: ON HOLD | COMMUNITY
End: 2019-10-27 | Stop reason: HOSPADM

## 2019-10-17 RX ORDER — SPIRONOLACTONE 25 MG/1
25 TABLET ORAL DAILY
COMMUNITY
End: 2019-10-24 | Stop reason: SDUPTHER

## 2019-10-17 RX ORDER — CARVEDILOL 12.5 MG/1
12.5 TABLET ORAL 2 TIMES DAILY WITH MEALS
COMMUNITY
End: 2019-10-24 | Stop reason: SDUPTHER

## 2019-10-17 NOTE — PROGRESS NOTES
Subjective:   Patient ID:  Noble Tripp is a 74 y.o. male who presents for follow up of Coronary Artery Disease (hospital f/u)      73 yo, male, came in for severe multiple vessel CAD.  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   10- admitted to [facility] for sepsis/PNA and PSVT on . EKG showed extensive St depression of 1 mm on anterolateral leads. EF 60%. MPi showed fixed inferior perfusion defect. cTn was up to 0.3. PSVT was converted to sinus O/N.   s/p left thoracentesis d/t pleural effusion.  Echo in : normal EF, DD.  MPI in :  A small to moderate size fixed defect of moderate to severe intensity that extends from the apical to the base inferior wall of the left ventricle.  EKG on 10-: PSVT, st depression on V3 to V6, I, II and avL.   admitted for CHF and COPD exacerbation. ECHO showed normal EF and BNP 1800, troponin 0.05 and flat. Rx with IV lasix and breathing O2. D/c with lasix 20 mg po daily   admitted for [facility] for worsening SOB. Troponin up to 0.3. ECHo showed EF 35%, LHC showed severe multivessel CAD. Had CVT evaluation and was not cabg candidate due to high risk. Also high risk PCI. Pt was discharged for medical Rx and PCI as op. Discharged with home O2. Om amiodarone 400 mg daily for nonsustained VT in the hospital.  Today, states that breathing is stable. No chest pain, palpitation and dizziness. On home o2.        Past Medical History:   Diagnosis Date    Adrenal adenoma     david;nl fxn w/u;tran 11/18    Aspiration pneumonia 10/15    puree/honey    Benign prostate hyperplasia     CAD (coronary artery disease)     dr padilla    Chronic pain     dr santos    Chronic systolic congestive heart failure 10/17/2019    COPD (chronic obstructive pulmonary disease)     papi    Ex-smoker      "11/13    Hyperlipidemia     JUANITO on CPAP     cpap    Osteopenia 1/15 tran 1/18    PSVT (paroxysmal supraventricular tachycardia)     PVD (peripheral vascular disease)     noobs 07 khuri    Pyriform sinus cancer     dr ponce radiation 1/2-14 dr king perales    Squamous cell carcinoma of skin     roberto    Tongue cancer     "superficial" removed 11/14    Vocal cord cancer 2011    Xerostomia     radiation       Past Surgical History:   Procedure Laterality Date    APPENDECTOMY      BRONCHOSCOPY Bilateral 2/5/2019    Procedure: Bronchoscopy;  Surgeon: Santos Cardozo MD;  Location: [facility];  Service: Endoscopy;  Laterality: Bilateral;    COLONOSCOPY N/A 5/1/2017    Procedure: Due for screening colonoscopy;  Surgeon: Anushka Yoder MD;  Location: [facility];  Service: Endoscopy;  Laterality: N/A;    ESOPHAGOGASTRODUODENOSCOPY N/A 5/29/2018    Procedure: ESOPHAGOGASTRODUODENOSCOPY (EGD);  Surgeon: Audie Hill III, MD;  Location: [facility];  Service: Endoscopy;  Laterality: N/A;    ESOPHAGOGASTRODUODENOSCOPY N/A 8/29/2018    Procedure: ESOPHAGOGASTRODUODENOSCOPY (EGD);  Surgeon: Audie Hill III, MD;  Location: [facility];  Service: Endoscopy;  Laterality: N/A;    THORACENTESIS Left 8/7/2018    Procedure: Thoracentesis;  Surgeon: Santos Cardozo MD;  Location: [facility];  Service: Endoscopy;  Laterality: Left;    THORACENTESIS Right 2/25/2019    Procedure: Thoracentesis;  Surgeon: Santos Cardozo MD;  Location: [facility];  Service: Endoscopy;  Laterality: Right;    tongue cancer excision  11/14    dr vitale    VOCAL CORD LATERALIZATION, ENDOSCOPIC APPROACH W/ MORENA ponce       Social History     Tobacco Use    Smoking status: Current Every Day Smoker     Packs/day: 0.50     Years: 55.00     Pack years: 27.50    Smokeless tobacco: Never Used    Tobacco comment: 6-7 cigs/day   Substance Use Topics    Alcohol use: Yes     Comment: 2-3 glasses of whiskey every evening    Drug " use: No       Family History   Problem Relation Age of Onset    Cancer Father     Cancer Brother          Review of Systems   Constitution: Positive for malaise/fatigue. Negative for decreased appetite, diaphoresis, fever and night sweats.   HENT: Negative for nosebleeds.    Eyes: Negative for blurred vision and double vision.   Cardiovascular: Negative for chest pain, claudication, dyspnea on exertion, irregular heartbeat, leg swelling, near-syncope, orthopnea, palpitations, paroxysmal nocturnal dyspnea and syncope.   Respiratory: Positive for cough. Negative for shortness of breath, sleep disturbances due to breathing, snoring, sputum production and wheezing.    Endocrine: Negative for cold intolerance and polyuria.   Hematologic/Lymphatic: Does not bruise/bleed easily.   Skin: Negative for rash.   Musculoskeletal: Negative for back pain, falls, joint pain, joint swelling and neck pain.   Gastrointestinal: Negative for abdominal pain, heartburn, nausea and vomiting.   Genitourinary: Negative for dysuria, frequency and hematuria.   Neurological: Negative for difficulty with concentration, dizziness, focal weakness, headaches, light-headedness, numbness, seizures and weakness.   Psychiatric/Behavioral: Negative for depression, memory loss and substance abuse. The patient does not have insomnia.    Allergic/Immunologic: Negative for HIV exposure and hives.       Objective:   Physical Exam   Constitutional: He is oriented to person, place, and time. He appears well-nourished.   HENT:   Head: Normocephalic.   Eyes: Pupils are equal, round, and reactive to light.   Neck: Normal carotid pulses and no JVD present. Carotid bruit is not present. No thyromegaly present.   Cardiovascular: Normal rate, regular rhythm, normal heart sounds and normal pulses.  No extrasystoles are present. PMI is not displaced. Exam reveals no gallop and no S3.   No murmur heard.  Pulses:       Carotid pulses are on the right side with  bruit.  Pulmonary/Chest: Breath sounds normal. No stridor. No respiratory distress.   Abdominal: Soft. Bowel sounds are normal. There is no tenderness. There is no rebound.   Musculoskeletal: Normal range of motion.   Neurological: He is alert and oriented to person, place, and time.   Skin: Skin is intact. No rash noted.   Psychiatric: His behavior is normal.       Lab Results   Component Value Date    CHOL 77 (L) 02/06/2019    CHOL 98 (L) 08/13/2018    CHOL 204 (H) 11/14/2017     Lab Results   Component Value Date    HDL 44 02/06/2019    HDL 43 08/13/2018    HDL 43 11/14/2017     Lab Results   Component Value Date    LDLCALC 25.0 (L) 02/06/2019    LDLCALC 39.8 (L) 08/13/2018    LDLCALC 147.8 11/14/2017     Lab Results   Component Value Date    TRIG 40 02/06/2019    TRIG 76 08/13/2018    TRIG 66 11/14/2017     Lab Results   Component Value Date    CHOLHDL 57.1 (H) 02/06/2019    CHOLHDL 43.9 08/13/2018    CHOLHDL 21.1 11/14/2017       Chemistry        Component Value Date/Time     09/15/2019 0505    K 3.5 09/15/2019 0505     09/15/2019 0505    CO2 32 (H) 09/15/2019 0505    BUN 15 09/15/2019 0505    CREATININE 0.8 09/15/2019 0505     (H) 09/15/2019 0505        Component Value Date/Time    CALCIUM 8.5 (L) 09/15/2019 0505    ALKPHOS 73 09/13/2019 1844    AST 16 09/13/2019 1844    ALT 6 (L) 09/13/2019 1844    BILITOT 0.4 09/13/2019 1844    ESTGFRAFRICA >60 09/15/2019 0505    EGFRNONAA >60 09/15/2019 0505          Lab Results   Component Value Date    HGBA1C 5.4 02/06/2019     Lab Results   Component Value Date    TSH 1.259 02/06/2019     Lab Results   Component Value Date    INR 1.1 10/10/2015    INR 1.0 10/10/2015    INR 1.0 07/26/2005     Lab Results   Component Value Date    WBC 10.76 09/15/2019    HGB 12.9 (L) 09/15/2019    HCT 39.1 (L) 09/15/2019    MCV 99 (H) 09/15/2019     09/15/2019     BMP  Sodium   Date Value Ref Range Status   09/15/2019 139 136 - 145 mmol/L Final     Potassium    Date Value Ref Range Status   09/15/2019 3.5 3.5 - 5.1 mmol/L Final     Chloride   Date Value Ref Range Status   09/15/2019 100 95 - 110 mmol/L Final     CO2   Date Value Ref Range Status   09/15/2019 32 (H) 23 - 29 mmol/L Final     BUN, Bld   Date Value Ref Range Status   09/15/2019 15 8 - 23 mg/dL Final     Creatinine   Date Value Ref Range Status   09/15/2019 0.8 0.5 - 1.4 mg/dL Final     Calcium   Date Value Ref Range Status   09/15/2019 8.5 (L) 8.7 - 10.5 mg/dL Final     Anion Gap   Date Value Ref Range Status   09/15/2019 7 (L) 8 - 16 mmol/L Final     eGFR if    Date Value Ref Range Status   09/15/2019 >60 >60 mL/min/1.73 m^2 Final     eGFR if non    Date Value Ref Range Status   09/15/2019 >60 >60 mL/min/1.73 m^2 Final     Comment:     Calculation used to obtain the estimated glomerular filtration  rate (eGFR) is the CKD-EPI equation.        BNP  @LABRCNTIP(BNP,BNPTRIAGEBLO)@  @LABRCNTIP(troponini)@  CrCl cannot be calculated (Patient's most recent lab result is older than the maximum 7 days allowed.).  No results found in the last 24 hours.  No results found in the last 24 hours.  No results found in the last 24 hours.    Assessment:      1. Coronary artery disease involving native coronary artery of native heart without angina pectoris    2. Chronic systolic congestive heart failure    3. Stenosis of right carotid artery    4. Essential hypertension    5. Mixed hyperlipidemia    6. Moderate COPD (chronic obstructive pulmonary disease)    7. Chronic anemia    8. JUANITO on CPAP    9. Statin intolerance        Plan:   Lengthy discussed the strategy for the management of his multivessel CAD and CHF  Continue lasix 40 mf daily ASA 81mg, Plavix 75mg, ACEI, coreg, aldactone, repatha and Lipitor  Will ask Dr. Beltran to review cath imaging  Daily weight. Please call the office if gain 3 pounds in 1 day or 5 pounds in 1 week.  Fluid restriction 1.5 liters a day  Na< 2 gm  RTC as  needed  If recurrent SOB or chest pain, go to ER.

## 2019-10-18 ENCOUNTER — TELEPHONE (OUTPATIENT)
Dept: CARDIOLOGY | Facility: CLINIC | Age: 74
End: 2019-10-18

## 2019-10-18 DIAGNOSIS — I25.10 CORONARY ARTERY DISEASE INVOLVING NATIVE CORONARY ARTERY OF NATIVE HEART WITHOUT ANGINA PECTORIS: Primary | ICD-10-CM

## 2019-10-18 NOTE — TELEPHONE ENCOUNTER
----- Message from Charu Yin sent at 10/18/2019  8:45 AM CDT -----  Contact: pt  He's calling in regards to speak with nurse ,      pls call pt back at [phone]

## 2019-10-18 NOTE — TELEPHONE ENCOUNTER
Dr. padilla called the pt about scheduleing a PET scan in [place]. Pt states the problem is he is on home oxygen and he is worried that he will not be able to take enough oxygen to get to [place]. He is able to get to [facility] taking a half tank of oxygen. Is it possible to say that he would need an ambulance ride down to [place] and back so it will be covered by insurance?     Nahid verbally stated he could get a ride.

## 2019-10-21 ENCOUNTER — TELEPHONE (OUTPATIENT)
Dept: CARDIOLOGY | Facility: CLINIC | Age: 74
End: 2019-10-21

## 2019-10-21 NOTE — TELEPHONE ENCOUNTER
----- Message from Isabel Pedro sent at 10/21/2019  8:21 AM CDT -----  Contact: Ms. Oseguera-Daughter  Daughter states imaging test is currently scheduled for 11/14/2019. She believed Dr. Whitfield requested an earlier appointment.Please call back at [phone].        Thanks,  Isabel Pedro

## 2019-10-21 NOTE — TELEPHONE ENCOUNTER
Patient daughter informed Dr. Whitfield to discuss with Dr. Beltran related to wait time and will notifiy of response.    Daughter verbalized understanding.

## 2019-10-21 NOTE — TELEPHONE ENCOUNTER
Returned call. Spoke with daughter, who wanted to requested earlier appointment for Cardiology PET Imaging in [place].   Appt scheduled for 11/14/2019 and to know if Dr. Whitfield aware of scheduled date of PET.    Daughter was instructed of the [phone] to call and try to get rescheduled to earlier date and possibly be placed on a cancellation wait list.    Daughter to await Dr. Whitfield response.

## 2019-10-22 ENCOUNTER — TELEPHONE (OUTPATIENT)
Dept: CARDIOLOGY | Facility: CLINIC | Age: 74
End: 2019-10-22

## 2019-10-22 NOTE — PROGRESS NOTES
Subjective:       Patient ID: Noble Tripp is a 74 y.o. male.  Patient Active Problem List   Diagnosis    Thoracic or lumbosacral neuritis or radiculitis, unspecified    Neuropathy    Benign prostate hyperplasia    Hyperlipidemia    Osteopenia    Hypertension    Chronic pain    History of head and neck cancer    Personal history of colonic polyps    JUANITO on CPAP    Cancer of tongue    Adrenal benign neoplasm    Hypomagnesemia    Oropharyngeal aspiration    Stage 3 severe COPD by GOLD classification    Coronary artery disease involving native coronary artery of native heart without angina pectoris    Allergy to statin medication    Statin intolerance    History of iron deficiency    Chronic anemia    Pleural effusion, bilateral    Esophagitis    Lung mass    Carotid artery disease    Recurrent right pleural effusion    Chronic systolic congestive heart failure    On home oxygen therapy    Weight decreasing    Cardiac left ventricular ejection fraction 21-40 percent    Coronary artery disease involving left main coronary artery     Immunization History   Administered Date(s) Administered    Influenza 12/10/2007, 10/14/2008, 09/18/2009, 10/19/2010, 10/06/2011    Influenza - High Dose - PF (65 years and older) 12/13/2012, 10/02/2013, 10/07/2014, 10/10/2015, 10/31/2016, 10/17/2017, 11/19/2018, 09/15/2019    Influenza - Trivalent (ADULT) 12/10/2007, 09/18/2009, 10/06/2011    Influenza A (H1N1) 2009 Monovalent - IM - PF 01/22/2010    Influenza Split 12/10/2007, 09/18/2009, 10/06/2011    Pneumococcal 10/19/2010    Pneumococcal Conjugate - 13 Valent 10/10/2015    Pneumococcal Polysaccharide - 23 Valent 12/13/2017    Tdap 01/22/2010    Zoster 01/25/2011     Social History     Tobacco Use   Smoking Status Current Every Day Smoker    Packs/day: 0.50    Years: 55.00    Pack years: 27.50   Smokeless Tobacco Never Used   Tobacco Comment    6-7 cigs/day       Chief Complaint: COPD;  "Sleep Apnea; and recent pneumonia    Noble Tripp is 74 y.o.   LV was 2019  Spouse .  Here with grand daughter: recently in [place]: @ [facility]: and another local hospital  10/08 to 10/16 and 10/05 to 10/08  Treated Vanc/Zosyn discharged on Levaquin and now on Oxygen 2 LPM  LHC, multivessel CAD, medical mx, EF down to 20%  Records care everywhere reviewed  Still weak: granddaughter present, daughter on speaker phone   Known Chronic aspiration, declined hospital bed and or PEG for nutrition in past.   Now asks if can get PEG, Lost weight from 160lb to 145Lb. Bruising easily, on plavix.  Will ref to GI to discuss this , encourage to get back on Speech therapy, Aspiration precautions, Agreed to try hospital bed, use thickner in food.  Reviewed his CXR today, effusion impproved, prior infiltrates better.  Spirometrry: FEV1 declined from 1.87L to 1.29L, FVC declined from 2.72L to 2.31L  Has CPAP using and benefits..  Frustrated about weak : poor nutrition,   Images reveiwed  Will follow with ENT          Review of Systems   Constitutional: Positive for fatigue and weakness.   HENT: Positive for trouble swallowing.    Eyes: Negative.    Respiratory: Negative for snoring, sputum production, shortness of breath, wheezing and dyspnea on extertion.    Cardiovascular: Negative.    Genitourinary: Negative.    Endocrine: endocrine negative   Musculoskeletal: Negative.    Skin: Negative.    Gastrointestinal: Negative.    Psychiatric/Behavioral: Negative.        Objective:       Vitals:    10/23/19 0924   BP: (!) 116/56   Pulse: (!) 47   Resp: 18   SpO2: 100%  Comment: 3 liters   Weight: 66.1 kg (145 lb 11.6 oz)   Height: 5' 7.32" (1.71 m)     Physical Exam   Constitutional: He is oriented to person, place, and time. Vital signs are normal. He has a sickly appearance. He does not appear ill. No distress. Nasal cannula in place.     He is not obese.   HENT:   Head: Normocephalic.   Nose: Nose normal.   Neck: " Normal range of motion. Neck supple.   Cardiovascular: Normal rate, regular rhythm and normal heart sounds.   No murmur heard.  Pulmonary/Chest: Normal expansion and effort normal. He has decreased breath sounds in the right middle field, the right lower field and the left lower field. He has no wheezes. He has no rhonchi. Negative for tactile fremitus.   Abdominal: Bowel sounds are normal.   Musculoskeletal: Normal range of motion. He exhibits no edema.   Lymphadenopathy:     He has no cervical adenopathy.   Neurological: He is alert and oriented to person, place, and time. He has normal reflexes. No cranial nerve deficit.   Skin: Skin is warm and dry.   Psychiatric: He has a normal mood and affect.   Nursing note and vitals reviewed.    Personal Diagnostic Review   SPIROMETRY  FEV1: 1.29L ( 45.0%), FVC 2.31L( 60.8%)  FEV1/FVC 56.18    FEV1 and FVC declined       Echo  There is mild left ventricular eccentric hypertrophy.  · Left Ventricular ejection fraction is 35 - 40%.  · The left ventricle chamber size is severely enlarged.  · Left ventricular systolic function is severely impaired.  · Spectral Doppler shows impaired relaxation pattern of left ventricular   diastolic filling.  · Left atrium size is mildly dilated.  · Mild-moderate tricuspid valve regurgitation.    [facility]  Non ST elevation myocardial infarction    Informed consent was obtain.  Patient brought to the cath lab. Moderate sedation provided.   Right groin infiltrated with with Xylocaine 2%.   An attempt was made to access the right common femoral artery which was   unsuccessful.     Ultrasound probe was taken right common femoral, superficial, profunda   were identified.Ultrasound guided one single anterior wall puncture was   perform to the femoral artery with a micropuncture system. A 6 [place]   sheath was place in.    A 6 [place]undian pig tail, JL4 and 3 DRC catheter were use.     Findings  Left ventricle:  Ejection fraction 25%  LVEDP 25 mmHg   Systolic  pressure was 150 mmHg  There is no gradient across the aortic valve.    Coronaries:  Left main artery short and patent   Left anterior descending artery has mid 80% david 1,1,1 stenosis at anthony   D2, and 40- 50% diffuse mid stenosis  First Diagonal small and Patent  Secnod diagonal has 60-70% david 1,1,1 stenosis at its origin from LAD   Circumflex  has 30% stenosis in its  mid segment  OM branch patent.  Right coronary artery has diffuse 80 to 90%% stenosis from ts prox to  Mid   to distal segment   PDA and PLV patent, filling mostly from left to right septal collaterals   from LAD and faintly from antegrade flow from the right coronary artery        Right femoral angiography and Mynx closure device applied for hemostasis.      Physician supervised moderate sedation with IV versed and fentanyl for 76    minutes    Continuous monitoring on the pulse oximetry, blood pressure, heart rate,   patient consciousness level and patient pain level was performed by   independent RN    Patient tolerated the procedure well  Assessment:       Problem List Items Addressed This Visit     Hypertension    Relevant Orders    Ambulatory Referral to Pulmonary Disease Management    HOSPITAL BED FOR HOME USE    Hyperlipidemia    Relevant Orders    Ambulatory Referral to Pulmonary Disease Management    HOSPITAL BED FOR HOME USE    JUANITO on CPAP - Primary (Chronic)    Relevant Orders    Ambulatory Referral to Pulmonary Disease Management    HOSPITAL BED FOR HOME USE    Oropharyngeal aspiration     Needs speech therapy         Relevant Orders    Ambulatory Referral to Nutrition - [facility]    Ambulatory Referral to Pulmonary Disease Management    HOSPITAL BED FOR HOME USE    Stage 3 severe COPD by GOLD classification    Relevant Medications    arformoterol (BROVANA) 15 mcg/2 mL Nebu    Other Relevant Orders    X-Ray Chest PA And Lateral    Spirometry without Bronchodilator    Stress test, pulmonary    [facility]    Ambulatory Referral to Pulmonary Disease Management    HOSPITAL BED FOR HOME USE    On home oxygen therapy    Relevant Orders    Ambulatory Referral to Pulmonary Disease Management    HOSPITAL BED FOR HOME USE    Weight decreasing    Relevant Orders    Ambulatory Referral to Pulmonary Disease Management    HOSPITAL BED FOR HOME USE    Cardiac left ventricular ejection fraction 21-40 percent    Relevant Orders    HOSPITAL BED FOR HOME USE    Coronary artery disease involving left main coronary artery     Follow with cardiology             Plan:       Hospital bed :    Adherence  With speech therapy and nutrition  Adherence with CPAP  Follow-up x-ray and spirometry in 3 months  Nutrition improvement.  Continue current inhaler regimen rescue albuterol and Spiriva HFA. Added Arformoterol via nebulizer    Follow up in about 3 months (around 1/23/2020), or follow up Dr Hill, Added dali Marin, 6mwd, , for Needs Proxy access for [facility] Pulmonary disease management, hospital bed, .    This note was prepared using voice recognition system and is likely to have sound alike errors that may have been overlooked even after proof reading.  Please call me with any questions    Discussed diagnosis, its evaluation, treatment and usual course. All questions answered.    Thank you for the courtesy of participating in the care of this patient    Santos Cardozo MD

## 2019-10-23 ENCOUNTER — OFFICE VISIT (OUTPATIENT)
Dept: PULMONOLOGY | Facility: CLINIC | Age: 74
End: 2019-10-23
Payer: MEDICARE

## 2019-10-23 ENCOUNTER — HOSPITAL ENCOUNTER (OUTPATIENT)
Dept: RADIOLOGY | Facility: HOSPITAL | Age: 74
Discharge: HOME | End: 2019-10-23
Attending: INTERNAL MEDICINE
Payer: MEDICARE

## 2019-10-23 ENCOUNTER — CLINICAL SUPPORT (OUTPATIENT)
Dept: PULMONOLOGY | Facility: CLINIC | Age: 74
End: 2019-10-23
Payer: MEDICARE

## 2019-10-23 VITALS
OXYGEN SATURATION: 100 % | HEIGHT: 67 IN | WEIGHT: 145.75 LBS | RESPIRATION RATE: 18 BRPM | SYSTOLIC BLOOD PRESSURE: 116 MMHG | HEART RATE: 47 BPM | DIASTOLIC BLOOD PRESSURE: 56 MMHG | BODY MASS INDEX: 22.88 KG/M2

## 2019-10-23 DIAGNOSIS — R63.4 WEIGHT DECREASING: ICD-10-CM

## 2019-10-23 DIAGNOSIS — I25.10 CORONARY ARTERY DISEASE INVOLVING LEFT MAIN CORONARY ARTERY: ICD-10-CM

## 2019-10-23 DIAGNOSIS — J44.9 MODERATE COPD (CHRONIC OBSTRUCTIVE PULMONARY DISEASE): ICD-10-CM

## 2019-10-23 DIAGNOSIS — I10 ESSENTIAL HYPERTENSION: ICD-10-CM

## 2019-10-23 DIAGNOSIS — I50.22 CHRONIC SYSTOLIC HEART FAILURE: Primary | ICD-10-CM

## 2019-10-23 DIAGNOSIS — G47.33 OSA ON CPAP: Primary | Chronic | ICD-10-CM

## 2019-10-23 DIAGNOSIS — T17.208S: ICD-10-CM

## 2019-10-23 DIAGNOSIS — I25.10 ATHEROSCLEROSIS OF NATIVE CORONARY ARTERY OF NATIVE HEART WITHOUT ANGINA PECTORIS: ICD-10-CM

## 2019-10-23 DIAGNOSIS — I50.42 CHRONIC COMBINED SYSTOLIC AND DIASTOLIC HEART FAILURE: ICD-10-CM

## 2019-10-23 DIAGNOSIS — J44.9 STAGE 3 SEVERE COPD BY GOLD CLASSIFICATION: ICD-10-CM

## 2019-10-23 DIAGNOSIS — E78.00 PURE HYPERCHOLESTEROLEMIA: ICD-10-CM

## 2019-10-23 DIAGNOSIS — Z99.81 ON HOME OXYGEN THERAPY: ICD-10-CM

## 2019-10-23 DIAGNOSIS — R94.30 CARDIAC LEFT VENTRICULAR EJECTION FRACTION 21-40 PERCENT: ICD-10-CM

## 2019-10-23 DIAGNOSIS — J44.9 MODERATE COPD (CHRONIC OBSTRUCTIVE PULMONARY DISEASE): Chronic | ICD-10-CM

## 2019-10-23 PROCEDURE — 94010 BREATHING CAPACITY TEST: CPT | Mod: 26,S$PBB,, | Performed by: INTERNAL MEDICINE

## 2019-10-23 PROCEDURE — 99214 PR OFFICE/OUTPT VISIT, EST, LEVL IV, 30-39 MIN: ICD-10-PCS | Mod: 25,S$PBB,, | Performed by: INTERNAL MEDICINE

## 2019-10-23 PROCEDURE — 71046 X-RAY EXAM CHEST 2 VIEWS: CPT | Mod: TC

## 2019-10-23 PROCEDURE — 94010 BREATHING CAPACITY TEST: CPT | Mod: PBBFAC

## 2019-10-23 PROCEDURE — 71046 XR CHEST PA AND LATERAL: ICD-10-PCS | Mod: 26,,, | Performed by: RADIOLOGY

## 2019-10-23 PROCEDURE — 99214 OFFICE O/P EST MOD 30 MIN: CPT | Mod: PBBFAC,25 | Performed by: INTERNAL MEDICINE

## 2019-10-23 PROCEDURE — 94010 BREATHING CAPACITY TEST: ICD-10-PCS | Mod: 26,S$PBB,, | Performed by: INTERNAL MEDICINE

## 2019-10-23 PROCEDURE — 71046 X-RAY EXAM CHEST 2 VIEWS: CPT | Mod: 26,,, | Performed by: RADIOLOGY

## 2019-10-23 PROCEDURE — 99214 OFFICE O/P EST MOD 30 MIN: CPT | Mod: 25,S$PBB,, | Performed by: INTERNAL MEDICINE

## 2019-10-23 PROCEDURE — 99999 PR PBB SHADOW E&M-EST. PATIENT-LVL IV: CPT | Mod: PBBFAC,,, | Performed by: INTERNAL MEDICINE

## 2019-10-23 PROCEDURE — 99999 PR PBB SHADOW E&M-EST. PATIENT-LVL IV: ICD-10-PCS | Mod: PBBFAC,,, | Performed by: INTERNAL MEDICINE

## 2019-10-23 RX ORDER — ARFORMOTEROL TARTRATE 15 UG/2ML
15 SOLUTION RESPIRATORY (INHALATION) 2 TIMES DAILY
Qty: 120 ML | Refills: 11 | Status: SHIPPED | OUTPATIENT
Start: 2019-10-23 | End: 2020-02-05

## 2019-10-23 NOTE — TELEPHONE ENCOUNTER
Telephoned patient after contacting [facility] Cardiac PET Scan Dept and rescheduling test to 11/7/19 for 130PM.  Reviewed instructions and answered all questions

## 2019-10-24 ENCOUNTER — TELEPHONE (OUTPATIENT)
Dept: CARDIOLOGY | Facility: CLINIC | Age: 74
End: 2019-10-24

## 2019-10-24 DIAGNOSIS — I25.10 CORONARY ARTERY DISEASE INVOLVING NATIVE CORONARY ARTERY OF NATIVE HEART WITHOUT ANGINA PECTORIS: Primary | ICD-10-CM

## 2019-10-24 DIAGNOSIS — I50.22 CHRONIC SYSTOLIC CONGESTIVE HEART FAILURE: ICD-10-CM

## 2019-10-24 LAB
BRPFT: ABNORMAL
FEF 25 75 LLN: 0.9
FEF 25 75 PRE REF: 23.4 %
FEF 25 75 REF: 2.15
FEV1 FVC LLN: 62
FEV1 FVC PRE REF: 73.9 %
FEV1 FVC REF: 76
FEV1 LLN: 2.03
FEV1 PRE REF: 45.5 %
FEV1 REF: 2.84
FVC LLN: 2.76
FVC PRE REF: 61.3 %
FVC REF: 3.76
PEF LLN: 5.31
PEF PRE REF: 55.2 %
PEF REF: 7.46
PRE FEF 25 75: 0.5 L/S (ref 0.9–3.41)
PRE FET 100: 7.69 SEC
PRE FEV1 FVC: 56.18 % (ref 62.07–89.92)
PRE FEV1: 1.29 L (ref 2.03–3.66)
PRE FVC: 2.31 L (ref 2.76–4.75)
PRE PEF: 4.12 L/S (ref 5.31–9.6)

## 2019-10-24 RX ORDER — CARVEDILOL 12.5 MG/1
12.5 TABLET ORAL 2 TIMES DAILY WITH MEALS
Qty: 60 TABLET | Refills: 11 | Status: ON HOLD | OUTPATIENT
Start: 2019-10-24 | End: 2019-10-27 | Stop reason: HOSPADM

## 2019-10-24 RX ORDER — CLOPIDOGREL BISULFATE 75 MG/1
75 TABLET ORAL DAILY
Qty: 90 TABLET | Refills: 3 | Status: SHIPPED | OUTPATIENT
Start: 2019-10-24 | End: 2019-11-13 | Stop reason: SDUPTHER

## 2019-10-24 RX ORDER — ATORVASTATIN CALCIUM 80 MG/1
80 TABLET, FILM COATED ORAL DAILY
Qty: 90 TABLET | Refills: 3 | Status: SHIPPED | OUTPATIENT
Start: 2019-10-24 | End: 2019-11-12 | Stop reason: SDUPTHER

## 2019-10-24 RX ORDER — LISINOPRIL 5 MG/1
5 TABLET ORAL DAILY
Qty: 90 TABLET | Refills: 3 | Status: ON HOLD | OUTPATIENT
Start: 2019-10-24 | End: 2019-10-27 | Stop reason: HOSPADM

## 2019-10-24 RX ORDER — SPIRONOLACTONE 25 MG/1
25 TABLET ORAL DAILY
Qty: 60 TABLET | Refills: 11 | Status: ON HOLD | OUTPATIENT
Start: 2019-10-24 | End: 2019-10-27 | Stop reason: HOSPADM

## 2019-10-24 RX ORDER — FUROSEMIDE 20 MG/1
TABLET ORAL
Qty: 30 TABLET | Refills: 11 | Status: ON HOLD | OUTPATIENT
Start: 2019-10-24 | End: 2019-10-27 | Stop reason: HOSPADM

## 2019-10-25 ENCOUNTER — HOSPITAL ENCOUNTER (OUTPATIENT)
Facility: HOSPITAL | Age: 74
Discharge: HOME | End: 2019-10-27
Attending: FAMILY MEDICINE | Admitting: INTERNAL MEDICINE
Payer: MEDICARE

## 2019-10-25 DIAGNOSIS — M51.36 DDD (DEGENERATIVE DISC DISEASE), LUMBAR: ICD-10-CM

## 2019-10-25 DIAGNOSIS — I95.9 HYPOTENSION, UNSPECIFIED HYPOTENSION TYPE: ICD-10-CM

## 2019-10-25 DIAGNOSIS — R07.9 CHEST PAIN: ICD-10-CM

## 2019-10-25 DIAGNOSIS — R00.1 BRADYCARDIA: Primary | ICD-10-CM

## 2019-10-25 DIAGNOSIS — R53.1 WEAKNESS: ICD-10-CM

## 2019-10-25 LAB
BASOPHILS # BLD AUTO: 0.05 K/UL (ref 0–0.2)
BASOPHILS NFR BLD: 0.6 % (ref 0–1.9)
DIFFERENTIAL METHOD: ABNORMAL
EOSINOPHIL # BLD AUTO: 0.1 K/UL (ref 0–0.5)
EOSINOPHIL NFR BLD: 0.6 % (ref 0–8)
ERYTHROCYTE [DISTWIDTH] IN BLOOD BY AUTOMATED COUNT: 16.2 % (ref 11.5–14.5)
HCT VFR BLD AUTO: 30.5 % (ref 40–54)
HGB BLD-MCNC: 9.9 G/DL (ref 14–18)
IMM GRANULOCYTES # BLD AUTO: 0.03 K/UL (ref 0–0.04)
IMM GRANULOCYTES NFR BLD AUTO: 0.4 % (ref 0–0.5)
INR PPP: 1 (ref 0.8–1.2)
LYMPHOCYTES # BLD AUTO: 0.8 K/UL (ref 1–4.8)
LYMPHOCYTES NFR BLD: 9.2 % (ref 18–48)
MCH RBC QN AUTO: 31.4 PG (ref 27–31)
MCHC RBC AUTO-ENTMCNC: 32.5 G/DL (ref 32–36)
MCV RBC AUTO: 97 FL (ref 82–98)
MONOCYTES # BLD AUTO: 0.8 K/UL (ref 0.3–1)
MONOCYTES NFR BLD: 9.2 % (ref 4–15)
NEUTROPHILS # BLD AUTO: 6.9 K/UL (ref 1.8–7.7)
NEUTROPHILS NFR BLD: 80 % (ref 38–73)
NRBC BLD-RTO: 0 /100 WBC
PLATELET # BLD AUTO: 233 K/UL (ref 150–350)
PMV BLD AUTO: 12.2 FL (ref 9.2–12.9)
PROTHROMBIN TIME: 10.7 SEC (ref 9–12.5)
RBC # BLD AUTO: 3.15 M/UL (ref 4.6–6.2)
TROPONIN I SERPL DL<=0.01 NG/ML-MCNC: 0.02 NG/ML (ref 0–0.03)
WBC # BLD AUTO: 8.56 K/UL (ref 3.9–12.7)

## 2019-10-25 PROCEDURE — 84484 ASSAY OF TROPONIN QUANT: CPT

## 2019-10-25 PROCEDURE — 85025 COMPLETE CBC W/AUTO DIFF WBC: CPT

## 2019-10-25 PROCEDURE — 99285 EMERGENCY DEPT VISIT HI MDM: CPT | Mod: 25

## 2019-10-25 PROCEDURE — 83880 ASSAY OF NATRIURETIC PEPTIDE: CPT

## 2019-10-25 PROCEDURE — 80053 COMPREHEN METABOLIC PANEL: CPT

## 2019-10-25 PROCEDURE — 93010 EKG 12-LEAD: ICD-10-PCS | Mod: ,,, | Performed by: INTERNAL MEDICINE

## 2019-10-25 PROCEDURE — 85610 PROTHROMBIN TIME: CPT

## 2019-10-25 PROCEDURE — 63600175 PHARM REV CODE 636 W HCPCS: Performed by: FAMILY MEDICINE

## 2019-10-25 PROCEDURE — 96361 HYDRATE IV INFUSION ADD-ON: CPT

## 2019-10-25 PROCEDURE — 93010 ELECTROCARDIOGRAM REPORT: CPT | Mod: ,,, | Performed by: INTERNAL MEDICINE

## 2019-10-25 PROCEDURE — 93005 ELECTROCARDIOGRAM TRACING: CPT

## 2019-10-25 RX ORDER — PILOCARPINE HYDROCHLORIDE 5 MG/1
TABLET, FILM COATED ORAL
Qty: 90 TABLET | Refills: 2 | Status: SHIPPED | OUTPATIENT
Start: 2019-10-25 | End: 2020-03-05 | Stop reason: SDUPTHER

## 2019-10-25 RX ADMIN — SODIUM CHLORIDE 1000 ML: 0.9 INJECTION, SOLUTION INTRAVENOUS at 11:10

## 2019-10-26 PROBLEM — E87.5 HYPERKALEMIA: Status: ACTIVE | Noted: 2019-10-26

## 2019-10-26 PROBLEM — R00.1 BRADYCARDIA: Status: ACTIVE | Noted: 2019-10-26

## 2019-10-26 PROBLEM — H70.93 MASTOIDITIS OF BOTH SIDES: Status: ACTIVE | Noted: 2019-10-26

## 2019-10-26 PROBLEM — E87.5 HYPERKALEMIA: Chronic | Status: ACTIVE | Noted: 2019-10-26

## 2019-10-26 LAB
ALBUMIN SERPL BCP-MCNC: 2.8 G/DL (ref 3.5–5.2)
ALP SERPL-CCNC: 56 U/L (ref 55–135)
ALT SERPL W/O P-5'-P-CCNC: 12 U/L (ref 10–44)
ANION GAP SERPL CALC-SCNC: 8 MMOL/L (ref 8–16)
ANION GAP SERPL CALC-SCNC: 8 MMOL/L (ref 8–16)
AST SERPL-CCNC: 14 U/L (ref 10–40)
BASOPHILS # BLD AUTO: 0.07 K/UL (ref 0–0.2)
BASOPHILS NFR BLD: 0.9 % (ref 0–1.9)
BILIRUB SERPL-MCNC: 0.5 MG/DL (ref 0.1–1)
BILIRUB UR QL STRIP: NEGATIVE
BNP SERPL-MCNC: 522 PG/ML (ref 0–99)
BUN SERPL-MCNC: 21 MG/DL (ref 8–23)
BUN SERPL-MCNC: 28 MG/DL (ref 8–23)
CALCIUM SERPL-MCNC: 7.5 MG/DL (ref 8.7–10.5)
CALCIUM SERPL-MCNC: 9.2 MG/DL (ref 8.7–10.5)
CHLORIDE SERPL-SCNC: 110 MMOL/L (ref 95–110)
CHLORIDE SERPL-SCNC: 97 MMOL/L (ref 95–110)
CLARITY UR: CLEAR
CO2 SERPL-SCNC: 22 MMOL/L (ref 23–29)
CO2 SERPL-SCNC: 27 MMOL/L (ref 23–29)
COLOR UR: YELLOW
CREAT SERPL-MCNC: 0.9 MG/DL (ref 0.5–1.4)
CREAT SERPL-MCNC: 1.4 MG/DL (ref 0.5–1.4)
DIFFERENTIAL METHOD: ABNORMAL
EOSINOPHIL # BLD AUTO: 0.1 K/UL (ref 0–0.5)
EOSINOPHIL NFR BLD: 0.9 % (ref 0–8)
ERYTHROCYTE [DISTWIDTH] IN BLOOD BY AUTOMATED COUNT: 15.9 % (ref 11.5–14.5)
EST. GFR  (AFRICAN AMERICAN): 57 ML/MIN/1.73 M^2
EST. GFR  (AFRICAN AMERICAN): >60 ML/MIN/1.73 M^2
EST. GFR  (NON AFRICAN AMERICAN): 49 ML/MIN/1.73 M^2
EST. GFR  (NON AFRICAN AMERICAN): >60 ML/MIN/1.73 M^2
GLUCOSE SERPL-MCNC: 107 MG/DL (ref 70–110)
GLUCOSE SERPL-MCNC: 76 MG/DL (ref 70–110)
GLUCOSE UR QL STRIP: NEGATIVE
HCT VFR BLD AUTO: 30 % (ref 40–54)
HGB BLD-MCNC: 9.3 G/DL (ref 14–18)
HGB UR QL STRIP: ABNORMAL
IMM GRANULOCYTES # BLD AUTO: 0.03 K/UL (ref 0–0.04)
IMM GRANULOCYTES NFR BLD AUTO: 0.4 % (ref 0–0.5)
INR PPP: 1 (ref 0.8–1.2)
KETONES UR QL STRIP: NEGATIVE
LEUKOCYTE ESTERASE UR QL STRIP: NEGATIVE
LYMPHOCYTES # BLD AUTO: 0.7 K/UL (ref 1–4.8)
LYMPHOCYTES NFR BLD: 9.3 % (ref 18–48)
MAGNESIUM SERPL-MCNC: 1.6 MG/DL (ref 1.6–2.6)
MCH RBC QN AUTO: 30.6 PG (ref 27–31)
MCHC RBC AUTO-ENTMCNC: 31 G/DL (ref 32–36)
MCV RBC AUTO: 99 FL (ref 82–98)
MONOCYTES # BLD AUTO: 0.8 K/UL (ref 0.3–1)
MONOCYTES NFR BLD: 10.2 % (ref 4–15)
NEUTROPHILS # BLD AUTO: 6.1 K/UL (ref 1.8–7.7)
NEUTROPHILS NFR BLD: 78.3 % (ref 38–73)
NITRITE UR QL STRIP: NEGATIVE
NRBC BLD-RTO: 0 /100 WBC
PH UR STRIP: 6 [PH] (ref 5–8)
PHOSPHATE SERPL-MCNC: 3.7 MG/DL (ref 2.7–4.5)
PLATELET # BLD AUTO: 216 K/UL (ref 150–350)
PMV BLD AUTO: 11.8 FL (ref 9.2–12.9)
POCT GLUCOSE: 135 MG/DL (ref 70–110)
POCT GLUCOSE: 94 MG/DL (ref 70–110)
POTASSIUM SERPL-SCNC: 4.5 MMOL/L (ref 3.5–5.1)
POTASSIUM SERPL-SCNC: 5 MMOL/L (ref 3.5–5.1)
POTASSIUM SERPL-SCNC: 5.2 MMOL/L (ref 3.5–5.1)
POTASSIUM SERPL-SCNC: 5.3 MMOL/L (ref 3.5–5.1)
POTASSIUM SERPL-SCNC: 6.6 MMOL/L (ref 3.5–5.1)
PROT SERPL-MCNC: 6.6 G/DL (ref 6–8.4)
PROT UR QL STRIP: NEGATIVE
PROTHROMBIN TIME: 10.9 SEC (ref 9–12.5)
RBC # BLD AUTO: 3.04 M/UL (ref 4.6–6.2)
SODIUM SERPL-SCNC: 132 MMOL/L (ref 136–145)
SODIUM SERPL-SCNC: 140 MMOL/L (ref 136–145)
SP GR UR STRIP: 1.01 (ref 1–1.03)
TROPONIN I SERPL DL<=0.01 NG/ML-MCNC: 0.02 NG/ML (ref 0–0.03)
URN SPEC COLLECT METH UR: ABNORMAL
UROBILINOGEN UR STRIP-ACNC: NEGATIVE EU/DL
WBC # BLD AUTO: 7.82 K/UL (ref 3.9–12.7)

## 2019-10-26 PROCEDURE — 96361 HYDRATE IV INFUSION ADD-ON: CPT

## 2019-10-26 PROCEDURE — 25000003 PHARM REV CODE 250: Performed by: INTERNAL MEDICINE

## 2019-10-26 PROCEDURE — 84100 ASSAY OF PHOSPHORUS: CPT

## 2019-10-26 PROCEDURE — 25000242 PHARM REV CODE 250 ALT 637 W/ HCPCS

## 2019-10-26 PROCEDURE — 80048 BASIC METABOLIC PNL TOTAL CA: CPT

## 2019-10-26 PROCEDURE — 27000221 HC OXYGEN, UP TO 24 HOURS

## 2019-10-26 PROCEDURE — 99214 OFFICE O/P EST MOD 30 MIN: CPT | Mod: ,,, | Performed by: INTERNAL MEDICINE

## 2019-10-26 PROCEDURE — 63600175 PHARM REV CODE 636 W HCPCS

## 2019-10-26 PROCEDURE — 96367 TX/PROPH/DG ADDL SEQ IV INF: CPT

## 2019-10-26 PROCEDURE — 25000003 PHARM REV CODE 250: Performed by: FAMILY MEDICINE

## 2019-10-26 PROCEDURE — 25000242 PHARM REV CODE 250 ALT 637 W/ HCPCS: Performed by: INTERNAL MEDICINE

## 2019-10-26 PROCEDURE — 99900035 HC TECH TIME PER 15 MIN (STAT)

## 2019-10-26 PROCEDURE — 84484 ASSAY OF TROPONIN QUANT: CPT

## 2019-10-26 PROCEDURE — G0378 HOSPITAL OBSERVATION PER HR: HCPCS

## 2019-10-26 PROCEDURE — 25000242 PHARM REV CODE 250 ALT 637 W/ HCPCS: Performed by: EMERGENCY MEDICINE

## 2019-10-26 PROCEDURE — 96375 TX/PRO/DX INJ NEW DRUG ADDON: CPT

## 2019-10-26 PROCEDURE — 94640 AIRWAY INHALATION TREATMENT: CPT

## 2019-10-26 PROCEDURE — 94761 N-INVAS EAR/PLS OXIMETRY MLT: CPT

## 2019-10-26 PROCEDURE — 85610 PROTHROMBIN TIME: CPT

## 2019-10-26 PROCEDURE — 96365 THER/PROPH/DIAG IV INF INIT: CPT

## 2019-10-26 PROCEDURE — 84132 ASSAY OF SERUM POTASSIUM: CPT | Mod: 91

## 2019-10-26 PROCEDURE — 99214 PR OFFICE/OUTPT VISIT, EST, LEVL IV, 30-39 MIN: ICD-10-PCS | Mod: ,,, | Performed by: INTERNAL MEDICINE

## 2019-10-26 PROCEDURE — 85025 COMPLETE CBC W/AUTO DIFF WBC: CPT

## 2019-10-26 PROCEDURE — 81003 URINALYSIS AUTO W/O SCOPE: CPT

## 2019-10-26 PROCEDURE — 96368 THER/DIAG CONCURRENT INF: CPT

## 2019-10-26 PROCEDURE — 36415 COLL VENOUS BLD VENIPUNCTURE: CPT

## 2019-10-26 PROCEDURE — 83735 ASSAY OF MAGNESIUM: CPT

## 2019-10-26 PROCEDURE — 63600175 PHARM REV CODE 636 W HCPCS: Performed by: FAMILY MEDICINE

## 2019-10-26 RX ORDER — POTASSIUM CHLORIDE 20 MEQ/15ML
40 SOLUTION ORAL
Status: DISCONTINUED | OUTPATIENT
Start: 2019-10-26 | End: 2019-10-27 | Stop reason: HOSPADM

## 2019-10-26 RX ORDER — ONDANSETRON 2 MG/ML
4 INJECTION INTRAMUSCULAR; INTRAVENOUS EVERY 8 HOURS PRN
Status: DISCONTINUED | OUTPATIENT
Start: 2019-10-26 | End: 2019-10-27 | Stop reason: HOSPADM

## 2019-10-26 RX ORDER — PILOCARPINE HYDROCHLORIDE 5 MG/1
5 TABLET, FILM COATED ORAL
Status: DISCONTINUED | OUTPATIENT
Start: 2019-10-26 | End: 2019-10-27 | Stop reason: HOSPADM

## 2019-10-26 RX ORDER — LANOLIN ALCOHOL/MO/W.PET/CERES
800 CREAM (GRAM) TOPICAL
Status: DISCONTINUED | OUTPATIENT
Start: 2019-10-26 | End: 2019-10-27 | Stop reason: HOSPADM

## 2019-10-26 RX ORDER — SODIUM,POTASSIUM PHOSPHATES 280-250MG
2 POWDER IN PACKET (EA) ORAL
Status: DISCONTINUED | OUTPATIENT
Start: 2019-10-26 | End: 2019-10-27 | Stop reason: HOSPADM

## 2019-10-26 RX ORDER — IPRATROPIUM BROMIDE 0.5 MG/2.5ML
0.5 SOLUTION RESPIRATORY (INHALATION) EVERY 6 HOURS
Status: DISCONTINUED | OUTPATIENT
Start: 2019-10-26 | End: 2019-10-26

## 2019-10-26 RX ORDER — CLOTRIMAZOLE 10 MG/1
10 LOZENGE ORAL; TOPICAL 3 TIMES DAILY
Status: DISCONTINUED | OUTPATIENT
Start: 2019-10-26 | End: 2019-10-26

## 2019-10-26 RX ORDER — FUROSEMIDE 40 MG/1
40 TABLET ORAL DAILY
Status: DISCONTINUED | OUTPATIENT
Start: 2019-10-26 | End: 2019-10-27 | Stop reason: HOSPADM

## 2019-10-26 RX ORDER — ARFORMOTEROL TARTRATE 15 UG/2ML
15 SOLUTION RESPIRATORY (INHALATION) 2 TIMES DAILY
Status: DISCONTINUED | OUTPATIENT
Start: 2019-10-26 | End: 2019-10-27 | Stop reason: HOSPADM

## 2019-10-26 RX ORDER — ALBUTEROL SULFATE 0.83 MG/ML
2.5 SOLUTION RESPIRATORY (INHALATION) EVERY 4 HOURS
Status: DISCONTINUED | OUTPATIENT
Start: 2019-10-26 | End: 2019-10-26

## 2019-10-26 RX ORDER — GLUCAGON 1 MG
1 KIT INJECTION
Status: DISCONTINUED | OUTPATIENT
Start: 2019-10-26 | End: 2019-10-27 | Stop reason: HOSPADM

## 2019-10-26 RX ORDER — CEFTRIAXONE 1 G/1
INJECTION, POWDER, FOR SOLUTION INTRAMUSCULAR; INTRAVENOUS
Status: DISCONTINUED
Start: 2019-10-26 | End: 2019-10-26 | Stop reason: WASHOUT

## 2019-10-26 RX ORDER — TIOTROPIUM BROMIDE 18 UG/1
18 CAPSULE ORAL; RESPIRATORY (INHALATION) DAILY
Status: DISCONTINUED | OUTPATIENT
Start: 2019-10-26 | End: 2019-10-26

## 2019-10-26 RX ORDER — IBUPROFEN 400 MG/1
400 TABLET ORAL EVERY 6 HOURS PRN
Status: DISCONTINUED | OUTPATIENT
Start: 2019-10-26 | End: 2019-10-27 | Stop reason: HOSPADM

## 2019-10-26 RX ORDER — IPRATROPIUM BROMIDE AND ALBUTEROL SULFATE 2.5; .5 MG/3ML; MG/3ML
3 SOLUTION RESPIRATORY (INHALATION) EVERY 6 HOURS
Status: DISCONTINUED | OUTPATIENT
Start: 2019-10-26 | End: 2019-10-27 | Stop reason: HOSPADM

## 2019-10-26 RX ORDER — POLYETHYLENE GLYCOL 3350 17 G/17G
17 POWDER, FOR SOLUTION ORAL DAILY
Status: DISCONTINUED | OUTPATIENT
Start: 2019-10-26 | End: 2019-10-27 | Stop reason: HOSPADM

## 2019-10-26 RX ORDER — OXYCODONE AND ACETAMINOPHEN 7.5; 325 MG/1; MG/1
1 TABLET ORAL EVERY 8 HOURS PRN
Status: DISCONTINUED | OUTPATIENT
Start: 2019-10-26 | End: 2019-10-27 | Stop reason: HOSPADM

## 2019-10-26 RX ORDER — IPRATROPIUM BROMIDE AND ALBUTEROL SULFATE 2.5; .5 MG/3ML; MG/3ML
SOLUTION RESPIRATORY (INHALATION)
Status: COMPLETED
Start: 2019-10-26 | End: 2019-10-26

## 2019-10-26 RX ORDER — NAPROXEN SODIUM 220 MG/1
81 TABLET, FILM COATED ORAL DAILY
Status: DISCONTINUED | OUTPATIENT
Start: 2019-10-26 | End: 2019-10-27 | Stop reason: HOSPADM

## 2019-10-26 RX ORDER — CLOPIDOGREL BISULFATE 75 MG/1
75 TABLET ORAL DAILY
Status: DISCONTINUED | OUTPATIENT
Start: 2019-10-26 | End: 2019-10-27 | Stop reason: HOSPADM

## 2019-10-26 RX ORDER — AMIODARONE HYDROCHLORIDE 200 MG/1
400 TABLET ORAL DAILY
Status: DISCONTINUED | OUTPATIENT
Start: 2019-10-26 | End: 2019-10-26

## 2019-10-26 RX ORDER — IBUPROFEN 200 MG
16 TABLET ORAL
Status: DISCONTINUED | OUTPATIENT
Start: 2019-10-26 | End: 2019-10-27 | Stop reason: HOSPADM

## 2019-10-26 RX ORDER — SODIUM CHLORIDE 0.9 % (FLUSH) 0.9 %
10 SYRINGE (ML) INJECTION
Status: DISCONTINUED | OUTPATIENT
Start: 2019-10-26 | End: 2019-10-27 | Stop reason: HOSPADM

## 2019-10-26 RX ORDER — IBUPROFEN 200 MG
24 TABLET ORAL
Status: DISCONTINUED | OUTPATIENT
Start: 2019-10-26 | End: 2019-10-27 | Stop reason: HOSPADM

## 2019-10-26 RX ORDER — PANTOPRAZOLE SODIUM 40 MG/1
40 TABLET, DELAYED RELEASE ORAL DAILY
Status: DISCONTINUED | OUTPATIENT
Start: 2019-10-26 | End: 2019-10-27 | Stop reason: HOSPADM

## 2019-10-26 RX ADMIN — SODIUM POLYSTYRENE SULFONATE 30 G: 15 SUSPENSION ORAL; RECTAL at 06:10

## 2019-10-26 RX ADMIN — PILOCARPINE HYDROCHLORIDE 5 MG: 5 TABLET, FILM COATED ORAL at 06:10

## 2019-10-26 RX ADMIN — ARFORMOTEROL TARTRATE 15 MCG: 15 SOLUTION RESPIRATORY (INHALATION) at 07:10

## 2019-10-26 RX ADMIN — SODIUM POLYSTYRENE SULFONATE 30 G: 15 SUSPENSION ORAL; RECTAL at 01:10

## 2019-10-26 RX ADMIN — IPRATROPIUM BROMIDE AND ALBUTEROL SULFATE 3 ML: .5; 3 SOLUTION RESPIRATORY (INHALATION) at 07:10

## 2019-10-26 RX ADMIN — FUROSEMIDE 40 MG: 40 TABLET ORAL at 08:10

## 2019-10-26 RX ADMIN — ASPIRIN 81 MG CHEWABLE TABLET 81 MG: 81 TABLET CHEWABLE at 08:10

## 2019-10-26 RX ADMIN — PANTOPRAZOLE SODIUM 40 MG: 40 TABLET, DELAYED RELEASE ORAL at 08:10

## 2019-10-26 RX ADMIN — CLOPIDOGREL BISULFATE 75 MG: 75 TABLET ORAL at 08:10

## 2019-10-26 RX ADMIN — INSULIN HUMAN 10 UNITS: 100 INJECTION, SOLUTION PARENTERAL at 01:10

## 2019-10-26 RX ADMIN — OXYCODONE AND ACETAMINOPHEN 1 TABLET: 7.5; 325 TABLET ORAL at 08:10

## 2019-10-26 RX ADMIN — AMIODARONE HYDROCHLORIDE 400 MG: 200 TABLET ORAL at 08:10

## 2019-10-26 RX ADMIN — IPRATROPIUM BROMIDE AND ALBUTEROL SULFATE 3 ML: .5; 3 SOLUTION RESPIRATORY (INHALATION) at 12:10

## 2019-10-26 RX ADMIN — CALCIUM GLUCONATE 1 G: 94 INJECTION, SOLUTION INTRAVENOUS at 01:10

## 2019-10-26 RX ADMIN — PILOCARPINE HYDROCHLORIDE 5 MG: 5 TABLET, FILM COATED ORAL at 03:10

## 2019-10-26 RX ADMIN — CEFTRIAXONE 1 G: 1 INJECTION, SOLUTION INTRAVENOUS at 06:10

## 2019-10-26 RX ADMIN — DEXTROSE 250 ML: 10 SOLUTION INTRAVENOUS at 01:10

## 2019-10-26 RX ADMIN — ALBUTEROL SULFATE 2.5 MG: 2.5 SOLUTION RESPIRATORY (INHALATION) at 05:10

## 2019-10-26 NOTE — CONSULTS
"  [facility] -   Adult Nutrition  Consult Note    SUMMARY   Intervention:  Modified sodium, fat diet.    Recommendations    1) Recommend SLP consult.    2) If pt able to consume nutrition PO, recommend cardiac diet with texture per SLP + Boost Glucose Control, BID.    2) If unable to consume nutrition PO, recommend TF.   Glucerna 1.5 @ 10 mls/hr. Progress to goal of 60 mls/hr per pt tolerance. Flush with water 40 mls/hr.   Provides: 1800 calories (100% EEN), 99 gm protein (100% EPN), 911 mls free water.  Goals: 1) Pt will receive >=50% estimated needs by f/u.   Nutrition Goal Status: new  Communication of RD Recs: (POC, sticky note)    Reason for Assessment    Reason For Assessment: consult/dysphagia  Diagnosis: cardiac disease  Relevant Medical History: COPD, HDL, tongue and vocal cord cancer, Chronic CHF on lasix  Interdisciplinary Rounds: did not attend  General Information Comments: Pt admitted with hyperkalemia. AMS x few days. No intake noted yet.  Pt coughed/choked on dysphagia screen.  Chart notes continued slow decline in weight since 19 wt of 77.4. BMI 22 reflects pt underweight.    Cardiac regular texture diet ordered.  RD covering remotely. NFPE pending.   Nutrition Discharge Planning: to be determined    Nutrition Risk Screen    Nutrition Risk Screen: dysphagia or difficulty swallowing    Nutrition/Diet History    Spiritual, Cultural Beliefs, Gnosticist Practices, Values that Affect Care: yes    Anthropometrics    Temp: 97.8 °F (36.6 °C)  Height Method: Stated  Height: 5' 8"  Height (inches): 68 in  Weight Method: Standard Scale  Weight: 65.7 kg (144 lb 13.5 oz)  Weight (lb): 144.84 lb  Ideal Body Weight (IBW), Male: 154 lb  % Ideal Body Weight, Male (lb): 94.05 lb  BMI (Calculated): 22.1  BMI Grade: (underweight for age)  Usual Body Weight (UBW), k kg on 19 ~6 months.  Was 77.1 on 19)  % Usual Body Weight: 88.61  % Weight Change From Usual Weight: -11.58 %   "     Lab/Procedures/Meds    Pertinent Labs Reviewed: reviewed  Pertinent Labs Comments: GFR on admit 49, now >60, K on admit was 6.5, now 5.0. Albumin 2.8.  A1c 5.4   Pertinent Medications Reviewed: reviewed  Pertinent Medications Comments: lasix, insulin, asa, polyethylene glycol    Physical Findings/Assessment         Estimated/Assessed Needs    Weight Used For Calorie Calculations: 65.7 kg (144 lb 13.5 oz)  Energy Calorie Requirements (kcal): 3322-6428 (wt gain)   Energy Need Method: [place]-St Jeor  Protein Requirements: 79-99 (wt gain, oncology guidelines)  Weight Used For Protein Calculations: 65.7 kg (144 lb 13.5 oz)     Estimated Fluid Requirement Method: RDA Method(or per MD)  RDA Method (mL): 1789  CHO Requirement: 223 gm/day (50% EEN) (Pt not DM per PMH, Hx COPD on insulin. Current glucose 107.)      Nutrition Prescription Ordered    Current Diet Order: Cardiac    Evaluation of Received Nutrient/Fluid Intake    I/O: In 1400 mls  Energy Calories Required: not meeting needs  Protein Required: not meeting needs  Fluid Required: (progressing toward meeting needs )  % Intake of Estimated Energy Needs: not meeting needs  % Meal Intake: KODY/  Comment: Based not meeting needs on pt's gradual wt decline over >6 months and BMI reflects pt underweight.     Nutrition Risk    Level of Risk/Frequency of Follow-up: (2 x wkly)     Assessment and Plan    Underweight for age r/t inability to consume sufficient energy as evidenced by  pt's gradual wt decline over >6 months and BMI is 22.       Monitor and Evaluation    Food and Nutrient Intake: energy intake  Food and Nutrient Adminstration: diet order  Anthropometric Measurements: weight  Biochemical Data, Medical Tests and Procedures: electrolyte and renal panel, glucose/endocrine profile, inflammatory profile  Nutrition-Focused Physical Findings: overall appearance     Malnutrition Assessment     Skin (Micronutrient): (Ryan score 22, Edema noted in H&P)       Weight  Loss (Malnutrition):  10% in 6 months   Orbital Region (Subcutaneous Fat Loss): (NFPE pending)                     Nutrition Follow-Up  yes

## 2019-10-26 NOTE — NURSING
Pt arrived to unit from ED in no acute distress. VSS. Supplemental oxygen @ 3 LPM via NC in place. Tele monitor placed on pt by primary nurse. Bed low, side rails up x2, call light in reach, non-slip footwear socks in place.  Family present at bedside. Pt oriented to room and unit protocols as well as fall risk precautions. Educated pt on importance of collecting accurate I&Os. Urinal at bedside within reach/urine hat placed in toilet. Communication board updated with current plan of care. Pt has no complaints at this time, instructed to call for assistance.

## 2019-10-26 NOTE — H&P
"[facility] Hospital Medicine  History & Physical    Patient Name: Noble Tripp  MRN: [id number]  Admission Date: 10/25/2019  Attending Physician: Ronnie Lynch MD  Primary Care Provider: Dwaine Davis MD         Patient information was obtained from patient, relative(s) and ER records.     Subjective:     Principal Problem:Hyperkalemia    Chief Complaint:   Chief Complaint   Patient presents with    Altered Mental Status     Pt states he has been confused for the last few days and just not feeling right.  States thinks his electorlytes are off.        HPI: The patient is a 74-year-old man presenting with dizziness and weakness with some mild confusion.  The patient has a history of cardiomyopathy with an ejection fraction of 25% to his medications were recently changed while it heals stent.  Patient states with the son that he had a myocardial fraction and was treated with cardiac catheterization at [facility] where he was told that he had 90% blockage in [place] and 67% blockage roughly in another main artery.  He was placed on spironolactone and a beta-blocker.    Past Medical History:   Diagnosis Date    Adrenal adenoma     david;nl fxn w/u;tran 11/18    Aspiration pneumonia 10/15    puree/honey    Benign prostate hyperplasia     CAD (coronary artery disease)     dr padilla    Chronic pain     dr santos    Chronic systolic congestive heart failure 10/17/2019    COPD (chronic obstructive pulmonary disease)     papi    Ex-smoker     11/13    Hyperlipidemia     JUANITO on CPAP     cpap    Osteopenia 1/15 tran 1/18    PSVT (paroxysmal supraventricular tachycardia)     PVD (peripheral vascular disease)     noobs 07 khuri    Pyriform sinus cancer     dr ponce radiation 1/2-14 dr king perales    Squamous cell carcinoma of skin     roberto    Tongue cancer     "superficial" removed 11/14    Vocal cord cancer 2011    Xerostomia     radiation       Past Surgical " History:   Procedure Laterality Date    APPENDECTOMY      BRONCHOSCOPY Bilateral 2/5/2019    Procedure: Bronchoscopy;  Surgeon: Santos Cardozo MD;  Location: [facility] ENDO;  Service: Endoscopy;  Laterality: Bilateral;    COLONOSCOPY N/A 5/1/2017    Procedure: Due for screening colonoscopy;  Surgeon: Anushka Yoder MD;  Location: [facility] ENDO;  Service: Endoscopy;  Laterality: N/A;    ESOPHAGOGASTRODUODENOSCOPY N/A 5/29/2018    Procedure: ESOPHAGOGASTRODUODENOSCOPY (EGD);  Surgeon: Audie Hill III, MD;  Location: [facility] ENDO;  Service: Endoscopy;  Laterality: N/A;    ESOPHAGOGASTRODUODENOSCOPY N/A 8/29/2018    Procedure: ESOPHAGOGASTRODUODENOSCOPY (EGD);  Surgeon: Audie Hill III, MD;  Location: [facility] ENDO;  Service: Endoscopy;  Laterality: N/A;    THORACENTESIS Left 8/7/2018    Procedure: Thoracentesis;  Surgeon: Santos Cardozo MD;  Location: [facility] ENDO;  Service: Endoscopy;  Laterality: Left;    THORACENTESIS Right 2/25/2019    Procedure: Thoracentesis;  Surgeon: Santos Cardozo MD;  Location: [facility];  Service: Endoscopy;  Laterality: Right;    tongue cancer excision  11/14    dr vitale    VOCAL CORD LATERALIZATION, ENDOSCOPIC APPROACH W/ MORENA yanceyorter       Review of patient's allergies indicates:   Allergen Reactions    Contrast media     Iodinated contrast media      Other reaction(s): Itching  Other reaction(s): Hives    Iodine Hives    Neomycin-bacitracin-polymyxin      Other reaction(s): Rash  Other reaction(s): Rash    Pravastatin      Other reaction(s): fatigue  Other reaction(s): fatigue    Rosuvastatin      Other reaction(s): fatigue  Other reaction(s): fatigue    Simvastatin      Other reaction(s): fatigue  Other reaction(s): fatigue    Statins-hmg-coa reductase inhibitors        No current facility-administered medications on file prior to encounter.      Current Outpatient Medications on File Prior to Encounter   Medication Sig    albuterol (PROAIR HFA) 90  mcg/actuation inhaler INL 2 PFS PO INTO THE LUNGS Q 4 H PRF WHZ OR SOB    albuterol (PROVENTIL) 2.5 mg /3 mL (0.083 %) nebulizer solution Take 3 mLs (2.5 mg total) by nebulization every 8 (eight) hours while awake.    amiodarone (PACERONE) 400 MG tablet Take 400 mg by mouth once daily.    arformoterol (BROVANA) 15 mcg/2 mL Nebu Take 2 mLs (15 mcg total) by nebulization 2 (two) times daily. Controller    aspirin 81 mg Tab Take 1 tablet by mouth Daily. Over the counter to help prevent stroke/heart attack    atorvastatin (LIPITOR) 80 MG tablet Take 1 tablet (80 mg total) by mouth once daily.    carvedilol (COREG) 12.5 MG tablet Take 1 tablet (12.5 mg total) by mouth 2 (two) times daily with meals.    CHANTIX CONTINUING MONTH BOX 1 mg Tab TAKE 1 TABLET BY MOUTH ONCE DAILY    clopidogrel (PLAVIX) 75 mg tablet Take 1 tablet (75 mg total) by mouth once daily.    clotrimazole (MYCELEX) 10 mg elissa     ELDERBERRY FRUIT AND FLOWER ORAL Take by mouth.    evolocumab (REPATHA SURECLICK) 140 mg/mL PnIj Inject 1 mL (140 mg total) into the skin every 14 (fourteen) days.    furosemide (LASIX) 20 MG tablet Take 1 tablet  Daily x 5 days , then after take PRN  For sob  Qd    gabapentin (NEURONTIN) 800 MG tablet Take 1 tablet (800 mg total) by mouth 3 (three) times daily.    garlic 2,000 mg Cap Take 1 tablet by mouth once daily.     lisinopril (PRINIVIL,ZESTRIL) 5 MG tablet Take 1 tablet (5 mg total) by mouth once daily.    loratadine (CLARITIN) 10 mg tablet Take 1 tablet by mouth daily as needed.     nebulizer and compressor Bailey Use as directed    oxyCODONE-acetaminophen (PERCOCET) 7.5-325 mg per tablet Take 1 tablet by mouth every 8 (eight) hours as needed for Pain.    pantoprazole (PROTONIX) 40 MG tablet TAKE ONE TABLET BY MOUTH ONCE DAILY    pilocarpine (SALAGEN) 5 MG Tab TAKE ONE TABLET BY MOUTH 30 MINUTES PRIOR TO EACH MEAL    spironolactone (ALDACTONE) 25 MG tablet Take 1 tablet (25 mg total) by mouth once  daily.    tiotropium (SPIRIVA WITH HANDIHALER) 18 mcg inhalation capsule Inhale 1 capsule (18 mcg total) into the lungs once daily.    turmeric root extract 500 mg Cap Take 1 capsule by mouth 2 (two) times daily.     [DISCONTINUED] pilocarpine (SALAGEN) 5 MG Tab  TAKE 1 TABLET BY MOUTH 30 MINUTES PRIOR TO EACH MEAL     Family History     Problem Relation (Age of Onset)    Cancer Father, Brother        Tobacco Use    Smoking status: Current Every Day Smoker     Packs/day: 0.50     Years: 55.00     Pack years: 27.50    Smokeless tobacco: Never Used    Tobacco comment: 6-7 cigs/day   Substance and Sexual Activity    Alcohol use: Yes     Comment: 2-3 glasses of whiskey every evening    Drug use: No    Sexual activity: Not Currently     Review of Systems   Constitutional: Positive for fatigue. Negative for activity change, appetite change, chills and fever.   HENT: Positive for congestion, hearing loss, postnasal drip and sinus pressure. Negative for dental problem, drooling, ear discharge, ear pain, facial swelling, mouth sores, nosebleeds, sneezing, sore throat and tinnitus.    Eyes: Negative for photophobia, pain, discharge, redness, itching and visual disturbance.   Respiratory: Positive for cough. Negative for apnea, choking, chest tightness, shortness of breath, wheezing and stridor.    Cardiovascular: Negative for chest pain, palpitations and leg swelling.   Gastrointestinal: Negative for abdominal distention, abdominal pain, anal bleeding, blood in stool, constipation and diarrhea.   Endocrine: Negative for cold intolerance, heat intolerance, polydipsia, polyphagia and polyuria.   Genitourinary: Negative for difficulty urinating, dysuria, flank pain, frequency, hematuria and urgency.   Musculoskeletal: Negative for arthralgias, back pain, gait problem, joint swelling, myalgias, neck pain and neck stiffness.   Skin: Negative for color change, pallor, rash and wound.   Neurological: Positive for dizziness,  weakness and light-headedness. Negative for tremors, seizures, syncope, facial asymmetry, speech difficulty and headaches.   Hematological: Negative for adenopathy. Does not bruise/bleed easily.   Psychiatric/Behavioral: Positive for confusion and decreased concentration. Negative for agitation, behavioral problems, dysphoric mood, hallucinations, sleep disturbance and suicidal ideas. The patient is not hyperactive.    All other systems reviewed and are negative.    Objective:     Vital Signs (Most Recent):  Temp: 98 °F (36.7 °C) (10/26/19 0543)  Pulse: (!) 51 (10/26/19 0553)  Resp: 18 (10/26/19 0553)  BP: 120/67 (10/26/19 0543)  SpO2: 100 % (10/26/19 0553) Vital Signs (24h Range):  Temp:  [97.8 °F (36.6 °C)-98 °F (36.7 °C)] 98 °F (36.7 °C)  Pulse:  [48-55] 51  Resp:  [12-20] 18  SpO2:  [95 %-100 %] 100 %  BP: ()/(43-67) 120/67     Weight: 65.7 kg (144 lb 13.5 oz)  Body mass index is 22.02 kg/m².    Physical Exam   Constitutional: He appears well-developed and well-nourished. No distress.   HENT:   Head: Normocephalic and atraumatic.   Right Ear: External ear normal.   Left Ear: External ear normal.   Nose: Nose normal.   Mouth/Throat: Oropharynx is clear and moist.   Eyes: Pupils are equal, round, and reactive to light. Conjunctivae and EOM are normal. Right eye exhibits no discharge. Left eye exhibits no discharge. No scleral icterus.   Neck: Normal range of motion. Neck supple. No JVD present. No tracheal deviation present. No thyromegaly present.   Cardiovascular: Normal rate, regular rhythm, normal heart sounds and intact distal pulses. Exam reveals no gallop and no friction rub.   No murmur heard.  Pulmonary/Chest: Effort normal and breath sounds normal. No stridor. No respiratory distress. He has no wheezes. He has no rales. He exhibits no tenderness.   Abdominal: Soft. Bowel sounds are normal. He exhibits no distension and no mass. There is no tenderness. There is no rebound and no guarding.    Musculoskeletal: Normal range of motion. He exhibits edema. He exhibits no tenderness or deformity.   Lymphadenopathy:     He has no cervical adenopathy.   Neurological: He is alert. He displays normal reflexes. No cranial nerve deficit or sensory deficit. He exhibits normal muscle tone. Coordination normal.   Skin: Skin is warm and dry. Capillary refill takes less than 2 seconds. He is not diaphoretic. No erythema. No pallor.   Psychiatric: He has a normal mood and affect. His behavior is normal. Judgment and thought content normal.   Nursing note and vitals reviewed.        CRANIAL NERVES     CN III, IV, VI   Pupils are equal, round, and reactive to light.  Extraocular motions are normal.        Significant Labs:   Recent Lab Results       10/26/19  0128   10/25/19  2303        Albumin   2.8     Alkaline Phosphatase   56     ALT   12     Anion Gap   8     AST   14     Baso #   0.05     Basophil%   0.6     BILIRUBIN TOTAL   0.5  Comment:  For infants and newborns, interpretation of results should be based  on gestational age, weight and in agreement with clinical  observations.  Premature Infant recommended reference ranges:  Up to 24 hours.............<8.0 mg/dL  Up to 48 hours............<12.0 mg/dL  3-5 days..................<15.0 mg/dL  6-29 days.................<15.0 mg/dL       BNP   522  Comment:  Values of less than 100 pg/ml are consistent with non-CHF populations.     BUN, Bld   28     Calcium   9.2     Chloride   97     CO2   27     Creatinine   1.4     Differential Method   Automated     eGFR if    57     eGFR if non    49  Comment:  Calculation used to obtain the estimated glomerular filtration  rate (eGFR) is the CKD-EPI equation.        Eos #   0.1     Eosinophil%   0.6     Glucose   107     Gran # (ANC)   6.9     Gran%   80.0     Hematocrit   30.5     Hemoglobin   9.9     Immature Grans (Abs)   0.03  Comment:  Mild elevation in immature granulocytes is non specific  and   can be seen in a variety of conditions including stress response,   acute inflammation, trauma and pregnancy. Correlation with other   laboratory and clinical findings is essential.       Immature Granulocytes   0.4     Coumadin Monitoring INR   1.0  Comment:  Coumadin Therapy:  2.0 - 3.0 for INR for all indicators except mechanical heart valves  and antiphospholipid syndromes which should use 2.5 - 3.5.       Lymph #   0.8     Lymph%   9.2     MCH   31.4     MCHC   32.5     MCV   97     Mono #   0.8     Mono%   9.2     MPV   12.2     nRBC   0     Platelets   233     POCT Glucose 135       Potassium   6.6  Comment:  K  critical result(s) called and verbal readback obtained from   Naomie Johnson RN, 10/26/2019 00:17       PROTEIN TOTAL   6.6     Protime   10.7     RBC   3.15     RDW   16.2     Sodium   132     Troponin I   0.022  Comment:  The reference interval for Troponin I represents the 99th percentile   cutoff   for our facility and is consistent with 3rd generation assay   performance.       WBC   8.56           Significant Imaging:   Imaging Results          X-Ray Chest AP Portable (Final result)  Result time 10/26/19 00:15:40    Final result by Edson Olguin Jr., MD (10/26/19 00:15:40)                 Impression:      No significant change.  Pleuroparenchymal disease within the left lower chest is similar to multiple prior exams.  Persistent trace bilateral pleural effusions and scarring at the left apex.      Electronically signed by: Edson Olguin Jr., MD  Date:    10/26/2019  Time:    00:15             Narrative:    EXAMINATION:  XR CHEST AP PORTABLE    CLINICAL HISTORY:  Chest Pain;    COMPARISON:  Prior radiograph from October 23, 2019.    FINDINGS:  Slightly low lung volumes.  Again demonstrated are small bilateral pleural effusions.  Patchy airspace disease within the left mid and lower lung is unchanged.  Apical fibronodular scarring bilaterally, left greater than right.  No pneumothorax.   Heart size within normal limits.  Calcific atheromatous change of the aortic knob.  No significant bony findings.                               CT Head Without Contrast (Final result)  Result time 10/25/19 23:43:41    Final result by Edson Olguin Jr., MD (10/25/19 23:43:41)                 Impression:      No acute findings.  Bilateral mastoid effusions, left larger than right.  Acute on chronic sinusitis change of the left sphenoid sinus.    All CT scans at this facility use dose modulation, iterative reconstruction, and/or weight base dosing when appropriate to reduce radiation dose to as low as reasonably achievable.      Electronically signed by: Edson Olguin Jr., MD  Date:    10/25/2019  Time:    23:43             Narrative:    EXAMINATION:  CT HEAD WITHOUT CONTRAST    CLINICAL HISTORY:  Confusion/delirium, altered LOC, unexplained;Dizziness;    TECHNIQUE:  Contiguous axial images were obtained from the skull base through the vertex without intravenous contrast.    COMPARISON:  None    FINDINGS:  No intracranial hemorrhage. No mass effect or midline shift. Normal parenchymal attenuation. The ventricles and sulci are normal in size and configuration. Bilateral mastoid effusions, left larger than right.  Air-fluid level left sphenoid sinus with bony wall thickening.                                  Assessment/Plan:     * Hyperkalemia  Physician  was down for several hours and therefore patient's orders school not be completed that and therefore with ordering stat BMP and will continue treatment. High potassium was due to spironolactone effect.  This was prescribed for his cardiomyopathy.  Will hold spironolactone.  Will continue with Lasix      Mastoiditis of both sides  Will explain patient's dizziness.  Will start patient on IV Rocephin.  First dose was given earlier before epic was done.        VTE Risk Mitigation (From admission, onward)         Ordered     Place sequential compression device   Until discontinued      10/26/19 0611     IP VTE HIGH RISK PATIENT  Once      10/26/19 0611                   Ronnie Lynch MD  Department of Hospital Medicine   [facility]

## 2019-10-26 NOTE — SUBJECTIVE & OBJECTIVE
"Past Medical History:   Diagnosis Date    Adrenal adenoma     david;nl fxn w/u;tran 11/18    Aspiration pneumonia 10/15    puree/honey    Benign prostate hyperplasia     CAD (coronary artery disease)     dr padilla    Chronic pain     dr santos    Chronic systolic congestive heart failure 10/17/2019    COPD (chronic obstructive pulmonary disease)     papi    Ex-smoker     11/13    Hyperlipidemia     JUANITO on CPAP     cpap    Osteopenia 1/15 tran 1/18    PSVT (paroxysmal supraventricular tachycardia)     PVD (peripheral vascular disease)     noobs 07 khuri    Pyriform sinus cancer     dr ponce radiation 1/2-14 dr king perales    Squamous cell carcinoma of skin     roberto    Tongue cancer     "superficial" removed 11/14    Vocal cord cancer 2011    Xerostomia     radiation       Past Surgical History:   Procedure Laterality Date    APPENDECTOMY      BRONCHOSCOPY Bilateral 2/5/2019    Procedure: Bronchoscopy;  Surgeon: Santos Cardozo MD;  Location: [facility];  Service: Endoscopy;  Laterality: Bilateral;    COLONOSCOPY N/A 5/1/2017    Procedure: Due for screening colonoscopy;  Surgeon: Anushka Yoder MD;  Location: [facility];  Service: Endoscopy;  Laterality: N/A;    ESOPHAGOGASTRODUODENOSCOPY N/A 5/29/2018    Procedure: ESOPHAGOGASTRODUODENOSCOPY (EGD);  Surgeon: Audie Hill III, MD;  Location: [facility];  Service: Endoscopy;  Laterality: N/A;    ESOPHAGOGASTRODUODENOSCOPY N/A 8/29/2018    Procedure: ESOPHAGOGASTRODUODENOSCOPY (EGD);  Surgeon: Audie Hill III, MD;  Location: [facility];  Service: Endoscopy;  Laterality: N/A;    THORACENTESIS Left 8/7/2018    Procedure: Thoracentesis;  Surgeon: Santos Cardozo MD;  Location: [facility];  Service: Endoscopy;  Laterality: Left;    THORACENTESIS Right 2/25/2019    Procedure: Thoracentesis;  Surgeon: Santos Cardozo MD;  Location: [facility];  Service: Endoscopy;  Laterality: Right;    tongue cancer excision  11/14    dr" winifred    VOCAL CORD LATERALIZATION, ENDOSCOPIC APPROACH W/ MLB      kris       Review of patient's allergies indicates:   Allergen Reactions    Contrast media     Iodinated contrast media      Other reaction(s): Itching  Other reaction(s): Hives    Iodine Hives    Neomycin-bacitracin-polymyxin      Other reaction(s): Rash  Other reaction(s): Rash    Pravastatin      Other reaction(s): fatigue  Other reaction(s): fatigue    Rosuvastatin      Other reaction(s): fatigue  Other reaction(s): fatigue    Simvastatin      Other reaction(s): fatigue  Other reaction(s): fatigue    Statins-hmg-coa reductase inhibitors        No current facility-administered medications on file prior to encounter.      Current Outpatient Medications on File Prior to Encounter   Medication Sig    albuterol (PROAIR HFA) 90 mcg/actuation inhaler INL 2 PFS PO INTO THE LUNGS Q 4 H PRF WHZ OR SOB    albuterol (PROVENTIL) 2.5 mg /3 mL (0.083 %) nebulizer solution Take 3 mLs (2.5 mg total) by nebulization every 8 (eight) hours while awake.    amiodarone (PACERONE) 400 MG tablet Take 400 mg by mouth once daily.    arformoterol (BROVANA) 15 mcg/2 mL Nebu Take 2 mLs (15 mcg total) by nebulization 2 (two) times daily. Controller    aspirin 81 mg Tab Take 1 tablet by mouth Daily. Over the counter to help prevent stroke/heart attack    atorvastatin (LIPITOR) 80 MG tablet Take 1 tablet (80 mg total) by mouth once daily.    carvedilol (COREG) 12.5 MG tablet Take 1 tablet (12.5 mg total) by mouth 2 (two) times daily with meals.    CHANTIX CONTINUING MONTH BOX 1 mg Tab TAKE 1 TABLET BY MOUTH ONCE DAILY    clopidogrel (PLAVIX) 75 mg tablet Take 1 tablet (75 mg total) by mouth once daily.    clotrimazole (MYCELEX) 10 mg elissa     ELDERBERRY FRUIT AND FLOWER ORAL Take by mouth.    evolocumab (REPATHA SURECLICK) 140 mg/mL PnIj Inject 1 mL (140 mg total) into the skin every 14 (fourteen) days.    furosemide (LASIX) 20 MG tablet Take 1 tablet   Daily x 5 days , then after take PRN  For sob  Qd    gabapentin (NEURONTIN) 800 MG tablet Take 1 tablet (800 mg total) by mouth 3 (three) times daily.    garlic 2,000 mg Cap Take 1 tablet by mouth once daily.     lisinopril (PRINIVIL,ZESTRIL) 5 MG tablet Take 1 tablet (5 mg total) by mouth once daily.    loratadine (CLARITIN) 10 mg tablet Take 1 tablet by mouth daily as needed.     nebulizer and compressor Bailey Use as directed    oxyCODONE-acetaminophen (PERCOCET) 7.5-325 mg per tablet Take 1 tablet by mouth every 8 (eight) hours as needed for Pain.    pantoprazole (PROTONIX) 40 MG tablet TAKE ONE TABLET BY MOUTH ONCE DAILY    pilocarpine (SALAGEN) 5 MG Tab TAKE ONE TABLET BY MOUTH 30 MINUTES PRIOR TO EACH MEAL    spironolactone (ALDACTONE) 25 MG tablet Take 1 tablet (25 mg total) by mouth once daily.    tiotropium (SPIRIVA WITH HANDIHALER) 18 mcg inhalation capsule Inhale 1 capsule (18 mcg total) into the lungs once daily.    turmeric root extract 500 mg Cap Take 1 capsule by mouth 2 (two) times daily.     [DISCONTINUED] pilocarpine (SALAGEN) 5 MG Tab  TAKE 1 TABLET BY MOUTH 30 MINUTES PRIOR TO EACH MEAL     Family History     Problem Relation (Age of Onset)    Cancer Father, Brother        Tobacco Use    Smoking status: Current Every Day Smoker     Packs/day: 0.50     Years: 55.00     Pack years: 27.50    Smokeless tobacco: Never Used    Tobacco comment: 6-7 cigs/day   Substance and Sexual Activity    Alcohol use: Yes     Comment: 2-3 glasses of whiskey every evening    Drug use: No    Sexual activity: Not Currently     Review of Systems   Constitution: Positive for decreased appetite and malaise/fatigue.   HENT: Negative.    Eyes: Negative.    Cardiovascular: Positive for near-syncope.   Respiratory: Negative.    Endocrine: Negative.    Hematologic/Lymphatic: Negative.    Skin: Negative.    Musculoskeletal: Negative.    Gastrointestinal: Negative.    Genitourinary: Negative.    Neurological:  Positive for loss of balance, sensory change and weakness.   Psychiatric/Behavioral: Negative.    Allergic/Immunologic: Negative.      Objective:     Vital Signs (Most Recent):  Temp: 97.8 °F (36.6 °C) (10/26/19 1159)  Pulse: (!) 52 (10/26/19 1247)  Resp: 20 (10/26/19 1247)  BP: (!) 119/58 (10/26/19 1159)  SpO2: 100 % (10/26/19 1247) Vital Signs (24h Range):  Temp:  [97.6 °F (36.4 °C)-98 °F (36.7 °C)] 97.8 °F (36.6 °C)  Pulse:  [48-61] 52  Resp:  [12-20] 20  SpO2:  [95 %-100 %] 100 %  BP: ()/(43-67) 119/58     Weight: 65.7 kg (144 lb 13.5 oz)  Body mass index is 22.02 kg/m².    SpO2: 100 %  O2 Device (Oxygen Therapy): nasal cannula      Intake/Output Summary (Last 24 hours) at 10/26/2019 1330  Last data filed at 10/26/2019 0650  Gross per 24 hour   Intake 1400 ml   Output --   Net 1400 ml       Lines/Drains/Airways     Peripheral Intravenous Line                 Peripheral IV - Single Lumen 10/25/19 2252 20 G Right Forearm less than 1 day                Physical Exam   Constitutional: He is oriented to person, place, and time. He appears well-developed and well-nourished. No distress.   HENT:   Head: Normocephalic and atraumatic.   Nose: Nose normal.   Mouth/Throat: Oropharynx is clear and moist.   Eyes: Conjunctivae and EOM are normal. No scleral icterus.   Neck: Normal range of motion. Neck supple. No JVD present. No thyromegaly present.   Cardiovascular: Regular rhythm, S1 normal and S2 normal. Bradycardia present. Exam reveals no gallop, no S3, no S4 and no friction rub.   Murmur heard.  Pulmonary/Chest: Effort normal and breath sounds normal. No stridor. No respiratory distress. He has no wheezes. He has no rales. He exhibits no tenderness.   Abdominal: Soft. Bowel sounds are normal. He exhibits no distension and no mass. There is no tenderness. There is no rebound.   Genitourinary:   Genitourinary Comments: Deferred   Musculoskeletal: Normal range of motion. He exhibits no edema, tenderness or deformity.    Lymphadenopathy:     He has no cervical adenopathy.   Neurological: He is alert and oriented to person, place, and time. He exhibits normal muscle tone. Coordination normal.   Skin: Skin is warm and dry. No rash noted. He is not diaphoretic. No erythema. No pallor.   Psychiatric: He has a normal mood and affect. His behavior is normal. Judgment and thought content normal.   Nursing note and vitals reviewed.      Significant Labs:   All pertinent lab results from the last 24 hours have been reviewed. and   Recent Lab Results       10/26/19  1150   10/26/19  0851   10/26/19  0718   10/26/19  0715   10/26/19  0617        Albumin               Alkaline Phosphatase               ALT               Anion Gap         8     Appearance, UA     Clear         AST               Baso #         0.07     Basophil%         0.9     Bilirubin (UA)     Negative         BILIRUBIN TOTAL               BNP               BUN, Bld         21     Calcium         7.5     Chloride         110     CO2         22     Color, UA     Yellow         Creatinine         0.9     Differential Method         Automated     eGFR if          >60     eGFR if non          >60  Comment:  Calculation used to obtain the estimated glomerular filtration  rate (eGFR) is the CKD-EPI equation.        Eos #         0.1     Eosinophil%         0.9     Glucose         76     Glucose, UA     Negative         Gran # (ANC)         6.1     Gran%         78.3     Hematocrit         30.0     Hemoglobin         9.3     Immature Grans (Abs)         0.03  Comment:  Mild elevation in immature granulocytes is non specific and   can be seen in a variety of conditions including stress response,   acute inflammation, trauma and pregnancy. Correlation with other   laboratory and clinical findings is essential.       Immature Granulocytes         0.4     Coumadin Monitoring INR         1.0  Comment:  Coumadin Therapy:  2.0 - 3.0 for INR for all  indicators except mechanical heart valves  and antiphospholipid syndromes which should use 2.5 - 3.5.       Ketones, UA     Negative         Leukocytes, UA     Negative         Lymph #         0.7     Lymph%         9.3     Magnesium         1.6     MCH         30.6     MCHC         31.0     MCV         99     Mono #         0.8     Mono%         10.2     MPV         11.8     NITRITE UA     Negative         nRBC         0     Occult Blood UA     Trace         pH, UA     6.0         Phosphorus         3.7     Platelets         216     POCT Glucose       94       Potassium 5.3 5.0     4.5              4.5     PROTEIN TOTAL               Protein, UA     Negative  Comment:  Recommend a 24 hour urine protein or a urine   protein/creatinine ratio if globulin induced proteinuria is  clinically suspected.           Protime         10.9     RBC         3.04     RDW         15.9     Sodium         140     Specific [place], UA     1.010         Specimen UA     Urine, Clean Catch         Troponin I         0.022  Comment:  The reference interval for Troponin I represents the 99th percentile   cutoff   for our facility and is consistent with 3rd generation assay   performance.       UROBILINOGEN UA     Negative         WBC         7.82                      10/26/19  0128   10/25/19  2303        Albumin   2.8     Alkaline Phosphatase   56     ALT   12     Anion Gap   8     Appearance, UA         AST   14     Baso #   0.05     Basophil%   0.6     Bilirubin (UA)         BILIRUBIN TOTAL   0.5  Comment:  For infants and newborns, interpretation of results should be based  on gestational age, weight and in agreement with clinical  observations.  Premature Infant recommended reference ranges:  Up to 24 hours.............<8.0 mg/dL  Up to 48 hours............<12.0 mg/dL  3-5 days..................<15.0 mg/dL  6-29 days.................<15.0 mg/dL       BNP   522  Comment:  Values of less than 100 pg/ml are consistent with non-CHF  populations.     BUN, Bld   28     Calcium   9.2     Chloride   97     CO2   27     Color, UA         Creatinine   1.4     Differential Method   Automated     eGFR if    57     eGFR if non    49  Comment:  Calculation used to obtain the estimated glomerular filtration  rate (eGFR) is the CKD-EPI equation.        Eos #   0.1     Eosinophil%   0.6     Glucose   107     Glucose, UA         Gran # (ANC)   6.9     Gran%   80.0     Hematocrit   30.5     Hemoglobin   9.9     Immature Grans (Abs)   0.03  Comment:  Mild elevation in immature granulocytes is non specific and   can be seen in a variety of conditions including stress response,   acute inflammation, trauma and pregnancy. Correlation with other   laboratory and clinical findings is essential.       Immature Granulocytes   0.4     Coumadin Monitoring INR   1.0  Comment:  Coumadin Therapy:  2.0 - 3.0 for INR for all indicators except mechanical heart valves  and antiphospholipid syndromes which should use 2.5 - 3.5.       Ketones, UA         Leukocytes, UA         Lymph #   0.8     Lymph%   9.2     Magnesium         MCH   31.4     MCHC   32.5     MCV   97     Mono #   0.8     Mono%   9.2     MPV   12.2     NITRITE UA         nRBC   0     Occult Blood UA         pH, UA         Phosphorus         Platelets   233     POCT Glucose 135       Potassium   6.6  Comment:  K  critical result(s) called and verbal readback obtained from   Naomie Johnson RN, 10/26/2019 00:17       PROTEIN TOTAL   6.6     Protein, UA         Protime   10.7     RBC   3.15     RDW   16.2     Sodium   132     Specific Gravity, UA         Specimen UA         Troponin I   0.022  Comment:  The reference interval for Troponin I represents the 99th percentile   cutoff   for our facility and is consistent with 3rd generation assay   performance.       UROBILINOGEN UA         WBC   8.56           Significant Imaging: Echocardiogram:   2D echo with color flow doppler:    Results for orders placed or performed during the hospital encounter of 09/13/19   2D echo with color flow doppler   Result Value Ref Range    QEF 55 55 - 65    Est. PA Systolic Pressure 23.98     Narrative    Date of Procedure: 09/15/2019        TEST DESCRIPTION   Technical Quality: This is a technically challenging study. There is poor endocardial definition.     Aorta: The aortic root is normal in size, measuring 3.0 cm at sinotubular junction and 3.3 cm at Sinuses of Valsalva. The proximal ascending aorta is normal in size, measuring 3.3 cm across.     Left Atrium: The left atrial volume index is severely enlarged, measuring 87.13 cc/m2.     Left Ventricle: The left ventricle is normal in size, with an end-diastolic diameter of 5.3 cm, and an end-systolic diameter of 3.6 cm. LV wall thickness is normal, with the septum and the posterior wall each measuring 1.3 cm across. Relative wall   thickness was increased at 0.49, and the LV mass index was increased at 189.2 g/m2 consistent with concentric left ventricular hypertrophy. There are no regional wall motion abnormalities. Left ventricular systolic function appears normal. Visually   estimated ejection fraction is 55-60%. The LV Doppler derived stroke volume equals 47.0 ccs.     Diastolic indices: E wave velocity 1.0 m/s, E/A ratio 1.8,  msec., E/e' ratio(avg) 12. Diastolic function is indeterminate.     Right Atrium: The right atrium is normal in size, measuring 6.3 cm in length and 5.0 cm in width in the apical view.     Right Ventricle: The right ventricle is normal in size measuring 2.3 cm at the base in the apical right ventricle-focused view. Global right ventricular systolic function appears normal. Tricuspid annular plane systolic excursion (TAPSE) is 2.9 cm. The   estimated PA systolic pressure is 24 mmHg.     Aortic Valve:  The aortic valve is mildly sclerotic.     Mitral Valve:  The mitral valve is normal in structure. The pressure half time is  61 msec. The calculated mitral valve area is 3.61 cm2.     Tricuspid Valve:  The tricuspid valve is normal in structure.     Pulmonary Valve:  The pulmonic valve is not well seen.     IVC: IVC is normal in size and collapses > 50% with a sniff, suggesting normal right atrial pressure of 3 mmHg.     Intracavitary: There is no evidence of pericardial effusion, intracavity mass, thrombi, or vegetation.         CONCLUSIONS     1 - Normal left ventricular systolic function (EF 55-60%).     2 - Indeterminate LV diastolic function.     3 - Normal right ventricular systolic function .             This document has been electronically    SIGNED BY: Stan Yeager MD On: 09/15/2019 15:00    and X-Ray: CXR: X-Ray Chest 1 View (CXR): No results found for this visit on 10/25/19.

## 2019-10-26 NOTE — ASSESSMENT & PLAN NOTE
-Cardiology following   -home medications resumed with Spironolactone, Lopressor, and Amiodarone held

## 2019-10-26 NOTE — PROGRESS NOTES
[facility] Hospital Medicine  Progress Note    Patient Name: Noble Tripp  MRN: [id number]  Patient Class: OP- Observation   Admission Date: 10/25/2019  Length of Stay: 0 days  Attending Physician: Ronnie Lynch MD  Primary Care Provider: Dwaine Davis MD        Subjective:     Principal Problem:Hyperkalemia        HPI:  The patient is a 74-year-old man presenting with dizziness and weakness with some mild confusion.  The patient has a history of cardiomyopathy with an ejection fraction of 25% to his medications were recently changed while it heals stent.  Patient states with the son that he had a myocardial fraction and was treated with cardiac catheterization at [facility] where he was told that he had 90% blockage in [place] and 67% blockage roughly in another main artery.  He was placed on spironolactone and a beta-blocker.    Overview/Hospital Course:  Pt admitted to Observation Unit for Altered Mental Status in the setting of bilateral mastoiditis. Hyperkalemia also noted.  EKG showed JR with HR 47.  Cardiology consulted and Amiodarone held.  Potassium improved with Kayexalate and Spironolactone held.  H/H stable and kidney function improved.  Bradycardia noted with HR 57.      Interval History: Pt states he is feeling better and verbalized symptom improvement.  K improved with Kayexalate continued and Spironolactone held. EKG showed JR with HR 47 with Lopressor held.  Troponin remained within normal limits.  Cardiology consulted due to rhythm changes and recent MI.  H/H stable and sodium normalized.     Review of Systems   Constitutional: Positive for fatigue. Negative for activity change, appetite change, chills and fever.   HENT: Positive for congestion, hearing loss, postnasal drip and sinus pressure. Negative for dental problem, drooling, ear discharge, ear pain, facial swelling, mouth sores, nosebleeds, sneezing, sore throat and tinnitus.    Eyes: Negative for  photophobia, pain, discharge, redness, itching and visual disturbance.   Respiratory: Positive for cough. Negative for apnea, choking, chest tightness, shortness of breath, wheezing and stridor.    Cardiovascular: Negative for chest pain, palpitations and leg swelling.   Gastrointestinal: Negative for abdominal distention, abdominal pain, anal bleeding, blood in stool, constipation and diarrhea.   Endocrine: Negative for cold intolerance, heat intolerance, polydipsia, polyphagia and polyuria.   Genitourinary: Negative for difficulty urinating, dysuria, flank pain, frequency, hematuria and urgency.   Musculoskeletal: Negative for arthralgias, back pain, gait problem, joint swelling, myalgias, neck pain and neck stiffness.   Skin: Negative for color change, pallor, rash and wound.   Neurological: Positive for weakness. Negative for dizziness, tremors, seizures, syncope, facial asymmetry, speech difficulty, light-headedness and headaches.   Hematological: Negative for adenopathy. Does not bruise/bleed easily.   Psychiatric/Behavioral: Negative for agitation, behavioral problems, confusion, decreased concentration, dysphoric mood, hallucinations, sleep disturbance and suicidal ideas. The patient is not hyperactive.    All other systems reviewed and are negative.    Objective:     Vital Signs (Most Recent):  Temp: 97.4 °F (36.3 °C) (10/26/19 1517)  Pulse: 61 (10/26/19 1517)  Resp: 18 (10/26/19 1517)  BP: 135/65 (10/26/19 1517)  SpO2: 98 % (10/26/19 1517) Vital Signs (24h Range):  Temp:  [97.4 °F (36.3 °C)-98 °F (36.7 °C)] 97.4 °F (36.3 °C)  Pulse:  [48-61] 61  Resp:  [12-20] 18  SpO2:  [95 %-100 %] 98 %  BP: ()/(43-67) 135/65     Weight: 65.7 kg (144 lb 13.5 oz)  Body mass index is 22.02 kg/m².    Intake/Output Summary (Last 24 hours) at 10/26/2019 1656  Last data filed at 10/26/2019 0650  Gross per 24 hour   Intake 1400 ml   Output --   Net 1400 ml      Physical Exam   Constitutional: He appears well-developed and  well-nourished. No distress.   HENT:   Head: Normocephalic and atraumatic.   Nose: Nose normal.   Mouth/Throat: Oropharynx is clear and moist.   Eyes: Pupils are equal, round, and reactive to light. Conjunctivae and EOM are normal. Right eye exhibits no discharge. Left eye exhibits no discharge. No scleral icterus.   Neck: Normal range of motion. Neck supple. No JVD present. No tracheal deviation present. No thyromegaly present.   Cardiovascular: Normal rate, regular rhythm, normal heart sounds and intact distal pulses. Exam reveals no gallop and no friction rub.   No murmur heard.  Pulmonary/Chest: Effort normal and breath sounds normal. No stridor. No respiratory distress. He has no wheezes. He has no rales. He exhibits no tenderness.   Abdominal: Soft. Bowel sounds are normal. He exhibits no distension and no mass. There is no tenderness. There is no rebound and no guarding.   Genitourinary:   Genitourinary Comments: Deferred    Musculoskeletal: Normal range of motion. He exhibits edema. He exhibits no tenderness or deformity.   Lymphadenopathy:     He has no cervical adenopathy.   Neurological: He is alert. He displays normal reflexes. No cranial nerve deficit or sensory deficit. He exhibits normal muscle tone. Coordination normal.   Skin: Skin is warm and dry. Capillary refill takes less than 2 seconds. He is not diaphoretic. No erythema. No pallor.   Psychiatric: He has a normal mood and affect. His behavior is normal. Judgment and thought content normal.   Nursing note and vitals reviewed.      Significant Labs:   CBC:   Recent Labs   Lab 10/25/19  2303 10/26/19  0617   WBC 8.56 7.82   HGB 9.9* 9.3*   HCT 30.5* 30.0*    216     CMP:   Recent Labs   Lab 10/25/19  2303 10/26/19  0617 10/26/19  0851 10/26/19  1150   * 140  --   --    K 6.6* 4.5  4.5 5.0 5.3*   CL 97 110  --   --    CO2 27 22*  --   --     76  --   --    BUN 28* 21  --   --    CREATININE 1.4 0.9  --   --    CALCIUM 9.2 7.5*   --   --    PROT 6.6  --   --   --    ALBUMIN 2.8*  --   --   --    BILITOT 0.5  --   --   --    ALKPHOS 56  --   --   --    AST 14  --   --   --    ALT 12  --   --   --    ANIONGAP 8 8  --   --    EGFRNONAA 49* >60  --   --      Magnesium:   Recent Labs   Lab 10/26/19  0617   MG 1.6     Troponin:   Recent Labs   Lab 10/25/19  2303 10/26/19  0617   TROPONINI 0.022 0.022       Significant Imaging:   Imaging Results          X-Ray Chest AP Portable (Final result)  Result time 10/26/19 00:15:40    Final result by Edson Olguin Jr., MD (10/26/19 00:15:40)                 Impression:      No significant change.  Pleuroparenchymal disease within the left lower chest is similar to multiple prior exams.  Persistent trace bilateral pleural effusions and scarring at the left apex.      Electronically signed by: Edson Olguin Jr., MD  Date:    10/26/2019  Time:    00:15             Narrative:    EXAMINATION:  XR CHEST AP PORTABLE    CLINICAL HISTORY:  Chest Pain;    COMPARISON:  Prior radiograph from October 23, 2019.    FINDINGS:  Slightly low lung volumes.  Again demonstrated are small bilateral pleural effusions.  Patchy airspace disease within the left mid and lower lung is unchanged.  Apical fibronodular scarring bilaterally, left greater than right.  No pneumothorax.  Heart size within normal limits.  Calcific atheromatous change of the aortic knob.  No significant bony findings.                               CT Head Without Contrast (Final result)  Result time 10/25/19 23:43:41    Final result by Edson Olguin Jr., MD (10/25/19 23:43:41)                 Impression:      No acute findings.  Bilateral mastoid effusions, left larger than right.  Acute on chronic sinusitis change of the left sphenoid sinus.    All CT scans at this facility use dose modulation, iterative reconstruction, and/or weight base dosing when appropriate to reduce radiation dose to as low as reasonably achievable.      Electronically signed  by: Edson Olguin Jr., MD  Date:    10/25/2019  Time:    23:43             Narrative:    EXAMINATION:  CT HEAD WITHOUT CONTRAST    CLINICAL HISTORY:  Confusion/delirium, altered LOC, unexplained;Dizziness;    TECHNIQUE:  Contiguous axial images were obtained from the skull base through the vertex without intravenous contrast.    COMPARISON:  None    FINDINGS:  No intracranial hemorrhage. No mass effect or midline shift. Normal parenchymal attenuation. The ventricles and sulci are normal in size and configuration. Bilateral mastoid effusions, left larger than right.  Air-fluid level left sphenoid sinus with bony wall thickening.                                Assessment/Plan:      * Hyperkalemia  -High potassium in the setting of spironolactone   -Will hold spironolactone  -Kayexalate given   -Lasix continued       Mastoiditis of both sides  Will explain patient's dizziness.    IV Rocephin.   -confirmed per CT imaging       Bradycardia  -Amiodarone and Lopressor held   -Cardiology consulted   -EKG with JR HR 47 noted upon admit      Cardiac left ventricular ejection fraction 21-40 percent  -Cardiology following   -home medications resumed with Spironolactone, Lopressor, and Amiodarone held       Coronary artery disease involving native coronary artery of native heart without angina pectoris  -home medications resumed with Spironolactone held   -Lasix, Statin, ASA, and Plavix continued   -Coreg and Amiodarone held due to bradycardia   -PET scan with revascularization planned         VTE Risk Mitigation (From admission, onward)         Ordered     Place sequential compression device  Until discontinued      10/26/19 0611     IP VTE HIGH RISK PATIENT  Once      10/26/19 0611                      Rosa Gutierrez NP  Department of Hospital Medicine   [facility]

## 2019-10-26 NOTE — ED PROVIDER NOTES
SCRIBE #1 NOTE: I, Shahzad Teodoro, am scribing for, and in the presence of, Rina Singleton MD. I have scribed the entire note.       History     Chief Complaint   Patient presents with    Altered Mental Status     Pt states he has been confused for the last few days and just not feeling right.  States thinks his electorlytes are off.     Review of patient's allergies indicates:   Allergen Reactions    Contrast media     Iodinated contrast media      Other reaction(s): Itching  Other reaction(s): Hives    Iodine Hives    Neomycin-bacitracin-polymyxin      Other reaction(s): Rash  Other reaction(s): Rash    Pravastatin      Other reaction(s): fatigue  Other reaction(s): fatigue    Rosuvastatin      Other reaction(s): fatigue  Other reaction(s): fatigue    Simvastatin      Other reaction(s): fatigue  Other reaction(s): fatigue    Statins-hmg-coa reductase inhibitors          History of Present Illness     HPI    10/25/2019, 10:10 PM  History obtained from the patient      History of Present Illness: Noble Tripp is a 74 y.o. male patient who presents to the Emergency Department for evaluation of confusion which onset gradually several days ago. Symptoms are constant and moderate in severity. No mitigating or exacerbating factors reported. Associated sxs include dizziness and generalized weakness. Patient denies any extremity weakness/numbness, speech changes, HA, light-headedness, and all other sxs at this time. No prior Tx reported. No further complaints or concerns at this time. Family states pt is increasingly forgetful, including leaving shower running and coffee pot on. Pt reports recent MI several weeks ago.      Arrival mode: Personal transportation    PCP: Dwaine Davis MD        Past Medical History:  Past Medical History:   Diagnosis Date    Adrenal adenoma     david;nl fxn w/u;tran 11/18    Aspiration pneumonia 10/15    puree/honey    Benign prostate hyperplasia     CAD  "(coronary artery disease)     dr padilla    Chronic pain     dr santos    Chronic systolic congestive heart failure 10/17/2019    COPD (chronic obstructive pulmonary disease)     papi    Ex-smoker     11/13    Hyperlipidemia     JUANITO on CPAP     cpap    Osteopenia 1/15 tran 1/18    PSVT (paroxysmal supraventricular tachycardia)     PVD (peripheral vascular disease)     noobs 07 khuri    Pyriform sinus cancer     dr ponce radiation 1/2-14 dr king perales    Squamous cell carcinoma of skin     roberto    Tongue cancer     "superficial" removed 11/14    Vocal cord cancer 2011    Xerostomia     radiation       Past Surgical History:  Past Surgical History:   Procedure Laterality Date    APPENDECTOMY      BRONCHOSCOPY Bilateral 2/5/2019    Procedure: Bronchoscopy;  Surgeon: Santos Cardozo MD;  Location: [facility];  Service: Endoscopy;  Laterality: Bilateral;    COLONOSCOPY N/A 5/1/2017    Procedure: Due for screening colonoscopy;  Surgeon: Anushka Yoder MD;  Location: [facility];  Service: Endoscopy;  Laterality: N/A;    ESOPHAGOGASTRODUODENOSCOPY N/A 5/29/2018    Procedure: ESOPHAGOGASTRODUODENOSCOPY (EGD);  Surgeon: Audie Hill III, MD;  Location: [facility];  Service: Endoscopy;  Laterality: N/A;    ESOPHAGOGASTRODUODENOSCOPY N/A 8/29/2018    Procedure: ESOPHAGOGASTRODUODENOSCOPY (EGD);  Surgeon: Audie Hill III, MD;  Location: [facility];  Service: Endoscopy;  Laterality: N/A;    THORACENTESIS Left 8/7/2018    Procedure: Thoracentesis;  Surgeon: Santos Cardozo MD;  Location: [facility];  Service: Endoscopy;  Laterality: Left;    THORACENTESIS Right 2/25/2019    Procedure: Thoracentesis;  Surgeon: Santos Cardozo MD;  Location: [facility];  Service: Endoscopy;  Laterality: Right;    tongue cancer excision  11/14    dr vitale    VOCAL CORD LATERALIZATION, ENDOSCOPIC APPROACH W/ MORENA ponce         Family History:  Family History   Problem Relation Age of Onset    Cancer " Father     Cancer Brother        Social History:  Social History     Tobacco Use    Smoking status: Current Every Day Smoker     Packs/day: 0.50     Years: 55.00     Pack years: 27.50    Smokeless tobacco: Never Used    Tobacco comment: 6-7 cigs/day   Substance and Sexual Activity    Alcohol use: Yes     Comment: 2-3 glasses of whiskey every evening    Drug use: No    Sexual activity: Not Currently        Review of Systems     Review of Systems   Constitutional: Negative for fever.        + generalized weakness   HENT: Negative for sore throat.    Respiratory: Negative for shortness of breath.    Cardiovascular: Negative for chest pain.   Gastrointestinal: Negative for nausea.   Genitourinary: Negative for dysuria.   Musculoskeletal: Negative for back pain.   Skin: Negative for rash.   Neurological: Positive for dizziness. Negative for speech difficulty, weakness, light-headedness, numbness and headaches.   Hematological: Does not bruise/bleed easily.   Psychiatric/Behavioral: Positive for confusion.   All other systems reviewed and are negative.       Physical Exam     Initial Vitals [10/25/19 2200]   BP Pulse Resp Temp SpO2   (!) 110/43 (!) 55 20 97.8 °F (36.6 °C) 95 %      MAP       --          Physical Exam  Nursing Notes and Vital Signs Reviewed.  Constitutional: Patient is in no acute distress. Well-developed and well-nourished.  Head: Atraumatic. Normocephalic.  Eyes: PERRL. EOM intact. Conjunctivae are not pale. No scleral icterus.  ENT: Mucous membranes are moist. Oropharynx is clear and symmetric.    Neck: Supple. Full ROM. No lymphadenopathy.  Cardiovascular: Bradycardic. Regular rhythm. No murmurs, rubs, or gallops. Distal pulses are 2+ and symmetric.  Pulmonary/Chest: Coarse breath sounds bilaterally. No respiratory distress. Occasional wheezing. No rales.  Abdominal: Soft and non-distended.  There is no tenderness.  No rebound, guarding, or rigidity. Good bowel sounds.  Genitourinary: No CVA  "tenderness  Musculoskeletal: Moves all extremities. No obvious deformities. No calf tenderness.  Skin: Warm and dry.  Neurological:  Alert, awake, and appropriate.  Normal speech.  No acute focal neurological deficits are appreciated.  Psychiatric: Normal affect. Good eye contact. Appropriate in content.     ED Course   Procedures  ED Vital Signs:  Vitals:    10/26/19 0756 10/26/19 0800 10/26/19 0832 10/26/19 1155   BP: (!) 143/64      Pulse: (!) 57  61    Resp: 18      Temp: 97.6 °F (36.4 °C)      TempSrc: Oral      SpO2: 98% 98%     Weight:    65.7 kg (144 lb 13.5 oz)   Height:    5' 8" (1.727 m)    10/26/19 1159 10/26/19 1202 10/26/19 1205 10/26/19 1247   BP: (!) 119/58      Pulse: (!) 54   (!) 52   Resp: 18   20   Temp: 97.8 °F (36.6 °C)      TempSrc:       SpO2: 100%   100%   Weight:  65.7 kg (144 lb 13.5 oz)     Height:  5' 8" (1.727 m) 5' 8" (1.727 m)     10/26/19 1255 10/26/19 1349 10/26/19 1511 10/26/19 1517   BP:    135/65   Pulse:  (!) 56 (!) 55 61   Resp:    18   Temp:    97.4 °F (36.3 °C)   TempSrc:    Oral   SpO2:    98%   Weight: 65.7 kg (144 lb 13.5 oz)      Height:        10/26/19 1700 10/26/19 1936 10/26/19 1947   BP:   (!) 121/57   Pulse: (!) 58 69 63   Resp:  16 18   Temp:   97.8 °F (36.6 °C)   TempSrc:   Oral   SpO2:  99% (!) 94%   Weight:      Height:          Abnormal Lab Results:  Labs Reviewed   CBC W/ AUTO DIFFERENTIAL - Abnormal; Notable for the following components:       Result Value    RBC 3.15 (*)     Hemoglobin 9.9 (*)     Hematocrit 30.5 (*)     Mean Corpuscular Hemoglobin 31.4 (*)     RDW 16.2 (*)     Lymph # 0.8 (*)     Gran% 80.0 (*)     Lymph% 9.2 (*)     All other components within normal limits   COMPREHENSIVE METABOLIC PANEL - Abnormal; Notable for the following components:    Sodium 132 (*)     Potassium 6.6 (*)     BUN, Bld 28 (*)     Albumin 2.8 (*)     eGFR if  57 (*)     eGFR if non  49 (*)     All other components within normal limits    " Narrative:      K  critical result(s) called and verbal readback obtained from   Naomie Johnson RN, 10/26/2019 00:17   URINALYSIS - Abnormal; Notable for the following components:    Occult Blood UA Trace (*)     All other components within normal limits   B-TYPE NATRIURETIC PEPTIDE - Abnormal; Notable for the following components:     (*)     All other components within normal limits   BASIC METABOLIC PANEL - Abnormal; Notable for the following components:    CO2 22 (*)     Calcium 7.5 (*)     All other components within normal limits    Narrative:     Continue until potassium is less than 5.4 mEq/L   CBC W/ AUTO DIFFERENTIAL - Abnormal; Notable for the following components:    RBC 3.04 (*)     Hemoglobin 9.3 (*)     Hematocrit 30.0 (*)     Mean Corpuscular Volume 99 (*)     Mean Corpuscular Hemoglobin Conc 31.0 (*)     RDW 15.9 (*)     Lymph # 0.7 (*)     Gran% 78.3 (*)     Lymph% 9.3 (*)     All other components within normal limits    Narrative:     Continue until potassium is less than 5.4 mEq/L   POCT GLUCOSE - Abnormal; Notable for the following components:    POCT Glucose 135 (*)     All other components within normal limits   TROPONIN I   PROTIME-INR   POTASSIUM    Narrative:     Continue until potassium is less than 5.4 mEq/L   MAGNESIUM    Narrative:     Continue until potassium is less than 5.4 mEq/L   PHOSPHORUS    Narrative:     Continue until potassium is less than 5.4 mEq/L   TROPONIN I    Narrative:     Continue until potassium is less than 5.4 mEq/L   PROTIME-INR    Narrative:     Continue until potassium is less than 5.4 mEq/L   POCT GLUCOSE   POCT GLUCOSE MONITORING CONTINUOUS   POCT GLUCOSE MONITORING CONTINUOUS        All Lab Results:  Results for orders placed or performed during the hospital encounter of 10/25/19   CBC auto differential   Result Value Ref Range    WBC 8.56 3.90 - 12.70 K/uL    RBC 3.15 (L) 4.60 - 6.20 M/uL    Hemoglobin 9.9 (L) 14.0 - 18.0 g/dL    Hematocrit 30.5 (L)  40.0 - 54.0 %    Mean Corpuscular Volume 97 82 - 98 fL    Mean Corpuscular Hemoglobin 31.4 (H) 27.0 - 31.0 pg    Mean Corpuscular Hemoglobin Conc 32.5 32.0 - 36.0 g/dL    RDW 16.2 (H) 11.5 - 14.5 %    Platelets 233 150 - 350 K/uL    MPV 12.2 9.2 - 12.9 fL    Immature Granulocytes 0.4 0.0 - 0.5 %    Gran # (ANC) 6.9 1.8 - 7.7 K/uL    Immature Grans (Abs) 0.03 0.00 - 0.04 K/uL    Lymph # 0.8 (L) 1.0 - 4.8 K/uL    Mono # 0.8 0.3 - 1.0 K/uL    Eos # 0.1 0.0 - 0.5 K/uL    Baso # 0.05 0.00 - 0.20 K/uL    nRBC 0 0 /100 WBC    Gran% 80.0 (H) 38.0 - 73.0 %    Lymph% 9.2 (L) 18.0 - 48.0 %    Mono% 9.2 4.0 - 15.0 %    Eosinophil% 0.6 0.0 - 8.0 %    Basophil% 0.6 0.0 - 1.9 %    Differential Method Automated    Comprehensive metabolic panel   Result Value Ref Range    Sodium 132 (L) 136 - 145 mmol/L    Potassium 6.6 (HH) 3.5 - 5.1 mmol/L    Chloride 97 95 - 110 mmol/L    CO2 27 23 - 29 mmol/L    Glucose 107 70 - 110 mg/dL    BUN, Bld 28 (H) 8 - 23 mg/dL    Creatinine 1.4 0.5 - 1.4 mg/dL    Calcium 9.2 8.7 - 10.5 mg/dL    Total Protein 6.6 6.0 - 8.4 g/dL    Albumin 2.8 (L) 3.5 - 5.2 g/dL    Total Bilirubin 0.5 0.1 - 1.0 mg/dL    Alkaline Phosphatase 56 55 - 135 U/L    AST 14 10 - 40 U/L    ALT 12 10 - 44 U/L    Anion Gap 8 8 - 16 mmol/L    eGFR if African American 57 (A) >60 mL/min/1.73 m^2    eGFR if non African American 49 (A) >60 mL/min/1.73 m^2   Urinalysis - Clean Catch   Result Value Ref Range    Specimen UA Urine, Clean Catch     Color, UA Yellow Yellow, Straw, Annelise    Appearance, UA Clear Clear    pH, UA 6.0 5.0 - 8.0    Specific Gravity, UA 1.010 1.005 - 1.030    Protein, UA Negative Negative    Glucose, UA Negative Negative    Ketones, UA Negative Negative    Bilirubin (UA) Negative Negative    Occult Blood UA Trace (A) Negative    Nitrite, UA Negative Negative    Urobilinogen, UA Negative <2.0 EU/dL    Leukocytes, UA Negative Negative   B-Type natriuretic peptide (BNP)   Result Value Ref Range     (H) 0 - 99  pg/mL   Troponin I   Result Value Ref Range    Troponin I 0.022 0.000 - 0.026 ng/mL   Protime-INR   Result Value Ref Range    Prothrombin Time 10.7 9.0 - 12.5 sec    INR 1.0 0.8 - 1.2   Potassium   Result Value Ref Range    Potassium 4.5 3.5 - 5.1 mmol/L   Basic Metabolic Panel (BMP)   Result Value Ref Range    Sodium 140 136 - 145 mmol/L    Potassium 4.5 3.5 - 5.1 mmol/L    Chloride 110 95 - 110 mmol/L    CO2 22 (L) 23 - 29 mmol/L    Glucose 76 70 - 110 mg/dL    BUN, Bld 21 8 - 23 mg/dL    Creatinine 0.9 0.5 - 1.4 mg/dL    Calcium 7.5 (L) 8.7 - 10.5 mg/dL    Anion Gap 8 8 - 16 mmol/L    eGFR if African American >60 >60 mL/min/1.73 m^2    eGFR if non African American >60 >60 mL/min/1.73 m^2   Magnesium   Result Value Ref Range    Magnesium 1.6 1.6 - 2.6 mg/dL   Phosphorus   Result Value Ref Range    Phosphorus 3.7 2.7 - 4.5 mg/dL   Troponin I   Result Value Ref Range    Troponin I 0.022 0.000 - 0.026 ng/mL   CBC with Automated Differential   Result Value Ref Range    WBC 7.82 3.90 - 12.70 K/uL    RBC 3.04 (L) 4.60 - 6.20 M/uL    Hemoglobin 9.3 (L) 14.0 - 18.0 g/dL    Hematocrit 30.0 (L) 40.0 - 54.0 %    Mean Corpuscular Volume 99 (H) 82 - 98 fL    Mean Corpuscular Hemoglobin 30.6 27.0 - 31.0 pg    Mean Corpuscular Hemoglobin Conc 31.0 (L) 32.0 - 36.0 g/dL    RDW 15.9 (H) 11.5 - 14.5 %    Platelets 216 150 - 350 K/uL    MPV 11.8 9.2 - 12.9 fL    Immature Granulocytes 0.4 0.0 - 0.5 %    Gran # (ANC) 6.1 1.8 - 7.7 K/uL    Immature Grans (Abs) 0.03 0.00 - 0.04 K/uL    Lymph # 0.7 (L) 1.0 - 4.8 K/uL    Mono # 0.8 0.3 - 1.0 K/uL    Eos # 0.1 0.0 - 0.5 K/uL    Baso # 0.07 0.00 - 0.20 K/uL    nRBC 0 0 /100 WBC    Gran% 78.3 (H) 38.0 - 73.0 %    Lymph% 9.3 (L) 18.0 - 48.0 %    Mono% 10.2 4.0 - 15.0 %    Eosinophil% 0.9 0.0 - 8.0 %    Basophil% 0.9 0.0 - 1.9 %    Differential Method Automated    PT/INR   Result Value Ref Range    Prothrombin Time 10.9 9.0 - 12.5 sec    INR 1.0 0.8 - 1.2   Potassium   Result Value Ref Range     Potassium 5.0 3.5 - 5.1 mmol/L   Potassium   Result Value Ref Range    Potassium 5.3 (H) 3.5 - 5.1 mmol/L   Potassium   Result Value Ref Range    Potassium 5.2 (H) 3.5 - 5.1 mmol/L   Potassium   Result Value Ref Range    Potassium 4.5 3.5 - 5.1 mmol/L   POCT glucose   Result Value Ref Range    POCT Glucose 135 (H) 70 - 110 mg/dL   POCT glucose   Result Value Ref Range    POCT Glucose 94 70 - 110 mg/dL         Imaging Results:  Imaging Results          X-Ray Chest AP Portable (Final result)  Result time 10/26/19 00:15:40    Final result by Edson Olguin Jr., MD (10/26/19 00:15:40)                 Impression:      No significant change.  Pleuroparenchymal disease within the left lower chest is similar to multiple prior exams.  Persistent trace bilateral pleural effusions and scarring at the left apex.      Electronically signed by: Edson Olguin Jr., MD  Date:    10/26/2019  Time:    00:15             Narrative:    EXAMINATION:  XR CHEST AP PORTABLE    CLINICAL HISTORY:  Chest Pain;    COMPARISON:  Prior radiograph from October 23, 2019.    FINDINGS:  Slightly low lung volumes.  Again demonstrated are small bilateral pleural effusions.  Patchy airspace disease within the left mid and lower lung is unchanged.  Apical fibronodular scarring bilaterally, left greater than right.  No pneumothorax.  Heart size within normal limits.  Calcific atheromatous change of the aortic knob.  No significant bony findings.                               CT Head Without Contrast (Final result)  Result time 10/25/19 23:43:41    Final result by Edson Olguin Jr., MD (10/25/19 23:43:41)                 Impression:      No acute findings.  Bilateral mastoid effusions, left larger than right.  Acute on chronic sinusitis change of the left sphenoid sinus.    All CT scans at this facility use dose modulation, iterative reconstruction, and/or weight base dosing when appropriate to reduce radiation dose to as low as reasonably  achievable.      Electronically signed by: Edson Olguin Jr., MD  Date:    10/25/2019  Time:    23:43             Narrative:    EXAMINATION:  CT HEAD WITHOUT CONTRAST    CLINICAL HISTORY:  Confusion/delirium, altered LOC, unexplained;Dizziness;    TECHNIQUE:  Contiguous axial images were obtained from the skull base through the vertex without intravenous contrast.    COMPARISON:  None    FINDINGS:  No intracranial hemorrhage. No mass effect or midline shift. Normal parenchymal attenuation. The ventricles and sulci are normal in size and configuration. Bilateral mastoid effusions, left larger than right.  Air-fluid level left sphenoid sinus with bony wall thickening.                                 The EKG was ordered, reviewed, and independently interpreted by the ED provider.  Interpretation time: 2324  Rate: 47 BPM  Rhythm: junctional rhythm  Interpretation: ST abnormality, possible digitalis effect. No STEMI.         The Emergency Provider reviewed the vital signs and test results, which are outlined above.     ED Discussion       12:53 AM: Discussed case with Kathie Gottlieb NP ([facility]). Dr. Lynch agrees with current care and management of pt and accepts admission.   Admitting Service: Hospital Medicine  Admitting Physician: Dr. Lynch  Admit to: Obs/tele    Re-evaluated pt. I have discussed test results, shared treatment plan, and the need for admission with patient and family at bedside. Pt and family express understanding at this time and agree with all information. All questions answered. Pt and family have no further questions or concerns at this time. Pt is ready for admit.       MDM        Medical Decision Making:   Clinical Tests:   Lab Tests: Ordered and Reviewed  Radiological Study: Reviewed and Ordered  Medical Tests: Reviewed and Ordered           ED Medication(s):  Medications   sodium polystyrene 15 gram/60 mL suspension 30 g (30 g Oral Not Given 10/26/19 1000)   calcium gluconate 1g in  dextrose 5% 100mL (ready to mix system) (0 g Intravenous Stopped 10/26/19 0203)     And   calcium gluconate 1g in dextrose 5% 100mL (ready to mix system) (has no administration in time range)   dextrose 10% (D10W) Bolus (has no administration in time range)   arformoterol nebulizer solution 15 mcg (15 mcg Nebulization Given 10/26/19 1936)   aspirin chewable tablet 81 mg (81 mg Oral Given 10/26/19 0845)   clopidogrel tablet 75 mg (75 mg Oral Given 10/26/19 0845)   furosemide tablet 40 mg (40 mg Oral Given 10/26/19 0845)   pantoprazole EC tablet 40 mg (40 mg Oral Given 10/26/19 0845)   pilocarpine tablet 5 mg (5 mg Oral Given 10/26/19 1541)   oxyCODONE-acetaminophen 7.5-325 mg per tablet 1 tablet (1 tablet Oral Given 10/26/19 0832)   cefTRIAXone (ROCEPHIN) 1 g in dextrose 5 % 50 mL IVPB (0 g Intravenous Stopped 10/26/19 0650)   sodium chloride 0.9% flush 10 mL (has no administration in time range)   glucose chewable tablet 16 g (has no administration in time range)   glucose chewable tablet 24 g (has no administration in time range)   glucagon (human recombinant) injection 1 mg (has no administration in time range)   ibuprofen tablet 400 mg (has no administration in time range)   potassium chloride 10% oral solution 40 mEq (has no administration in time range)   potassium chloride 10% oral solution 40 mEq (has no administration in time range)   magnesium oxide tablet 800 mg (has no administration in time range)   magnesium oxide tablet 800 mg (has no administration in time range)   potassium, sodium phosphates 280-160-250 mg packet 2 packet (has no administration in time range)   potassium, sodium phosphates 280-160-250 mg packet 2 packet (has no administration in time range)   polyethylene glycol packet 17 g (17 g Oral Not Given 10/26/19 0900)   ondansetron injection 4 mg (has no administration in time range)   ondansetron injection 4 mg (has no administration in time range)   dextrose 10% (D10W) Bolus (has no  administration in time range)   dextrose 10% (D10W) Bolus (has no administration in time range)   albuterol-ipratropium 2.5 mg-0.5 mg/3 mL nebulizer solution 3 mL (3 mLs Nebulization Given 10/26/19 1936)   sodium polystyrene 15 gram/60 mL suspension 30 g (30 g Oral Not Given 10/26/19 1815)   sodium chloride 0.9% bolus 1,000 mL (0 mLs Intravenous Stopped 10/26/19 0203)   dextrose 10% (D10W) Bolus (0 g Intravenous Stopped 10/26/19 0203)   insulin regular injection 10 Units (10 Units Intravenous Given 10/26/19 0140)       Current Discharge Medication List                  Scribe Attestation:   Scribe #1: I performed the above scribed service and the documentation accurately describes the services I performed. I attest to the accuracy of the note.     Attending:   Physician Attestation Statement for Scribe #1: I, Rina Singleton MD, personally performed the services described in this documentation, as scribed by Shahzad Valerio, in my presence, and it is both accurate and complete.           Clinical Impression       ICD-10-CM ICD-9-CM   1. Bradycardia R00.1 427.89   2. Weakness R53.1 780.79   3. Hypotension, unspecified hypotension type I95.9 458.9   4. Chest pain R07.9 786.50       Disposition:   Disposition: Placed in Observation  Condition: Fair         Rina Singleton MD  10/26/19 2047

## 2019-10-26 NOTE — PLAN OF CARE
Intervention:  Modified sodium, fat diet.    Recommendations    1) Recommend SLP consult.    2) If pt able to consume nutrition PO, recommend cardiac diet with texture per SLP + Boost Glucose Control, BID.    2) If unable to consume nutrition PO, recommend TF.   Glucerna 1.5 @ 10 mls/hr. Progress to goal of 60 mls/hr per pt tolerance. Flush with water 40 mls/hr.   Provides: 1800 calories (100% EEN), 99 gm protein (100% EPN), 911 mls free water.  Goals: 1) Pt will receive >=50% estimated needs by f/u.   Nutrition Goal Status: new  Communication of RD Recs: (POC, sticky note)

## 2019-10-26 NOTE — SUBJECTIVE & OBJECTIVE
Interval History: Pt states he is feeling better and verbalized symptom improvement.  K improved with Kayexalate continued and Spironolactone held. EKG showed JR with HR 47 with Lopressor held.  Troponin remained within normal limits.  Cardiology consulted due to rhythm changes and recent MI.  H/H stable and sodium normalized.     Review of Systems   Constitutional: Positive for fatigue. Negative for activity change, appetite change, chills and fever.   HENT: Positive for congestion, hearing loss, postnasal drip and sinus pressure. Negative for dental problem, drooling, ear discharge, ear pain, facial swelling, mouth sores, nosebleeds, sneezing, sore throat and tinnitus.    Eyes: Negative for photophobia, pain, discharge, redness, itching and visual disturbance.   Respiratory: Positive for cough. Negative for apnea, choking, chest tightness, shortness of breath, wheezing and stridor.    Cardiovascular: Negative for chest pain, palpitations and leg swelling.   Gastrointestinal: Negative for abdominal distention, abdominal pain, anal bleeding, blood in stool, constipation and diarrhea.   Endocrine: Negative for cold intolerance, heat intolerance, polydipsia, polyphagia and polyuria.   Genitourinary: Negative for difficulty urinating, dysuria, flank pain, frequency, hematuria and urgency.   Musculoskeletal: Negative for arthralgias, back pain, gait problem, joint swelling, myalgias, neck pain and neck stiffness.   Skin: Negative for color change, pallor, rash and wound.   Neurological: Positive for weakness. Negative for dizziness, tremors, seizures, syncope, facial asymmetry, speech difficulty, light-headedness and headaches.   Hematological: Negative for adenopathy. Does not bruise/bleed easily.   Psychiatric/Behavioral: Negative for agitation, behavioral problems, confusion, decreased concentration, dysphoric mood, hallucinations, sleep disturbance and suicidal ideas. The patient is not hyperactive.    All other  systems reviewed and are negative.    Objective:     Vital Signs (Most Recent):  Temp: 97.4 °F (36.3 °C) (10/26/19 1517)  Pulse: 61 (10/26/19 1517)  Resp: 18 (10/26/19 1517)  BP: 135/65 (10/26/19 1517)  SpO2: 98 % (10/26/19 1517) Vital Signs (24h Range):  Temp:  [97.4 °F (36.3 °C)-98 °F (36.7 °C)] 97.4 °F (36.3 °C)  Pulse:  [48-61] 61  Resp:  [12-20] 18  SpO2:  [95 %-100 %] 98 %  BP: ()/(43-67) 135/65     Weight: 65.7 kg (144 lb 13.5 oz)  Body mass index is 22.02 kg/m².    Intake/Output Summary (Last 24 hours) at 10/26/2019 1653  Last data filed at 10/26/2019 0650  Gross per 24 hour   Intake 1400 ml   Output --   Net 1400 ml      Physical Exam   Constitutional: He appears well-developed and well-nourished. No distress.   HENT:   Head: Normocephalic and atraumatic.   Nose: Nose normal.   Mouth/Throat: Oropharynx is clear and moist.   Eyes: Pupils are equal, round, and reactive to light. Conjunctivae and EOM are normal. Right eye exhibits no discharge. Left eye exhibits no discharge. No scleral icterus.   Neck: Normal range of motion. Neck supple. No JVD present. No tracheal deviation present. No thyromegaly present.   Cardiovascular: Normal rate, regular rhythm, normal heart sounds and intact distal pulses. Exam reveals no gallop and no friction rub.   No murmur heard.  Pulmonary/Chest: Effort normal and breath sounds normal. No stridor. No respiratory distress. He has no wheezes. He has no rales. He exhibits no tenderness.   Abdominal: Soft. Bowel sounds are normal. He exhibits no distension and no mass. There is no tenderness. There is no rebound and no guarding.   Genitourinary:   Genitourinary Comments: Deferred    Musculoskeletal: Normal range of motion. He exhibits edema. He exhibits no tenderness or deformity.   Lymphadenopathy:     He has no cervical adenopathy.   Neurological: He is alert. He displays normal reflexes. No cranial nerve deficit or sensory deficit. He exhibits normal muscle tone.  Coordination normal.   Skin: Skin is warm and dry. Capillary refill takes less than 2 seconds. He is not diaphoretic. No erythema. No pallor.   Psychiatric: He has a normal mood and affect. His behavior is normal. Judgment and thought content normal.   Nursing note and vitals reviewed.      Significant Labs:   CBC:   Recent Labs   Lab 10/25/19  2303 10/26/19  0617   WBC 8.56 7.82   HGB 9.9* 9.3*   HCT 30.5* 30.0*    216     CMP:   Recent Labs   Lab 10/25/19  2303 10/26/19  0617 10/26/19  0851 10/26/19  1150   * 140  --   --    K 6.6* 4.5  4.5 5.0 5.3*   CL 97 110  --   --    CO2 27 22*  --   --     76  --   --    BUN 28* 21  --   --    CREATININE 1.4 0.9  --   --    CALCIUM 9.2 7.5*  --   --    PROT 6.6  --   --   --    ALBUMIN 2.8*  --   --   --    BILITOT 0.5  --   --   --    ALKPHOS 56  --   --   --    AST 14  --   --   --    ALT 12  --   --   --    ANIONGAP 8 8  --   --    EGFRNONAA 49* >60  --   --      Magnesium:   Recent Labs   Lab 10/26/19  0617   MG 1.6     Troponin:   Recent Labs   Lab 10/25/19  2303 10/26/19  0617   TROPONINI 0.022 0.022       Significant Imaging:   Imaging Results          X-Ray Chest AP Portable (Final result)  Result time 10/26/19 00:15:40    Final result by Edson Olguin Jr., MD (10/26/19 00:15:40)                 Impression:      No significant change.  Pleuroparenchymal disease within the left lower chest is similar to multiple prior exams.  Persistent trace bilateral pleural effusions and scarring at the left apex.      Electronically signed by: Edson Olguin Jr., MD  Date:    10/26/2019  Time:    00:15             Narrative:    EXAMINATION:  XR CHEST AP PORTABLE    CLINICAL HISTORY:  Chest Pain;    COMPARISON:  Prior radiograph from October 23, 2019.    FINDINGS:  Slightly low lung volumes.  Again demonstrated are small bilateral pleural effusions.  Patchy airspace disease within the left mid and lower lung is unchanged.  Apical fibronodular scarring  bilaterally, left greater than right.  No pneumothorax.  Heart size within normal limits.  Calcific atheromatous change of the aortic knob.  No significant bony findings.                               CT Head Without Contrast (Final result)  Result time 10/25/19 23:43:41    Final result by Edson Olguin Jr., MD (10/25/19 23:43:41)                 Impression:      No acute findings.  Bilateral mastoid effusions, left larger than right.  Acute on chronic sinusitis change of the left sphenoid sinus.    All CT scans at this facility use dose modulation, iterative reconstruction, and/or weight base dosing when appropriate to reduce radiation dose to as low as reasonably achievable.      Electronically signed by: Edson Olguin Jr., MD  Date:    10/25/2019  Time:    23:43             Narrative:    EXAMINATION:  CT HEAD WITHOUT CONTRAST    CLINICAL HISTORY:  Confusion/delirium, altered LOC, unexplained;Dizziness;    TECHNIQUE:  Contiguous axial images were obtained from the skull base through the vertex without intravenous contrast.    COMPARISON:  None    FINDINGS:  No intracranial hemorrhage. No mass effect or midline shift. Normal parenchymal attenuation. The ventricles and sulci are normal in size and configuration. Bilateral mastoid effusions, left larger than right.  Air-fluid level left sphenoid sinus with bony wall thickening.

## 2019-10-26 NOTE — ASSESSMENT & PLAN NOTE
-High potassium in the setting of spironolactone   -Will hold spironolactone  -Kayexalate given   -Lasix continued

## 2019-10-26 NOTE — HPI
Noble Tripp is a 74 y.o. male with medical history as below presented to the ER presented with dizziness and weakness with some mild confusion.     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   10- admitted to [facility] for sepsis/PNA and PSVT on . EKG showed extensive St depression of 1 mm on anterolateral leads. EF 60%. MPi showed fixed inferior perfusion defect. cTn was up to 0.3. PSVT was converted to sinus O/N.   s/p left thoracentesis d/t pleural effusion.  Echo in : normal EF, DD.  MPI in :  A small to moderate size fixed defect of moderate to severe intensity that extends from the apical to the base inferior wall of the left ventricle.  EKG on 10-: PSVT, st depression on V3 to V6, I, II and avL.   admitted for CHF and COPD exacerbation. ECHO showed normal EF and BNP 1800, troponin 0.05 and flat. Rx with IV lasix and breathing O2. D/c with lasix 20 mg po daily   admitted for [facility] for worsening SOB. Troponin up to 0.3. ECHo showed EF 35%, LHC showed severe multivessel CAD. Had CVT evaluation and was not cabg candidate due to high risk. Also high risk PCI. Pt was discharged for medical Rx and PCI as op. Discharged with home O2. Om amiodarone 400 mg daily for nonsustained VT in the hospital.    Cardiology consulted for junctional tahmina on arrival and med management/workup. Discussed with pt and family will monitor overnight, hold BB and stop AMIO. Pt's ischemic workup still pending with Cardiac PET and revasc.

## 2019-10-26 NOTE — HOSPITAL COURSE
Pt admitted to Observation Unit for Altered Mental Status in the setting of bilateral mastoiditis. Hyperkalemia also noted.  EKG showed JR with HR 47.  Cardiology consulted and Amiodarone held.  Potassium improved with Kayexalate given and Spironolactone held.  H/H stable and kidney function improved.  Bradycardia noted with HR 57.  On 10/28/19, potassium normalized and HR remained in the 60's.  Pt verbalized symptom improvement and denied any additional complaints.  Pt instructed to change positions slowly with understanding verbalized.  Vital signs stable and no signs of acute distress noted.  Pt seen and examined on the date of discharge and deemed suitable for discharge to home accompanied by family.  Current medications resumed with Augmentin prescribed. Lasix and Neurontin dose adjusted.  Amiodarone, Aldactone, and Coreg stopped per Cardiology recommendation. Lisinopril held due to mild hypotension.  Pt instructed to follow up with PCP, Cardiology, and established Pain Management Provider for further evaluation.

## 2019-10-26 NOTE — SUBJECTIVE & OBJECTIVE
"Past Medical History:   Diagnosis Date    Adrenal adenoma     david;nl fxn w/u;tran 11/18    Aspiration pneumonia 10/15    puree/honey    Benign prostate hyperplasia     CAD (coronary artery disease)     dr padilla    Chronic pain     dr santos    Chronic systolic congestive heart failure 10/17/2019    COPD (chronic obstructive pulmonary disease)     papi    Ex-smoker     11/13    Hyperlipidemia     JUANITO on CPAP     cpap    Osteopenia 1/15 tran 1/18    PSVT (paroxysmal supraventricular tachycardia)     PVD (peripheral vascular disease)     noobs 07 khuri    Pyriform sinus cancer     dr ponce radiation 1/2-14 dr king perales    Squamous cell carcinoma of skin     roberto    Tongue cancer     "superficial" removed 11/14    Vocal cord cancer 2011    Xerostomia     radiation       Past Surgical History:   Procedure Laterality Date    APPENDECTOMY      BRONCHOSCOPY Bilateral 2/5/2019    Procedure: Bronchoscopy;  Surgeon: Santos Cardozo MD;  Location: [facility];  Service: Endoscopy;  Laterality: Bilateral;    COLONOSCOPY N/A 5/1/2017    Procedure: Due for screening colonoscopy;  Surgeon: Anushka Yoder MD;  Location: [facility];  Service: Endoscopy;  Laterality: N/A;    ESOPHAGOGASTRODUODENOSCOPY N/A 5/29/2018    Procedure: ESOPHAGOGASTRODUODENOSCOPY (EGD);  Surgeon: Audie Hill III, MD;  Location: [facility];  Service: Endoscopy;  Laterality: N/A;    ESOPHAGOGASTRODUODENOSCOPY N/A 8/29/2018    Procedure: ESOPHAGOGASTRODUODENOSCOPY (EGD);  Surgeon: Audie Hill III, MD;  Location: [facility];  Service: Endoscopy;  Laterality: N/A;    THORACENTESIS Left 8/7/2018    Procedure: Thoracentesis;  Surgeon: Santos Cardozo MD;  Location: [facility];  Service: Endoscopy;  Laterality: Left;    THORACENTESIS Right 2/25/2019    Procedure: Thoracentesis;  Surgeon: Santos Cardozo MD;  Location: [facility];  Service: Endoscopy;  Laterality: Right;    tongue cancer excision  11/14    dr" winifred    VOCAL CORD LATERALIZATION, ENDOSCOPIC APPROACH W/ MLB      kris       Review of patient's allergies indicates:   Allergen Reactions    Contrast media     Iodinated contrast media      Other reaction(s): Itching  Other reaction(s): Hives    Iodine Hives    Neomycin-bacitracin-polymyxin      Other reaction(s): Rash  Other reaction(s): Rash    Pravastatin      Other reaction(s): fatigue  Other reaction(s): fatigue    Rosuvastatin      Other reaction(s): fatigue  Other reaction(s): fatigue    Simvastatin      Other reaction(s): fatigue  Other reaction(s): fatigue    Statins-hmg-coa reductase inhibitors        No current facility-administered medications on file prior to encounter.      Current Outpatient Medications on File Prior to Encounter   Medication Sig    albuterol (PROAIR HFA) 90 mcg/actuation inhaler INL 2 PFS PO INTO THE LUNGS Q 4 H PRF WHZ OR SOB    albuterol (PROVENTIL) 2.5 mg /3 mL (0.083 %) nebulizer solution Take 3 mLs (2.5 mg total) by nebulization every 8 (eight) hours while awake.    amiodarone (PACERONE) 400 MG tablet Take 400 mg by mouth once daily.    arformoterol (BROVANA) 15 mcg/2 mL Nebu Take 2 mLs (15 mcg total) by nebulization 2 (two) times daily. Controller    aspirin 81 mg Tab Take 1 tablet by mouth Daily. Over the counter to help prevent stroke/heart attack    atorvastatin (LIPITOR) 80 MG tablet Take 1 tablet (80 mg total) by mouth once daily.    carvedilol (COREG) 12.5 MG tablet Take 1 tablet (12.5 mg total) by mouth 2 (two) times daily with meals.    CHANTIX CONTINUING MONTH BOX 1 mg Tab TAKE 1 TABLET BY MOUTH ONCE DAILY    clopidogrel (PLAVIX) 75 mg tablet Take 1 tablet (75 mg total) by mouth once daily.    clotrimazole (MYCELEX) 10 mg elissa     ELDERBERRY FRUIT AND FLOWER ORAL Take by mouth.    evolocumab (REPATHA SURECLICK) 140 mg/mL PnIj Inject 1 mL (140 mg total) into the skin every 14 (fourteen) days.    furosemide (LASIX) 20 MG tablet Take 1 tablet   Daily x 5 days , then after take PRN  For sob  Qd    gabapentin (NEURONTIN) 800 MG tablet Take 1 tablet (800 mg total) by mouth 3 (three) times daily.    garlic 2,000 mg Cap Take 1 tablet by mouth once daily.     lisinopril (PRINIVIL,ZESTRIL) 5 MG tablet Take 1 tablet (5 mg total) by mouth once daily.    loratadine (CLARITIN) 10 mg tablet Take 1 tablet by mouth daily as needed.     nebulizer and compressor Bailey Use as directed    oxyCODONE-acetaminophen (PERCOCET) 7.5-325 mg per tablet Take 1 tablet by mouth every 8 (eight) hours as needed for Pain.    pantoprazole (PROTONIX) 40 MG tablet TAKE ONE TABLET BY MOUTH ONCE DAILY    pilocarpine (SALAGEN) 5 MG Tab TAKE ONE TABLET BY MOUTH 30 MINUTES PRIOR TO EACH MEAL    spironolactone (ALDACTONE) 25 MG tablet Take 1 tablet (25 mg total) by mouth once daily.    tiotropium (SPIRIVA WITH HANDIHALER) 18 mcg inhalation capsule Inhale 1 capsule (18 mcg total) into the lungs once daily.    turmeric root extract 500 mg Cap Take 1 capsule by mouth 2 (two) times daily.     [DISCONTINUED] pilocarpine (SALAGEN) 5 MG Tab  TAKE 1 TABLET BY MOUTH 30 MINUTES PRIOR TO EACH MEAL     Family History     Problem Relation (Age of Onset)    Cancer Father, Brother        Tobacco Use    Smoking status: Current Every Day Smoker     Packs/day: 0.50     Years: 55.00     Pack years: 27.50    Smokeless tobacco: Never Used    Tobacco comment: 6-7 cigs/day   Substance and Sexual Activity    Alcohol use: Yes     Comment: 2-3 glasses of whiskey every evening    Drug use: No    Sexual activity: Not Currently     Review of Systems   Constitutional: Positive for fatigue. Negative for activity change, appetite change, chills and fever.   HENT: Positive for congestion, hearing loss, postnasal drip and sinus pressure. Negative for dental problem, drooling, ear discharge, ear pain, facial swelling, mouth sores, nosebleeds, sneezing, sore throat and tinnitus.    Eyes: Negative for photophobia,  pain, discharge, redness, itching and visual disturbance.   Respiratory: Positive for cough. Negative for apnea, choking, chest tightness, shortness of breath, wheezing and stridor.    Cardiovascular: Negative for chest pain, palpitations and leg swelling.   Gastrointestinal: Negative for abdominal distention, abdominal pain, anal bleeding, blood in stool, constipation and diarrhea.   Endocrine: Negative for cold intolerance, heat intolerance, polydipsia, polyphagia and polyuria.   Genitourinary: Negative for difficulty urinating, dysuria, flank pain, frequency, hematuria and urgency.   Musculoskeletal: Negative for arthralgias, back pain, gait problem, joint swelling, myalgias, neck pain and neck stiffness.   Skin: Negative for color change, pallor, rash and wound.   Neurological: Positive for dizziness, weakness and light-headedness. Negative for tremors, seizures, syncope, facial asymmetry, speech difficulty and headaches.   Hematological: Negative for adenopathy. Does not bruise/bleed easily.   Psychiatric/Behavioral: Positive for confusion and decreased concentration. Negative for agitation, behavioral problems, dysphoric mood, hallucinations, sleep disturbance and suicidal ideas. The patient is not hyperactive.    All other systems reviewed and are negative.    Objective:     Vital Signs (Most Recent):  Temp: 98 °F (36.7 °C) (10/26/19 0543)  Pulse: (!) 51 (10/26/19 0553)  Resp: 18 (10/26/19 0553)  BP: 120/67 (10/26/19 0543)  SpO2: 100 % (10/26/19 0553) Vital Signs (24h Range):  Temp:  [97.8 °F (36.6 °C)-98 °F (36.7 °C)] 98 °F (36.7 °C)  Pulse:  [48-55] 51  Resp:  [12-20] 18  SpO2:  [95 %-100 %] 100 %  BP: ()/(43-67) 120/67     Weight: 65.7 kg (144 lb 13.5 oz)  Body mass index is 22.02 kg/m².    Physical Exam   Constitutional: He appears well-developed and well-nourished. No distress.   HENT:   Head: Normocephalic and atraumatic.   Right Ear: External ear normal.   Left Ear: External ear normal.   Nose:  Nose normal.   Mouth/Throat: Oropharynx is clear and moist.   Eyes: Pupils are equal, round, and reactive to light. Conjunctivae and EOM are normal. Right eye exhibits no discharge. Left eye exhibits no discharge. No scleral icterus.   Neck: Normal range of motion. Neck supple. No JVD present. No tracheal deviation present. No thyromegaly present.   Cardiovascular: Normal rate, regular rhythm, normal heart sounds and intact distal pulses. Exam reveals no gallop and no friction rub.   No murmur heard.  Pulmonary/Chest: Effort normal and breath sounds normal. No stridor. No respiratory distress. He has no wheezes. He has no rales. He exhibits no tenderness.   Abdominal: Soft. Bowel sounds are normal. He exhibits no distension and no mass. There is no tenderness. There is no rebound and no guarding.   Musculoskeletal: Normal range of motion. He exhibits edema. He exhibits no tenderness or deformity.   Lymphadenopathy:     He has no cervical adenopathy.   Neurological: He is alert. He displays normal reflexes. No cranial nerve deficit or sensory deficit. He exhibits normal muscle tone. Coordination normal.   Skin: Skin is warm and dry. Capillary refill takes less than 2 seconds. He is not diaphoretic. No erythema. No pallor.   Psychiatric: He has a normal mood and affect. His behavior is normal. Judgment and thought content normal.   Nursing note and vitals reviewed.        CRANIAL NERVES     CN III, IV, VI   Pupils are equal, round, and reactive to light.  Extraocular motions are normal.        Significant Labs:   Recent Lab Results       10/26/19  0128   10/25/19  2303        Albumin   2.8     Alkaline Phosphatase   56     ALT   12     Anion Gap   8     AST   14     Baso #   0.05     Basophil%   0.6     BILIRUBIN TOTAL   0.5  Comment:  For infants and newborns, interpretation of results should be based  on gestational age, weight and in agreement with clinical  observations.  Premature Infant recommended reference  ranges:  Up to 24 hours.............<8.0 mg/dL  Up to 48 hours............<12.0 mg/dL  3-5 days..................<15.0 mg/dL  6-29 days.................<15.0 mg/dL       BNP   522  Comment:  Values of less than 100 pg/ml are consistent with non-CHF populations.     BUN, Bld   28     Calcium   9.2     Chloride   97     CO2   27     Creatinine   1.4     Differential Method   Automated     eGFR if    57     eGFR if non    49  Comment:  Calculation used to obtain the estimated glomerular filtration  rate (eGFR) is the CKD-EPI equation.        Eos #   0.1     Eosinophil%   0.6     Glucose   107     Gran # (ANC)   6.9     Gran%   80.0     Hematocrit   30.5     Hemoglobin   9.9     Immature Grans (Abs)   0.03  Comment:  Mild elevation in immature granulocytes is non specific and   can be seen in a variety of conditions including stress response,   acute inflammation, trauma and pregnancy. Correlation with other   laboratory and clinical findings is essential.       Immature Granulocytes   0.4     Coumadin Monitoring INR   1.0  Comment:  Coumadin Therapy:  2.0 - 3.0 for INR for all indicators except mechanical heart valves  and antiphospholipid syndromes which should use 2.5 - 3.5.       Lymph #   0.8     Lymph%   9.2     MCH   31.4     MCHC   32.5     MCV   97     Mono #   0.8     Mono%   9.2     MPV   12.2     nRBC   0     Platelets   233     POCT Glucose 135       Potassium   6.6  Comment:  K  critical result(s) called and verbal readback obtained from   Naomie Johnson RN, 10/26/2019 00:17       PROTEIN TOTAL   6.6     Protime   10.7     RBC   3.15     RDW   16.2     Sodium   132     Troponin I   0.022  Comment:  The reference interval for Troponin I represents the 99th percentile   cutoff   for our facility and is consistent with 3rd generation assay   performance.       WBC   8.56           Significant Imaging:   Imaging Results          X-Ray Chest AP Portable (Final result)  Result  time 10/26/19 00:15:40    Final result by Edson Olguin Jr., MD (10/26/19 00:15:40)                 Impression:      No significant change.  Pleuroparenchymal disease within the left lower chest is similar to multiple prior exams.  Persistent trace bilateral pleural effusions and scarring at the left apex.      Electronically signed by: Edson Olguin Jr., MD  Date:    10/26/2019  Time:    00:15             Narrative:    EXAMINATION:  XR CHEST AP PORTABLE    CLINICAL HISTORY:  Chest Pain;    COMPARISON:  Prior radiograph from October 23, 2019.    FINDINGS:  Slightly low lung volumes.  Again demonstrated are small bilateral pleural effusions.  Patchy airspace disease within the left mid and lower lung is unchanged.  Apical fibronodular scarring bilaterally, left greater than right.  No pneumothorax.  Heart size within normal limits.  Calcific atheromatous change of the aortic knob.  No significant bony findings.                               CT Head Without Contrast (Final result)  Result time 10/25/19 23:43:41    Final result by Edson Olguin Jr., MD (10/25/19 23:43:41)                 Impression:      No acute findings.  Bilateral mastoid effusions, left larger than right.  Acute on chronic sinusitis change of the left sphenoid sinus.    All CT scans at this facility use dose modulation, iterative reconstruction, and/or weight base dosing when appropriate to reduce radiation dose to as low as reasonably achievable.      Electronically signed by: Edson Olguin Jr., MD  Date:    10/25/2019  Time:    23:43             Narrative:    EXAMINATION:  CT HEAD WITHOUT CONTRAST    CLINICAL HISTORY:  Confusion/delirium, altered LOC, unexplained;Dizziness;    TECHNIQUE:  Contiguous axial images were obtained from the skull base through the vertex without intravenous contrast.    COMPARISON:  None    FINDINGS:  No intracranial hemorrhage. No mass effect or midline shift. Normal parenchymal attenuation. The ventricles and sulci  are normal in size and configuration. Bilateral mastoid effusions, left larger than right.  Air-fluid level left sphenoid sinus with bony wall thickening.

## 2019-10-26 NOTE — HPI
The patient is a 74-year-old man presenting with dizziness and weakness with some mild confusion.  The patient has a history of cardiomyopathy with an ejection fraction of 25% to his medications were recently changed while it heals stent.  Patient states with the son that he had a myocardial fraction and was treated with cardiac catheterization at a [facility] where he was told that he had 90% blockage in [place] and 67% blockage roughly in another main artery.  He was placed on spironolactone and a beta-blocker.

## 2019-10-26 NOTE — ASSESSMENT & PLAN NOTE
Physician  was down for several hours and therefore patient's orders school not be completed that and therefore with ordering stat BMP and will continue treatment. High potassium was due to spironolactone effect.  This was prescribed for his cardiomyopathy.  Will hold spironolactone.  Will continue with Lasix

## 2019-10-26 NOTE — ASSESSMENT & PLAN NOTE
-home medications resumed with Spironolactone held   -Lasix, Statin, ASA, and Plavix continued   -Coreg and Amiodarone held due to bradycardia   -PET scan with revascularization planned

## 2019-10-26 NOTE — PLAN OF CARE
Patient/ family updated on plan of care, no questions at this time, will continue to monitor Chris Freed 10/26/2019 3:14 PM

## 2019-10-26 NOTE — CONSULTS
[facility] -   Cardiology  Consult Note    Patient Name: Noble Tripp  MRN: [id number]  Admission Date: 10/25/2019  Hospital Length of Stay: 0 days  Code Status: Full Code   Attending Provider: Ronnie Lynch MD   Consulting Provider: Girma Palacios Md, MD  Primary Care Physician: Dwaine Davis MD  Principal Problem:Hyperkalemia    Patient information was obtained from patient and ER records.     Inpatient consult to Cardiology  Consult performed by: Girma Palacios MD  Consult ordered by: Rosa Gutierrez NP  Reason for consult: CAD, bradycardia        Subjective:     Chief Complaint:  Weakness/confusion     HPI:   Noble Tripp is a 74 y.o. male with medical history as below presented to the ER presented with dizziness and weakness with some mild confusion.     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   10- admitted to [facility] for sepsis/PNA and PSVT on . EKG showed extensive St depression of 1 mm on anterolateral leads. EF 60%. MPi showed fixed inferior perfusion defect. cTn was up to 0.3. PSVT was converted to sinus O/N.   s/p left thoracentesis d/t pleural effusion.  Echo in : normal EF, DD.  MPI in :  A small to moderate size fixed defect of moderate to severe intensity that extends from the apical to the base inferior wall of the left ventricle.  EKG on 10-: PSVT, st depression on V3 to V6, I, II and avL.   admitted for CHF and COPD exacerbation. ECHO showed normal EF and BNP 1800, troponin 0.05 and flat. Rx with IV lasix and breathing O2. D/c with lasix 20 mg po daily   admitted for [facility] for worsening SOB. Troponin up to 0.3. ECHo showed EF 35%, LHC showed severe multivessel CAD. Had CVT evaluation and was not cabg candidate due to high risk. Also high risk PCI. Pt was discharged for  "medical Rx and PCI as op. Discharged with home O2. Om amiodarone 400 mg daily for nonsustained VT in the hospital.    Cardiology consulted for junctional tahmina on arrival and med management/workup. Discussed with pt and family will monitor overnight, hold BB and stop AMIO. Pt's ischemic workup still pending with Cardiac PET and revasc.         Past Medical History:   Diagnosis Date    Adrenal adenoma     david;nl fxn w/u;tran 11/18    Aspiration pneumonia 10/15    puree/honey    Benign prostate hyperplasia     CAD (coronary artery disease)     dr padilla    Chronic pain     dr santos    Chronic systolic congestive heart failure 10/17/2019    COPD (chronic obstructive pulmonary disease)     papi    Ex-smoker     11/13    Hyperlipidemia     JUANITO on CPAP     cpap    Osteopenia 1/15 tran 1/18    PSVT (paroxysmal supraventricular tachycardia)     PVD (peripheral vascular disease)     noobs 07 khuri    Pyriform sinus cancer     dr ponce radiation 1/2-14 dr king perales    Squamous cell carcinoma of skin     roberto    Tongue cancer     "superficial" removed 11/14    Vocal cord cancer 2011    Xerostomia     radiation       Past Surgical History:   Procedure Laterality Date    APPENDECTOMY      BRONCHOSCOPY Bilateral 2/5/2019    Procedure: Bronchoscopy;  Surgeon: Santos Cardozo MD;  Location: [facility];  Service: Endoscopy;  Laterality: Bilateral;    COLONOSCOPY N/A 5/1/2017    Procedure: Due for screening colonoscopy;  Surgeon: Anushka Yoder MD;  Location: [facility];  Service: Endoscopy;  Laterality: N/A;    ESOPHAGOGASTRODUODENOSCOPY N/A 5/29/2018    Procedure: ESOPHAGOGASTRODUODENOSCOPY (EGD);  Surgeon: Audie Hill III, MD;  Location: [facility];  Service: Endoscopy;  Laterality: N/A;    ESOPHAGOGASTRODUODENOSCOPY N/A 8/29/2018    Procedure: ESOPHAGOGASTRODUODENOSCOPY (EGD);  Surgeon: Audie Hill III, MD;  Location: [facility];  Service: Endoscopy;  Laterality: N/A;    " THORACENTESIS Left 8/7/2018    Procedure: Thoracentesis;  Surgeon: Santos Cardozo MD;  Location: [facility] ENDO;  Service: Endoscopy;  Laterality: Left;    THORACENTESIS Right 2/25/2019    Procedure: Thoracentesis;  Surgeon: Santos Cardozo MD;  Location: [facility] ENDO;  Service: Endoscopy;  Laterality: Right;    tongue cancer excision  11/14    dr vitale    VOCAL CORD LATERALIZATION, ENDOSCOPIC APPROACH W/ MLB      kris       Review of patient's allergies indicates:   Allergen Reactions    Contrast media     Iodinated contrast media      Other reaction(s): Itching  Other reaction(s): Hives    Iodine Hives    Neomycin-bacitracin-polymyxin      Other reaction(s): Rash  Other reaction(s): Rash    Pravastatin      Other reaction(s): fatigue  Other reaction(s): fatigue    Rosuvastatin      Other reaction(s): fatigue  Other reaction(s): fatigue    Simvastatin      Other reaction(s): fatigue  Other reaction(s): fatigue    Statins-hmg-coa reductase inhibitors        No current facility-administered medications on file prior to encounter.      Current Outpatient Medications on File Prior to Encounter   Medication Sig    albuterol (PROAIR HFA) 90 mcg/actuation inhaler INL 2 PFS PO INTO THE LUNGS Q 4 H PRF WHZ OR SOB    albuterol (PROVENTIL) 2.5 mg /3 mL (0.083 %) nebulizer solution Take 3 mLs (2.5 mg total) by nebulization every 8 (eight) hours while awake.    amiodarone (PACERONE) 400 MG tablet Take 400 mg by mouth once daily.    arformoterol (BROVANA) 15 mcg/2 mL Nebu Take 2 mLs (15 mcg total) by nebulization 2 (two) times daily. Controller    aspirin 81 mg Tab Take 1 tablet by mouth Daily. Over the counter to help prevent stroke/heart attack    atorvastatin (LIPITOR) 80 MG tablet Take 1 tablet (80 mg total) by mouth once daily.    carvedilol (COREG) 12.5 MG tablet Take 1 tablet (12.5 mg total) by mouth 2 (two) times daily with meals.    CHANTIX CONTINUING MONTH BOX 1 mg Tab TAKE 1 TABLET BY MOUTH ONCE  DAILY    clopidogrel (PLAVIX) 75 mg tablet Take 1 tablet (75 mg total) by mouth once daily.    clotrimazole (MYCELEX) 10 mg elissa     ELDERBERRY FRUIT AND FLOWER ORAL Take by mouth.    evolocumab (REPATHA SURECLICK) 140 mg/mL PnIj Inject 1 mL (140 mg total) into the skin every 14 (fourteen) days.    furosemide (LASIX) 20 MG tablet Take 1 tablet  Daily x 5 days , then after take PRN  For sob  Qd    gabapentin (NEURONTIN) 800 MG tablet Take 1 tablet (800 mg total) by mouth 3 (three) times daily.    garlic 2,000 mg Cap Take 1 tablet by mouth once daily.     lisinopril (PRINIVIL,ZESTRIL) 5 MG tablet Take 1 tablet (5 mg total) by mouth once daily.    loratadine (CLARITIN) 10 mg tablet Take 1 tablet by mouth daily as needed.     nebulizer and compressor Bailey Use as directed    oxyCODONE-acetaminophen (PERCOCET) 7.5-325 mg per tablet Take 1 tablet by mouth every 8 (eight) hours as needed for Pain.    pantoprazole (PROTONIX) 40 MG tablet TAKE ONE TABLET BY MOUTH ONCE DAILY    pilocarpine (SALAGEN) 5 MG Tab TAKE ONE TABLET BY MOUTH 30 MINUTES PRIOR TO EACH MEAL    spironolactone (ALDACTONE) 25 MG tablet Take 1 tablet (25 mg total) by mouth once daily.    tiotropium (SPIRIVA WITH HANDIHALER) 18 mcg inhalation capsule Inhale 1 capsule (18 mcg total) into the lungs once daily.    turmeric root extract 500 mg Cap Take 1 capsule by mouth 2 (two) times daily.     [DISCONTINUED] pilocarpine (SALAGEN) 5 MG Tab  TAKE 1 TABLET BY MOUTH 30 MINUTES PRIOR TO EACH MEAL     Family History     Problem Relation (Age of Onset)    Cancer Father, Brother        Tobacco Use    Smoking status: Current Every Day Smoker     Packs/day: 0.50     Years: 55.00     Pack years: 27.50    Smokeless tobacco: Never Used    Tobacco comment: 6-7 cigs/day   Substance and Sexual Activity    Alcohol use: Yes     Comment: 2-3 glasses of whiskey every evening    Drug use: No    Sexual activity: Not Currently     Review of Systems    Constitution: Positive for decreased appetite and malaise/fatigue.   HENT: Negative.    Eyes: Negative.    Cardiovascular: Positive for near-syncope.   Respiratory: Negative.    Endocrine: Negative.    Hematologic/Lymphatic: Negative.    Skin: Negative.    Musculoskeletal: Negative.    Gastrointestinal: Negative.    Genitourinary: Negative.    Neurological: Positive for loss of balance, sensory change and weakness.   Psychiatric/Behavioral: Negative.    Allergic/Immunologic: Negative.      Objective:     Vital Signs (Most Recent):  Temp: 97.8 °F (36.6 °C) (10/26/19 1159)  Pulse: (!) 52 (10/26/19 1247)  Resp: 20 (10/26/19 1247)  BP: (!) 119/58 (10/26/19 1159)  SpO2: 100 % (10/26/19 1247) Vital Signs (24h Range):  Temp:  [97.6 °F (36.4 °C)-98 °F (36.7 °C)] 97.8 °F (36.6 °C)  Pulse:  [48-61] 52  Resp:  [12-20] 20  SpO2:  [95 %-100 %] 100 %  BP: ()/(43-67) 119/58     Weight: 65.7 kg (144 lb 13.5 oz)  Body mass index is 22.02 kg/m².    SpO2: 100 %  O2 Device (Oxygen Therapy): nasal cannula      Intake/Output Summary (Last 24 hours) at 10/26/2019 1330  Last data filed at 10/26/2019 0650  Gross per 24 hour   Intake 1400 ml   Output --   Net 1400 ml       Lines/Drains/Airways     Peripheral Intravenous Line                 Peripheral IV - Single Lumen 10/25/19 2252 20 G Right Forearm less than 1 day                Physical Exam   Constitutional: He is oriented to person, place, and time. He appears well-developed and well-nourished. No distress.   HENT:   Head: Normocephalic and atraumatic.   Nose: Nose normal.   Mouth/Throat: Oropharynx is clear and moist.   Eyes: Conjunctivae and EOM are normal. No scleral icterus.   Neck: Normal range of motion. Neck supple. No JVD present. No thyromegaly present.   Cardiovascular: Regular rhythm, S1 normal and S2 normal. Bradycardia present. Exam reveals no gallop, no S3, no S4 and no friction rub.   Murmur heard.  Pulmonary/Chest: Effort normal and breath sounds normal. No  stridor. No respiratory distress. He has no wheezes. He has no rales. He exhibits no tenderness.   Abdominal: Soft. Bowel sounds are normal. He exhibits no distension and no mass. There is no tenderness. There is no rebound.   Genitourinary:   Genitourinary Comments: Deferred   Musculoskeletal: Normal range of motion. He exhibits no edema, tenderness or deformity.   Lymphadenopathy:     He has no cervical adenopathy.   Neurological: He is alert and oriented to person, place, and time. He exhibits normal muscle tone. Coordination normal.   Skin: Skin is warm and dry. No rash noted. He is not diaphoretic. No erythema. No pallor.   Psychiatric: He has a normal mood and affect. His behavior is normal. Judgment and thought content normal.   Nursing note and vitals reviewed.      Significant Labs:   All pertinent lab results from the last 24 hours have been reviewed. and   Recent Lab Results       10/26/19  1150   10/26/19  0851   10/26/19  0718   10/26/19  0715   10/26/19  0617        Albumin               Alkaline Phosphatase               ALT               Anion Gap         8     Appearance, UA     Clear         AST               Baso #         0.07     Basophil%         0.9     Bilirubin (UA)     Negative         BILIRUBIN TOTAL               BNP               BUN, Bld         21     Calcium         7.5     Chloride         110     CO2         22     Color, UA     Yellow         Creatinine         0.9     Differential Method         Automated     eGFR if          >60     eGFR if non          >60  Comment:  Calculation used to obtain the estimated glomerular filtration  rate (eGFR) is the CKD-EPI equation.        Eos #         0.1     Eosinophil%         0.9     Glucose         76     Glucose, UA     Negative         Gran # (ANC)         6.1     Gran%         78.3     Hematocrit         30.0     Hemoglobin         9.3     Immature Grans (Abs)         0.03  Comment:  Mild elevation in  immature granulocytes is non specific and   can be seen in a variety of conditions including stress response,   acute inflammation, trauma and pregnancy. Correlation with other   laboratory and clinical findings is essential.       Immature Granulocytes         0.4     Coumadin Monitoring INR         1.0  Comment:  Coumadin Therapy:  2.0 - 3.0 for INR for all indicators except mechanical heart valves  and antiphospholipid syndromes which should use 2.5 - 3.5.       Ketones, UA     Negative         Leukocytes, UA     Negative         Lymph #         0.7     Lymph%         9.3     Magnesium         1.6     MCH         30.6     MCHC         31.0     MCV         99     Mono #         0.8     Mono%         10.2     MPV         11.8     NITRITE UA     Negative         nRBC         0     Occult Blood UA     Trace         pH, UA     6.0         Phosphorus         3.7     Platelets         216     POCT Glucose       94       Potassium 5.3 5.0     4.5              4.5     PROTEIN TOTAL               Protein, UA     Negative  Comment:  Recommend a 24 hour urine protein or a urine   protein/creatinine ratio if globulin induced proteinuria is  clinically suspected.           Protime         10.9     RBC         3.04     RDW         15.9     Sodium         140     Specific [place], UA     1.010         Specimen UA     Urine, Clean Catch         Troponin I         0.022  Comment:  The reference interval for Troponin I represents the 99th percentile   cutoff   for our facility and is consistent with 3rd generation assay   performance.       UROBILINOGEN UA     Negative         WBC         7.82                      10/26/19  0128   10/25/19  2303        Albumin   2.8     Alkaline Phosphatase   56     ALT   12     Anion Gap   8     Appearance, UA         AST   14     Baso #   0.05     Basophil%   0.6     Bilirubin (UA)         BILIRUBIN TOTAL   0.5  Comment:  For infants and newborns, interpretation of results should be based  on  gestational age, weight and in agreement with clinical  observations.  Premature Infant recommended reference ranges:  Up to 24 hours.............<8.0 mg/dL  Up to 48 hours............<12.0 mg/dL  3-5 days..................<15.0 mg/dL  6-29 days.................<15.0 mg/dL       BNP   522  Comment:  Values of less than 100 pg/ml are consistent with non-CHF populations.     BUN, Bld   28     Calcium   9.2     Chloride   97     CO2   27     Color, UA         Creatinine   1.4     Differential Method   Automated     eGFR if    57     eGFR if non    49  Comment:  Calculation used to obtain the estimated glomerular filtration  rate (eGFR) is the CKD-EPI equation.        Eos #   0.1     Eosinophil%   0.6     Glucose   107     Glucose, UA         Gran # (ANC)   6.9     Gran%   80.0     Hematocrit   30.5     Hemoglobin   9.9     Immature Grans (Abs)   0.03  Comment:  Mild elevation in immature granulocytes is non specific and   can be seen in a variety of conditions including stress response,   acute inflammation, trauma and pregnancy. Correlation with other   laboratory and clinical findings is essential.       Immature Granulocytes   0.4     Coumadin Monitoring INR   1.0  Comment:  Coumadin Therapy:  2.0 - 3.0 for INR for all indicators except mechanical heart valves  and antiphospholipid syndromes which should use 2.5 - 3.5.       Ketones, UA         Leukocytes, UA         Lymph #   0.8     Lymph%   9.2     Magnesium         MCH   31.4     MCHC   32.5     MCV   97     Mono #   0.8     Mono%   9.2     MPV   12.2     NITRITE UA         nRBC   0     Occult Blood UA         pH, UA         Phosphorus         Platelets   233     POCT Glucose 135       Potassium   6.6  Comment:  K  critical result(s) called and verbal readback obtained from   Naomie Johnson RN, 10/26/2019 00:17       PROTEIN TOTAL   6.6     Protein, UA         Protime   10.7     RBC   3.15     RDW   16.2     Sodium   132      Specific Gravity, UA         Specimen UA         Troponin I   0.022  Comment:  The reference interval for Troponin I represents the 99th percentile   cutoff   for our facility and is consistent with 3rd generation assay   performance.       UROBILINOGEN UA         WBC   8.56           Significant Imaging: Echocardiogram:   2D echo with color flow doppler:   Results for orders placed or performed during the hospital encounter of 09/13/19   2D echo with color flow doppler   Result Value Ref Range    QEF 55 55 - 65    Est. PA Systolic Pressure 23.98     Narrative    Date of Procedure: 09/15/2019        TEST DESCRIPTION   Technical Quality: This is a technically challenging study. There is poor endocardial definition.     Aorta: The aortic root is normal in size, measuring 3.0 cm at sinotubular junction and 3.3 cm at Sinuses of Valsalva. The proximal ascending aorta is normal in size, measuring 3.3 cm across.     Left Atrium: The left atrial volume index is severely enlarged, measuring 87.13 cc/m2.     Left Ventricle: The left ventricle is normal in size, with an end-diastolic diameter of 5.3 cm, and an end-systolic diameter of 3.6 cm. LV wall thickness is normal, with the septum and the posterior wall each measuring 1.3 cm across. Relative wall   thickness was increased at 0.49, and the LV mass index was increased at 189.2 g/m2 consistent with concentric left ventricular hypertrophy. There are no regional wall motion abnormalities. Left ventricular systolic function appears normal. Visually   estimated ejection fraction is 55-60%. The LV Doppler derived stroke volume equals 47.0 ccs.     Diastolic indices: E wave velocity 1.0 m/s, E/A ratio 1.8,  msec., E/e' ratio(avg) 12. Diastolic function is indeterminate.     Right Atrium: The right atrium is normal in size, measuring 6.3 cm in length and 5.0 cm in width in the apical view.     Right Ventricle: The right ventricle is normal in size measuring 2.3 cm at the  base in the apical right ventricle-focused view. Global right ventricular systolic function appears normal. Tricuspid annular plane systolic excursion (TAPSE) is 2.9 cm. The   estimated PA systolic pressure is 24 mmHg.     Aortic Valve:  The aortic valve is mildly sclerotic.     Mitral Valve:  The mitral valve is normal in structure. The pressure half time is 61 msec. The calculated mitral valve area is 3.61 cm2.     Tricuspid Valve:  The tricuspid valve is normal in structure.     Pulmonary Valve:  The pulmonic valve is not well seen.     IVC: IVC is normal in size and collapses > 50% with a sniff, suggesting normal right atrial pressure of 3 mmHg.     Intracavitary: There is no evidence of pericardial effusion, intracavity mass, thrombi, or vegetation.         CONCLUSIONS     1 - Normal left ventricular systolic function (EF 55-60%).     2 - Indeterminate LV diastolic function.     3 - Normal right ventricular systolic function .             This document has been electronically    SIGNED BY: Stan Yeager MD On: 09/15/2019 15:00    and X-Ray: CXR: X-Ray Chest 1 View (CXR): No results found for this visit on 10/25/19.    Assessment and Plan:     * Hyperkalemia  Stop aldactone  Monitor    Mastoiditis of both sides  Cont tx per primary team    Bradycardia  Hold BB  Stop amiodarone  Correct K; stop aldactone    Cardiac left ventricular ejection fraction 21-40 percent  Cont CHF meds  Will discuss Entresto as outpt  Stop aldactone    Coronary artery disease involving native coronary artery of native heart without angina pectoris  Will need cardiac PET and will revascularize as needed  Cont CV meds  Stable now        VTE Risk Mitigation (From admission, onward)         Ordered     Place sequential compression device  Until discontinued      10/26/19 0611     IP VTE HIGH RISK PATIENT  Once      10/26/19 0611                Thank you for your consult. I will follow-up with patient. Please contact us if you have any  additional questions.    Girma Palacios Md, MD  Cardiology   [facility]

## 2019-10-26 NOTE — PROGRESS NOTES
Patient resting in bed with no distress noted at this time. Currently on 3 L NC per home settings

## 2019-10-26 NOTE — ASSESSMENT & PLAN NOTE
Will explain patient's dizziness.  Will start patient on IV Rocephin.  First dose was given earlier before epic was done.

## 2019-10-27 VITALS
RESPIRATION RATE: 18 BRPM | TEMPERATURE: 98 F | WEIGHT: 138.88 LBS | BODY MASS INDEX: 21.05 KG/M2 | SYSTOLIC BLOOD PRESSURE: 108 MMHG | OXYGEN SATURATION: 95 % | DIASTOLIC BLOOD PRESSURE: 68 MMHG | HEIGHT: 68 IN | HEART RATE: 66 BPM

## 2019-10-27 LAB
ALBUMIN SERPL BCP-MCNC: 2.7 G/DL (ref 3.5–5.2)
ALP SERPL-CCNC: 53 U/L (ref 55–135)
ALT SERPL W/O P-5'-P-CCNC: 17 U/L (ref 10–44)
ANION GAP SERPL CALC-SCNC: 11 MMOL/L (ref 8–16)
AST SERPL-CCNC: 17 U/L (ref 10–40)
BASOPHILS # BLD AUTO: 0.09 K/UL (ref 0–0.2)
BASOPHILS NFR BLD: 1 % (ref 0–1.9)
BILIRUB SERPL-MCNC: 0.4 MG/DL (ref 0.1–1)
BUN SERPL-MCNC: 18 MG/DL (ref 8–23)
CALCIUM SERPL-MCNC: 9.3 MG/DL (ref 8.7–10.5)
CHLORIDE SERPL-SCNC: 100 MMOL/L (ref 95–110)
CO2 SERPL-SCNC: 28 MMOL/L (ref 23–29)
CREAT SERPL-MCNC: 1.1 MG/DL (ref 0.5–1.4)
DIFFERENTIAL METHOD: ABNORMAL
EOSINOPHIL # BLD AUTO: 0 K/UL (ref 0–0.5)
EOSINOPHIL NFR BLD: 0.5 % (ref 0–8)
ERYTHROCYTE [DISTWIDTH] IN BLOOD BY AUTOMATED COUNT: 15.9 % (ref 11.5–14.5)
EST. GFR  (AFRICAN AMERICAN): >60 ML/MIN/1.73 M^2
EST. GFR  (NON AFRICAN AMERICAN): >60 ML/MIN/1.73 M^2
GLUCOSE SERPL-MCNC: 79 MG/DL (ref 70–110)
HCT VFR BLD AUTO: 30.8 % (ref 40–54)
HGB BLD-MCNC: 10 G/DL (ref 14–18)
IMM GRANULOCYTES # BLD AUTO: 0.03 K/UL (ref 0–0.04)
IMM GRANULOCYTES NFR BLD AUTO: 0.3 % (ref 0–0.5)
LYMPHOCYTES # BLD AUTO: 0.9 K/UL (ref 1–4.8)
LYMPHOCYTES NFR BLD: 10 % (ref 18–48)
MCH RBC QN AUTO: 31.3 PG (ref 27–31)
MCHC RBC AUTO-ENTMCNC: 32.5 G/DL (ref 32–36)
MCV RBC AUTO: 97 FL (ref 82–98)
MONOCYTES # BLD AUTO: 0.8 K/UL (ref 0.3–1)
MONOCYTES NFR BLD: 8.9 % (ref 4–15)
NEUTROPHILS # BLD AUTO: 7 K/UL (ref 1.8–7.7)
NEUTROPHILS NFR BLD: 79.3 % (ref 38–73)
NRBC BLD-RTO: 0 /100 WBC
PLATELET # BLD AUTO: 218 K/UL (ref 150–350)
PMV BLD AUTO: 11.8 FL (ref 9.2–12.9)
POTASSIUM SERPL-SCNC: 4.2 MMOL/L (ref 3.5–5.1)
POTASSIUM SERPL-SCNC: 4.5 MMOL/L (ref 3.5–5.1)
POTASSIUM SERPL-SCNC: 4.5 MMOL/L (ref 3.5–5.1)
POTASSIUM SERPL-SCNC: 4.6 MMOL/L (ref 3.5–5.1)
PROT SERPL-MCNC: 6.5 G/DL (ref 6–8.4)
RBC # BLD AUTO: 3.19 M/UL (ref 4.6–6.2)
SODIUM SERPL-SCNC: 139 MMOL/L (ref 136–145)
WBC # BLD AUTO: 8.8 K/UL (ref 3.9–12.7)

## 2019-10-27 PROCEDURE — 99900035 HC TECH TIME PER 15 MIN (STAT)

## 2019-10-27 PROCEDURE — 63600175 PHARM REV CODE 636 W HCPCS: Performed by: INTERNAL MEDICINE

## 2019-10-27 PROCEDURE — 99214 OFFICE O/P EST MOD 30 MIN: CPT | Mod: ,,, | Performed by: INTERNAL MEDICINE

## 2019-10-27 PROCEDURE — 36415 COLL VENOUS BLD VENIPUNCTURE: CPT

## 2019-10-27 PROCEDURE — 85025 COMPLETE CBC W/AUTO DIFF WBC: CPT

## 2019-10-27 PROCEDURE — 84132 ASSAY OF SERUM POTASSIUM: CPT | Mod: 91

## 2019-10-27 PROCEDURE — G0378 HOSPITAL OBSERVATION PER HR: HCPCS

## 2019-10-27 PROCEDURE — 25000242 PHARM REV CODE 250 ALT 637 W/ HCPCS: Performed by: INTERNAL MEDICINE

## 2019-10-27 PROCEDURE — 94761 N-INVAS EAR/PLS OXIMETRY MLT: CPT

## 2019-10-27 PROCEDURE — 80053 COMPREHEN METABOLIC PANEL: CPT

## 2019-10-27 PROCEDURE — 25000242 PHARM REV CODE 250 ALT 637 W/ HCPCS: Performed by: EMERGENCY MEDICINE

## 2019-10-27 PROCEDURE — 94640 AIRWAY INHALATION TREATMENT: CPT

## 2019-10-27 PROCEDURE — 27000221 HC OXYGEN, UP TO 24 HOURS

## 2019-10-27 PROCEDURE — 25000003 PHARM REV CODE 250: Performed by: INTERNAL MEDICINE

## 2019-10-27 PROCEDURE — 96376 TX/PRO/DX INJ SAME DRUG ADON: CPT

## 2019-10-27 PROCEDURE — 99214 PR OFFICE/OUTPT VISIT, EST, LEVL IV, 30-39 MIN: ICD-10-PCS | Mod: ,,, | Performed by: INTERNAL MEDICINE

## 2019-10-27 RX ORDER — GABAPENTIN 600 MG/1
600 TABLET ORAL 3 TIMES DAILY
Qty: 90 TABLET | Refills: 0 | Status: SHIPPED | OUTPATIENT
Start: 2019-10-27 | End: 2019-10-27 | Stop reason: SDUPTHER

## 2019-10-27 RX ORDER — AMOXICILLIN AND CLAVULANATE POTASSIUM 875; 125 MG/1; MG/1
1 TABLET, FILM COATED ORAL EVERY 12 HOURS
Status: DISCONTINUED | OUTPATIENT
Start: 2019-10-27 | End: 2019-10-27 | Stop reason: HOSPADM

## 2019-10-27 RX ORDER — GABAPENTIN 600 MG/1
600 TABLET ORAL 3 TIMES DAILY
Qty: 90 TABLET | Refills: 0 | Status: SHIPPED | OUTPATIENT
Start: 2019-10-27 | End: 2019-11-12 | Stop reason: SDUPTHER

## 2019-10-27 RX ORDER — FUROSEMIDE 40 MG/1
40 TABLET ORAL DAILY
Qty: 30 TABLET | Refills: 0 | Status: SHIPPED | OUTPATIENT
Start: 2019-10-28 | End: 2019-11-01 | Stop reason: SDUPTHER

## 2019-10-27 RX ORDER — AMOXICILLIN AND CLAVULANATE POTASSIUM 875; 125 MG/1; MG/1
1 TABLET, FILM COATED ORAL EVERY 12 HOURS
Qty: 14 TABLET | Refills: 0 | Status: SHIPPED | OUTPATIENT
Start: 2019-10-27 | End: 2019-11-01

## 2019-10-27 RX ADMIN — OXYCODONE AND ACETAMINOPHEN 1 TABLET: 7.5; 325 TABLET ORAL at 11:10

## 2019-10-27 RX ADMIN — IPRATROPIUM BROMIDE AND ALBUTEROL SULFATE 3 ML: .5; 3 SOLUTION RESPIRATORY (INHALATION) at 12:10

## 2019-10-27 RX ADMIN — CEFTRIAXONE 1 G: 1 INJECTION, SOLUTION INTRAVENOUS at 01:10

## 2019-10-27 RX ADMIN — ASPIRIN 81 MG CHEWABLE TABLET 81 MG: 81 TABLET CHEWABLE at 08:10

## 2019-10-27 RX ADMIN — PILOCARPINE HYDROCHLORIDE 5 MG: 5 TABLET, FILM COATED ORAL at 06:10

## 2019-10-27 RX ADMIN — ARFORMOTEROL TARTRATE 15 MCG: 15 SOLUTION RESPIRATORY (INHALATION) at 07:10

## 2019-10-27 RX ADMIN — IPRATROPIUM BROMIDE AND ALBUTEROL SULFATE 3 ML: .5; 3 SOLUTION RESPIRATORY (INHALATION) at 07:10

## 2019-10-27 RX ADMIN — PANTOPRAZOLE SODIUM 40 MG: 40 TABLET, DELAYED RELEASE ORAL at 08:10

## 2019-10-27 RX ADMIN — IPRATROPIUM BROMIDE AND ALBUTEROL SULFATE 3 ML: .5; 3 SOLUTION RESPIRATORY (INHALATION) at 01:10

## 2019-10-27 RX ADMIN — FUROSEMIDE 40 MG: 40 TABLET ORAL at 08:10

## 2019-10-27 RX ADMIN — CLOPIDOGREL BISULFATE 75 MG: 75 TABLET ORAL at 08:10

## 2019-10-27 NOTE — PROGRESS NOTES
[facility] -   Cardiology  Progress Note    Patient Name: Noble Tripp  MRN: [id number]  Admission Date: 10/25/2019  Hospital Length of Stay: 0 days  Code Status: Full Code   Attending Physician: Harish Mak MD   Primary Care Physician: Dwaine Davis MD  Expected Discharge Date: 10/27/2019  Principal Problem:Hyperkalemia    Subjective:   HPI      Noble Tripp is a 74 y.o. male with medical history as below presented to the ER presented with dizziness and weakness with some mild confusion.     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   10- admitted to [facility] for sepsis/PNA and PSVT on . EKG showed extensive St depression of 1 mm on anterolateral leads. EF 60%. MPi showed fixed inferior perfusion defect. cTn was up to 0.3. PSVT was converted to sinus O/N.   s/p left thoracentesis d/t pleural effusion.  Echo in : normal EF, DD.  MPI in :  A small to moderate size fixed defect of moderate to severe intensity that extends from the apical to the base inferior wall of the left ventricle.  EKG on 10-: PSVT, st depression on V3 to V6, I, II and avL.   admitted for CHF and COPD exacerbation. ECHO showed normal EF and BNP 1800, troponin 0.05 and flat. Rx with IV lasix and breathing O2. D/c with lasix 20 mg po daily   admitted for [facility] for worsening SOB. Troponin up to 0.3. ECHo showed EF 35%, LHC showed severe multivessel CAD. Had CVT evaluation and was not cabg candidate due to high risk. Also high risk PCI. Pt was discharged for medical Rx and PCI as op. Discharged with home O2. Om amiodarone 400 mg daily for nonsustained VT in the hospital.    Cardiology consulted for junctional tahmina on arrival and med management/workup. Discussed with pt and family will monitor overnight, hold BB and stop  AMIO. Pt's ischemic workup still pending with Cardiac PET and revasc.       Hospital Course:   10/27/19--Patient seen and examined in room, feels good today. Heart rate improved to 60s overnight since holding BB and Amiodarone. Labs reviewed, K+ 4.5, Cr 1.1. Ok to discharge home today per cardiology standpoint. Close cardiology follow up in clinic.     Interval History: no acute issues noted o/n. Ok to discharge home from cardiology standpoint. Close cardiology follow up next week.     Review of Systems   Constitution: Positive for malaise/fatigue.   HENT: Negative for hearing loss and hoarse voice.    Eyes: Negative for blurred vision and visual disturbance.   Cardiovascular: Negative for chest pain, claudication, dyspnea on exertion, irregular heartbeat, leg swelling, near-syncope, orthopnea, palpitations, paroxysmal nocturnal dyspnea and syncope.   Respiratory: Negative for cough, hemoptysis, shortness of breath, sleep disturbances due to breathing, snoring and wheezing.    Endocrine: Negative for cold intolerance and heat intolerance.   Hematologic/Lymphatic: Bruises/bleeds easily.   Skin: Negative for color change, dry skin and nail changes.   Musculoskeletal: Positive for arthritis and back pain. Negative for joint pain and myalgias.   Gastrointestinal: Negative for bloating, abdominal pain, constipation, nausea and vomiting.   Genitourinary: Negative for dysuria, flank pain, hematuria and hesitancy.   Neurological: Positive for weakness. Negative for headaches, light-headedness, loss of balance, numbness and paresthesias.   Psychiatric/Behavioral: Negative for altered mental status.   Allergic/Immunologic: Negative for environmental allergies.     Objective:     Vital Signs (Most Recent):  Temp: 97.7 °F (36.5 °C) (10/27/19 0712)  Pulse: 60 (10/27/19 1110)  Resp: 20 (10/27/19 0727)  BP: 108/68 (10/27/19 0712)  SpO2: 95 % (10/27/19 0727) Vital Signs (24h Range):  Temp:  [97.4 °F (36.3 °C)-98 °F (36.7 °C)] 97.7  °F (36.5 °C)  Pulse:  [52-69] 60  Resp:  [16-20] 20  SpO2:  [90 %-100 %] 95 %  BP: (103-135)/(49-68) 108/68     Weight: 63 kg (138 lb 14.2 oz)  Body mass index is 21.12 kg/m².     SpO2: 95 %  O2 Device (Oxygen Therapy): nasal cannula      Intake/Output Summary (Last 24 hours) at 10/27/2019 1142  Last data filed at 10/27/2019 0600  Gross per 24 hour   Intake 410 ml   Output 550 ml   Net -140 ml       Lines/Drains/Airways     None                 Physical Exam   Constitutional: He is oriented to person, place, and time. He appears well-developed and well-nourished. No distress.   HENT:   Head: Normocephalic and atraumatic.   Eyes: Pupils are equal, round, and reactive to light.   Neck: Normal range of motion and full passive range of motion without pain. Neck supple. No JVD present.   Cardiovascular: Normal rate, regular rhythm, S1 normal, S2 normal and intact distal pulses. PMI is not displaced. Exam reveals no distant heart sounds.   No murmur heard.  Pulses:       Radial pulses are 2+ on the right side, and 2+ on the left side.        Dorsalis pedis pulses are 2+ on the right side, and 2+ on the left side.   Heart rate improved to 60s overnight.    Pulmonary/Chest: Effort normal and breath sounds normal. No accessory muscle usage. No respiratory distress. He has no decreased breath sounds. He has no wheezes. He has no rales.   Abdominal: Soft. Bowel sounds are normal. He exhibits no distension. There is no tenderness.   Musculoskeletal: Normal range of motion. He exhibits no edema.        Right ankle: He exhibits no swelling.        Left ankle: He exhibits no swelling.   Neurological: He is alert and oriented to person, place, and time.   Skin: Skin is warm and dry. He is not diaphoretic. No cyanosis. Nails show no clubbing.   Psychiatric: He has a normal mood and affect. His speech is normal and behavior is normal. Judgment and thought content normal. Cognition and memory are normal.   Nursing note and vitals  reviewed.      Significant Labs:   BMP:   Recent Labs   Lab 10/25/19  2303 10/26/19  0617  10/26/19  2346 10/27/19  0448 10/27/19  0849    76  --   --  79  --    * 140  --   --  139  --    K 6.6* 4.5  4.5   < > 4.6 4.5  4.5 4.2   CL 97 110  --   --  100  --    CO2 27 22*  --   --  28  --    BUN 28* 21  --   --  18  --    CREATININE 1.4 0.9  --   --  1.1  --    CALCIUM 9.2 7.5*  --   --  9.3  --    MG  --  1.6  --   --   --   --     < > = values in this interval not displayed.   , CBC   Recent Labs   Lab 10/25/19  2303 10/26/19  0617 10/27/19  0448   WBC 8.56 7.82 8.80   HGB 9.9* 9.3* 10.0*   HCT 30.5* 30.0* 30.8*    216 218   , Troponin   Recent Labs   Lab 10/25/19  2303 10/26/19  0617   TROPONINI 0.022 0.022    and All pertinent lab results from the last 24 hours have been reviewed.    Significant Imaging: Echocardiogram:   2D echo with color flow doppler:   Results for orders placed or performed during the hospital encounter of 09/13/19   2D echo with color flow doppler   Result Value Ref Range    QEF 55 55 - 65    Est. PA Systolic Pressure 23.98     Narrative    Date of Procedure: 09/15/2019        TEST DESCRIPTION   Technical Quality: This is a technically challenging study. There is poor endocardial definition.     Aorta: The aortic root is normal in size, measuring 3.0 cm at sinotubular junction and 3.3 cm at Sinuses of Valsalva. The proximal ascending aorta is normal in size, measuring 3.3 cm across.     Left Atrium: The left atrial volume index is severely enlarged, measuring 87.13 cc/m2.     Left Ventricle: The left ventricle is normal in size, with an end-diastolic diameter of 5.3 cm, and an end-systolic diameter of 3.6 cm. LV wall thickness is normal, with the septum and the posterior wall each measuring 1.3 cm across. Relative wall   thickness was increased at 0.49, and the LV mass index was increased at 189.2 g/m2 consistent with concentric left ventricular hypertrophy. There are  no regional wall motion abnormalities. Left ventricular systolic function appears normal. Visually   estimated ejection fraction is 55-60%. The LV Doppler derived stroke volume equals 47.0 ccs.     Diastolic indices: E wave velocity 1.0 m/s, E/A ratio 1.8,  msec., E/e' ratio(avg) 12. Diastolic function is indeterminate.     Right Atrium: The right atrium is normal in size, measuring 6.3 cm in length and 5.0 cm in width in the apical view.     Right Ventricle: The right ventricle is normal in size measuring 2.3 cm at the base in the apical right ventricle-focused view. Global right ventricular systolic function appears normal. Tricuspid annular plane systolic excursion (TAPSE) is 2.9 cm. The   estimated PA systolic pressure is 24 mmHg.     Aortic Valve:  The aortic valve is mildly sclerotic.     Mitral Valve:  The mitral valve is normal in structure. The pressure half time is 61 msec. The calculated mitral valve area is 3.61 cm2.     Tricuspid Valve:  The tricuspid valve is normal in structure.     Pulmonary Valve:  The pulmonic valve is not well seen.     IVC: IVC is normal in size and collapses > 50% with a sniff, suggesting normal right atrial pressure of 3 mmHg.     Intracavitary: There is no evidence of pericardial effusion, intracavity mass, thrombi, or vegetation.         CONCLUSIONS     1 - Normal left ventricular systolic function (EF 55-60%).     2 - Indeterminate LV diastolic function.     3 - Normal right ventricular systolic function .             This document has been electronically    SIGNED BY: Stan Yeager MD On: 09/15/2019 15:00    and X-Ray: CXR: X-Ray Chest 1 View (CXR): No results found for this visit on 10/25/19. and X-Ray Chest PA and Lateral (CXR): No results found for this visit on 10/25/19.    Assessment and Plan:       * Hyperkalemia  Stop aldactone  Monitor    10/27  K+ 4.5 today    Mastoiditis of both sides  Cont tx per primary team    Bradycardia  Hold BB  Stop amiodarone  Correct  K; stop aldactone    10/27  -Continue to hold AMIO and BB   -HR improved today  -OK to discharge home with close follow up    Cardiac left ventricular ejection fraction 21-40 percent  Cont CHF meds  Will discuss Entresto as outpt  Stop aldactone    10/27  -Continue current medical management  -Consider Entresto as OP  -OK to discharge home with close cardiology follow up  -Repeat BMP at follow up visit  -Hold BB and Amio for now given junctional bradycardia and hyperkalemia on admission  -HR improved today     Coronary artery disease involving native coronary artery of native heart without angina pectoris  Will need cardiac PET and will revascularize as needed  Cont CV meds  Stable now        VTE Risk Mitigation (From admission, onward)         Ordered     Place sequential compression device  Until discontinued      10/26/19 0611     IP VTE HIGH RISK PATIENT  Once      10/26/19 0611                EDY Brunner  Cardiology  [facility] [place]

## 2019-10-27 NOTE — PLAN OF CARE
Patient on O2 tolerating well. No distress noted at this time. Patient tolerated breathing treatments without issue.

## 2019-10-27 NOTE — PLAN OF CARE
POC reviewed. Pt verbalizes understanding. Comfort measures and safety measures addressed. VSS. Will continue to monitor. Pt remains free from falls and injuries.

## 2019-10-27 NOTE — SUBJECTIVE & OBJECTIVE
Interval History: no acute issues noted o/n. Ok to discharge home from cardiology standpoint. Close cardiology follow up next week.     Review of Systems   Constitution: Positive for malaise/fatigue.   HENT: Negative for hearing loss and hoarse voice.    Eyes: Negative for blurred vision and visual disturbance.   Cardiovascular: Negative for chest pain, claudication, dyspnea on exertion, irregular heartbeat, leg swelling, near-syncope, orthopnea, palpitations, paroxysmal nocturnal dyspnea and syncope.   Respiratory: Negative for cough, hemoptysis, shortness of breath, sleep disturbances due to breathing, snoring and wheezing.    Endocrine: Negative for cold intolerance and heat intolerance.   Hematologic/Lymphatic: Bruises/bleeds easily.   Skin: Negative for color change, dry skin and nail changes.   Musculoskeletal: Positive for arthritis and back pain. Negative for joint pain and myalgias.   Gastrointestinal: Negative for bloating, abdominal pain, constipation, nausea and vomiting.   Genitourinary: Negative for dysuria, flank pain, hematuria and hesitancy.   Neurological: Positive for weakness. Negative for headaches, light-headedness, loss of balance, numbness and paresthesias.   Psychiatric/Behavioral: Negative for altered mental status.   Allergic/Immunologic: Negative for environmental allergies.     Objective:     Vital Signs (Most Recent):  Temp: 97.7 °F (36.5 °C) (10/27/19 0712)  Pulse: 60 (10/27/19 1110)  Resp: 20 (10/27/19 0727)  BP: 108/68 (10/27/19 0712)  SpO2: 95 % (10/27/19 0727) Vital Signs (24h Range):  Temp:  [97.4 °F (36.3 °C)-98 °F (36.7 °C)] 97.7 °F (36.5 °C)  Pulse:  [52-69] 60  Resp:  [16-20] 20  SpO2:  [90 %-100 %] 95 %  BP: (103-135)/(49-68) 108/68     Weight: 63 kg (138 lb 14.2 oz)  Body mass index is 21.12 kg/m².     SpO2: 95 %  O2 Device (Oxygen Therapy): nasal cannula      Intake/Output Summary (Last 24 hours) at 10/27/2019 1142  Last data filed at 10/27/2019 0600  Gross per 24 hour    Intake 410 ml   Output 550 ml   Net -140 ml       Lines/Drains/Airways     None                 Physical Exam   Constitutional: He is oriented to person, place, and time. He appears well-developed and well-nourished. No distress.   HENT:   Head: Normocephalic and atraumatic.   Eyes: Pupils are equal, round, and reactive to light.   Neck: Normal range of motion and full passive range of motion without pain. Neck supple. No JVD present.   Cardiovascular: Normal rate, regular rhythm, S1 normal, S2 normal and intact distal pulses. PMI is not displaced. Exam reveals no distant heart sounds.   No murmur heard.  Pulses:       Radial pulses are 2+ on the right side, and 2+ on the left side.        Dorsalis pedis pulses are 2+ on the right side, and 2+ on the left side.   Heart rate improved to 60s overnight.    Pulmonary/Chest: Effort normal and breath sounds normal. No accessory muscle usage. No respiratory distress. He has no decreased breath sounds. He has no wheezes. He has no rales.   Abdominal: Soft. Bowel sounds are normal. He exhibits no distension. There is no tenderness.   Musculoskeletal: Normal range of motion. He exhibits no edema.        Right ankle: He exhibits no swelling.        Left ankle: He exhibits no swelling.   Neurological: He is alert and oriented to person, place, and time.   Skin: Skin is warm and dry. He is not diaphoretic. No cyanosis. Nails show no clubbing.   Psychiatric: He has a normal mood and affect. His speech is normal and behavior is normal. Judgment and thought content normal. Cognition and memory are normal.   Nursing note and vitals reviewed.      Significant Labs:   BMP:   Recent Labs   Lab 10/25/19  2303 10/26/19  0617  10/26/19  2346 10/27/19  0448 10/27/19  0849    76  --   --  79  --    * 140  --   --  139  --    K 6.6* 4.5  4.5   < > 4.6 4.5  4.5 4.2   CL 97 110  --   --  100  --    CO2 27 22*  --   --  28  --    BUN 28* 21  --   --  18  --    CREATININE 1.4  0.9  --   --  1.1  --    CALCIUM 9.2 7.5*  --   --  9.3  --    MG  --  1.6  --   --   --   --     < > = values in this interval not displayed.   , CBC   Recent Labs   Lab 10/25/19  2303 10/26/19  0617 10/27/19  0448   WBC 8.56 7.82 8.80   HGB 9.9* 9.3* 10.0*   HCT 30.5* 30.0* 30.8*    216 218   , Troponin   Recent Labs   Lab 10/25/19  2303 10/26/19  0617   TROPONINI 0.022 0.022    and All pertinent lab results from the last 24 hours have been reviewed.    Significant Imaging: Echocardiogram:   2D echo with color flow doppler:   Results for orders placed or performed during the hospital encounter of 09/13/19   2D echo with color flow doppler   Result Value Ref Range    QEF 55 55 - 65    Est. PA Systolic Pressure 23.98     Narrative    Date of Procedure: 09/15/2019        TEST DESCRIPTION   Technical Quality: This is a technically challenging study. There is poor endocardial definition.     Aorta: The aortic root is normal in size, measuring 3.0 cm at sinotubular junction and 3.3 cm at Sinuses of Valsalva. The proximal ascending aorta is normal in size, measuring 3.3 cm across.     Left Atrium: The left atrial volume index is severely enlarged, measuring 87.13 cc/m2.     Left Ventricle: The left ventricle is normal in size, with an end-diastolic diameter of 5.3 cm, and an end-systolic diameter of 3.6 cm. LV wall thickness is normal, with the septum and the posterior wall each measuring 1.3 cm across. Relative wall   thickness was increased at 0.49, and the LV mass index was increased at 189.2 g/m2 consistent with concentric left ventricular hypertrophy. There are no regional wall motion abnormalities. Left ventricular systolic function appears normal. Visually   estimated ejection fraction is 55-60%. The LV Doppler derived stroke volume equals 47.0 ccs.     Diastolic indices: E wave velocity 1.0 m/s, E/A ratio 1.8,  msec., E/e' ratio(avg) 12. Diastolic function is indeterminate.     Right Atrium: The  right atrium is normal in size, measuring 6.3 cm in length and 5.0 cm in width in the apical view.     Right Ventricle: The right ventricle is normal in size measuring 2.3 cm at the base in the apical right ventricle-focused view. Global right ventricular systolic function appears normal. Tricuspid annular plane systolic excursion (TAPSE) is 2.9 cm. The   estimated PA systolic pressure is 24 mmHg.     Aortic Valve:  The aortic valve is mildly sclerotic.     Mitral Valve:  The mitral valve is normal in structure. The pressure half time is 61 msec. The calculated mitral valve area is 3.61 cm2.     Tricuspid Valve:  The tricuspid valve is normal in structure.     Pulmonary Valve:  The pulmonic valve is not well seen.     IVC: IVC is normal in size and collapses > 50% with a sniff, suggesting normal right atrial pressure of 3 mmHg.     Intracavitary: There is no evidence of pericardial effusion, intracavity mass, thrombi, or vegetation.         CONCLUSIONS     1 - Normal left ventricular systolic function (EF 55-60%).     2 - Indeterminate LV diastolic function.     3 - Normal right ventricular systolic function .             This document has been electronically    SIGNED BY: Stan Yeager MD On: 09/15/2019 15:00    and X-Ray: CXR: X-Ray Chest 1 View (CXR): No results found for this visit on 10/25/19. and X-Ray Chest PA and Lateral (CXR): No results found for this visit on 10/25/19.

## 2019-10-27 NOTE — DISCHARGE SUMMARY
[facility] Hospital Medicine  Discharge Summary      Patient Name: Noble Tripp  MRN: [id number]  Admission Date: 10/25/2019  Hospital Length of Stay: 0 days  Discharge Date and Time: 10/27/2019  4:40 PM  Attending Physician: Dr. Harish Mak   Discharging Provider: Rosa Gutierrez NP  Primary Care Provider: Dwaine Davis MD      HPI:   The patient is a 74-year-old man presenting with dizziness and weakness with some mild confusion.  The patient has a history of cardiomyopathy with an ejection fraction of 25% to his medications were recently changed while it heals stent.  Patient states with the son that he had a myocardial fraction and was treated with cardiac catheterization at [facility] where he was told that he had 90% blockage in [place] and 67% blockage roughly in another main artery.  He was placed on spironolactone and a beta-blocker.    * No surgery found *      Hospital Course:   Pt admitted to Observation Unit for Altered Mental Status in the setting of bilateral mastoiditis. Hyperkalemia also noted.  EKG showed JR with HR 47.  Cardiology consulted and Amiodarone held.  Potassium improved with Kayexalate given and Spironolactone held.  H/H stable and kidney function improved.  Bradycardia noted with HR 57.  On 10/27/19, potassium normalized and HR remained in the 60's.  Pt verbalized symptom improvement and denied any additional complaints.  Pt instructed to change positions slowly with understanding verbalized.  Vital signs stable and no signs of acute distress noted.  Supplemental oxygen at home dose in place.  Pt seen and examined on the date of discharge and deemed suitable for discharge to home accompanied by family.  Current medications resumed with Augmentin prescribed. Lasix and Neurontin dose adjusted.  Amiodarone, Aldactone, and Coreg stopped per Cardiology recommendation. Lisinopril held due to mild hypotension.  Pt instructed to follow up with PCP, Cardiology, and  established Pain Management Provider for further evaluation.      Consults:   Consults (From admission, onward)        Status Ordering Provider     Inpatient consult to Cardiology  Once     Provider:  Girma Palacios MD    Completed LIZ DONATO     Inpatient consult to Registered Dietitian/Nutritionist  Once     Provider:  (Not yet assigned)    TOI Riley          Final Active Diagnoses:    Diagnosis Date Noted POA    PRINCIPAL PROBLEM:  Hyperkalemia [E87.5] 10/26/2019 Yes     Chronic    Bradycardia [R00.1] 10/26/2019 Yes    Mastoiditis of both sides [H70.93] 10/26/2019 Yes    Cardiac left ventricular ejection fraction 21-40 percent [R94.30] 10/23/2019 Yes    Coronary artery disease involving native coronary artery of native heart without angina pectoris [I25.10] 05/17/2016 Yes      Problems Resolved During this Admission:       Discharged Condition: stable    Disposition: Home or Self Care    Follow Up:  Follow-up Information     Dwaine Davis MD In 3 days.    Specialty:  Family Medicine  Why:  -hospital follow up   Contact information:  [address]  [place] [address] [phone]             Andres Whitfield MD In 1 week.    Specialties:  Cardiology, Cardiovascular Disease  Why:  -hospital follow up- clinic to contact   Contact information:  [address] [facility] [address] [phone]             Please follow up.    Why:  -pt may also follow up with established Pain Management Provider for further evaluation for chronic back pain                Patient Instructions:      Diet Cardiac     Notify your health care provider if you experience any of the following:  temperature >100.4     Notify your health care provider if you experience any of the following:  persistent nausea and vomiting or diarrhea     Notify your health care provider if you experience any of the following:  difficulty breathing or increased cough     Notify your health care provider if you  experience any of the following:  persistent dizziness, light-headedness, or visual disturbances     Notify your health care provider if you experience any of the following:  increased confusion or weakness     Notify your health care provider if you experience any of the following:  severe uncontrolled pain     Notify your health care provider if you experience any of the following:  redness, tenderness, or signs of infection (pain, swelling, redness, odor or green/yellow discharge around incision site)     Notify your health care provider if you experience any of the following:  severe persistent headache     Activity as tolerated       Significant Diagnostic Studies: Labs:   CMP   Recent Labs   Lab 10/26/19  2346 10/27/19  0448 10/27/19  0849   NA  --  139  --    K 4.6 4.5  4.5 4.2   CL  --  100  --    CO2  --  28  --    GLU  --  79  --    BUN  --  18  --    CREATININE  --  1.1  --    CALCIUM  --  9.3  --    PROT  --  6.5  --    ALBUMIN  --  2.7*  --    BILITOT  --  0.4  --    ALKPHOS  --  53*  --    AST  --  17  --    ALT  --  17  --    ANIONGAP  --  11  --    ESTGFRAFRICA  --  >60  --    EGFRNONAA  --  >60  --    , CBC   Recent Labs   Lab 10/27/19  0448   WBC 8.80   HGB 10.0*   HCT 30.8*       and Troponin   Recent Labs   Lab 10/26/19  0617   TROPONINI 0.022       Pending Diagnostic Studies:     None         Medications:  Reconciled Home Medications:      Medication List      START taking these medications    amoxicillin-clavulanate 875-125mg 875-125 mg per tablet  Commonly known as:  AUGMENTIN  Take 1 tablet by mouth every 12 (twelve) hours. for 7 days     gabapentin 600 MG tablet  Commonly known as:  NEURONTIN  Take 1 tablet (600 mg total) by mouth 3 (three) times daily.        CHANGE how you take these medications    furosemide 40 MG tablet  Commonly known as:  LASIX  Take 1 tablet (40 mg total) by mouth once daily.  What changed:    · medication strength  · how much to take  · how to take  this  · when to take this  · additional instructions        CONTINUE taking these medications    * PROAIR HFA 90 mcg/actuation inhaler  Generic drug:  albuterol  INL 2 PFS PO INTO THE LUNGS Q 4 H PRF WHZ OR SOB     * albuterol 2.5 mg /3 mL (0.083 %) nebulizer solution  Commonly known as:  PROVENTIL  Take 3 mLs (2.5 mg total) by nebulization every 8 (eight) hours while awake.     arformoterol 15 mcg/2 mL Nebu  Commonly known as:  BROVANA  Take 2 mLs (15 mcg total) by nebulization 2 (two) times daily. Controller     aspirin 81 mg Tab  Take 1 tablet by mouth Daily. Over the counter to help prevent stroke/heart attack     atorvastatin 80 MG tablet  Commonly known as:  LIPITOR  Take 1 tablet (80 mg total) by mouth once daily.     CHANTIX CONTINUING MONTH BOX 1 mg Tab  Generic drug:  varenicline  TAKE 1 TABLET BY MOUTH ONCE DAILY     clopidogrel 75 mg tablet  Commonly known as:  PLAVIX  Take 1 tablet (75 mg total) by mouth once daily.     clotrimazole 10 mg elissa  Commonly known as:  MYCELEX     COMP-AIR NEBULIZER COMPRESSOR Bailey  Generic drug:  nebulizer and compressor  Use as directed     evolocumab 140 mg/mL Pnij  Commonly known as:  REPATHA SURECLICK  Inject 1 mL (140 mg total) into the skin every 14 (fourteen) days.     loratadine 10 mg tablet  Commonly known as:  CLARITIN  Take 1 tablet by mouth daily as needed.     oxyCODONE-acetaminophen 7.5-325 mg per tablet  Commonly known as:  PERCOCET  Take 1 tablet by mouth every 8 (eight) hours as needed for Pain.     pantoprazole 40 MG tablet  Commonly known as:  PROTONIX  TAKE ONE TABLET BY MOUTH ONCE DAILY     pilocarpine 5 MG Tab  Commonly known as:  SALAGEN  TAKE ONE TABLET BY MOUTH 30 MINUTES PRIOR TO EACH MEAL     tiotropium 18 mcg inhalation capsule  Commonly known as:  SPIRIVA WITH HANDIHALER  Inhale 1 capsule (18 mcg total) into the lungs once daily.         * This list has 2 medication(s) that are the same as other medications prescribed for you. Read the  directions carefully, and ask your doctor or other care provider to review them with you.            STOP taking these medications    amiodarone 400 MG tablet  Commonly known as:  PACERONE     carvedilol 12.5 MG tablet  Commonly known as:  COREG     ELDERBERRY FRUIT AND FLOWER ORAL     garlic 2,000 mg Cap     lisinopril 5 MG tablet  Commonly known as:  PRINIVIL,ZESTRIL     spironolactone 25 MG tablet  Commonly known as:  ALDACTONE     turmeric root extract 500 mg Cap            Indwelling Lines/Drains at time of discharge:   Lines/Drains/Airways     None                 Time spent on the discharge of patient: > 45 minutes  Patient was seen and examined on the date of discharge and determined to be suitable for discharge.         Rosa Gutierrez NP  Department of Hospital Medicine  [facility]

## 2019-10-27 NOTE — ASSESSMENT & PLAN NOTE
Cont CHF meds  Will discuss Entresto as outpt  Stop aldactone    10/27  -Continue current medical management  -Consider Entresto as OP  -OK to discharge home with close cardiology follow up  -Repeat BMP at follow up visit  -Hold BB and Amio for now given junctional bradycardia and hyperkalemia on admission  -HR improved today

## 2019-10-27 NOTE — PLAN OF CARE
Patient/ family updated on plan of care, pt in process of discharge Chris Freed 10/27/2019 10:59 AM

## 2019-10-27 NOTE — NURSING
Discharge instructions provided and reviewed with patient/ family  IV removed  Telemetry removed and returned  Pt properly dressed for weather  Pt ate prior to discharge  Paper prescription provided and reviewed with patient/ family  Pt left via walked safely out of hospital with supplemental O2 on [facility] @ [facility]  Chris Freed 10/27/2019 2:05 PM

## 2019-10-27 NOTE — ASSESSMENT & PLAN NOTE
Hold BB  Stop amiodarone  Correct K; stop aldactone    10/27  -Continue to hold AMIO and BB   -HR improved today  -OK to discharge home with close follow up

## 2019-10-27 NOTE — HOSPITAL COURSE
10/27/19--Patient seen and examined in room, feels good today. Heart rate improved to 60s overnight since holding BB and Amiodarone. Labs reviewed, K+ 4.5, Cr 1.1. Ok to discharge home today per cardiology standpoint. Close cardiology follow up in clinic.

## 2019-10-27 NOTE — DISCHARGE INSTRUCTIONS
Nutrition recommendations   cardiac diet with texture + Boost Glucose Control, BID.  -Pt cautioned to change positions slowly and report falls, increased episodes of weakness, and/or dizziness

## 2019-10-28 ENCOUNTER — TELEPHONE (OUTPATIENT)
Dept: CARDIOLOGY | Facility: CLINIC | Age: 74
End: 2019-10-28

## 2019-10-28 ENCOUNTER — TELEPHONE (OUTPATIENT)
Dept: INTERNAL MEDICINE | Facility: CLINIC | Age: 74
End: 2019-10-28

## 2019-10-28 DIAGNOSIS — I50.22 CHRONIC SYSTOLIC CONGESTIVE HEART FAILURE: Primary | ICD-10-CM

## 2019-10-28 NOTE — TELEPHONE ENCOUNTER
----- Message from Berkleybruce Red sent at 10/28/2019 12:07 PM CDT -----  Contact: Ana Rosa- daughter  Type:  Sooner Apoointment Request    Caller is requesting a sooner appointment.  Caller declined first available appointment listed below.  Caller will not accept being placed on the waitlist and is requesting a message be sent to doctor.  Name of Caller: Ana Rosa  When is the first available appointment? 12/24  Symptoms: cardiac issues/ medication   Would the patient rather a call back or a response via [facility]? call  Best Call Back Number: [phone]  Additional Information: Pt is requesting hospital follow up as soon as possible. Ana Rosa also, has some questions about sleeping medication for pt.

## 2019-10-28 NOTE — TELEPHONE ENCOUNTER
----- Message from EDY Mendoza sent at 10/28/2019 10:21 AM CDT -----  Patient needs CHF follow up this week with repeat labs (BMP, BNP)    Please call and arrange  Thanks

## 2019-10-28 NOTE — TELEPHONE ENCOUNTER
----- Message from Omid Gross sent at 10/28/2019  2:36 PM CDT -----  Contact: Pt daughter/Ana Rosa  Caller request a call from the nurse to see what pt can take to help him sleep because he is not sleepping, please contact the caller at [phone]..    [id number] [place] [address]  [id number] [place] [address]  Phone: [phone] Fax: [phone]

## 2019-10-28 NOTE — TELEPHONE ENCOUNTER
Returned call to patient's daughter Ana Rosa--states patient needs to know what he can take to help sleep--states she spoke with patient's PCP and was told to call cardiologist to see what patient may be able to take--informed will give a call back on 1029/19 with provider's response--Ana Rosa verbalizes understanding

## 2019-10-28 NOTE — TELEPHONE ENCOUNTER
Patient daughter requested her see a doctor, so appt scheduled with Dr. Frost on 11/1/19 at 10:40am

## 2019-10-28 NOTE — TELEPHONE ENCOUNTER
"Called back to Ana Rosa and informed  states," Need to call PCP for sleep meds, I recommend over the counter Melatonin"--Ana Rosa verbalizes understanding  "

## 2019-10-28 NOTE — TELEPHONE ENCOUNTER
----- Message from Girma Palacios MD sent at 10/28/2019  5:01 PM CDT -----  Contact: Pt daughter/Ana Rosa  Need to call PCP for sleep meds, I recommend over the counter Melatonin.     ----- Message -----  From: Nathan Reynolds LPN  Sent: 10/28/2019   5:00 PM CDT  To: Girma Palacios MD        ----- Message -----  From: Omid Gross  Sent: 10/28/2019   2:36 PM CDT  To: Philip Rayo Staff    Caller request a call from the nurse to see what pt can take to help him sleep because he is not sleepping, please contact the caller at [phone]..    [facility] [address] [place] [address]  Phone: [phone] Fax: [phone]

## 2019-10-30 ENCOUNTER — TELEPHONE (OUTPATIENT)
Dept: PULMONOLOGY | Facility: CLINIC | Age: 74
End: 2019-10-30

## 2019-10-30 ENCOUNTER — TELEPHONE (OUTPATIENT)
Dept: CARDIOLOGY | Facility: CLINIC | Age: 74
End: 2019-10-30

## 2019-10-30 NOTE — TELEPHONE ENCOUNTER
----- Message from Sagie Hampton NP sent at 10/29/2019  5:50 PM CDT -----  Contact: pt  I have never seen patient to place orders, he will need to report for compliance download if needs orders for new machine or supplies. Unless he would like to await Dr. Cardozo's return to clinic next week. Thank you.     ----- Message -----  From: Apple Jaime MA  Sent: 10/29/2019   2:11 PM CDT  To: Saige Hampton NP    Patient needs RX new machine and supplies cpap  ----- Message -----  From: Charu Yin  Sent: 10/29/2019  12:46 PM CDT  To: Juliane Graham Staff    He's calling in regards to order for new machine         pls call pt back at [phone]

## 2019-10-30 NOTE — TELEPHONE ENCOUNTER
Returned patient call explain to him he  Would needto bring his cpap machine to [facility]  Have them download reading from machine and fax it to [facility]

## 2019-10-30 NOTE — TELEPHONE ENCOUNTER
----- Message from aNthan Reynolds LPN sent at 10/30/2019 11:29 AM CDT -----  Contact: Esteban Oseguera- [phone]  Patient scheduled previously with Dr. Palacios.  ----- Message -----  From: Filiberto Hernandez  Sent: 10/30/2019  10:37 AM CDT  To: Philip Rayo Staff    Would like a call from nurse in regards to an appt being rescheduled with no notice. Please call back at [phone].       Thank You,   Filiberto Hernandez

## 2019-10-30 NOTE — TELEPHONE ENCOUNTER
Rescheduled Pt with Dr. Whitfield due to scheduling issue. Pt stated that they would rather start seeing Dr. Palacios and pt was put back onto Dr. Palacios's schedule.

## 2019-10-31 ENCOUNTER — EXTERNAL CHRONIC CARE MANAGEMENT (OUTPATIENT)
Dept: PRIMARY CARE CLINIC | Facility: CLINIC | Age: 74
End: 2019-10-31
Payer: MEDICARE

## 2019-10-31 PROCEDURE — 99490 CHRNC CARE MGMT STAFF 1ST 20: CPT | Mod: S$PBB,,, | Performed by: INTERNAL MEDICINE

## 2019-10-31 PROCEDURE — 99490 PR CHRONIC CARE MGMT, 1ST 20 MIN: ICD-10-PCS | Mod: S$PBB,,, | Performed by: INTERNAL MEDICINE

## 2019-10-31 PROCEDURE — 99490 CHRNC CARE MGMT STAFF 1ST 20: CPT | Mod: PBBFAC | Performed by: INTERNAL MEDICINE

## 2019-11-01 ENCOUNTER — LAB VISIT (OUTPATIENT)
Dept: LAB | Facility: HOSPITAL | Age: 74
End: 2019-11-01
Attending: INTERNAL MEDICINE
Payer: MEDICARE

## 2019-11-01 ENCOUNTER — OFFICE VISIT (OUTPATIENT)
Dept: INTERNAL MEDICINE | Facility: CLINIC | Age: 74
End: 2019-11-01
Payer: MEDICARE

## 2019-11-01 ENCOUNTER — OFFICE VISIT (OUTPATIENT)
Dept: CARDIOLOGY | Facility: CLINIC | Age: 74
End: 2019-11-01
Payer: MEDICARE

## 2019-11-01 VITALS
SYSTOLIC BLOOD PRESSURE: 146 MMHG | HEIGHT: 68 IN | OXYGEN SATURATION: 98 % | WEIGHT: 138.69 LBS | TEMPERATURE: 98 F | HEART RATE: 60 BPM | RESPIRATION RATE: 16 BRPM | DIASTOLIC BLOOD PRESSURE: 66 MMHG | BODY MASS INDEX: 21.02 KG/M2

## 2019-11-01 VITALS
HEART RATE: 84 BPM | DIASTOLIC BLOOD PRESSURE: 60 MMHG | BODY MASS INDEX: 21.08 KG/M2 | SYSTOLIC BLOOD PRESSURE: 120 MMHG | WEIGHT: 138.69 LBS

## 2019-11-01 DIAGNOSIS — H91.90 HEARING LOSS, UNSPECIFIED HEARING LOSS TYPE, UNSPECIFIED LATERALITY: ICD-10-CM

## 2019-11-01 DIAGNOSIS — I10 ESSENTIAL HYPERTENSION: ICD-10-CM

## 2019-11-01 DIAGNOSIS — J44.9 STAGE 3 SEVERE COPD BY GOLD CLASSIFICATION: ICD-10-CM

## 2019-11-01 DIAGNOSIS — I25.10 CORONARY ARTERY DISEASE INVOLVING LEFT MAIN CORONARY ARTERY: ICD-10-CM

## 2019-11-01 DIAGNOSIS — R94.30 CARDIAC LEFT VENTRICULAR EJECTION FRACTION 21-40 PERCENT: ICD-10-CM

## 2019-11-01 DIAGNOSIS — Z78.9 STATIN INTOLERANCE: ICD-10-CM

## 2019-11-01 DIAGNOSIS — Z99.81 ON HOME OXYGEN THERAPY: ICD-10-CM

## 2019-11-01 DIAGNOSIS — I25.10 CORONARY ARTERY DISEASE INVOLVING NATIVE CORONARY ARTERY OF NATIVE HEART WITHOUT ANGINA PECTORIS: ICD-10-CM

## 2019-11-01 DIAGNOSIS — I12.9 HYPERTENSIVE CHRONIC KIDNEY DISEASE WITH STAGE 1 THROUGH STAGE 4 CHRONIC KIDNEY DISEASE, OR UNSPECIFIED CHRONIC KIDNEY DISEASE: ICD-10-CM

## 2019-11-01 DIAGNOSIS — H70.93 MASTOIDITIS OF BOTH SIDES: ICD-10-CM

## 2019-11-01 DIAGNOSIS — R63.4 WEIGHT DECREASING: ICD-10-CM

## 2019-11-01 DIAGNOSIS — E87.5 HYPERKALEMIA: Primary | Chronic | ICD-10-CM

## 2019-11-01 DIAGNOSIS — I50.22 CHRONIC SYSTOLIC CONGESTIVE HEART FAILURE: ICD-10-CM

## 2019-11-01 DIAGNOSIS — E83.42 HYPOMAGNESEMIA: ICD-10-CM

## 2019-11-01 DIAGNOSIS — I50.22 CHRONIC SYSTOLIC CONGESTIVE HEART FAILURE: Primary | ICD-10-CM

## 2019-11-01 DIAGNOSIS — F41.9 ANXIETY: ICD-10-CM

## 2019-11-01 DIAGNOSIS — E78.00 PURE HYPERCHOLESTEROLEMIA: ICD-10-CM

## 2019-11-01 DIAGNOSIS — G47.33 OSA ON CPAP: Chronic | ICD-10-CM

## 2019-11-01 LAB
ANION GAP SERPL CALC-SCNC: 8 MMOL/L (ref 8–16)
BNP SERPL-MCNC: 182 PG/ML (ref 0–99)
BUN SERPL-MCNC: 16 MG/DL (ref 8–23)
CALCIUM SERPL-MCNC: 9.7 MG/DL (ref 8.7–10.5)
CHLORIDE SERPL-SCNC: 96 MMOL/L (ref 95–110)
CO2 SERPL-SCNC: 34 MMOL/L (ref 23–29)
CREAT SERPL-MCNC: 1.1 MG/DL (ref 0.5–1.4)
EST. GFR  (AFRICAN AMERICAN): >60 ML/MIN/1.73 M^2
EST. GFR  (NON AFRICAN AMERICAN): >60 ML/MIN/1.73 M^2
GLUCOSE SERPL-MCNC: 105 MG/DL (ref 70–110)
MAGNESIUM SERPL-MCNC: 1.9 MG/DL (ref 1.6–2.6)
POTASSIUM SERPL-SCNC: 3.9 MMOL/L (ref 3.5–5.1)
SODIUM SERPL-SCNC: 138 MMOL/L (ref 136–145)

## 2019-11-01 PROCEDURE — 99214 PR OFFICE/OUTPT VISIT, EST, LEVL IV, 30-39 MIN: ICD-10-PCS | Mod: S$PBB,,, | Performed by: INTERNAL MEDICINE

## 2019-11-01 PROCEDURE — 99496 TRANSJ CARE MGMT HIGH F2F 7D: CPT | Mod: PBBFAC | Performed by: PEDIATRICS

## 2019-11-01 PROCEDURE — 36415 COLL VENOUS BLD VENIPUNCTURE: CPT

## 2019-11-01 PROCEDURE — 99214 OFFICE O/P EST MOD 30 MIN: CPT | Mod: S$PBB,,, | Performed by: INTERNAL MEDICINE

## 2019-11-01 PROCEDURE — 99214 OFFICE O/P EST MOD 30 MIN: CPT | Mod: PBBFAC | Performed by: PEDIATRICS

## 2019-11-01 PROCEDURE — 80048 BASIC METABOLIC PNL TOTAL CA: CPT

## 2019-11-01 PROCEDURE — 99999 PR PBB SHADOW E&M-EST. PATIENT-LVL IV: ICD-10-PCS | Mod: PBBFAC,,, | Performed by: PEDIATRICS

## 2019-11-01 PROCEDURE — 83735 ASSAY OF MAGNESIUM: CPT

## 2019-11-01 PROCEDURE — 99496 TRANSJ CARE MGMT HIGH F2F 7D: CPT | Mod: S$PBB,,, | Performed by: PEDIATRICS

## 2019-11-01 PROCEDURE — 99213 OFFICE O/P EST LOW 20 MIN: CPT | Mod: PBBFAC,27 | Performed by: INTERNAL MEDICINE

## 2019-11-01 PROCEDURE — 99496 TRANSITIONAL CARE MANAGE SERVICE 7 DAY DISCHARGE: ICD-10-PCS | Mod: S$PBB,,, | Performed by: PEDIATRICS

## 2019-11-01 PROCEDURE — 99999 PR PBB SHADOW E&M-EST. PATIENT-LVL IV: CPT | Mod: PBBFAC,,, | Performed by: PEDIATRICS

## 2019-11-01 PROCEDURE — 83880 ASSAY OF NATRIURETIC PEPTIDE: CPT

## 2019-11-01 PROCEDURE — 99999 PR PBB SHADOW E&M-EST. PATIENT-LVL III: ICD-10-PCS | Mod: PBBFAC,,, | Performed by: INTERNAL MEDICINE

## 2019-11-01 PROCEDURE — 99999 PR PBB SHADOW E&M-EST. PATIENT-LVL III: CPT | Mod: PBBFAC,,, | Performed by: INTERNAL MEDICINE

## 2019-11-01 RX ORDER — SERTRALINE HYDROCHLORIDE 50 MG/1
50 TABLET, FILM COATED ORAL DAILY
Qty: 30 TABLET | Refills: 11 | Status: SHIPPED | OUTPATIENT
Start: 2019-11-01 | End: 2020-03-05 | Stop reason: SDUPTHER

## 2019-11-01 RX ORDER — ACETAMINOPHEN, DIPHENHYDRAMINE HCL, PHENYLEPHRINE HCL 325; 25; 5 MG/1; MG/1; MG/1
1 TABLET ORAL NIGHTLY
COMMUNITY
End: 2020-03-05 | Stop reason: SDUPTHER

## 2019-11-01 RX ORDER — FUROSEMIDE 40 MG/1
40 TABLET ORAL DAILY
Qty: 30 TABLET | Refills: 6 | Status: SHIPPED | OUTPATIENT
Start: 2019-11-01 | End: 2019-12-23 | Stop reason: SDUPTHER

## 2019-11-01 RX ORDER — AMOXICILLIN AND CLAVULANATE POTASSIUM 875; 125 MG/1; MG/1
1 TABLET, FILM COATED ORAL EVERY 12 HOURS
Qty: 14 TABLET | Refills: 0 | Status: SHIPPED | OUTPATIENT
Start: 2019-11-01 | End: 2019-11-08

## 2019-11-01 NOTE — PROGRESS NOTES
Subjective:       Patient ID: Noble rTipp is a 74 y.o. male.    Chief Complaint: Hospital Follow Up    F/U of hospitalization. With son. Feeling better, trying to follow low salt low K diet to keep up nutrition. No CP, SOB is static. Augmentin for sinusitis/mastoiditis causing mild diarrhea. Meds reviewed with them, he is off the aldactone, lisinopril, amiodarone, and coreg. Hearing has desean decreased lately    Review of Systems   Constitutional: Positive for activity change, appetite change, fatigue and unexpected weight change. Negative for fever.   HENT: Positive for congestion. Negative for rhinorrhea.    Eyes: Negative for discharge and redness.   Respiratory: Positive for cough, shortness of breath and wheezing. Negative for chest tightness.    Cardiovascular: Positive for chest pain. Negative for palpitations and leg swelling.   Gastrointestinal: Negative for constipation, diarrhea and vomiting.   Endocrine: Negative for polydipsia, polyphagia and polyuria.   Genitourinary: Negative for decreased urine volume and difficulty urinating.   Musculoskeletal: Positive for back pain (decreased gabapentin has not increased his back pain.). Negative for arthralgias and joint swelling.   Skin: Negative for rash and wound.   Neurological: Negative for syncope and headaches.   Psychiatric/Behavioral: Negative for behavioral problems, dysphoric mood and sleep disturbance. The patient is nervous/anxious (more anxious worried about health and diet.).        Objective:      Physical Exam   Constitutional: He is oriented to person, place, and time. No distress (thin chronically ill).   HENT:   Right Ear: External ear normal.   Left Ear: External ear normal.   Mouth/Throat: Oropharynx is clear and moist.   Cardiovascular: Normal rate, regular rhythm and normal heart sounds.   No murmur heard.  Pulmonary/Chest:   Mildly dyspneic, O2, prolonged end exp phases with mild wheezing   Abdominal: Soft. He exhibits no  distension and no mass. There is no guarding.   Musculoskeletal: He exhibits no edema.   Neurological: He is alert and oriented to person, place, and time. No cranial nerve deficit. Coordination normal.   Skin: Capillary refill takes less than 2 seconds. No rash noted.   Psychiatric: He has a normal mood and affect. His behavior is normal. Judgment and thought content normal.       Assessment:       1. Hyperkalemia    2. Essential hypertension    3. Hypomagnesemia    4. Stage 3 severe COPD by GOLD classification    5. Coronary artery disease involving native coronary artery of native heart without angina pectoris    6. On home oxygen therapy    7. Mastoiditis of both sides    8. Anxiety    9. Weight decreasing    10. Hearing loss, unspecified hearing loss type, unspecified laterality    11. Hypertensive chronic kidney disease with stage 1 through stage 4 chronic kidney disease, or unspecified chronic kidney disease         Plan:       Hyperkalemia  -     Ambulatory consult to Nutrition Services    Essential hypertension    Hypomagnesemia    Stage 3 severe COPD by GOLD classification    Coronary artery disease involving native coronary artery of native heart without angina pectoris    On home oxygen therapy    Mastoiditis of both sides  -     Ambulatory consult to ENT  -     amoxicillin-clavulanate 875-125mg (AUGMENTIN) 875-125 mg per tablet; Take 1 tablet by mouth every 12 (twelve) hours. for 7 days  Dispense: 14 tablet; Refill: 0    Anxiety  -     sertraline (ZOLOFT) 50 MG tablet; Take 1 tablet (50 mg total) by mouth once daily.  Dispense: 30 tablet; Refill: 11    Weight decreasing    Hearing loss, unspecified hearing loss type, unspecified laterality  -     Ambulatory consult to Audiology  -     Ambulatory consult to ENT    Hypertensive chronic kidney disease with stage 1 through stage 4 chronic kidney disease, or unspecified chronic kidney disease   -     Ambulatory consult to Nutrition Services    Overall he is  improving. Get labs today. If K is normal, add 1 banana with augmentin to reduce diarrhea or kaopectate. Extend augmentin due to mastoiditis. Add meds back for heart at cardiology discretion.  See dietary to aid with nutrition.    Transitional Care Note    Family and/or Caretaker present at visit?  Yes.  Diagnostic tests reviewed/disposition: I have reviewed all completed as well as pending diagnostic tests at the time of discharge.  Disease/illness education: given  Home health/community services discussion/referrals: Patient does not have home health established from hospital visit.  They do not need home health.  If needed, we will set up home health for the patient.   Establishment or re-establishment of referral orders for community resources: No other necessary community resources.   Discussion with other health care providers: has f/u subspecialty.

## 2019-11-01 NOTE — PROGRESS NOTES
Subjective:   Patient ID:  Noble Tripp is a 74 y.o. male who presents for cardiac consult of Coronary Artery Disease (hospital f/u )      HPI  The patient came in today for cardiac consult of Coronary Artery Disease (hospital f/u )      Noble Tripp is a 74 y.o. male with medical history as below presents for CV follow up    He presented with dizziness and weakness with some mild confusion last week.      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   10- admitted to [facility] for sepsis/PNA and PSVT on . EKG showed extensive St depression of 1 mm on anterolateral leads. EF 60%. MPi showed fixed inferior perfusion defect. cTn was up to 0.3. PSVT was converted to sinus O/N.   s/p left thoracentesis d/t pleural effusion.  Echo in : normal EF, DD.  MPI in :  A small to moderate size fixed defect of moderate to severe intensity that extends from the apical to the base inferior wall of the left ventricle.  EKG on 10-: PSVT, st depression on V3 to V6, I, II and avL.   admitted for CHF and COPD exacerbation. ECHO showed normal EF and BNP 1800, troponin 0.05 and flat. Rx with IV lasix and breathing O2. D/c with lasix 20 mg po daily   admitted for [facility] for worsening SOB. Troponin up to 0.3. ECHo showed EF 35%, LHC showed severe multivessel CAD. Had CVT evaluation and was not cabg candidate due to high risk. Also high risk PCI. Pt was discharged for medical Rx and PCI as op. Discharged with home O2. Om amiodarone 400 mg daily for nonsustained VT in the hospital.    Cardiology consulted for junctional tahmina on arrival and med management/workup. Discussed with pt and family will monitor overnight, hold BB and stop AMIO. Pt's ischemic workup still pending with Cardiac PET and revasc.     11/1/19  Has been overall doing  "well since DC.   Amiodarone, Aldactone, and Coreg stopped. Lisinopril held due to mild hypotension. He remains on lasix 40 mg daily, labs reviewed today overall normal.      Patient feels no sob, no leg swelling, no PND, no palpitation, no dizziness, no syncope, no CNS symptoms.    Patient has fairly good exercise tolerance.    Patient is compliant with medications.    Past Medical History:   Diagnosis Date    Adrenal adenoma     david;nl fxn w/u;tran 11/18    Aspiration pneumonia 10/15    puree/honey    Benign prostate hyperplasia     CAD (coronary artery disease)     dr padilla    Chronic pain     dr santos    Chronic systolic congestive heart failure 10/17/2019    COPD (chronic obstructive pulmonary disease)     papi    Ex-smoker     11/13    Hyperlipidemia     JUANITO on CPAP     cpap    Osteopenia 1/15 tran 1/18    PSVT (paroxysmal supraventricular tachycardia)     PVD (peripheral vascular disease)     noobs 07 khuri    Pyriform sinus cancer     dr ponce radiation 1/2-14 dr king perales    Squamous cell carcinoma of skin     roberto    Tongue cancer     "superficial" removed 11/14    Vocal cord cancer 2011    Xerostomia     radiation       Past Surgical History:   Procedure Laterality Date    APPENDECTOMY      BRONCHOSCOPY Bilateral 2/5/2019    Procedure: Bronchoscopy;  Surgeon: Sanots Cardozo MD;  Location: [facility];  Service: Endoscopy;  Laterality: Bilateral;    COLONOSCOPY N/A 5/1/2017    Procedure: Due for screening colonoscopy;  Surgeon: Anushka Yoder MD;  Location: [facility];  Service: Endoscopy;  Laterality: N/A;    ESOPHAGOGASTRODUODENOSCOPY N/A 5/29/2018    Procedure: ESOPHAGOGASTRODUODENOSCOPY (EGD);  Surgeon: Audie Hill III, MD;  Location: [facility];  Service: Endoscopy;  Laterality: N/A;    ESOPHAGOGASTRODUODENOSCOPY N/A 8/29/2018    Procedure: ESOPHAGOGASTRODUODENOSCOPY (EGD);  Surgeon: Audie Hill III, MD;  Location: [facility];  Service: Endoscopy;  " Laterality: N/A;    THORACENTESIS Left 2018    Procedure: Thoracentesis;  Surgeon: Santos Cardozo MD;  Location: [facility];  Service: Endoscopy;  Laterality: Left;    THORACENTESIS Right 2019    Procedure: Thoracentesis;  Surgeon: Santos Cardozo MD;  Location: [facility] ENDO;  Service: Endoscopy;  Laterality: Right;    tongue cancer excision      dr vitale    VOCAL CORD LATERALIZATION, ENDOSCOPIC APPROACH W/ MLB      kris       Social History     Tobacco Use    Smoking status: Former Smoker     Packs/day: 0.50     Years: 55.00     Pack years: 27.50     Types: Cigarettes     Last attempt to quit: 10/1/2019     Years since quittin.0    Smokeless tobacco: Never Used    Tobacco comment: 6-7 cigs/day   Substance Use Topics    Alcohol use: Not Currently    Drug use: No       Family History   Problem Relation Age of Onset    Lung cancer Father         + Smoker    No Known Problems Mother     Lung cancer Brother         + Smoker       Patient's Medications   New Prescriptions    No medications on file   Previous Medications    ALBUTEROL (PROAIR HFA) 90 MCG/ACTUATION INHALER    INL 2 PFS PO INTO THE LUNGS Q 4 H PRF WHZ OR SOB    ALBUTEROL (PROVENTIL) 2.5 MG /3 ML (0.083 %) NEBULIZER SOLUTION    Take 3 mLs (2.5 mg total) by nebulization every 8 (eight) hours while awake.    AMOXICILLIN-CLAVULANATE 875-125MG (AUGMENTIN) 875-125 MG PER TABLET    Take 1 tablet by mouth every 12 (twelve) hours. for 7 days    ARFORMOTEROL (BROVANA) 15 MCG/2 ML NEBU    Take 2 mLs (15 mcg total) by nebulization 2 (two) times daily. Controller    ASPIRIN 81 MG TAB    Take 1 tablet by mouth Daily. Over the counter to help prevent stroke/heart attack    ATORVASTATIN (LIPITOR) 80 MG TABLET    Take 1 tablet (80 mg total) by mouth once daily.    CHANTIX CONTINUING MONTH BOX 1 MG TAB    TAKE 1 TABLET BY MOUTH ONCE DAILY    CLOPIDOGREL (PLAVIX) 75 MG TABLET    Take 1 tablet (75 mg total) by mouth once daily.     CLOTRIMAZOLE (MYCELEX) 10 MG BUFFY        EVOLOCUMAB (REPATHA SURECLICK) 140 MG/ML PNIJ    Inject 1 mL (140 mg total) into the skin every 14 (fourteen) days.    GABAPENTIN (NEURONTIN) 600 MG TABLET    Take 1 tablet (600 mg total) by mouth 3 (three) times daily.    LORATADINE (CLARITIN) 10 MG TABLET    Take 1 tablet by mouth daily as needed.     MELATONIN 10 MG TAB    Take 1 tablet by mouth every evening.    NEBULIZER AND COMPRESSOR ANGELO    Use as directed    OXYCODONE-ACETAMINOPHEN (PERCOCET) 7.5-325 MG PER TABLET    Take 1 tablet by mouth every 8 (eight) hours as needed for Pain.    OXYGEN-AIR DELIVERY SYSTEMS MISC    Inhale 2-3 L into the lungs continuous.    PANTOPRAZOLE (PROTONIX) 40 MG TABLET    TAKE ONE TABLET BY MOUTH ONCE DAILY    PILOCARPINE (SALAGEN) 5 MG TAB    TAKE ONE TABLET BY MOUTH 30 MINUTES PRIOR TO EACH MEAL    SERTRALINE (ZOLOFT) 50 MG TABLET    Take 1 tablet (50 mg total) by mouth once daily.    TIOTROPIUM (SPIRIVA WITH HANDIHALER) 18 MCG INHALATION CAPSULE    Inhale 1 capsule (18 mcg total) into the lungs once daily.   Modified Medications    Modified Medication Previous Medication    FUROSEMIDE (LASIX) 40 MG TABLET furosemide (LASIX) 40 MG tablet       Take 1 tablet (40 mg total) by mouth once daily.    Take 1 tablet (40 mg total) by mouth once daily.   Discontinued Medications    No medications on file       Review of Systems   Constitutional: Negative.    HENT: Negative.    Eyes: Negative.    Respiratory: Negative.    Cardiovascular: Negative.    Gastrointestinal: Negative.    Genitourinary: Negative.    Musculoskeletal: Negative.    Skin: Negative.    Neurological: Negative.    Endo/Heme/Allergies: Negative.    Psychiatric/Behavioral: Negative.    All 12 systems otherwise negative.      Wt Readings from Last 3 Encounters:   11/01/19 62.9 kg (138 lb 10.7 oz)   11/01/19 62.9 kg (138 lb 10.7 oz)   10/27/19 63 kg (138 lb 14.2 oz)     Temp Readings from Last 3 Encounters:   11/01/19 97.8 °F  (36.6 °C) (Oral)   10/27/19 97.7 °F (36.5 °C) (Oral)   10/11/19 96 °F (35.6 °C)     BP Readings from Last 3 Encounters:   11/01/19 120/60   11/01/19 (!) 146/66   10/27/19 108/68     Pulse Readings from Last 3 Encounters:   11/01/19 84   11/01/19 60   10/27/19 66       /60 (BP Location: Left arm, Patient Position: Sitting, BP Method: Small (Manual))   Pulse 84   Wt 62.9 kg (138 lb 10.7 oz)   BMI 21.08 kg/m²     Objective:   Physical Exam   Constitutional: He is oriented to person, place, and time. He appears well-developed and well-nourished. No distress.   HENT:   Head: Normocephalic and atraumatic.   Nose: Nose normal.   Mouth/Throat: Oropharynx is clear and moist.   Eyes: Conjunctivae and EOM are normal. No scleral icterus.   Neck: Normal range of motion. Neck supple. No JVD present. No thyromegaly present.   Cardiovascular: Normal rate, regular rhythm, S1 normal and S2 normal. Exam reveals no gallop, no S3, no S4 and no friction rub.   Murmur heard.  Pulmonary/Chest: Effort normal and breath sounds normal. No stridor. No respiratory distress. He has no wheezes. He has no rales. He exhibits no tenderness.   Abdominal: Soft. Bowel sounds are normal. He exhibits no distension and no mass. There is no tenderness. There is no rebound.   Genitourinary:   Genitourinary Comments: Deferred   Musculoskeletal: Normal range of motion. He exhibits no edema, tenderness or deformity.   Lymphadenopathy:     He has no cervical adenopathy.   Neurological: He is alert and oriented to person, place, and time. He exhibits normal muscle tone. Coordination normal.   Skin: Skin is warm and dry. No rash noted. He is not diaphoretic. No erythema. No pallor.   Psychiatric: He has a normal mood and affect. His behavior is normal. Judgment and thought content normal.   Nursing note and vitals reviewed.      Lab Results   Component Value Date     11/01/2019    K 3.9 11/01/2019    CL 96 11/01/2019    CO2 34 (H) 11/01/2019    BUN  16 11/01/2019    CREATININE 1.1 11/01/2019     11/01/2019    HGBA1C 5.4 02/06/2019    MG 1.9 11/01/2019    AST 17 10/27/2019    ALT 17 10/27/2019    ALBUMIN 2.7 (L) 10/27/2019    PROT 6.5 10/27/2019    BILITOT 0.4 10/27/2019    WBC 8.80 10/27/2019    HGB 10.0 (L) 10/27/2019    HCT 30.8 (L) 10/27/2019    MCV 97 10/27/2019     10/27/2019    INR 1.0 10/26/2019    TSH 1.259 02/06/2019    CHOL 77 (L) 02/06/2019    HDL 44 02/06/2019    LDLCALC 25.0 (L) 02/06/2019    TRIG 40 02/06/2019     (H) 11/01/2019     Assessment:      1. Chronic systolic congestive heart failure    2. Cardiac left ventricular ejection fraction 21-40 percent    3. Coronary artery disease involving left main coronary artery    4. Coronary artery disease involving native coronary artery of native heart without angina pectoris    5. Pure hypercholesterolemia    6. Essential hypertension    7. Stage 3 severe COPD by GOLD classification    8. JUANITO on CPAP    9. Statin intolerance        Plan:   1. CAD/ICM s/p recent hosp admit for bradycardia sec to BB/AMIO  - for PET viability study next week  - cont meds  - stable   - cont lasix, labs repeat in 2 weeks    2. HTN  - cont meds    3. HLD  - cont statin and Repatha    4. COPD  - cont meds, on home oxygen    5. JUANITO  - cont CPAP    F/u with Dr. Whitfield/myself as scheduled     Thank you for allowing me to participate in this patient's care. Please do not hesitate to contact me with any questions or concerns. Consult note has been forwarded to the referral physician.

## 2019-11-04 ENCOUNTER — TELEPHONE (OUTPATIENT)
Dept: PULMONOLOGY | Facility: CLINIC | Age: 74
End: 2019-11-04

## 2019-11-04 NOTE — TELEPHONE ENCOUNTER
Spoke with pt. Pt wanted to know if we received compliance report from E so he could get order for new cpap. I called [facility] and asked them to refax report. Waiting on fax.

## 2019-11-04 NOTE — TELEPHONE ENCOUNTER
----- Message from She Hernandez sent at 11/4/2019  9:02 AM CST -----  ..Type:  Patient Returning Call    Who Called: pt   Who Left Message for Patient:  Does the patient know what this is regarding?: discuss c pap machine   Would the patient rather a call back or a response via [facility]? call back   Best Call Back Number: [phone]  Additional Information: Pt is requesting a call from nurse to discuss C pap

## 2019-11-04 NOTE — TELEPHONE ENCOUNTER
Initiated prior authorization request with patient's insurance for arformoterol (BROVANA) 15 mcg/2 mL Nebu prescription. Submitted online via [email or web address] (request key [id number]). After entering all information for prior auth into [employer] form online, prompted to print paper form that must be signed by provider then faxed. Form printed for review by prescribing provider. Form given to DENISE Graham MA, to provide to Dr. Cardozo for his review and signature if appropriate. She will fax thereafter. -- PA case ID [id number].

## 2019-11-05 ENCOUNTER — TELEPHONE (OUTPATIENT)
Dept: PULMONOLOGY | Facility: CLINIC | Age: 74
End: 2019-11-05

## 2019-11-05 ENCOUNTER — TELEPHONE (OUTPATIENT)
Dept: CARDIOLOGY | Facility: CLINIC | Age: 74
End: 2019-11-05

## 2019-11-05 DIAGNOSIS — G47.33 OSA ON CPAP: Primary | ICD-10-CM

## 2019-11-05 NOTE — TELEPHONE ENCOUNTER
----- Message from Cash Posadas sent at 11/5/2019  2:45 PM CST -----  .Type:  Patient Returning Call    Who Called: Pt   Who Left Message for Patient: Nurse   Does the patient know what this is regarding?: return call   Would the patient rather a call back or a response via [facility]? Call back   Best Call Back Number: .[phone] (home)   Additional Information:

## 2019-11-05 NOTE — PROGRESS NOTES
Asks for new machine  Machine malfunction      Orders Placed This Encounter   Procedures    [facility] POLINAT PATIENT ENTERED CPAP USAGE     Order Specific Question:   Patient consents to share P[facility] CPAP usage and settings [facility]?     Answer:   Yes    CPAP FOR HOME USE     Replacement machine  DME HMS     Order Specific Question:   Type:     Answer:   CPAP     Order Specific Question:   CPAP setting (cmH20):     Answer:   14     Order Specific Question:   Length of need (1-99 months):     Answer:   99     Order Specific Question:   Humidification:     Answer:   Heated     Order Specific Question:   Type of mask:     Answer:   FFM     Order Specific Question:   Headgear?     Answer:   Yes     Order Specific Question:   Tubing?     Answer:   Yes     Order Specific Question:   Humidifier chamber?     Answer:   Yes     Order Specific Question:   Chin strap?     Answer:   Yes     Order Specific Question:   Filters?     Answer:   Yes

## 2019-11-07 ENCOUNTER — CLINICAL SUPPORT (OUTPATIENT)
Dept: CARDIOLOGY | Facility: CLINIC | Age: 74
End: 2019-11-07
Attending: INTERNAL MEDICINE
Payer: MEDICARE

## 2019-11-07 VITALS — SYSTOLIC BLOOD PRESSURE: 156 MMHG | DIASTOLIC BLOOD PRESSURE: 75 MMHG | HEART RATE: 60 BPM

## 2019-11-07 DIAGNOSIS — I50.22 CHRONIC SYSTOLIC HEART FAILURE: ICD-10-CM

## 2019-11-07 DIAGNOSIS — I25.10 ATHEROSCLEROSIS OF NATIVE CORONARY ARTERY OF NATIVE HEART WITHOUT ANGINA PECTORIS: ICD-10-CM

## 2019-11-07 LAB
NUC REST DIASTOLIC VOLUME INDEX: 15
NUC REST EJECTION FRACTION: 54
NUC REST SYSTOLIC VOLUME INDEX: 69

## 2019-11-07 PROCEDURE — 99999 PR PBB SHADOW E&M-EST. PATIENT-LVL I: CPT | Mod: PBBFAC,,,

## 2019-11-07 PROCEDURE — A9552 F18 FDG: HCPCS | Mod: PBBFAC | Performed by: INTERNAL MEDICINE

## 2019-11-07 PROCEDURE — 96374 THER/PROPH/DIAG INJ IV PUSH: CPT | Mod: PBBFAC,59

## 2019-11-07 PROCEDURE — 99999 PR PBB SHADOW E&M-EST. PATIENT-LVL I: ICD-10-PCS | Mod: PBBFAC,,,

## 2019-11-07 PROCEDURE — 99211 OFF/OP EST MAY X REQ PHY/QHP: CPT | Mod: PBBFAC

## 2019-11-07 PROCEDURE — 78459 MYOCRD IMG PET SINGLE STUDY: CPT | Mod: PBBFAC

## 2019-11-07 PROCEDURE — 96372 THER/PROPH/DIAG INJ SC/IM: CPT | Mod: PBBFAC

## 2019-11-07 PROCEDURE — 78492 MYOCRD IMG PET MLT RST&STRS: CPT | Mod: PBBFAC

## 2019-11-08 DIAGNOSIS — J44.9 MODERATE COPD (CHRONIC OBSTRUCTIVE PULMONARY DISEASE): Chronic | ICD-10-CM

## 2019-11-08 RX ORDER — TIOTROPIUM BROMIDE 18 UG/1
CAPSULE ORAL; RESPIRATORY (INHALATION)
Qty: 30 CAPSULE | Refills: 11 | Status: SHIPPED | OUTPATIENT
Start: 2019-11-08

## 2019-11-11 ENCOUNTER — LAB VISIT (OUTPATIENT)
Dept: LAB | Facility: HOSPITAL | Age: 74
End: 2019-11-11
Attending: NURSE PRACTITIONER
Payer: MEDICARE

## 2019-11-11 ENCOUNTER — OFFICE VISIT (OUTPATIENT)
Dept: HEMATOLOGY/ONCOLOGY | Facility: CLINIC | Age: 74
End: 2019-11-11
Payer: MEDICARE

## 2019-11-11 VITALS
BODY MASS INDEX: 21.42 KG/M2 | WEIGHT: 141.31 LBS | TEMPERATURE: 98 F | HEIGHT: 68 IN | DIASTOLIC BLOOD PRESSURE: 62 MMHG | OXYGEN SATURATION: 96 % | HEART RATE: 60 BPM | SYSTOLIC BLOOD PRESSURE: 108 MMHG

## 2019-11-11 DIAGNOSIS — Z99.81 ON HOME OXYGEN THERAPY: ICD-10-CM

## 2019-11-11 DIAGNOSIS — Z86.39 HISTORY OF IRON DEFICIENCY: ICD-10-CM

## 2019-11-11 DIAGNOSIS — D35.00 BENIGN NEOPLASM OF ADRENAL GLAND, UNSPECIFIED LATERALITY: ICD-10-CM

## 2019-11-11 DIAGNOSIS — R91.8 LUNG MASS: ICD-10-CM

## 2019-11-11 DIAGNOSIS — D64.9 CHRONIC ANEMIA: ICD-10-CM

## 2019-11-11 DIAGNOSIS — Z86.39 HISTORY OF IRON DEFICIENCY: Primary | ICD-10-CM

## 2019-11-11 DIAGNOSIS — Z85.89 HISTORY OF HEAD AND NECK CANCER: ICD-10-CM

## 2019-11-11 DIAGNOSIS — R63.4 WEIGHT DECREASING: ICD-10-CM

## 2019-11-11 DIAGNOSIS — C02.9 CANCER OF TONGUE: ICD-10-CM

## 2019-11-11 LAB
ALBUMIN SERPL BCP-MCNC: 3.2 G/DL (ref 3.5–5.2)
ALP SERPL-CCNC: 66 U/L (ref 55–135)
ALT SERPL W/O P-5'-P-CCNC: 43 U/L (ref 10–44)
ANION GAP SERPL CALC-SCNC: 6 MMOL/L (ref 8–16)
AST SERPL-CCNC: 26 U/L (ref 10–40)
BASOPHILS # BLD AUTO: 0.08 K/UL (ref 0–0.2)
BASOPHILS NFR BLD: 1.2 % (ref 0–1.9)
BILIRUB SERPL-MCNC: 0.5 MG/DL (ref 0.1–1)
BUN SERPL-MCNC: 21 MG/DL (ref 8–23)
CALCIUM SERPL-MCNC: 9.4 MG/DL (ref 8.7–10.5)
CHLORIDE SERPL-SCNC: 104 MMOL/L (ref 95–110)
CO2 SERPL-SCNC: 29 MMOL/L (ref 23–29)
CREAT SERPL-MCNC: 1.1 MG/DL (ref 0.5–1.4)
DIFFERENTIAL METHOD: ABNORMAL
EOSINOPHIL # BLD AUTO: 0.2 K/UL (ref 0–0.5)
EOSINOPHIL NFR BLD: 2.5 % (ref 0–8)
ERYTHROCYTE [DISTWIDTH] IN BLOOD BY AUTOMATED COUNT: 15.9 % (ref 11.5–14.5)
EST. GFR  (AFRICAN AMERICAN): >60 ML/MIN/1.73 M^2
EST. GFR  (NON AFRICAN AMERICAN): >60 ML/MIN/1.73 M^2
GLUCOSE SERPL-MCNC: 98 MG/DL (ref 70–110)
HCT VFR BLD AUTO: 31.3 % (ref 40–54)
HGB BLD-MCNC: 10 G/DL (ref 14–18)
IMM GRANULOCYTES # BLD AUTO: 0.07 K/UL (ref 0–0.04)
IMM GRANULOCYTES NFR BLD AUTO: 1 % (ref 0–0.5)
LYMPHOCYTES # BLD AUTO: 1.1 K/UL (ref 1–4.8)
LYMPHOCYTES NFR BLD: 16.7 % (ref 18–48)
MCH RBC QN AUTO: 30.7 PG (ref 27–31)
MCHC RBC AUTO-ENTMCNC: 31.9 G/DL (ref 32–36)
MCV RBC AUTO: 96 FL (ref 82–98)
MONOCYTES # BLD AUTO: 0.5 K/UL (ref 0.3–1)
MONOCYTES NFR BLD: 7.2 % (ref 4–15)
NEUTROPHILS # BLD AUTO: 4.8 K/UL (ref 1.8–7.7)
NEUTROPHILS NFR BLD: 71.4 % (ref 38–73)
NRBC BLD-RTO: 0 /100 WBC
PLATELET # BLD AUTO: 216 K/UL (ref 150–350)
PMV BLD AUTO: 10.8 FL (ref 9.2–12.9)
POTASSIUM SERPL-SCNC: 5.3 MMOL/L (ref 3.5–5.1)
PROT SERPL-MCNC: 7 G/DL (ref 6–8.4)
RBC # BLD AUTO: 3.26 M/UL (ref 4.6–6.2)
SODIUM SERPL-SCNC: 139 MMOL/L (ref 136–145)
WBC # BLD AUTO: 6.77 K/UL (ref 3.9–12.7)

## 2019-11-11 PROCEDURE — 82728 ASSAY OF FERRITIN: CPT

## 2019-11-11 PROCEDURE — 80053 COMPREHEN METABOLIC PANEL: CPT

## 2019-11-11 PROCEDURE — 99214 PR OFFICE/OUTPT VISIT, EST, LEVL IV, 30-39 MIN: ICD-10-PCS | Mod: S$PBB,,, | Performed by: NURSE PRACTITIONER

## 2019-11-11 PROCEDURE — 99999 PR PBB SHADOW E&M-EST. PATIENT-LVL III: CPT | Mod: PBBFAC,,, | Performed by: NURSE PRACTITIONER

## 2019-11-11 PROCEDURE — 99999 PR PBB SHADOW E&M-EST. PATIENT-LVL III: ICD-10-PCS | Mod: PBBFAC,,, | Performed by: NURSE PRACTITIONER

## 2019-11-11 PROCEDURE — 83540 ASSAY OF IRON: CPT

## 2019-11-11 PROCEDURE — 99214 OFFICE O/P EST MOD 30 MIN: CPT | Mod: S$PBB,,, | Performed by: NURSE PRACTITIONER

## 2019-11-11 PROCEDURE — 99213 OFFICE O/P EST LOW 20 MIN: CPT | Mod: PBBFAC | Performed by: NURSE PRACTITIONER

## 2019-11-11 NOTE — TELEPHONE ENCOUNTER
Nov 04, 1:41 pm  You said: Printed paper prior authorization and provided copy to DENISE Graham MA, to provide to Dr. Cardozo for his review and signature upon his return to clinic.

## 2019-11-12 ENCOUNTER — OFFICE VISIT (OUTPATIENT)
Dept: PAIN MEDICINE | Facility: CLINIC | Age: 74
End: 2019-11-12
Payer: MEDICARE

## 2019-11-12 ENCOUNTER — PATIENT MESSAGE (OUTPATIENT)
Dept: HEMATOLOGY/ONCOLOGY | Facility: CLINIC | Age: 74
End: 2019-11-12

## 2019-11-12 ENCOUNTER — OFFICE VISIT (OUTPATIENT)
Dept: CARDIOLOGY | Facility: CLINIC | Age: 74
End: 2019-11-12
Payer: MEDICARE

## 2019-11-12 VITALS
SYSTOLIC BLOOD PRESSURE: 108 MMHG | OXYGEN SATURATION: 98 % | WEIGHT: 143.5 LBS | HEART RATE: 60 BPM | DIASTOLIC BLOOD PRESSURE: 58 MMHG | BODY MASS INDEX: 21.82 KG/M2

## 2019-11-12 VITALS
DIASTOLIC BLOOD PRESSURE: 54 MMHG | SYSTOLIC BLOOD PRESSURE: 120 MMHG | WEIGHT: 141 LBS | HEART RATE: 63 BPM | BODY MASS INDEX: 21.37 KG/M2 | HEIGHT: 68 IN | RESPIRATION RATE: 18 BRPM

## 2019-11-12 DIAGNOSIS — I50.22 CHRONIC SYSTOLIC CONGESTIVE HEART FAILURE: ICD-10-CM

## 2019-11-12 DIAGNOSIS — M47.816 LUMBAR FACET ARTHROPATHY: ICD-10-CM

## 2019-11-12 DIAGNOSIS — I25.10 CORONARY ARTERY DISEASE INVOLVING NATIVE CORONARY ARTERY OF NATIVE HEART WITHOUT ANGINA PECTORIS: ICD-10-CM

## 2019-11-12 DIAGNOSIS — J44.9 STAGE 3 SEVERE COPD BY GOLD CLASSIFICATION: ICD-10-CM

## 2019-11-12 DIAGNOSIS — M53.3 SACROILIAC JOINT PAIN: ICD-10-CM

## 2019-11-12 DIAGNOSIS — M54.16 LEFT LUMBAR RADICULOPATHY: ICD-10-CM

## 2019-11-12 DIAGNOSIS — E78.00 PURE HYPERCHOLESTEROLEMIA: ICD-10-CM

## 2019-11-12 DIAGNOSIS — R00.1 BRADYCARDIA: Primary | ICD-10-CM

## 2019-11-12 DIAGNOSIS — M51.36 DDD (DEGENERATIVE DISC DISEASE), LUMBAR: ICD-10-CM

## 2019-11-12 DIAGNOSIS — Z99.81 ON HOME OXYGEN THERAPY: ICD-10-CM

## 2019-11-12 DIAGNOSIS — I10 ESSENTIAL HYPERTENSION: ICD-10-CM

## 2019-11-12 DIAGNOSIS — M48.061 SPINAL STENOSIS OF LUMBAR REGION WITHOUT NEUROGENIC CLAUDICATION: ICD-10-CM

## 2019-11-12 DIAGNOSIS — M51.36 DDD (DEGENERATIVE DISC DISEASE), LUMBAR: Primary | ICD-10-CM

## 2019-11-12 LAB
FERRITIN SERPL-MCNC: 680 NG/ML (ref 20–300)
IRON SERPL-MCNC: 106 UG/DL (ref 45–160)
SATURATED IRON: 44 % (ref 20–50)
TOTAL IRON BINDING CAPACITY: 243 UG/DL (ref 250–450)
TRANSFERRIN SERPL-MCNC: 164 MG/DL (ref 200–375)

## 2019-11-12 PROCEDURE — 99215 OFFICE O/P EST HI 40 MIN: CPT | Mod: S$PBB,,, | Performed by: INTERNAL MEDICINE

## 2019-11-12 PROCEDURE — 99215 PR OFFICE/OUTPT VISIT, EST, LEVL V, 40-54 MIN: ICD-10-PCS | Mod: S$PBB,,, | Performed by: INTERNAL MEDICINE

## 2019-11-12 PROCEDURE — 99999 PR PBB SHADOW E&M-EST. PATIENT-LVL V: ICD-10-PCS | Mod: PBBFAC,,, | Performed by: PHYSICIAN ASSISTANT

## 2019-11-12 PROCEDURE — 99999 PR PBB SHADOW E&M-EST. PATIENT-LVL V: CPT | Mod: PBBFAC,,, | Performed by: PHYSICIAN ASSISTANT

## 2019-11-12 PROCEDURE — 99213 OFFICE O/P EST LOW 20 MIN: CPT | Mod: PBBFAC,27 | Performed by: INTERNAL MEDICINE

## 2019-11-12 PROCEDURE — 99215 OFFICE O/P EST HI 40 MIN: CPT | Mod: PBBFAC | Performed by: PHYSICIAN ASSISTANT

## 2019-11-12 PROCEDURE — 99999 PR PBB SHADOW E&M-EST. PATIENT-LVL III: ICD-10-PCS | Mod: PBBFAC,,, | Performed by: INTERNAL MEDICINE

## 2019-11-12 PROCEDURE — 99214 OFFICE O/P EST MOD 30 MIN: CPT | Mod: S$PBB,,, | Performed by: PHYSICIAN ASSISTANT

## 2019-11-12 PROCEDURE — 99999 PR PBB SHADOW E&M-EST. PATIENT-LVL III: CPT | Mod: PBBFAC,,, | Performed by: INTERNAL MEDICINE

## 2019-11-12 PROCEDURE — 99214 PR OFFICE/OUTPT VISIT, EST, LEVL IV, 30-39 MIN: ICD-10-PCS | Mod: S$PBB,,, | Performed by: PHYSICIAN ASSISTANT

## 2019-11-12 RX ORDER — ATORVASTATIN CALCIUM 80 MG/1
80 TABLET, FILM COATED ORAL DAILY
Qty: 90 TABLET | Refills: 3 | Status: SHIPPED | OUTPATIENT
Start: 2019-11-12 | End: 2019-11-13 | Stop reason: SDUPTHER

## 2019-11-12 RX ORDER — GABAPENTIN 600 MG/1
TABLET ORAL
Qty: 90 TABLET | Refills: 0 | OUTPATIENT
Start: 2019-11-12

## 2019-11-12 RX ORDER — GABAPENTIN 600 MG/1
600 TABLET ORAL 3 TIMES DAILY
Qty: 90 TABLET | Refills: 0 | Status: SHIPPED | OUTPATIENT
Start: 2019-11-12 | End: 2020-01-09

## 2019-11-12 RX ORDER — OXYCODONE AND ACETAMINOPHEN 7.5; 325 MG/1; MG/1
1 TABLET ORAL EVERY 8 HOURS PRN
Qty: 90 TABLET | Refills: 0 | Status: SHIPPED | OUTPATIENT
Start: 2019-11-12 | End: 2019-12-12

## 2019-11-12 NOTE — PATIENT INSTRUCTIONS
Iron-Deficiency Anemia (Adult)  Red blood cells carry oxygen to the tissues of your body. Anemia is a condition in which you have too few red blood cells. You need iron to make red cells. Anemia makes you feel tired and run down. When anemia becomes severe, your skin becomes pale. You may feel short of breath after physical activity. Other symptoms include:  · Headaches  · Dizziness  · Leg cramps with physical activity  · Drowsiness  · Restless legs  Your anemia is caused by not having enough iron in your body. This may be because of:  · Loss of blood. This can be caused by heavy menstrual periods. It can also be caused by bleeding from the stomach or intestines.  · Poor diet. You may not be eating enough foods that contain iron.  · Inability to absorb iron from the foods you eat  · Pregnancy  If your blood count is low enough, your healthcare provider may prescribe an iron supplement. It usually takes about 2 to 3 months of treatment with iron supplements to correct anemia. Severe cases of anemia need a blood transfusion to quickly ease symptoms and deliver more oxygen to the cells.  Home care  Follow these guidelines when caring for yourself at home:  · Eat foods high in iron. This will boost the amount of iron stored in your body. It is a natural way to build up the number of blood cells. Good sources of iron include beef, liver, spinach and other dark green leafy vegetables, whole grains, beans, and nuts.  · Do not overexert yourself.  · Talk with your healthcare provider before traveling by air or traveling to high altitudes.  Follow-up care  Follow up with your healthcare provider in 2 months, or as advised. This is to have another red blood cell count to be sure your anemia has been fixed.  When to seek medical advice  Call your healthcare provider right away if any of these occur:  · Shortness of breath or chest pain  · Dizziness or fainting  · Vomiting blood or passing red or black-colored stool   Date  Last Reviewed: 2/25/2016  © 9639-9030 [employer], [email or web address]. [address] [place] [address], [place] [address]. All rights reserved. This information is not intended as a substitute for professional medical care. Always follow your healthcare professional's instructions.

## 2019-11-12 NOTE — PROGRESS NOTES
Chief Pain Complaint:  Low Back Pain, Leg pain (left > right)     Interval History: Since last visit on 9/17/2019, he had an MI and was hospitalized in [place] for several days.  He is doing much better now, followed by Dr. Palacios and Dr. Whitfield, considering surgical intervention.     History of Present Illness:  This patient is a 74 y.o. male who presents today complaining of the above noted pain/s. The patient describes this pain as follows.    - duration of pain: pain for years   - timing: constant   - character: aching, burning  - radiating, dermatomal: extends into left leg, along L5 pattern at times, mostly non-radiating  - antecedent trauma, prior spinal surgery: none  - pertinent negatives: No fever, No chills, No weight loss, No bladder dysfunction, No bowel dysfunction, No extremity weakness, No saddle anesthesia  - pertinent positives: none    - medications, other therapies tried (physical therapy, injections):     >> gabapentin, Norco    >> Has previously undergone Physical Therapy, which made pain worse    >> Has previously undergone spinal injection/s: lumbar MBB and Left TFESI with Dr Taylor in 2014 & 2015 (see EMR Surgeries for full details) with only short term relief    _________________________________________________________________________________________________________________________________________________________________________________________________________________________      IMAGING / Labs / Studies (reviewed on 11/12/2019):    9/15/17 MRI LUMBAR SPINE WITHOUT CONTRAST  History: Lower back pain.  Left lower extremity pain  Technique: Standard multiplanar pulse sequences without IV contrast.  Comparison:  No prior  studies available for comparison.  Findings:   Mild-moderate scoliosis, convex to the left and centered at L3.  All lumbar vertebral bodies are normal in height.  Scattered degenerative endplate marrow signal identified at the L2-L3 and L3-L4 L5-S1 levels.  No fracture.  No  suspicious osseous lesion is identified.  Mild retrolisthesis of L2-L3.  Distal spinal cord and conus medullaris have normal appearance but the conus terminates at the T12-L1 level.  T12-L1: Minimal central protrusion.  Negative for focal nerve root impingement or central canal narrowing.  Neural foramina widely patent.  L1-2: Mild disc height loss.  Prominent intraosseous eyes.  Mild disc bulging.  Mild facet arthropathy and ligament flavum hypertrophy.  The central canal neural foramina patent.  L2-L3: Minimal retrolisthesis of L2 on L3.  There is moderate disc height loss.  Prominent anterior osteophytes.  Mild-moderate diffuse generalized disc bulging.  Mild facet arthropathy and ligament flavum hypertrophy.  The central canal is patent.  Neural foramina patent.  L3-L4: Disc desiccation and moderate disc height loss.  Large anterior osteophytes are present.  There is mild disc bulging and osteophytic ridging at the posterior disc margin.  Moderate right and mild left facet arthropathy and ligamentum flavum hypertrophy.  The central canal is patent.  Mild neural foraminal narrowing.  L4-L5: Very minimal grade 1 spondylolisthesis of L4-L5.  Mild generalized disc bulge is present.  Severe facet arthropathy and ligamentum flavum hypertrophy.  There is severe central canal and lateral recess stenosis.  Moderate neural foraminal stenosis.  L5-S1: Disc desiccation and moderate to severe disc height loss.  Prominent intraocular heights.  There is mild disc bulging and osteophytic ridging at the posterior disc margin.  Severe left and moderate right neural foraminal stenosis.  The central canal is patent.   Paravertebral soft tissues and musculature are normal.  The visualized intra-abdominal and intrapelvic contents are normal.       10/30/14 MRI Lumbar Spine Without Contrast    Narrative Exam: MRI of the lumbar spine without contrast.  History:     Low back pain. Status post SHEY, with S1-S2 radicular  symptoms  Comparison: Prior study dated 04/02/13  Findings:     A convex right scoliosis again noted.  No compression fracture or subluxation.  The conus medullaris has a normal appearance.  The paraspinous soft tissues are unremarkable.  The T12 -- L1 and L1 -- 2 disk levels are essentially unremarkable.  L2 -- 3: Mild annular bulging again noted.    L3 -- 4: Moderate narrowing of the right lateral disk space, with right greater than left facet arthropathy, unchanged.    L4 -- 5: Marked bilateral facet arthropathy, with mild annular bulging, causing moderate central canal stenosis, unchanged.  L5 -- S1: Disk desiccation, with moderate disk space narrowing.  Significant degenerative narrowing of the left L5 neural foramen is apparent, unchanged.     _________________________________________________________________________________________________________________________________________________________________________________________________________________________      Review of Systems:  CONSTITUTIONAL: patient denies any fever, chills, or weight loss  SKIN: patient denies any rash or itching  RESPIRATORY: patient denies having any shortness of breath  GASTROINTESTINAL: patient denies having any diarrhea, constipation, or bowel incontinence  GENITOURINARY: patient denies having any abnormal bladder function    MUSCULOSKELETAL:  - patient reports low back pain    NEUROLOGICAL:   - pain as above  - strength in Lower extremities is intact, BILATERALLY  - sensation in Lower extremities is intact, BILATERALLY  - patient denies any loss of bowel or bladder control      PSYCHIATRIC: patient denies any suicidal or homicidal ideations    _________________________________________________________________________________________________________________________________________________________________________________________________________________________      Physical Exam:  Vitals:  BP (!) 120/54 (BP Location: Right arm,  "Patient Position: Sitting, BP Method: Medium (Automatic))   Pulse 63   Resp 18   Ht 5' 8" (1.727 m)   Wt 64 kg (141 lb)   BMI 21.44 kg/m²   (reviewed on 11/12/2019)    General: alert and oriented, in no apparent distress.  Gait: normal gait.  Skin: no rashes, no discoloration, no obvious lesions  HEENT: normocephalic, atraumatic. Pupils equal and round.  Cardiovascular: no significant peripheral edema present.  Respiratory: without use of accessory muscles of respiration.    Musculoskeletal - Lumbar Spine:  - Pain on flexion of lumbar spine: Absent   - Pain on extension of lumbar spine: Present  - Lumbar facet loading: Present bilaterally  - TTP over the lumbar facet joints: Present Bilaterally, worse at L5-S1   - TTP over the lumbar paraspinals: Present, mild  - TTP over the SI joints: Present on left   - Straight Leg Raise: Negative  - NOELLE: Present  - Pain with internal and external rotation of hip    Neuro - Lower Extremities:  - BLE Strength: R/L: HF: 5/5, HE: 5/5, KF: 5/5; KE: 5/5; FE: 5/5; FF: 5/5  - Extremity Reflexes: Brisk and symmetric throughout  - Sensory: Sensation to light touch intact bilaterally      Psych:  Mood and affect is appropriate    _________________________________________________________________________________________________________________________________________________________________________________________________________________________     Assessment:  Noble Tripp is a 74 y.o. year old male who is presenting with   Encounter Diagnoses   Name Primary?    DDD (degenerative disc disease), lumbar Yes    Sacroiliac joint pain     Lumbar facet arthropathy     Spinal stenosis of lumbar region without neurogenic claudication     Left lumbar radiculopathy      Patient returns for follow-up.  He complains of chronic low back pain. Lumbar MRI shows advanced diffuse spondylosis, greatest at the L4-L5 level with severe central canal stenosis and lateral recess stenosis. "       Plan:  1. Interventional: Consider Bilateral L3, L4, L5 MBB with local. We will consider this if pain not controlled with medication, but he is not interested at this time since he does not feel they have not been long lasting in the past.    2. Pharmacologic:   - Refill Percocet 7.5/325 mg PO TID PRN (90 tabs).  Will e-prescribe today and in 30 days (December 11th).   Patient tolerating opioids with no side effects, obtaining good pain control with functional improvement.   - Refill Gabapentin 600mg TID.  - Opioid contract signed on 3/20/2018.     - LA  reviewed and appropriate. Last filled 9-22-19. He lost his 2nd paper Rx, so he was overdue for refill today.   - Last UDS was appropriate.     3. Rehabilitative: Encouraged regular exercise. He has been using back brace; patient was informed to limit use to avoid muscle atrophy.     4. Diagnostic: None for now.    5. Follow up: 9 weeks for med refill.     - I discussed the risks, benefits, and alternatives to potential treatment options. All questions and concerns were fully addressed today in clinic. Dr. Mueller was consulted regarding the patient plan and agrees.           >> UDS:  6-28-16 :: appropriate  2/28/2017 :: appropriate  8/18/2017 :: (not in EMR)  5/16/2018 :: appropriate  9/11/2018 :: appropriate  3/11/2019 :: appropriate  9/17/2019 :: appropriate

## 2019-11-12 NOTE — PROGRESS NOTES
Subjective:       Patient ID: Noble Tripp is a 74 y.o. male.    Chief Complaint: Anemia    73 year old male, presents for evaluation of anemia. Patient has history of iron deficiency treated in the past with oral iron replacement. Patient states he has had difficulty with oral iron replacement in the past due to severe constipation. Patient is seen by Dr. Oleary for ongoing monitoring of history of tongue cancer, he is UTD with followup. Patient has had an EGD and colonoscopy and these tests were negative for source of a GI bleed. Patient denies melena, hematemesis, difficulty swallowing, palpitations, dizziness, severe fatigue.      Today's visit:    Patient reports worsened fatigue, patient recently hospitalized for a heart attack, was not able to receive iron infusions as planned.     Anemia   There has been no bruising/bleeding easily, confusion, fever, light-headedness or palpitations.     Review of Systems   Constitutional: Positive for fatigue. Negative for activity change, appetite change, chills, diaphoresis, fever and unexpected weight change.   HENT: Negative for congestion, hearing loss, nosebleeds, postnasal drip, sore throat and trouble swallowing.    Eyes: Negative for discharge and visual disturbance.   Respiratory: Negative for cough, chest tightness and shortness of breath.    Cardiovascular: Negative for chest pain, palpitations and leg swelling.   Gastrointestinal: Negative for constipation, diarrhea, nausea and vomiting.   Endocrine: Negative for cold intolerance and heat intolerance.   Genitourinary: Negative for difficulty urinating, dysuria, flank pain and hematuria.   Musculoskeletal: Positive for arthralgias and back pain. Negative for myalgias.   Skin: Negative.    Neurological: Positive for headaches. Negative for dizziness, weakness and light-headedness.   Hematological: Negative for adenopathy. Does not bruise/bleed easily.   Psychiatric/Behavioral: Negative for agitation,  behavioral problems and confusion. The patient is nervous/anxious.        Medication List with Changes/Refills   New Medications    OXYCODONE-ACETAMINOPHEN (PERCOCET) 7.5-325 MG PER TABLET    Take 1 tablet by mouth every 8 (eight) hours as needed for Pain.   Current Medications    ALBUTEROL (PROAIR HFA) 90 MCG/ACTUATION INHALER    INL 2 PFS PO INTO THE LUNGS Q 4 H PRF WHZ OR SOB    ALBUTEROL (PROVENTIL) 2.5 MG /3 ML (0.083 %) NEBULIZER SOLUTION    Take 3 mLs (2.5 mg total) by nebulization every 8 (eight) hours while awake.    ARFORMOTEROL (BROVANA) 15 MCG/2 ML NEBU    Take 2 mLs (15 mcg total) by nebulization 2 (two) times daily. Controller    ASPIRIN 81 MG TAB    Take 1 tablet by mouth Daily. Over the counter to help prevent stroke/heart attack    CHANTIX CONTINUING MONTH BOX 1 MG TAB    TAKE 1 TABLET BY MOUTH ONCE DAILY    CLOPIDOGREL (PLAVIX) 75 MG TABLET    Take 1 tablet (75 mg total) by mouth once daily.    CLOTRIMAZOLE (MYCELEX) 10 MG BUFFY        EVOLOCUMAB (REPATHA SURECLICK) 140 MG/ML PNIJ    Inject 1 mL (140 mg total) into the skin every 14 (fourteen) days.    FUROSEMIDE (LASIX) 40 MG TABLET    Take 1 tablet (40 mg total) by mouth once daily.    LORATADINE (CLARITIN) 10 MG TABLET    Take 1 tablet by mouth daily as needed.     MELATONIN 10 MG TAB    Take 1 tablet by mouth every evening.    NEBULIZER AND COMPRESSOR ANGELO    Use as directed    OXYGEN-AIR DELIVERY SYSTEMS MISC    Inhale 2-3 L into the lungs continuous.    PANTOPRAZOLE (PROTONIX) 40 MG TABLET    TAKE ONE TABLET BY MOUTH ONCE DAILY    PILOCARPINE (SALAGEN) 5 MG TAB    TAKE ONE TABLET BY MOUTH 30 MINUTES PRIOR TO EACH MEAL    SERTRALINE (ZOLOFT) 50 MG TABLET    Take 1 tablet (50 mg total) by mouth once daily.    SPIRIVA WITH HANDIHALER 18 MCG INHALATION CAPSULE    INHALE 1 CAPSULE BY MOUTH ONCE DAILY   Changed and/or Refilled Medications    Modified Medication Previous Medication    ATORVASTATIN (LIPITOR) 80 MG TABLET atorvastatin (LIPITOR) 80 MG  tablet       Take 1 tablet (80 mg total) by mouth once daily.    Take 1 tablet (80 mg total) by mouth once daily.    GABAPENTIN (NEURONTIN) 600 MG TABLET gabapentin (NEURONTIN) 600 MG tablet       Take 1 tablet (600 mg total) by mouth 3 (three) times daily.    Take 1 tablet (600 mg total) by mouth 3 (three) times daily.   Discontinued Medications    OXYCODONE-ACETAMINOPHEN (PERCOCET) 7.5-325 MG PER TABLET    Take 1 tablet by mouth every 8 (eight) hours as needed for Pain.     Objective:     Vitals:    11/11/19 1043   BP: 108/62   Pulse: 60   Temp: 97.8 °F (36.6 °C)     Physical Exam   Constitutional: He is oriented to person, place, and time. He appears well-developed and well-nourished. No distress.   HENT:   Head: Normocephalic and atraumatic.   Right Ear: Hearing and external ear normal.   Left Ear: Hearing and external ear normal.   Nose: Nose normal. No mucosal edema or rhinorrhea. No epistaxis.   Mouth/Throat: Uvula is midline, oropharynx is clear and moist and mucous membranes are normal.   Eyes: Pupils are equal, round, and reactive to light. Conjunctivae and EOM are normal. Right eye exhibits no chemosis and no discharge. Left eye exhibits no chemosis and no discharge.   Neck: Trachea normal and normal range of motion. Neck supple. No thyroid mass and no thyromegaly present.   Cardiovascular: Normal rate, regular rhythm and intact distal pulses.   Murmur heard.  Pulses:       Dorsalis pedis pulses are 2+ on the right side, and 2+ on the left side.   Pulmonary/Chest: Effort normal and breath sounds normal. No respiratory distress. He has no decreased breath sounds. He has no wheezes.   Abdominal: Soft. Bowel sounds are normal. He exhibits no distension. There is no tenderness.   Musculoskeletal: Normal range of motion. He exhibits no edema.   Lymphadenopathy:     He has no cervical adenopathy.     He has no axillary adenopathy.        Right: No supraclavicular adenopathy present.        Left: No  supraclavicular adenopathy present.   Neurological: He is alert and oriented to person, place, and time. He has normal strength.   Skin: Skin is warm, dry and intact. Capillary refill takes less than 2 seconds. No rash noted. He is not diaphoretic. There is pallor.   Psychiatric: His speech is normal and behavior is normal. Judgment and thought content normal. His mood appears anxious. Cognition and memory are normal.   Vitals reviewed.      Assessment:       Problem List Items Addressed This Visit        Pulmonary    Lung mass    On home oxygen therapy       Oncology    History of head and neck cancer    Cancer of tongue    Adrenal benign neoplasm    Chronic anemia    Relevant Orders    CBC auto differential (Completed)    Comprehensive metabolic panel (Completed)    Ferritin (Completed)    Iron and TIBC (Completed)       Endocrine    History of iron deficiency - Primary    Relevant Orders    CBC auto differential (Completed)    Comprehensive metabolic panel (Completed)    Ferritin (Completed)    Iron and TIBC (Completed)    Weight decreasing          Plan:       KIM:  --Patient is currently iron repleted.  --replete with IV Injectafer x2--Did not receive- patient had a heart attack and was hospitalized  -- Return to clinic in 3 months with labs prior    I will review assessment/plan with collaborating physician Dr. Hal Pandya.

## 2019-11-12 NOTE — PROGRESS NOTES
Subjective:   Patient ID:  Noble Tripp is a 74 y.o. male who presents for follow up of Follow-up      73 yo, male, came in for severe multiple vessel CAD.  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   10- admitted to [facility] for sepsis/PNA and PSVT on . EKG showed extensive St depression of 1 mm on anterolateral leads. EF 60%. MPi showed fixed inferior perfusion defect. cTn was up to 0.3. PSVT was converted to sinus O/N.   s/p left thoracentesis d/t pleural effusion.  Echo in : normal EF, DD.  MPI in :  A small to moderate size fixed defect of moderate to severe intensity that extends from the apical to the base inferior wall of the left ventricle.  EKG on 10-: PSVT, st depression on V3 to V6, I, II and avL.   admitted for CHF and COPD exacerbation. ECHO showed normal EF and BNP 1800, troponin 0.05 and flat. Rx with IV lasix and breathing O2. D/c with lasix 20 mg po daily   admitted for [facility] for worsening SOB. Troponin up to 0.3. ECHo showed EF 35%, C showed severe multivessel CAD. Had CVT evaluation and was not cabg candidate due to high risk. Also high risk PCI. Pt was discharged for medical Rx and PCI as op. Discharged with home O2. Om amiodarone 400 mg daily for nonsustained VT in the hospital.  Reviewed [facility] imaging with Dr. Beltran and had PET for viability study. Showing inferior viable myocardium  11/2019 admitted for bradycardia and hypotension. BB, ARB, amio and aldactone on hold.  Today, states that breathing is stable. No chest pain, palpitation and dizziness. On home o2.        Past Medical History:   Diagnosis Date    Adrenal adenoma     david;nl fxn w/u;tran 11/18    Aspiration pneumonia 10/15    puree/honey    Benign prostate hyperplasia     CAD (coronary artery disease)     dr padilla     "Chronic pain     dr santos    Chronic systolic congestive heart failure 10/17/2019    COPD (chronic obstructive pulmonary disease)     papi    Ex-smoker     11/13    Hyperlipidemia     JUANITO on CPAP     cpap    Osteopenia 1/15 tran 1/18    PSVT (paroxysmal supraventricular tachycardia)     PVD (peripheral vascular disease)     noobs 07 khuri    Pyriform sinus cancer     dr ponce radiation 1/2-14 dr king perales    Squamous cell carcinoma of skin     roberto    Tongue cancer     "superficial" removed 11/14    Vocal cord cancer 2011    Xerostomia     radiation       Past Surgical History:   Procedure Laterality Date    APPENDECTOMY      BRONCHOSCOPY Bilateral 2/5/2019    Procedure: Bronchoscopy;  Surgeon: Santos Cardozo MD;  Location: [facility];  Service: Endoscopy;  Laterality: Bilateral;    COLONOSCOPY N/A 5/1/2017    Procedure: Due for screening colonoscopy;  Surgeon: Anushka Yoder MD;  Location: [facility];  Service: Endoscopy;  Laterality: N/A;    ESOPHAGOGASTRODUODENOSCOPY N/A 5/29/2018    Procedure: ESOPHAGOGASTRODUODENOSCOPY (EGD);  Surgeon: Audie Hill III, MD;  Location: [facility];  Service: Endoscopy;  Laterality: N/A;    ESOPHAGOGASTRODUODENOSCOPY N/A 8/29/2018    Procedure: ESOPHAGOGASTRODUODENOSCOPY (EGD);  Surgeon: Audie Hill III, MD;  Location: [facility];  Service: Endoscopy;  Laterality: N/A;    THORACENTESIS Left 8/7/2018    Procedure: Thoracentesis;  Surgeon: Santos Cardozo MD;  Location: [facility];  Service: Endoscopy;  Laterality: Left;    THORACENTESIS Right 2/25/2019    Procedure: Thoracentesis;  Surgeon: Santos Cardozo MD;  Location: [facility];  Service: Endoscopy;  Laterality: Right;    tongue cancer excision  11/14    dr vitale    VOCAL CORD LATERALIZATION, ENDOSCOPIC APPROACH W/ MORENA ponce       Social History     Tobacco Use    Smoking status: Former Smoker     Packs/day: 0.50     Years: 55.00     Pack years: 27.50     Types: Cigarettes "     Last attempt to quit: 10/1/2019     Years since quittin.1    Smokeless tobacco: Never Used    Tobacco comment: 6-7 cigs/day   Substance Use Topics    Alcohol use: Not Currently    Drug use: No       Family History   Problem Relation Age of Onset    Lung cancer Father         + Smoker    No Known Problems Mother     Lung cancer Brother         + Smoker         Review of Systems   Constitution: Positive for malaise/fatigue. Negative for decreased appetite, diaphoresis, fever and night sweats.   HENT: Negative for nosebleeds.    Eyes: Negative for blurred vision and double vision.   Cardiovascular: Positive for dyspnea on exertion. Negative for chest pain, claudication, irregular heartbeat, leg swelling, near-syncope, orthopnea, palpitations, paroxysmal nocturnal dyspnea and syncope.   Respiratory: Positive for cough and shortness of breath. Negative for sleep disturbances due to breathing, snoring, sputum production and wheezing.    Endocrine: Negative for cold intolerance and polyuria.   Hematologic/Lymphatic: Does not bruise/bleed easily.   Skin: Negative for rash.   Musculoskeletal: Negative for back pain, falls, joint pain, joint swelling and neck pain.   Gastrointestinal: Negative for abdominal pain, heartburn, nausea and vomiting.   Genitourinary: Negative for dysuria, frequency and hematuria.   Neurological: Negative for difficulty with concentration, dizziness, focal weakness, headaches, light-headedness, numbness, seizures and weakness.   Psychiatric/Behavioral: Negative for depression, memory loss and substance abuse. The patient does not have insomnia.    Allergic/Immunologic: Negative for HIV exposure and hives.       Objective:   Physical Exam   Constitutional: He is oriented to person, place, and time. He appears well-nourished.   HENT:   Head: Normocephalic.   Eyes: Pupils are equal, round, and reactive to light.   Neck: Normal carotid pulses and no JVD present. Carotid bruit is not  present. No thyromegaly present.   Cardiovascular: Normal rate, regular rhythm, normal heart sounds and normal pulses.  No extrasystoles are present. PMI is not displaced. Exam reveals no gallop and no S3.   No murmur heard.  Pulses:       Carotid pulses are on the right side with bruit.  Pulmonary/Chest: Breath sounds normal. No stridor. No respiratory distress.   Abdominal: Soft. Bowel sounds are normal. There is no tenderness. There is no rebound.   Musculoskeletal: Normal range of motion.   Neurological: He is alert and oriented to person, place, and time.   Skin: Skin is intact. No rash noted.   Psychiatric: His behavior is normal.       Lab Results   Component Value Date    CHOL 77 (L) 02/06/2019    CHOL 98 (L) 08/13/2018    CHOL 204 (H) 11/14/2017     Lab Results   Component Value Date    HDL 44 02/06/2019    HDL 43 08/13/2018    HDL 43 11/14/2017     Lab Results   Component Value Date    LDLCALC 25.0 (L) 02/06/2019    LDLCALC 39.8 (L) 08/13/2018    LDLCALC 147.8 11/14/2017     Lab Results   Component Value Date    TRIG 40 02/06/2019    TRIG 76 08/13/2018    TRIG 66 11/14/2017     Lab Results   Component Value Date    CHOLHDL 57.1 (H) 02/06/2019    CHOLHDL 43.9 08/13/2018    CHOLHDL 21.1 11/14/2017       Chemistry        Component Value Date/Time     11/11/2019 1119    K 5.3 (H) 11/11/2019 1119     11/11/2019 1119    CO2 29 11/11/2019 1119    BUN 21 11/11/2019 1119    CREATININE 1.1 11/11/2019 1119    GLU 98 11/11/2019 1119        Component Value Date/Time    CALCIUM 9.4 11/11/2019 1119    ALKPHOS 66 11/11/2019 1119    AST 26 11/11/2019 1119    ALT 43 11/11/2019 1119    BILITOT 0.5 11/11/2019 1119    ESTGFRAFRICA >60 11/11/2019 1119    EGFRNONAA >60 11/11/2019 1119          Lab Results   Component Value Date    HGBA1C 5.4 02/06/2019     Lab Results   Component Value Date    TSH 1.259 02/06/2019     Lab Results   Component Value Date    INR 1.0 10/26/2019    INR 1.0 10/25/2019    INR 1.1 10/10/2015      Lab Results   Component Value Date    WBC 6.77 11/11/2019    HGB 10.0 (L) 11/11/2019    HCT 31.3 (L) 11/11/2019    MCV 96 11/11/2019     11/11/2019     BMP  Sodium   Date Value Ref Range Status   11/11/2019 139 136 - 145 mmol/L Final     Potassium   Date Value Ref Range Status   11/11/2019 5.3 (H) 3.5 - 5.1 mmol/L Final     Chloride   Date Value Ref Range Status   11/11/2019 104 95 - 110 mmol/L Final     CO2   Date Value Ref Range Status   11/11/2019 29 23 - 29 mmol/L Final     BUN, Bld   Date Value Ref Range Status   11/11/2019 21 8 - 23 mg/dL Final     Creatinine   Date Value Ref Range Status   11/11/2019 1.1 0.5 - 1.4 mg/dL Final     Calcium   Date Value Ref Range Status   11/11/2019 9.4 8.7 - 10.5 mg/dL Final     Anion Gap   Date Value Ref Range Status   11/11/2019 6 (L) 8 - 16 mmol/L Final     eGFR if    Date Value Ref Range Status   11/11/2019 >60 >60 mL/min/1.73 m^2 Final     eGFR if non    Date Value Ref Range Status   11/11/2019 >60 >60 mL/min/1.73 m^2 Final     Comment:     Calculation used to obtain the estimated glomerular filtration  rate (eGFR) is the CKD-EPI equation.        BNP  @LABRCNTIP(BNP,BNPTRIAGEBLO)@  @LABRCNTIP(troponini)@  CrCl cannot be calculated (Unknown ideal weight.).  No results found in the last 24 hours.  No results found in the last 24 hours.  No results found in the last 24 hours.    Assessment:      1. Bradycardia    2. Coronary artery disease involving native coronary artery of native heart without angina pectoris    3. Chronic systolic congestive heart failure    4. Pure hypercholesterolemia    5. Essential hypertension    6. Stage 3 severe COPD by GOLD classification    7. On home oxygen therapy      BP and HR wnl  On home O2  No angina  CHFrEF compensated FC III  Plan:   Continue ASA,Plavix, lipitor and lasix  Will discuss with Dr. Beltran about LHC and PCI for inferior ischemia  DASH  Recommend heart-healthy diet, weight control and  regular exercise.  Yamile. Risk modification.   RTC after CATH    I have reviewed all pertinent labs and cardiac studies. Plans and recommendations have been formulated under my direct supervision. All questions answered and patient voiced understanding. Patient to continue current medications.

## 2019-11-13 DIAGNOSIS — I25.10 CORONARY ARTERY DISEASE INVOLVING NATIVE CORONARY ARTERY OF NATIVE HEART WITHOUT ANGINA PECTORIS: ICD-10-CM

## 2019-11-13 DIAGNOSIS — I50.22 CHRONIC SYSTOLIC CONGESTIVE HEART FAILURE: ICD-10-CM

## 2019-11-13 RX ORDER — CLOPIDOGREL BISULFATE 75 MG/1
75 TABLET ORAL DAILY
Qty: 90 TABLET | Refills: 3 | Status: ON HOLD | OUTPATIENT
Start: 2019-11-13 | End: 2020-05-19 | Stop reason: HOSPADM

## 2019-11-13 RX ORDER — ATORVASTATIN CALCIUM 80 MG/1
80 TABLET, FILM COATED ORAL DAILY
Qty: 90 TABLET | Refills: 3 | Status: ON HOLD | OUTPATIENT
Start: 2019-11-13 | End: 2020-05-12 | Stop reason: HOSPADM

## 2019-11-13 NOTE — TELEPHONE ENCOUNTER
----- Message from Aaron Cullen sent at 11/12/2019  3:30 PM CST -----  Contact: pt   Pt would like cb in reference to script from today's visit.             [phone]

## 2019-11-15 ENCOUNTER — TELEPHONE (OUTPATIENT)
Dept: PULMONOLOGY | Facility: CLINIC | Age: 74
End: 2019-11-15

## 2019-11-15 ENCOUNTER — TELEPHONE (OUTPATIENT)
Dept: CARDIOLOGY | Facility: CLINIC | Age: 74
End: 2019-11-15

## 2019-11-15 ENCOUNTER — LAB VISIT (OUTPATIENT)
Dept: LAB | Facility: HOSPITAL | Age: 74
End: 2019-11-15
Attending: INTERNAL MEDICINE
Payer: MEDICARE

## 2019-11-15 DIAGNOSIS — I50.22 CHRONIC SYSTOLIC CONGESTIVE HEART FAILURE: ICD-10-CM

## 2019-11-15 PROCEDURE — 80048 BASIC METABOLIC PNL TOTAL CA: CPT

## 2019-11-15 PROCEDURE — 36415 COLL VENOUS BLD VENIPUNCTURE: CPT

## 2019-11-15 PROCEDURE — 83735 ASSAY OF MAGNESIUM: CPT

## 2019-11-15 NOTE — TELEPHONE ENCOUNTER
Spoke with pt. Pt stated that [facility] needed most recent office notes. Notes faxed to [facility] [phone]

## 2019-11-15 NOTE — TELEPHONE ENCOUNTER
----- Message from Abby Albright sent at 11/15/2019 10:23 AM CST -----  Contact: pt  Pt stated he has called and left several messages for your staff and has not had a response. He needs orders for oxygen as well as medications. He can be reached at [phone]        Thanks,  Abby Albright

## 2019-11-15 NOTE — TELEPHONE ENCOUNTER
----- Message from Mckayla Ledbetter MA sent at 11/14/2019  4:57 PM CST -----  Contact: self [phone]      ----- Message -----  From: Latanya Greenwood  Sent: 11/14/2019   3:46 PM CST  To: Nahid Campos Staff    Pt would like return call from nurse regarding order for oxygen machine. Please call back at [phone].  Md Rich

## 2019-11-15 NOTE — TELEPHONE ENCOUNTER
Please Advise.  Pt was notified that he was able to get a portable oxygen machine through his insurance. All the patient needs is an order for a portable oxygen machine for [facility].     [facility]  [address], [place] [address]   PH:[phone]      Pt also wanted to know if he was supposed to be on 2 cholesterol medications (lipitor and repatha). Pt notified that Doctors may proscribe more than one medication if one is not helping fully.

## 2019-11-16 LAB
ANION GAP SERPL CALC-SCNC: 8 MMOL/L (ref 8–16)
BUN SERPL-MCNC: 24 MG/DL (ref 8–23)
CALCIUM SERPL-MCNC: 9.2 MG/DL (ref 8.7–10.5)
CHLORIDE SERPL-SCNC: 107 MMOL/L (ref 95–110)
CO2 SERPL-SCNC: 26 MMOL/L (ref 23–29)
CREAT SERPL-MCNC: 1 MG/DL (ref 0.5–1.4)
EST. GFR  (AFRICAN AMERICAN): >60 ML/MIN/1.73 M^2
EST. GFR  (NON AFRICAN AMERICAN): >60 ML/MIN/1.73 M^2
GLUCOSE SERPL-MCNC: 88 MG/DL (ref 70–110)
MAGNESIUM SERPL-MCNC: 2 MG/DL (ref 1.6–2.6)
POTASSIUM SERPL-SCNC: 4.6 MMOL/L (ref 3.5–5.1)
SODIUM SERPL-SCNC: 141 MMOL/L (ref 136–145)

## 2019-11-18 ENCOUNTER — TELEPHONE (OUTPATIENT)
Dept: INTERNAL MEDICINE | Facility: CLINIC | Age: 74
End: 2019-11-18

## 2019-11-18 ENCOUNTER — INFUSION (OUTPATIENT)
Dept: INFUSION THERAPY | Facility: HOSPITAL | Age: 74
End: 2019-11-18
Attending: INTERNAL MEDICINE
Payer: MEDICARE

## 2019-11-18 ENCOUNTER — CLINICAL SUPPORT (OUTPATIENT)
Dept: AUDIOLOGY | Facility: CLINIC | Age: 74
End: 2019-11-18
Payer: MEDICARE

## 2019-11-18 ENCOUNTER — OFFICE VISIT (OUTPATIENT)
Dept: OTOLARYNGOLOGY | Facility: CLINIC | Age: 74
End: 2019-11-18
Payer: MEDICARE

## 2019-11-18 VITALS
BODY MASS INDEX: 22.19 KG/M2 | HEART RATE: 73 BPM | TEMPERATURE: 98 F | SYSTOLIC BLOOD PRESSURE: 113 MMHG | DIASTOLIC BLOOD PRESSURE: 61 MMHG | WEIGHT: 145.94 LBS

## 2019-11-18 VITALS
DIASTOLIC BLOOD PRESSURE: 60 MMHG | SYSTOLIC BLOOD PRESSURE: 130 MMHG | HEART RATE: 60 BPM | RESPIRATION RATE: 18 BRPM | TEMPERATURE: 97 F | OXYGEN SATURATION: 97 %

## 2019-11-18 DIAGNOSIS — Z99.81 ON HOME OXYGEN THERAPY: ICD-10-CM

## 2019-11-18 DIAGNOSIS — Z86.39 HISTORY OF IRON DEFICIENCY: ICD-10-CM

## 2019-11-18 DIAGNOSIS — J90 RECURRENT RIGHT PLEURAL EFFUSION: ICD-10-CM

## 2019-11-18 DIAGNOSIS — D64.9 CHRONIC ANEMIA: Primary | ICD-10-CM

## 2019-11-18 DIAGNOSIS — H90.3 SENSORINEURAL HEARING LOSS, BILATERAL: Primary | ICD-10-CM

## 2019-11-18 DIAGNOSIS — H90.3 SENSORINEURAL HEARING LOSS (SNHL) OF BOTH EARS: Primary | ICD-10-CM

## 2019-11-18 DIAGNOSIS — J44.9 STAGE 3 SEVERE COPD BY GOLD CLASSIFICATION: Primary | ICD-10-CM

## 2019-11-18 PROCEDURE — 92567 TYMPANOMETRY: CPT | Mod: PBBFAC | Performed by: AUDIOLOGIST-HEARING AID FITTER

## 2019-11-18 PROCEDURE — 99213 OFFICE O/P EST LOW 20 MIN: CPT | Mod: PBBFAC,25 | Performed by: PHYSICIAN ASSISTANT

## 2019-11-18 PROCEDURE — 63600175 PHARM REV CODE 636 W HCPCS: Mod: JG | Performed by: NURSE PRACTITIONER

## 2019-11-18 PROCEDURE — 99213 OFFICE O/P EST LOW 20 MIN: CPT | Mod: S$PBB,ICN,, | Performed by: PHYSICIAN ASSISTANT

## 2019-11-18 PROCEDURE — 99999 PR PBB SHADOW E&M-EST. PATIENT-LVL III: CPT | Mod: PBBFAC,,, | Performed by: PHYSICIAN ASSISTANT

## 2019-11-18 PROCEDURE — 99999 PR PBB SHADOW E&M-EST. PATIENT-LVL III: ICD-10-PCS | Mod: PBBFAC,,, | Performed by: PHYSICIAN ASSISTANT

## 2019-11-18 PROCEDURE — 96375 TX/PRO/DX INJ NEW DRUG ADDON: CPT

## 2019-11-18 PROCEDURE — 99213 PR OFFICE/OUTPT VISIT, EST, LEVL III, 20-29 MIN: ICD-10-PCS | Mod: S$PBB,ICN,, | Performed by: PHYSICIAN ASSISTANT

## 2019-11-18 PROCEDURE — 96374 THER/PROPH/DIAG INJ IV PUSH: CPT

## 2019-11-18 PROCEDURE — 92557 COMPREHENSIVE HEARING TEST: CPT | Mod: PBBFAC | Performed by: AUDIOLOGIST-HEARING AID FITTER

## 2019-11-18 RX ORDER — METHYLPREDNISOLONE SOD SUCC 125 MG
80 VIAL (EA) INJECTION
Status: COMPLETED | OUTPATIENT
Start: 2019-11-18 | End: 2019-11-18

## 2019-11-18 RX ADMIN — SODIUM CHLORIDE: 9 INJECTION, SOLUTION INTRAVENOUS at 02:11

## 2019-11-18 RX ADMIN — FERRIC CARBOXYMALTOSE INJECTION 750 MG: 50 INJECTION, SOLUTION INTRAVENOUS at 02:11

## 2019-11-18 RX ADMIN — METHYLPREDNISOLONE SODIUM SUCCINATE 80 MG: 125 INJECTION, POWDER, FOR SOLUTION INTRAMUSCULAR; INTRAVENOUS at 02:11

## 2019-11-18 NOTE — TELEPHONE ENCOUNTER
----- Message from Ca Peck sent at 11/18/2019 10:13 AM CST -----  Contact: self/[phone]  Would like to consult with nurse regarding a portable oxygen tank. Please call back at [phone]. Thanks/ar

## 2019-11-18 NOTE — LETTER
November 19, 2019      [place]  [address]  [place] [address]           [facility]  [address] [place], [place] [address]ON [facility] [address]  Phone: [phone]  Fax: [phone]          Patient: Noble Tripp   MR Number: [id number]   YOB: 1945   Date of Visit: 11/18/2019       Dear Dr. JONATHAN Frost Jr.:    Thank you for referring Noble Tripp to me for evaluation. Attached you will find relevant portions of my assessment and plan of care.    If you have questions, please do not hesitate to call me. I look forward to following Noble Tripp along with you.    Sincerely,    JIMENEZ Loosure  CC:  No Recipients    If you would like to receive this communication electronically, please contact [email or web address] or [phone] to request more information on [facility] access.    For providers and/or their staff who would like to refer a patient to [facility], please contact us through our one-stop-shop provider referral line, [facility], at [phone].    If you feel you have received this communication in error or would no longer like to receive these types of communications, please e-mail [email or web address]

## 2019-11-18 NOTE — PROGRESS NOTES
Referring Provider:Dr. Santosh Tripp was seen 11/18/2019 for an audiological evaluation.  Patient complains of bilateral hearing loss. Onset was gradual. He has a hx of occupational noise exposure. He reports tinnitus bilaterally. He denies ear infections, ear surgeries, otalgia, aural fullness, and vertigo.    Results reveal a mild to moderately severe sensorineural hearing loss 250-8000 Hz with conductive components at 500 and 4 K Hz for the right ear, and a mild-to-severe sensorineural hearing loss 250-8000 Hz with a conductive component noted at 4 K Hz for the left ear.   Speech Reception Thresholds were  50 dBHL for the right ear and 50 dBHL for the left ear.   Word recognition scores were excellent for the right ear and excellent for the left ear.   Tympanograms were Type Ad, hypermobile for the right ear and Type A, slight ETD noted for the left ear.    Patient was counseled on the above findings.    Recommendations:  1. ENT  2. Annual Audiograms  3. Binaural hearing aids. Provided patient with information regarding [facility]. ENT please place a referral to [facility].

## 2019-11-18 NOTE — TELEPHONE ENCOUNTER
----- Message from Filibetro Hernandez sent at 11/18/2019 11:42 AM CST -----  Contact: Self- [phone]  .Type:  Patient Returning Call    Who Called:Noble Tripp  Who Left Message for Patient:Usnure   Does the patient know what this is regarding?:Unsure   Would the patient rather a call back or a response via [facility]? Call back   Best Call Back Number:.[phone] (home)   Additional Information:

## 2019-11-18 NOTE — DISCHARGE INSTRUCTIONS
[facility]  [address] [facility]  [address]  [phone] phone     [phone] fax  Hours of Operation: Monday- Friday 8:00am- 5:00pm  After hours phone  [phone]  Hematology / Oncology Physicians on call      PEG [address] Dr[address] Dr., Dr., Dr., NP Sydney Prescott, NP Tyesha Taylor, NP    Please call with any concerns regarding your appointment today.IRON DEFICIENCY ANEMIA:  Anemia is a condition where the size or number of red blood cells in the body is reduced. Iron is needed in the diet to make red cells. The red blood cells carry oxygen to all parts of the body. Anemia limits the delivery of oxygen to where it is needed. This causes a feeling of being tired and run down. When anemia becomes severe, the skin becomes pale and there is shortness of breath with exertion, headaches, dizziness, drowsiness and fatigue.  The cause of your anemia is lack of iron in your body. This may occur due to blood loss (for example, heavy menstrual periods or bleeding from the stomach or intestines) or a poor diet (not eating enough iron-containing foods), inability to absorb iron from your diet, or pregnancy.  If the blood count is low enough, an IRON SUPPLEMENT will be prescribed. It usually takes about 2-3 months of treatment with iron supplements to correct an anemia. Severe cases of anemia requires a blood transfusion to rapidly correct symptoms and deliver more oxygen to the cells.  HOME CARE:  1) Increase the iron stores in your body by eating foods high in iron content. This is a natural way of building your blood cells back up again. Beef, liver, spinach and other dark green leafy vegetables, whole grain products, beans and nuts are all natural sources of iron.  2) If you are having symptoms of anemia listed above:  -- Do not overexert yourself.  -- Talk to your doctor before flying on an airplane or  travelling to high altitudes.  FOLLOW UP with your doctor in 2 months for a repeat red blood cell count, or as recommended by our staff, to be sure that the anemia has been corrected.  GET PROMPT MEDICAL ATTENTION if any of the following occur or worsen:  -- Shortness of breath or chest pain  -- Dizziness or fainting  -- Vomiting blood or passing red or black-colored stool  © 8497-3822 [facility], [address] [place] [address], [place] [address]. All rights reserved. This information is not intended as a substitute for professional medical care. Always follow your healthcare professional's instructions.

## 2019-11-19 ENCOUNTER — TELEPHONE (OUTPATIENT)
Dept: CARDIOLOGY | Facility: CLINIC | Age: 74
End: 2019-11-19

## 2019-11-19 ENCOUNTER — TELEPHONE (OUTPATIENT)
Dept: PULMONOLOGY | Facility: CLINIC | Age: 74
End: 2019-11-19

## 2019-11-19 ENCOUNTER — PATIENT MESSAGE (OUTPATIENT)
Dept: INTERNAL MEDICINE | Facility: CLINIC | Age: 74
End: 2019-11-19

## 2019-11-19 DIAGNOSIS — J44.9 STAGE 3 SEVERE COPD BY GOLD CLASSIFICATION: Primary | ICD-10-CM

## 2019-11-19 NOTE — TELEPHONE ENCOUNTER
Returned call to patient and informed that we will contact him with information concerning heart catheterization once decided with Dr. Beltran--patient verbalizes understanding

## 2019-11-19 NOTE — PROGRESS NOTES
Subjective:       Patient ID: Noble Tripp is a 74 y.o. male.    Chief Complaint: Hearing Loss    Mr. Tripp is a very pleasant 75 yo male here to see me today with worsening hearing loss according to family members. 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 Oleary.  He has had multiple surgeries as well as radiation treatment.     Review of Systems   Constitutional: Negative for fatigue, fever and unexpected weight change.   HENT: Positive for hearing loss. Negative for congestion, ear discharge, ear pain, facial swelling, nosebleeds, postnasal drip, rhinorrhea, sinus pressure, sneezing, sore throat, tinnitus, trouble swallowing and voice change.    Eyes: Negative for discharge, redness and itching.   Respiratory: Positive for shortness of breath. Negative for cough, choking and wheezing.         On home O2   Cardiovascular: Negative for chest pain and palpitations.   Gastrointestinal: Negative for abdominal pain.        No reflux.   Musculoskeletal: Negative for neck pain.   Neurological: Negative for dizziness, facial asymmetry, light-headedness and headaches.   Hematological: Negative for adenopathy. Does not bruise/bleed easily.   Psychiatric/Behavioral: Negative for agitation, behavioral problems, confusion and decreased concentration.       Objective:      Physical Exam   HENT:   Right Ear: Tympanic membrane and ear canal normal. No middle ear effusion. Decreased hearing is noted.   Left Ear: Tympanic membrane and ear canal normal.  No middle ear effusion. Decreased hearing is noted.     Results reveal a mild to moderately severe sensorineural hearing loss 250-8000 Hz with conductive components at 500 and 4 K Hz for the right ear, and a mild-to-severe sensorineural hearing loss 250-8000 Hz with a conductive component noted at 4 K Hz for the left ear.   Speech Reception Thresholds were  50 dBHL for the  right ear and 50 dBHL for the left ear.   Word recognition scores were excellent for the right ear and excellent for the left ear.   Tympanograms were Type Ad, hypermobile for the right ear and Type A, slight ETD noted for the left ear.     Patient was counseled on the above findings.     Recommendations:  1. ENT  2. Annual Audiograms  3. Binaural hearing aids. Provided patient with information regarding [facility]. ENT please place a referral to [facility].      Assessment:       1. Sensorineural hearing loss (SNHL) of both ears        Plan:       Referral for HA evaluation placed to [facility]. Return to clinic as needed. He will need to continue to follow with Mamta regarding his other issues.

## 2019-11-19 NOTE — TELEPHONE ENCOUNTER
----- Message from Iris Agarwal sent at 11/19/2019  1:43 PM CST -----  Contact: Patient  Patient states he is waiting on the nurse to call him concerning scheduling a consult for surgery to get stints put in. Please call to advise at ph .[phone], he is a patient of Dr. Whitfield.

## 2019-11-19 NOTE — TELEPHONE ENCOUNTER
----- Message from Santos Cardozo MD sent at 11/19/2019  8:54 AM CST -----  Contact: Patient   Марина  Needs qualifying data for order based on Medicare   RA  ABG or 6MWD  ASAP  This week  Santos Cardozo MD        ----- Message -----  From: Dwaine Davis MD  Sent: 11/18/2019   5:23 PM CST  To: MD Dr Juliane Patino or covering provider: please see patients request  ----- Message -----  From: Lashaun Fox MA  Sent: 11/15/2019  11:51 AM CST  To: Dwaine Davis MD    Pt would like order for portable oxygen. Please advise. Thank you.   ----- Message -----  From: Liudmila Cano  Sent: 11/15/2019  11:34 AM CST  To: Ryan REECE Staff    Patient called in regards to an order for him to receive a portable  oxygen . Please call back at [phone].    Thanks,  Liudmila Cano

## 2019-11-20 ENCOUNTER — PATIENT MESSAGE (OUTPATIENT)
Dept: PULMONOLOGY | Facility: CLINIC | Age: 74
End: 2019-11-20

## 2019-11-20 ENCOUNTER — TELEPHONE (OUTPATIENT)
Dept: CARDIOLOGY | Facility: CLINIC | Age: 74
End: 2019-11-20

## 2019-11-20 ENCOUNTER — NUTRITION (OUTPATIENT)
Dept: DIABETES | Facility: CLINIC | Age: 74
End: 2019-11-20
Payer: MEDICARE

## 2019-11-20 ENCOUNTER — CLINICAL SUPPORT (OUTPATIENT)
Dept: PULMONOLOGY | Facility: CLINIC | Age: 74
End: 2019-11-20
Payer: MEDICARE

## 2019-11-20 ENCOUNTER — OFFICE VISIT (OUTPATIENT)
Dept: PULMONOLOGY | Facility: CLINIC | Age: 74
End: 2019-11-20
Payer: MEDICARE

## 2019-11-20 VITALS
OXYGEN SATURATION: 97 % | WEIGHT: 148 LBS | HEIGHT: 67 IN | WEIGHT: 148.81 LBS | BODY MASS INDEX: 23.36 KG/M2 | HEART RATE: 57 BPM | SYSTOLIC BLOOD PRESSURE: 142 MMHG | BODY MASS INDEX: 23.78 KG/M2 | DIASTOLIC BLOOD PRESSURE: 65 MMHG | HEIGHT: 66 IN | RESPIRATION RATE: 18 BRPM

## 2019-11-20 VITALS — HEIGHT: 66 IN | BODY MASS INDEX: 23.88 KG/M2 | WEIGHT: 148.56 LBS

## 2019-11-20 DIAGNOSIS — J44.9 STAGE 3 SEVERE COPD BY GOLD CLASSIFICATION: ICD-10-CM

## 2019-11-20 DIAGNOSIS — I50.22 CHRONIC SYSTOLIC CONGESTIVE HEART FAILURE: ICD-10-CM

## 2019-11-20 DIAGNOSIS — G47.33 OSA ON CPAP: Primary | Chronic | ICD-10-CM

## 2019-11-20 DIAGNOSIS — G47.33 OSA ON CPAP: Chronic | ICD-10-CM

## 2019-11-20 DIAGNOSIS — I12.9 HYPERTENSIVE CHRONIC KIDNEY DISEASE WITH STAGE 1 THROUGH STAGE 4 CHRONIC KIDNEY DISEASE, OR UNSPECIFIED CHRONIC KIDNEY DISEASE: Primary | ICD-10-CM

## 2019-11-20 PROCEDURE — 99999 PR PBB SHADOW E&M-EST. PATIENT-LVL IV: ICD-10-PCS | Mod: PBBFAC,,, | Performed by: NURSE PRACTITIONER

## 2019-11-20 PROCEDURE — 94618 PULMONARY STRESS TESTING: CPT | Mod: 26,S$PBB,, | Performed by: INTERNAL MEDICINE

## 2019-11-20 PROCEDURE — 1159F MED LIST DOCD IN RCRD: CPT | Mod: ,,, | Performed by: NURSE PRACTITIONER

## 2019-11-20 PROCEDURE — 94618 PULMONARY STRESS TESTING: CPT | Mod: PBBFAC

## 2019-11-20 PROCEDURE — 97802 MEDICAL NUTRITION INDIV IN: CPT | Mod: PBBFAC,59 | Performed by: DIETITIAN, REGISTERED

## 2019-11-20 PROCEDURE — 99999 PR PBB SHADOW E&M-EST. PATIENT-LVL IV: CPT | Mod: PBBFAC,,, | Performed by: NURSE PRACTITIONER

## 2019-11-20 PROCEDURE — 94618 PULMONARY STRESS TESTING: ICD-10-PCS | Mod: 26,S$PBB,, | Performed by: INTERNAL MEDICINE

## 2019-11-20 PROCEDURE — 99214 OFFICE O/P EST MOD 30 MIN: CPT | Mod: S$PBB,,, | Performed by: NURSE PRACTITIONER

## 2019-11-20 PROCEDURE — 99999 PR PBB SHADOW E&M-EST. PATIENT-LVL I: ICD-10-PCS | Mod: PBBFAC,,, | Performed by: DIETITIAN, REGISTERED

## 2019-11-20 PROCEDURE — 1159F PR MEDICATION LIST DOCUMENTED IN MEDICAL RECORD: ICD-10-PCS | Mod: ,,, | Performed by: NURSE PRACTITIONER

## 2019-11-20 PROCEDURE — 99214 PR OFFICE/OUTPT VISIT, EST, LEVL IV, 30-39 MIN: ICD-10-PCS | Mod: S$PBB,,, | Performed by: NURSE PRACTITIONER

## 2019-11-20 PROCEDURE — 99211 OFF/OP EST MAY X REQ PHY/QHP: CPT | Mod: PBBFAC,27,25 | Performed by: DIETITIAN, REGISTERED

## 2019-11-20 PROCEDURE — 99999 PR PBB SHADOW E&M-EST. PATIENT-LVL I: CPT | Mod: PBBFAC,,, | Performed by: DIETITIAN, REGISTERED

## 2019-11-20 PROCEDURE — 94762 N-INVAS EAR/PLS OXIMTRY CONT: CPT | Mod: PBBFAC

## 2019-11-20 PROCEDURE — 99214 OFFICE O/P EST MOD 30 MIN: CPT | Mod: PBBFAC | Performed by: NURSE PRACTITIONER

## 2019-11-20 NOTE — PROGRESS NOTES
"PCP: Dwaine Davis MD   REFERRING PROVIDER: JONATHAN Frost Jr., MD     HISTORY OF PRESENT ILLNESS: 74 y.o. male patient is in clinic today for hypertension and diet for weight gain. Patient has a history of cancer with radiation treatment. He lost 40 pounds after treatment.  Patient had a heart attack the 2nd week in October and was instructed to reduce sodium in his diet. Patient denies diabetes.    VITAL SIGNS:  Height: 5' 6" (167.6 cm)   Weight: 67.4 kg (148 lb 9.4 oz)   Body mass index is 23.98 kg/m².    ALLERGIES & MEDICATIONS: Reviewed and Reconciled  MEDICAL/SURGICAL & FAMILY HISTORY: Reviewed and Reconciled    LABORATORY:  Reviewed Diabetes Management Flowsheet and Results Review.      ACTIVITY LEVEL: Aerobic - None. Resistance - none. Patient was on oxygen until today.     NUTRITION INTAKE: Meal patterns include 3 meals, 1-2 snacks daily. Patient is limiting sodium to 5244-3054 mg/day. Patient is not limiting carbohydrates, saturated fat or sodium. Inadequate intakes of fruits, vegetables, whole grains.   B and L: Grilled cheese made with unsalted butter,  Milkshake made with Boost, banana, frozen fruit, sweetened condensed milk, bluebell strawberry, vanilla, chocolate ice cream.   S - None.   D - Ground meat with cheese and pork and beans, iced tea  S - Little Annie cake  Beverages - water, Sweet tea  Dining out - not often.   Potatoes, green beans, fruit everyday, honey wheat bread,     PSYCHOSOCIAL: Stage of change - action  Barriers to change - none.    EDUCATIONAL ASSESSMENT:   1. Impediments in learning class environment - NONE.  2. Needs improvement in self care management skills.  -healthy eating  -being active  -self-monitoring  -problem solving  -healthy coping  -reducing risks    MNT ASSESSMENT:   2500 calories, 150 grams protein daily  45-60 grams carb/meal, 15-30 grams carb/snack  increase fruit 2 serv/d, vegetables 2+ cups/d, whole grains 3+serv/d, lowfat dairy 3+serv/d  low-fat, " low-sodium (1500 mg/day)  Protein shakes to boost protein intake.   150 min physical activity per week, moderate intensity, as tolerated    PLAN:   Reviewed MNT guidelines for hypertension- DASH diet guidelines and provided education material: Steps for Lowering Sodium.    Advised patient to avoid processed foods.    Provided meal-planning instruction via foodlists, plate method, food models, food labels and/or ADA booklet. Reviewed micro/macronutrient effect on health management. Discussed carbohydrate counting and spacing techniques with emphasis on cardiovascular wellness health strategies, functional foods.    Encouraged 6 small meals per day.    Discussed importance of daily physical activity with review of benefits, methods, guidelines and precautions.   Discussed behavioral strategies for improving social and environmental support of lifestyle changes.    GOALS: Self-monitoring: Daily food & activity journal. Meal Plan: 90% Accuracy. Activity:150 min/wk.    Visit Time Spent:  60 minutes  Thank you for the opportunity to work with your patient.

## 2019-11-20 NOTE — LETTER
November 20, 2019      Santos Cardozo MD  [address]  [place] [address]           [facility]  [address] [place] [id number]  Phone: [phone]  Fax: [phone]          Patient: Noble Tripp   MR Number: [id number]   YOB: 1945   Date of Visit: 11/20/2019       Dear Dr. Santos Cardozo:    Thank you for referring Noble Tripp to me for evaluation. Attached you will find relevant portions of my assessment and plan of care.    If you have questions, please do not hesitate to call me. I look forward to following Noble Tripp along with you.    Sincerely,    Saige Hampton, NP    Enclosure  CC:  No Recipients    If you would like to receive this communication electronically, please contact [email or web address] or [phone] to request more information on [employer] access.    For providers and/or their staff who would like to refer a patient to [facility], please contact us through our one-stop-shop provider referral line, Jason Heath, at [phone].    If you feel you have received this communication in error or would no longer like to receive these types of communications, please e-mail [email or web address]

## 2019-11-20 NOTE — PROGRESS NOTES
Subjective:      Patient ID: Nolbe Tripp is a 74 y.o. male.    Patient Active Problem List   Diagnosis    Thoracic or lumbosacral neuritis or radiculitis, unspecified    Neuropathy    Benign prostate hyperplasia    Hyperlipidemia    Osteopenia    Hypertension    Chronic pain    History of head and neck cancer    Personal history of colonic polyps    JUANITO on CPAP    Cancer of tongue    Adrenal benign neoplasm    Hypomagnesemia    Oropharyngeal aspiration    Stage 3 severe COPD by GOLD classification    Coronary artery disease involving native coronary artery of native heart without angina pectoris    Allergy to statin medication    Statin intolerance    History of iron deficiency    Chronic anemia    Pleural effusion, bilateral    Esophagitis    Lung mass    Carotid artery disease    Recurrent right pleural effusion    Chronic systolic congestive heart failure    On home oxygen therapy    Weight decreasing    Cardiac left ventricular ejection fraction 21-40 percent    Coronary artery disease involving left main coronary artery    Bradycardia    Hyperkalemia    Mastoiditis of both sides    Anxiety       he has been referred by Santos Cardozo MD for evaluation and management for   Chief Complaint   Patient presents with    Sleep Apnea    COPD     Chief Complaint: Sleep Apnea and COPD    HPI:  Noble Tripp is a 74 y.o. male presents to pulmonary clinic for review of 6 min walk distance to [facility] for home oxygen.  Patient was discharged from [facility] in early October 2019.  On the same day was discharged from [facility] before he was able to make it home to [place] he had a heart attack and was admitted to [facility].   Patient has been utilizing home oxygen 2 L continuous including 2 L bled in to CPAP 14 cm at night since he was discharged from [facility] early October 2019.    6 min walk distance 11/20/2019 no  desaturations requiring supplemental oxygen at rest or with exertion.  Resting O2 sat 99%.  Tasneem dyspnea Scale 0.  O2 sat estefania with exertion 96%.   (orders placed at this visit discontinue portable home oxygen HME:  [facility])    Further evaluate continued need for oxygen at night with CPAP overnight oximetry orders placed.     On CPAP 14 cm benefits and compliant.  No apneic index on current CPAP machine that is over 5 years old.  Dr. Cardozo placed order for replacement CPAP machine patient is awaiting obtaining from [facility], SHAAN bowman spoke with a [facility] staff today stating that his replacement CPAP machine was being process than a should be calling him to arrange appointment for pickup.     Previous Report Reviewed: historical medical records, lab reports, office notes and radiology reports     Past Medical History: The following portions of the patient's history were reviewed and updated as appropriate:   He  has a past surgical history that includes Appendectomy; Vocal cord lateralization, endoscopic approach w/ MLB; tongue cancer excision (11/14); Colonoscopy (N/A, 5/1/2017); Esophagogastroduodenoscopy (N/A, 5/29/2018); Thoracentesis (Left, 8/7/2018); Esophagogastroduodenoscopy (N/A, 8/29/2018); Bronchoscopy (Bilateral, 2/5/2019); and Thoracentesis (Right, 2/25/2019).  His family history includes Lung cancer in his brother and father; No Known Problems in his mother.  He  reports that he quit smoking about 7 weeks ago. His smoking use included cigarettes. He has a 27.50 pack-year smoking history. He has never used smokeless tobacco. He reports that he drank alcohol. He reports that he does not use drugs.  He has a current medication list which includes the following prescription(s): albuterol, albuterol, arformoterol, aspirin, atorvastatin, chantix continuing month box, clopidogrel, clotrimazole, evolocumab, furosemide, gabapentin, lactobacillus acidophilus, loratadine, melatonin, nebulizer and compressor,  "oxycodone-acetaminophen, oxygen-air delivery systems, pantoprazole, pilocarpine, sertraline, and spiriva with handihaler.  He is allergic to contrast media; iodinated contrast media; iodine; pravastatin; rosuvastatin; simvastatin; and statins-hmg-coa reductase inhibitors..    Review of Systems   Constitutional: Negative for fever, chills, weight loss, weight gain, activity change, appetite change, fatigue (Improved since iron infusions) and night sweats.   HENT: Negative for postnasal drip, rhinorrhea, sinus pressure, voice change and congestion.    Eyes: Negative for redness and itching.   Respiratory: Negative for snoring, cough, sputum production, chest tightness, shortness of breath, wheezing, orthopnea, asthma nighttime symptoms, dyspnea on extertion, use of rescue inhaler and somnolence.    Cardiovascular: Negative.  Negative for chest pain, palpitations and leg swelling.   Genitourinary: Negative for difficulty urinating and hematuria.   Endocrine: Negative for cold intolerance and heat intolerance.    Musculoskeletal: Negative for arthralgias, gait problem, joint swelling and myalgias.   Skin: Negative.    Gastrointestinal: Negative for nausea, vomiting, abdominal pain and acid reflux.   Neurological: Negative for dizziness, weakness, light-headedness and headaches.   Hematological: Negative for adenopathy. No excessive bruising.   All other systems reviewed and are negative.       Objective:   BP (!) 142/65   Pulse (!) 57   Resp 18   Ht 5' 6" (1.676 m)   Wt 67.1 kg (148 lb)   SpO2 97% Comment: 2 liters  BMI 23.89 kg/m²   Physical Exam   Constitutional: He is oriented to person, place, and time. He appears well-developed and well-nourished. He is active and cooperative.  Non-toxic appearance. He does not appear ill. No distress.   HENT:   Head: Normocephalic and atraumatic.   Right Ear: External ear normal.   Left Ear: External ear normal.   Nose: Nose normal.   Mouth/Throat: Oropharynx is clear and " "moist. No oropharyngeal exudate.   Eyes: Conjunctivae are normal.   Neck: Normal range of motion. Neck supple.   Cardiovascular: Normal rate, regular rhythm, normal heart sounds and intact distal pulses.   Pulmonary/Chest: Effort normal and breath sounds normal. He has no decreased breath sounds. He has no wheezes. He has no rhonchi.   Velcro breath sounds in posterior bases.    Abdominal: Soft.   Musculoskeletal: He exhibits no edema.   Neurological: He is alert and oriented to person, place, and time.   Skin: Skin is warm and dry.   Psychiatric: He has a normal mood and affect. His behavior is normal. Judgment and thought content normal.   Vitals reviewed.    Personal Diagnostic Review  6 min walk distance 11/20/2019 no desaturations requiring supplemental oxygen at rest or with exertion.  Cardiology report  Lab reports  X-ray reports.    Assessment:     1. JUANITO on CPAP    2. Stage 3 severe COPD by GOLD classification    3. Chronic systolic congestive heart failure      Orders Placed This Encounter   Procedures    HME - OTHER     D/C portable home oxygen. Normal 6mwd 11/20/2019.   HME: [facility]     Order Specific Question:   Type of Equipment:     Answer:   portable home oxygen     Order Specific Question:   Height:     Answer:   5' 6" (1.676 m)     Order Specific Question:   Weight:     Answer:   67.1 kg (148 lb)    PULSE OXIMETRY OVERNIGHT     Standing Status:   Future     Number of Occurrences:   1     Standing Expiration Date:   11/20/2020     Order Specific Question:   Reason for Test?     Answer:   O2 Re-Qualification     Comments:   prior 2 liters bled in, reevaluate continued need for oxygen with cpap     Order Specific Question:   Symptoms:     Answer:   Other     Order Specific Question:   Oxygen Settings:     Answer:   na     Order Specific Question:   BiPAP Settings:     Answer:   na     Order Specific Question:   CPAP Settings:     Answer:   14 cm     Order Specific Question:   Room Air:     Answer:   " Yes     Medication List with Changes/Refills   Current Medications    ALBUTEROL (PROAIR HFA) 90 MCG/ACTUATION INHALER    INL 2 PFS PO INTO THE LUNGS Q 4 H PRF WHZ OR SOB    ALBUTEROL (PROVENTIL) 2.5 MG /3 ML (0.083 %) NEBULIZER SOLUTION    Take 3 mLs (2.5 mg total) by nebulization every 8 (eight) hours while awake.    ARFORMOTEROL (BROVANA) 15 MCG/2 ML NEBU    Take 2 mLs (15 mcg total) by nebulization 2 (two) times daily. Controller    ASPIRIN 81 MG TAB    Take 1 tablet by mouth Daily. Over the counter to help prevent stroke/heart attack    ATORVASTATIN (LIPITOR) 80 MG TABLET    Take 1 tablet (80 mg total) by mouth once daily.    CHANTIX CONTINUING MONTH BOX 1 MG TAB    TAKE 1 TABLET BY MOUTH ONCE DAILY    CLOPIDOGREL (PLAVIX) 75 MG TABLET    Take 1 tablet (75 mg total) by mouth once daily.    CLOTRIMAZOLE (MYCELEX) 10 MG BUFFY        EVOLOCUMAB (REPATHA SURECLICK) 140 MG/ML PNIJ    Inject 1 mL (140 mg total) into the skin every 14 (fourteen) days.    FUROSEMIDE (LASIX) 40 MG TABLET    Take 1 tablet (40 mg total) by mouth once daily.    GABAPENTIN (NEURONTIN) 600 MG TABLET    Take 1 tablet (600 mg total) by mouth 3 (three) times daily.    LACTOBACILLUS ACIDOPHILUS (PROBIOTIC ORAL)    Take 1 tablet by mouth once daily.    LORATADINE (CLARITIN) 10 MG TABLET    Take 1 tablet by mouth daily as needed.     MELATONIN 10 MG TAB    Take 1 tablet by mouth every evening.    NEBULIZER AND COMPRESSOR ANGELO    Use as directed    OXYCODONE-ACETAMINOPHEN (PERCOCET) 7.5-325 MG PER TABLET    Take 1 tablet by mouth every 8 (eight) hours as needed for Pain.    OXYGEN-AIR DELIVERY SYSTEMS MISC    Inhale 2-3 L into the lungs continuous.    PANTOPRAZOLE (PROTONIX) 40 MG TABLET    TAKE ONE TABLET BY MOUTH ONCE DAILY    PILOCARPINE (SALAGEN) 5 MG TAB    TAKE ONE TABLET BY MOUTH 30 MINUTES PRIOR TO EACH MEAL    SERTRALINE (ZOLOFT) 50 MG TABLET    Take 1 tablet (50 mg total) by mouth once daily.    SPIRIVA WITH HANDIHALER 18 MCG INHALATION  CAPSULE    INHALE 1 CAPSULE BY MOUTH ONCE DAILY     Plan:   Discussed diagnosis, its evaluation, treatment and usual course. All questions answered.  Problem List Items Addressed This Visit     Stage 3 severe COPD by GOLD classification    Relevant Orders    PULSE OXIMETRY OVERNIGHT    HME - OTHER    JUANITO on CPAP - Primary (Chronic)    Overview     14 cm         Relevant Orders    PULSE OXIMETRY OVERNIGHT    HME - OTHER    Chronic systolic congestive heart failure    Relevant Orders    PULSE OXIMETRY OVERNIGHT    HME - OTHER       Plan summary  On CPAP 14 cm nightly benefits and compliant.  Further evaluate need for continued home oxygen with CPAP proceed with overnight oximetry CPAP 14 cm on room air.  Orders placed discontinue portable home oxygen.  E Omer  Awaiting  of his replacement CPAP machine [place] [facility].   Follow up in about 11 weeks (around 2/5/2020) for copd/cpap dnld on replacement machine as soo farley/Dr. Cardozo .

## 2019-11-20 NOTE — TELEPHONE ENCOUNTER
----- Message from Santos Cardozo MD sent at 11/20/2019  8:44 AM CST -----   Please inform patient about result  Does not qualify for oxygen based on 6MWD    Santos Cardozo MD    Six Minute Walk Summary  6MWT Status: completed without stopping  Patient Reported: Other (Comment)(Back Pain)     Interpretation:  Did the patient stop or pause?: No            Total Time Walked (Calculated): 360 seconds  Final Partial Lap Distance (feet): 75 feet  Total Distance Meters (Calculated): 327.66 meters   LLN was 331.31m  Predicted Distance Meters (Calculated): 484.31 meters  Percentage of Predicted (Calculated): 67.66  Peak VO2 (Calculated): 13.81  Mets: 3.95  Has The Patient Had a Previous Six Minute Walk Test?: Yes       Previous 6MWT Results  Has The Patient Had a Previous Six Minute Walk Test?: Yes  Date of Previous Test: 04/17/19  Total Time Walked: 360 seconds  Total Distance (meters): 312.42  Predicted Distance (meters): 508.45 meters  Percentage of Predicted: 61.45  Percent Change (Calculated): -0.05            CLINICAL INTERPRETATION:  Six minute walk distance is 327.66m (67.66 % predicted) with very light dyspnea.  During exercise, there was VERY MILD desaturation while breathing room air.  Blood pressure remained stable and Heart rate remained stable with walking.  The patient reported non-pulmonary symptoms during exercise.  BACK PAIN  Significant exercise impairment is likely due to subjective symptoms.  The patient did complete the study, walking 360 seconds of the 360 second test.  Since the previous study in 04/17/2019, exercise capacity may be somewhat improved.  Based upon age and body mass index, exercise capacity is less than predicted.

## 2019-11-20 NOTE — LETTER
November 20, 2019        [place]  [address] [place] [address]             [facility]  [address] [facility] [place] [address]  Phone: [phone]  Fax: [phone]   Patient: Noble Tripp   MR Number: [id number]   YOB: 1945   Date of Visit: 11/20/2019       Dear Dr. Frost:    Thank you for referring Noble Tripp to me for evaluation. Below are the relevant portions of my assessment and plan of care.            If you have questions, please do not hesitate to call me. I look forward to following Noble along with you.    Sincerely,      Alecia Love RD, CDE           CC  No Recipients

## 2019-11-20 NOTE — TELEPHONE ENCOUNTER
----- Message from Abel Beltran MD sent at 11/19/2019 10:58 PM CST -----  Get the patient an appointment to see me please.  ----- Message -----  From: Nathan Reynolds LPN  Sent: 11/19/2019   3:29 PM CST  To: Abel Beltran MD    Per Dr. Whitfield's note on 11/12/19, he was to talk with you about patient getting a heart cath.  I'm following up to see if it has been discussed and decided because the patient is inquiring. Thanks.

## 2019-11-20 NOTE — TELEPHONE ENCOUNTER
called to patient and informed that needs to schedule appointment with Dr. Beltran this week to discuss possible angiogram--appointment has been scheduled 11/22/19 at 8:30 A.M.

## 2019-11-20 NOTE — TELEPHONE ENCOUNTER
Left vm informing pt that based on his 6mw he did not qualify for oxygen and to call back with any questions.

## 2019-11-21 ENCOUNTER — TELEPHONE (OUTPATIENT)
Dept: PULMONOLOGY | Facility: CLINIC | Age: 74
End: 2019-11-21

## 2019-11-21 NOTE — TELEPHONE ENCOUNTER
----- Message from Dayton Mccall sent at 11/21/2019 11:15 AM CST -----  Contact: pt   States he came in on yesterday and did a test and states he doesn't qualify for oxygen anymore and states he needs a letter written stating that he no longer qualify's and can be reached at [phone]/[email or web address]

## 2019-11-21 NOTE — PROCEDURES
[facility]  [address] * [place] [address]  Telephone: [phone]  Test date: 19 Start: 19 09:07:45 Noble Tripp  Doctor: Saige Hampton NP End: 19 05:47:25 [id number]  Oximetry: Summary Report  Comments: CPAP 14cm H2O / Room Air  Recording time: 20:39:40 Highest pulse: 125 Highest SpO2: 97%  Excluded samplin:07:28 Lowest pulse: 55 Lowest SpO2: 81%  Total valid samplin:32:12 Mean pulse: 66 Mean SpO2: 93.3%  1 S.D.: 3.3 1 S.D.: 1.1  Time with SpO2<90: 0:01:04, 0.2%  Time with SpO2<80: 0:00:00, 0.0%  Time with SpO2<70: 0:00:00, 0.0%  Time with SpO2<60: 0:00:00, 0.0%  Time with SpO2<89: 0:00:56, 0.2%  Time with SpO2 =>90: 8:31:08, 99.8%  Time with SpO2=>80 & <90: 0:01:04, 0.2%  Time with SpO2=>70 & <80: 0:00:00, 0.0%  Time with SpO2=>60 & <70: 0:00:00, 0.0%  The longest continuous time with saturation <=88 was 00:00:24, which started at  19 21:17:25.  A desaturation event was defined as a decrease of saturation by 4 or more.  No events were excluded due to artifact.  There was one desaturation event over 3 minutes duration.  There was one desaturation event of less than 3 minutes duration during which:  The mean high was 92.0%. The mean low was 87.0%.  The number of these events that were:  > 0 & <10 seconds: 0 > 0 seconds: 1  =>10 & <20 seconds: 0 =>10 seconds: 1  =>20 & <30 seconds: 0 =>20 seconds: 1  =>30 & <40 seconds: 0 =>30 seconds: 1  =>40 & <50 seconds: 0 =>40 seconds: 1  =>50 & <60 seconds: 1 =>50 seconds: 1  =>60 seconds: 0 =>60 seconds: 0  The mean length of desaturation events that were >=10 sec & <=3 mins was: 56.0 sec.  Desaturation event index (events >=10 sec per sampled hour): 0.1  Desaturation event index (events >= 0 sec per sampled hour): 0.1

## 2019-11-22 ENCOUNTER — CLINICAL SUPPORT (OUTPATIENT)
Dept: CARDIOLOGY | Facility: CLINIC | Age: 74
End: 2019-11-22
Payer: MEDICARE

## 2019-11-22 ENCOUNTER — OFFICE VISIT (OUTPATIENT)
Dept: CARDIOLOGY | Facility: CLINIC | Age: 74
End: 2019-11-22
Payer: MEDICARE

## 2019-11-22 VITALS
SYSTOLIC BLOOD PRESSURE: 112 MMHG | DIASTOLIC BLOOD PRESSURE: 54 MMHG | BODY MASS INDEX: 23.81 KG/M2 | HEIGHT: 66 IN | HEART RATE: 61 BPM | WEIGHT: 148.13 LBS

## 2019-11-22 DIAGNOSIS — I10 ESSENTIAL HYPERTENSION: ICD-10-CM

## 2019-11-22 DIAGNOSIS — I25.5 ISCHEMIC CARDIOMYOPATHY: Primary | ICD-10-CM

## 2019-11-22 DIAGNOSIS — Z78.9 STATIN INTOLERANCE: ICD-10-CM

## 2019-11-22 DIAGNOSIS — J44.9 STAGE 3 SEVERE COPD BY GOLD CLASSIFICATION: ICD-10-CM

## 2019-11-22 DIAGNOSIS — I25.5 ISCHEMIC CARDIOMYOPATHY: ICD-10-CM

## 2019-11-22 DIAGNOSIS — N40.0 BENIGN PROSTATIC HYPERPLASIA WITHOUT LOWER URINARY TRACT SYMPTOMS: ICD-10-CM

## 2019-11-22 DIAGNOSIS — R94.30 CARDIAC LEFT VENTRICULAR EJECTION FRACTION 21-40 PERCENT: ICD-10-CM

## 2019-11-22 DIAGNOSIS — I65.21 STENOSIS OF RIGHT CAROTID ARTERY: ICD-10-CM

## 2019-11-22 DIAGNOSIS — E87.5 HYPERKALEMIA: Chronic | ICD-10-CM

## 2019-11-22 DIAGNOSIS — Z99.81 ON HOME OXYGEN THERAPY: ICD-10-CM

## 2019-11-22 DIAGNOSIS — R00.1 BRADYCARDIA: ICD-10-CM

## 2019-11-22 DIAGNOSIS — I25.10 CORONARY ARTERY DISEASE INVOLVING NATIVE CORONARY ARTERY OF NATIVE HEART WITHOUT ANGINA PECTORIS: ICD-10-CM

## 2019-11-22 DIAGNOSIS — G47.33 OSA ON CPAP: Chronic | ICD-10-CM

## 2019-11-22 DIAGNOSIS — I70.0 ABDOMINAL AORTIC ATHEROSCLEROSIS: ICD-10-CM

## 2019-11-22 PROCEDURE — 93010 EKG 12-LEAD: ICD-10-PCS | Mod: S$PBB,,, | Performed by: NUCLEAR MEDICINE

## 2019-11-22 PROCEDURE — 99215 OFFICE O/P EST HI 40 MIN: CPT | Mod: S$PBB,,, | Performed by: INTERNAL MEDICINE

## 2019-11-22 PROCEDURE — 93005 ELECTROCARDIOGRAM TRACING: CPT | Mod: PBBFAC | Performed by: NUCLEAR MEDICINE

## 2019-11-22 PROCEDURE — 1159F MED LIST DOCD IN RCRD: CPT | Mod: ,,, | Performed by: INTERNAL MEDICINE

## 2019-11-22 PROCEDURE — 99999 PR PBB SHADOW E&M-EST. PATIENT-LVL III: ICD-10-PCS | Mod: PBBFAC,,, | Performed by: INTERNAL MEDICINE

## 2019-11-22 PROCEDURE — 99213 OFFICE O/P EST LOW 20 MIN: CPT | Mod: PBBFAC,25 | Performed by: INTERNAL MEDICINE

## 2019-11-22 PROCEDURE — 99999 PR PBB SHADOW E&M-EST. PATIENT-LVL III: CPT | Mod: PBBFAC,,, | Performed by: INTERNAL MEDICINE

## 2019-11-22 PROCEDURE — 93010 ELECTROCARDIOGRAM REPORT: CPT | Mod: S$PBB,,, | Performed by: NUCLEAR MEDICINE

## 2019-11-22 PROCEDURE — 1159F PR MEDICATION LIST DOCUMENTED IN MEDICAL RECORD: ICD-10-PCS | Mod: ,,, | Performed by: INTERNAL MEDICINE

## 2019-11-22 PROCEDURE — 99215 PR OFFICE/OUTPT VISIT, EST, LEVL V, 40-54 MIN: ICD-10-PCS | Mod: S$PBB,,, | Performed by: INTERNAL MEDICINE

## 2019-11-22 NOTE — H&P (VIEW-ONLY)
"Subjective:   Patient ID:  Noble Tripp is a 74 y.o. male who presents for evaluation of Bradycardia      HPI  A 73 yo male referred from DR WHITFIELD to discuss revascularization of cad. I have reviewed his hospital admit in [place] including the w/u as well as the coronary angio surgical consult I have reveiwed the office visits and eval from pulmonary 6 minute walk test Dr Whitfield VISITS. HE HAS SIGNIFICANT CAD INCLUDING RCA OCCLUSION WITH RECANALIZATION WITH L TO R COLLATERALS WITH VIABLE INFERIOR WALL BY PET AS WELL AS SIGNIFICANT LAD DISEASE WITH DIAGONAL INVOLVEMNET WITH HARRIS 1,1,1 AND CARDIOMYOPATHY HE HAS SIGNIFICANT LUNG DISEASE HE WAS NOT A SURGICAL CANDIDATE HE WAS FELT TO BE A HIGH RISK ANGIOPLASTY CANDIDATE. THE PATIENT IS OFF HOME O2. HE HAS NO DEFINITE CLAUDICATION SYMPTOMS. HE HAS NOT BEEN WALKING A LOT. HAS NO CHF SYMPTOMS. INITIALLY IN [place] HE HAD CHF AND NSTEMI.HE HAD SIGNIFICANT BRADYCARDIA NECESSITATING CESSATION OF AMIO AND B BLOCKERS.HE COULD NOT TOLERATE AFTERLOAD DUE TO LOW BLOOD PRESSURE.   Past Medical History:   Diagnosis Date    Adrenal adenoma     david;nl fxn w/u;tran 11/18    Aspiration pneumonia 10/15    puree/honey    Benign prostate hyperplasia     CAD (coronary artery disease)     dr whitfield    Chronic pain     dr santos    Chronic systolic congestive heart failure 10/17/2019    COPD (chronic obstructive pulmonary disease)     papi    Ex-smoker     11/13    Hyperlipidemia     Ischemic cardiomyopathy 11/22/2019    JUANITO on CPAP     cpap    Osteopenia 1/15 tran 1/18    PSVT (paroxysmal supraventricular tachycardia)     PVD (peripheral vascular disease)     noobs 07 khuri    Pyriform sinus cancer     dr ponce radiation 1/2-14 dr king perales    Squamous cell carcinoma of skin     roberto    Tongue cancer     "superficial" removed 11/14    Vocal cord cancer 2011    Xerostomia     radiation       Past Surgical History:   Procedure Laterality Date    " APPENDECTOMY      BRONCHOSCOPY Bilateral 2019    Procedure: Bronchoscopy;  Surgeon: Santos Cardozo MD;  Location: [facility] ENDO;  Service: Endoscopy;  Laterality: Bilateral;    COLONOSCOPY N/A 2017    Procedure: Due for screening colonoscopy;  Surgeon: Anushka Yoder MD;  Location: [facility] ENDO;  Service: Endoscopy;  Laterality: N/A;    ESOPHAGOGASTRODUODENOSCOPY N/A 2018    Procedure: ESOPHAGOGASTRODUODENOSCOPY (EGD);  Surgeon: Audie Hill III, MD;  Location: [facility] ENDO;  Service: Endoscopy;  Laterality: N/A;    ESOPHAGOGASTRODUODENOSCOPY N/A 2018    Procedure: ESOPHAGOGASTRODUODENOSCOPY (EGD);  Surgeon: Audie Hill III, MD;  Location: [facility] ENDO;  Service: Endoscopy;  Laterality: N/A;    THORACENTESIS Left 2018    Procedure: Thoracentesis;  Surgeon: Santos Cardozo MD;  Location: [facility] ENDO;  Service: Endoscopy;  Laterality: Left;    THORACENTESIS Right 2019    Procedure: Thoracentesis;  Surgeon: Santos Cardozo MD;  Location: [facility];  Service: Endoscopy;  Laterality: Right;    tongue cancer excision      dr vitale    VOCAL CORD LATERALIZATION, ENDOSCOPIC APPROACH W/ MLB      kris       Social History     Tobacco Use    Smoking status: Former Smoker     Packs/day: 0.50     Years: 55.00     Pack years: 27.50     Types: Cigarettes     Last attempt to quit: 10/1/2019     Years since quittin.1    Smokeless tobacco: Never Used    Tobacco comment: 6-7 cigs/day   Substance Use Topics    Alcohol use: Not Currently    Drug use: No       Family History   Problem Relation Age of Onset    Lung cancer Father         + Smoker    No Known Problems Mother     Lung cancer Brother         + Smoker       Current Outpatient Medications   Medication Sig    albuterol (PROAIR HFA) 90 mcg/actuation inhaler INL 2 PFS PO INTO THE LUNGS Q 4 H PRF WHZ OR SOB    albuterol (PROVENTIL) 2.5 mg /3 mL (0.083 %) nebulizer solution Take 3 mLs (2.5 mg total) by nebulization every 8  (eight) hours while awake.    aspirin 81 mg Tab Take 1 tablet by mouth Daily. Over the counter to help prevent stroke/heart attack    atorvastatin (LIPITOR) 80 MG tablet Take 1 tablet (80 mg total) by mouth once daily.    clopidogrel (PLAVIX) 75 mg tablet Take 1 tablet (75 mg total) by mouth once daily.    evolocumab (REPATHA SURECLICK) 140 mg/mL PnIj Inject 1 mL (140 mg total) into the skin every 14 (fourteen) days.    furosemide (LASIX) 40 MG tablet Take 1 tablet (40 mg total) by mouth once daily.    gabapentin (NEURONTIN) 600 MG tablet Take 1 tablet (600 mg total) by mouth 3 (three) times daily.    Lactobacillus acidophilus (PROBIOTIC ORAL) Take 1 tablet by mouth once daily.    loratadine (CLARITIN) 10 mg tablet Take 1 tablet by mouth daily as needed.     melatonin 10 mg Tab Take 1 tablet by mouth every evening.    nebulizer and compressor Bailey Use as directed    oxyCODONE-acetaminophen (PERCOCET) 7.5-325 mg per tablet Take 1 tablet by mouth every 8 (eight) hours as needed for Pain.    pantoprazole (PROTONIX) 40 MG tablet TAKE ONE TABLET BY MOUTH ONCE DAILY    pilocarpine (SALAGEN) 5 MG Tab TAKE ONE TABLET BY MOUTH 30 MINUTES PRIOR TO EACH MEAL    SPIRIVA WITH HANDIHALER 18 mcg inhalation capsule INHALE 1 CAPSULE BY MOUTH ONCE DAILY    arformoterol (BROVANA) 15 mcg/2 mL Nebu Take 2 mLs (15 mcg total) by nebulization 2 (two) times daily. Controller    CHANTIX CONTINUING MONTH BOX 1 mg Tab TAKE 1 TABLET BY MOUTH ONCE DAILY (Patient not taking: Reported on 11/18/2019)    clotrimazole (MYCELEX) 10 mg elissa     OXYGEN-AIR DELIVERY SYSTEMS MISC Inhale 2-3 L into the lungs continuous.    sertraline (ZOLOFT) 50 MG tablet Take 1 tablet (50 mg total) by mouth once daily. (Patient not taking: Reported on 11/22/2019)     No current facility-administered medications for this visit.      Current Outpatient Medications on File Prior to Visit   Medication Sig    albuterol (PROAIR HFA) 90 mcg/actuation  inhaler INL 2 PFS PO INTO THE LUNGS Q 4 H PRF WHZ OR SOB    albuterol (PROVENTIL) 2.5 mg /3 mL (0.083 %) nebulizer solution Take 3 mLs (2.5 mg total) by nebulization every 8 (eight) hours while awake.    aspirin 81 mg Tab Take 1 tablet by mouth Daily. Over the counter to help prevent stroke/heart attack    atorvastatin (LIPITOR) 80 MG tablet Take 1 tablet (80 mg total) by mouth once daily.    clopidogrel (PLAVIX) 75 mg tablet Take 1 tablet (75 mg total) by mouth once daily.    evolocumab (REPATHA SURECLICK) 140 mg/mL PnIj Inject 1 mL (140 mg total) into the skin every 14 (fourteen) days.    furosemide (LASIX) 40 MG tablet Take 1 tablet (40 mg total) by mouth once daily.    gabapentin (NEURONTIN) 600 MG tablet Take 1 tablet (600 mg total) by mouth 3 (three) times daily.    Lactobacillus acidophilus (PROBIOTIC ORAL) Take 1 tablet by mouth once daily.    loratadine (CLARITIN) 10 mg tablet Take 1 tablet by mouth daily as needed.     melatonin 10 mg Tab Take 1 tablet by mouth every evening.    nebulizer and compressor Bailey Use as directed    oxyCODONE-acetaminophen (PERCOCET) 7.5-325 mg per tablet Take 1 tablet by mouth every 8 (eight) hours as needed for Pain.    pantoprazole (PROTONIX) 40 MG tablet TAKE ONE TABLET BY MOUTH ONCE DAILY    pilocarpine (SALAGEN) 5 MG Tab TAKE ONE TABLET BY MOUTH 30 MINUTES PRIOR TO EACH MEAL    SPIRIVA WITH HANDIHALER 18 mcg inhalation capsule INHALE 1 CAPSULE BY MOUTH ONCE DAILY    arformoterol (BROVANA) 15 mcg/2 mL Nebu Take 2 mLs (15 mcg total) by nebulization 2 (two) times daily. Controller    CHANTIX CONTINUING MONTH BOX 1 mg Tab TAKE 1 TABLET BY MOUTH ONCE DAILY (Patient not taking: Reported on 11/18/2019)    clotrimazole (MYCELEX) 10 mg elissa     OXYGEN-AIR DELIVERY SYSTEMS MISC Inhale 2-3 L into the lungs continuous.    sertraline (ZOLOFT) 50 MG tablet Take 1 tablet (50 mg total) by mouth once daily. (Patient not taking: Reported on 11/22/2019)     No current  facility-administered medications on file prior to visit.        Review of patient's allergies indicates:   Allergen Reactions    Contrast media Hives and Itching    Iodinated contrast media Hives and Itching    Iodine Hives and Itching    Pravastatin      Other reaction(s): fatigue  Other reaction(s): fatigue    Rosuvastatin      Other reaction(s): fatigue  Other reaction(s): fatigue    Simvastatin      Other reaction(s): fatigue  Other reaction(s): fatigue    Statins-hmg-coa reductase inhibitors Other (See Comments)     Fatigue     Review of patient's allergies indicates:   Allergen Reactions    Contrast media Hives and Itching    Iodinated contrast media Hives and Itching    Iodine Hives and Itching    Pravastatin      Other reaction(s): fatigue  Other reaction(s): fatigue    Rosuvastatin      Other reaction(s): fatigue  Other reaction(s): fatigue    Simvastatin      Other reaction(s): fatigue  Other reaction(s): fatigue    Statins-hmg-coa reductase inhibitors Other (See Comments)     Fatigue     Review of Systems   Constitution: Negative for diaphoresis, malaise/fatigue and weight gain.   HENT: Negative for hoarse voice.    Eyes: Negative for blurred vision, double vision and visual disturbance.   Cardiovascular: Positive for dyspnea on exertion. Negative for chest pain, claudication, cyanosis, irregular heartbeat, leg swelling, near-syncope, orthopnea, palpitations, paroxysmal nocturnal dyspnea and syncope.   Respiratory: Positive for shortness of breath. Negative for cough, hemoptysis and snoring.    Hematologic/Lymphatic: Negative for bleeding problem. Does not bruise/bleed easily.   Skin: Negative for color change, dry skin, itching and poor wound healing.   Musculoskeletal: Negative for falls, muscle cramps, muscle weakness and myalgias.   Gastrointestinal: Negative for bloating, abdominal pain, change in bowel habit, diarrhea, heartburn, hematemesis, hematochezia, melena and nausea.    Genitourinary: Negative for flank pain and hematuria.   Neurological: Negative for excessive daytime sleepiness, dizziness, focal weakness, headaches, light-headedness, loss of balance, numbness, paresthesias, seizures and weakness.   Psychiatric/Behavioral: Negative for altered mental status and memory loss. The patient does not have insomnia and is not nervous/anxious.    Allergic/Immunologic: Negative for hives.       Objective:   Physical Exam   Constitutional: He is oriented to person, place, and time. He appears well-developed and well-nourished. No distress.   HENT:   Head: Normocephalic and atraumatic.   Eyes: Pupils are equal, round, and reactive to light. EOM are normal. Right eye exhibits no discharge. Left eye exhibits no discharge.   Neck: Neck supple. No JVD present. No thyromegaly present.   Cardiovascular: Normal rate, regular rhythm, normal heart sounds and intact distal pulses. Exam reveals no gallop and no friction rub.   No murmur heard.  Pulses:       Carotid pulses are 2+ on the right side with bruit, and on the left side with bruit.       Radial pulses are 2+ on the right side, and 2+ on the left side.        Femoral pulses are 2+ on the right side, and 2+ on the left side.       Popliteal pulses are 2+ on the right side, and 2+ on the left side.        Dorsalis pedis pulses are 2+ on the right side, and 2+ on the left side.        Posterior tibial pulses are 2+ on the right side, and 2+ on the left side.   Pulmonary/Chest: Effort normal and breath sounds normal. No respiratory distress. He has no wheezes. He has no rales. He exhibits no tenderness.   Abdominal: Soft. Bowel sounds are normal. He exhibits no distension. There is no tenderness.   Musculoskeletal: Normal range of motion. He exhibits no edema.   Neurological: He is alert and oriented to person, place, and time. No cranial nerve deficit.   Skin: Skin is warm and dry. No rash noted. He is not diaphoretic. No erythema.  "  Psychiatric: He has a normal mood and affect. His behavior is normal.   Nursing note and vitals reviewed.    Vitals:    11/22/19 0819 11/22/19 0820   BP: (!) 110/56 (!) 112/54   BP Location: Right arm Left arm   Patient Position: Sitting Sitting   Pulse: 61    Weight: 67.2 kg (148 lb 2.4 oz)    Height: 5' 6" (1.676 m)      Lab Results   Component Value Date    CHOL 77 (L) 02/06/2019    CHOL 98 (L) 08/13/2018    CHOL 204 (H) 11/14/2017     Lab Results   Component Value Date    HDL 44 02/06/2019    HDL 43 08/13/2018    HDL 43 11/14/2017     Lab Results   Component Value Date    LDLCALC 25.0 (L) 02/06/2019    LDLCALC 39.8 (L) 08/13/2018    LDLCALC 147.8 11/14/2017     Lab Results   Component Value Date    TRIG 40 02/06/2019    TRIG 76 08/13/2018    TRIG 66 11/14/2017     Lab Results   Component Value Date    CHOLHDL 57.1 (H) 02/06/2019    CHOLHDL 43.9 08/13/2018    CHOLHDL 21.1 11/14/2017       Chemistry        Component Value Date/Time     11/15/2019 0938    K 4.6 11/15/2019 0938     11/15/2019 0938    CO2 26 11/15/2019 0938    BUN 24 (H) 11/15/2019 0938    CREATININE 1.0 11/15/2019 0938    GLU 88 11/15/2019 0938        Component Value Date/Time    CALCIUM 9.2 11/15/2019 0938    ALKPHOS 66 11/11/2019 1119    AST 26 11/11/2019 1119    ALT 43 11/11/2019 1119    BILITOT 0.5 11/11/2019 1119    ESTGFRAFRICA >60.0 11/15/2019 0938    EGFRNONAA >60.0 11/15/2019 0938          Lab Results   Component Value Date    TSH 1.259 02/06/2019     Lab Results   Component Value Date    INR 1.0 10/26/2019    INR 1.0 10/25/2019    INR 1.1 10/10/2015     Lab Results   Component Value Date    WBC 6.77 11/11/2019    HGB 10.0 (L) 11/11/2019    HCT 31.3 (L) 11/11/2019    MCV 96 11/11/2019     11/11/2019     BNP  @LABRCNTIP(BNP,BNPTRIAGEBLO)@  Estimated Creatinine Clearance: 59.4 mL/min (based on SCr of 1 mg/dL).   EKG SHOWS SINUS BRADYCARDIA.  Assessment:     1. Ischemic cardiomyopathy    2. JUANITO on CPAP    3. Hyperkalemia  "   4. Benign prostatic hyperplasia without lower urinary tract symptoms    5. Essential hypertension    6. Stage 3 severe COPD by GOLD classification    7. Coronary artery disease involving native coronary artery of native heart without angina pectoris    8. Statin intolerance    9. Stenosis of right carotid artery    10. On home oxygen therapy    11. Cardiac left ventricular ejection fraction 21-40 percent    12. Bradycardia      HAS SINUS BRADYCARDIA ON EKG WITH FIRST DEGREE AV BLOCK. HE AHS CLASS 2 SHORTNESS OF BREATH THAT IS ANGINAL EQUIVALENT .HE HAS ABILITY OF MAKING 1 FLIGHT OS STIRS AND HAS SOME LEG TIREDNESS WITH IT.  HE HAS CALCIFICATION ON HIS ANGIO IN THE ILIACS WILL NEED TOE VALUATE PVD. [facility] MY OPLAN IS TO PERFORM SUPPORTED MULTIVESSEL ANGIOPLASTY WITH IABP OR IMPELLA.   I have explained the risks, benefits , and alternatives of the procedure in detail.the patient voices understanding and all questions have been answered.the patient agrees to proceed as planned.    Plan:   KEELEY   BILATERAL LOWER EXTREMITY DUPLEX   PENDING DECIDE ON TIMING OF PROCEDURE.   I HAVE EXPLAINED HIS ANGIOS IN DETAILS RATIONALE FOR THE PROCEDURE INDICATION AS WELL AS PET RESULTS. HE UNDERSTANDS AND IS WILLING TO PROCEED.

## 2019-11-22 NOTE — LETTER
November 22, 2019      Girma Palacios MD  [address]  [place] [address]           [facility]  [address]  [place] [address]  Phone: [phone]  Fax: [phone]          Patient: Noble Tripp   MR Number: [id number]   YOB: 1945   Date of Visit: 11/22/2019       Dear Dr. Girma Palacios:    Thank you for referring Noble Tripp to me for evaluation. Attached you will find relevant portions of my assessment and plan of care.    If you have questions, please do not hesitate to call me. I look forward to following Noble Tripp along with you.    Sincerely,    Abel Beltran MD    Enclosure  CC:  No Recipients    If you would like to receive this communication electronically, please contact [email or web address] or [phone] to request more information on [employer] (fka [employer]) Link access.    For providers and/or their staff who would like to refer a patient to [facility], please contact us through our one-stop-shop provider referral line, [facility], at [phone].    If you feel you have received this communication in error or would no longer like to receive these types of communications, please e-mail [email or web address]

## 2019-11-22 NOTE — PROGRESS NOTES
"Subjective:   Patient ID:  Noble Tripp is a 74 y.o. male who presents for evaluation of Bradycardia      HPI  A 75 yo male referred from DR WHITFIELD to discuss revascularization of cad. I have reviewed his hospital admit in [place] including the w/u as well as the coronary angio surgical consult I have reveiwed the office visits and eval from pulmonary 6 minute walk test Dr Whitfield VISITS. HE HAS SIGNIFICANT CAD INCLUDING RCA OCCLUSION WITH RECANALIZATION WITH L TO R COLLATERALS WITH VIABLE INFERIOR WALL BY PET AS WELL AS SIGNIFICANT LAD DISEASE WITH DIAGONAL INVOLVEMNET WITH HARRIS 1,1,1 AND CARDIOMYOPATHY HE HAS SIGNIFICANT LUNG DISEASE HE WAS NOT A SURGICAL CANDIDATE HE WAS FELT TO BE A HIGH RISK ANGIOPLASTY CANDIDATE. THE PATIENT IS OFF HOME O2. HE HAS NO DEFINITE CLAUDICATION SYMPTOMS. HE HAS NOT BEEN WALKING A LOT. HAS NO CHF SYMPTOMS. INITIALLY IN [place] HE HAD CHF AND NSTEMI.HE HAD SIGNIFICANT BRADYCARDIA NECESSITATING CESSATION OF AMIO AND B BLOCKERS.HE COULD NOT TOLERATE AFTERLOAD DUE TO LOW BLOOD PRESSURE.   Past Medical History:   Diagnosis Date    Adrenal adenoma     david;nl fxn w/u;tran 11/18    Aspiration pneumonia 10/15    puree/honey    Benign prostate hyperplasia     CAD (coronary artery disease)     dr whitfield    Chronic pain     dr santos    Chronic systolic congestive heart failure 10/17/2019    COPD (chronic obstructive pulmonary disease)     papi    Ex-smoker     11/13    Hyperlipidemia     Ischemic cardiomyopathy 11/22/2019    JUANITO on CPAP     cpap    Osteopenia 1/15 tran 1/18    PSVT (paroxysmal supraventricular tachycardia)     PVD (peripheral vascular disease)     noobs 07 khuri    Pyriform sinus cancer     dr ponce radiation 1/2-14 dr king perales    Squamous cell carcinoma of skin     roberto    Tongue cancer     "superficial" removed 11/14    Vocal cord cancer 2011    Xerostomia     radiation       Past Surgical History:   Procedure Laterality Date    " APPENDECTOMY      BRONCHOSCOPY Bilateral 2019    Procedure: Bronchoscopy;  Surgeon: Santos Cardozo MD;  Location: [facility] ENDO;  Service: Endoscopy;  Laterality: Bilateral;    COLONOSCOPY N/A 2017    Procedure: Due for screening colonoscopy;  Surgeon: Anushka Yoder MD;  Location: [facility] ENDO;  Service: Endoscopy;  Laterality: N/A;    ESOPHAGOGASTRODUODENOSCOPY N/A 2018    Procedure: ESOPHAGOGASTRODUODENOSCOPY (EGD);  Surgeon: Audie Hill III, MD;  Location: [facility] ENDO;  Service: Endoscopy;  Laterality: N/A;    ESOPHAGOGASTRODUODENOSCOPY N/A 2018    Procedure: ESOPHAGOGASTRODUODENOSCOPY (EGD);  Surgeon: Audie Hill III, MD;  Location: [facility] ENDO;  Service: Endoscopy;  Laterality: N/A;    THORACENTESIS Left 2018    Procedure: Thoracentesis;  Surgeon: Santos Cardozo MD;  Location: [facility] ENDO;  Service: Endoscopy;  Laterality: Left;    THORACENTESIS Right 2019    Procedure: Thoracentesis;  Surgeon: Santos Cardozo MD;  Location: [facility];  Service: Endoscopy;  Laterality: Right;    tongue cancer excision      dr vitale    VOCAL CORD LATERALIZATION, ENDOSCOPIC APPROACH W/ MLB      kris       Social History     Tobacco Use    Smoking status: Former Smoker     Packs/day: 0.50     Years: 55.00     Pack years: 27.50     Types: Cigarettes     Last attempt to quit: 10/1/2019     Years since quittin.1    Smokeless tobacco: Never Used    Tobacco comment: 6-7 cigs/day   Substance Use Topics    Alcohol use: Not Currently    Drug use: No       Family History   Problem Relation Age of Onset    Lung cancer Father         + Smoker    No Known Problems Mother     Lung cancer Brother         + Smoker       Current Outpatient Medications   Medication Sig    albuterol (PROAIR HFA) 90 mcg/actuation inhaler INL 2 PFS PO INTO THE LUNGS Q 4 H PRF WHZ OR SOB    albuterol (PROVENTIL) 2.5 mg /3 mL (0.083 %) nebulizer solution Take 3 mLs (2.5 mg total) by nebulization every 8  (eight) hours while awake.    aspirin 81 mg Tab Take 1 tablet by mouth Daily. Over the counter to help prevent stroke/heart attack    atorvastatin (LIPITOR) 80 MG tablet Take 1 tablet (80 mg total) by mouth once daily.    clopidogrel (PLAVIX) 75 mg tablet Take 1 tablet (75 mg total) by mouth once daily.    evolocumab (REPATHA SURECLICK) 140 mg/mL PnIj Inject 1 mL (140 mg total) into the skin every 14 (fourteen) days.    furosemide (LASIX) 40 MG tablet Take 1 tablet (40 mg total) by mouth once daily.    gabapentin (NEURONTIN) 600 MG tablet Take 1 tablet (600 mg total) by mouth 3 (three) times daily.    Lactobacillus acidophilus (PROBIOTIC ORAL) Take 1 tablet by mouth once daily.    loratadine (CLARITIN) 10 mg tablet Take 1 tablet by mouth daily as needed.     melatonin 10 mg Tab Take 1 tablet by mouth every evening.    nebulizer and compressor Bailey Use as directed    oxyCODONE-acetaminophen (PERCOCET) 7.5-325 mg per tablet Take 1 tablet by mouth every 8 (eight) hours as needed for Pain.    pantoprazole (PROTONIX) 40 MG tablet TAKE ONE TABLET BY MOUTH ONCE DAILY    pilocarpine (SALAGEN) 5 MG Tab TAKE ONE TABLET BY MOUTH 30 MINUTES PRIOR TO EACH MEAL    SPIRIVA WITH HANDIHALER 18 mcg inhalation capsule INHALE 1 CAPSULE BY MOUTH ONCE DAILY    arformoterol (BROVANA) 15 mcg/2 mL Nebu Take 2 mLs (15 mcg total) by nebulization 2 (two) times daily. Controller    CHANTIX CONTINUING MONTH BOX 1 mg Tab TAKE 1 TABLET BY MOUTH ONCE DAILY (Patient not taking: Reported on 11/18/2019)    clotrimazole (MYCELEX) 10 mg elissa     OXYGEN-AIR DELIVERY SYSTEMS MISC Inhale 2-3 L into the lungs continuous.    sertraline (ZOLOFT) 50 MG tablet Take 1 tablet (50 mg total) by mouth once daily. (Patient not taking: Reported on 11/22/2019)     No current facility-administered medications for this visit.      Current Outpatient Medications on File Prior to Visit   Medication Sig    albuterol (PROAIR HFA) 90 mcg/actuation  inhaler INL 2 PFS PO INTO THE LUNGS Q 4 H PRF WHZ OR SOB    albuterol (PROVENTIL) 2.5 mg /3 mL (0.083 %) nebulizer solution Take 3 mLs (2.5 mg total) by nebulization every 8 (eight) hours while awake.    aspirin 81 mg Tab Take 1 tablet by mouth Daily. Over the counter to help prevent stroke/heart attack    atorvastatin (LIPITOR) 80 MG tablet Take 1 tablet (80 mg total) by mouth once daily.    clopidogrel (PLAVIX) 75 mg tablet Take 1 tablet (75 mg total) by mouth once daily.    evolocumab (REPATHA SURECLICK) 140 mg/mL PnIj Inject 1 mL (140 mg total) into the skin every 14 (fourteen) days.    furosemide (LASIX) 40 MG tablet Take 1 tablet (40 mg total) by mouth once daily.    gabapentin (NEURONTIN) 600 MG tablet Take 1 tablet (600 mg total) by mouth 3 (three) times daily.    Lactobacillus acidophilus (PROBIOTIC ORAL) Take 1 tablet by mouth once daily.    loratadine (CLARITIN) 10 mg tablet Take 1 tablet by mouth daily as needed.     melatonin 10 mg Tab Take 1 tablet by mouth every evening.    nebulizer and compressor Bailey Use as directed    oxyCODONE-acetaminophen (PERCOCET) 7.5-325 mg per tablet Take 1 tablet by mouth every 8 (eight) hours as needed for Pain.    pantoprazole (PROTONIX) 40 MG tablet TAKE ONE TABLET BY MOUTH ONCE DAILY    pilocarpine (SALAGEN) 5 MG Tab TAKE ONE TABLET BY MOUTH 30 MINUTES PRIOR TO EACH MEAL    SPIRIVA WITH HANDIHALER 18 mcg inhalation capsule INHALE 1 CAPSULE BY MOUTH ONCE DAILY    arformoterol (BROVANA) 15 mcg/2 mL Nebu Take 2 mLs (15 mcg total) by nebulization 2 (two) times daily. Controller    CHANTIX CONTINUING MONTH BOX 1 mg Tab TAKE 1 TABLET BY MOUTH ONCE DAILY (Patient not taking: Reported on 11/18/2019)    clotrimazole (MYCELEX) 10 mg elissa     OXYGEN-AIR DELIVERY SYSTEMS MISC Inhale 2-3 L into the lungs continuous.    sertraline (ZOLOFT) 50 MG tablet Take 1 tablet (50 mg total) by mouth once daily. (Patient not taking: Reported on 11/22/2019)     No current  facility-administered medications on file prior to visit.        Review of patient's allergies indicates:   Allergen Reactions    Contrast media Hives and Itching    Iodinated contrast media Hives and Itching    Iodine Hives and Itching    Pravastatin      Other reaction(s): fatigue  Other reaction(s): fatigue    Rosuvastatin      Other reaction(s): fatigue  Other reaction(s): fatigue    Simvastatin      Other reaction(s): fatigue  Other reaction(s): fatigue    Statins-hmg-coa reductase inhibitors Other (See Comments)     Fatigue     Review of patient's allergies indicates:   Allergen Reactions    Contrast media Hives and Itching    Iodinated contrast media Hives and Itching    Iodine Hives and Itching    Pravastatin      Other reaction(s): fatigue  Other reaction(s): fatigue    Rosuvastatin      Other reaction(s): fatigue  Other reaction(s): fatigue    Simvastatin      Other reaction(s): fatigue  Other reaction(s): fatigue    Statins-hmg-coa reductase inhibitors Other (See Comments)     Fatigue     Review of Systems   Constitution: Negative for diaphoresis, malaise/fatigue and weight gain.   HENT: Negative for hoarse voice.    Eyes: Negative for blurred vision, double vision and visual disturbance.   Cardiovascular: Positive for dyspnea on exertion. Negative for chest pain, claudication, cyanosis, irregular heartbeat, leg swelling, near-syncope, orthopnea, palpitations, paroxysmal nocturnal dyspnea and syncope.   Respiratory: Positive for shortness of breath. Negative for cough, hemoptysis and snoring.    Hematologic/Lymphatic: Negative for bleeding problem. Does not bruise/bleed easily.   Skin: Negative for color change, dry skin, itching and poor wound healing.   Musculoskeletal: Negative for falls, muscle cramps, muscle weakness and myalgias.   Gastrointestinal: Negative for bloating, abdominal pain, change in bowel habit, diarrhea, heartburn, hematemesis, hematochezia, melena and nausea.    Genitourinary: Negative for flank pain and hematuria.   Neurological: Negative for excessive daytime sleepiness, dizziness, focal weakness, headaches, light-headedness, loss of balance, numbness, paresthesias, seizures and weakness.   Psychiatric/Behavioral: Negative for altered mental status and memory loss. The patient does not have insomnia and is not nervous/anxious.    Allergic/Immunologic: Negative for hives.       Objective:   Physical Exam   Constitutional: He is oriented to person, place, and time. He appears well-developed and well-nourished. No distress.   HENT:   Head: Normocephalic and atraumatic.   Eyes: Pupils are equal, round, and reactive to light. EOM are normal. Right eye exhibits no discharge. Left eye exhibits no discharge.   Neck: Neck supple. No JVD present. No thyromegaly present.   Cardiovascular: Normal rate, regular rhythm, normal heart sounds and intact distal pulses. Exam reveals no gallop and no friction rub.   No murmur heard.  Pulses:       Carotid pulses are 2+ on the right side with bruit, and on the left side with bruit.       Radial pulses are 2+ on the right side, and 2+ on the left side.        Femoral pulses are 2+ on the right side, and 2+ on the left side.       Popliteal pulses are 2+ on the right side, and 2+ on the left side.        Dorsalis pedis pulses are 2+ on the right side, and 2+ on the left side.        Posterior tibial pulses are 2+ on the right side, and 2+ on the left side.   Pulmonary/Chest: Effort normal and breath sounds normal. No respiratory distress. He has no wheezes. He has no rales. He exhibits no tenderness.   Abdominal: Soft. Bowel sounds are normal. He exhibits no distension. There is no tenderness.   Musculoskeletal: Normal range of motion. He exhibits no edema.   Neurological: He is alert and oriented to person, place, and time. No cranial nerve deficit.   Skin: Skin is warm and dry. No rash noted. He is not diaphoretic. No erythema.  "  Psychiatric: He has a normal mood and affect. His behavior is normal.   Nursing note and vitals reviewed.    Vitals:    11/22/19 0819 11/22/19 0820   BP: (!) 110/56 (!) 112/54   BP Location: Right arm Left arm   Patient Position: Sitting Sitting   Pulse: 61    Weight: 67.2 kg (148 lb 2.4 oz)    Height: 5' 6" (1.676 m)      Lab Results   Component Value Date    CHOL 77 (L) 02/06/2019    CHOL 98 (L) 08/13/2018    CHOL 204 (H) 11/14/2017     Lab Results   Component Value Date    HDL 44 02/06/2019    HDL 43 08/13/2018    HDL 43 11/14/2017     Lab Results   Component Value Date    LDLCALC 25.0 (L) 02/06/2019    LDLCALC 39.8 (L) 08/13/2018    LDLCALC 147.8 11/14/2017     Lab Results   Component Value Date    TRIG 40 02/06/2019    TRIG 76 08/13/2018    TRIG 66 11/14/2017     Lab Results   Component Value Date    CHOLHDL 57.1 (H) 02/06/2019    CHOLHDL 43.9 08/13/2018    CHOLHDL 21.1 11/14/2017       Chemistry        Component Value Date/Time     11/15/2019 0938    K 4.6 11/15/2019 0938     11/15/2019 0938    CO2 26 11/15/2019 0938    BUN 24 (H) 11/15/2019 0938    CREATININE 1.0 11/15/2019 0938    GLU 88 11/15/2019 0938        Component Value Date/Time    CALCIUM 9.2 11/15/2019 0938    ALKPHOS 66 11/11/2019 1119    AST 26 11/11/2019 1119    ALT 43 11/11/2019 1119    BILITOT 0.5 11/11/2019 1119    ESTGFRAFRICA >60.0 11/15/2019 0938    EGFRNONAA >60.0 11/15/2019 0938          Lab Results   Component Value Date    TSH 1.259 02/06/2019     Lab Results   Component Value Date    INR 1.0 10/26/2019    INR 1.0 10/25/2019    INR 1.1 10/10/2015     Lab Results   Component Value Date    WBC 6.77 11/11/2019    HGB 10.0 (L) 11/11/2019    HCT 31.3 (L) 11/11/2019    MCV 96 11/11/2019     11/11/2019     BNP  @LABRCNTIP(BNP,BNPTRIAGEBLO)@  Estimated Creatinine Clearance: 59.4 mL/min (based on SCr of 1 mg/dL).   EKG SHOWS SINUS BRADYCARDIA.  Assessment:     1. Ischemic cardiomyopathy    2. JUANITO on CPAP    3. Hyperkalemia  "   4. Benign prostatic hyperplasia without lower urinary tract symptoms    5. Essential hypertension    6. Stage 3 severe COPD by GOLD classification    7. Coronary artery disease involving native coronary artery of native heart without angina pectoris    8. Statin intolerance    9. Stenosis of right carotid artery    10. On home oxygen therapy    11. Cardiac left ventricular ejection fraction 21-40 percent    12. Bradycardia      HAS SINUS BRADYCARDIA ON EKG WITH FIRST DEGREE AV BLOCK. HE AHS CLASS 2 SHORTNESS OF BREATH THAT IS ANGINAL EQUIVALENT .HE HAS ABILITY OF MAKING 1 FLIGHT OS STIRS AND HAS SOME LEG TIREDNESS WITH IT.  HE HAS CALCIFICATION ON HIS ANGIO IN THE ILIACS WILL NEED TOE VALUATE PVD. [facility] MY OPLAN IS TO PERFORM SUPPORTED MULTIVESSEL ANGIOPLASTY WITH IABP OR IMPELLA.   I have explained the risks, benefits , and alternatives of the procedure in detail.the patient voices understanding and all questions have been answered.the patient agrees to proceed as planned.    Plan:   KEELEY   BILATERAL LOWER EXTREMITY DUPLEX   PENDING DECIDE ON TIMING OF PROCEDURE.   I HAVE EXPLAINED HIS ANGIOS IN DETAILS RATIONALE FOR THE PROCEDURE INDICATION AS WELL AS PET RESULTS. HE UNDERSTANDS AND IS WILLING TO PROCEED.

## 2019-11-24 DIAGNOSIS — M51.36 DDD (DEGENERATIVE DISC DISEASE), LUMBAR: ICD-10-CM

## 2019-11-25 ENCOUNTER — INFUSION (OUTPATIENT)
Dept: INFUSION THERAPY | Facility: HOSPITAL | Age: 74
End: 2019-11-25
Attending: INTERNAL MEDICINE
Payer: MEDICARE

## 2019-11-25 VITALS
RESPIRATION RATE: 20 BRPM | SYSTOLIC BLOOD PRESSURE: 123 MMHG | DIASTOLIC BLOOD PRESSURE: 65 MMHG | HEART RATE: 63 BPM | OXYGEN SATURATION: 98 % | TEMPERATURE: 97 F

## 2019-11-25 DIAGNOSIS — D64.9 CHRONIC ANEMIA: Primary | ICD-10-CM

## 2019-11-25 DIAGNOSIS — G47.33 OSA ON CPAP: Primary | ICD-10-CM

## 2019-11-25 DIAGNOSIS — J44.9 STAGE 3 SEVERE COPD BY GOLD CLASSIFICATION: ICD-10-CM

## 2019-11-25 DIAGNOSIS — Z86.39 HISTORY OF IRON DEFICIENCY: ICD-10-CM

## 2019-11-25 PROCEDURE — 63600175 PHARM REV CODE 636 W HCPCS: Performed by: NURSE PRACTITIONER

## 2019-11-25 PROCEDURE — 96365 THER/PROPH/DIAG IV INF INIT: CPT

## 2019-11-25 PROCEDURE — 96375 TX/PRO/DX INJ NEW DRUG ADDON: CPT

## 2019-11-25 RX ORDER — GABAPENTIN 800 MG/1
TABLET ORAL
Qty: 180 TABLET | Refills: 0 | OUTPATIENT
Start: 2019-11-25

## 2019-11-25 RX ORDER — METHYLPREDNISOLONE SOD SUCC 125 MG
80 VIAL (EA) INJECTION
Status: COMPLETED | OUTPATIENT
Start: 2019-11-25 | End: 2019-11-25

## 2019-11-25 RX ADMIN — METHYLPREDNISOLONE SODIUM SUCCINATE 80 MG: 125 INJECTION, POWDER, FOR SOLUTION INTRAMUSCULAR; INTRAVENOUS at 01:11

## 2019-11-25 RX ADMIN — FERRIC CARBOXYMALTOSE INJECTION 750 MG: 50 INJECTION, SOLUTION INTRAVENOUS at 01:11

## 2019-11-26 NOTE — PROGRESS NOTES
"Orders Placed This Encounter   Procedures    HME - OTHER     Discontinue home oxygen.     11/20/2019 Normal overnight oximetry on CPAP 14 cm  Normal 6wmd11/20/2019     HME: PAULA     Order Specific Question:   Type of Equipment:     Answer:   home oxygen     Order Specific Question:   Height:     Answer:   5'6"     Order Specific Question:   Weight:     Answer:   148 lbs     Order Specific Question:   Does patient have medical equipment at home?     Answer:   concentrator     Order Specific Question:   Does patient have medical equipment at home?     Answer:   portable oxygen     Order Specific Question:   Does patient have medical equipment at home?     Answer:   CPAP     1. JUANITO on CPAP       Over night oximetry Test date: 11/20/19 Start: 11/20/19 09:07:45   Overnight Oxygen Saturation Study:     Interpretation:  Conditions of testing: Stable outpatient. Room air.  Overnight oxygen saturuation study revealed transient hypoxemia (oxygen saturation less than 88%) Time with SpO2<89: 0:00:56, 0.2%of study period. Lowest oxygen saturation recorded as 81%.  Clinicial correlation suggested.        Joslyn Pfeiffer M.D         Medicare Criteria Comments:   Oximetry test results suggest the patient does not fall under Medicare Group 1 Criteria.   (Arterial oxygen saturation at or below 88% for at least 5 minutes taken during sleep)  "

## 2019-11-27 ENCOUNTER — CLINICAL SUPPORT (OUTPATIENT)
Dept: CARDIOLOGY | Facility: CLINIC | Age: 74
End: 2019-11-27
Attending: INTERNAL MEDICINE
Payer: MEDICARE

## 2019-11-27 DIAGNOSIS — I70.0 ABDOMINAL AORTIC ATHEROSCLEROSIS: ICD-10-CM

## 2019-11-27 DIAGNOSIS — I65.21 STENOSIS OF RIGHT CAROTID ARTERY: ICD-10-CM

## 2019-11-27 LAB — VASCULAR ANKLE BRACHIAL INDEX (ABI) LEFT: 1.07 (ref 0.9–1.2)

## 2019-11-27 PROCEDURE — 93922 CAR US ANKLE BRACHIAL INDICES EXT LTD WO STR: ICD-10-PCS | Mod: 26,S$PBB,, | Performed by: INTERNAL MEDICINE

## 2019-11-27 PROCEDURE — 93922 UPR/L XTREMITY ART 2 LEVELS: CPT | Mod: PBBFAC | Performed by: INTERNAL MEDICINE

## 2019-11-27 PROCEDURE — 93925 LOWER EXTREMITY STUDY: CPT | Mod: PBBFAC | Performed by: INTERNAL MEDICINE

## 2019-11-27 PROCEDURE — 93925 CAR US DOPPLER ARTERIAL LEGS BILATERAL: ICD-10-PCS | Mod: 26,S$PBB,, | Performed by: INTERNAL MEDICINE

## 2019-11-29 ENCOUNTER — PATIENT MESSAGE (OUTPATIENT)
Dept: CARDIOLOGY | Facility: CLINIC | Age: 74
End: 2019-11-29

## 2019-11-29 ENCOUNTER — TELEPHONE (OUTPATIENT)
Dept: CARDIOLOGY | Facility: CLINIC | Age: 74
End: 2019-11-29

## 2019-11-29 DIAGNOSIS — M79.605 PAIN IN BOTH LOWER EXTREMITIES: ICD-10-CM

## 2019-11-29 DIAGNOSIS — I25.10 CAD, MULTIPLE VESSEL: Primary | ICD-10-CM

## 2019-11-29 DIAGNOSIS — M79.604 PAIN IN BOTH LOWER EXTREMITIES: ICD-10-CM

## 2019-11-29 NOTE — TELEPHONE ENCOUNTER
12/2/19 Pre med prescription called to Pharmacy.    Patient did return call and confirmed 12/9/19    Telephoned patient with leg studies and discuss scheduling heart cath.  And he is ready to to schedule angiogram probably 12/9/19  Advised would need lab work and will call back to confirm      ----- Message from Abel Beltran MD sent at 11/29/2019  7:52 AM CST -----  Please tell patient he has good blood flow to legs.  I want to know if he has anymore questions and if he is willing to proceed with angioplasty as per our office discussion and when

## 2019-11-30 ENCOUNTER — EXTERNAL CHRONIC CARE MANAGEMENT (OUTPATIENT)
Dept: PRIMARY CARE CLINIC | Facility: CLINIC | Age: 74
End: 2019-11-30

## 2019-12-02 RX ORDER — DIPHENHYDRAMINE HCL 50 MG
50 CAPSULE ORAL
Status: ON HOLD | COMMUNITY
End: 2019-12-10 | Stop reason: HOSPADM

## 2019-12-02 RX ORDER — PREDNISONE 50 MG/1
50 TABLET ORAL
Status: ON HOLD | COMMUNITY
End: 2019-12-10 | Stop reason: HOSPADM

## 2019-12-02 RX ORDER — FAMOTIDINE 40 MG/1
40 TABLET, FILM COATED ORAL
Status: ON HOLD | COMMUNITY
End: 2019-12-10 | Stop reason: HOSPADM

## 2019-12-04 ENCOUNTER — PATIENT MESSAGE (OUTPATIENT)
Dept: CARDIOLOGY | Facility: CLINIC | Age: 74
End: 2019-12-04

## 2019-12-05 ENCOUNTER — LAB VISIT (OUTPATIENT)
Dept: LAB | Facility: HOSPITAL | Age: 74
End: 2019-12-05
Attending: INTERNAL MEDICINE
Payer: MEDICARE

## 2019-12-05 DIAGNOSIS — M79.605 PAIN IN BOTH LOWER EXTREMITIES: ICD-10-CM

## 2019-12-05 DIAGNOSIS — I25.10 CAD, MULTIPLE VESSEL: ICD-10-CM

## 2019-12-05 DIAGNOSIS — M79.604 PAIN IN BOTH LOWER EXTREMITIES: ICD-10-CM

## 2019-12-05 LAB
ANION GAP SERPL CALC-SCNC: 7 MMOL/L (ref 8–16)
APTT BLDCRRT: 30.7 SEC (ref 21–32)
BASOPHILS # BLD AUTO: 0.13 K/UL (ref 0–0.2)
BASOPHILS NFR BLD: 1.2 % (ref 0–1.9)
BUN SERPL-MCNC: 13 MG/DL (ref 8–23)
CALCIUM SERPL-MCNC: 9 MG/DL (ref 8.7–10.5)
CHLORIDE SERPL-SCNC: 105 MMOL/L (ref 95–110)
CO2 SERPL-SCNC: 26 MMOL/L (ref 23–29)
CREAT SERPL-MCNC: 1 MG/DL (ref 0.5–1.4)
DIFFERENTIAL METHOD: ABNORMAL
EOSINOPHIL # BLD AUTO: 0.3 K/UL (ref 0–0.5)
EOSINOPHIL NFR BLD: 2.7 % (ref 0–8)
ERYTHROCYTE [DISTWIDTH] IN BLOOD BY AUTOMATED COUNT: 18.1 % (ref 11.5–14.5)
EST. GFR  (AFRICAN AMERICAN): >60 ML/MIN/1.73 M^2
EST. GFR  (NON AFRICAN AMERICAN): >60 ML/MIN/1.73 M^2
GLUCOSE SERPL-MCNC: 113 MG/DL (ref 70–110)
HCT VFR BLD AUTO: 33.2 % (ref 40–54)
HGB BLD-MCNC: 10.2 G/DL (ref 14–18)
IMM GRANULOCYTES # BLD AUTO: 0.22 K/UL (ref 0–0.04)
IMM GRANULOCYTES NFR BLD AUTO: 2 % (ref 0–0.5)
INR PPP: 1 (ref 0.8–1.2)
LYMPHOCYTES # BLD AUTO: 1.1 K/UL (ref 1–4.8)
LYMPHOCYTES NFR BLD: 9.6 % (ref 18–48)
MCH RBC QN AUTO: 30.7 PG (ref 27–31)
MCHC RBC AUTO-ENTMCNC: 30.7 G/DL (ref 32–36)
MCV RBC AUTO: 100 FL (ref 82–98)
MONOCYTES # BLD AUTO: 1.1 K/UL (ref 0.3–1)
MONOCYTES NFR BLD: 10.1 % (ref 4–15)
NEUTROPHILS # BLD AUTO: 8.4 K/UL (ref 1.8–7.7)
NEUTROPHILS NFR BLD: 74.4 % (ref 38–73)
NRBC BLD-RTO: 0 /100 WBC
PLATELET # BLD AUTO: 250 K/UL (ref 150–350)
PMV BLD AUTO: 11.3 FL (ref 9.2–12.9)
POTASSIUM SERPL-SCNC: 5.2 MMOL/L (ref 3.5–5.1)
PROTHROMBIN TIME: 10.1 SEC (ref 9–12.5)
RBC # BLD AUTO: 3.32 M/UL (ref 4.6–6.2)
SODIUM SERPL-SCNC: 138 MMOL/L (ref 136–145)
WBC # BLD AUTO: 11.23 K/UL (ref 3.9–12.7)

## 2019-12-05 PROCEDURE — 85025 COMPLETE CBC W/AUTO DIFF WBC: CPT

## 2019-12-05 PROCEDURE — 85610 PROTHROMBIN TIME: CPT

## 2019-12-05 PROCEDURE — 36415 COLL VENOUS BLD VENIPUNCTURE: CPT

## 2019-12-05 PROCEDURE — 80048 BASIC METABOLIC PNL TOTAL CA: CPT

## 2019-12-05 PROCEDURE — 85730 THROMBOPLASTIN TIME PARTIAL: CPT

## 2019-12-08 DIAGNOSIS — M51.36 DDD (DEGENERATIVE DISC DISEASE), LUMBAR: ICD-10-CM

## 2019-12-09 ENCOUNTER — HOSPITAL ENCOUNTER (OUTPATIENT)
Facility: HOSPITAL | Age: 74
Discharge: HOME | End: 2019-12-10
Attending: INTERNAL MEDICINE | Admitting: INTERNAL MEDICINE
Payer: MEDICARE

## 2019-12-09 DIAGNOSIS — I25.10 CORONARY ARTERY DISEASE, ANGINA PRESENCE UNSPECIFIED, UNSPECIFIED VESSEL OR LESION TYPE, UNSPECIFIED WHETHER NATIVE OR TRANSPLANTED HEART: ICD-10-CM

## 2019-12-09 DIAGNOSIS — I25.10 ATHEROSCLEROSIS OF NATIVE CORONARY ARTERY OF NATIVE HEART WITHOUT ANGINA PECTORIS: ICD-10-CM

## 2019-12-09 DIAGNOSIS — I25.10 ATHEROSCLEROTIC HEART DISEASE OF NATIVE CORONARY ARTERY WITHOUT ANGINA PECTORIS: ICD-10-CM

## 2019-12-09 DIAGNOSIS — I50.9 CHF (CONGESTIVE HEART FAILURE): ICD-10-CM

## 2019-12-09 DIAGNOSIS — I50.9 CONGESTIVE HEART FAILURE, UNSPECIFIED HF CHRONICITY, UNSPECIFIED HEART FAILURE TYPE: ICD-10-CM

## 2019-12-09 LAB
CORONARY STENOSIS: ABNORMAL
PERIPHERAL PTA: YES
POC ACTIVATED CLOTTING TIME K: 147 SEC (ref 74–137)
POC ACTIVATED CLOTTING TIME K: 213 SEC (ref 74–137)
POC ACTIVATED CLOTTING TIME K: 252 SEC (ref 74–137)
SAMPLE: ABNORMAL

## 2019-12-09 PROCEDURE — 93454 CORONARY ARTERY ANGIO S&I: CPT | Mod: 26,,, | Performed by: INTERNAL MEDICINE

## 2019-12-09 PROCEDURE — 25000003 PHARM REV CODE 250: Performed by: INTERNAL MEDICINE

## 2019-12-09 PROCEDURE — S0028 INJECTION, FAMOTIDINE, 20 MG: HCPCS

## 2019-12-09 PROCEDURE — 93454 CATH LAB PROCEDURE: ICD-10-PCS | Mod: 26,,, | Performed by: INTERNAL MEDICINE

## 2019-12-09 PROCEDURE — 37224 CATH LAB PROCEDURE: ICD-10-PCS | Mod: LT,,, | Performed by: INTERNAL MEDICINE

## 2019-12-09 PROCEDURE — 93571 CATH LAB PROCEDURE: ICD-10-PCS | Mod: 26,,, | Performed by: INTERNAL MEDICINE

## 2019-12-09 PROCEDURE — C1769 GUIDE WIRE: HCPCS

## 2019-12-09 PROCEDURE — 63600175 PHARM REV CODE 636 W HCPCS: Performed by: INTERNAL MEDICINE

## 2019-12-09 PROCEDURE — 99152 CATH LAB PROCEDURE: ICD-10-PCS | Mod: ,,, | Performed by: INTERNAL MEDICINE

## 2019-12-09 PROCEDURE — 93571 IV DOP VEL&/PRESS C FLO 1ST: CPT | Mod: 26,,, | Performed by: INTERNAL MEDICINE

## 2019-12-09 PROCEDURE — 94761 N-INVAS EAR/PLS OXIMETRY MLT: CPT

## 2019-12-09 PROCEDURE — 99152 MOD SED SAME PHYS/QHP 5/>YRS: CPT | Mod: ,,, | Performed by: INTERNAL MEDICINE

## 2019-12-09 PROCEDURE — 25000242 PHARM REV CODE 250 ALT 637 W/ HCPCS: Performed by: INTERNAL MEDICINE

## 2019-12-09 PROCEDURE — 63600175 PHARM REV CODE 636 W HCPCS

## 2019-12-09 PROCEDURE — 37224 CATH LAB PROCEDURE: CPT | Mod: LT,,, | Performed by: INTERNAL MEDICINE

## 2019-12-09 PROCEDURE — 25500020 PHARM REV CODE 255

## 2019-12-09 PROCEDURE — 25000003 PHARM REV CODE 250

## 2019-12-09 PROCEDURE — 75710 CATH LAB PROCEDURE: ICD-10-PCS | Mod: 26,59,LT, | Performed by: INTERNAL MEDICINE

## 2019-12-09 PROCEDURE — 94640 AIRWAY INHALATION TREATMENT: CPT

## 2019-12-09 PROCEDURE — 75710 ARTERY X-RAYS ARM/LEG: CPT | Mod: 26,59,LT, | Performed by: INTERNAL MEDICINE

## 2019-12-09 RX ORDER — SODIUM CHLORIDE 9 MG/ML
INJECTION, SOLUTION INTRAVENOUS CONTINUOUS
Status: ACTIVE | OUTPATIENT
Start: 2019-12-09 | End: 2019-12-10

## 2019-12-09 RX ORDER — ARFORMOTEROL TARTRATE 15 UG/2ML
15 SOLUTION RESPIRATORY (INHALATION) 2 TIMES DAILY
Status: DISCONTINUED | OUTPATIENT
Start: 2019-12-09 | End: 2019-12-10 | Stop reason: HOSPADM

## 2019-12-09 RX ORDER — ALBUTEROL SULFATE 0.83 MG/ML
2.5 SOLUTION RESPIRATORY (INHALATION) EVERY 4 HOURS PRN
Status: DISCONTINUED | OUTPATIENT
Start: 2019-12-09 | End: 2019-12-10 | Stop reason: HOSPADM

## 2019-12-09 RX ORDER — IPRATROPIUM BROMIDE 0.5 MG/2.5ML
0.5 SOLUTION RESPIRATORY (INHALATION) EVERY 6 HOURS
Status: DISCONTINUED | OUTPATIENT
Start: 2019-12-09 | End: 2019-12-10 | Stop reason: HOSPADM

## 2019-12-09 RX ORDER — TIOTROPIUM BROMIDE 18 UG/1
1 CAPSULE ORAL; RESPIRATORY (INHALATION) DAILY
Status: DISCONTINUED | OUTPATIENT
Start: 2019-12-10 | End: 2019-12-09

## 2019-12-09 RX ORDER — NAPROXEN SODIUM 220 MG/1
81 TABLET, FILM COATED ORAL ONCE
Status: COMPLETED | OUTPATIENT
Start: 2019-12-09 | End: 2019-12-09

## 2019-12-09 RX ORDER — DIPHENHYDRAMINE HCL 50 MG
50 CAPSULE ORAL ONCE
Status: COMPLETED | OUTPATIENT
Start: 2019-12-09 | End: 2019-12-09

## 2019-12-09 RX ORDER — ATORVASTATIN CALCIUM 40 MG/1
80 TABLET, FILM COATED ORAL DAILY
Status: DISCONTINUED | OUTPATIENT
Start: 2019-12-09 | End: 2019-12-10 | Stop reason: HOSPADM

## 2019-12-09 RX ORDER — SERTRALINE HYDROCHLORIDE 50 MG/1
50 TABLET, FILM COATED ORAL DAILY
Status: DISCONTINUED | OUTPATIENT
Start: 2019-12-10 | End: 2019-12-10 | Stop reason: HOSPADM

## 2019-12-09 RX ORDER — DIAZEPAM 5 MG/1
5 TABLET ORAL
Status: DISCONTINUED | OUTPATIENT
Start: 2019-12-09 | End: 2019-12-09

## 2019-12-09 RX ORDER — CLOPIDOGREL BISULFATE 75 MG/1
75 TABLET ORAL DAILY
Status: DISCONTINUED | OUTPATIENT
Start: 2019-12-09 | End: 2019-12-10 | Stop reason: HOSPADM

## 2019-12-09 RX ORDER — PANTOPRAZOLE SODIUM 40 MG/1
40 TABLET, DELAYED RELEASE ORAL DAILY
Status: DISCONTINUED | OUTPATIENT
Start: 2019-12-10 | End: 2019-12-10 | Stop reason: HOSPADM

## 2019-12-09 RX ORDER — OXYCODONE AND ACETAMINOPHEN 7.5; 325 MG/1; MG/1
1 TABLET ORAL EVERY 8 HOURS PRN
Status: DISCONTINUED | OUTPATIENT
Start: 2019-12-09 | End: 2019-12-10 | Stop reason: HOSPADM

## 2019-12-09 RX ORDER — NAPROXEN SODIUM 220 MG/1
81 TABLET, FILM COATED ORAL DAILY
Status: DISCONTINUED | OUTPATIENT
Start: 2019-12-09 | End: 2019-12-10 | Stop reason: HOSPADM

## 2019-12-09 RX ORDER — ALBUTEROL SULFATE 90 UG/1
2 AEROSOL, METERED RESPIRATORY (INHALATION) EVERY 4 HOURS PRN
Status: DISCONTINUED | OUTPATIENT
Start: 2019-12-09 | End: 2019-12-09 | Stop reason: SDUPTHER

## 2019-12-09 RX ORDER — GABAPENTIN 800 MG/1
TABLET ORAL
Qty: 180 TABLET | Refills: 0 | OUTPATIENT
Start: 2019-12-09

## 2019-12-09 RX ORDER — PILOCARPINE HYDROCHLORIDE 5 MG/1
5 TABLET, FILM COATED ORAL
Status: DISCONTINUED | OUTPATIENT
Start: 2019-12-10 | End: 2019-12-10 | Stop reason: HOSPADM

## 2019-12-09 RX ORDER — GABAPENTIN 300 MG/1
600 CAPSULE ORAL 3 TIMES DAILY
Status: DISCONTINUED | OUTPATIENT
Start: 2019-12-09 | End: 2019-12-10 | Stop reason: HOSPADM

## 2019-12-09 RX ORDER — SODIUM CHLORIDE 9 MG/ML
INJECTION, SOLUTION INTRAVENOUS CONTINUOUS
Status: DISCONTINUED | OUTPATIENT
Start: 2019-12-09 | End: 2019-12-09

## 2019-12-09 RX ADMIN — ARFORMOTEROL TARTRATE 15 MCG: 15 SOLUTION RESPIRATORY (INHALATION) at 07:12

## 2019-12-09 RX ADMIN — SODIUM CHLORIDE: 0.9 INJECTION, SOLUTION INTRAVENOUS at 04:12

## 2019-12-09 RX ADMIN — SODIUM CHLORIDE: 9 INJECTION, SOLUTION INTRAVENOUS at 10:12

## 2019-12-09 RX ADMIN — GABAPENTIN 600 MG: 300 CAPSULE ORAL at 08:12

## 2019-12-09 RX ADMIN — OXYCODONE HYDROCHLORIDE AND ACETAMINOPHEN 1 TABLET: 7.5; 325 TABLET ORAL at 04:12

## 2019-12-09 RX ADMIN — DIAZEPAM 5 MG: 5 TABLET ORAL at 10:12

## 2019-12-09 RX ADMIN — OXYCODONE HYDROCHLORIDE AND ACETAMINOPHEN 1 TABLET: 7.5; 325 TABLET ORAL at 11:12

## 2019-12-09 RX ADMIN — IPRATROPIUM BROMIDE 0.5 MG: 0.5 SOLUTION RESPIRATORY (INHALATION) at 06:12

## 2019-12-09 RX ADMIN — NAPROXEN SODIUM 81 MG: 220 TABLET, FILM COATED ORAL at 10:12

## 2019-12-09 RX ADMIN — ASPIRIN 81 MG 81 MG: 81 TABLET ORAL at 04:12

## 2019-12-09 RX ADMIN — CLOPIDOGREL BISULFATE 75 MG: 75 TABLET ORAL at 04:12

## 2019-12-09 RX ADMIN — Medication 50 MG: at 10:12

## 2019-12-09 NOTE — INTERVAL H&P NOTE
The patient has been examined and the H&P has been reviewed:    I concur with the findings and no changes have occurred since H&P was written.    Anesthesia/Surgery risks, benefits and alternative options discussed and understood by patient/family.  I have explained the risks, benefits , and alternatives of the procedure in detail.the patient voices understanding and all questions have been answered.the patient agrees to proceed as planned.          Active Hospital Problems    Diagnosis  POA    CHF (congestive heart failure) [I50.9]  Yes     Priority: High      Resolved Hospital Problems   No resolved problems to display.

## 2019-12-09 NOTE — NURSING TRANSFER
Nursing Transfer Note      12/9/2019     Transfer To: 247    Transfer via stretcher    Transfer with cardiac monitoring    Transported by [facility]    Medicines sent: NONE    Chart send with patient: Yes    Notified: daughter    Patient reassessed at: TRANSFERRED TO ROOM. TRANSFERRED TO BED.  TELEMETRY MONITER ON.  IV FLUIDS ON PUMP AT PRESCRIBED RATE.  PROCEDURAL ACCESS SITE WITHOUT BLEEDING OR HEMATOMA.  DRESSING DRY AND INTACT.  CARE HANDED OFF [id number]........ (date, time)    Upon arrival to floor: cardiac monitor applied, patient oriented to room, call bell in reach and bed in lowest position

## 2019-12-09 NOTE — OP NOTE
INPATIENT Operative Note         SUMMARY     Surgery Date: 12/9/2019     Surgeon(s) and Role:     * Abel Beltran MD - Primary    ASSISTANT:none    Pre-op Diagnosis:  CAD, multiple vessel [I25.10]      Post-op Diagnosis: cad cardiomyopathy nstemi    Procedure(s) (LRB):  PTCA, USING BALLOON/ IABP or Impella multivessel angioplasty/poss stent (Right)  Attempted ptca rca  pta profunda   COMPLICATION:none    Anesthesia: RN IV Sedation    Findings/Key Components:  rca with  recanalized unable to cross.   Lad 50% ifr 0.84   Diagonal ifr 0.98   Non o0bs lcx.  rca fills from lad.  angioseal with still bleeding and extarvasation at the site needing  Balloon of profunda with no extravasation.   rfa manual pressure.    Estimated Blood Loss: < 50 ML.         SPECIMEN: NONE    Devices/Prostetics: None    PLAN:  continue medical therapy   Observe overnite.

## 2019-12-10 ENCOUNTER — TELEPHONE (OUTPATIENT)
Dept: CARDIOLOGY | Facility: CLINIC | Age: 74
End: 2019-12-10

## 2019-12-10 ENCOUNTER — TELEPHONE (OUTPATIENT)
Dept: PULMONOLOGY | Facility: CLINIC | Age: 74
End: 2019-12-10

## 2019-12-10 VITALS
BODY MASS INDEX: 25.19 KG/M2 | TEMPERATURE: 97 F | DIASTOLIC BLOOD PRESSURE: 63 MMHG | RESPIRATION RATE: 20 BRPM | OXYGEN SATURATION: 97 % | WEIGHT: 156.75 LBS | HEART RATE: 85 BPM | SYSTOLIC BLOOD PRESSURE: 150 MMHG | HEIGHT: 66 IN

## 2019-12-10 PROCEDURE — 99217 PR OBSERVATION CARE DISCHARGE: CPT | Mod: ,,, | Performed by: INTERNAL MEDICINE

## 2019-12-10 PROCEDURE — 99900035 HC TECH TIME PER 15 MIN (STAT)

## 2019-12-10 PROCEDURE — 94761 N-INVAS EAR/PLS OXIMETRY MLT: CPT

## 2019-12-10 PROCEDURE — 25000003 PHARM REV CODE 250: Performed by: NURSE PRACTITIONER

## 2019-12-10 PROCEDURE — 25000003 PHARM REV CODE 250: Performed by: INTERNAL MEDICINE

## 2019-12-10 PROCEDURE — 25000242 PHARM REV CODE 250 ALT 637 W/ HCPCS: Performed by: INTERNAL MEDICINE

## 2019-12-10 PROCEDURE — 99217 PR OBSERVATION CARE DISCHARGE: ICD-10-PCS | Mod: ,,, | Performed by: INTERNAL MEDICINE

## 2019-12-10 PROCEDURE — 94640 AIRWAY INHALATION TREATMENT: CPT

## 2019-12-10 RX ORDER — CARVEDILOL 3.12 MG/1
3.12 TABLET ORAL 2 TIMES DAILY
Qty: 60 TABLET | Refills: 11 | Status: SHIPPED | OUTPATIENT
Start: 2019-12-10 | End: 2020-12-09

## 2019-12-10 RX ORDER — CARVEDILOL 3.12 MG/1
3.12 TABLET ORAL 2 TIMES DAILY
Status: DISCONTINUED | OUTPATIENT
Start: 2019-12-10 | End: 2019-12-10 | Stop reason: HOSPADM

## 2019-12-10 RX ORDER — FUROSEMIDE 20 MG/1
20 TABLET ORAL DAILY
Status: DISCONTINUED | OUTPATIENT
Start: 2019-12-10 | End: 2019-12-10 | Stop reason: HOSPADM

## 2019-12-10 RX ADMIN — CARVEDILOL 3.12 MG: 3.12 TABLET, FILM COATED ORAL at 08:12

## 2019-12-10 RX ADMIN — IPRATROPIUM BROMIDE 0.5 MG: 0.5 SOLUTION RESPIRATORY (INHALATION) at 12:12

## 2019-12-10 RX ADMIN — GABAPENTIN 600 MG: 300 CAPSULE ORAL at 08:12

## 2019-12-10 RX ADMIN — CLOPIDOGREL BISULFATE 75 MG: 75 TABLET ORAL at 08:12

## 2019-12-10 RX ADMIN — PILOCARPINE HYDROCHLORIDE 5 MG: 5 TABLET, FILM COATED ORAL at 05:12

## 2019-12-10 RX ADMIN — ARFORMOTEROL TARTRATE 15 MCG: 15 SOLUTION RESPIRATORY (INHALATION) at 07:12

## 2019-12-10 RX ADMIN — FUROSEMIDE 20 MG: 20 TABLET ORAL at 08:12

## 2019-12-10 RX ADMIN — ASPIRIN 81 MG 81 MG: 81 TABLET ORAL at 08:12

## 2019-12-10 RX ADMIN — ATORVASTATIN CALCIUM 80 MG: 40 TABLET, FILM COATED ORAL at 08:12

## 2019-12-10 RX ADMIN — PANTOPRAZOLE SODIUM 40 MG: 40 TABLET, DELAYED RELEASE ORAL at 08:12

## 2019-12-10 RX ADMIN — IPRATROPIUM BROMIDE 0.5 MG: 0.5 SOLUTION RESPIRATORY (INHALATION) at 07:12

## 2019-12-10 NOTE — TELEPHONE ENCOUNTER
----- Message from EDY Mendoza sent at 12/10/2019 11:29 AM CST -----  Patient discharged today    Needs hospital follow up/post cath arranged with Dr. Nahid Isbell

## 2019-12-10 NOTE — PLAN OF CARE
Patient AAOX 4. VSS  Patient remained afebrile throughout shift.  Patient remained free of falls this shift  Patient 5/10 of pain this shift  Percocet pain medication administered as ordered.  Plan of care reviewed.  Patient verbalized understanding.  Patient moving/turning independently  Frequent weight shifting encouraged.  Patient NSR on monitor  Bed low, side rails up x2, wheels locked, call light in reach.  Bed alarm maintained for safety.  Patient instructed to call for assistance.  Hourly rounding completed.  Will continue to monitor.

## 2019-12-10 NOTE — DISCHARGE SUMMARY
[facility]  Cardiology  Discharge Summary      Patient Name: Noble Tripp  MRN: [id number]  Admission Date: 12/9/2019  Hospital Length of Stay: 0 days  Discharge Date and Time:  12/10/2019 8:41 AM  Attending Physician: Abel Beltran MD    Discharging Provider: EDY Brunner  Primary Care Physician: Dwaine Davis MD    HPI:   Noble Tripp is a 74 year old male who presented to [facility] for elective LHC for possible PCI. LHC revealed RCA with  recanalized but unable to cross, LAD 50% IFR 0.84, Diagonal IFR 0.98, Non-obstructive LCX, RCA fills from LAD. Angioseal still bleeding and extravasation at the site needing balloon of profunda with no extravasation. He was placed in outpatient recovery overnight. Patient seen and examined by myself and Dr. Beltran. Deemed stable for discharge. Will attempt to optimize medical therapy, resume Coreg today. No ACei/ARB given elevated K+ and hypotension issues, will attempt to add as OP if able.     Procedure(s) (LRB):  PTCA, USING BALLOON/ IABP or Impella multivessel angioplasty/poss stent (Right)     Indwelling Lines/Drains at time of discharge:  Lines/Drains/Airways     None                 Hospital Course:  No notes on file    Consults:     Significant Diagnostic Studies: Labs: BMP: No results for input(s): GLU, NA, K, CL, CO2, BUN, CREATININE, CALCIUM, MG in the last 48 hours., CBC No results for input(s): WBC, HGB, HCT, PLT in the last 48 hours. and All labs within the past 24 hours have been reviewed    Pending Diagnostic Studies:     None          Final Active Diagnoses:    Diagnosis Date Noted POA    PRINCIPAL PROBLEM:  CHF (congestive heart failure) [I50.9] 12/09/2019 Yes      Problems Resolved During this Admission:     * CHF (congestive heart failure)  -Ok to discharge home with close cardiology follow up  -Continue lipitor and Repatha as OP for better lipid control  -Resume low dose coreg today  -Continue ASA, Plavix,  lasix  -No ACEi/ARB for now given elevated K+ and issues with hypotension  -Will try to add ACEi/ARB as OP if able  -OP Cardiology follow up, clinic will call to arrange.         Discharged Condition: good    Disposition: Home or Self Care    Follow Up:  Follow-up Information     Andres Whitfield MD In 1 week.    Specialties:  Cardiology, Cardiovascular Disease  Contact information:  [id number]  [place] [address] [phone]                 Patient Instructions:      Diet Cardiac     Other restrictions (specify):   Order Comments: NO heavy lifting  NO bathing for 3 days  NO driving for 3 days     Notify your health care provider if you experience any of the following:  temperature >100.4     Notify your health care provider if you experience any of the following:  redness, tenderness, or signs of infection (pain, swelling, redness, odor or green/yellow discharge around incision site)     Notify your health care provider if you experience any of the following:  severe uncontrolled pain     Notify your health care provider if you experience any of the following:  persistent dizziness, light-headedness, or visual disturbances     Remove dressing in 24 hours     Activity as tolerated     Medications:  Reconciled Home Medications:      Medication List      START taking these medications    carvedilol 3.125 MG tablet  Commonly known as:  COREG  Take 1 tablet (3.125 mg total) by mouth 2 (two) times daily.        CONTINUE taking these medications    * ProAir HFA 90 mcg/actuation inhaler  Generic drug:  albuterol  INL 2 PFS PO INTO THE LUNGS Q 4 H PRF WHZ OR SOB     * albuterol 2.5 mg /3 mL (0.083 %) nebulizer solution  Commonly known as:  PROVENTIL  Take 3 mLs (2.5 mg total) by nebulization every 8 (eight) hours while awake.     arformoterol 15 mcg/2 mL Nebu  Commonly known as:  Brovana  Take 2 mLs (15 mcg total) by nebulization 2 (two) times daily. Controller     aspirin 81 mg Tab  Take 1 tablet by mouth  Daily. Over the counter to help prevent stroke/heart attack     atorvastatin 80 MG tablet  Commonly known as:  LIPITOR  Take 1 tablet (80 mg total) by mouth once daily.     clopidogrel 75 mg tablet  Commonly known as:  PLAVIX  Take 1 tablet (75 mg total) by mouth once daily.     clotrimazole 10 mg elissa  Commonly known as:  MYCELEX     Comp-Air Nebulizer Compressor Bailey  Generic drug:  nebulizer and compressor  Use as directed     evolocumab 140 mg/mL Pnij  Commonly known as:  Repatha SureClick  Inject 1 mL (140 mg total) into the skin every 14 (fourteen) days.     furosemide 40 MG tablet  Commonly known as:  LASIX  Take 1 tablet (40 mg total) by mouth once daily.     gabapentin 600 MG tablet  Commonly known as:  NEURONTIN  Take 1 tablet (600 mg total) by mouth 3 (three) times daily.     loratadine 10 mg tablet  Commonly known as:  CLARITIN  Take 1 tablet by mouth daily as needed.     melatonin 10 mg Tab  Take 1 tablet by mouth every evening.     oxyCODONE-acetaminophen 7.5-325 mg per tablet  Commonly known as:  PERCOCET  Take 1 tablet by mouth every 8 (eight) hours as needed for Pain.     OXYGEN-AIR DELIVERY SYSTEMS MISC  Inhale 2-3 L into the lungs continuous.     pantoprazole 40 MG tablet  Commonly known as:  PROTONIX  TAKE ONE TABLET BY MOUTH ONCE DAILY     pilocarpine 5 MG Tab  Commonly known as:  SALAGEN  TAKE ONE TABLET BY MOUTH 30 MINUTES PRIOR TO EACH MEAL     PROBIOTIC ORAL  Take 1 tablet by mouth once daily.     sertraline 50 MG tablet  Commonly known as:  ZOLOFT  Take 1 tablet (50 mg total) by mouth once daily.     Spiriva with HandiHaler 18 mcg inhalation capsule  Generic drug:  tiotropium  INHALE 1 CAPSULE BY MOUTH ONCE DAILY         * This list has 2 medication(s) that are the same as other medications prescribed for you. Read the directions carefully, and ask your doctor or other care provider to review them with you.            STOP taking these medications    Chantix Continuing Month Box 1 mg  Tab  Generic drug:  varenicline     diphenhydrAMINE 50 MG capsule  Commonly known as:  BENADRYL     famotidine 40 MG tablet  Commonly known as:  PEPCID     predniSONE 50 MG Tab  Commonly known as:  DELTASONE            Time spent on the discharge of patient: 30 minutes    RUSSELL Brunner-CHEVY  Cardiology  [facility]

## 2019-12-10 NOTE — ASSESSMENT & PLAN NOTE
-Ok to discharge home with close cardiology follow up  -Continue lipitor and Repatha as OP for better lipid control  -Resume low dose coreg today  -Continue ASA, Plavix, lasix  -No ACEi/ARB for now given elevated K+ and issues with hypotension  -Will try to add ACEi/ARB as OP if able  -OP Cardiology follow up, clinic will call to arrange.    Cheek To Nose Interpolation Flap Division And Inset Text: Division and inset of the cheek to nose interpolation flap was performed to achieve optimal aesthetic result, restore normal anatomic appearance and avoid distortion of normal anatomy, expedite and facilitate wound healing, achieve optimal functional result and because linear closure either not possible or would produce suboptimal result. The patient was prepped and draped in the usual manner. The pedicle was infiltrated with local anesthesia. The pedicle was sectioned with a #15 blade. The pedicle was de-bulked and trimmed to match the shape of the defect. Hemostasis was achieved. The flap donor site and free margin of the flap were secured with deep buried sutures and the wound edges were re-approximated.

## 2019-12-10 NOTE — PLAN OF CARE
Patient admitted to tele room 247 this shift from cath lab via stretcher.  Plan of care reviewed with patient and family at bedside; both verbalized understanding.   Pt remains free from falls this shift, fall precautions in place.  Pt remains free from skin breakdown; able to turn self in bed.   AAOx4, VSS, NAD noted at this time.  Pt remained afebrile.  NSR on the tele monitor.  Pain controled by PRN percocet.   Pt currently resting comfortably in bed.  Hourly rounding complete.  Multiple family members remains at the bedside.  Will continue to monitor.

## 2019-12-10 NOTE — TELEPHONE ENCOUNTER
Returned call - told pt should have instructions on his discharge paperwork but he could take the dressings off tomorrow and shower - no baths until the end of the week - treat as normal and look for S & S of infection swelling - redness drainage - etc... -if any then to call us  Pt verbalized understanding

## 2019-12-10 NOTE — NURSING
Discharge instructions reviewed with patient, patient verbalizes understanding.  PIV removed, catheter intact.  Tele box #[id number] removed and returned to monitor room.  Stressed importance to keep and attend all follow up appointments.  Educated patient on the need to make prescribed medication meds.  Instructed patient to call when ride arrives.

## 2019-12-10 NOTE — HPI
Noble Tripp is a 74 year old male who presented to [facility] for elective LHC for possible PCI. LHC revealed RCA with  recanalized but unable to cross, LAD 50% IFR 0.84, Diagonal IFR 0.98, Non-obstructive LCX, RCA fills from LAD. Angioseal still bleeding and extravasation at the site needing balloon of profunda with no extravasation. He was placed in outpatient recovery overnight. Patient seen and examined by myself and Dr. Beltran. Deemed stable for discharge. Will attempt to optimize medical therapy, resume Coreg today. No ACei/ARB given elevated K+ and hypotension issues, will attempt to add as OP if able.

## 2019-12-10 NOTE — TELEPHONE ENCOUNTER
----- Message from Nubia Childs sent at 12/10/2019 10:56 AM CST -----  Contact: Pt  Pt is requesting a callback from nurse says he just got home and did not get any instruction on how to take care of the wounds    Pt can be reached at [phone]

## 2019-12-10 NOTE — TELEPHONE ENCOUNTER
Patient called back in regard to voicemail left for PDM appointment scheduling.     Patient scheduled for initial pulmonary disease management appointment on 1/9/20, 0900 AM at [facility]. Patient advised to bring respiratory inhalers to appointment.

## 2019-12-12 RX ORDER — OXYCODONE AND ACETAMINOPHEN 7.5; 325 MG/1; MG/1
1 TABLET ORAL EVERY 8 HOURS PRN
Qty: 90 TABLET | Refills: 0 | Status: SHIPPED | OUTPATIENT
Start: 2019-12-12 | End: 2020-01-11

## 2019-12-12 NOTE — TELEPHONE ENCOUNTER
----- Message from Bethanie Torrez sent at 12/12/2019  1:08 PM CST -----  Contact: Pt  Pt asked that nurse return his call at ([phone])

## 2019-12-15 DIAGNOSIS — M51.36 DDD (DEGENERATIVE DISC DISEASE), LUMBAR: ICD-10-CM

## 2019-12-16 RX ORDER — GABAPENTIN 800 MG/1
TABLET ORAL
Qty: 90 TABLET | Refills: 1 | Status: SHIPPED | OUTPATIENT
Start: 2019-12-16 | End: 2020-01-09

## 2019-12-17 ENCOUNTER — OFFICE VISIT (OUTPATIENT)
Dept: CARDIOLOGY | Facility: CLINIC | Age: 74
End: 2019-12-17
Payer: MEDICARE

## 2019-12-17 VITALS
BODY MASS INDEX: 23.16 KG/M2 | SYSTOLIC BLOOD PRESSURE: 126 MMHG | WEIGHT: 143.5 LBS | DIASTOLIC BLOOD PRESSURE: 58 MMHG | HEART RATE: 64 BPM | OXYGEN SATURATION: 95 %

## 2019-12-17 DIAGNOSIS — E78.00 PURE HYPERCHOLESTEROLEMIA: ICD-10-CM

## 2019-12-17 DIAGNOSIS — I25.10 CORONARY ARTERY DISEASE, ANGINA PRESENCE UNSPECIFIED, UNSPECIFIED VESSEL OR LESION TYPE, UNSPECIFIED WHETHER NATIVE OR TRANSPLANTED HEART: Primary | ICD-10-CM

## 2019-12-17 DIAGNOSIS — I50.22 CHRONIC SYSTOLIC CONGESTIVE HEART FAILURE: ICD-10-CM

## 2019-12-17 DIAGNOSIS — I25.5 ISCHEMIC CARDIOMYOPATHY: ICD-10-CM

## 2019-12-17 DIAGNOSIS — G47.33 OSA ON CPAP: Chronic | ICD-10-CM

## 2019-12-17 DIAGNOSIS — I65.21 STENOSIS OF RIGHT CAROTID ARTERY: ICD-10-CM

## 2019-12-17 DIAGNOSIS — I10 ESSENTIAL HYPERTENSION: ICD-10-CM

## 2019-12-17 PROCEDURE — 99213 OFFICE O/P EST LOW 20 MIN: CPT | Mod: PBBFAC | Performed by: INTERNAL MEDICINE

## 2019-12-17 PROCEDURE — 1159F MED LIST DOCD IN RCRD: CPT | Mod: ,,, | Performed by: INTERNAL MEDICINE

## 2019-12-17 PROCEDURE — 1126F PR PAIN SEVERITY QUANTIFIED, NO PAIN PRESENT: ICD-10-PCS | Mod: ,,, | Performed by: INTERNAL MEDICINE

## 2019-12-17 PROCEDURE — 99999 PR PBB SHADOW E&M-EST. PATIENT-LVL III: CPT | Mod: PBBFAC,,, | Performed by: INTERNAL MEDICINE

## 2019-12-17 PROCEDURE — 1159F PR MEDICATION LIST DOCUMENTED IN MEDICAL RECORD: ICD-10-PCS | Mod: ,,, | Performed by: INTERNAL MEDICINE

## 2019-12-17 PROCEDURE — 1126F AMNT PAIN NOTED NONE PRSNT: CPT | Mod: ,,, | Performed by: INTERNAL MEDICINE

## 2019-12-17 PROCEDURE — 99999 PR PBB SHADOW E&M-EST. PATIENT-LVL III: ICD-10-PCS | Mod: PBBFAC,,, | Performed by: INTERNAL MEDICINE

## 2019-12-17 PROCEDURE — 99214 OFFICE O/P EST MOD 30 MIN: CPT | Mod: S$PBB,,, | Performed by: INTERNAL MEDICINE

## 2019-12-17 PROCEDURE — 99214 PR OFFICE/OUTPT VISIT, EST, LEVL IV, 30-39 MIN: ICD-10-PCS | Mod: S$PBB,,, | Performed by: INTERNAL MEDICINE

## 2019-12-17 NOTE — PROGRESS NOTES
Subjective:   Patient ID:  Noble Tripp is a 74 y.o. male who presents for follow up of No chief complaint on file.      73 yo, male, f/u for CAD  PMH PSVT, CAD, CHfrEF 35%, HLD, COPD on home O2, JUANITO on CPAP, vocal cord cancer s/p surgery and subsequent XRT in 2011, and recent tongue precancer mass removal.   10- admitted to [facility] for sepsis/PNA and PSVT on . EKG showed extensive St depression of 1 mm on anterolateral leads. EF 60%. MPi showed fixed inferior perfusion defect. cTn was up to 0.3. PSVT was converted to sinus O/N. Echo in : normal EF, DD.  MPI in :  A small to moderate size fixed defect of moderate to severe intensity that extends from the apical to the base inferior wall of the left ventricle.  EKG on 10-: PSVT, st depression on V3 to V6, I, II and avL.   s/p left thoracentesis d/t pleural effusion.   admitted for CHF and COPD exacerbation. ECHO showed normal EF and BNP 1800, troponin 0.05 and flat. Rx with IV lasix and breathing O2. D/c with lasix 20 mg po daily   admitted for [facility] for worsening SOB. Troponin up to 0.3. ECHo showed EF 35%, LHC showed severe multivessel CAD. Had CVT evaluation and was not cabg candidate due to high risk. Also high risk PCI. Pt was discharged for medical Rx and PCI as op. Discharged with home O2. Om amiodarone 400 mg daily for nonsustained VT in the hospital.  Reviewed [facility] imaging with Dr. Beltran and had PET for viability study. Showing inferior viable myocardium  11/2019 admitted for bradycardia and hypotension. BB, ARB, amio and aldactone on hold.  12/2019 repeat [facility] by Dr. Beltran: rca with  recanalized unable to cross. Lad 50% ifr 0.84, Diagonal ifr 0.98, Nonobstruc. Lcx. rca fills from lad.    Today, states that th epain on the back.  No chest pain. Sleeps with 2 pillows  SUAREZ chronic  Weight stable  Med compliance  Starts to walk      Past Medical History:   Diagnosis Date     "Adrenal adenoma     david;nl fxn w/u;tran 11/18    Aspiration pneumonia 10/15    puree/honey    Benign prostate hyperplasia     CAD (coronary artery disease)     dr padilla    Chronic pain     dr santos    Chronic systolic congestive heart failure 10/17/2019    COPD (chronic obstructive pulmonary disease)     papi    Ex-smoker     11/13    Hyperlipidemia     Ischemic cardiomyopathy 11/22/2019    JUANITO on CPAP     cpap    Osteopenia 1/15 tran 1/18    PSVT (paroxysmal supraventricular tachycardia)     PVD (peripheral vascular disease)     noobs 07 latrell    Pyriform sinus cancer     dr ponce radiation 1/2-14 dr king perales    Squamous cell carcinoma of skin     roberto    Tongue cancer     "superficial" removed 11/14    Vocal cord cancer 2011    Xerostomia     radiation       Past Surgical History:   Procedure Laterality Date    APPENDECTOMY      BRONCHOSCOPY Bilateral 2/5/2019    Procedure: Bronchoscopy;  Surgeon: Santos Cardozo MD;  Location: [facility];  Service: Endoscopy;  Laterality: Bilateral;    COLONOSCOPY N/A 5/1/2017    Procedure: Due for screening colonoscopy;  Surgeon: Anushka Yoder MD;  Location: [facility];  Service: Endoscopy;  Laterality: N/A;    ESOPHAGOGASTRODUODENOSCOPY N/A 5/29/2018    Procedure: ESOPHAGOGASTRODUODENOSCOPY (EGD);  Surgeon: Audie Hill III, MD;  Location: [facility] ENDO;  Service: Endoscopy;  Laterality: N/A;    ESOPHAGOGASTRODUODENOSCOPY N/A 8/29/2018    Procedure: ESOPHAGOGASTRODUODENOSCOPY (EGD);  Surgeon: Audie Hill III, MD;  Location: [facility];  Service: Endoscopy;  Laterality: N/A;    PERCUTANEOUS TRANSLUMINAL BALLOON ANGIOPLASTY OF CORONARY ARTERY Right 12/9/2019    Procedure: PTCA, USING BALLOON/ IABP or Impella multivessel angioplasty/poss stent;  Surgeon: Abel Beltran MD;  Location: [facility] CATH LAB;  Service: Cardiology;  Laterality: Right;    THORACENTESIS Left 8/7/2018    Procedure: Thoracentesis;  Surgeon: Santos Cardozo" MD;  Location: [facility] ENDO;  Service: Endoscopy;  Laterality: Left;    THORACENTESIS Right 2019    Procedure: Thoracentesis;  Surgeon: Santos Cardozo MD;  Location: [facility] ENDO;  Service: Endoscopy;  Laterality: Right;    tongue cancer excision      dr vitale    VOCAL CORD LATERALIZATION, ENDOSCOPIC APPROACH W/ MLB      kris       Social History     Tobacco Use    Smoking status: Former Smoker     Packs/day: 0.50     Years: 55.00     Pack years: 27.50     Types: Cigarettes     Last attempt to quit: 10/1/2019     Years since quittin.2    Smokeless tobacco: Never Used    Tobacco comment: 6-7 cigs/day   Substance Use Topics    Alcohol use: Not Currently    Drug use: No       Family History   Problem Relation Age of Onset    Lung cancer Father         + Smoker    No Known Problems Mother     Lung cancer Brother         + Smoker         Review of Systems   Constitution: Positive for malaise/fatigue. Negative for decreased appetite, diaphoresis, fever and night sweats.   HENT: Negative for nosebleeds.    Eyes: Negative for blurred vision and double vision.   Cardiovascular: Positive for dyspnea on exertion. Negative for chest pain, claudication, irregular heartbeat, leg swelling, near-syncope, orthopnea, palpitations, paroxysmal nocturnal dyspnea and syncope.   Respiratory: Positive for shortness of breath. Negative for sleep disturbances due to breathing, snoring, sputum production and wheezing.    Endocrine: Negative for cold intolerance and polyuria.   Hematologic/Lymphatic: Does not bruise/bleed easily.   Skin: Negative for rash.   Musculoskeletal: Positive for back pain. Negative for falls, joint pain, joint swelling and neck pain.   Gastrointestinal: Negative for abdominal pain, heartburn, nausea and vomiting.   Genitourinary: Negative for dysuria, frequency and hematuria.   Neurological: Negative for difficulty with concentration, dizziness, focal weakness, headaches, light-headedness,  numbness, seizures and weakness.   Psychiatric/Behavioral: Negative for depression, memory loss and substance abuse. The patient does not have insomnia.    Allergic/Immunologic: Negative for HIV exposure and hives.       Objective:   Physical Exam   Constitutional: He is oriented to person, place, and time. He appears well-nourished.   HENT:   Head: Normocephalic.   Eyes: Pupils are equal, round, and reactive to light.   Neck: Normal carotid pulses and no JVD present. Carotid bruit is not present. No thyromegaly present.   Cardiovascular: Normal rate, regular rhythm, normal heart sounds and normal pulses.  No extrasystoles are present. PMI is not displaced. Exam reveals no gallop and no S3.   No murmur heard.  Pulses:       Carotid pulses are on the right side with bruit.  Pulmonary/Chest: Breath sounds normal. No stridor. No respiratory distress.   Abdominal: Soft. Bowel sounds are normal. There is no tenderness. There is no rebound.   Musculoskeletal: Normal range of motion.   Neurological: He is alert and oriented to person, place, and time.   Skin: Skin is intact. No rash noted.   Psychiatric: His behavior is normal.       Lab Results   Component Value Date    CHOL 77 (L) 02/06/2019    CHOL 98 (L) 08/13/2018    CHOL 204 (H) 11/14/2017     Lab Results   Component Value Date    HDL 44 02/06/2019    HDL 43 08/13/2018    HDL 43 11/14/2017     Lab Results   Component Value Date    LDLCALC 25.0 (L) 02/06/2019    LDLCALC 39.8 (L) 08/13/2018    LDLCALC 147.8 11/14/2017     Lab Results   Component Value Date    TRIG 40 02/06/2019    TRIG 76 08/13/2018    TRIG 66 11/14/2017     Lab Results   Component Value Date    CHOLHDL 57.1 (H) 02/06/2019    CHOLHDL 43.9 08/13/2018    CHOLHDL 21.1 11/14/2017       Chemistry        Component Value Date/Time     12/05/2019 1024    K 5.2 (H) 12/05/2019 1024     12/05/2019 1024    CO2 26 12/05/2019 1024    BUN 13 12/05/2019 1024    CREATININE 1.0 12/05/2019 1024     (H)  12/05/2019 1024        Component Value Date/Time    CALCIUM 9.0 12/05/2019 1024    ALKPHOS 66 11/11/2019 1119    AST 26 11/11/2019 1119    ALT 43 11/11/2019 1119    BILITOT 0.5 11/11/2019 1119    ESTGFRAFRICA >60.0 12/05/2019 1024    EGFRNONAA >60.0 12/05/2019 1024          Lab Results   Component Value Date    HGBA1C 5.4 02/06/2019     Lab Results   Component Value Date    TSH 1.259 02/06/2019     Lab Results   Component Value Date    INR 1.0 12/05/2019    INR 1.0 10/26/2019    INR 1.0 10/25/2019     Lab Results   Component Value Date    WBC 11.23 12/05/2019    HGB 10.2 (L) 12/05/2019    HCT 33.2 (L) 12/05/2019     (H) 12/05/2019     12/05/2019     BMP  Sodium   Date Value Ref Range Status   12/05/2019 138 136 - 145 mmol/L Final     Potassium   Date Value Ref Range Status   12/05/2019 5.2 (H) 3.5 - 5.1 mmol/L Final     Chloride   Date Value Ref Range Status   12/05/2019 105 95 - 110 mmol/L Final     CO2   Date Value Ref Range Status   12/05/2019 26 23 - 29 mmol/L Final     BUN, Bld   Date Value Ref Range Status   12/05/2019 13 8 - 23 mg/dL Final     Creatinine   Date Value Ref Range Status   12/05/2019 1.0 0.5 - 1.4 mg/dL Final     Calcium   Date Value Ref Range Status   12/05/2019 9.0 8.7 - 10.5 mg/dL Final     Anion Gap   Date Value Ref Range Status   12/05/2019 7 (L) 8 - 16 mmol/L Final     eGFR if    Date Value Ref Range Status   12/05/2019 >60.0 >60 mL/min/1.73 m^2 Final     eGFR if non    Date Value Ref Range Status   12/05/2019 >60.0 >60 mL/min/1.73 m^2 Final     Comment:     Calculation used to obtain the estimated glomerular filtration  rate (eGFR) is the CKD-EPI equation.        BNP  @LABRCNTIP(BNP,BNPTRIAGEBLO)@  @LABRCNTIP(troponini)@  CrCl cannot be calculated (Patient's most recent lab result is older than the maximum 7 days allowed.).  No results found in the last 24 hours.  No results found in the last 24 hours.  No results found in the last 24  hours.    Assessment:      1. Coronary artery disease, angina presence unspecified, unspecified vessel or lesion type, unspecified whether native or transplanted heart    2. Chronic systolic congestive heart failure    3. Stenosis of right carotid artery    4. Essential hypertension    5. Pure hypercholesterolemia    6. Ischemic cardiomyopathy    7. JUANITO on CPAP      CAD no CP  CHFrEF compeansted  Plan:   Repeat Lipid profile  Continue ASA, Plavix, Lipitor, Repatha, coreg   ARB and Aldactone were held recently for tahmina and hyperkalemia. Will resume once stable  Recommend heart-healthy diet, weight control and regular exercise.  Yamile. Risk modification.   RTC in 3 months    I have reviewed all pertinent labs and cardiac studies. Plans and recommendations have been formulated under my direct supervision. All questions answered and patient voiced understanding. Patient to continue current medications.

## 2019-12-19 ENCOUNTER — LAB VISIT (OUTPATIENT)
Dept: LAB | Facility: HOSPITAL | Age: 74
End: 2019-12-19
Attending: NURSE PRACTITIONER
Payer: MEDICARE

## 2019-12-19 DIAGNOSIS — Z86.39 HISTORY OF IRON DEFICIENCY: ICD-10-CM

## 2019-12-19 DIAGNOSIS — I25.10 CORONARY ARTERY DISEASE, ANGINA PRESENCE UNSPECIFIED, UNSPECIFIED VESSEL OR LESION TYPE, UNSPECIFIED WHETHER NATIVE OR TRANSPLANTED HEART: ICD-10-CM

## 2019-12-19 LAB
ALBUMIN SERPL BCP-MCNC: 3.2 G/DL (ref 3.5–5.2)
ALP SERPL-CCNC: 81 U/L (ref 55–135)
ALT SERPL W/O P-5'-P-CCNC: 14 U/L (ref 10–44)
ANION GAP SERPL CALC-SCNC: 8 MMOL/L (ref 8–16)
AST SERPL-CCNC: 14 U/L (ref 10–40)
BASOPHILS # BLD AUTO: 0.12 K/UL (ref 0–0.2)
BASOPHILS NFR BLD: 1.3 % (ref 0–1.9)
BILIRUB SERPL-MCNC: 0.5 MG/DL (ref 0.1–1)
BUN SERPL-MCNC: 18 MG/DL (ref 8–23)
CALCIUM SERPL-MCNC: 9.1 MG/DL (ref 8.7–10.5)
CHLORIDE SERPL-SCNC: 102 MMOL/L (ref 95–110)
CHOLEST SERPL-MCNC: 80 MG/DL (ref 120–199)
CHOLEST/HDLC SERPL: 1.9 {RATIO} (ref 2–5)
CO2 SERPL-SCNC: 33 MMOL/L (ref 23–29)
CREAT SERPL-MCNC: 0.9 MG/DL (ref 0.5–1.4)
DIFFERENTIAL METHOD: ABNORMAL
EOSINOPHIL # BLD AUTO: 0.2 K/UL (ref 0–0.5)
EOSINOPHIL NFR BLD: 2.5 % (ref 0–8)
ERYTHROCYTE [DISTWIDTH] IN BLOOD BY AUTOMATED COUNT: 16.5 % (ref 11.5–14.5)
EST. GFR  (AFRICAN AMERICAN): >60 ML/MIN/1.73 M^2
EST. GFR  (NON AFRICAN AMERICAN): >60 ML/MIN/1.73 M^2
GLUCOSE SERPL-MCNC: 99 MG/DL (ref 70–110)
HCT VFR BLD AUTO: 34 % (ref 40–54)
HDLC SERPL-MCNC: 42 MG/DL (ref 40–75)
HDLC SERPL: 52.5 % (ref 20–50)
HGB BLD-MCNC: 11.1 G/DL (ref 14–18)
IMM GRANULOCYTES # BLD AUTO: 0.36 K/UL (ref 0–0.04)
IMM GRANULOCYTES NFR BLD AUTO: 3.9 % (ref 0–0.5)
IRON SERPL-MCNC: 71 UG/DL (ref 45–160)
LDLC SERPL CALC-MCNC: 28 MG/DL (ref 63–159)
LYMPHOCYTES # BLD AUTO: 1.2 K/UL (ref 1–4.8)
LYMPHOCYTES NFR BLD: 13.6 % (ref 18–48)
MCH RBC QN AUTO: 31.2 PG (ref 27–31)
MCHC RBC AUTO-ENTMCNC: 32.6 G/DL (ref 32–36)
MCV RBC AUTO: 96 FL (ref 82–98)
MONOCYTES # BLD AUTO: 0.8 K/UL (ref 0.3–1)
MONOCYTES NFR BLD: 8.5 % (ref 4–15)
NEUTROPHILS # BLD AUTO: 6.4 K/UL (ref 1.8–7.7)
NEUTROPHILS NFR BLD: 70.2 % (ref 38–73)
NONHDLC SERPL-MCNC: 38 MG/DL
NRBC BLD-RTO: 0 /100 WBC
PLATELET # BLD AUTO: 274 K/UL (ref 150–350)
PMV BLD AUTO: 10.8 FL (ref 9.2–12.9)
POTASSIUM SERPL-SCNC: 3.8 MMOL/L (ref 3.5–5.1)
PROT SERPL-MCNC: 6.9 G/DL (ref 6–8.4)
RBC # BLD AUTO: 3.56 M/UL (ref 4.6–6.2)
SATURATED IRON: 32 % (ref 20–50)
SODIUM SERPL-SCNC: 143 MMOL/L (ref 136–145)
TOTAL IRON BINDING CAPACITY: 219 UG/DL (ref 250–450)
TRANSFERRIN SERPL-MCNC: 148 MG/DL (ref 200–375)
TRIGL SERPL-MCNC: 50 MG/DL (ref 30–150)
WBC # BLD AUTO: 9.14 K/UL (ref 3.9–12.7)

## 2019-12-19 PROCEDURE — 85025 COMPLETE CBC W/AUTO DIFF WBC: CPT

## 2019-12-19 PROCEDURE — 80061 LIPID PANEL: CPT

## 2019-12-19 PROCEDURE — 83540 ASSAY OF IRON: CPT

## 2019-12-19 PROCEDURE — 82728 ASSAY OF FERRITIN: CPT

## 2019-12-19 PROCEDURE — 80053 COMPREHEN METABOLIC PANEL: CPT

## 2019-12-20 LAB — FERRITIN SERPL-MCNC: 1962 NG/ML (ref 20–300)

## 2019-12-23 ENCOUNTER — OFFICE VISIT (OUTPATIENT)
Dept: HEMATOLOGY/ONCOLOGY | Facility: CLINIC | Age: 74
End: 2019-12-23
Payer: MEDICARE

## 2019-12-23 VITALS
SYSTOLIC BLOOD PRESSURE: 106 MMHG | WEIGHT: 142.19 LBS | BODY MASS INDEX: 21.55 KG/M2 | DIASTOLIC BLOOD PRESSURE: 57 MMHG | HEIGHT: 68 IN | TEMPERATURE: 98 F | HEART RATE: 66 BPM | OXYGEN SATURATION: 91 %

## 2019-12-23 DIAGNOSIS — G47.33 OSA ON CPAP: Chronic | ICD-10-CM

## 2019-12-23 DIAGNOSIS — D64.9 CHRONIC ANEMIA: Primary | ICD-10-CM

## 2019-12-23 DIAGNOSIS — Z86.39 HISTORY OF IRON DEFICIENCY: ICD-10-CM

## 2019-12-23 DIAGNOSIS — I50.9 CONGESTIVE HEART FAILURE, UNSPECIFIED HF CHRONICITY, UNSPECIFIED HEART FAILURE TYPE: Primary | ICD-10-CM

## 2019-12-23 DIAGNOSIS — D35.00 BENIGN NEOPLASM OF ADRENAL GLAND, UNSPECIFIED LATERALITY: ICD-10-CM

## 2019-12-23 DIAGNOSIS — M85.80 OSTEOPENIA, UNSPECIFIED LOCATION: ICD-10-CM

## 2019-12-23 PROCEDURE — 1159F PR MEDICATION LIST DOCUMENTED IN MEDICAL RECORD: ICD-10-PCS | Mod: ,,, | Performed by: NURSE PRACTITIONER

## 2019-12-23 PROCEDURE — 99999 PR PBB SHADOW E&M-EST. PATIENT-LVL III: CPT | Mod: PBBFAC,,, | Performed by: NURSE PRACTITIONER

## 2019-12-23 PROCEDURE — 99214 PR OFFICE/OUTPT VISIT, EST, LEVL IV, 30-39 MIN: ICD-10-PCS | Mod: S$PBB,,, | Performed by: NURSE PRACTITIONER

## 2019-12-23 PROCEDURE — 99999 PR PBB SHADOW E&M-EST. PATIENT-LVL III: ICD-10-PCS | Mod: PBBFAC,,, | Performed by: NURSE PRACTITIONER

## 2019-12-23 PROCEDURE — 1125F PR PAIN SEVERITY QUANTIFIED, PAIN PRESENT: ICD-10-PCS | Mod: ,,, | Performed by: NURSE PRACTITIONER

## 2019-12-23 PROCEDURE — 1125F AMNT PAIN NOTED PAIN PRSNT: CPT | Mod: ,,, | Performed by: NURSE PRACTITIONER

## 2019-12-23 PROCEDURE — 99214 OFFICE O/P EST MOD 30 MIN: CPT | Mod: S$PBB,,, | Performed by: NURSE PRACTITIONER

## 2019-12-23 PROCEDURE — 99213 OFFICE O/P EST LOW 20 MIN: CPT | Mod: PBBFAC | Performed by: NURSE PRACTITIONER

## 2019-12-23 PROCEDURE — 1159F MED LIST DOCD IN RCRD: CPT | Mod: ,,, | Performed by: NURSE PRACTITIONER

## 2019-12-23 NOTE — TELEPHONE ENCOUNTER
----- Message from Michelle Waterford sent at 12/23/2019  8:41 AM CST -----  Contact: pt   Type:  RX Refill Request    Who Called:  pt  Refill or New Rx:refill  RX Name and Strength:furosemide (LASIX) 40 MG tablet  How is the patient currently taking it? (ex. 1XDay): 1XDay  Is this a 30 day or 90 day RX: 30  Preferred Pharmacy with phone number: listed below   Local or Mail Order: local   Ordering Provider: Dr Harris  Would the patient rather a call back or a response via [facility]? Call back  Best Call Back Number: [phone]  Additional Information:       [facility] [address] [place] [id number]  Phone: [phone] Fax: [phone]

## 2019-12-23 NOTE — PROGRESS NOTES
Subjective:       Patient ID: Noble Tripp is a 74 y.o. male.    Chief Complaint: Anemia    73 year old male, presents for evaluation of anemia. Patient has history of iron deficiency treated in the past with oral iron replacement. Patient states he has had difficulty with oral iron replacement in the past due to severe constipation. Patient is seen by Dr. Oleary for ongoing monitoring of history of tongue cancer, he is UTD with followup. Patient has had an EGD and colonoscopy and these tests were negative for source of a GI bleed. Patient denies melena, hematemesis, difficulty swallowing, palpitations, dizziness, severe fatigue.      Today's visit:    Patient reports improved fatigue.      Anemia   There has been no bruising/bleeding easily, confusion, fever, light-headedness or palpitations.     Review of Systems   Constitutional: Positive for fatigue. Negative for activity change, appetite change, chills, diaphoresis, fever and unexpected weight change.   HENT: Negative for congestion, hearing loss, nosebleeds, postnasal drip, sore throat and trouble swallowing.    Eyes: Negative for discharge and visual disturbance.   Respiratory: Negative for cough, chest tightness and shortness of breath.    Cardiovascular: Negative for chest pain, palpitations and leg swelling.   Gastrointestinal: Negative for constipation, diarrhea, nausea and vomiting.   Endocrine: Negative for cold intolerance and heat intolerance.   Genitourinary: Negative for difficulty urinating, dysuria, flank pain and hematuria.   Musculoskeletal: Positive for arthralgias and back pain. Negative for myalgias.   Skin: Negative.    Neurological: Positive for headaches. Negative for dizziness, weakness and light-headedness.   Hematological: Negative for adenopathy. Does not bruise/bleed easily.   Psychiatric/Behavioral: Negative for agitation, behavioral problems and confusion. The patient is nervous/anxious.        Medication List with  Changes/Refills   Current Medications    ALBUTEROL (PROAIR HFA) 90 MCG/ACTUATION INHALER    INL 2 PFS PO INTO THE LUNGS Q 4 H PRF WHZ OR SOB    ALBUTEROL (PROVENTIL) 2.5 MG /3 ML (0.083 %) NEBULIZER SOLUTION    Take 3 mLs (2.5 mg total) by nebulization every 8 (eight) hours while awake.    ARFORMOTEROL (BROVANA) 15 MCG/2 ML NEBU    Take 2 mLs (15 mcg total) by nebulization 2 (two) times daily. Controller    ASPIRIN 81 MG TAB    Take 1 tablet by mouth Daily. Over the counter to help prevent stroke/heart attack    ATORVASTATIN (LIPITOR) 80 MG TABLET    Take 1 tablet (80 mg total) by mouth once daily.    CARVEDILOL (COREG) 3.125 MG TABLET    Take 1 tablet (3.125 mg total) by mouth 2 (two) times daily.    CLOPIDOGREL (PLAVIX) 75 MG TABLET    Take 1 tablet (75 mg total) by mouth once daily.    CLOTRIMAZOLE (MYCELEX) 10 MG BUFFY        EVOLOCUMAB (REPATHA SURECLICK) 140 MG/ML PNIJ    Inject 1 mL (140 mg total) into the skin every 14 (fourteen) days.    FUROSEMIDE (LASIX) 40 MG TABLET    Take 1 tablet (40 mg total) by mouth once daily.    GABAPENTIN (NEURONTIN) 600 MG TABLET    Take 1 tablet (600 mg total) by mouth 3 (three) times daily.    GABAPENTIN (NEURONTIN) 800 MG TABLET    TAKE 1 TABLET BY MOUTH THREE TIMES DAILY    LACTOBACILLUS ACIDOPHILUS (PROBIOTIC ORAL)    Take 1 tablet by mouth once daily.    LORATADINE (CLARITIN) 10 MG TABLET    Take 1 tablet by mouth daily as needed.     MELATONIN 10 MG TAB    Take 1 tablet by mouth every evening.    NEBULIZER AND COMPRESSOR ANGELO    Use as directed    OXYCODONE-ACETAMINOPHEN (PERCOCET) 7.5-325 MG PER TABLET    Take 1 tablet by mouth every 8 (eight) hours as needed for Pain.    OXYGEN-AIR DELIVERY SYSTEMS MISC    Inhale 2-3 L into the lungs continuous.    PANTOPRAZOLE (PROTONIX) 40 MG TABLET    TAKE ONE TABLET BY MOUTH ONCE DAILY    PILOCARPINE (SALAGEN) 5 MG TAB    TAKE ONE TABLET BY MOUTH 30 MINUTES PRIOR TO EACH MEAL    SERTRALINE (ZOLOFT) 50 MG TABLET    Take 1 tablet  (50 mg total) by mouth once daily.    SPIRIVA WITH HANDIHALER 18 MCG INHALATION CAPSULE    INHALE 1 CAPSULE BY MOUTH ONCE DAILY     Objective:     Vitals:    12/23/19 1023   BP: (!) 106/57   Pulse: 66   Temp: 97.6 °F (36.4 °C)     Physical Exam   Constitutional: He is oriented to person, place, and time. He appears well-developed and well-nourished. No distress.   HENT:   Head: Normocephalic and atraumatic.   Right Ear: Hearing and external ear normal.   Left Ear: Hearing and external ear normal.   Nose: Nose normal. No mucosal edema or rhinorrhea. No epistaxis.   Mouth/Throat: Uvula is midline, oropharynx is clear and moist and mucous membranes are normal.   Eyes: Pupils are equal, round, and reactive to light. Conjunctivae and EOM are normal. Right eye exhibits no chemosis and no discharge. Left eye exhibits no chemosis and no discharge.   Neck: Trachea normal and normal range of motion. Neck supple. No thyroid mass and no thyromegaly present.   Cardiovascular: Normal rate, regular rhythm and intact distal pulses.   Murmur heard.  Pulses:       Dorsalis pedis pulses are 2+ on the right side, and 2+ on the left side.   Pulmonary/Chest: Effort normal and breath sounds normal. No respiratory distress. He has no decreased breath sounds. He has no wheezes.   Abdominal: Soft. Bowel sounds are normal. He exhibits no distension. There is no tenderness.   Musculoskeletal: Normal range of motion. He exhibits no edema.   Lymphadenopathy:     He has no cervical adenopathy.     He has no axillary adenopathy.        Right: No supraclavicular adenopathy present.        Left: No supraclavicular adenopathy present.   Neurological: He is alert and oriented to person, place, and time. He has normal strength.   Skin: Skin is warm, dry and intact. Capillary refill takes less than 2 seconds. No rash noted. He is not diaphoretic. There is pallor.   Psychiatric: His speech is normal and behavior is normal. Judgment and thought content  normal. His mood appears anxious. Cognition and memory are normal.   Vitals reviewed.      Assessment:       Problem List Items Addressed This Visit        Oncology    Adrenal benign neoplasm    Chronic anemia - Primary    Relevant Orders    CBC auto differential    Comprehensive metabolic panel    Ferritin    Iron and TIBC       Endocrine    History of iron deficiency    Relevant Orders    CBC auto differential    Comprehensive metabolic panel    Ferritin    Iron and TIBC       Orthopedic    Osteopenia       Other    JUANITO on CPAP (Chronic)          Plan:       KIM:  --Patient is currently iron repleted.  -- Return to clinic in 3 months with labs prior    I will review assessment/plan with collaborating physician Dr. Hal Pandya.

## 2019-12-23 NOTE — PATIENT INSTRUCTIONS
Iron-Deficiency Anemia (Adult)  Red blood cells carry oxygen to the tissues of your body. Anemia is a condition in which you have too few red blood cells. You need iron to make red cells. Anemia makes you feel tired and run down. When anemia becomes severe, your skin becomes pale. You may feel short of breath after physical activity. Other symptoms include:  · Headaches  · Dizziness  · Leg cramps with physical activity  · Drowsiness  · Restless legs  Your anemia is caused by not having enough iron in your body. This may be because of:  · Loss of blood. This can be caused by heavy menstrual periods. It can also be caused by bleeding from the stomach or intestines.  · Poor diet. You may not be eating enough foods that contain iron.  · Inability to absorb iron from the foods you eat  · Pregnancy  If your blood count is low enough, your healthcare provider may prescribe an iron supplement. It usually takes about 2 to 3 months of treatment with iron supplements to correct anemia. Severe cases of anemia need a blood transfusion to quickly ease symptoms and deliver more oxygen to the cells.  Home care  Follow these guidelines when caring for yourself at home:  · Eat foods high in iron. This will boost the amount of iron stored in your body. It is a natural way to build up the number of blood cells. Good sources of iron include beef, liver, spinach and other dark green leafy vegetables, whole grains, beans, and nuts.  · Do not overexert yourself.  · Talk with your healthcare provider before traveling by air or traveling to high altitudes.  Follow-up care  Follow up with your healthcare provider in 2 months, or as advised. This is to have another red blood cell count to be sure your anemia has been fixed.  When to seek medical advice  Call your healthcare provider right away if any of these occur:  · Shortness of breath or chest pain  · Dizziness or fainting  · Vomiting blood or passing red or black-colored stool   Date  Last Reviewed: 2/25/2016  © 3835-6689 [employer]. [address] [place] [address], [place] [address]. All rights reserved. This information is not intended as a substitute for professional medical care. Always follow your healthcare professional's instructions.

## 2019-12-24 RX ORDER — FUROSEMIDE 40 MG/1
40 TABLET ORAL DAILY
Qty: 30 TABLET | Refills: 6 | Status: SHIPPED | OUTPATIENT
Start: 2019-12-24 | End: 2020-02-14 | Stop reason: SDUPTHER

## 2019-12-31 ENCOUNTER — EXTERNAL CHRONIC CARE MANAGEMENT (OUTPATIENT)
Dept: PRIMARY CARE CLINIC | Facility: CLINIC | Age: 74
End: 2019-12-31
Payer: MEDICARE

## 2019-12-31 PROCEDURE — 99490 CHRNC CARE MGMT STAFF 1ST 20: CPT | Mod: PBBFAC | Performed by: INTERNAL MEDICINE

## 2019-12-31 PROCEDURE — 99490 CHRNC CARE MGMT STAFF 1ST 20: CPT | Mod: S$PBB,,, | Performed by: INTERNAL MEDICINE

## 2019-12-31 PROCEDURE — 99490 PR CHRONIC CARE MGMT, 1ST 20 MIN: ICD-10-PCS | Mod: S$PBB,,, | Performed by: INTERNAL MEDICINE

## 2020-01-06 RX ORDER — OXYCODONE AND ACETAMINOPHEN 7.5; 325 MG/1; MG/1
1 TABLET ORAL EVERY 8 HOURS PRN
Qty: 90 TABLET | Refills: 0 | Status: SHIPPED | OUTPATIENT
Start: 2020-01-11 | End: 2020-02-10

## 2020-01-06 RX ORDER — OXYCODONE AND ACETAMINOPHEN 7.5; 325 MG/1; MG/1
1 TABLET ORAL EVERY 8 HOURS PRN
Qty: 90 TABLET | Refills: 0 | Status: SHIPPED | OUTPATIENT
Start: 2020-02-10 | End: 2020-03-11

## 2020-01-08 ENCOUNTER — TELEPHONE (OUTPATIENT)
Dept: PULMONOLOGY | Facility: CLINIC | Age: 75
End: 2020-01-08

## 2020-01-08 NOTE — TELEPHONE ENCOUNTER
Chronic Disease Management:     Called patient to confirm pulmonary disease management appointment for 1/9/20 beginning at 0820 at the [facility]. Patient confirmed. Advised patient to bring respiratory inhalers to visit.

## 2020-01-09 ENCOUNTER — PROCEDURE VISIT (OUTPATIENT)
Dept: PULMONOLOGY | Facility: CLINIC | Age: 75
End: 2020-01-09
Payer: MEDICARE

## 2020-01-09 ENCOUNTER — OFFICE VISIT (OUTPATIENT)
Dept: PAIN MEDICINE | Facility: CLINIC | Age: 75
End: 2020-01-09
Payer: MEDICARE

## 2020-01-09 VITALS
BODY MASS INDEX: 21.22 KG/M2 | SYSTOLIC BLOOD PRESSURE: 111 MMHG | WEIGHT: 140 LBS | HEART RATE: 63 BPM | RESPIRATION RATE: 18 BRPM | HEIGHT: 68 IN | DIASTOLIC BLOOD PRESSURE: 58 MMHG

## 2020-01-09 VITALS
RESPIRATION RATE: 18 BRPM | HEIGHT: 68 IN | HEART RATE: 72 BPM | OXYGEN SATURATION: 95 % | BODY MASS INDEX: 21.22 KG/M2 | WEIGHT: 140 LBS

## 2020-01-09 DIAGNOSIS — M47.816 LUMBAR FACET ARTHROPATHY: ICD-10-CM

## 2020-01-09 DIAGNOSIS — T17.208A: ICD-10-CM

## 2020-01-09 DIAGNOSIS — M51.36 DDD (DEGENERATIVE DISC DISEASE), LUMBAR: Primary | ICD-10-CM

## 2020-01-09 DIAGNOSIS — M53.3 SACROILIAC JOINT PAIN: ICD-10-CM

## 2020-01-09 DIAGNOSIS — J44.9 STAGE 3 SEVERE COPD BY GOLD CLASSIFICATION: Primary | ICD-10-CM

## 2020-01-09 DIAGNOSIS — M48.061 SPINAL STENOSIS OF LUMBAR REGION WITHOUT NEUROGENIC CLAUDICATION: ICD-10-CM

## 2020-01-09 PROCEDURE — 1125F AMNT PAIN NOTED PAIN PRSNT: CPT | Mod: ,,, | Performed by: PHYSICIAN ASSISTANT

## 2020-01-09 PROCEDURE — 94664 DEMO&/EVAL PT USE INHALER: CPT | Mod: PBBFAC

## 2020-01-09 PROCEDURE — 99999 PR PBB SHADOW E&M-EST. PATIENT-LVL V: CPT | Mod: PBBFAC,,, | Performed by: PHYSICIAN ASSISTANT

## 2020-01-09 PROCEDURE — 99215 OFFICE O/P EST HI 40 MIN: CPT | Mod: PBBFAC,25 | Performed by: PHYSICIAN ASSISTANT

## 2020-01-09 PROCEDURE — 99214 PR OFFICE/OUTPT VISIT, EST, LEVL IV, 30-39 MIN: ICD-10-PCS | Mod: S$PBB,,, | Performed by: PHYSICIAN ASSISTANT

## 2020-01-09 PROCEDURE — 99999 PR PBB SHADOW E&M-EST. PATIENT-LVL V: ICD-10-PCS | Mod: PBBFAC,,, | Performed by: PHYSICIAN ASSISTANT

## 2020-01-09 PROCEDURE — 1125F PR PAIN SEVERITY QUANTIFIED, PAIN PRESENT: ICD-10-PCS | Mod: ,,, | Performed by: PHYSICIAN ASSISTANT

## 2020-01-09 PROCEDURE — 99214 OFFICE O/P EST MOD 30 MIN: CPT | Mod: S$PBB,,, | Performed by: PHYSICIAN ASSISTANT

## 2020-01-09 PROCEDURE — 1159F PR MEDICATION LIST DOCUMENTED IN MEDICAL RECORD: ICD-10-PCS | Mod: ,,, | Performed by: PHYSICIAN ASSISTANT

## 2020-01-09 PROCEDURE — 1159F MED LIST DOCD IN RCRD: CPT | Mod: ,,, | Performed by: PHYSICIAN ASSISTANT

## 2020-01-09 RX ORDER — GABAPENTIN 800 MG/1
800 TABLET ORAL 3 TIMES DAILY
Qty: 90 TABLET | Refills: 0 | Status: SHIPPED | OUTPATIENT
Start: 2020-01-09 | End: 2020-03-05 | Stop reason: SDUPTHER

## 2020-01-09 NOTE — PROGRESS NOTES
Chief Pain Complaint:  Low Back Pain, Leg pain (left > right)     Interval History: Since last visit on 9/17/2019, he had an MI and was hospitalized in [place] for several days.  He is doing much better now, followed by Dr. Palacios and Dr. Whitfield, considering surgical intervention.     History of Present Illness:  This patient is a 74 y.o. male who presents today complaining of the above noted pain/s. The patient describes this pain as follows.    - duration of pain: pain for years   - timing: constant   - character: aching, burning  - radiating, dermatomal: extends into left leg, along L5 pattern at times, mostly non-radiating  - antecedent trauma, prior spinal surgery: none  - pertinent negatives: No fever, No chills, No weight loss, No bladder dysfunction, No bowel dysfunction, No extremity weakness, No saddle anesthesia  - pertinent positives: none    - medications, other therapies tried (physical therapy, injections):     >> gabapentin, Norco    >> Has previously undergone Physical Therapy, which made pain worse    >> Has previously undergone spinal injection/s: lumbar MBB and Left TFESI with Dr Taylor in 2014 & 2015 (see EMR Surgeries for full details) with only short term relief    _________________________________________________________________________________________________________________________________________________________________________________________________________________________      IMAGING / Labs / Studies (reviewed on 1/9/2020):    9/15/17 MRI LUMBAR SPINE WITHOUT CONTRAST  History: Lower back pain.  Left lower extremity pain  Technique: Standard multiplanar pulse sequences without IV contrast.  Comparison:  No prior  studies available for comparison.  Findings:   Mild-moderate scoliosis, convex to the left and centered at L3.  All lumbar vertebral bodies are normal in height.  Scattered degenerative endplate marrow signal identified at the L2-L3 and L3-L4 L5-S1 levels.  No fracture.  No  suspicious osseous lesion is identified.  Mild retrolisthesis of L2-L3.  Distal spinal cord and conus medullaris have normal appearance but the conus terminates at the T12-L1 level.  T12-L1: Minimal central protrusion.  Negative for focal nerve root impingement or central canal narrowing.  Neural foramina widely patent.  L1-2: Mild disc height loss.  Prominent intraosseous eyes.  Mild disc bulging.  Mild facet arthropathy and ligament flavum hypertrophy.  The central canal neural foramina patent.  L2-L3: Minimal retrolisthesis of L2 on L3.  There is moderate disc height loss.  Prominent anterior osteophytes.  Mild-moderate diffuse generalized disc bulging.  Mild facet arthropathy and ligament flavum hypertrophy.  The central canal is patent.  Neural foramina patent.  L3-L4: Disc desiccation and moderate disc height loss.  Large anterior osteophytes are present.  There is mild disc bulging and osteophytic ridging at the posterior disc margin.  Moderate right and mild left facet arthropathy and ligamentum flavum hypertrophy.  The central canal is patent.  Mild neural foraminal narrowing.  L4-L5: Very minimal grade 1 spondylolisthesis of L4-L5.  Mild generalized disc bulge is present.  Severe facet arthropathy and ligamentum flavum hypertrophy.  There is severe central canal and lateral recess stenosis.  Moderate neural foraminal stenosis.  L5-S1: Disc desiccation and moderate to severe disc height loss.  Prominent intraocular heights.  There is mild disc bulging and osteophytic ridging at the posterior disc margin.  Severe left and moderate right neural foraminal stenosis.  The central canal is patent.   Paravertebral soft tissues and musculature are normal.  The visualized intra-abdominal and intrapelvic contents are normal.       10/30/14 MRI Lumbar Spine Without Contrast    Narrative Exam: MRI of the lumbar spine without contrast.  History:     Low back pain. Status post SHEY, with S1-S2 radicular  symptoms  Comparison: Prior study dated 04/02/13  Findings:     A convex right scoliosis again noted.  No compression fracture or subluxation.  The conus medullaris has a normal appearance.  The paraspinous soft tissues are unremarkable.  The T12 -- L1 and L1 -- 2 disk levels are essentially unremarkable.  L2 -- 3: Mild annular bulging again noted.    L3 -- 4: Moderate narrowing of the right lateral disk space, with right greater than left facet arthropathy, unchanged.    L4 -- 5: Marked bilateral facet arthropathy, with mild annular bulging, causing moderate central canal stenosis, unchanged.  L5 -- S1: Disk desiccation, with moderate disk space narrowing.  Significant degenerative narrowing of the left L5 neural foramen is apparent, unchanged.     _________________________________________________________________________________________________________________________________________________________________________________________________________________________      Review of Systems:  CONSTITUTIONAL: patient denies any fever, chills, or weight loss  SKIN: patient denies any rash or itching  RESPIRATORY: patient denies having any shortness of breath  GASTROINTESTINAL: patient denies having any diarrhea, constipation, or bowel incontinence  GENITOURINARY: patient denies having any abnormal bladder function    MUSCULOSKELETAL:  - patient reports low back pain    NEUROLOGICAL:   - pain as above  - strength in Lower extremities is intact, BILATERALLY  - sensation in Lower extremities is intact, BILATERALLY  - patient denies any loss of bowel or bladder control      PSYCHIATRIC: patient denies any suicidal or homicidal ideations    _________________________________________________________________________________________________________________________________________________________________________________________________________________________      Physical Exam:  Vitals:  BP (!) 111/58 (BP Location: Right arm,  "Patient Position: Sitting, BP Method: Medium (Automatic))   Pulse 63   Resp 18   Ht 5' 8" (1.727 m)   Wt 63.5 kg (140 lb)   BMI 21.29 kg/m²   (reviewed on 1/9/2020)    General: alert and oriented, in no apparent distress.  Gait: normal gait.  Skin: no rashes, no discoloration, no obvious lesions  HEENT: normocephalic, atraumatic. Pupils equal and round.  Cardiovascular: no significant peripheral edema present.  Respiratory: without use of accessory muscles of respiration.    Musculoskeletal - Lumbar Spine:  - Pain on extension of lumbar spine: Present  - Lumbar facet loading: Present bilaterally  - TTP over the lumbar facet joints: Present Bilaterally, worse at L5-S1   - TTP over the lumbar paraspinals: Present, mild  - TTP over the SI joints: Present on left   - Straight Leg Raise: Negative  - NOELLE: Present  - Pain with internal and external rotation of hip    Neuro - Lower Extremities:  - BLE Strength: R/L: HF: 5/5, HE: 5/5, KF: 5/5; KE: 5/5; FE: 5/5; FF: 5/5  - Extremity Reflexes: Brisk and symmetric throughout  - Sensory: Sensation to light touch intact bilaterally      Psych:  Mood and affect is appropriate    _________________________________________________________________________________________________________________________________________________________________________________________________________________________     Assessment:  Noble Tripp is a 74 y.o. year old male who is presenting with   Encounter Diagnoses   Name Primary?    DDD (degenerative disc disease), lumbar Yes    Sacroiliac joint pain     Lumbar facet arthropathy     Spinal stenosis of lumbar region without neurogenic claudication      Patient returns for follow-up.  He complains of chronic low back pain. Lumbar MRI shows advanced diffuse spondylosis, greatest at the L4-L5 level with severe central canal stenosis and lateral recess stenosis.       Plan:  1. Interventional: Consider Bilateral L3, L4, L5 MBB with local. " We will consider this if pain not controlled with medication, but he is not interested at this time since he does not feel they have not been long lasting in the past.    2. Pharmacologic:   - Refill Percocet 7.5/325 mg PO TID PRN (90 tabs).  Will e-prescribe today and in 30 days (December 11th).   Patient tolerating opioids with no side effects, obtaining good pain control with functional improvement.   - Refill Gabapentin 800mg TID.  - Opioid contract signed on 3/20/2018.     - LA  reviewed and appropriate. Last filled 12-12-19.    - Will order UDS next visit to ensure medication compliance.      3. Rehabilitative: Encouraged regular exercise. He has been using back brace; patient was informed to limit use to avoid muscle atrophy.     4. Diagnostic: None for now.    5. Follow up: 8 weeks for med refill.     - I discussed the risks, benefits, and alternatives to potential treatment options. All questions and concerns were fully addressed today in clinic. Dr. Mueller was consulted regarding the patient plan and agrees.           >> UDS:  6-28-16 :: appropriate  2/28/2017 :: appropriate  8/18/2017 :: (not in EMR)  5/16/2018 :: appropriate  9/11/2018 :: appropriate  3/11/2019 :: appropriate  9/17/2019 :: appropriate

## 2020-01-09 NOTE — PROGRESS NOTES
"Pulmonary Disease Management  [facility]  Initial Visit  Chronic Care Management    Referring Provider: MD Juliane  Diagnosis: Stage 3 severe COPD by GOLD classification, Pleural effusion, bilateral Lung mass  Last Hospital Admission: 2019  Last provider visit: 2019      Current Outpatient Medications:     albuterol (PROAIR HFA) 90 mcg/actuation inhaler, INL 2 PFS PO INTO THE LUNGS Q 4 H PRF WHZ OR SOB, Disp: , Rfl:     albuterol (PROVENTIL) 2.5 mg /3 mL (0.083 %) nebulizer solution, Take 3 mLs (2.5 mg total) by nebulization every 8 (eight) hours while awake., Disp: 1 Box, Rfl: 11    arformoterol (BROVANA) 15 mcg/2 mL Nebu, Take 2 mLs (15 mcg total) by nebulization 2 (two) times daily. Controller, Disp: 120 mL, Rfl: 11    furosemide (LASIX) 40 MG tablet, Take 1 tablet (40 mg total) by mouth once daily., Disp: 30 tablet, Rfl: 6    nebulizer and compressor Bailey, Use as directed, Disp: 1 each, Rfl: 0    SPIRIVA WITH HANDIHALER 18 mcg inhalation capsule, INHALE 1 CAPSULE BY MOUTH ONCE DAILY, Disp: 30 capsule, Rfl: 11    Review of patient's allergies indicates:   Allergen Reactions    Contrast media Hives and Itching    Iodinated contrast media Hives and Itching    Iodine Hives and Itching    Pravastatin      Other reaction(s): fatigue  Other reaction(s): fatigue    Rosuvastatin      Other reaction(s): fatigue  Other reaction(s): fatigue    Simvastatin      Other reaction(s): fatigue  Other reaction(s): fatigue    Statins-hmg-coa reductase inhibitors Other (See Comments)     Fatigue       Smoking history:   Social History     Tobacco Use   Smoking Status Former Smoker    Packs/day: 0.50    Years: 55.00    Pack years: 27.50    Types: Cigarettes    Last attempt to quit: 10/1/2019    Years since quittin.2   Smokeless Tobacco Never Used   Tobacco Comment    6-7 cigs/day       Pulse: 72  SpO2: 95 %     Resp: 18  Height: 5' 8" (172.7 cm)  Weight: 63.5 kg (140 lb)  BMI (kg/m2): " 21.33    Resting Tasneem Dyspnea Rating : 0      Current Oxygen Use: No        Does the patient use BiPAP, CPAP or Trilogy?: Yes          [place] Sleepiness Scale:  [place] Sleepiness Scale  Sitting and reading: Moderate chance of dozing  Watching TV: High chance of dozing  Sitting, inactive in a public place (e.g. a theatre or a meeting): Would never doze  As a passenger in a car for an hour without a break: Would never doze  Lying down to rest in the afternoon when circumstances permit: High chance of dozing  Sitting and talking to someone: Would never doze  Sitting quietly after a lunch without alcohol: Moderate chance of dozing  In a car, while stopped for a few minutes in traffic: Would never doze  Total score: 10    COPD Questionnaire:  COPD Questionnaire  How often do you cough?: Almost never  How often do you have phlegm (mucus) in your chest?: Almost never  How often does your chest feel tight?: Never  When you walk up a hill or one flight of stairs, how often are you breathless?: Some of the time  How often are you limited doing any activities at home?: Some of the time  How often are you confident leaving the house despite your lung condition?: All of the time  How often do you sleep soundly?: All of the time  How often do you have energy?: Almost never  Total score: 12    Has this patient had any pulmonary studies (PFTS)?: Yes  PFT Results:  Pre FVC   Date/Time Value Ref Range Status   10/24/2019 05:11 PM 2.31 (L) 2.76 - 4.75 L Final     Pre FEV1   Date/Time Value Ref Range Status   10/24/2019 05:11 PM 1.29 (L) 2.03 - 3.66 L Final     Pre FEV1 FVC   Date/Time Value Ref Range Status   10/24/2019 05:11 PM 56.18 (L) 62.07 - 89.92 % Final     Pre TLC   Date/Time Value Ref Range Status   02/02/2018 09:05 AM 7.64 (H) 5.52 - 6.52 L Final     Pre DLCO   Date/Time Value Ref Range Status   02/02/2018 09:05 AM 12.16 (L) 14.57 - 22.86 ml/mmHg/min Final        Method Used for Education: Literature, Demonstration,  "Verbal          Patient Concerns or Expectations:   The ptient has not received Brovana. Will follow up with pulmonary provider staff.    The patient states he does not use thickener for beverages. Informed patient that aspiration and pneumonia is possible. He stated he does not like it and understands the possible consequences.     Therapist Comments:   Noble Tripp  was seen 1/9/2020  9:51 AM in the Pulmonary Disease management clinic for evaluation, educate and reinforce self management techniques and exacerbation action plan.     Patient practiced proper technique using MDI with valved holding chamber (spacer) and DPI inhalers. Patient demonstrated understanding. Literature was given to patient.     Infection prevention was discussed. Hand hygiene and cleaning of respiratory equipment was also discussed. Patient verbalized understanding.     He states he has refrained from smoking.      COPD action plan was reviewed and literature was given to patient. Patient verbalized understanding.     Mary Joe    Past Medical History:   Diagnosis Date    Adrenal adenoma     david;nl fxn w/u;tran 11/18    Aspiration pneumonia 10/15    puree/honey    Benign prostate hyperplasia     CAD (coronary artery disease)     dr padilla    Chronic pain     dr santos    Chronic systolic congestive heart failure 10/17/2019    COPD (chronic obstructive pulmonary disease)     papi    Ex-smoker     11/13    Hyperlipidemia     Ischemic cardiomyopathy 11/22/2019    JUANITO on CPAP     cpap    Osteopenia 1/15 tran 1/18    PSVT (paroxysmal supraventricular tachycardia)     PVD (peripheral vascular disease)     noobs 07 latrell    Pyriform sinus cancer     dr ponce radiation 1/2-14 dr king perales    Squamous cell carcinoma of skin     roberto    Tongue cancer     "superficial" removed 11/14    Vocal cord cancer 2011    Xerostomia     radiation       Educational assessment:   [x]            Good  []           "  Fair  []            Poor    Readiness to learn:   [x]            Good  []            Fair  []            Poor    Vision Status:   [x]            Good  []            Fair  []            Poor    Reading Ability:  [x]            Good  []            Fair  []            Poor    Knowledge of condition:   [x]            Good  []            Fair  []            Poor    Language Barriers:   []            Good  []            Fair  []            Poor  [x]            None    Cognitive/ Physical Barriers:   []            Good  []            Fair  []            Poor  [x]            None    Learning best by:                       []            Seeing  []            Hearing  []            Reading                         [x]            Doing    Describe any barrier /Limitation or financial implications of care choices identified     []            Financial  []            Emotional  []            Education  []            Vision/Hearing  []            Physical  [x]            None  []                TOPIC /CONTENT FOR TODAY:    [x]            MDI with or without spacer  [x]            Dry power inhaler  []            Accapella   []           Peak Flow meter  [x]            COPD action plan  []            Nebulizer use  []            Oxygen use safety  [x]            Breathing and cough techniques  [x]            Energy coservation  [x]            Infection prevention  []           OTHER________________________        Learner:    [x]            Patient   []            Caregiver    Method:    [x]            Verbal explanation  []            Audio visual    [x]            Literature  [x]            Teach back      Evaluation:    [x]            Teach back  []            Demonsatrate  []            Follow up phone call    []            2 weeks     [x]            4 weeks   []            PRN    Additional comments:     Plan:  Monthly pulmonary disease management questionnaire  Reinforce education  Meds: Albuterol, Brovana, Spiriva  DME  Needs: [facility]   Action Plan: COPD  Immunization: Pneumococcal- CURRENT, Flu-CURRENT  Next Provider Visit: 2/5/2020  Next Spirometry/CPFT: 2/5/2020  Approximate time spent with patient: 45 MINUTES

## 2020-01-22 ENCOUNTER — TELEPHONE (OUTPATIENT)
Dept: PULMONOLOGY | Facility: CLINIC | Age: 75
End: 2020-01-22

## 2020-01-28 DIAGNOSIS — I25.10 CORONARY ARTERY DISEASE WITHOUT ANGINA PECTORIS, UNSPECIFIED VESSEL OR LESION TYPE, UNSPECIFIED WHETHER NATIVE OR TRANSPLANTED HEART: Primary | ICD-10-CM

## 2020-01-29 ENCOUNTER — CLINICAL SUPPORT (OUTPATIENT)
Dept: CARDIOLOGY | Facility: CLINIC | Age: 75
End: 2020-01-29
Payer: MEDICARE

## 2020-01-29 ENCOUNTER — OFFICE VISIT (OUTPATIENT)
Dept: CARDIOLOGY | Facility: CLINIC | Age: 75
End: 2020-01-29
Payer: MEDICARE

## 2020-01-29 VITALS
WEIGHT: 143 LBS | HEART RATE: 64 BPM | OXYGEN SATURATION: 93 % | DIASTOLIC BLOOD PRESSURE: 50 MMHG | SYSTOLIC BLOOD PRESSURE: 100 MMHG | BODY MASS INDEX: 21.67 KG/M2 | HEIGHT: 68 IN

## 2020-01-29 DIAGNOSIS — I25.10 CORONARY ARTERY DISEASE, ANGINA PRESENCE UNSPECIFIED, UNSPECIFIED VESSEL OR LESION TYPE, UNSPECIFIED WHETHER NATIVE OR TRANSPLANTED HEART: Primary | ICD-10-CM

## 2020-01-29 DIAGNOSIS — I25.10 CORONARY ARTERY DISEASE INVOLVING LEFT MAIN CORONARY ARTERY: ICD-10-CM

## 2020-01-29 DIAGNOSIS — I50.9 CONGESTIVE HEART FAILURE, UNSPECIFIED HF CHRONICITY, UNSPECIFIED HEART FAILURE TYPE: ICD-10-CM

## 2020-01-29 DIAGNOSIS — J44.9 STAGE 3 SEVERE COPD BY GOLD CLASSIFICATION: ICD-10-CM

## 2020-01-29 DIAGNOSIS — Z78.9 STATIN INTOLERANCE: ICD-10-CM

## 2020-01-29 DIAGNOSIS — R91.8 LUNG MASS: ICD-10-CM

## 2020-01-29 DIAGNOSIS — I10 ESSENTIAL HYPERTENSION: ICD-10-CM

## 2020-01-29 DIAGNOSIS — I50.22 CHRONIC SYSTOLIC CONGESTIVE HEART FAILURE: ICD-10-CM

## 2020-01-29 DIAGNOSIS — R94.30 CARDIAC LEFT VENTRICULAR EJECTION FRACTION 21-40 PERCENT: ICD-10-CM

## 2020-01-29 DIAGNOSIS — G47.33 OSA ON CPAP: Chronic | ICD-10-CM

## 2020-01-29 DIAGNOSIS — Z86.39 HISTORY OF IRON DEFICIENCY: ICD-10-CM

## 2020-01-29 DIAGNOSIS — Z99.81 ON HOME OXYGEN THERAPY: ICD-10-CM

## 2020-01-29 DIAGNOSIS — E78.00 PURE HYPERCHOLESTEROLEMIA: ICD-10-CM

## 2020-01-29 DIAGNOSIS — I25.10 CORONARY ARTERY DISEASE WITHOUT ANGINA PECTORIS, UNSPECIFIED VESSEL OR LESION TYPE, UNSPECIFIED WHETHER NATIVE OR TRANSPLANTED HEART: ICD-10-CM

## 2020-01-29 DIAGNOSIS — I25.5 ISCHEMIC CARDIOMYOPATHY: ICD-10-CM

## 2020-01-29 DIAGNOSIS — I65.21 STENOSIS OF RIGHT CAROTID ARTERY: ICD-10-CM

## 2020-01-29 PROCEDURE — 99213 OFFICE O/P EST LOW 20 MIN: CPT | Mod: PBBFAC | Performed by: INTERNAL MEDICINE

## 2020-01-29 PROCEDURE — 93010 EKG 12-LEAD: ICD-10-PCS | Mod: S$PBB,,, | Performed by: INTERNAL MEDICINE

## 2020-01-29 PROCEDURE — 93010 ELECTROCARDIOGRAM REPORT: CPT | Mod: S$PBB,,, | Performed by: INTERNAL MEDICINE

## 2020-01-29 PROCEDURE — 1159F PR MEDICATION LIST DOCUMENTED IN MEDICAL RECORD: ICD-10-PCS | Mod: ,,, | Performed by: INTERNAL MEDICINE

## 2020-01-29 PROCEDURE — 99214 OFFICE O/P EST MOD 30 MIN: CPT | Mod: S$PBB,,, | Performed by: INTERNAL MEDICINE

## 2020-01-29 PROCEDURE — 99999 PR PBB SHADOW E&M-EST. PATIENT-LVL III: CPT | Mod: PBBFAC,,, | Performed by: INTERNAL MEDICINE

## 2020-01-29 PROCEDURE — 1126F PR PAIN SEVERITY QUANTIFIED, NO PAIN PRESENT: ICD-10-PCS | Mod: ,,, | Performed by: INTERNAL MEDICINE

## 2020-01-29 PROCEDURE — 99214 PR OFFICE/OUTPT VISIT, EST, LEVL IV, 30-39 MIN: ICD-10-PCS | Mod: S$PBB,,, | Performed by: INTERNAL MEDICINE

## 2020-01-29 PROCEDURE — 99999 PR PBB SHADOW E&M-EST. PATIENT-LVL III: ICD-10-PCS | Mod: PBBFAC,,, | Performed by: INTERNAL MEDICINE

## 2020-01-29 PROCEDURE — 1126F AMNT PAIN NOTED NONE PRSNT: CPT | Mod: ,,, | Performed by: INTERNAL MEDICINE

## 2020-01-29 PROCEDURE — 93005 ELECTROCARDIOGRAM TRACING: CPT | Mod: PBBFAC | Performed by: INTERNAL MEDICINE

## 2020-01-29 PROCEDURE — 1159F MED LIST DOCD IN RCRD: CPT | Mod: ,,, | Performed by: INTERNAL MEDICINE

## 2020-01-29 RX ORDER — LOSARTAN POTASSIUM 25 MG/1
12.5 TABLET ORAL NIGHTLY
Qty: 45 TABLET | Refills: 3 | Status: SHIPPED | OUTPATIENT
Start: 2020-01-29 | End: 2020-02-14 | Stop reason: SDUPTHER

## 2020-01-29 NOTE — PROGRESS NOTES
Subjective:   Patient ID:  Noble Tripp is a 74 y.o. male who presents for follow up of Consult (repeat cath. lab procedure)      HPI  75 yo, male, f/u for CAD  PMH PSVT, CAD, CHfrEF 35%, HLD, COPD on home O2, JUANITO on CPAP, vocal cord cancer s/p surgery and subsequent XRT in 2011, and recent tongue precancer mass removal.   10- admitted to [facility] for sepsis/PNA and PSVT on . EKG showed extensive St depression of 1 mm on anterolateral leads. EF 60%. MPi showed fixed inferior perfusion defect. cTn was up to 0.3. PSVT was converted to sinus O/N. Echo in : normal EF, DD.  MPI in :  A small to moderate size fixed defect of moderate to severe intensity that extends from the apical to the base inferior wall of the left ventricle.  EKG on 10-: PSVT, st depression on V3 to V6, I, II and avL.   s/p left thoracentesis d/t pleural effusion.   admitted for CHF and COPD exacerbation. ECHO showed normal EF and BNP 1800, troponin 0.05 and flat. Rx with IV lasix and breathing O2. D/c with lasix 20 mg po daily   admitted for [facility] for worsening SOB. Troponin up to 0.3. ECHo showed EF 35%, LHC showed severe multivessel CAD. Had CVT evaluation and was not cabg candidate due to high risk. Also high risk PCI. Pt was discharged for medical Rx and PCI as op. Discharged with home O2. Om amiodarone 400 mg daily for nonsustained VT in the hospital.  Reviewed [facility] imaging with Dr. Beltran and had PET for viability study. Showing inferior viable myocardium  11/2019 admitted for bradycardia and hypotension. BB, ARB, amio and aldactone on hold.  12/2019 repeat [facility] by Dr. Beltran: rca with  recanalized unable to cross. Lad 50% ifr 0.84, Diagonal ifr 0.98, Nonobstruc. Lcx. rca fills from lad.     He is able to walk now 0.5 mile w/o any significant limitation. He is able to walk better has no swelling in legs no chest pain no orthopnea or pnd no change in 2 pillows he  "sleeps on.  \has no othe rissues clinically with syncope near syncope is compliant with diet and meds. He is intolerant to more medical therapy due to side effects.. with hyperkalemia. His pet scan sshowed viability in the inferior wall. No arrythmia clinically   Past Medical History:   Diagnosis Date    Adrenal adenoma     david;nl fxn w/u;tran 11/18    Aspiration pneumonia 10/15    puree/honey    Benign prostate hyperplasia     CAD (coronary artery disease)     dr padilla    Chronic pain     dr santos    Chronic systolic congestive heart failure 10/17/2019    COPD (chronic obstructive pulmonary disease)     papi    Ex-smoker     11/13    Hyperlipidemia     Ischemic cardiomyopathy 11/22/2019    JUANITO on CPAP     cpap    Osteopenia 1/15 tran 1/18    PSVT (paroxysmal supraventricular tachycardia)     PVD (peripheral vascular disease)     noobs 07 khuri    Pyriform sinus cancer     dr ponce radiation 1/2-14 dr king perales    Squamous cell carcinoma of skin     roberto    Tongue cancer     "superficial" removed 11/14    Vocal cord cancer 2011    Xerostomia     radiation       Past Surgical History:   Procedure Laterality Date    APPENDECTOMY      BRONCHOSCOPY Bilateral 2/5/2019    Procedure: Bronchoscopy;  Surgeon: Santos Cardozo MD;  Location: [facility];  Service: Endoscopy;  Laterality: Bilateral;    COLONOSCOPY N/A 5/1/2017    Procedure: Due for screening colonoscopy;  Surgeon: Anushka Yoder MD;  Location: [facility];  Service: Endoscopy;  Laterality: N/A;    ESOPHAGOGASTRODUODENOSCOPY N/A 5/29/2018    Procedure: ESOPHAGOGASTRODUODENOSCOPY (EGD);  Surgeon: Audie Hill III, MD;  Location: [facility];  Service: Endoscopy;  Laterality: N/A;    ESOPHAGOGASTRODUODENOSCOPY N/A 8/29/2018    Procedure: ESOPHAGOGASTRODUODENOSCOPY (EGD);  Surgeon: Audie Hill III, MD;  Location: [facility];  Service: Endoscopy;  Laterality: N/A;    PERCUTANEOUS TRANSLUMINAL BALLOON ANGIOPLASTY OF " CORONARY ARTERY Right 2019    Procedure: PTCA, USING BALLOON/ IABP or Impella multivessel angioplasty/poss stent;  Surgeon: Abel Beltran MD;  Location: [facility] CATH LAB;  Service: Cardiology;  Laterality: Right;    THORACENTESIS Left 2018    Procedure: Thoracentesis;  Surgeon: Santos Cardozo MD;  Location: [facility] ENDO;  Service: Endoscopy;  Laterality: Left;    THORACENTESIS Right 2019    Procedure: Thoracentesis;  Surgeon: Santos Cardozo MD;  Location: [facility] ENDO;  Service: Endoscopy;  Laterality: Right;    tongue cancer excision      dr vitale    VOCAL CORD LATERALIZATION, ENDOSCOPIC APPROACH W/ MLB      kris       Social History     Tobacco Use    Smoking status: Former Smoker     Packs/day: 0.50     Years: 55.00     Pack years: 27.50     Types: Cigarettes     Last attempt to quit: 10/1/2019     Years since quittin.3    Smokeless tobacco: Never Used    Tobacco comment: 6-7 cigs/day   Substance Use Topics    Alcohol use: Not Currently    Drug use: No       Family History   Problem Relation Age of Onset    Lung cancer Father         + Smoker    No Known Problems Mother     Lung cancer Brother         + Smoker       Current Outpatient Medications   Medication Sig    albuterol (PROAIR HFA) 90 mcg/actuation inhaler INL 2 PFS PO INTO THE LUNGS Q 4 H PRF WHZ OR SOB    aspirin 81 mg Tab Take 1 tablet by mouth Daily. Over the counter to help prevent stroke/heart attack    atorvastatin (LIPITOR) 80 MG tablet Take 1 tablet (80 mg total) by mouth once daily.    carvedilol (COREG) 3.125 MG tablet Take 1 tablet (3.125 mg total) by mouth 2 (two) times daily.    clopidogrel (PLAVIX) 75 mg tablet Take 1 tablet (75 mg total) by mouth once daily.    evolocumab (REPATHA SURECLICK) 140 mg/mL PnIj Inject 1 mL (140 mg total) into the skin every 14 (fourteen) days.    gabapentin (NEURONTIN) 800 MG tablet Take 1 tablet (800 mg total) by mouth 3 (three) times daily.    melatonin 10 mg  Tab Take 1 tablet by mouth every evening.    nebulizer and compressor Bailey Use as directed    oxyCODONE-acetaminophen (PERCOCET) 7.5-325 mg per tablet Take 1 tablet by mouth every 8 (eight) hours as needed for Pain.    pantoprazole (PROTONIX) 40 MG tablet TAKE ONE TABLET BY MOUTH ONCE DAILY    pilocarpine (SALAGEN) 5 MG Tab TAKE ONE TABLET BY MOUTH 30 MINUTES PRIOR TO EACH MEAL    SPIRIVA WITH HANDIHALER 18 mcg inhalation capsule INHALE 1 CAPSULE BY MOUTH ONCE DAILY    arformoterol (BROVANA) 15 mcg/2 mL Nebu Take 2 mLs (15 mcg total) by nebulization 2 (two) times daily. Controller (Patient not taking: Reported on 1/29/2020)    clotrimazole (MYCELEX) 10 mg elissa     furosemide (LASIX) 40 MG tablet Take 1 tablet (40 mg total) by mouth once daily.    Lactobacillus acidophilus (PROBIOTIC ORAL) Take 1 tablet by mouth once daily.    loratadine (CLARITIN) 10 mg tablet Take 1 tablet by mouth daily as needed.     [START ON 2/10/2020] oxyCODONE-acetaminophen (PERCOCET) 7.5-325 mg per tablet Take 1 tablet by mouth every 8 (eight) hours as needed for Pain. (Patient not taking: Reported on 1/29/2020)    OXYGEN-AIR DELIVERY SYSTEMS MISC Inhale 2-3 L into the lungs continuous.    sertraline (ZOLOFT) 50 MG tablet Take 1 tablet (50 mg total) by mouth once daily. (Patient not taking: Reported on 1/29/2020)     No current facility-administered medications for this visit.      Current Outpatient Medications on File Prior to Visit   Medication Sig    albuterol (PROAIR HFA) 90 mcg/actuation inhaler INL 2 PFS PO INTO THE LUNGS Q 4 H PRF WHZ OR SOB    aspirin 81 mg Tab Take 1 tablet by mouth Daily. Over the counter to help prevent stroke/heart attack    atorvastatin (LIPITOR) 80 MG tablet Take 1 tablet (80 mg total) by mouth once daily.    carvedilol (COREG) 3.125 MG tablet Take 1 tablet (3.125 mg total) by mouth 2 (two) times daily.    clopidogrel (PLAVIX) 75 mg tablet Take 1 tablet (75 mg total) by mouth once daily.     evolocumab (REPATHA SURECLICK) 140 mg/mL PnIj Inject 1 mL (140 mg total) into the skin every 14 (fourteen) days.    gabapentin (NEURONTIN) 800 MG tablet Take 1 tablet (800 mg total) by mouth 3 (three) times daily.    melatonin 10 mg Tab Take 1 tablet by mouth every evening.    nebulizer and compressor Bailey Use as directed    oxyCODONE-acetaminophen (PERCOCET) 7.5-325 mg per tablet Take 1 tablet by mouth every 8 (eight) hours as needed for Pain.    pantoprazole (PROTONIX) 40 MG tablet TAKE ONE TABLET BY MOUTH ONCE DAILY    pilocarpine (SALAGEN) 5 MG Tab TAKE ONE TABLET BY MOUTH 30 MINUTES PRIOR TO EACH MEAL    SPIRIVA WITH HANDIHALER 18 mcg inhalation capsule INHALE 1 CAPSULE BY MOUTH ONCE DAILY    arformoterol (BROVANA) 15 mcg/2 mL Nebu Take 2 mLs (15 mcg total) by nebulization 2 (two) times daily. Controller (Patient not taking: Reported on 1/29/2020)    clotrimazole (MYCELEX) 10 mg elissa     furosemide (LASIX) 40 MG tablet Take 1 tablet (40 mg total) by mouth once daily.    Lactobacillus acidophilus (PROBIOTIC ORAL) Take 1 tablet by mouth once daily.    loratadine (CLARITIN) 10 mg tablet Take 1 tablet by mouth daily as needed.     [START ON 2/10/2020] oxyCODONE-acetaminophen (PERCOCET) 7.5-325 mg per tablet Take 1 tablet by mouth every 8 (eight) hours as needed for Pain. (Patient not taking: Reported on 1/29/2020)    OXYGEN-AIR DELIVERY SYSTEMS MISC Inhale 2-3 L into the lungs continuous.    sertraline (ZOLOFT) 50 MG tablet Take 1 tablet (50 mg total) by mouth once daily. (Patient not taking: Reported on 1/29/2020)     No current facility-administered medications on file prior to visit.      Review of patient's allergies indicates:   Allergen Reactions    Contrast media Hives and Itching    Iodinated contrast media Hives and Itching    Iodine Hives and Itching    Pravastatin      Other reaction(s): fatigue  Other reaction(s): fatigue    Rosuvastatin      Other reaction(s): fatigue  Other  reaction(s): fatigue    Simvastatin      Other reaction(s): fatigue  Other reaction(s): fatigue    Statins-hmg-coa reductase inhibitors Other (See Comments)     Fatigue     Review of Systems   Constitution: Negative for malaise/fatigue.   Eyes: Negative for blurred vision.   Cardiovascular: Positive for dyspnea on exertion. Negative for chest pain, claudication, cyanosis, irregular heartbeat, leg swelling, near-syncope, orthopnea, palpitations and paroxysmal nocturnal dyspnea.   Respiratory: Positive for shortness of breath. Negative for cough and hemoptysis.    Hematologic/Lymphatic: Negative for bleeding problem. Does not bruise/bleed easily.   Skin: Negative for dry skin and itching.   Musculoskeletal: Negative for falls, muscle weakness and myalgias.   Gastrointestinal: Negative for abdominal pain, diarrhea, heartburn, hematemesis, hematochezia and melena.   Genitourinary: Negative for flank pain and hematuria.   Neurological: Negative for dizziness, focal weakness, headaches, light-headedness, numbness, paresthesias, seizures and weakness.   Psychiatric/Behavioral: Negative for altered mental status and memory loss. The patient is not nervous/anxious.    Allergic/Immunologic: Negative for hives.       Objective:   Physical Exam   Constitutional: He is oriented to person, place, and time. He appears well-developed and well-nourished. No distress.   HENT:   Head: Normocephalic and atraumatic.   Eyes: Pupils are equal, round, and reactive to light. EOM are normal. Right eye exhibits no discharge. Left eye exhibits no discharge.   Neck: Neck supple. No JVD present. No thyromegaly present.   Cardiovascular: Normal rate, regular rhythm, normal heart sounds and intact distal pulses. Exam reveals no gallop and no friction rub.   No murmur heard.  Pulses:       Carotid pulses are 2+ on the right side with bruit, and 2+ on the left side with bruit.       Radial pulses are 2+ on the right side, and 2+ on the left side.  "       Femoral pulses are 2+ on the right side, and 2+ on the left side.       Popliteal pulses are 2+ on the right side, and 2+ on the left side.        Dorsalis pedis pulses are 2+ on the right side, and 2+ on the left side.        Posterior tibial pulses are 2+ on the right side, and 2+ on the left side.   Pulmonary/Chest: Effort normal and breath sounds normal. No respiratory distress. He has no wheezes. He has no rales. He exhibits no tenderness.   Abdominal: Soft. Bowel sounds are normal. He exhibits no distension. There is no tenderness.   Musculoskeletal: Normal range of motion. He exhibits no edema.   Neurological: He is alert and oriented to person, place, and time. No cranial nerve deficit.   Skin: Skin is warm and dry. No rash noted. He is not diaphoretic. No erythema.   Psychiatric: He has a normal mood and affect. His behavior is normal.   Nursing note and vitals reviewed.    Vitals:    01/29/20 0927 01/29/20 0938   BP: (!) 98/52 (!) 100/50   BP Location: Right arm Left arm   Patient Position: Sitting Sitting   BP Method: Small (Manual) Small (Manual)   Pulse: 64    SpO2: (!) 93%    Weight: 64.9 kg (143 lb)    Height: 5' 8" (1.727 m)      Lab Results   Component Value Date    CHOL 80 (L) 12/19/2019    CHOL 77 (L) 02/06/2019    CHOL 98 (L) 08/13/2018     Lab Results   Component Value Date    HDL 42 12/19/2019    HDL 44 02/06/2019    HDL 43 08/13/2018     Lab Results   Component Value Date    LDLCALC 28.0 (L) 12/19/2019    LDLCALC 25.0 (L) 02/06/2019    LDLCALC 39.8 (L) 08/13/2018     Lab Results   Component Value Date    TRIG 50 12/19/2019    TRIG 40 02/06/2019    TRIG 76 08/13/2018     Lab Results   Component Value Date    CHOLHDL 52.5 (H) 12/19/2019    CHOLHDL 57.1 (H) 02/06/2019    CHOLHDL 43.9 08/13/2018       Chemistry        Component Value Date/Time     12/19/2019 0921    K 3.8 12/19/2019 0921     12/19/2019 0921    CO2 33 (H) 12/19/2019 0921    BUN 18 12/19/2019 0921    CREATININE " 0.9 12/19/2019 0921    GLU 99 12/19/2019 0921        Component Value Date/Time    CALCIUM 9.1 12/19/2019 0921    ALKPHOS 81 12/19/2019 0921    AST 14 12/19/2019 0921    ALT 14 12/19/2019 0921    BILITOT 0.5 12/19/2019 0921    ESTGFRAFRICA >60.0 12/19/2019 0921    EGFRNONAA >60.0 12/19/2019 0921          Lab Results   Component Value Date    TSH 1.259 02/06/2019     Lab Results   Component Value Date    INR 1.0 12/05/2019    INR 1.0 10/26/2019    INR 1.0 10/25/2019     Lab Results   Component Value Date    WBC 9.14 12/19/2019    HGB 11.1 (L) 12/19/2019    HCT 34.0 (L) 12/19/2019    MCV 96 12/19/2019     12/19/2019     BMP  Sodium   Date Value Ref Range Status   12/19/2019 143 136 - 145 mmol/L Final     Potassium   Date Value Ref Range Status   12/19/2019 3.8 3.5 - 5.1 mmol/L Final     Chloride   Date Value Ref Range Status   12/19/2019 102 95 - 110 mmol/L Final     CO2   Date Value Ref Range Status   12/19/2019 33 (H) 23 - 29 mmol/L Final     BUN, Bld   Date Value Ref Range Status   12/19/2019 18 8 - 23 mg/dL Final     Creatinine   Date Value Ref Range Status   12/19/2019 0.9 0.5 - 1.4 mg/dL Final     Calcium   Date Value Ref Range Status   12/19/2019 9.1 8.7 - 10.5 mg/dL Final     Anion Gap   Date Value Ref Range Status   12/19/2019 8 8 - 16 mmol/L Final     eGFR if    Date Value Ref Range Status   12/19/2019 >60.0 >60 mL/min/1.73 m^2 Final     eGFR if non    Date Value Ref Range Status   12/19/2019 >60.0 >60 mL/min/1.73 m^2 Final     Comment:     Calculation used to obtain the estimated glomerular filtration  rate (eGFR) is the CKD-EPI equation.        CrCl cannot be calculated (Patient's most recent lab result is older than the maximum 7 days allowed.).    Assessment:     1. Coronary artery disease, angina presence unspecified, unspecified vessel or lesion type, unspecified whether native or transplanted heart    2. JUANITO on CPAP    3. Congestive heart failure, unspecified HF  chronicity, unspecified heart failure type    4. Pure hypercholesterolemia    5. Essential hypertension    6. Stage 3 severe COPD by GOLD classification    7. Statin intolerance    8. History of iron deficiency    9. Lung mass    10. Stenosis of right carotid artery    11. Chronic systolic congestive heart failure    12. On home oxygen therapy    13. Cardiac left ventricular ejection fraction 21-40 percent    14. Coronary artery disease involving left main coronary artery    15. Ischemic cardiomyopathy      Appears well compensated has no chf exacerbation will benefit from trial of low dose arb and will reassess clinically will review angio and will decide accordingly on revascularization.  Lipids on target   No angina.  Plan:   Add losartan 12.5 mg po in the evening  Reevaluate clinically in 2 weeks with bmp.

## 2020-01-30 ENCOUNTER — TELEPHONE (OUTPATIENT)
Dept: CARDIOLOGY | Facility: CLINIC | Age: 75
End: 2020-01-30

## 2020-01-30 NOTE — TELEPHONE ENCOUNTER
Patient contacted. Unable to leave a message to get details on call.    Contact: Pt  Pt asked that nurse return his call at [phone]

## 2020-01-31 ENCOUNTER — EXTERNAL CHRONIC CARE MANAGEMENT (OUTPATIENT)
Dept: PRIMARY CARE CLINIC | Facility: CLINIC | Age: 75
End: 2020-01-31
Payer: MEDICARE

## 2020-01-31 PROCEDURE — 99490 CHRNC CARE MGMT STAFF 1ST 20: CPT | Mod: PBBFAC | Performed by: FAMILY MEDICINE

## 2020-01-31 PROCEDURE — 99490 PR CHRONIC CARE MGMT, 1ST 20 MIN: ICD-10-PCS | Mod: S$PBB,,, | Performed by: FAMILY MEDICINE

## 2020-01-31 PROCEDURE — 99490 CHRNC CARE MGMT STAFF 1ST 20: CPT | Mod: S$PBB,,, | Performed by: FAMILY MEDICINE

## 2020-02-03 ENCOUNTER — TELEPHONE (OUTPATIENT)
Dept: PULMONOLOGY | Facility: CLINIC | Age: 75
End: 2020-02-03

## 2020-02-03 NOTE — TELEPHONE ENCOUNTER
Informed patient to continue taking Spiriva as the controller and Brovana will not be added to his respiratory medication regimen.

## 2020-02-05 ENCOUNTER — CLINICAL SUPPORT (OUTPATIENT)
Dept: PULMONOLOGY | Facility: CLINIC | Age: 75
End: 2020-02-05
Payer: MEDICARE

## 2020-02-05 ENCOUNTER — OFFICE VISIT (OUTPATIENT)
Dept: PULMONOLOGY | Facility: CLINIC | Age: 75
End: 2020-02-05
Payer: MEDICARE

## 2020-02-05 ENCOUNTER — HOSPITAL ENCOUNTER (OUTPATIENT)
Dept: RADIOLOGY | Facility: HOSPITAL | Age: 75
Discharge: HOME | End: 2020-02-05
Attending: INTERNAL MEDICINE
Payer: MEDICARE

## 2020-02-05 VITALS
DIASTOLIC BLOOD PRESSURE: 60 MMHG | WEIGHT: 144 LBS | RESPIRATION RATE: 20 BRPM | HEIGHT: 68 IN | BODY MASS INDEX: 21.82 KG/M2 | HEIGHT: 68 IN | BODY MASS INDEX: 21.85 KG/M2 | HEART RATE: 62 BPM | OXYGEN SATURATION: 99 % | WEIGHT: 144.19 LBS | SYSTOLIC BLOOD PRESSURE: 127 MMHG

## 2020-02-05 DIAGNOSIS — J44.9 STAGE 3 SEVERE COPD BY GOLD CLASSIFICATION: ICD-10-CM

## 2020-02-05 DIAGNOSIS — I25.5 ISCHEMIC CARDIOMYOPATHY: ICD-10-CM

## 2020-02-05 DIAGNOSIS — I10 ESSENTIAL HYPERTENSION: ICD-10-CM

## 2020-02-05 DIAGNOSIS — G47.33 OSA ON CPAP: Chronic | ICD-10-CM

## 2020-02-05 DIAGNOSIS — J90 PLEURAL EFFUSION, BILATERAL: ICD-10-CM

## 2020-02-05 DIAGNOSIS — J90 RECURRENT RIGHT PLEURAL EFFUSION: ICD-10-CM

## 2020-02-05 DIAGNOSIS — I50.22 CHRONIC SYSTOLIC CONGESTIVE HEART FAILURE: ICD-10-CM

## 2020-02-05 DIAGNOSIS — E78.00 PURE HYPERCHOLESTEROLEMIA: ICD-10-CM

## 2020-02-05 DIAGNOSIS — J44.9 STAGE 3 SEVERE COPD BY GOLD CLASSIFICATION: Primary | ICD-10-CM

## 2020-02-05 DIAGNOSIS — T17.208S: ICD-10-CM

## 2020-02-05 PROBLEM — Z99.81 ON HOME OXYGEN THERAPY: Status: RESOLVED | Noted: 2019-10-23 | Resolved: 2020-02-05

## 2020-02-05 LAB
BRPFT: ABNORMAL
FEF 25 75 LLN: 0.91
FEF 25 75 PRE REF: 20.2 %
FEF 25 75 REF: 2.18
FEV1 FVC LLN: 62
FEV1 FVC PRE REF: 61.1 %
FEV1 FVC REF: 76
FEV1 LLN: 2.07
FEV1 PRE REF: 50.7 %
FEV1 REF: 2.91
FVC LLN: 2.83
FVC PRE REF: 82.6 %
FVC REF: 3.86
PEF LLN: 5.44
PEF PRE REF: 43.4 %
PEF REF: 7.63
PRE FEF 25 75: 0.44 L/S (ref 0.91–3.46)
PRE FET 100: 13.94 SEC
PRE FEV1 FVC: 46.36 % (ref 61.88–89.75)
PRE FEV1: 1.48 L (ref 2.07–3.75)
PRE FVC: 3.18 L (ref 2.83–4.88)
PRE PEF: 3.31 L/S (ref 5.44–9.82)

## 2020-02-05 PROCEDURE — 99215 OFFICE O/P EST HI 40 MIN: CPT | Mod: PBBFAC,25 | Performed by: INTERNAL MEDICINE

## 2020-02-05 PROCEDURE — 71046 XR CHEST PA AND LATERAL: ICD-10-PCS | Mod: 26,,, | Performed by: RADIOLOGY

## 2020-02-05 PROCEDURE — 94010 BREATHING CAPACITY TEST: CPT | Mod: PBBFAC

## 2020-02-05 PROCEDURE — 94010 BREATHING CAPACITY TEST: ICD-10-PCS | Mod: 26,59,S$PBB, | Performed by: INTERNAL MEDICINE

## 2020-02-05 PROCEDURE — 94618 PULMONARY STRESS TESTING: CPT | Mod: PBBFAC

## 2020-02-05 PROCEDURE — 94618 PULMONARY STRESS TESTING: ICD-10-PCS | Mod: 26,S$PBB,, | Performed by: INTERNAL MEDICINE

## 2020-02-05 PROCEDURE — 71046 X-RAY EXAM CHEST 2 VIEWS: CPT | Mod: TC

## 2020-02-05 PROCEDURE — 71046 X-RAY EXAM CHEST 2 VIEWS: CPT | Mod: 26,,, | Performed by: RADIOLOGY

## 2020-02-05 PROCEDURE — 94010 BREATHING CAPACITY TEST: CPT | Mod: 26,59,S$PBB, | Performed by: INTERNAL MEDICINE

## 2020-02-05 PROCEDURE — 99214 OFFICE O/P EST MOD 30 MIN: CPT | Mod: 25,S$PBB,, | Performed by: INTERNAL MEDICINE

## 2020-02-05 PROCEDURE — 99999 PR PBB SHADOW E&M-EST. PATIENT-LVL V: CPT | Mod: PBBFAC,,, | Performed by: INTERNAL MEDICINE

## 2020-02-05 PROCEDURE — 99214 PR OFFICE/OUTPT VISIT, EST, LEVL IV, 30-39 MIN: ICD-10-PCS | Mod: 25,S$PBB,, | Performed by: INTERNAL MEDICINE

## 2020-02-05 PROCEDURE — 94618 PULMONARY STRESS TESTING: CPT | Mod: 26,S$PBB,, | Performed by: INTERNAL MEDICINE

## 2020-02-05 PROCEDURE — 99999 PR PBB SHADOW E&M-EST. PATIENT-LVL V: ICD-10-PCS | Mod: PBBFAC,,, | Performed by: INTERNAL MEDICINE

## 2020-02-05 NOTE — PROCEDURES
"[facility]  Six Minute Walk     SUMMARY     Ordering Provider: Dr. Cardozo   Interpreting Provider: Dr. Cardozo  Performing nurse/tech/RT: DENISE Pollack crt  Diagnosis: COPD  Height: 5' 8" (172.7 cm)  Weight: 65.4 kg (144 lb 2.9 oz)  BMI (Calculated): 21.9   Patient Race:             Phase Oxygen Assessment Supplemental O2 Heart   Rate Blood Pressure Tasneem Dyspnea Scale Rating   Resting 95 % Room Air 61 bpm 109/47 2   Exercise        Minute        1 92 % Room Air 78 bpm     2 92 % Room Air 74 bpm     3 92 % Room Air 80 bpm     4 93 % Room Air 84 bpm     5 93 % Room Air 86 bpm     6  93 % Room Air 84 bpm 123/51 3   Recovery        Minute        1 95 % Room Air 74 bpm     2 97 % Room Air 68 bpm     3 98 % Room Air 65 bpm     4 99 % Room Air 62 bpm 127/60 0     Six Minute Walk Summary  6MWT Status: completed without stopping  Patient Reported: Leg pain(back pain)     Interpretation:  Did the patient stop or pause?: No         Total Time Walked (Calculated): 360 seconds  Final Partial Lap Distance (feet): 100 feet  Total Distance Meters (Calculated): 396.24 meters   LLN was 363.78m  Predicted Distance Meters (Calculated): 516.78 meters  Percentage of Predicted (Calculated): 76.67  Peak VO2 (Calculated): 15.87  Mets: 4.53  Has The Patient Had a Previous Six Minute Walk Test?: Yes       Previous 6MWT Results  Has The Patient Had a Previous Six Minute Walk Test?: Yes  Date of Previous Test: 11/20/19  Total Time Walked: 360 seconds  Total Distance (meters): 327.66  Predicted Distance (meters): 484.31 meters  Percentage of Predicted: 67.66  Percent Change (Calculated): -0.21         CLINICAL INTERPRETATION:  Six minute walk distance is 396.24m (76.67 % predicted) with light dyspnea.  During exercise, there was MILD desaturation while breathing room air.  SpO2 estefania was 92%.  Blood pressure remained stable and Heart rate remained stable with walking.  The patient did complete the study, walking 360 " seconds of the 360 second test.  Since the previous study in 11/20/2019, exercise capacity is significantly improved.  Based upon age and body mass index, exercise capacity is normal.

## 2020-02-05 NOTE — PATIENT INSTRUCTIONS
Olodaterol respiratory inhalation spray  What is this medicine?  OLODATEROL (OH roly DA ter ol) inhalation is a slow-acting bronchodilator. It helps to open up the airways in your lungs and to make it easier to breathe. It is for chronic obstructive pulmonary disease (COPD), including chronic bronchitis or emphysema. Do NOT use for asthma or an acute asthma attack. Do NOT use for a COPD attack.  How should I use this medicine?  This medicine is inhaled through the mouth. It is used once per day. Follow the directions on the prescription label. Take your medicine at regular intervals. Do not take your medicine more often than directed. Do not stop taking except on your doctor's advice. Make sure that you are using your inhaler correctly. Ask you doctor or health care provider if you have any questions.  A special MedGuide will be given to you by the pharmacist with each prescription and refill. Be sure to read this information carefully each time.  Talk to your pediatrician regarding the use of this medicine in children. Special care may be needed.  What side effects may I notice from receiving this medicine?  Side effects that you should report to your doctor or health care professional as soon as possible:  · allergic reactions like skin rash or hives, swelling of the face, lips, or tongue  · breathing problems right after inhaling your medicine  · chest pain  · difficulty breathing or wheezing that increases or does not go away  · fast, irregular heartbeat  · feeling faint or lightheaded, falls  · unusually weak or tired  Side effects that usually do not require medical attention (Report these to your doctor or health care professional if they continue or are bothersome.):  · back pain  · cough  · diarrhea  · nervousness  · runny nose  · sore throat  · tremor  What may interact with this medicine?  Do not take this medicine with any of the following medications:  · MAOIs like Carbex, Eldepryl, Marplan, Nardil,  and Parnate  This medicine may also interact with the following medications:  · caffeine  · diuretics  · medicines for colds or congestion  · medicines for depression, anxiety, or psychotic disturbances  · medicines for fungal infections like ketoconazole  · medicines for weight loss including some herbal products  · other medicines that prolong the QT interval (cause an abnormal heart rhythm)  · procarbazine  · ritonavir  · some antibiotics like clarithromycin, erythromycin, levofloxacin, linezolid, and telithromycin  · some heart medicines  · steroid medicines like prednisone or cortisone  · stimulant medicines for attention disorders, weight loss, or to stay awake  · theophylline  · thyroid hormones  What if I miss a dose?  If you miss a dose, use it as soon as you can. If it is almost time for your next dose, use only that dose and continue with your regular schedule. Do not use double or extra doses.  Where should I keep my medicine?  Keep out of the reach of children.  Store at room temperature between 15 and 30 degrees C (59 and 86 degrees F). Do not freeze. Throw away 3 months after first use or when the inhaler is locked, whichever comes first. Throw away any unopened packages after the expiration date.  What should I tell my health care provider before I take this medicine?  They need to know if you have any of these conditions:  · asthma  · diabetes  · heart disease or irregular heartbeat  · high blood pressure  · pheochromocytoma  · seizures  · thyroid disease  · an unusual or allergic reaction to olodaterol, other medicines, foods, dyes, or preservatives  · pregnant or trying to get pregnant  · breast-feeding  What should I watch for while using this medicine?  Visit your doctor or health care professional for regular checkups. Tell your doctor or health care professional if your symptoms do not get better.  If your symptoms get worse or if you need your short-acting inhalers more often, call your  doctor right away. Do not use this medicine more than once every 24 hours.  NOTE:This sheet is a summary. It may not cover all possible information. If you have questions about this medicine, talk to your doctor, pharmacist, or health care provider. Copyright© 2017 [employer]

## 2020-02-05 NOTE — ASSESSMENT & PLAN NOTE
COPD ROS: taking medications as instructed, no medication side effects noted, no significant ongoing wheezing or shortness of breath, using bronchodilator MDI less than twice a week.   CAT score 14  FEV1: 1.48L ( 50.7%), FVC 3.18L( 82.6%)  FEV1/FVC 46.36    FEV1 and FVC improved  FEV1  Improved: from 1.29L to 1.48L  FVC improved from 2.31L to 3.18L    mRC 1  Not on oxygen  Using  Spiriva consistently and Albuterol HFA  New concerns: FEV1 and FVC improved      Exam: appears well, vitals normal, no respiratory distress, acyanotic, normal RR, chest clear, no wheezing, crepitations, rhonchi, normal symmetric air entry.      Meds: Continue SPIRIVA     Assessment: COPD stable  .   immunizations current  Plan:

## 2020-02-05 NOTE — ASSESSMENT & PLAN NOTE
CPAP 14 cm  [place] score 12  Using device and benefits  Obtain download from [facility]    Discussed therapeutic goals for positive airway pressure therapy(CPAP or BiPAP):   Ideal is usage 100% of nights for 6 - 8 hours per night. Minimum usage is 70% of night for at least 4 hours per night used. Pateint expressed understanding. All Questions answered.

## 2020-02-05 NOTE — PROGRESS NOTES
Subjective:       Patient ID: Noble Tripp is a 74 y.o. male.  Patient Active Problem List   Diagnosis    Thoracic or lumbosacral neuritis or radiculitis, unspecified    Neuropathy    Benign prostate hyperplasia    Hyperlipidemia    Osteopenia    Hypertension    Chronic pain    History of head and neck cancer    Personal history of colonic polyps    JUANITO on CPAP    Cancer of tongue    Adrenal benign neoplasm    Hypomagnesemia    Oropharyngeal aspiration    Stage 3 severe COPD by GOLD classification    Coronary artery disease    Allergy to statin medication    Statin intolerance    History of iron deficiency    Chronic anemia    Pleural effusion, bilateral    Esophagitis    Lung mass    Carotid artery disease    Chronic systolic congestive heart failure    Weight decreasing    Cardiac left ventricular ejection fraction 21-40 percent    Coronary artery disease involving left main coronary artery    Bradycardia    Hyperkalemia    Mastoiditis of both sides    Anxiety    Ischemic cardiomyopathy    CHF (congestive heart failure)     Immunization History   Administered Date(s) Administered    Influenza 12/10/2007, 10/14/2008, 2009, 10/19/2010, 10/06/2011    Influenza - High Dose - PF (65 years and older) 2012, 10/02/2013, 10/07/2014, 10/10/2015, 10/31/2016, 10/17/2017, 2018, 09/15/2019    Influenza - Trivalent (ADULT) 12/10/2007, 2009, 10/06/2011    Influenza A (H1N1) 2009 Monovalent - IM - PF 2010    Influenza Split 12/10/2007, 2009, 10/06/2011    Pneumococcal 10/19/2010    Pneumococcal Conjugate - 13 Valent 10/10/2015    Pneumococcal Polysaccharide - 23 Valent 2017    Tdap 2010    Zoster 2011     Social History     Tobacco Use   Smoking Status Former Smoker    Packs/day: 0.50    Years: 55.00    Pack years: 27.50    Types: Cigarettes    Last attempt to quit: 10/1/2019    Years since quittin.3   Smokeless  Tobacco Never Used   Tobacco Comment    6-7 cigs/day     EPWORTH SLEEPINESS SCALE 2/5/2020   Sitting and reading 3   Watching TV 3   Sitting, inactive in a public place (e.g. a theatre or a meeting) 0   As a passenger in a car for an hour without a break 0   Lying down to rest in the afternoon when circumstances permit 3   Sitting and talking to someone 0   Sitting quietly after a lunch without alcohol 3   In a car, while stopped for a few minutes in traffic 0   Total score 12     COPD Questionnaire  How often do you cough?: A little of the time  How often do you have phlegm (mucus) in your chest?: Almost never  How often does your chest feel tight?: Never  When you walk up a hill or one flight of stairs, how often are you breathless?: Most of the time  How often are you limited doing any activities at home?: Most of the time  How often are you confident leaving the house despite your lung condition?: All of the time  How often do you sleep soundly?: All of the time  How often do you have energy?: A little of the time  Total score: 14      Chief Complaint: COPD (REV CXR/KIRAN/WALK) and Sleep Apnea    Noble Tripp is 74 y.o.   Has COPD group C, JUANITO on CPAP, Chronic Aspiration  LV was 11/20/2019  Here to review test  Spirometry: show obstruction with improved FEV1 an FVC  6MWD: distance improved, no desaturation, normal exercise capacity  CXR: small sheldon erffusions stable  Overall improved  No dyspnea  Off oxygen  Known Chronic aspiration, declined hospital bed and or PEG for nutrition in past.     Has CPAP using and benefits..                  Review of Systems   Constitutional: Positive for fatigue and weakness.   HENT: Negative for trouble swallowing.    Eyes: Negative.    Respiratory: Negative for snoring, sputum production, shortness of breath, wheezing and dyspnea on extertion.    Cardiovascular: Negative.    Genitourinary: Negative.    Endocrine: endocrine negative   Musculoskeletal: Negative.    Skin:  "Negative.    Gastrointestinal: Negative.    Psychiatric/Behavioral: Negative.        Objective:       Vitals:    02/05/20 1128   BP: 127/60   Pulse: 62   Resp: 20   SpO2: 99%   Weight: 65.3 kg (144 lb)   Height: 5' 8" (1.727 m)     Physical Exam   Constitutional: He is oriented to person, place, and time. Vital signs are normal. He does not have a sickly appearance. He does not appear ill. No distress. Nasal cannula in place. He is not obese.   HENT:   Head: Normocephalic.   Nose: Nose normal.   Neck: Normal range of motion. Neck supple.   Cardiovascular: Normal rate, regular rhythm and normal heart sounds.   No murmur heard.  Pulmonary/Chest: Normal expansion and effort normal. He has decreased breath sounds in the right middle field, the right lower field and the left lower field. He has no wheezes. He has no rhonchi. Negative for tactile fremitus.   Abdominal: Bowel sounds are normal.   Musculoskeletal: Normal range of motion. He exhibits no edema.   Lymphadenopathy:     He has no cervical adenopathy.   Neurological: He is alert and oriented to person, place, and time. He has normal reflexes. No cranial nerve deficit.   Skin: Skin is warm and dry.   Psychiatric: He has a normal mood and affect.   Nursing note and vitals reviewed.    Personal Diagnostic Review     SPIROMETRY  FEV1: 1.48L ( 50.7%), FVC 3.18L( 82.6%)  FEV1/FVC 46.36    FEV1 and FVC improved  FEV1  Improved: from 1.29L to 1.48L  FVC improved from 2.31L to 3.18L       6MW Test  Height: 5' 8" (172.7 cm)  Weight: 65.3 kg (144 lb)  BMI (Calculated): 21.9  Predicted Distance: 357.52  Interpretation  Predicted Distance Meters (Calculated): 516.92 meters   Distance improved from 327.66m to 396.24 m  76.67% predicted  Spo2 estefania 92%  [place] score 3      CXR      Assessment:       Problem List Items Addressed This Visit     Chronic systolic congestive heart failure    JUANITO on CPAP (Chronic)     CPAP 14 cm  [place] score 12  Using device and benefits  Obtain " download from [facility]    Discussed therapeutic goals for positive airway pressure therapy(CPAP or BiPAP):   Ideal is usage 100% of nights for 6 - 8 hours per night. Minimum usage is 70% of night for at least 4 hours per night used. Pateint expressed understanding. All Questions answered.          Hyperlipidemia     Stable on REPATHA         Hypertension     Stable on COZAAR         Oropharyngeal aspiration     Cont speech therapy and aspiration precautions         Stage 3 severe COPD by GOLD classification - Primary     COPD ROS: taking medications as instructed, no medication side effects noted, no significant ongoing wheezing or shortness of breath, using bronchodilator MDI less than twice a week.   CAT score 14  FEV1: 1.48L ( 50.7%), FVC 3.18L( 82.6%)  FEV1/FVC 46.36    FEV1 and FVC improved  FEV1  Improved: from 1.29L to 1.48L  FVC improved from 2.31L to 3.18L    mRC 1  Not on oxygen  Using  Spiriva consistently and Albuterol HFA  New concerns: FEV1 and FVC improved      Exam: appears well, vitals normal, no respiratory distress, acyanotic, normal RR, chest clear, no wheezing, crepitations, rhonchi, normal symmetric air entry.      Meds: Continue SPIRIVA     Assessment: COPD stable  .   immunizations current  Plan:           Relevant Orders    X-Ray Chest PA And Lateral    Spirometry without Bronchodilator    Pleural effusion, bilateral     Stable, likely transudate, asymptomatic , very mild         RESOLVED: Recurrent right pleural effusion    Ischemic cardiomyopathy     Stable: LASIX, Plavix and aspirin             Plan:          Adherence  With speech therapy and nutrition  Adherence with CPAP: get download from [facility]  Follow-up x-ray and spirometry in 3 months  Nutrition improvement.  Continue current inhaler regimen rescue albuterol and Spiriva HFA.   Overall FEV1 and FVC improved  May condier STRIVERDI if decline    Follow up in about 4 months (around 6/5/2020), or get download from [facility], for Basic Spirometry and  CXR next visit.    This note was prepared using voice recognition system and is likely to have sound alike errors that may have been overlooked even after proof reading.  Please call me with any questions    Discussed diagnosis, its evaluation, treatment and usual course. All questions answered.    Thank you for the courtesy of participating in the care of this patient    Santos Cardozo MD

## 2020-02-14 ENCOUNTER — OFFICE VISIT (OUTPATIENT)
Dept: CARDIOLOGY | Facility: CLINIC | Age: 75
End: 2020-02-14
Payer: MEDICARE

## 2020-02-14 VITALS
DIASTOLIC BLOOD PRESSURE: 50 MMHG | HEART RATE: 66 BPM | OXYGEN SATURATION: 94 % | SYSTOLIC BLOOD PRESSURE: 112 MMHG | BODY MASS INDEX: 22.05 KG/M2 | HEIGHT: 68 IN | WEIGHT: 145.5 LBS

## 2020-02-14 DIAGNOSIS — E78.00 PURE HYPERCHOLESTEROLEMIA: ICD-10-CM

## 2020-02-14 DIAGNOSIS — I50.9 CONGESTIVE HEART FAILURE, UNSPECIFIED HF CHRONICITY, UNSPECIFIED HEART FAILURE TYPE: ICD-10-CM

## 2020-02-14 DIAGNOSIS — I25.10 CORONARY ARTERY DISEASE, ANGINA PRESENCE UNSPECIFIED, UNSPECIFIED VESSEL OR LESION TYPE, UNSPECIFIED WHETHER NATIVE OR TRANSPLANTED HEART: ICD-10-CM

## 2020-02-14 DIAGNOSIS — R94.30 CARDIAC LEFT VENTRICULAR EJECTION FRACTION 21-40 PERCENT: ICD-10-CM

## 2020-02-14 DIAGNOSIS — Z78.9 STATIN INTOLERANCE: ICD-10-CM

## 2020-02-14 DIAGNOSIS — G47.33 OSA ON CPAP: Primary | Chronic | ICD-10-CM

## 2020-02-14 DIAGNOSIS — E87.5 HYPERKALEMIA: Chronic | ICD-10-CM

## 2020-02-14 DIAGNOSIS — J44.9 STAGE 3 SEVERE COPD BY GOLD CLASSIFICATION: ICD-10-CM

## 2020-02-14 DIAGNOSIS — I65.21 STENOSIS OF RIGHT CAROTID ARTERY: ICD-10-CM

## 2020-02-14 DIAGNOSIS — I25.5 ISCHEMIC CARDIOMYOPATHY: ICD-10-CM

## 2020-02-14 DIAGNOSIS — I10 ESSENTIAL HYPERTENSION: ICD-10-CM

## 2020-02-14 DIAGNOSIS — I25.10 CORONARY ARTERY DISEASE INVOLVING LEFT MAIN CORONARY ARTERY: ICD-10-CM

## 2020-02-14 PROCEDURE — 99213 OFFICE O/P EST LOW 20 MIN: CPT | Mod: PBBFAC | Performed by: INTERNAL MEDICINE

## 2020-02-14 PROCEDURE — 99999 PR PBB SHADOW E&M-EST. PATIENT-LVL III: CPT | Mod: PBBFAC,,, | Performed by: INTERNAL MEDICINE

## 2020-02-14 PROCEDURE — 99214 PR OFFICE/OUTPT VISIT, EST, LEVL IV, 30-39 MIN: ICD-10-PCS | Mod: S$PBB,,, | Performed by: INTERNAL MEDICINE

## 2020-02-14 PROCEDURE — 99214 OFFICE O/P EST MOD 30 MIN: CPT | Mod: S$PBB,,, | Performed by: INTERNAL MEDICINE

## 2020-02-14 PROCEDURE — 99999 PR PBB SHADOW E&M-EST. PATIENT-LVL III: ICD-10-PCS | Mod: PBBFAC,,, | Performed by: INTERNAL MEDICINE

## 2020-02-14 RX ORDER — LOSARTAN POTASSIUM 25 MG/1
25 TABLET ORAL NIGHTLY
Qty: 90 TABLET | Refills: 3 | Status: SHIPPED | OUTPATIENT
Start: 2020-02-14 | End: 2020-03-23 | Stop reason: SDUPTHER

## 2020-02-14 RX ORDER — FUROSEMIDE 40 MG/1
20 TABLET ORAL DAILY
Qty: 30 TABLET | Refills: 6 | Status: ON HOLD | OUTPATIENT
Start: 2020-02-14 | End: 2020-05-19

## 2020-02-14 NOTE — PROGRESS NOTES
Subjective:   Patient ID:  Noble Tripp is a 74 y.o. male who presents for follow up of Coronary Artery Disease (2 week f/u) and Fatigue (low bp)      HPI  73 yo, male, f/u for CAD  PMH PSVT, CAD, CHfrEF 35%, HLD, COPD on home O2, JUANITO on CPAP, vocal cord cancer s/p surgery and subsequent XRT in 2011, and recent tongue precancer mass removal.   10- admitted to [facility] for sepsis/PNA and PSVT on . EKG showed extensive St depression of 1 mm on anterolateral leads. EF 60%. MPi showed fixed inferior perfusion defect. cTn was up to 0.3. PSVT was converted to sinus O/N. Echo in : normal EF, DD.  MPI in :  A small to moderate size fixed defect of moderate to severe intensity that extends from the apical to the base inferior wall of the left ventricle.  EKG on 10-: PSVT, st depression on V3 to V6, I, II and avL.   s/p left thoracentesis d/t pleural effusion.   admitted for CHF and COPD exacerbation. ECHO showed normal EF and BNP 1800, troponin 0.05 and flat. Rx with IV lasix and breathing O2. D/c with lasix 20 mg po daily   admitted for [facility] for worsening SOB. Troponin up to 0.3. ECHo showed EF 35%, C showed severe multivessel CAD. Had CVT evaluation and was not cabg candidate due to high risk. Also high risk PCI. Pt was discharged for medical Rx and PCI as op. Discharged with home O2. Om amiodarone 400 mg daily for nonsustained VT in the hospital.  Reviewed [facility] imaging with Dr. Beltran and had PET for viability study. Showing inferior viable myocardium  11/2019 admitted for bradycardia and hypotension. BB, ARB, amio and aldactone on hold.  12/2019 repeat [facility] by Dr. Beltran: rca with  recanalized unable to cross. Lad 50% ifr 0.84, Diagonal ifr 0.98, Nonobstruc. Lcx. rca fills from lad.       1/29/2020  He is able to walk now 0.5 mile w/o any significant limitation. He is able to walk better has no swelling in legs no chest pain no  "orthopnea or pnd no change in 2 pillows he sleeps on.  \has no othe rissues clinically with syncope near syncope is compliant with diet and meds. He is intolerant to more medical therapy due to side effects.. with hyperkalemia. His pet scan sshowed viability in the inferior wall. No arrythmia clinically       He is tolerating losartan well no dizziness lightheadedness . His bp is unchanged. He is still able top make 0.5 miles his limitation is not shortness of breath but hios back and leg pain. He is not smoking. He is taking furosemide 40 mg po daily his co2 is 31   Past Medical History:   Diagnosis Date    Adrenal adenoma     david;nl fxn w/u;tran 11/18    Aspiration pneumonia 10/15    puree/honey    Benign prostate hyperplasia     CAD (coronary artery disease)     dr padilla    Chronic pain     dr santos    Chronic systolic congestive heart failure 10/17/2019    COPD (chronic obstructive pulmonary disease)     papi    Ex-smoker     11/13    Hyperlipidemia     Ischemic cardiomyopathy 11/22/2019    JUANITO on CPAP     cpap    Osteopenia 1/15 tran 1/18    PSVT (paroxysmal supraventricular tachycardia)     PVD (peripheral vascular disease)     noobs 07 khuri    Pyriform sinus cancer     dr ponce radiation 1/2-14 dr king perales    Squamous cell carcinoma of skin     roberto    Tongue cancer     "superficial" removed 11/14    Vocal cord cancer 2011    Xerostomia     radiation       Past Surgical History:   Procedure Laterality Date    APPENDECTOMY      BRONCHOSCOPY Bilateral 2/5/2019    Procedure: Bronchoscopy;  Surgeon: Santos Cardozo MD;  Location: [facility];  Service: Endoscopy;  Laterality: Bilateral;    COLONOSCOPY N/A 5/1/2017    Procedure: Due for screening colonoscopy;  Surgeon: Anushka Yoder MD;  Location: [facility];  Service: Endoscopy;  Laterality: N/A;    ESOPHAGOGASTRODUODENOSCOPY N/A 5/29/2018    Procedure: ESOPHAGOGASTRODUODENOSCOPY (EGD);  Surgeon: Audie Hill III, " MD;  Location: [facility] ENDO;  Service: Endoscopy;  Laterality: N/A;    ESOPHAGOGASTRODUODENOSCOPY N/A 2018    Procedure: ESOPHAGOGASTRODUODENOSCOPY (EGD);  Surgeon: Audie Hill III, MD;  Location: [facility] ENDO;  Service: Endoscopy;  Laterality: N/A;    PERCUTANEOUS TRANSLUMINAL BALLOON ANGIOPLASTY OF CORONARY ARTERY Right 2019    Procedure: PTCA, USING BALLOON/ IABP or Impella multivessel angioplasty/poss stent;  Surgeon: Abel Beltran MD;  Location: [facility] CATH LAB;  Service: Cardiology;  Laterality: Right;    THORACENTESIS Left 2018    Procedure: Thoracentesis;  Surgeon: Santos Cardozo MD;  Location: [facility] ENDO;  Service: Endoscopy;  Laterality: Left;    THORACENTESIS Right 2019    Procedure: Thoracentesis;  Surgeon: Santos Cardozo MD;  Location: [facility] ENDO;  Service: Endoscopy;  Laterality: Right;    tongue cancer excision      dr vitale    VOCAL CORD LATERALIZATION, ENDOSCOPIC APPROACH W/ MORENA yanceyorter       Social History     Tobacco Use    Smoking status: Former Smoker     Packs/day: 0.50     Years: 55.00     Pack years: 27.50     Types: Cigarettes     Last attempt to quit: 10/1/2019     Years since quittin.3    Smokeless tobacco: Never Used    Tobacco comment: 6-7 cigs/day   Substance Use Topics    Alcohol use: Not Currently    Drug use: No       Family History   Problem Relation Age of Onset    Lung cancer Father         + Smoker    No Known Problems Mother     Lung cancer Brother         + Smoker       Current Outpatient Medications   Medication Sig    albuterol (PROAIR HFA) 90 mcg/actuation inhaler INL 2 PFS PO INTO THE LUNGS Q 4 H PRF WHZ OR SOB    aspirin 81 mg Tab Take 1 tablet by mouth Daily. Over the counter to help prevent stroke/heart attack    atorvastatin (LIPITOR) 80 MG tablet Take 1 tablet (80 mg total) by mouth once daily.    carvedilol (COREG) 3.125 MG tablet Take 1 tablet (3.125 mg total) by mouth 2 (two) times daily.    clopidogrel  (PLAVIX) 75 mg tablet Take 1 tablet (75 mg total) by mouth once daily.    clotrimazole (MYCELEX) 10 mg elissa     evolocumab (REPATHA SURECLICK) 140 mg/mL PnIj Inject 1 mL (140 mg total) into the skin every 14 (fourteen) days.    gabapentin (NEURONTIN) 800 MG tablet Take 1 tablet (800 mg total) by mouth 3 (three) times daily.    Lactobacillus acidophilus (PROBIOTIC ORAL) Take 1 tablet by mouth once daily.    loratadine (CLARITIN) 10 mg tablet Take 1 tablet by mouth daily as needed.     losartan (COZAAR) 25 MG tablet Take 0.5 tablets (12.5 mg total) by mouth every evening.    melatonin 10 mg Tab Take 1 tablet by mouth every evening.    nebulizer and compressor Bailey Use as directed    oxyCODONE-acetaminophen (PERCOCET) 7.5-325 mg per tablet Take 1 tablet by mouth every 8 (eight) hours as needed for Pain.    OXYGEN-AIR DELIVERY SYSTEMS MISC Inhale 2-3 L into the lungs continuous.    pantoprazole (PROTONIX) 40 MG tablet TAKE ONE TABLET BY MOUTH ONCE DAILY    pilocarpine (SALAGEN) 5 MG Tab TAKE ONE TABLET BY MOUTH 30 MINUTES PRIOR TO EACH MEAL    sertraline (ZOLOFT) 50 MG tablet Take 1 tablet (50 mg total) by mouth once daily.    SPIRIVA WITH HANDIHALER 18 mcg inhalation capsule INHALE 1 CAPSULE BY MOUTH ONCE DAILY    furosemide (LASIX) 40 MG tablet Take 1 tablet (40 mg total) by mouth once daily.     No current facility-administered medications for this visit.      Current Outpatient Medications on File Prior to Visit   Medication Sig    albuterol (PROAIR HFA) 90 mcg/actuation inhaler INL 2 PFS PO INTO THE LUNGS Q 4 H PRF WHZ OR SOB    aspirin 81 mg Tab Take 1 tablet by mouth Daily. Over the counter to help prevent stroke/heart attack    atorvastatin (LIPITOR) 80 MG tablet Take 1 tablet (80 mg total) by mouth once daily.    carvedilol (COREG) 3.125 MG tablet Take 1 tablet (3.125 mg total) by mouth 2 (two) times daily.    clopidogrel (PLAVIX) 75 mg tablet Take 1 tablet (75 mg total) by mouth once  daily.    clotrimazole (MYCELEX) 10 mg elissa     evolocumab (REPATHA SURECLICK) 140 mg/mL PnIj Inject 1 mL (140 mg total) into the skin every 14 (fourteen) days.    gabapentin (NEURONTIN) 800 MG tablet Take 1 tablet (800 mg total) by mouth 3 (three) times daily.    Lactobacillus acidophilus (PROBIOTIC ORAL) Take 1 tablet by mouth once daily.    loratadine (CLARITIN) 10 mg tablet Take 1 tablet by mouth daily as needed.     losartan (COZAAR) 25 MG tablet Take 0.5 tablets (12.5 mg total) by mouth every evening.    melatonin 10 mg Tab Take 1 tablet by mouth every evening.    nebulizer and compressor Bailey Use as directed    oxyCODONE-acetaminophen (PERCOCET) 7.5-325 mg per tablet Take 1 tablet by mouth every 8 (eight) hours as needed for Pain.    OXYGEN-AIR DELIVERY SYSTEMS MISC Inhale 2-3 L into the lungs continuous.    pantoprazole (PROTONIX) 40 MG tablet TAKE ONE TABLET BY MOUTH ONCE DAILY    pilocarpine (SALAGEN) 5 MG Tab TAKE ONE TABLET BY MOUTH 30 MINUTES PRIOR TO EACH MEAL    sertraline (ZOLOFT) 50 MG tablet Take 1 tablet (50 mg total) by mouth once daily.    SPIRIVA WITH HANDIHALER 18 mcg inhalation capsule INHALE 1 CAPSULE BY MOUTH ONCE DAILY    furosemide (LASIX) 40 MG tablet Take 1 tablet (40 mg total) by mouth once daily.     No current facility-administered medications on file prior to visit.      Review of patient's allergies indicates:   Allergen Reactions    Contrast media Hives and Itching    Iodinated contrast media Hives and Itching    Iodine Hives and Itching    Pravastatin      Other reaction(s): fatigue  Other reaction(s): fatigue    Rosuvastatin      Other reaction(s): fatigue  Other reaction(s): fatigue    Simvastatin      Other reaction(s): fatigue  Other reaction(s): fatigue    Statins-hmg-coa reductase inhibitors Other (See Comments)     Fatigue     Review of Systems   Constitution: Negative for malaise/fatigue.   Eyes: Negative for blurred vision.   Cardiovascular:  "Positive for dyspnea on exertion. Negative for chest pain, claudication, cyanosis, irregular heartbeat, leg swelling, near-syncope, orthopnea, palpitations and paroxysmal nocturnal dyspnea.   Respiratory: Positive for shortness of breath. Negative for cough and hemoptysis.    Hematologic/Lymphatic: Negative for bleeding problem. Does not bruise/bleed easily.   Skin: Negative for dry skin and itching.   Musculoskeletal: Negative for falls, muscle weakness and myalgias.   Gastrointestinal: Negative for abdominal pain, diarrhea, heartburn, hematemesis, hematochezia and melena.   Genitourinary: Negative for flank pain and hematuria.   Neurological: Negative for dizziness, focal weakness, headaches, light-headedness, numbness, paresthesias, seizures and weakness.   Psychiatric/Behavioral: Negative for altered mental status and memory loss. The patient is not nervous/anxious.    Allergic/Immunologic: Negative for hives.       Objective:   Physical Exam  Vitals:    02/14/20 1441 02/14/20 1444   BP: (!) 108/48 (!) 112/50   BP Location: Right arm Left arm   Patient Position: Sitting Sitting   BP Method: Small (Manual) Small (Manual)   Pulse: 66    SpO2: (!) 94%    Weight: 66 kg (145 lb 8.1 oz)    Height: 5' 8" (1.727 m)    Constitutional: He is oriented to person, place, and time. He appears well-developed and well-nourished. No distress.   HENT:   Head: Normocephalic and atraumatic.   Eyes: Pupils are equal, round, and reactive to light. EOM are normal. Right eye exhibits no discharge. Left eye exhibits no discharge.   Neck: Neck supple. No JVD present. No thyromegaly present.   Cardiovascular: Normal rate, regular rhythm, normal heart sounds and intact distal pulses. Exam reveals no gallop and no friction rub.   No murmur heard.  Pulses:       Carotid pulses are 2+ on the right side with bruit, and 2+ on the left side with bruit.       Radial pulses are 2+ on the right side, and 2+ on the left side.        Femoral pulses " are 2+ on the right side, and 2+ on the left side.       Popliteal pulses are 2+ on the right side, and 2+ on the left side.        Dorsalis pedis pulses are 2+ on the right side, and 2+ on the left side.        Posterior tibial pulses are 2+ on the right side, and 2+ on the left side.   Pulmonary/Chest: Effort normal and breath sounds normal. No respiratory distress. He has no wheezes. He has no rales. He exhibits no tenderness.   Abdominal: Soft. Bowel sounds are normal. He exhibits no distension. There is no tenderness.   Musculoskeletal: Normal range of motion. He exhibits no edema.   Neurological: He is alert and oriented to person, place, and time. No cranial nerve deficit.   Skin: Skin is warm and dry. No rash noted. He is not diaphoretic. No erythema.   Psychiatric: He has a normal mood and affect. His behavior is normal.   Nursing note and vitals reviewed.     Lab Results   Component Value Date    CHOL 80 (L) 12/19/2019    CHOL 77 (L) 02/06/2019    CHOL 98 (L) 08/13/2018     Lab Results   Component Value Date    HDL 42 12/19/2019    HDL 44 02/06/2019    HDL 43 08/13/2018     Lab Results   Component Value Date    LDLCALC 28.0 (L) 12/19/2019    LDLCALC 25.0 (L) 02/06/2019    LDLCALC 39.8 (L) 08/13/2018     Lab Results   Component Value Date    TRIG 50 12/19/2019    TRIG 40 02/06/2019    TRIG 76 08/13/2018     Lab Results   Component Value Date    CHOLHDL 52.5 (H) 12/19/2019    CHOLHDL 57.1 (H) 02/06/2019    CHOLHDL 43.9 08/13/2018       Chemistry        Component Value Date/Time     02/05/2020 1036    K 4.5 02/05/2020 1036     02/05/2020 1036    CO2 31 (H) 02/05/2020 1036    BUN 19 02/05/2020 1036    CREATININE 1.2 02/05/2020 1036    GLU 72 02/05/2020 1036        Component Value Date/Time    CALCIUM 9.1 02/05/2020 1036    ALKPHOS 81 12/19/2019 0921    AST 14 12/19/2019 0921    ALT 14 12/19/2019 0921    BILITOT 0.5 12/19/2019 0921    ESTGFRAFRICA >60.0 02/05/2020 1036    EGFRNONAA 59.6 (A)  02/05/2020 1036          Lab Results   Component Value Date    TSH 1.259 02/06/2019     Lab Results   Component Value Date    INR 1.0 12/05/2019    INR 1.0 10/26/2019    INR 1.0 10/25/2019     Lab Results   Component Value Date    WBC 9.14 12/19/2019    HGB 11.1 (L) 12/19/2019    HCT 34.0 (L) 12/19/2019    MCV 96 12/19/2019     12/19/2019     BMP  Sodium   Date Value Ref Range Status   02/05/2020 141 136 - 145 mmol/L Final     Potassium   Date Value Ref Range Status   02/05/2020 4.5 3.5 - 5.1 mmol/L Final     Chloride   Date Value Ref Range Status   02/05/2020 100 95 - 110 mmol/L Final     CO2   Date Value Ref Range Status   02/05/2020 31 (H) 23 - 29 mmol/L Final     BUN, Bld   Date Value Ref Range Status   02/05/2020 19 8 - 23 mg/dL Final     Creatinine   Date Value Ref Range Status   02/05/2020 1.2 0.5 - 1.4 mg/dL Final     Calcium   Date Value Ref Range Status   02/05/2020 9.1 8.7 - 10.5 mg/dL Final     Anion Gap   Date Value Ref Range Status   02/05/2020 10 8 - 16 mmol/L Final     eGFR if    Date Value Ref Range Status   02/05/2020 >60.0 >60 mL/min/1.73 m^2 Final     eGFR if non    Date Value Ref Range Status   02/05/2020 59.6 (A) >60 mL/min/1.73 m^2 Final     Comment:     Calculation used to obtain the estimated glomerular filtration  rate (eGFR) is the CKD-EPI equation.        CrCl cannot be calculated (Patient's most recent lab result is older than the maximum 7 days allowed.).    Assessment:     1. JUANITO on CPAP    2. Hyperkalemia    3. Congestive heart failure, unspecified HF chronicity, unspecified heart failure type    4. Pure hypercholesterolemia    5. Essential hypertension    6. Stage 3 severe COPD by GOLD classification    7. Coronary artery disease, angina presence unspecified, unspecified vessel or lesion type, unspecified whether native or transplanted heart    8. Statin intolerance    9. Stenosis of right carotid artery    10. Cardiac left ventricular ejection  fraction 21-40 percent    11. Coronary artery disease involving left main coronary artery    12. Ischemic cardiomyopathy         Plan:   He is tolerating losartan ok no evidence of renal compromise or potassium reetention  Will drop lasix dose in half to 20 mg po daily and will get losartan up to 25 mg po daily.  Will fiollow up in 2 weeks with bmp   He will benefit from pet cardiolite to r/o ischemia to justify intervention.

## 2020-02-28 ENCOUNTER — TELEPHONE (OUTPATIENT)
Dept: CARDIOLOGY | Facility: CLINIC | Age: 75
End: 2020-02-28

## 2020-02-29 ENCOUNTER — EXTERNAL CHRONIC CARE MANAGEMENT (OUTPATIENT)
Dept: PRIMARY CARE CLINIC | Facility: CLINIC | Age: 75
End: 2020-02-29
Payer: MEDICARE

## 2020-02-29 PROCEDURE — 99490 CHRNC CARE MGMT STAFF 1ST 20: CPT | Mod: S$PBB,,, | Performed by: FAMILY MEDICINE

## 2020-02-29 PROCEDURE — 99490 PR CHRONIC CARE MGMT, 1ST 20 MIN: ICD-10-PCS | Mod: S$PBB,,, | Performed by: FAMILY MEDICINE

## 2020-02-29 PROCEDURE — 99490 CHRNC CARE MGMT STAFF 1ST 20: CPT | Mod: PBBFAC | Performed by: FAMILY MEDICINE

## 2020-03-02 ENCOUNTER — CLINICAL SUPPORT (OUTPATIENT)
Dept: CARDIOLOGY | Facility: CLINIC | Age: 75
End: 2020-03-02
Attending: INTERNAL MEDICINE
Payer: MEDICARE

## 2020-03-02 VITALS — HEART RATE: 68 BPM | SYSTOLIC BLOOD PRESSURE: 127 MMHG | DIASTOLIC BLOOD PRESSURE: 65 MMHG

## 2020-03-02 DIAGNOSIS — I25.10 CORONARY ARTERY DISEASE INVOLVING LEFT MAIN CORONARY ARTERY: ICD-10-CM

## 2020-03-02 DIAGNOSIS — I50.9 CONGESTIVE HEART FAILURE, UNSPECIFIED HF CHRONICITY, UNSPECIFIED HEART FAILURE TYPE: ICD-10-CM

## 2020-03-02 DIAGNOSIS — I25.5 ISCHEMIC CARDIOMYOPATHY: ICD-10-CM

## 2020-03-02 LAB
% MYOCARDIUM- DEFECT 1: 23 %
CFR FLOW - ANTERIOR: 1.58
CFR FLOW - INFERIOR: 1.22
CFR FLOW - LATERAL: 1.45
CFR FLOW - MAX: 2.18
CFR FLOW - MIN: 0.86
CFR FLOW - SEPTAL: 1.53
CFR FLOW - WHOLE HEART: 1.45
CFR FLOW- DEFECT 1: 1.16 CC/MIN/G
CV PHARM DOSE: 36.9 MG
CV STRESS BASE HR: 60 BPM
DIASTOLIC BLOOD PRESSURE: 62 MMHG
END DIASTOLIC INDEX-HIGH: 170 ML/M2
END SYSTOLIC INDEX-HIGH: 70 ML/M2
NUC REST DIASTOLIC VOLUME INDEX: 175
NUC REST EJECTION FRACTION: 72
NUC REST SYSTOLIC VOLUME INDEX: 49
NUC STRESS DIASTOLIC VOLUME INDEX: 170
NUC STRESS EJECTION FRACTION: 71 %
NUC STRESS SYSTOLIC VOLUME INDEX: 49
OHS CV CPX 85 PERCENT MAX PREDICTED HEART RATE MALE: 125
OHS CV CPX MAX PREDICTED HEART RATE: 147
OHS CV CPX PATIENT IS FEMALE: 0
OHS CV CPX PATIENT IS MALE: 1
OHS CV CPX PEAK DIASTOLIC BLOOD PRESSURE: 36 MMHG
OHS CV CPX PEAK HEAR RATE: 63 BPM
OHS CV CPX PEAK RATE PRESSURE PRODUCT: 6489
OHS CV CPX PEAK SYSTOLIC BLOOD PRESSURE: 103 MMHG
OHS CV CPX PERCENT MAX PREDICTED HEART RATE ACHIEVED: 43
OHS CV CPX RATE PRESSURE PRODUCT PRESENTING: 7140
PERFUSION DEFECT 1 SIZE IN %: 24 %
REST FLOW - ANTERIOR: 1.38 CC/MIN/G
REST FLOW - INFERIOR: 0.97 CC/MIN/G
REST FLOW - LATERAL: 1.32 CC/MIN/G
REST FLOW - MAX: 1.8 CC/MIN/G
REST FLOW - MIN: 0.5 CC/MIN/G
REST FLOW - SEPTAL: 1.1 CC/MIN/G
REST FLOW - WHOLE HEART: 1.19 CC/MIN/G
REST FLOW- DEFECT 1: 0.89 CC/MIN/G
RETIRED EF AND QEF - SEE NOTES: 51 %
STRESS ECHO TARGET HR: 125 BPM
STRESS FLOW - ANTERIOR: 2.18 CC/MIN/G
STRESS FLOW - INFERIOR: 1.15 CC/MIN/G
STRESS FLOW - LATERAL: 1.91 CC/MIN/G
STRESS FLOW - MAX: 3.1 CC/MIN/G
STRESS FLOW - MIN: 0.7 CC/MIN/G
STRESS FLOW - SEPTAL: 1.64 CC/MIN/G
STRESS FLOW - WHOLE HEART: 1.72 CC/MIN/G
STRESS FLOW- DEFECT 1: 1 CC/MIN/G
SYSTOLIC BLOOD PRESSURE: 119 MMHG

## 2020-03-02 PROCEDURE — 99999 PR PBB SHADOW E&M-EST. PATIENT-LVL I: CPT | Mod: PBBFAC,,,

## 2020-03-02 PROCEDURE — 93018 CV STRESS TEST I&R ONLY: CPT | Mod: S$PBB,,, | Performed by: INTERNAL MEDICINE

## 2020-03-02 PROCEDURE — 78434 PR PET, ABS QUANT MYOCARD BLOOD FLOW, REST/STRESS: ICD-10-PCS | Mod: 26,S$PBB,, | Performed by: INTERNAL MEDICINE

## 2020-03-02 PROCEDURE — 99999 PR PBB SHADOW E&M-EST. PATIENT-LVL I: ICD-10-PCS | Mod: PBBFAC,,,

## 2020-03-02 PROCEDURE — 78492 CARDIAC PET SCAN STRESS (CUPID ONLY): ICD-10-PCS | Mod: 26,S$PBB,, | Performed by: INTERNAL MEDICINE

## 2020-03-02 PROCEDURE — 93018 CARDIAC PET SCAN STRESS (CUPID ONLY): ICD-10-PCS | Mod: S$PBB,,, | Performed by: INTERNAL MEDICINE

## 2020-03-02 PROCEDURE — 99211 OFF/OP EST MAY X REQ PHY/QHP: CPT | Mod: PBBFAC

## 2020-03-02 PROCEDURE — 78492 MYOCRD IMG PET MLT RST&STRS: CPT | Mod: PBBFAC | Performed by: INTERNAL MEDICINE

## 2020-03-02 PROCEDURE — 78434 AQMBF PET REST & RX STRESS: CPT | Mod: 26,S$PBB,, | Performed by: INTERNAL MEDICINE

## 2020-03-02 PROCEDURE — 93016 CARDIAC PET SCAN STRESS (CUPID ONLY): ICD-10-PCS | Mod: S$PBB,,, | Performed by: INTERNAL MEDICINE

## 2020-03-02 PROCEDURE — 93016 CV STRESS TEST SUPVJ ONLY: CPT | Mod: S$PBB,,, | Performed by: INTERNAL MEDICINE

## 2020-03-02 PROCEDURE — 78434 AQMBF PET REST & RX STRESS: CPT

## 2020-03-02 RX ORDER — DIPYRIDAMOLE 5 MG/ML
36.91 INJECTION INTRAVENOUS
Status: COMPLETED | OUTPATIENT
Start: 2020-03-02 | End: 2020-03-02

## 2020-03-02 RX ADMIN — DIPYRIDAMOLE 36.9 MG: 5 INJECTION INTRAVENOUS at 01:03

## 2020-03-03 ENCOUNTER — PATIENT OUTREACH (OUTPATIENT)
Dept: ADMINISTRATIVE | Facility: OTHER | Age: 75
End: 2020-03-03

## 2020-03-03 RX ORDER — OXYCODONE AND ACETAMINOPHEN 7.5; 325 MG/1; MG/1
1 TABLET ORAL EVERY 8 HOURS PRN
Qty: 90 TABLET | Refills: 0 | Status: SHIPPED | OUTPATIENT
Start: 2020-03-14 | End: 2020-04-13

## 2020-03-03 RX ORDER — OXYCODONE AND ACETAMINOPHEN 5; 325 MG/1; MG/1
1 TABLET ORAL EVERY 8 HOURS PRN
Qty: 90 TABLET | Refills: 0 | Status: SHIPPED | OUTPATIENT
Start: 2020-04-13 | End: 2020-04-13

## 2020-03-03 RX ORDER — OXYCODONE AND ACETAMINOPHEN 5; 325 MG/1; MG/1
1 TABLET ORAL EVERY 8 HOURS PRN
Qty: 90 TABLET | Refills: 0 | Status: SHIPPED | OUTPATIENT
Start: 2020-05-13 | End: 2020-04-13

## 2020-03-05 ENCOUNTER — OFFICE VISIT (OUTPATIENT)
Dept: PAIN MEDICINE | Facility: CLINIC | Age: 75
End: 2020-03-05
Payer: MEDICARE

## 2020-03-05 VITALS
HEART RATE: 61 BPM | HEIGHT: 68 IN | WEIGHT: 145 LBS | RESPIRATION RATE: 18 BRPM | BODY MASS INDEX: 21.98 KG/M2 | SYSTOLIC BLOOD PRESSURE: 121 MMHG | DIASTOLIC BLOOD PRESSURE: 63 MMHG

## 2020-03-05 DIAGNOSIS — M47.816 LUMBAR FACET ARTHROPATHY: ICD-10-CM

## 2020-03-05 DIAGNOSIS — M51.36 DDD (DEGENERATIVE DISC DISEASE), LUMBAR: ICD-10-CM

## 2020-03-05 DIAGNOSIS — M53.3 SACROILIAC JOINT PAIN: Primary | ICD-10-CM

## 2020-03-05 DIAGNOSIS — M48.061 SPINAL STENOSIS OF LUMBAR REGION WITHOUT NEUROGENIC CLAUDICATION: ICD-10-CM

## 2020-03-05 DIAGNOSIS — M54.16 LEFT LUMBAR RADICULOPATHY: ICD-10-CM

## 2020-03-05 PROCEDURE — 99214 OFFICE O/P EST MOD 30 MIN: CPT | Mod: S$PBB,,, | Performed by: PHYSICIAN ASSISTANT

## 2020-03-05 PROCEDURE — 99214 PR OFFICE/OUTPT VISIT, EST, LEVL IV, 30-39 MIN: ICD-10-PCS | Mod: S$PBB,,, | Performed by: PHYSICIAN ASSISTANT

## 2020-03-05 PROCEDURE — 99999 PR PBB SHADOW E&M-EST. PATIENT-LVL IV: CPT | Mod: PBBFAC,,, | Performed by: PHYSICIAN ASSISTANT

## 2020-03-05 PROCEDURE — 99214 OFFICE O/P EST MOD 30 MIN: CPT | Mod: PBBFAC | Performed by: PHYSICIAN ASSISTANT

## 2020-03-05 PROCEDURE — 99999 PR PBB SHADOW E&M-EST. PATIENT-LVL IV: ICD-10-PCS | Mod: PBBFAC,,, | Performed by: PHYSICIAN ASSISTANT

## 2020-03-05 RX ORDER — GABAPENTIN 800 MG/1
800 TABLET ORAL 3 TIMES DAILY
Qty: 90 TABLET | Refills: 2 | Status: ON HOLD | OUTPATIENT
Start: 2020-03-05 | End: 2020-05-19 | Stop reason: HOSPADM

## 2020-03-05 NOTE — PROGRESS NOTES
Chief Pain Complaint:  Low Back Pain, Leg pain (left > right)       History of Present Illness:  This patient is a 74 y.o. male who presents today complaining of the above noted pain/s. The patient describes this pain as follows.    - duration of pain: pain for years   - timing: constant   - character: aching, burning  - radiating, dermatomal: extends into left leg, along L5 pattern at times, mostly non-radiating  - antecedent trauma, prior spinal surgery: none  - pertinent negatives: No fever, No chills, No weight loss, No bladder dysfunction, No bowel dysfunction, No extremity weakness, No saddle anesthesia  - pertinent positives: none    - medications, other therapies tried (physical therapy, injections):     >> gabapentin, Norco    >> Has previously undergone Physical Therapy, which made pain worse    >> Has previously undergone spinal injection/s: lumbar MBB and Left TFESI with Dr Taylor in 2014 & 2015 (see EMR Surgeries for full details) with only short term relief    _________________________________________________________________________________________________________________________________________________________________________________________________________________________      IMAGING / Labs / Studies (reviewed on 3/5/2020):    9/15/17 MRI LUMBAR SPINE WITHOUT CONTRAST  History: Lower back pain.  Left lower extremity pain  Technique: Standard multiplanar pulse sequences without IV contrast.  Comparison:  No prior  studies available for comparison.  Findings:   Mild-moderate scoliosis, convex to the left and centered at L3.  All lumbar vertebral bodies are normal in height.  Scattered degenerative endplate marrow signal identified at the L2-L3 and L3-L4 L5-S1 levels.  No fracture.  No suspicious osseous lesion is identified.  Mild retrolisthesis of L2-L3.  Distal spinal cord and conus medullaris have normal appearance but the conus terminates at the T12-L1 level.  T12-L1: Minimal central protrusion.   Negative for focal nerve root impingement or central canal narrowing.  Neural foramina widely patent.  L1-2: Mild disc height loss.  Prominent intraosseous eyes.  Mild disc bulging.  Mild facet arthropathy and ligament flavum hypertrophy.  The central canal neural foramina patent.  L2-L3: Minimal retrolisthesis of L2 on L3.  There is moderate disc height loss.  Prominent anterior osteophytes.  Mild-moderate diffuse generalized disc bulging.  Mild facet arthropathy and ligament flavum hypertrophy.  The central canal is patent.  Neural foramina patent.  L3-L4: Disc desiccation and moderate disc height loss.  Large anterior osteophytes are present.  There is mild disc bulging and osteophytic ridging at the posterior disc margin.  Moderate right and mild left facet arthropathy and ligamentum flavum hypertrophy.  The central canal is patent.  Mild neural foraminal narrowing.  L4-L5: Very minimal grade 1 spondylolisthesis of L4-L5.  Mild generalized disc bulge is present.  Severe facet arthropathy and ligamentum flavum hypertrophy.  There is severe central canal and lateral recess stenosis.  Moderate neural foraminal stenosis.  L5-S1: Disc desiccation and moderate to severe disc height loss.  Prominent intraocular heights.  There is mild disc bulging and osteophytic ridging at the posterior disc margin.  Severe left and moderate right neural foraminal stenosis.  The central canal is patent.   Paravertebral soft tissues and musculature are normal.  The visualized intra-abdominal and intrapelvic contents are normal.       10/30/14 MRI Lumbar Spine Without Contrast    Narrative Exam: MRI of the lumbar spine without contrast.  History:     Low back pain. Status post SHEY, with S1-S2 radicular symptoms  Comparison: Prior study dated 04/02/13  Findings:     A convex right scoliosis again noted.  No compression fracture or subluxation.  The conus medullaris has a normal appearance.  The paraspinous soft tissues are  "unremarkable.  The T12 -- L1 and L1 -- 2 disk levels are essentially unremarkable.  L2 -- 3: Mild annular bulging again noted.    L3 -- 4: Moderate narrowing of the right lateral disk space, with right greater than left facet arthropathy, unchanged.    L4 -- 5: Marked bilateral facet arthropathy, with mild annular bulging, causing moderate central canal stenosis, unchanged.  L5 -- S1: Disk desiccation, with moderate disk space narrowing.  Significant degenerative narrowing of the left L5 neural foramen is apparent, unchanged.     _________________________________________________________________________________________________________________________________________________________________________________________________________________________      Review of Systems:  CONSTITUTIONAL: patient denies any fever, chills, or weight loss  SKIN: patient denies any rash or itching  RESPIRATORY: patient denies having any shortness of breath  GASTROINTESTINAL: patient denies having any diarrhea, constipation, or bowel incontinence  GENITOURINARY: patient denies having any abnormal bladder function    MUSCULOSKELETAL:  - patient reports low back pain    NEUROLOGICAL:   - pain as above  - strength in Lower extremities is intact, BILATERALLY  - sensation in Lower extremities is intact, BILATERALLY  - patient denies any loss of bowel or bladder control      PSYCHIATRIC: patient denies any suicidal or homicidal ideations    _________________________________________________________________________________________________________________________________________________________________________________________________________________________      Physical Exam:  Vitals:  /63 (BP Location: Left arm, Patient Position: Sitting, BP Method: Medium (Automatic))   Pulse 61   Resp 18   Ht 5' 8" (1.727 m)   Wt 65.8 kg (145 lb)   BMI 22.05 kg/m²   (reviewed on 3/5/2020)    General: alert and oriented, in no apparent distress.  Gait: " normal gait.  Skin: no rashes, no discoloration, no obvious lesions  HEENT: normocephalic, atraumatic. Pupils equal and round.  Cardiovascular: no significant peripheral edema present.  Respiratory: without use of accessory muscles of respiration.    Musculoskeletal - Lumbar Spine:  - Pain on flexion of lumbar spine: Absent   - Pain on extension of lumbar spine: Present  - Lumbar facet loading: Present bilaterally  - TTP over the lumbar facet joints: Present Bilaterally, worse at L5-S1   - TTP over the lumbar paraspinals: Present, mild  - TTP over the SI joints: Present on left   - Straight Leg Raise: Negative  - NOELLE: Present  - Pain with internal and external rotation of hip    Neuro - Lower Extremities:  - BLE Strength: R/L: HF: 5/5, HE: 5/5, KF: 5/5; KE: 5/5; FE: 5/5; FF: 5/5  - Extremity Reflexes: Brisk and symmetric throughout  - Sensory: Sensation to light touch intact bilaterally      Psych:  Mood and affect is appropriate    _________________________________________________________________________________________________________________________________________________________________________________________________________________________     Assessment:  Noble Tripp is a 74 y.o. year old male who is presenting with   Encounter Diagnoses   Name Primary?    Sacroiliac joint pain Yes    DDD (degenerative disc disease), lumbar     Lumbar facet arthropathy     Spinal stenosis of lumbar region without neurogenic claudication     Left lumbar radiculopathy      Patient returns for follow-up.  He complains of chronic low back pain. Lumbar MRI shows advanced diffuse spondylosis, greatest at the L4-L5 level with severe central canal stenosis and lateral recess stenosis.       Plan:  1. Interventional: Consider Bilateral L3, L4, L5 MBB with local. We will consider this if pain not controlled with medication, but he is not interested at this time since he does not feel they have not been long lasting in  the past.    2. Pharmacologic:   - Refill Percocet 7.5/325 mg PO TID PRN (90 tabs) x 3 months. Paper Rx given (post dated to 3/14, 4/13, 5/13).    Patient tolerating opioids with no side effects, obtaining good pain control with functional improvement.  At next visit, will return to 2 month med refill appointment follow ups.  - Refill Gabapentin 800mg TID.  - Opioid contract signed on 3/20/2018.     - LA  reviewed and appropriate. Last filled 2-13-19.    - Will order UDS today to ensure medication compliance.       3. Rehabilitative: Encouraged regular exercise. He has been using back brace; patient was informed to limit use to avoid muscle atrophy.     4. Diagnostic: None for now.    5. Follow up: med refill - June 12th (when I am back from maternity leave)    - I discussed the risks, benefits, and alternatives to potential treatment options. All questions and concerns were fully addressed today in clinic.            >> UDS:  6-28-16 :: appropriate  2/28/2017 :: appropriate  8/18/2017 :: (not in EMR)  5/16/2018 :: appropriate  9/11/2018 :: appropriate  3/11/2019 :: appropriate  9/17/2019 :: appropriate  3/5/2020 :: pending

## 2020-03-09 ENCOUNTER — TELEPHONE (OUTPATIENT)
Dept: CARDIOLOGY | Facility: CLINIC | Age: 75
End: 2020-03-09

## 2020-03-09 DIAGNOSIS — I50.22 CHRONIC SYSTOLIC CONGESTIVE HEART FAILURE: Primary | ICD-10-CM

## 2020-03-09 NOTE — TELEPHONE ENCOUNTER
----- Message from Bhumika Muse sent at 3/9/2020  3:12 PM CDT -----  Contact: self [phone]  Type:  RX Refill Request    Who Called: Noble Tripp  Refill or New Rx:refill  RX Name and Strength: repatha 140mg  How is the patient currently taking it? (ex. 1XDay):every 2 weeks  Is this a 30 day or 90 day RX: 90 day  Preferred Pharmacy with phone number: [facility]  Local or Mail Order:Mail Order  Ordering Provider:Nahid  Would the patient rather a call back or a response via [facility]? Call back  Best Call Back Number:[phone]  Additional Information: States that he is calling to verify that the application for repatha was completed and sent to [facility].

## 2020-03-09 NOTE — TELEPHONE ENCOUNTER
Returned patient's call.   Patient states back in November he had given application papers to Dr. Whitfield's nurse for Repatha. [facility] states that they never received them. Advised patient to bring application papers to next OV on 3/27 with Dr. Beltran. Pt verbalized understanding. All questions answered. Pt will call back with any other questions or concerns.    ----- Message from Bhumika Muse sent at 3/9/2020  3:12 PM CDT -----  Contact: self [phone]  Type:  RX Refill Request    Who Called: Noble Tripp  Refill or New Rx:refill  RX Name and Strength: repatha 140mg  How is the patient currently taking it? (ex. 1XDay):every 2 weeks  Is this a 30 day or 90 day RX: 90 day  Preferred Pharmacy with phone number: [facility]  Local or Mail Order:Mail Order  Ordering Provider:Nahid  Would the patient rather a call back or a response via [facility]? Call back  Best Call Back Number:[phone]  Additional Information: States that he is calling to verify that the application for repatha was completed and sent to [facility].

## 2020-03-10 ENCOUNTER — TELEPHONE (OUTPATIENT)
Dept: CARDIOLOGY | Facility: CLINIC | Age: 75
End: 2020-03-10

## 2020-03-10 NOTE — TELEPHONE ENCOUNTER
Patient contacted the office to locate his [employer] paperwork (REPATHA SURECLICK). being dropped off.The patient was asked who received the paperwork? He stated that he gave it to a  at the [facility] location during a visit with Cardiology.The patient cannot recall who he came to visit that day in November. The patient had many office visits in November:   Dr. Beltran, 02/14/2020 ,01/29/2020,11/22/2020  Dr. Palacios, 11/01/2020  Dr. Whitfield,11/12/2020, 12/17/2020  The patient stated he would bring the paper work to on his next visit with Dr. Whitfield. The patient was informed that he is not scheduled to see Dr. Whitfield; He was scheduled to see Dr. Beltran on 03/27/2020. The patient was offered an appointment with Dr. Whitfield to get paper work filled out he declined.The patient became upset and stated he would go see Dr. Beltran and hung up the phone.      ----- Message from Joycelyn Lopez sent at 3/10/2020  8:29 AM CDT -----  Contact: self  states that [email or web address] still hadn't received application regarding REPATHA SURECLICK. please advise..[phone] (home)

## 2020-03-19 ENCOUNTER — TELEPHONE (OUTPATIENT)
Dept: ADMINISTRATIVE | Facility: HOSPITAL | Age: 75
End: 2020-03-19

## 2020-03-19 NOTE — TELEPHONE ENCOUNTER
Contacted patient to reschedule Annual visit with Dr Dwaine Davis. Left voicemail for patient to call back to reschedule appointment due to provider not being in the office on 03/26/2020 .PDaughgurwinder

## 2020-03-20 ENCOUNTER — TELEPHONE (OUTPATIENT)
Dept: ADMINISTRATIVE | Facility: HOSPITAL | Age: 75
End: 2020-03-20

## 2020-03-20 ENCOUNTER — TELEPHONE (OUTPATIENT)
Dept: PAIN MEDICINE | Facility: CLINIC | Age: 75
End: 2020-03-20

## 2020-03-20 NOTE — TELEPHONE ENCOUNTER
Return patient's call regarding prescription. I told patient that I send the message to Dr. Mueller and will call him back as soon as I receive the order from Dr. Mueller. Patient verbalized understanding. It is not an urgent situation.

## 2020-03-20 NOTE — TELEPHONE ENCOUNTER
Contacted patient to reschedule Annual visit with Dr Dwaine Davis. Patient rescheduled appointment with Dr Frost on 04/06/2020@ 7:00am.PDaugherty

## 2020-03-20 NOTE — TELEPHONE ENCOUNTER
----- Message from She Hernandez sent at 3/20/2020  1:40 PM CDT -----  ..Type:  Patient Returning Call    Who Called: pt   Who Left Message for Patient   Does the patient know what this is regarding?: medication change   Would the patient rather a call back or a response via [facility]?  Call back   Best Call Back Number:[phone]  Additional Information: Pt is requesting a call from nurse to discuss the dosage in his medication has changed

## 2020-03-23 DIAGNOSIS — I50.9 CONGESTIVE HEART FAILURE, UNSPECIFIED HF CHRONICITY, UNSPECIFIED HEART FAILURE TYPE: ICD-10-CM

## 2020-03-23 RX ORDER — LOSARTAN POTASSIUM 25 MG/1
25 TABLET ORAL NIGHTLY
Qty: 90 TABLET | Refills: 3 | Status: ON HOLD | OUTPATIENT
Start: 2020-03-23 | End: 2020-05-19 | Stop reason: HOSPADM

## 2020-03-23 NOTE — TELEPHONE ENCOUNTER
----- Message from Destiny Moreno sent at 3/23/2020  1:52 PM CDT -----  Contact: self  Type:  RX Refill Request    Who Called: Noble  Refill or New Rx:new rx  RX Name and Strength:losartan (COZAAR) 25 MG tablet  How is the patient currently taking it? (ex. 1XDay):1xday  Is this a 30 day or 90 day RX:30  Preferred Pharmacy with phone number:     [facility]  [phone]    Local or Mail Order:local  Ordering Provider:Ruby  Would the patient rather a call back or a response via [facility]? call  Best Call Back Number:[phone]  Additional Information:

## 2020-03-24 ENCOUNTER — OFFICE VISIT (OUTPATIENT)
Dept: CARDIOLOGY | Facility: CLINIC | Age: 75
End: 2020-03-24
Payer: MEDICARE

## 2020-03-24 ENCOUNTER — PATIENT OUTREACH (OUTPATIENT)
Dept: ADMINISTRATIVE | Facility: OTHER | Age: 75
End: 2020-03-24

## 2020-03-24 ENCOUNTER — PATIENT MESSAGE (OUTPATIENT)
Dept: CARDIOLOGY | Facility: CLINIC | Age: 75
End: 2020-03-24

## 2020-03-24 VITALS — SYSTOLIC BLOOD PRESSURE: 113 MMHG | HEART RATE: 65 BPM | DIASTOLIC BLOOD PRESSURE: 59 MMHG

## 2020-03-24 DIAGNOSIS — E83.42 HYPOMAGNESEMIA: ICD-10-CM

## 2020-03-24 DIAGNOSIS — E78.00 PURE HYPERCHOLESTEROLEMIA: ICD-10-CM

## 2020-03-24 DIAGNOSIS — I25.10 CORONARY ARTERY DISEASE, ANGINA PRESENCE UNSPECIFIED, UNSPECIFIED VESSEL OR LESION TYPE, UNSPECIFIED WHETHER NATIVE OR TRANSPLANTED HEART: ICD-10-CM

## 2020-03-24 DIAGNOSIS — I50.9 CONGESTIVE HEART FAILURE, UNSPECIFIED HF CHRONICITY, UNSPECIFIED HEART FAILURE TYPE: ICD-10-CM

## 2020-03-24 DIAGNOSIS — I10 ESSENTIAL HYPERTENSION: ICD-10-CM

## 2020-03-24 DIAGNOSIS — J44.9 STAGE 3 SEVERE COPD BY GOLD CLASSIFICATION: ICD-10-CM

## 2020-03-24 DIAGNOSIS — Z86.39 HISTORY OF IRON DEFICIENCY: ICD-10-CM

## 2020-03-24 DIAGNOSIS — I65.21 STENOSIS OF RIGHT CAROTID ARTERY: ICD-10-CM

## 2020-03-24 DIAGNOSIS — Z88.8 ALLERGY TO STATIN MEDICATION: ICD-10-CM

## 2020-03-24 DIAGNOSIS — Z85.89 HISTORY OF HEAD AND NECK CANCER: ICD-10-CM

## 2020-03-24 DIAGNOSIS — G47.33 OSA ON CPAP: Primary | Chronic | ICD-10-CM

## 2020-03-24 PROCEDURE — 99213 OFFICE O/P EST LOW 20 MIN: CPT | Mod: 95,,, | Performed by: INTERNAL MEDICINE

## 2020-03-24 PROCEDURE — 99213 PR OFFICE/OUTPT VISIT, EST, LEVL III, 20-29 MIN: ICD-10-PCS | Mod: 95,,, | Performed by: INTERNAL MEDICINE

## 2020-03-24 NOTE — PROGRESS NOTES
The patient location is: home  The chief complaint leading to consultation is: test results  Visit type: Virtual visit with synchronous audio and video  Total time spent with patient: 15 minutes   Each patient to whom he or she provides medical services by telemedicine is:  (1) informed of the relationship between the physician and patient and the respective role of any other health care provider with respect to management of the patient; and (2) notified that he or she may decline to receive medical services by telemedicine and may withdraw from such care at any time.    Notes:   A 73 yo male with cad chf abnormal pet viability and failed attempt at revascularization of rca has significant rca ischemia by pet stress 24% myocardium. He has no exchange in exercise tolerance. No chf symptoms no new angina. He is tolerating meds well no syncope near syncope or palpitation. His exercise tolerance is unchanged.he is compliant with salt and meds.   He was counseled about the stress and revascularization of rca and plan on elective intervention when the corona outbreak is under control.  He was advised if any symptoms chf or angina or worsening exertional symptoms to contact us or report to the er.   Will reassess clinically in 1 months.  counseled about meds compliance diet exercise.  Subjective:   Patient ID:  Noble Tripp is a 74 y.o. male who presents for follow up of No chief complaint on file.      HPI    Past Medical History:   Diagnosis Date    Adrenal adenoma     david;nl fxn w/u;tran 11/18    Aspiration pneumonia 10/15    puree/honey    Benign prostate hyperplasia     CAD (coronary artery disease)     dr padilla    Chronic pain     dr santos    Chronic systolic congestive heart failure 10/17/2019    COPD (chronic obstructive pulmonary disease)     papi    Ex-smoker     11/13    Hyperlipidemia     Ischemic cardiomyopathy 11/22/2019    JUANITO on CPAP     cpap    Osteopenia 1/15 tran 1/18     "PSVT (paroxysmal supraventricular tachycardia)     PVD (peripheral vascular disease)     noobs 07 latrell    Pyriform sinus cancer     dr ponce radiation 1/2-14 dr king perales    Squamous cell carcinoma of skin     roberto    Tongue cancer     "superficial" removed 11/14    Vocal cord cancer 2011    Xerostomia     radiation       Past Surgical History:   Procedure Laterality Date    APPENDECTOMY      BRONCHOSCOPY Bilateral 2/5/2019    Procedure: Bronchoscopy;  Surgeon: Santos Cardozo MD;  Location: [facility] ENDO;  Service: Endoscopy;  Laterality: Bilateral;    COLONOSCOPY N/A 5/1/2017    Procedure: Due for screening colonoscopy;  Surgeon: Anushka Yoder MD;  Location: [facility] ENDO;  Service: Endoscopy;  Laterality: N/A;    ESOPHAGOGASTRODUODENOSCOPY N/A 5/29/2018    Procedure: ESOPHAGOGASTRODUODENOSCOPY (EGD);  Surgeon: Audie Hill III, MD;  Location: [facility];  Service: Endoscopy;  Laterality: N/A;    ESOPHAGOGASTRODUODENOSCOPY N/A 8/29/2018    Procedure: ESOPHAGOGASTRODUODENOSCOPY (EGD);  Surgeon: Audie Hill III, MD;  Location: [facility];  Service: Endoscopy;  Laterality: N/A;    PERCUTANEOUS TRANSLUMINAL BALLOON ANGIOPLASTY OF CORONARY ARTERY Right 12/9/2019    Procedure: PTCA, USING BALLOON/ IABP or Impella multivessel angioplasty/poss stent;  Surgeon: Abel Beltran MD;  Location: [facility] CATH LAB;  Service: Cardiology;  Laterality: Right;    THORACENTESIS Left 8/7/2018    Procedure: Thoracentesis;  Surgeon: Santos Cardozo MD;  Location: [facility] ENDO;  Service: Endoscopy;  Laterality: Left;    THORACENTESIS Right 2/25/2019    Procedure: Thoracentesis;  Surgeon: Santos Cardozo MD;  Location: [facility] ENDO;  Service: Endoscopy;  Laterality: Right;    tongue cancer excision  11/14    dr vitale    VOCAL CORD LATERALIZATION, ENDOSCOPIC APPROACH W/ MORENA ponce       Social History     Tobacco Use    Smoking status: Former Smoker     Packs/day: 0.50     Years: 55.00     Pack years: " 27.50     Types: Cigarettes     Last attempt to quit: 10/1/2019     Years since quittin.4    Smokeless tobacco: Never Used    Tobacco comment: 6-7 cigs/day   Substance Use Topics    Alcohol use: Not Currently    Drug use: No       Family History   Problem Relation Age of Onset    Lung cancer Father         + Smoker    No Known Problems Mother     Lung cancer Brother         + Smoker       Current Outpatient Medications   Medication Sig    albuterol (PROAIR HFA) 90 mcg/actuation inhaler INL 2 PFS PO INTO THE LUNGS Q 4 H PRF WHZ OR SOB    aspirin 81 mg Tab Take 1 tablet by mouth Daily. Over the counter to help prevent stroke/heart attack    atorvastatin (LIPITOR) 80 MG tablet Take 1 tablet (80 mg total) by mouth once daily.    carvedilol (COREG) 3.125 MG tablet Take 1 tablet (3.125 mg total) by mouth 2 (two) times daily.    clopidogrel (PLAVIX) 75 mg tablet Take 1 tablet (75 mg total) by mouth once daily.    evolocumab (REPATHA SURECLICK) 140 mg/mL PnIj Inject 1 mL (140 mg total) into the skin every 14 (fourteen) days.    furosemide (LASIX) 40 MG tablet Take 0.5 tablets (20 mg total) by mouth once daily.    gabapentin (NEURONTIN) 800 MG tablet Take 1 tablet (800 mg total) by mouth 3 (three) times daily.    losartan (COZAAR) 25 MG tablet Take 1 tablet (25 mg total) by mouth every evening.    nebulizer and compressor Bailey Use as directed    [START ON 2020] oxyCODONE-acetaminophen (PERCOCET) 5-325 mg per tablet Take 1 tablet by mouth every 8 (eight) hours as needed for Pain.    [START ON 2020] oxyCODONE-acetaminophen (PERCOCET) 5-325 mg per tablet Take 1 tablet by mouth every 8 (eight) hours as needed for Pain.    oxyCODONE-acetaminophen (PERCOCET) 7.5-325 mg per tablet Take 1 tablet by mouth every 8 (eight) hours as needed for Pain.    OXYGEN-AIR DELIVERY SYSTEMS MISC Inhale 2-3 L into the lungs continuous.    pantoprazole (PROTONIX) 40 MG tablet TAKE ONE TABLET BY MOUTH ONCE DAILY     SPIRIVA WITH HANDIHALER 18 mcg inhalation capsule INHALE 1 CAPSULE BY MOUTH ONCE DAILY     No current facility-administered medications for this visit.      Current Outpatient Medications on File Prior to Visit   Medication Sig    albuterol (PROAIR HFA) 90 mcg/actuation inhaler INL 2 PFS PO INTO THE LUNGS Q 4 H PRF WHZ OR SOB    aspirin 81 mg Tab Take 1 tablet by mouth Daily. Over the counter to help prevent stroke/heart attack    atorvastatin (LIPITOR) 80 MG tablet Take 1 tablet (80 mg total) by mouth once daily.    carvedilol (COREG) 3.125 MG tablet Take 1 tablet (3.125 mg total) by mouth 2 (two) times daily.    clopidogrel (PLAVIX) 75 mg tablet Take 1 tablet (75 mg total) by mouth once daily.    evolocumab (REPATHA SURECLICK) 140 mg/mL PnIj Inject 1 mL (140 mg total) into the skin every 14 (fourteen) days.    furosemide (LASIX) 40 MG tablet Take 0.5 tablets (20 mg total) by mouth once daily.    gabapentin (NEURONTIN) 800 MG tablet Take 1 tablet (800 mg total) by mouth 3 (three) times daily.    losartan (COZAAR) 25 MG tablet Take 1 tablet (25 mg total) by mouth every evening.    nebulizer and compressor Bailey Use as directed    [START ON 4/13/2020] oxyCODONE-acetaminophen (PERCOCET) 5-325 mg per tablet Take 1 tablet by mouth every 8 (eight) hours as needed for Pain.    [START ON 5/13/2020] oxyCODONE-acetaminophen (PERCOCET) 5-325 mg per tablet Take 1 tablet by mouth every 8 (eight) hours as needed for Pain.    oxyCODONE-acetaminophen (PERCOCET) 7.5-325 mg per tablet Take 1 tablet by mouth every 8 (eight) hours as needed for Pain.    OXYGEN-AIR DELIVERY SYSTEMS MISC Inhale 2-3 L into the lungs continuous.    pantoprazole (PROTONIX) 40 MG tablet TAKE ONE TABLET BY MOUTH ONCE DAILY    SPIRIVA WITH HANDIHALER 18 mcg inhalation capsule INHALE 1 CAPSULE BY MOUTH ONCE DAILY     No current facility-administered medications on file prior to visit.      Review of patient's allergies indicates:   Allergen  Reactions    Contrast media Hives and Itching    Iodinated contrast media Hives and Itching    Iodine Hives and Itching    Pravastatin      Other reaction(s): fatigue  Other reaction(s): fatigue    Rosuvastatin      Other reaction(s): fatigue  Other reaction(s): fatigue    Simvastatin      Other reaction(s): fatigue  Other reaction(s): fatigue    Statins-hmg-coa reductase inhibitors Other (See Comments)     Fatigue     Review of Systems   Constitution: Negative for malaise/fatigue.   Eyes: Negative for blurred vision.   Cardiovascular: Negative for chest pain, claudication, cyanosis, dyspnea on exertion, irregular heartbeat, leg swelling, near-syncope, orthopnea, palpitations and paroxysmal nocturnal dyspnea.   Respiratory: Positive for shortness of breath. Negative for cough and hemoptysis.    Hematologic/Lymphatic: Negative for bleeding problem. Does not bruise/bleed easily.   Skin: Negative for dry skin and itching.   Musculoskeletal: Negative for falls, muscle weakness and myalgias.   Gastrointestinal: Negative for abdominal pain, diarrhea, heartburn, hematemesis, hematochezia and melena.   Genitourinary: Negative for flank pain and hematuria.   Neurological: Negative for dizziness, focal weakness, headaches, light-headedness, numbness, paresthesias, seizures and weakness.   Psychiatric/Behavioral: Negative for altered mental status and memory loss. The patient is not nervous/anxious.    Allergic/Immunologic: Negative for hives.       Objective:   Physical Exam  Vitals:    03/24/20 1429   BP: (!) 113/59   Pulse: 65     Lab Results   Component Value Date    CHOL 80 (L) 12/19/2019    CHOL 77 (L) 02/06/2019    CHOL 98 (L) 08/13/2018     Lab Results   Component Value Date    HDL 42 12/19/2019    HDL 44 02/06/2019    HDL 43 08/13/2018     Lab Results   Component Value Date    LDLCALC 28.0 (L) 12/19/2019    LDLCALC 25.0 (L) 02/06/2019    LDLCALC 39.8 (L) 08/13/2018     Lab Results   Component Value Date    TRIG  50 12/19/2019    TRIG 40 02/06/2019    TRIG 76 08/13/2018     Lab Results   Component Value Date    CHOLHDL 52.5 (H) 12/19/2019    CHOLHDL 57.1 (H) 02/06/2019    CHOLHDL 43.9 08/13/2018       Chemistry        Component Value Date/Time     02/05/2020 1036    K 4.5 02/05/2020 1036     02/05/2020 1036    CO2 31 (H) 02/05/2020 1036    BUN 19 02/05/2020 1036    CREATININE 1.2 02/05/2020 1036    GLU 72 02/05/2020 1036        Component Value Date/Time    CALCIUM 9.1 02/05/2020 1036    ALKPHOS 81 12/19/2019 0921    AST 14 12/19/2019 0921    ALT 14 12/19/2019 0921    BILITOT 0.5 12/19/2019 0921    ESTGFRAFRICA >60.0 02/05/2020 1036    EGFRNONAA 59.6 (A) 02/05/2020 1036          Lab Results   Component Value Date    TSH 1.259 02/06/2019     Lab Results   Component Value Date    INR 1.0 12/05/2019    INR 1.0 10/26/2019    INR 1.0 10/25/2019     Lab Results   Component Value Date    WBC 9.14 12/19/2019    HGB 11.1 (L) 12/19/2019    HCT 34.0 (L) 12/19/2019    MCV 96 12/19/2019     12/19/2019     BMP  Sodium   Date Value Ref Range Status   02/05/2020 141 136 - 145 mmol/L Final     Potassium   Date Value Ref Range Status   02/05/2020 4.5 3.5 - 5.1 mmol/L Final     Chloride   Date Value Ref Range Status   02/05/2020 100 95 - 110 mmol/L Final     CO2   Date Value Ref Range Status   02/05/2020 31 (H) 23 - 29 mmol/L Final     BUN, Bld   Date Value Ref Range Status   02/05/2020 19 8 - 23 mg/dL Final     Creatinine   Date Value Ref Range Status   02/05/2020 1.2 0.5 - 1.4 mg/dL Final     Calcium   Date Value Ref Range Status   02/05/2020 9.1 8.7 - 10.5 mg/dL Final     Anion Gap   Date Value Ref Range Status   02/05/2020 10 8 - 16 mmol/L Final     eGFR if    Date Value Ref Range Status   02/05/2020 >60.0 >60 mL/min/1.73 m^2 Final     eGFR if non    Date Value Ref Range Status   02/05/2020 59.6 (A) >60 mL/min/1.73 m^2 Final     Comment:     Calculation used to obtain the estimated  glomerular filtration  rate (eGFR) is the CKD-EPI equation.        CrCl cannot be calculated (Patient's most recent lab result is older than the maximum 7 days allowed.).    Assessment:     1. JUANITO on CPAP    2. Congestive heart failure, unspecified HF chronicity, unspecified heart failure type    3. Pure hypercholesterolemia    4. History of head and neck cancer    5. Essential hypertension    6. Hypomagnesemia    7. Stage 3 severe COPD by GOLD classification    8. Coronary artery disease, angina presence unspecified, unspecified vessel or lesion type, unspecified whether native or transplanted heart    9. Allergy to statin medication    10. History of iron deficiency    11. Stenosis of right carotid artery      Abnormal pet 24 % ischemic myocardial burden.  Plan:     continue meds  F/u in 1 months  .

## 2020-03-31 ENCOUNTER — EXTERNAL CHRONIC CARE MANAGEMENT (OUTPATIENT)
Dept: PRIMARY CARE CLINIC | Facility: CLINIC | Age: 75
End: 2020-03-31
Payer: MEDICARE

## 2020-03-31 PROCEDURE — 99490 CHRNC CARE MGMT STAFF 1ST 20: CPT | Mod: S$PBB,,, | Performed by: FAMILY MEDICINE

## 2020-03-31 PROCEDURE — 99490 PR CHRONIC CARE MGMT, 1ST 20 MIN: ICD-10-PCS | Mod: S$PBB,,, | Performed by: FAMILY MEDICINE

## 2020-03-31 PROCEDURE — 99490 CHRNC CARE MGMT STAFF 1ST 20: CPT | Mod: PBBFAC | Performed by: FAMILY MEDICINE

## 2020-04-06 ENCOUNTER — OFFICE VISIT (OUTPATIENT)
Dept: INTERNAL MEDICINE | Facility: CLINIC | Age: 75
End: 2020-04-06
Payer: MEDICARE

## 2020-04-06 ENCOUNTER — TELEPHONE (OUTPATIENT)
Dept: CARDIOLOGY | Facility: CLINIC | Age: 75
End: 2020-04-06

## 2020-04-06 VITALS
WEIGHT: 140 LBS | SYSTOLIC BLOOD PRESSURE: 146 MMHG | BODY MASS INDEX: 21.29 KG/M2 | TEMPERATURE: 98 F | OXYGEN SATURATION: 94 % | DIASTOLIC BLOOD PRESSURE: 58 MMHG | HEART RATE: 63 BPM

## 2020-04-06 DIAGNOSIS — D64.9 CHRONIC ANEMIA: ICD-10-CM

## 2020-04-06 DIAGNOSIS — Z85.89 HISTORY OF HEAD AND NECK CANCER: ICD-10-CM

## 2020-04-06 DIAGNOSIS — I50.9 CONGESTIVE HEART FAILURE, UNSPECIFIED HF CHRONICITY, UNSPECIFIED HEART FAILURE TYPE: ICD-10-CM

## 2020-04-06 DIAGNOSIS — I65.21 STENOSIS OF RIGHT CAROTID ARTERY: ICD-10-CM

## 2020-04-06 DIAGNOSIS — I10 ESSENTIAL HYPERTENSION: ICD-10-CM

## 2020-04-06 DIAGNOSIS — I25.10 CORONARY ARTERY DISEASE, ANGINA PRESENCE UNSPECIFIED, UNSPECIFIED VESSEL OR LESION TYPE, UNSPECIFIED WHETHER NATIVE OR TRANSPLANTED HEART: Primary | ICD-10-CM

## 2020-04-06 DIAGNOSIS — E78.00 PURE HYPERCHOLESTEROLEMIA: ICD-10-CM

## 2020-04-06 DIAGNOSIS — I73.9 PVD (PERIPHERAL VASCULAR DISEASE): ICD-10-CM

## 2020-04-06 DIAGNOSIS — J44.9 STAGE 3 SEVERE COPD BY GOLD CLASSIFICATION: ICD-10-CM

## 2020-04-06 DIAGNOSIS — M85.80 OSTEOPENIA, UNSPECIFIED LOCATION: ICD-10-CM

## 2020-04-06 PROCEDURE — 99214 PR OFFICE/OUTPT VISIT, EST, LEVL IV, 30-39 MIN: ICD-10-PCS | Mod: 95,,, | Performed by: FAMILY MEDICINE

## 2020-04-06 PROCEDURE — 99214 OFFICE O/P EST MOD 30 MIN: CPT | Mod: 95,,, | Performed by: FAMILY MEDICINE

## 2020-04-06 NOTE — PATIENT INSTRUCTIONS
Daily fiber with glass water   When constipated then enema may repeat if needed every 1/2 hour  Up to 3 times in a day  Also can use over counter miralax daily until good results if enemas not helping

## 2020-04-09 ENCOUNTER — TELEPHONE (OUTPATIENT)
Dept: PAIN MEDICINE | Facility: CLINIC | Age: 75
End: 2020-04-09

## 2020-04-09 NOTE — TELEPHONE ENCOUNTER
----- Message from Georgina Brown sent at 4/9/2020  3:06 PM CDT -----  Contact: Pt  Pt is requesting call back in regards to questions about medication    Medication:  oxyCODONE-acetaminophen (PERCOCET) 5-325 mg per tablet        Pls call back at [phone]

## 2020-04-09 NOTE — TELEPHONE ENCOUNTER
Per Joselin Castellon:    2. Pharmacologic:   - Refill Percocet 7.5/325 mg PO TID PRN (90 tabs) x 3 months. Paper Rx given (post dated to 3/14, 4/13, 5/13)    Contacted pt. Pt states rx for April 13 and May 13 is percocet 5/325. Instructed pt to bring paper rx to pharmacy to [facility] and Informed pt that mikayla Bearden will send percocet 7.5 to pharmacy. [facility] - [address] [phone] (Phone).     Contacted pharmacy. Pharmacist states she will shred April and May's rx. All questions answered.

## 2020-04-13 ENCOUNTER — TELEPHONE (OUTPATIENT)
Dept: PAIN MEDICINE | Facility: CLINIC | Age: 75
End: 2020-04-13

## 2020-04-13 DIAGNOSIS — I50.22 CHRONIC SYSTOLIC CONGESTIVE HEART FAILURE: ICD-10-CM

## 2020-04-13 RX ORDER — OXYCODONE AND ACETAMINOPHEN 7.5; 325 MG/1; MG/1
1 TABLET ORAL EVERY 8 HOURS PRN
Qty: 90 TABLET | Refills: 0 | Status: ON HOLD | OUTPATIENT
Start: 2020-05-14 | End: 2020-05-12 | Stop reason: HOSPADM

## 2020-04-13 RX ORDER — OXYCODONE AND ACETAMINOPHEN 7.5; 325 MG/1; MG/1
1 TABLET ORAL EVERY 8 HOURS PRN
Qty: 90 TABLET | Refills: 0 | Status: SHIPPED | OUTPATIENT
Start: 2020-04-14 | End: 2020-05-14

## 2020-04-13 NOTE — TELEPHONE ENCOUNTER
----- Message from Yulia Fox sent at 4/13/2020  3:21 PM CDT -----  Contact: Patient   Noble needs a call back at [phone], Regards to getting a authorization on Rx Repatha.      [facility] [address] [place] [address] [id number] [place] [address]  [address] [facility] [address]  Phone: [phone] Fax: [phone]    Thanks  td

## 2020-04-13 NOTE — TELEPHONE ENCOUNTER
[facility] [address] [facility] [phone] (Phone)     Rx sent in ERX per . Pt notified. All questions answered.//lp

## 2020-04-13 NOTE — TELEPHONE ENCOUNTER
----- Message from Naomi Molina sent at 4/13/2020 11:08 AM CDT -----  Contact: Pt   Pt called and stated that his pasin medication is not at the pharmacy.     [facility] - [address] [address]  Phone: [phone] Fax: [phone]    He can be reached at [phone] (home)   Thanks,  Tf

## 2020-04-14 PROBLEM — I73.9 PVD (PERIPHERAL VASCULAR DISEASE): Status: ACTIVE | Noted: 2020-04-14

## 2020-04-14 NOTE — PROGRESS NOTES
Subjective:       Patient ID: Noble Tripp is a  male.    Chief Complaint: Multiple issues see below      HPI The patient location is:home  The chief complaint leading to consultation is in hpi  Visit type: Virtual visit with synchronous audio and video  Total time spent with patient: 20  Each patient to whom he or she provides medical services by telemedicine is:  (1) informed of the relationship between the physician and patient and the respective role of any other health care provider with respect to management of the patient; and (2) notified that he or she may decline to receive medical services by telemedicine and may withdraw from such care at any time.        2Throat ca utd specialists; /hxDr Vasireddy;  Follow anemia also    hxetoh 2 drinks /whiskey . D/wd healthier amount     Hx smoking see prior note    Hypertension: blood pressurestyp normal. Tolerating medicine.          . juanito / cpap  chronic pain well controlled w  pain meds dr degroot.     Copd utd dr brown . Stable/ s/p thoracentesis in past    Coronary artery disease/micah: up to date with cardiology. No chest pain, palpitations or shortness of breath. Tolerating medication.       Osteopenia not needing med 11/18    C/o some constipaton 3-4 weeks. No abd pain. cscpe ok 2017        Past Medical History:   Diagnosis Date    Adrenal adenoma     david;nl fxn w/u;tran 11/18    Aspiration pneumonia 10/15    puree/honey    Benign prostate hyperplasia     CAD (coronary artery disease)     dr padilla    Chronic pain     dr santos    Chronic systolic congestive heart failure 10/17/2019    COPD (chronic obstructive pulmonary disease)     papi    Ex-smoker     11/13    Hyperlipidemia     Ischemic cardiomyopathy 11/22/2019    JUANITO on CPAP     cpap    Osteopenia 1/15 tran 1/18    PSVT (paroxysmal supraventricular tachycardia)     [facility]    Pyriform sinus cancer     dr ponce radiation  "1/2-14 dr king bryson    Squamous cell carcinoma of skin     roberto    Tongue cancer     "superficial" removed 11/14    Vocal cord cancer 2011    Xerostomia     radiation     Past Surgical History:   Procedure Laterality Date    APPENDECTOMY      BRONCHOSCOPY Bilateral 2/5/2019    Procedure: Bronchoscopy;  Surgeon: Santos Cardozo MD;  Location: [facility] ENDO;  Service: Endoscopy;  Laterality: Bilateral;    COLONOSCOPY N/A 5/1/2017    Procedure: Due for screening colonoscopy;  Surgeon: Anushka Yoder MD;  Location: [facility] ENDO;  Service: Endoscopy;  Laterality: N/A;    ESOPHAGOGASTRODUODENOSCOPY N/A 5/29/2018    Procedure: ESOPHAGOGASTRODUODENOSCOPY (EGD);  Surgeon: Audie Hill III, MD;  Location: [facility] ENDO;  Service: Endoscopy;  Laterality: N/A;    ESOPHAGOGASTRODUODENOSCOPY N/A 8/29/2018    Procedure: ESOPHAGOGASTRODUODENOSCOPY (EGD);  Surgeon: Audie Hill III, MD;  Location: [facility] ENDO;  Service: Endoscopy;  Laterality: N/A;    PERCUTANEOUS TRANSLUMINAL BALLOON ANGIOPLASTY OF CORONARY ARTERY Right 12/9/2019    Procedure: PTCA, USING BALLOON/ IABP or Impella multivessel angioplasty/poss stent;  Surgeon: Abel Beltran MD;  Location: [facility] CATH LAB;  Service: Cardiology;  Laterality: Right;    THORACENTESIS Left 8/7/2018    Procedure: Thoracentesis;  Surgeon: Santos Cardozo MD;  Location: [facility] ENDO;  Service: Endoscopy;  Laterality: Left;    THORACENTESIS Right 2/25/2019    Procedure: Thoracentesis;  Surgeon: Santos Cardozo MD;  Location: [facility];  Service: Endoscopy;  Laterality: Right;    tongue cancer excision  11/14    dr vitale    VOCAL CORD LATERALIZATION, ENDOSCOPIC APPROACH W/ MLB      kris     Family History   Problem Relation Age of Onset    Lung cancer Father         + Smoker    No Known Problems Mother     Lung cancer Brother         + Smoker     Social History     Socioeconomic History    Marital status:      Spouse name: JYOTI    Number of children: 6    " Years of education: Not on file    Highest education level: Not on file   Occupational History    Not on file   Social Needs    Financial resource strain: Not on file    Food insecurity:     Worry: Not on file     Inability: Not on file    Transportation needs:     Medical: Not on file     Non-medical: Not on file   Tobacco Use    Smoking status: Former Smoker     Packs/day: 0.50     Years: 55.00     Pack years: 27.50     Types: Cigarettes     Last attempt to quit: 10/1/2019     Years since quittin.5    Smokeless tobacco: Never Used    Tobacco comment: 6-7 cigs/day   Substance and Sexual Activity    Alcohol use: Not Currently    Drug use: No    Sexual activity: Not Currently   Lifestyle    Physical activity:     Days per week: Not on file     Minutes per session: Not on file    Stress: Not on file   Relationships    Social connections:     Talks on phone: Not on file     Gets together: Not on file     Attends Muslim service: Not on file     Active member of club or organization: Not on file     Attends meetings of clubs or organizations: Not on file     Relationship status: Not on file   Other Topics Concern    Not on file   Social History Narrative     ; 6 kids live nearby, Former smoker as of 10/2019. 1 dog, grandson lives with him, no smokers in household.     Review of Systems  Cardiovascular: no chest pain  Chest: no shortness of breath  Abd: no abd pain  Remainder review of systems negative    Objective:    he states weight at home stable 138- 142 lbs  Physical Exam   Constitutional: He is oriented to person, place, and time. He appears well-developed and well-nourished.   HENT:   Head: Normocephalic and atraumatic.   .   Neurological: He is alert and oriented to person, place, and time.   Skin: Skin is warm and dry.   Psychiatric: He has a normal mood and affect. His behavior is normal. Judgment normal.   Nursing note and vitals reviewed.      Assessment:       1. Moderate  COPD (chronic obstructive pulmonary disease)    2. Coronary artery disease involving native coronary artery of native heart without angina pectoris    3. Essential hypertension    4. constipation     JESSE  Plan:       **  f/u hemat, pulm. , hemonc, pain mgmt and throat specialists when due  F/u 6 months  Daily fiber with glass water   When constipated then enema may repeat if needed every 1/2 hour  Up to 3 times in a day  Also can use over counter miralax daily until good results if enemas not helping      D/w +/- psa and he will hold off on     Re:constipation -If no better 3-4 days followup sooner if any worsening or change in symptoms or lack of resolution

## 2020-04-20 ENCOUNTER — PATIENT OUTREACH (OUTPATIENT)
Dept: ADMINISTRATIVE | Facility: OTHER | Age: 75
End: 2020-04-20

## 2020-04-21 ENCOUNTER — OFFICE VISIT (OUTPATIENT)
Dept: CARDIOLOGY | Facility: CLINIC | Age: 75
End: 2020-04-21
Payer: MEDICARE

## 2020-04-21 VITALS — DIASTOLIC BLOOD PRESSURE: 65 MMHG | SYSTOLIC BLOOD PRESSURE: 130 MMHG | HEART RATE: 71 BPM

## 2020-04-21 DIAGNOSIS — I25.10 CORONARY ARTERY DISEASE, ANGINA PRESENCE UNSPECIFIED, UNSPECIFIED VESSEL OR LESION TYPE, UNSPECIFIED WHETHER NATIVE OR TRANSPLANTED HEART: Primary | ICD-10-CM

## 2020-04-21 DIAGNOSIS — C02.9 CANCER OF TONGUE: ICD-10-CM

## 2020-04-21 DIAGNOSIS — E78.00 PURE HYPERCHOLESTEROLEMIA: ICD-10-CM

## 2020-04-21 DIAGNOSIS — I73.9 PVD (PERIPHERAL VASCULAR DISEASE): ICD-10-CM

## 2020-04-21 DIAGNOSIS — I65.21 STENOSIS OF RIGHT CAROTID ARTERY: ICD-10-CM

## 2020-04-21 DIAGNOSIS — I50.9 CONGESTIVE HEART FAILURE, UNSPECIFIED HF CHRONICITY, UNSPECIFIED HEART FAILURE TYPE: ICD-10-CM

## 2020-04-21 DIAGNOSIS — J44.9 STAGE 3 SEVERE COPD BY GOLD CLASSIFICATION: ICD-10-CM

## 2020-04-21 DIAGNOSIS — I10 ESSENTIAL HYPERTENSION: ICD-10-CM

## 2020-04-21 DIAGNOSIS — G47.33 OSA ON CPAP: Chronic | ICD-10-CM

## 2020-04-21 DIAGNOSIS — Z85.89 HISTORY OF HEAD AND NECK CANCER: ICD-10-CM

## 2020-04-21 PROCEDURE — 99214 OFFICE O/P EST MOD 30 MIN: CPT | Mod: 95,,, | Performed by: INTERNAL MEDICINE

## 2020-04-21 PROCEDURE — 99214 PR OFFICE/OUTPT VISIT, EST, LEVL IV, 30-39 MIN: ICD-10-PCS | Mod: 95,,, | Performed by: INTERNAL MEDICINE

## 2020-04-21 NOTE — PROGRESS NOTES
Subjective:   Patient ID:  Noble Tripp is a 74 y.o. male who presents for follow up of No chief complaint on file.    The patient location is: HOME DUE TO COVID 19   The chief complaint leading to consultation is: CAD F/U  Visit type: audiovisual  Total time spent with patient: 15 MINUTES  Each patient to whom he or she provides medical services by telemedicine is:  (1) informed of the relationship between the physician and patient and the respective role of any other health care provider with respect to management of the patient; and (2) notified that he or she may decline to receive medical services by telemedicine and may withdraw from such care at any time.    Notes:     HPI   A74 YO MALE WITH CAD CARDIOMYOPATHY CHF COPD JUANITO ON CPAP NOP O2 THERAPY IS FOLLOWING UP ON CAD HE HAS AN OCCLUDED RCA MODERATE LAD DISEASE WITH PRESERVED LVF NOW WITH SIGNIFICANT ISCHEMIA AND VIABILITY OF 25% MYOCARDIUM. HE IS DOING WELL CLINICALLY NO CHANGE INE XERCISE TOLERANCE. HIS ACTIVITY IS DECREASED BECAUSE HE IS STAYING AT HOME he HAS HAD WHAT SEEMS FULLNESS IN CHEST WITH GASEOUS FEELING TOOK BICARBONATE IT RESOLVED NO OTHER ANGINA OTHERWISE OR EXERTIONAL PAIN NO NOCTURNAL SYMPTOMS NO LEG SWELLING./  Past Medical History:   Diagnosis Date    Adrenal adenoma     david;nl fxn w/u;tran 11/18    Aspiration pneumonia 10/15    puree/honey    Benign prostate hyperplasia     CAD (coronary artery disease)     dr padilla    Chronic pain     dr santos    Chronic systolic congestive heart failure 10/17/2019    COPD (chronic obstructive pulmonary disease)     papi    Ex-smoker     11/13    Hyperlipidemia     Ischemic cardiomyopathy 11/22/2019    JUANITO on CPAP     cpap    Osteopenia 1/15 tran 1/18    PSVT (paroxysmal supraventricular tachycardia)     PVD (peripheral vascular disease)     noobs 07 latrell    Pyriform sinus cancer     dr ponce radiation 1/2-14 dr king perales    Squamous cell carcinoma of skin      "roberto    Tongue cancer     "superficial" removed     Vocal cord cancer     Xerostomia     radiation       Past Surgical History:   Procedure Laterality Date    APPENDECTOMY      BRONCHOSCOPY Bilateral 2019    Procedure: Bronchoscopy;  Surgeon: Santos Cardozo MD;  Location: [facility] ENDO;  Service: Endoscopy;  Laterality: Bilateral;    COLONOSCOPY N/A 2017    Procedure: Due for screening colonoscopy;  Surgeon: Anushka Yoder MD;  Location: [facility] ENDO;  Service: Endoscopy;  Laterality: N/A;    ESOPHAGOGASTRODUODENOSCOPY N/A 2018    Procedure: ESOPHAGOGASTRODUODENOSCOPY (EGD);  Surgeon: Audie Hill III, MD;  Location: [facility] ENDO;  Service: Endoscopy;  Laterality: N/A;    ESOPHAGOGASTRODUODENOSCOPY N/A 2018    Procedure: ESOPHAGOGASTRODUODENOSCOPY (EGD);  Surgeon: Audie Hill III, MD;  Location: [facility];  Service: Endoscopy;  Laterality: N/A;    PERCUTANEOUS TRANSLUMINAL BALLOON ANGIOPLASTY OF CORONARY ARTERY Right 2019    Procedure: PTCA, USING BALLOON/ IABP or Impella multivessel angioplasty/poss stent;  Surgeon: Abel Beltran MD;  Location: [facility] CATH LAB;  Service: Cardiology;  Laterality: Right;    THORACENTESIS Left 2018    Procedure: Thoracentesis;  Surgeon: Snatos Cardozo MD;  Location: [facility] ENDO;  Service: Endoscopy;  Laterality: Left;    THORACENTESIS Right 2019    Procedure: Thoracentesis;  Surgeon: Santos Cardozo MD;  Location: [facility] ENDO;  Service: Endoscopy;  Laterality: Right;    tongue cancer excision      dr vitale    VOCAL CORD LATERALIZATION, ENDOSCOPIC APPROACH W/ MLVOLODYMYR      kris       Social History     Tobacco Use    Smoking status: Former Smoker     Packs/day: 0.50     Years: 55.00     Pack years: 27.50     Types: Cigarettes     Last attempt to quit: 10/1/2019     Years since quittin.5    Smokeless tobacco: Never Used    Tobacco comment: 6-7 cigs/day   Substance Use Topics    Alcohol use: Not Currently    Drug " use: No       Family History   Problem Relation Age of Onset    Lung cancer Father         + Smoker    No Known Problems Mother     Lung cancer Brother         + Smoker       Current Outpatient Medications   Medication Sig    albuterol (PROAIR HFA) 90 mcg/actuation inhaler INL 2 PFS PO INTO THE LUNGS Q 4 H PRF WHZ OR SOB    aspirin 81 mg Tab Take 1 tablet by mouth Daily. Over the counter to help prevent stroke/heart attack    atorvastatin (LIPITOR) 80 MG tablet Take 1 tablet (80 mg total) by mouth once daily.    carvedilol (COREG) 3.125 MG tablet Take 1 tablet (3.125 mg total) by mouth 2 (two) times daily.    clopidogrel (PLAVIX) 75 mg tablet Take 1 tablet (75 mg total) by mouth once daily.    evolocumab (REPATHA SURECLICK) 140 mg/mL PnIj Inject 1 mL (140 mg total) into the skin every 14 (fourteen) days.    furosemide (LASIX) 40 MG tablet Take 0.5 tablets (20 mg total) by mouth once daily.    gabapentin (NEURONTIN) 800 MG tablet Take 1 tablet (800 mg total) by mouth 3 (three) times daily.    losartan (COZAAR) 25 MG tablet Take 1 tablet (25 mg total) by mouth every evening.    nebulizer and compressor Bailey Use as directed    oxyCODONE-acetaminophen (PERCOCET) 7.5-325 mg per tablet Take 1 tablet by mouth every 8 (eight) hours as needed for Pain.    [START ON 5/14/2020] oxyCODONE-acetaminophen (PERCOCET) 7.5-325 mg per tablet Take 1 tablet by mouth every 8 (eight) hours as needed for Pain.    pantoprazole (PROTONIX) 40 MG tablet TAKE ONE TABLET BY MOUTH ONCE DAILY    SPIRIVA WITH HANDIHALER 18 mcg inhalation capsule INHALE 1 CAPSULE BY MOUTH ONCE DAILY    OXYGEN-AIR DELIVERY SYSTEMS MISC Inhale 2-3 L into the lungs continuous.     No current facility-administered medications for this visit.      Current Outpatient Medications on File Prior to Visit   Medication Sig    albuterol (PROAIR HFA) 90 mcg/actuation inhaler INL 2 PFS PO INTO THE LUNGS Q 4 H PRF WHZ OR SOB    aspirin 81 mg Tab Take 1 tablet  by mouth Daily. Over the counter to help prevent stroke/heart attack    atorvastatin (LIPITOR) 80 MG tablet Take 1 tablet (80 mg total) by mouth once daily.    carvedilol (COREG) 3.125 MG tablet Take 1 tablet (3.125 mg total) by mouth 2 (two) times daily.    clopidogrel (PLAVIX) 75 mg tablet Take 1 tablet (75 mg total) by mouth once daily.    evolocumab (REPATHA SURECLICK) 140 mg/mL PnIj Inject 1 mL (140 mg total) into the skin every 14 (fourteen) days.    furosemide (LASIX) 40 MG tablet Take 0.5 tablets (20 mg total) by mouth once daily.    gabapentin (NEURONTIN) 800 MG tablet Take 1 tablet (800 mg total) by mouth 3 (three) times daily.    losartan (COZAAR) 25 MG tablet Take 1 tablet (25 mg total) by mouth every evening.    nebulizer and compressor Bailey Use as directed    oxyCODONE-acetaminophen (PERCOCET) 7.5-325 mg per tablet Take 1 tablet by mouth every 8 (eight) hours as needed for Pain.    [START ON 5/14/2020] oxyCODONE-acetaminophen (PERCOCET) 7.5-325 mg per tablet Take 1 tablet by mouth every 8 (eight) hours as needed for Pain.    pantoprazole (PROTONIX) 40 MG tablet TAKE ONE TABLET BY MOUTH ONCE DAILY    SPIRIVA WITH HANDIHALER 18 mcg inhalation capsule INHALE 1 CAPSULE BY MOUTH ONCE DAILY    OXYGEN-AIR DELIVERY SYSTEMS MISC Inhale 2-3 L into the lungs continuous.     No current facility-administered medications on file prior to visit.      Review of patient's allergies indicates:   Allergen Reactions    Contrast media Hives and Itching    Iodinated contrast media Hives and Itching    Iodine Hives and Itching    Pravastatin      Other reaction(s): fatigue  Other reaction(s): fatigue    Rosuvastatin      Other reaction(s): fatigue  Other reaction(s): fatigue    Simvastatin      Other reaction(s): fatigue  Other reaction(s): fatigue    Statins-hmg-coa reductase inhibitors Other (See Comments)     Fatigue     Review of Systems   Constitution: Negative for malaise/fatigue.   Eyes: Negative  for blurred vision.   Cardiovascular: Positive for chest pain. Negative for claudication, cyanosis, dyspnea on exertion, irregular heartbeat, leg swelling, near-syncope, orthopnea, palpitations and paroxysmal nocturnal dyspnea.   Respiratory: Positive for shortness of breath. Negative for cough and hemoptysis.    Hematologic/Lymphatic: Negative for bleeding problem. Does not bruise/bleed easily.   Skin: Negative for dry skin and itching.   Musculoskeletal: Negative for falls, muscle weakness and myalgias.   Gastrointestinal: Negative for abdominal pain, diarrhea, heartburn, hematemesis, hematochezia and melena.   Genitourinary: Negative for flank pain and hematuria.   Neurological: Negative for dizziness, focal weakness, headaches, light-headedness, numbness, paresthesias, seizures and weakness.   Psychiatric/Behavioral: Negative for altered mental status and memory loss. The patient is not nervous/anxious.    Allergic/Immunologic: Negative for hives.       Objective:   Physical Exam   Constitutional: He is oriented to person, place, and time. He appears well-developed and well-nourished. No distress.   Pulmonary/Chest: Effort normal. No respiratory distress.   Neurological: He is alert and oriented to person, place, and time.   Skin: He is not diaphoretic.   Vitals reviewed.    Vitals:    04/21/20 1414   BP: 130/65   Pulse: 71     Lab Results   Component Value Date    CHOL 80 (L) 12/19/2019    CHOL 77 (L) 02/06/2019    CHOL 98 (L) 08/13/2018     Lab Results   Component Value Date    HDL 42 12/19/2019    HDL 44 02/06/2019    HDL 43 08/13/2018     Lab Results   Component Value Date    LDLCALC 28.0 (L) 12/19/2019    LDLCALC 25.0 (L) 02/06/2019    LDLCALC 39.8 (L) 08/13/2018     Lab Results   Component Value Date    TRIG 50 12/19/2019    TRIG 40 02/06/2019    TRIG 76 08/13/2018     Lab Results   Component Value Date    CHOLHDL 52.5 (H) 12/19/2019    CHOLHDL 57.1 (H) 02/06/2019    CHOLHDL 43.9 08/13/2018       Chemistry         Component Value Date/Time     02/05/2020 1036    K 4.5 02/05/2020 1036     02/05/2020 1036    CO2 31 (H) 02/05/2020 1036    BUN 19 02/05/2020 1036    CREATININE 1.2 02/05/2020 1036    GLU 72 02/05/2020 1036        Component Value Date/Time    CALCIUM 9.1 02/05/2020 1036    ALKPHOS 81 12/19/2019 0921    AST 14 12/19/2019 0921    ALT 14 12/19/2019 0921    BILITOT 0.5 12/19/2019 0921    ESTGFRAFRICA >60.0 02/05/2020 1036    EGFRNONAA 59.6 (A) 02/05/2020 1036          Lab Results   Component Value Date    TSH 1.259 02/06/2019     Lab Results   Component Value Date    INR 1.0 12/05/2019    INR 1.0 10/26/2019    INR 1.0 10/25/2019     Lab Results   Component Value Date    WBC 9.14 12/19/2019    HGB 11.1 (L) 12/19/2019    HCT 34.0 (L) 12/19/2019    MCV 96 12/19/2019     12/19/2019     BMP  Sodium   Date Value Ref Range Status   02/05/2020 141 136 - 145 mmol/L Final     Potassium   Date Value Ref Range Status   02/05/2020 4.5 3.5 - 5.1 mmol/L Final     Chloride   Date Value Ref Range Status   02/05/2020 100 95 - 110 mmol/L Final     CO2   Date Value Ref Range Status   02/05/2020 31 (H) 23 - 29 mmol/L Final     BUN, Bld   Date Value Ref Range Status   02/05/2020 19 8 - 23 mg/dL Final     Creatinine   Date Value Ref Range Status   02/05/2020 1.2 0.5 - 1.4 mg/dL Final     Calcium   Date Value Ref Range Status   02/05/2020 9.1 8.7 - 10.5 mg/dL Final     Anion Gap   Date Value Ref Range Status   02/05/2020 10 8 - 16 mmol/L Final     eGFR if    Date Value Ref Range Status   02/05/2020 >60.0 >60 mL/min/1.73 m^2 Final     eGFR if non    Date Value Ref Range Status   02/05/2020 59.6 (A) >60 mL/min/1.73 m^2 Final     Comment:     Calculation used to obtain the estimated glomerular filtration  rate (eGFR) is the CKD-EPI equation.        CrCl cannot be calculated (Patient's most recent lab result is older than the maximum 7 days allowed.).    Assessment:     1. Coronary artery  disease, angina presence unspecified, unspecified vessel or lesion type, unspecified whether native or transplanted heart    2. JUANITO on CPAP    3. Congestive heart failure, unspecified HF chronicity, unspecified heart failure type    4. Pure hypercholesterolemia    5. Essential hypertension    6. History of head and neck cancer    7. Cancer of tongue    8. Stage 3 severe COPD by GOLD classification    9. Stenosis of right carotid artery    10. PVD (peripheral vascular disease)      CAD WITH SIGNIFICANT ISCHEMIC BURDEN AND VAIBILITY BY PET . WILL REASSESS RCA INTERVENTION AFTER COVID SITUATION IS RESOLVED.  LIPIDS ON TARGET  ATYPICAL; CHEST PAIN   Plan:   REEVALUATE IN 1 MONTH   CONTINUE SAME THERAPY   CONTINUE WALKING REGIMEN AND ASSESS LIMITATIONS.

## 2020-04-28 RX ORDER — ALBUTEROL SULFATE 90 UG/1
AEROSOL, METERED RESPIRATORY (INHALATION)
Qty: 9 G | Refills: 0 | Status: SHIPPED | OUTPATIENT
Start: 2020-04-28

## 2020-04-30 ENCOUNTER — EXTERNAL CHRONIC CARE MANAGEMENT (OUTPATIENT)
Dept: PRIMARY CARE CLINIC | Facility: CLINIC | Age: 75
End: 2020-04-30
Payer: MEDICARE

## 2020-04-30 PROCEDURE — 99490 PR CHRONIC CARE MGMT, 1ST 20 MIN: ICD-10-PCS | Mod: S$PBB,,, | Performed by: FAMILY MEDICINE

## 2020-04-30 PROCEDURE — 99490 CHRNC CARE MGMT STAFF 1ST 20: CPT | Mod: S$PBB,,, | Performed by: FAMILY MEDICINE

## 2020-04-30 PROCEDURE — 99490 CHRNC CARE MGMT STAFF 1ST 20: CPT | Mod: PBBFAC | Performed by: FAMILY MEDICINE

## 2020-05-05 ENCOUNTER — TELEPHONE (OUTPATIENT)
Dept: PULMONOLOGY | Facility: CLINIC | Age: 75
End: 2020-05-05

## 2020-05-05 ENCOUNTER — PATIENT OUTREACH (OUTPATIENT)
Dept: ADMINISTRATIVE | Facility: OTHER | Age: 75
End: 2020-05-05

## 2020-05-05 ENCOUNTER — HOSPITAL ENCOUNTER (INPATIENT)
Facility: HOSPITAL | Age: 75
LOS: 2 days | Discharge: HOME | DRG: 287 | End: 2020-05-08
Attending: EMERGENCY MEDICINE | Admitting: INTERNAL MEDICINE
Payer: MEDICARE

## 2020-05-05 ENCOUNTER — CLINICAL SUPPORT (OUTPATIENT)
Dept: PULMONOLOGY | Facility: CLINIC | Age: 75
DRG: 287 | End: 2020-05-05
Payer: MEDICARE

## 2020-05-05 ENCOUNTER — OFFICE VISIT (OUTPATIENT)
Dept: PULMONOLOGY | Facility: CLINIC | Age: 75
DRG: 287 | End: 2020-05-05
Payer: MEDICARE

## 2020-05-05 ENCOUNTER — HOSPITAL ENCOUNTER (OUTPATIENT)
Dept: RADIOLOGY | Facility: HOSPITAL | Age: 75
Discharge: HOME | DRG: 287 | End: 2020-05-05
Attending: NURSE PRACTITIONER
Payer: MEDICARE

## 2020-05-05 VITALS — BODY MASS INDEX: 20.5 KG/M2 | WEIGHT: 135.25 LBS | HEIGHT: 68 IN

## 2020-05-05 VITALS — WEIGHT: 135 LBS | BODY MASS INDEX: 20.46 KG/M2 | HEIGHT: 68 IN

## 2020-05-05 DIAGNOSIS — R06.02 SHORTNESS OF BREATH ON EXERTION: ICD-10-CM

## 2020-05-05 DIAGNOSIS — R06.09 DYSPNEA ON EXERTION: Primary | ICD-10-CM

## 2020-05-05 DIAGNOSIS — J44.9 COPD, SEVERE: ICD-10-CM

## 2020-05-05 DIAGNOSIS — I20.0 UNSTABLE ANGINA: ICD-10-CM

## 2020-05-05 DIAGNOSIS — I50.9 CONGESTIVE HEART FAILURE, UNSPECIFIED HF CHRONICITY, UNSPECIFIED HEART FAILURE TYPE: ICD-10-CM

## 2020-05-05 DIAGNOSIS — R09.02 EXERCISE HYPOXEMIA: Primary | ICD-10-CM

## 2020-05-05 DIAGNOSIS — Z87.09 HISTORY OF COPD: ICD-10-CM

## 2020-05-05 DIAGNOSIS — G47.33 OSA (OBSTRUCTIVE SLEEP APNEA): ICD-10-CM

## 2020-05-05 DIAGNOSIS — I25.5 ISCHEMIC CARDIOMYOPATHY: ICD-10-CM

## 2020-05-05 DIAGNOSIS — R07.9 CHEST PAIN WITH HIGH RISK OF ACUTE CORONARY SYNDROME: ICD-10-CM

## 2020-05-05 DIAGNOSIS — R06.02 SHORTNESS OF BREATH: ICD-10-CM

## 2020-05-05 LAB
ALBUMIN SERPL BCP-MCNC: 3 G/DL (ref 3.5–5.2)
ALP SERPL-CCNC: 272 U/L (ref 55–135)
ALT SERPL W/O P-5'-P-CCNC: 93 U/L (ref 10–44)
ANION GAP SERPL CALC-SCNC: 10 MMOL/L (ref 8–16)
APTT BLDCRRT: 29.2 SEC (ref 21–32)
AST SERPL-CCNC: 106 U/L (ref 10–40)
BASOPHILS # BLD AUTO: 0.15 K/UL (ref 0–0.2)
BASOPHILS NFR BLD: 1.2 % (ref 0–1.9)
BILIRUB SERPL-MCNC: 0.4 MG/DL (ref 0.1–1)
BNP SERPL-MCNC: 380 PG/ML (ref 0–99)
BUN SERPL-MCNC: 23 MG/DL (ref 8–23)
CALCIUM SERPL-MCNC: 9.5 MG/DL (ref 8.7–10.5)
CHLORIDE SERPL-SCNC: 105 MMOL/L (ref 95–110)
CO2 SERPL-SCNC: 29 MMOL/L (ref 23–29)
CREAT SERPL-MCNC: 1.1 MG/DL (ref 0.5–1.4)
DIFFERENTIAL METHOD: ABNORMAL
EOSINOPHIL # BLD AUTO: 0.1 K/UL (ref 0–0.5)
EOSINOPHIL NFR BLD: 0.9 % (ref 0–8)
ERYTHROCYTE [DISTWIDTH] IN BLOOD BY AUTOMATED COUNT: 16.6 % (ref 11.5–14.5)
EST. GFR  (AFRICAN AMERICAN): >60 ML/MIN/1.73 M^2
EST. GFR  (NON AFRICAN AMERICAN): >60 ML/MIN/1.73 M^2
GLUCOSE SERPL-MCNC: 93 MG/DL (ref 70–110)
HCT VFR BLD AUTO: 37.2 % (ref 40–54)
HGB BLD-MCNC: 12 G/DL (ref 14–18)
IMM GRANULOCYTES # BLD AUTO: 0.31 K/UL (ref 0–0.04)
IMM GRANULOCYTES NFR BLD AUTO: 2.4 % (ref 0–0.5)
INR PPP: 1 (ref 0.8–1.2)
LYMPHOCYTES # BLD AUTO: 1.2 K/UL (ref 1–4.8)
LYMPHOCYTES NFR BLD: 9.8 % (ref 18–48)
MCH RBC QN AUTO: 30.2 PG (ref 27–31)
MCHC RBC AUTO-ENTMCNC: 32.3 G/DL (ref 32–36)
MCV RBC AUTO: 94 FL (ref 82–98)
MONOCYTES # BLD AUTO: 1 K/UL (ref 0.3–1)
MONOCYTES NFR BLD: 8 % (ref 4–15)
NEUTROPHILS # BLD AUTO: 9.9 K/UL (ref 1.8–7.7)
NEUTROPHILS NFR BLD: 77.7 % (ref 38–73)
NRBC BLD-RTO: 0 /100 WBC
PLATELET # BLD AUTO: 218 K/UL (ref 150–350)
PMV BLD AUTO: 12.4 FL (ref 9.2–12.9)
POTASSIUM SERPL-SCNC: 4.6 MMOL/L (ref 3.5–5.1)
PROT SERPL-MCNC: 6.9 G/DL (ref 6–8.4)
PROTHROMBIN TIME: 10.8 SEC (ref 9–12.5)
RBC # BLD AUTO: 3.97 M/UL (ref 4.6–6.2)
SARS-COV-2 RDRP RESP QL NAA+PROBE: NEGATIVE
SODIUM SERPL-SCNC: 144 MMOL/L (ref 136–145)
TROPONIN I SERPL DL<=0.01 NG/ML-MCNC: 0.02 NG/ML (ref 0–0.03)
TROPONIN I SERPL DL<=0.01 NG/ML-MCNC: 0.03 NG/ML (ref 0–0.03)
WBC # BLD AUTO: 12.7 K/UL (ref 3.9–12.7)

## 2020-05-05 PROCEDURE — 71046 X-RAY EXAM CHEST 2 VIEWS: CPT | Mod: TC

## 2020-05-05 PROCEDURE — 99214 PR OFFICE/OUTPT VISIT, EST, LEVL IV, 30-39 MIN: ICD-10-PCS | Mod: 95,25,, | Performed by: NURSE PRACTITIONER

## 2020-05-05 PROCEDURE — 80053 COMPREHEN METABOLIC PANEL: CPT

## 2020-05-05 PROCEDURE — 85610 PROTHROMBIN TIME: CPT

## 2020-05-05 PROCEDURE — 85730 THROMBOPLASTIN TIME PARTIAL: CPT

## 2020-05-05 PROCEDURE — 27000190 HC CPAP FULL FACE MASK W/VALVE

## 2020-05-05 PROCEDURE — 99999 PR PBB SHADOW E&M-EST. PATIENT-LVL I: ICD-10-PCS | Mod: PBBFAC,,,

## 2020-05-05 PROCEDURE — 93005 ELECTROCARDIOGRAM TRACING: CPT

## 2020-05-05 PROCEDURE — 25000003 PHARM REV CODE 250: Performed by: EMERGENCY MEDICINE

## 2020-05-05 PROCEDURE — 99214 OFFICE O/P EST MOD 30 MIN: CPT | Mod: 95,25,, | Performed by: NURSE PRACTITIONER

## 2020-05-05 PROCEDURE — 94618 PULMONARY STRESS TESTING: CPT | Mod: 26,S$PBB,, | Performed by: INTERNAL MEDICINE

## 2020-05-05 PROCEDURE — 93010 EKG 12-LEAD: ICD-10-PCS | Mod: ,,, | Performed by: INTERNAL MEDICINE

## 2020-05-05 PROCEDURE — 85025 COMPLETE CBC W/AUTO DIFF WBC: CPT

## 2020-05-05 PROCEDURE — G0378 HOSPITAL OBSERVATION PER HR: HCPCS

## 2020-05-05 PROCEDURE — 99211 OFF/OP EST MAY X REQ PHY/QHP: CPT | Mod: PBBFAC,25

## 2020-05-05 PROCEDURE — 94618 PULMONARY STRESS TESTING: CPT | Mod: PBBFAC | Performed by: GENERAL PRACTICE

## 2020-05-05 PROCEDURE — 99999 PR PBB SHADOW E&M-EST. PATIENT-LVL I: CPT | Mod: PBBFAC,,,

## 2020-05-05 PROCEDURE — 96372 THER/PROPH/DIAG INJ SC/IM: CPT | Mod: 59 | Performed by: EMERGENCY MEDICINE

## 2020-05-05 PROCEDURE — 93010 ELECTROCARDIOGRAM REPORT: CPT | Mod: ,,, | Performed by: INTERNAL MEDICINE

## 2020-05-05 PROCEDURE — 99285 EMERGENCY DEPT VISIT HI MDM: CPT | Mod: 25,27

## 2020-05-05 PROCEDURE — 71046 XR CHEST PA AND LATERAL: ICD-10-PCS | Mod: 26,,, | Performed by: RADIOLOGY

## 2020-05-05 PROCEDURE — 63600175 PHARM REV CODE 636 W HCPCS: Performed by: INTERNAL MEDICINE

## 2020-05-05 PROCEDURE — 83880 ASSAY OF NATRIURETIC PEPTIDE: CPT

## 2020-05-05 PROCEDURE — 36415 COLL VENOUS BLD VENIPUNCTURE: CPT

## 2020-05-05 PROCEDURE — 71046 X-RAY EXAM CHEST 2 VIEWS: CPT | Mod: 26,,, | Performed by: RADIOLOGY

## 2020-05-05 PROCEDURE — 94618 PULMONARY STRESS TESTING: ICD-10-PCS | Mod: 26,S$PBB,, | Performed by: INTERNAL MEDICINE

## 2020-05-05 PROCEDURE — 25000003 PHARM REV CODE 250: Performed by: INTERNAL MEDICINE

## 2020-05-05 PROCEDURE — 84484 ASSAY OF TROPONIN QUANT: CPT

## 2020-05-05 PROCEDURE — 84484 ASSAY OF TROPONIN QUANT: CPT | Mod: 91

## 2020-05-05 PROCEDURE — U0002 COVID-19 LAB TEST NON-CDC: HCPCS

## 2020-05-05 PROCEDURE — 99900035 HC TECH TIME PER 15 MIN (STAT)

## 2020-05-05 RX ORDER — NAPROXEN SODIUM 220 MG/1
81 TABLET, FILM COATED ORAL DAILY
Status: DISCONTINUED | OUTPATIENT
Start: 2020-05-06 | End: 2020-05-08 | Stop reason: HOSPADM

## 2020-05-05 RX ORDER — PANTOPRAZOLE SODIUM 40 MG/1
40 TABLET, DELAYED RELEASE ORAL DAILY
Status: DISCONTINUED | OUTPATIENT
Start: 2020-05-06 | End: 2020-05-06

## 2020-05-05 RX ORDER — TIOTROPIUM BROMIDE 18 UG/1
1 CAPSULE ORAL; RESPIRATORY (INHALATION) DAILY
Status: DISCONTINUED | OUTPATIENT
Start: 2020-05-06 | End: 2020-05-05

## 2020-05-05 RX ORDER — IPRATROPIUM BROMIDE AND ALBUTEROL SULFATE 2.5; .5 MG/3ML; MG/3ML
3 SOLUTION RESPIRATORY (INHALATION) EVERY 4 HOURS PRN
Status: DISCONTINUED | OUTPATIENT
Start: 2020-05-05 | End: 2020-05-08 | Stop reason: HOSPADM

## 2020-05-05 RX ORDER — GUAIFENESIN 100 MG/5ML
200 SOLUTION ORAL EVERY 4 HOURS PRN
Status: DISCONTINUED | OUTPATIENT
Start: 2020-05-05 | End: 2020-05-08 | Stop reason: HOSPADM

## 2020-05-05 RX ORDER — OXYCODONE AND ACETAMINOPHEN 7.5; 325 MG/1; MG/1
1 TABLET ORAL EVERY 8 HOURS PRN
Status: DISCONTINUED | OUTPATIENT
Start: 2020-05-05 | End: 2020-05-08 | Stop reason: HOSPADM

## 2020-05-05 RX ORDER — MAG HYDROX/ALUMINUM HYD/SIMETH 200-200-20
30 SUSPENSION, ORAL (FINAL DOSE FORM) ORAL EVERY 6 HOURS PRN
Status: DISCONTINUED | OUTPATIENT
Start: 2020-05-05 | End: 2020-05-08 | Stop reason: HOSPADM

## 2020-05-05 RX ORDER — ASPIRIN 325 MG
325 TABLET ORAL
Status: COMPLETED | OUTPATIENT
Start: 2020-05-05 | End: 2020-05-05

## 2020-05-05 RX ORDER — ACETAMINOPHEN 325 MG/1
650 TABLET ORAL EVERY 6 HOURS PRN
Status: DISCONTINUED | OUTPATIENT
Start: 2020-05-05 | End: 2020-05-08 | Stop reason: HOSPADM

## 2020-05-05 RX ORDER — ALPRAZOLAM 0.5 MG/1
0.5 TABLET ORAL EVERY 12 HOURS PRN
Status: DISCONTINUED | OUTPATIENT
Start: 2020-05-05 | End: 2020-05-08 | Stop reason: HOSPADM

## 2020-05-05 RX ORDER — GABAPENTIN 300 MG/1
300 CAPSULE ORAL 3 TIMES DAILY
Status: DISCONTINUED | OUTPATIENT
Start: 2020-05-05 | End: 2020-05-08 | Stop reason: HOSPADM

## 2020-05-05 RX ORDER — FUROSEMIDE 20 MG/1
20 TABLET ORAL DAILY
Status: DISCONTINUED | OUTPATIENT
Start: 2020-05-06 | End: 2020-05-08 | Stop reason: HOSPADM

## 2020-05-05 RX ORDER — ENOXAPARIN SODIUM 100 MG/ML
40 INJECTION SUBCUTANEOUS EVERY 24 HOURS
Status: DISCONTINUED | OUTPATIENT
Start: 2020-05-05 | End: 2020-05-06

## 2020-05-05 RX ORDER — CEFDINIR 300 MG/1
300 CAPSULE ORAL 2 TIMES DAILY
Qty: 20 CAPSULE | Refills: 0 | Status: ON HOLD | OUTPATIENT
Start: 2020-05-05 | End: 2020-05-12 | Stop reason: HOSPADM

## 2020-05-05 RX ORDER — DIPHENHYDRAMINE HCL 25 MG
25 CAPSULE ORAL EVERY 6 HOURS PRN
Status: DISCONTINUED | OUTPATIENT
Start: 2020-05-05 | End: 2020-05-08 | Stop reason: HOSPADM

## 2020-05-05 RX ORDER — HYDRALAZINE HYDROCHLORIDE 20 MG/ML
10 INJECTION INTRAMUSCULAR; INTRAVENOUS EVERY 6 HOURS PRN
Status: DISCONTINUED | OUTPATIENT
Start: 2020-05-05 | End: 2020-05-08 | Stop reason: HOSPADM

## 2020-05-05 RX ORDER — CLOPIDOGREL BISULFATE 75 MG/1
75 TABLET ORAL DAILY
Status: DISCONTINUED | OUTPATIENT
Start: 2020-05-06 | End: 2020-05-08 | Stop reason: HOSPADM

## 2020-05-05 RX ORDER — LOSARTAN POTASSIUM 25 MG/1
25 TABLET ORAL NIGHTLY
Status: DISCONTINUED | OUTPATIENT
Start: 2020-05-05 | End: 2020-05-08 | Stop reason: HOSPADM

## 2020-05-05 RX ORDER — CARVEDILOL 3.12 MG/1
3.12 TABLET ORAL 2 TIMES DAILY
Status: DISCONTINUED | OUTPATIENT
Start: 2020-05-05 | End: 2020-05-08 | Stop reason: HOSPADM

## 2020-05-05 RX ORDER — ONDANSETRON 2 MG/ML
4 INJECTION INTRAMUSCULAR; INTRAVENOUS EVERY 8 HOURS PRN
Status: DISCONTINUED | OUTPATIENT
Start: 2020-05-05 | End: 2020-05-08 | Stop reason: HOSPADM

## 2020-05-05 RX ADMIN — ENOXAPARIN SODIUM 40 MG: 100 INJECTION SUBCUTANEOUS at 10:05

## 2020-05-05 RX ADMIN — CARVEDILOL 3.12 MG: 3.12 TABLET, FILM COATED ORAL at 10:05

## 2020-05-05 RX ADMIN — LOSARTAN POTASSIUM 25 MG: 25 TABLET ORAL at 10:05

## 2020-05-05 RX ADMIN — ASPIRIN 325 MG: 325 TABLET, FILM COATED ORAL at 08:05

## 2020-05-05 RX ADMIN — GABAPENTIN 300 MG: 300 CAPSULE ORAL at 10:05

## 2020-05-05 RX ADMIN — OXYCODONE HYDROCHLORIDE AND ACETAMINOPHEN 1 TABLET: 7.5; 325 TABLET ORAL at 10:05

## 2020-05-05 NOTE — TELEPHONE ENCOUNTER
Spoke with pt. Informed him that I received a called informing me that he has SOB, 02 sat in the 80s and sputum production. Asked if pt would like a follow up appt. Pt stated that he would. Offered appt today with Saige, pt accepted appt scheduled.

## 2020-05-05 NOTE — PROCEDURES
"[facility]  Six Minute Walk     SUMMARY     Ordering Provider: PEG Hampton   Interpreting Provider: Dr. Wright  Performing nurse/tech/RT: BRIANNA Randle RRT  Diagnosis: Shortness of Breath  Height: 5' 8" (172.7 cm)  Weight: 61.4 kg (135 lb 4 oz)  BMI (Calculated): 20.6   Patient Race:             Phase Oxygen Assessment Supplemental O2 Heart   Rate Blood Pressure Tasneem Dyspnea Scale Rating   Resting 90 % Room Air 80 bpm 135/54 4   Exercise        Minute        1 89 % Room Air 90 bpm     2 88 %(placed pt on 2lpm 02) Room Air 90 bpm     3 89 % 2 L/M 88 bpm     4 91 % 2 L/M 89 bpm     5 91 % 2 L/M 90 bpm     6  91 % 2 L/M 93 bpm 135/59 2   Recovery        Minute        1 93 % 2 L/M 84 bpm     2 94 % 2 L/M 82 bpm     3 94 % 2 L/M 81 bpm     4 95 % 2 L/M 77 bpm 111/56 0     Six Minute Walk Summary  6MWT Status: completed without stopping  Patient Reported: Chest pain, Dyspnea     Interpretation:  Did the patient stop or pause?: No            Total Time Walked (Calculated): 360 seconds  Final Partial Lap Distance (feet): 100 feet  Total Distance Meters (Calculated): 213.36 meters  Predicted Distance Meters (Calculated): 523.9 meters  Percentage of Predicted (Calculated): 40.73  Peak VO2 (Calculated): 10.38  Mets: 2.97  Has The Patient Had a Previous Six Minute Walk Test?: Yes       Previous 6MWT Results  Has The Patient Had a Previous Six Minute Walk Test?: Yes  Date of Previous Test: 02/05/20  Total Time Walked: 360 seconds  Total Distance (meters): 396.24  Predicted Distance (meters): 516.78 meters  Percentage of Predicted: 76.67  Percent Change (Calculated): 0.46    Interpretation:  Total distance walked in six minutes is mildly reduced indicating an mild reduction in functional capacity.  There was significant severe oxygen desaturation to 88 % with exercise on room air (oxygen saturation less than 89%).  For the remainder of the 6 min walk patient was exercised with supplemental oxygen.  Clinical " correlation suggested.      Sanket Wright MD    Mild exercise-induced hypoxemia described as an arterial oxygen saturation of 93-95% (or 3-4% less than at rest), moderate exercise-induced hypoxemia as 89-93%, and severe exercise induced hypoxemia as < 89% O2 saturation.  Medicare Criteria Comments:   When arterial oxygen saturation is at or below 88% during exercise on room air (severe exercise induced hypoxemia) then the patient falls under Medicare Group 1 criteria for supplemental oxygen prescription.  Details about Medicare Group Criteria coverage can be found at http://www.cms.hhs.gov/manuals/downloads/

## 2020-05-05 NOTE — PROGRESS NOTES
Patient has appt scheduled with me 5/3/18.  Refilled #[phone] with expectation that I will see her at that date.  ALEKSANDER Ocampo    Telemedicine video visit related to 2020 Covid -19 social distancing recommendations  The patient location is: Home  The chief complaint leading to consultation is: Sob   Visit type: audiovisual  Total time spent with patient: 1250 - 1315   Each patient to whom he or she provides medical services by telemedicine is:  (1) informed of the relationship between the physician and patient and the respective role of any other health care provider with respect to management of the patient; and (2) notified that he or she may decline to receive medical services by telemedicine and may withdraw from such care at any time.    Patient ID: Noble Tripp is a 74 y.o. male.    Chief Complaint   Patient presents with    Shortness of Breath     on exertion     Chief Complaint: Shortness of Breath (on exertion)    HPI:  Noble Tripp is a 74 y.o. male  Request same day appointment related to shortness of breath on exertion over the past week.  He has noticed he is having oxygen desaturations to 88% when he is walking at times.  If he is sitting O2 sats are 92-95%.    He has had home oxygen 2 L in the past after being discharged from [facility] early October 2019.  Patient had 6 min walk on 11/20/2019 no desaturations requiring supplemental oxygen at rest or with exertion.  He also had normal overnight oximetry 11/20/2019 on CPAP 14 cm .  home oxygen discontinued.  Patient reports he is adherent to CPAP 14 cm.  HME:  [facility].    5/5/2020 6 min walk distance done today is abnormal requiring 2 L supplemental oxygen with exertion new orders placed.    Patient has history of aspiration pneumonia with swallowing difficulty.  He reports that over the past week he has lost about 6 lb.  He finds it difficult to eat.  He takes bites and is unable to proceed with eating.  No nausea.  No vomiting.    On CPAP 14 cm benefits and compliant obtained replacement CPAP machine from [facility] .  Remains compliant with CPAP 14 cm use percent  and uses over for 93.3%.  Average AHI 0.2.  No complaints of CPAP use.    Previous Report Reviewed: historical medical records, lab reports, office notes and radiology reports     Past Medical History: The following portions of the patient's history were reviewed and updated as appropriate:   He  has a past surgical history that includes Appendectomy; Vocal cord lateralization, endoscopic approach w/ MLB; tongue cancer excision (11/14); Colonoscopy (N/A, 5/1/2017); Esophagogastroduodenoscopy (N/A, 5/29/2018); Thoracentesis (Left, 8/7/2018); Esophagogastroduodenoscopy (N/A, 8/29/2018); Bronchoscopy (Bilateral, 2/5/2019); Thoracentesis (Right, 2/25/2019); and Percutaneous transluminal balloon angioplasty of coronary artery (Right, 12/9/2019).  His family history includes Lung cancer in his brother and father; No Known Problems in his mother.  He  reports that he quit smoking about 7 months ago. His smoking use included cigarettes. He has a 27.50 pack-year smoking history. He has never used smokeless tobacco. He reports that he drank alcohol. He reports that he does not use drugs.  He has a current medication list which includes the following prescription(s): albuterol, aspirin, atorvastatin, carvedilol, cefdinir, clopidogrel, evolocumab, furosemide, gabapentin, losartan, nebulizer and compressor, oxycodone-acetaminophen, oxycodone-acetaminophen, oxygen-air delivery systems, pantoprazole, and spiriva with handihaler.  He is allergic to contrast media; iodinated contrast media; iodine; pravastatin; rosuvastatin; simvastatin; and statins-hmg-coa reductase inhibitors..    Review of Systems   Constitutional: Negative for fever, chills, weight loss, weight gain, activity change, appetite change, fatigue (Improved since iron infusions) and night sweats.   HENT: Negative for postnasal drip, rhinorrhea, sinus pressure, voice change and congestion.    Eyes: Negative for redness and itching.   Respiratory: Negative for snoring,  "cough, sputum production, chest tightness, shortness of breath, wheezing, orthopnea, asthma nighttime symptoms, dyspnea on extertion, use of rescue inhaler and somnolence.    Cardiovascular: Negative.  Negative for chest pain, palpitations and leg swelling.   Genitourinary: Negative for difficulty urinating and hematuria.   Endocrine: Negative for cold intolerance and heat intolerance.    Musculoskeletal: Negative for arthralgias, gait problem, joint swelling and myalgias.   Skin: Negative.    Gastrointestinal: Negative for nausea, vomiting, abdominal pain and acid reflux.   Neurological: Negative for dizziness, weakness, light-headedness and headaches.   Hematological: Negative for adenopathy. No excessive bruising.   All other systems reviewed and are negative.     Objective:   Ht 5' 8" (1.727 m)   Wt 61.2 kg (135 lb)   BMI 20.53 kg/m²   Physical Exam   Constitutional: He is oriented to person, place, and time. He appears well-developed and well-nourished. He is active and cooperative.  Non-toxic appearance. He does not appear ill. No distress.   HENT:   Head: Normocephalic and atraumatic.   Right Ear: External ear normal.   Left Ear: External ear normal.   Nose: Nose normal.   Mouth/Throat: Oropharynx is clear and moist. No oropharyngeal exudate.   Eyes: Conjunctivae are normal.   Neck: Normal range of motion. Neck supple.   Cardiovascular: Normal rate, regular rhythm, normal heart sounds and intact distal pulses.   Pulmonary/Chest: Effort normal and breath sounds normal. He has no decreased breath sounds. He has no wheezes. He has no rhonchi.   Velcro breath sounds in posterior bases.    Abdominal: Soft.   Musculoskeletal: He exhibits no edema.   Neurological: He is alert and oriented to person, place, and time.   Skin: Skin is warm and dry.   Psychiatric: He has a normal mood and affect. His behavior is normal. Judgment and thought content normal.   Vitals reviewed.    Personal Diagnostic Review    6mwd " 5/5/2020 desaturations requiring supplemental oxygen 2 L with exertion.   Phase Oxygen Assessment Supplemental O2 Heart   Rate Blood Pressure Tasneem Dyspnea Scale Rating   Resting 90 % Room Air 80 bpm 135/54 4   Exercise        Minute        1 89 % Room Air 90 bpm     2 88 %(placed pt on 2lpm 02) Room Air 90 bpm     3 89 % 2 L/M 88 bpm     4 91 % 2 L/M 89 bpm     5 91 % 2 L/M 90 bpm     6  91 % 2 L/M 93 bpm 135/59 2   Recovery        Minute        1 93 % 2 L/M 84 bpm     2 94 % 2 L/M 82 bpm     3 94 % 2 L/M 81 bpm     4 95 % 2 L/M 77 bpm 111/56 0     Six Minute Walk Summary  6MWT Status: completed without stopping  Patient Reported: Chest pain, Dyspnea     Interpretation:  Did the patient stop or pause?: No     Total Time Walked (Calculated): 360 seconds  Final Partial Lap Distance (feet): 100 feet  Total Distance Meters (Calculated): 213.36 meters  Predicted Distance Meters (Calculated): 523.9 meters  Percentage of Predicted (Calculated): 40.73  Peak VO2 (Calculated): 10.38  Mets: 2.97  Has The Patient Had a Previous Six Minute Walk Test?: Yes     Previous 6MWT Results  Has The Patient Had a Previous Six Minute Walk Test?: Yes  Date of Previous Test: 02/05/20  Total Time Walked: 360 seconds  Total Distance (meters): 396.24  Predicted Distance (meters): 516.78 meters  Percentage of Predicted: 76.67  Percent Change (Calculated): 0.4    6 min walk distance 11/20/2019 no desaturations requiring supplemental oxygen at rest or with exertion.    CPAP compliance download  CPAP 14 cm     Assessment:     1. Exercise hypoxemia    2. Shortness of breath on exertion    3. Ischemic cardiomyopathy    4. Congestive heart failure, unspecified HF chronicity, unspecified heart failure type    5. COPD, severe      Orders Placed This Encounter   Procedures    OXYGEN FOR HOME USE     On CPAP 14 cm [place]   New order to begin home oxygen NC 2lm with exertion and 2 liters bleed into CPAP nightly.  "    Order Specific Question:   Liter Flow     Answer:   2     Order Specific Question:   Duration     Answer:   With activity     Comments:   and bled into cpap     Order Specific Question:   Qualifying SpO2:     Answer:   88%     Order Specific Question:   Testing done at:     Answer:   Exercise/Activity     Order Specific Question:   Route     Answer:   nasal cannula     Order Specific Question:   Portable mode:     Answer:   pulse dose acceptable     Order Specific Question:   Device     Answer:   home concentrator with portable unit     Order Specific Question:   Length of need (in months):     Answer:   99 mos     Order Specific Question:   Patient condition with qualifying saturation     Answer:   COPD     Comments:   CHF      Order Specific Question:   Height:     Answer:   5'8"     Order Specific Question:   Weight:     Answer:   135 lbs     Order Specific Question:   Does patient have medical equipment at home?     Answer:   nebulizer     Order Specific Question:   Does patient have medical equipment at home?     Answer:   CPAP     Order Specific Question:   Alternative treatment measures have been tried or considered and deemed clinically ineffective.     Answer:   Yes    X-Ray Chest PA And Lateral     Standing Status:   Future     Number of Occurrences:   1     Standing Expiration Date:   5/5/2021     Order Specific Question:   May the Radiologist modify the order per protocol to meet the clinical needs of the patient?     Answer:   Yes    COVID-19 Routine Screening     Order Specific Question:   Is the patient symptomatic?     Answer:   Yes     Order Specific Question:   What symptom criteria does the patient meet?     Answer:   Shortness of breath or difficulty breathing     Order Specific Question:   Does the patient have the ability to go to a drive thru location?     Answer:   Yes    Stress test, pulmonary     Standing Status:   Future     Number of Occurrences:   1     Standing Expiration Date:   " 5/5/2021     Medication List with Changes/Refills   New Medications    CEFDINIR (OMNICEF) 300 MG CAPSULE    Take 1 capsule (300 mg total) by mouth 2 (two) times daily. for 10 days   Current Medications    ALBUTEROL (PROAIR HFA) 90 MCG/ACTUATION INHALER    INHALE 2 PUFFS BY MOUTH EVERY 4 HOURS AS NEEDED FOR WHEEZING OR  SHORTNESS  OF  BREATH    ASPIRIN 81 MG TAB    Take 1 tablet by mouth Daily. Over the counter to help prevent stroke/heart attack    ATORVASTATIN (LIPITOR) 80 MG TABLET    Take 1 tablet (80 mg total) by mouth once daily.    CARVEDILOL (COREG) 3.125 MG TABLET    Take 1 tablet (3.125 mg total) by mouth 2 (two) times daily.    CLOPIDOGREL (PLAVIX) 75 MG TABLET    Take 1 tablet (75 mg total) by mouth once daily.    EVOLOCUMAB (REPATHA SURECLICK) 140 MG/ML PNIJ    Inject 1 mL (140 mg total) into the skin every 14 (fourteen) days.    FUROSEMIDE (LASIX) 40 MG TABLET    Take 0.5 tablets (20 mg total) by mouth once daily.    GABAPENTIN (NEURONTIN) 800 MG TABLET    Take 1 tablet (800 mg total) by mouth 3 (three) times daily.    LOSARTAN (COZAAR) 25 MG TABLET    Take 1 tablet (25 mg total) by mouth every evening.    NEBULIZER AND COMPRESSOR ANGELO    Use as directed    OXYCODONE-ACETAMINOPHEN (PERCOCET) 7.5-325 MG PER TABLET    Take 1 tablet by mouth every 8 (eight) hours as needed for Pain.    OXYCODONE-ACETAMINOPHEN (PERCOCET) 7.5-325 MG PER TABLET    Take 1 tablet by mouth every 8 (eight) hours as needed for Pain.    OXYGEN-AIR DELIVERY SYSTEMS MISC    Inhale 2-3 L into the lungs continuous.    PANTOPRAZOLE (PROTONIX) 40 MG TABLET    TAKE ONE TABLET BY MOUTH ONCE DAILY    SPIRIVA WITH HANDIHALER 18 MCG INHALATION CAPSULE    INHALE 1 CAPSULE BY MOUTH ONCE DAILY     Plan:   Discussed diagnosis, its evaluation, treatment and usual course. All questions answered.  Problem List Items Addressed This Visit     Ischemic cardiomyopathy    Relevant Orders    OXYGEN FOR HOME USE    COVID-19 Routine Screening    CHF  (congestive heart failure)    Relevant Orders    OXYGEN FOR HOME USE    COVID-19 Routine Screening      Other Visit Diagnoses     Exercise hypoxemia    -  Primary    Relevant Orders    OXYGEN FOR HOME USE    COVID-19 Routine Screening    Shortness of breath on exertion        Relevant Orders    Stress test, pulmonary (Completed)    X-Ray Chest PA And Lateral (Completed)    OXYGEN FOR HOME USE    COVID-19 Routine Screening    COPD, severe        Relevant Medications    cefdinir (OMNICEF) 300 MG capsule    Other Relevant Orders    OXYGEN FOR HOME USE    COVID-19 Routine Screening       Plan summary  On CPAP 14 cm nightly benefits and compliant. HME: [facility]  Come in today to the clinic for chest x-ray and 6 min walk distance  6MWD desaturations requiring supplemental oxygen 2 L with exertion, begin NC 2lm for exertion and 2 l bled into CPAP 14 cm.    4:50 pm patient not available spoke with daughter, Narciso. Patient is not having cough, no fever, no mucous production. dght narciso is contemplating bring him to hospital if he worsens, presently stable, decision to prepare for continued treatment  to perform Covid testing out pt tomorrow at drive through location.   Daughter will call cardiology tomorrow for sooner visit.   Complete omnicef x 10 days with history of aspiration pneumonia, no acute findings on chest xray chronic small effusions persist.      Keep 6/10/2020 follow up on stage 3 severe COPD with Dr. Cardozo with review spirometry/cxr.   Follow up with cardiology for reevaluation 6/8/2020 with Dr. Beltran.   Follow up with primary care provider's office for chronic fatigue, anorexia.     Follow up in about 5 weeks (around 6/10/2020) for COPD cxr/dali as soo w/Dr. Cardozo.

## 2020-05-05 NOTE — PATIENT INSTRUCTIONS
.covi    Instructions for Patients with Confirmed or Suspected COVID-19    If you are awaiting your test result, you will either be called or it will be released to the patient portal.  If you have any questions about your test, please visit [email or web address] or call our COVID-19 information line at [phone].       Stay home and stay away from family members and friends. The CDC says, you can leave home after these three things have happened: 1) You have had no fever for at least 72 hours (that is three full days of no fever without the use of medicine that reduces fevers) 2) AND other symptoms have improved (for example, when your cough or shortness of breath have improved) 3) AND at least 7 days have passed since your symptoms first appeared.   Separate yourself from other people and animals in your home.   Call ahead before visiting your doctor.   Wear a facemask.   Cover your coughs and sneezes.   Wash your hands often with soap and water; hand  can be used, too.   Avoid sharing personal household items.   Wipe down surfaces used daily.   Monitor your symptoms. Seek prompt medical attention if your illness is worsening (e.g., difficulty breathing).    Before seeking care, call your healthcare provider.   If you have a medical emergency and need to call 911, notify the dispatch personnel that you have, or are being evaluated for COVID-19. If possible, put on a facemask before emergency medical services arrive.        Recommended precautions for household members, intimate partners, and caregivers in a home setting of a patient with symptomatic laboratory-confirmed COVID-19 or a patient under investigation.  Household members, intimate partners, and caregivers in the home setting awaiting tests results have close contact with a person with symptomatic, laboratory-confirmed COVID-19 or a person under investigation. Close contacts should monitor their health; they should call  their provider right away if they develop symptoms suggestive of COVID-19 (e.g., fever, cough, shortness of breath).    Close contacts should also follow these recommendations:   Make sure that you understand and can help the patient follow their provider's instructions for medication(s) and care. You should help the patient with basic needs in the home and provide support for getting groceries, prescriptions, and other personal needs.   Monitor the patient's symptoms. If the patient is getting sicker, call his or her healthcare provider and tell them that the patient has laboratory-confirmed COVID-19. If the patient has a medical emergency and you need to call 911, notify the dispatch personnel that the patient has, or is being evaluated for COVID-19.   Household members should stay in another room or be  from the patient. Household members should use a separate bedroom and bathroom, if available.   Prohibit visitors.   Household members should care for any pets in the home.   Make sure that shared spaces in the home have good air flow, such as by an air conditioner or an opened window, weather permitting.   Perform hand hygiene frequently. Wash your hands often with soap and water for at least 20 seconds or use an alcohol-based hand  (that contains > 60% alcohol) covering all surfaces of your hands and rubbing them together until they feel dry. Soap and water should be used preferentially.   Avoid touching your eyes, nose, and mouth.   The patient should wear a facemask. If the patient is not able to wear a facemask (for example, because it causes trouble breathing), caregivers should wear a mask when they are in the same room as the patient.   Wear a disposable facemask and gloves when you touch or have contact with the patient's blood, stool, or body fluids, such as saliva, sputum, nasal mucus, vomit, urine.  o Throw out disposable facemasks and gloves after using them. Do not  reuse.  o When removing personal protective equipment, first remove and dispose of gloves. Then, immediately clean your hands with soap and water or alcohol-based hand . Next, remove and dispose of facemask, and immediately clean your hands again with soap and water or alcohol-based hand .   You should not share dishes, drinking glasses, cups, eating utensils, towels, bedding, or other items with the patient. After the patient uses these items, you should wash them thoroughly (see below Wash laundry thoroughly).   Clean all high-touch surfaces, such as counters, tabletops, doorknobs, bathroom fixtures, toilets, phones, keyboards, tablets, and bedside tables, every day. Also, clean any surfaces that may have blood, stool, or body fluids on them.   Use a household cleaning spray or wipe, according to the label instructions. Labels contain instructions for safe and effective use of the cleaning product including precautions you should take when applying the product, such as wearing gloves and making sure you have good ventilation during use of the product.   Wash laundry thoroughly.  o Immediately remove and wash clothes or bedding that have blood, stool, or body fluids on them.  o Wear disposable gloves while handling soiled items and keep soiled items away from your body. Clean your hands (with soap and water or an alcohol-based hand ) immediately after removing your gloves.  o Read and follow directions on labels of laundry or clothing items and detergent. In general, using a normal laundry detergent according to washing machine instructions and dry thoroughly using the warmest temperatures recommended on the clothing label.   Place all used disposable gloves, facemasks, and other contaminated items in a lined container before disposing of them with other household waste. Clean your hands (with soap and water or an alcohol-based hand ) immediately after handling these  items. Soap and water should be used preferentially if hands are visibly dirty.   Discuss any additional questions with your state or local health department or healthcare provider. Check available hours when contacting your local health department.    For more information see CDC link below.      https://www.cdc.gov/coronavirus/2019-ncov/hcp/guidance-prevent-spread.html#precautions        Sources:  [facility], [facility] and [facility]

## 2020-05-06 ENCOUNTER — TELEPHONE (OUTPATIENT)
Dept: GASTROENTEROLOGY | Facility: CLINIC | Age: 75
End: 2020-05-06

## 2020-05-06 PROBLEM — I20.0 UNSTABLE ANGINA: Status: ACTIVE | Noted: 2020-05-06

## 2020-05-06 LAB
ANION GAP SERPL CALC-SCNC: 11 MMOL/L (ref 8–16)
APTT BLDCRRT: 28.6 SEC (ref 21–32)
BASOPHILS # BLD AUTO: 0.13 K/UL (ref 0–0.2)
BASOPHILS NFR BLD: 1.1 % (ref 0–1.9)
BUN SERPL-MCNC: 22 MG/DL (ref 8–23)
CALCIUM SERPL-MCNC: 9.5 MG/DL (ref 8.7–10.5)
CHLORIDE SERPL-SCNC: 106 MMOL/L (ref 95–110)
CO2 SERPL-SCNC: 28 MMOL/L (ref 23–29)
CREAT SERPL-MCNC: 1 MG/DL (ref 0.5–1.4)
DIFFERENTIAL METHOD: ABNORMAL
EOSINOPHIL # BLD AUTO: 0.2 K/UL (ref 0–0.5)
EOSINOPHIL NFR BLD: 1.4 % (ref 0–8)
ERYTHROCYTE [DISTWIDTH] IN BLOOD BY AUTOMATED COUNT: 16.7 % (ref 11.5–14.5)
EST. GFR  (AFRICAN AMERICAN): >60 ML/MIN/1.73 M^2
EST. GFR  (NON AFRICAN AMERICAN): >60 ML/MIN/1.73 M^2
GLUCOSE SERPL-MCNC: 78 MG/DL (ref 70–110)
HCT VFR BLD AUTO: 39.1 % (ref 40–54)
HGB BLD-MCNC: 12.2 G/DL (ref 14–18)
IMM GRANULOCYTES # BLD AUTO: 0.28 K/UL (ref 0–0.04)
IMM GRANULOCYTES NFR BLD AUTO: 2.4 % (ref 0–0.5)
LYMPHOCYTES # BLD AUTO: 1.3 K/UL (ref 1–4.8)
LYMPHOCYTES NFR BLD: 11.3 % (ref 18–48)
MAGNESIUM SERPL-MCNC: 2.1 MG/DL (ref 1.6–2.6)
MCH RBC QN AUTO: 29.5 PG (ref 27–31)
MCHC RBC AUTO-ENTMCNC: 31.2 G/DL (ref 32–36)
MCV RBC AUTO: 95 FL (ref 82–98)
MONOCYTES # BLD AUTO: 0.9 K/UL (ref 0.3–1)
MONOCYTES NFR BLD: 7.9 % (ref 4–15)
NEUTROPHILS # BLD AUTO: 8.7 K/UL (ref 1.8–7.7)
NEUTROPHILS NFR BLD: 75.9 % (ref 38–73)
NRBC BLD-RTO: 0 /100 WBC
PLATELET # BLD AUTO: 212 K/UL (ref 150–350)
PMV BLD AUTO: 12.4 FL (ref 9.2–12.9)
POTASSIUM SERPL-SCNC: 5 MMOL/L (ref 3.5–5.1)
RBC # BLD AUTO: 4.13 M/UL (ref 4.6–6.2)
SODIUM SERPL-SCNC: 145 MMOL/L (ref 136–145)
TROPONIN I SERPL DL<=0.01 NG/ML-MCNC: 0.02 NG/ML (ref 0–0.03)
TROPONIN I SERPL DL<=0.01 NG/ML-MCNC: 0.03 NG/ML (ref 0–0.03)
WBC # BLD AUTO: 11.46 K/UL (ref 3.9–12.7)

## 2020-05-06 PROCEDURE — 99223 1ST HOSP IP/OBS HIGH 75: CPT | Mod: ,,, | Performed by: INTERNAL MEDICINE

## 2020-05-06 PROCEDURE — 96374 THER/PROPH/DIAG INJ IV PUSH: CPT | Performed by: EMERGENCY MEDICINE

## 2020-05-06 PROCEDURE — 99900035 HC TECH TIME PER 15 MIN (STAT)

## 2020-05-06 PROCEDURE — 36415 COLL VENOUS BLD VENIPUNCTURE: CPT

## 2020-05-06 PROCEDURE — 84484 ASSAY OF TROPONIN QUANT: CPT

## 2020-05-06 PROCEDURE — 25000003 PHARM REV CODE 250: Performed by: INTERNAL MEDICINE

## 2020-05-06 PROCEDURE — 80048 BASIC METABOLIC PNL TOTAL CA: CPT

## 2020-05-06 PROCEDURE — 83735 ASSAY OF MAGNESIUM: CPT

## 2020-05-06 PROCEDURE — 94660 CPAP INITIATION&MGMT: CPT

## 2020-05-06 PROCEDURE — 99223 PR INITIAL HOSPITAL CARE,LEVL III: ICD-10-PCS | Mod: ,,, | Performed by: INTERNAL MEDICINE

## 2020-05-06 PROCEDURE — 85025 COMPLETE CBC W/AUTO DIFF WBC: CPT

## 2020-05-06 PROCEDURE — 63600175 PHARM REV CODE 636 W HCPCS: Performed by: PHYSICIAN ASSISTANT

## 2020-05-06 PROCEDURE — 85730 THROMBOPLASTIN TIME PARTIAL: CPT

## 2020-05-06 PROCEDURE — 21400001 HC TELEMETRY ROOM

## 2020-05-06 PROCEDURE — 92507 TX SP LANG VOICE COMM INDIV: CPT

## 2020-05-06 RX ORDER — FUROSEMIDE 10 MG/ML
20 INJECTION INTRAMUSCULAR; INTRAVENOUS ONCE
Status: COMPLETED | OUTPATIENT
Start: 2020-05-06 | End: 2020-05-06

## 2020-05-06 RX ORDER — DIPHENHYDRAMINE HCL 50 MG
50 CAPSULE ORAL EVERY 6 HOURS
Status: COMPLETED | OUTPATIENT
Start: 2020-05-07 | End: 2020-05-07

## 2020-05-06 RX ORDER — SODIUM CHLORIDE 9 MG/ML
INJECTION, SOLUTION INTRAVENOUS CONTINUOUS
Status: DISCONTINUED | OUTPATIENT
Start: 2020-05-07 | End: 2020-05-07

## 2020-05-06 RX ORDER — HEPARIN SODIUM,PORCINE/D5W 25000/250
12 INTRAVENOUS SOLUTION INTRAVENOUS CONTINUOUS
Status: DISCONTINUED | OUTPATIENT
Start: 2020-05-06 | End: 2020-05-07

## 2020-05-06 RX ORDER — FAMOTIDINE 20 MG/1
40 TABLET, FILM COATED ORAL EVERY 6 HOURS
Status: COMPLETED | OUTPATIENT
Start: 2020-05-07 | End: 2020-05-07

## 2020-05-06 RX ADMIN — GABAPENTIN 300 MG: 300 CAPSULE ORAL at 08:05

## 2020-05-06 RX ADMIN — DIPHENHYDRAMINE HYDROCHLORIDE 50 MG: 50 CAPSULE ORAL at 11:05

## 2020-05-06 RX ADMIN — OXYCODONE HYDROCHLORIDE AND ACETAMINOPHEN 1 TABLET: 7.5; 325 TABLET ORAL at 07:05

## 2020-05-06 RX ADMIN — HEPARIN SODIUM 15 UNITS/KG/HR: 10000 INJECTION, SOLUTION INTRAVENOUS at 11:05

## 2020-05-06 RX ADMIN — OXYCODONE HYDROCHLORIDE AND ACETAMINOPHEN 1 TABLET: 7.5; 325 TABLET ORAL at 11:05

## 2020-05-06 RX ADMIN — OXYCODONE HYDROCHLORIDE AND ACETAMINOPHEN 1 TABLET: 7.5; 325 TABLET ORAL at 03:05

## 2020-05-06 RX ADMIN — HEPARIN SODIUM 12 UNITS/KG/HR: 10000 INJECTION, SOLUTION INTRAVENOUS at 05:05

## 2020-05-06 RX ADMIN — CARVEDILOL 3.12 MG: 3.12 TABLET, FILM COATED ORAL at 08:05

## 2020-05-06 RX ADMIN — ASPIRIN 81 MG 81 MG: 81 TABLET ORAL at 08:05

## 2020-05-06 RX ADMIN — LOSARTAN POTASSIUM 25 MG: 25 TABLET ORAL at 08:05

## 2020-05-06 RX ADMIN — GABAPENTIN 300 MG: 300 CAPSULE ORAL at 03:05

## 2020-05-06 RX ADMIN — PREDNISONE 50 MG: 20 TABLET ORAL at 11:05

## 2020-05-06 RX ADMIN — FUROSEMIDE 20 MG: 20 TABLET ORAL at 08:05

## 2020-05-06 RX ADMIN — FUROSEMIDE 20 MG: 10 INJECTION, SOLUTION INTRAMUSCULAR; INTRAVENOUS at 12:05

## 2020-05-06 RX ADMIN — PANTOPRAZOLE SODIUM 40 MG: 40 TABLET, DELAYED RELEASE ORAL at 08:05

## 2020-05-06 RX ADMIN — FAMOTIDINE 40 MG: 20 TABLET ORAL at 11:05

## 2020-05-06 RX ADMIN — CLOPIDOGREL BISULFATE 75 MG: 75 TABLET ORAL at 08:05

## 2020-05-06 NOTE — H&P (VIEW-ONLY)
[facility]  Cardiology  Consult Note    Patient Name: Noble Tripp  MRN: [id number]  Admission Date: 5/5/2020  Hospital Length of Stay: 0 days  Code Status: Prior   Attending Provider: Cas Hernandez, *   Consulting Provider: Daysi Greenberg PA-C  Primary Care Physician: Dwaine Davis MD  Principal Problem:Chest pain, rule out acute myocardial infarction    Patient information was obtained from patient, past medical records and ER records.     Inpatient consult to Cardiology  Consult performed by: Daysi Gerenberg PA-C  Consult ordered by: Briana Guzman MD        Subjective:     Chief Complaint:  Shortness of breath/chest pain    HPI:   Mr. Tripp is a 74 year old male patient whose current medical conditions include COPD (on supplemental O2), CAD, cardiomyopathy, CHF, and anxiety who presented to [facility] ED yesterday with a chief complaint of increasing SOB/SUAREZ over the past two weeks. Associated symptoms included hypoxia (patient reported O2 dipping into 80's at home) and left-sided pressure/fullness that radiated to his left arm. Patient denied any associated fever, chills, palpitations, near syncope, or syncope. Reported he was concerned that he may have aspiration PNA again. Initial workup in ED revealed BNp of 380 and troponin of 0.028 and patient was subsequently admitted for further evaluation and treatment. Cardiology consulted to assist with management. Patient seen and examined today, sitting up in bed. Feeling better. States SOB has improved. Chest pain free during exam. Does report intermittent bouts of left-sided fullness/gas-like discomfort over the past few weeks. Generally self-resolving. He reports compliance with his medications. Followed as OP by Dr. Beltran and previously had C in 12/19 with unsuccessful intervention of RCA. He was deemed not to be a suitable surgical candidate due to his underlying co-morbidities. Recent cardiac PET scan in 3/20  "showed significant ischemic burden/viability  in RCA distribution. Chart reviewed. Troponin 0.028>0.021>0.028. EKG reviewed, chronic ST-T wave abnormalities, noted inferolaterally.         However in spite of that, oxygen saturations have been dropping into the high 80s, hence had pulmonary telemedicine visit today.  Patient was prescribed Omnicef, but has not picked it up.  He went home, felt anxious, hence presented to the ED.  Currently he reports the chest pain, located in the left lower chest region, occasionally radiating to the left upper arm.  Denies nausea, vomiting, but complains of shortness of breath with exertion.  EKG reveals sinus rhythm with no ST or T-wave changes.  Initial troponin 0.028.  Patient had left heart catheterization done in December 2019, revealed two vessel coronary artery disease with unsuccessful attempt at PTCA of the RCA. Recent cardiac PET scan in 3/20 showed significant ischemic burden/viability  in RCA distribution. Chart reviewed. Troponin 0.028>0.021>0.028. EKG reviewed, chronic ST-T wave abnormalities, noted inferolaterally. CXR shows interstitial coarsening.        Past Medical History:   Diagnosis Date    Adrenal adenoma     david;nl fxn w/u;tran 11/18    Aspiration pneumonia 10/15    puree/honey    Benign prostate hyperplasia     CAD (coronary artery disease)     dr padilla    Chronic pain     dr santos    Chronic systolic congestive heart failure 10/17/2019    COPD (chronic obstructive pulmonary disease)     papi    Ex-smoker     11/13    Hyperlipidemia     Ischemic cardiomyopathy 11/22/2019    JUANITO on CPAP     cpap    Osteopenia 1/15 tran 1/18    PSVT (paroxysmal supraventricular tachycardia)     PVD (peripheral vascular disease)     noobs 07 latrell    Pyriform sinus cancer     dr ponce radiation 1/2-14 dr king perales    Squamous cell carcinoma of skin     roberto    Tongue cancer     "superficial" removed 11/14    Vocal cord cancer 2011    " Xerostomia     radiation       Past Surgical History:   Procedure Laterality Date    APPENDECTOMY      BRONCHOSCOPY Bilateral 2/5/2019    Procedure: Bronchoscopy;  Surgeon: Santos Cardozo MD;  Location: [facility] ENDO;  Service: Endoscopy;  Laterality: Bilateral;    COLONOSCOPY N/A 5/1/2017    Procedure: Due for screening colonoscopy;  Surgeon: Anushka Yoder MD;  Location: [facility] ENDO;  Service: Endoscopy;  Laterality: N/A;    ESOPHAGOGASTRODUODENOSCOPY N/A 5/29/2018    Procedure: ESOPHAGOGASTRODUODENOSCOPY (EGD);  Surgeon: Audie Hill III, MD;  Location: [facility] ENDO;  Service: Endoscopy;  Laterality: N/A;    ESOPHAGOGASTRODUODENOSCOPY N/A 8/29/2018    Procedure: ESOPHAGOGASTRODUODENOSCOPY (EGD);  Surgeon: Audie Hill III, MD;  Location: [facility];  Service: Endoscopy;  Laterality: N/A;    PERCUTANEOUS TRANSLUMINAL BALLOON ANGIOPLASTY OF CORONARY ARTERY Right 12/9/2019    Procedure: PTCA, USING BALLOON/ IABP or Impella multivessel angioplasty/poss stent;  Surgeon: Abel Beltran MD;  Location: [facility] CATH LAB;  Service: Cardiology;  Laterality: Right;    THORACENTESIS Left 8/7/2018    Procedure: Thoracentesis;  Surgeon: Santos Cardozo MD;  Location: [facility] ENDO;  Service: Endoscopy;  Laterality: Left;    THORACENTESIS Right 2/25/2019    Procedure: Thoracentesis;  Surgeon: Santos Cardozo MD;  Location: [facility] ENDO;  Service: Endoscopy;  Laterality: Right;    tongue cancer excision  11/14    dr vitale    VOCAL CORD LATERALIZATION, ENDOSCOPIC APPROACH W/ MLB      kris       Review of patient's allergies indicates:   Allergen Reactions    Contrast media Hives and Itching    Iodinated contrast media Hives and Itching    Iodine Hives and Itching    Pravastatin      Other reaction(s): fatigue  Other reaction(s): fatigue    Rosuvastatin      Other reaction(s): fatigue  Other reaction(s): fatigue    Simvastatin      Other reaction(s): fatigue  Other reaction(s): fatigue    Statins-hmg-coa  reductase inhibitors Other (See Comments)     Fatigue       No current facility-administered medications on file prior to encounter.      Current Outpatient Medications on File Prior to Encounter   Medication Sig    albuterol (PROAIR HFA) 90 mcg/actuation inhaler INHALE 2 PUFFS BY MOUTH EVERY 4 HOURS AS NEEDED FOR WHEEZING OR  SHORTNESS  OF  BREATH    atorvastatin (LIPITOR) 80 MG tablet Take 1 tablet (80 mg total) by mouth once daily.    clopidogrel (PLAVIX) 75 mg tablet Take 1 tablet (75 mg total) by mouth once daily.    furosemide (LASIX) 40 MG tablet Take 0.5 tablets (20 mg total) by mouth once daily.    gabapentin (NEURONTIN) 800 MG tablet Take 1 tablet (800 mg total) by mouth 3 (three) times daily.    losartan (COZAAR) 25 MG tablet Take 1 tablet (25 mg total) by mouth every evening.    pantoprazole (PROTONIX) 40 MG tablet TAKE ONE TABLET BY MOUTH ONCE DAILY    aspirin 81 mg Tab Take 1 tablet by mouth Daily. Over the counter to help prevent stroke/heart attack    carvedilol (COREG) 3.125 MG tablet Take 1 tablet (3.125 mg total) by mouth 2 (two) times daily.    cefdinir (OMNICEF) 300 MG capsule Take 1 capsule (300 mg total) by mouth 2 (two) times daily. for 10 days    evolocumab (REPATHA SURECLICK) 140 mg/mL PnIj Inject 1 mL (140 mg total) into the skin every 14 (fourteen) days.    nebulizer and compressor Bailey Use as directed    oxyCODONE-acetaminophen (PERCOCET) 7.5-325 mg per tablet Take 1 tablet by mouth every 8 (eight) hours as needed for Pain.    [START ON 5/14/2020] oxyCODONE-acetaminophen (PERCOCET) 7.5-325 mg per tablet Take 1 tablet by mouth every 8 (eight) hours as needed for Pain.    OXYGEN-AIR DELIVERY SYSTEMS MISC Inhale 2-3 L into the lungs continuous.    SPIRIVA WITH HANDIHALER 18 mcg inhalation capsule INHALE 1 CAPSULE BY MOUTH ONCE DAILY     Family History     Problem Relation (Age of Onset)    Lung cancer Father, Brother    No Known Problems Mother        Tobacco Use    Smoking  status: Former Smoker     Packs/day: 0.50     Years: 55.00     Pack years: 27.50     Types: Cigarettes     Last attempt to quit: 10/1/2019     Years since quittin.5    Smokeless tobacco: Never Used    Tobacco comment: 6-7 cigs/day   Substance and Sexual Activity    Alcohol use: Not Currently    Drug use: No    Sexual activity: Not Currently     Review of Systems   HENT: Negative.    Eyes: Negative.    Cardiovascular: Positive for chest pain and dyspnea on exertion.   Respiratory: Positive for shortness of breath.    Endocrine: Negative.    Hematologic/Lymphatic: Negative.    Skin: Negative.    Musculoskeletal: Negative.    Gastrointestinal: Negative.    Genitourinary: Negative.    Neurological: Negative.    Psychiatric/Behavioral: Negative.    Allergic/Immunologic: Negative.      Objective:     Vital Signs (Most Recent):  Temp: 97.7 °F (36.5 °C) (20 1137)  Pulse: 64 (20 1137)  Resp: 18 (20 1137)  BP: 134/64 (20 1137)  SpO2: 95 % (20 113) Vital Signs (24h Range):  Temp:  [96.7 °F (35.9 °C)-98.3 °F (36.8 °C)] 97.7 °F (36.5 °C)  Pulse:  [59-90] 64  Resp:  [16-19] 18  SpO2:  [91 %-97 %] 95 %  BP: (123-174)/(61-76) 134/64     Weight: 62.5 kg (137 lb 12.6 oz)  Body mass index is 20.95 kg/m².    SpO2: 95 %  O2 Device (Oxygen Therapy): nasal cannula    No intake or output data in the 24 hours ending 20 1218    Lines/Drains/Airways     Peripheral Intravenous Line                 Peripheral IV - Single Lumen 20 1920 20 G Right Antecubital less than 1 day                Physical Exam   Constitutional: He is oriented to person, place, and time. He appears well-developed and well-nourished. No distress.   On supplemental O2   HENT:   Head: Normocephalic and atraumatic.   Eyes: Pupils are equal, round, and reactive to light. Right eye exhibits no discharge. Left eye exhibits no discharge.   Neck: Neck supple. No JVD present.   Cardiovascular: Normal rate, regular rhythm, S1  normal, S2 normal and normal heart sounds.   No murmur heard.  Pulmonary/Chest: Effort normal. No respiratory distress. He has wheezes. He has rales.   Abdominal: Soft. He exhibits no distension.   Musculoskeletal: He exhibits no edema.   Neurological: He is alert and oriented to person, place, and time.   Skin: Skin is warm and dry. He is not diaphoretic. No erythema.   Psychiatric: He has a normal mood and affect. His behavior is normal. Thought content normal.   Nursing note and vitals reviewed.      Significant Labs:   CMP   Recent Labs   Lab 05/05/20 1925 05/06/20  0439    145   K 4.6 5.0    106   CO2 29 28   GLU 93 78   BUN 23 22   CREATININE 1.1 1.0   CALCIUM 9.5 9.5   PROT 6.9  --    ALBUMIN 3.0*  --    BILITOT 0.4  --    ALKPHOS 272*  --    *  --    ALT 93*  --    ANIONGAP 10 11   ESTGFRAFRICA >60 >60   EGFRNONAA >60 >60   , CBC   Recent Labs   Lab 05/05/20 1925 05/06/20  0439   WBC 12.70 11.46   HGB 12.0* 12.2*   HCT 37.2* 39.1*    212   , Troponin   Recent Labs   Lab 05/05/20 2127 05/06/20  0220 05/06/20  0817   TROPONINI 0.021 0.028* 0.022    and All pertinent lab results from the last 24 hours have been reviewed.    Significant Imaging: Echocardiogram:   2D echo with color flow doppler:   Results for orders placed or performed during the hospital encounter of 09/13/19   2D echo with color flow doppler   Result Value Ref Range    QEF 55 55 - 65    Est. PA Systolic Pressure 23.98     Narrative    Date of Procedure: 09/15/2019        TEST DESCRIPTION   Technical Quality: This is a technically challenging study. There is poor endocardial definition.     Aorta: The aortic root is normal in size, measuring 3.0 cm at sinotubular junction and 3.3 cm at Sinuses of Valsalva. The proximal ascending aorta is normal in size, measuring 3.3 cm across.     Left Atrium: The left atrial volume index is severely enlarged, measuring 87.13 cc/m2.     Left Ventricle: The left ventricle is normal  in size, with an end-diastolic diameter of 5.3 cm, and an end-systolic diameter of 3.6 cm. LV wall thickness is normal, with the septum and the posterior wall each measuring 1.3 cm across. Relative wall   thickness was increased at 0.49, and the LV mass index was increased at 189.2 g/m2 consistent with concentric left ventricular hypertrophy. There are no regional wall motion abnormalities. Left ventricular systolic function appears normal. Visually   estimated ejection fraction is 55-60%. The LV Doppler derived stroke volume equals 47.0 ccs.     Diastolic indices: E wave velocity 1.0 m/s, E/A ratio 1.8,  msec., E/e' ratio(avg) 12. Diastolic function is indeterminate.     Right Atrium: The right atrium is normal in size, measuring 6.3 cm in length and 5.0 cm in width in the apical view.     Right Ventricle: The right ventricle is normal in size measuring 2.3 cm at the base in the apical right ventricle-focused view. Global right ventricular systolic function appears normal. Tricuspid annular plane systolic excursion (TAPSE) is 2.9 cm. The   estimated PA systolic pressure is 24 mmHg.     Aortic Valve:  The aortic valve is mildly sclerotic.     Mitral Valve:  The mitral valve is normal in structure. The pressure half time is 61 msec. The calculated mitral valve area is 3.61 cm2.     Tricuspid Valve:  The tricuspid valve is normal in structure.     Pulmonary Valve:  The pulmonic valve is not well seen.     IVC: IVC is normal in size and collapses > 50% with a sniff, suggesting normal right atrial pressure of 3 mmHg.     Intracavitary: There is no evidence of pericardial effusion, intracavity mass, thrombi, or vegetation.         CONCLUSIONS     1 - Normal left ventricular systolic function (EF 55-60%).     2 - Indeterminate LV diastolic function.     3 - Normal right ventricular systolic function .             This document has been electronically    SIGNED BY: Stan Yeager MD On: 09/15/2019 15:00   , EKG:  Reviewed and X-Ray: CXR: X-Ray Chest 1 View (CXR): No results found for this visit on 05/05/20. and X-Ray Chest PA and Lateral (CXR): No results found for this visit on 05/05/20.    Assessment and Plan:   Patient who presents with symptoms concerning for UA. Gently diurese today. Continue OMT. LHC planned for tmw. Further rec's to follow.    * Chest pain, rule out acute myocardial infarction  -See plan under UA    Unstable angina  -Patient who presents with left-sided chest fullness/gas-like discomfort over the past few weeks with radiation to his left arm concerning for UA  -Known RCA occlusion with significant ischemic burden on recent cardiac PET  -Previously turned down from CABG due to significant co-morbidities  -Continue ASA, BB, ARB, statin  -Start heparin gtt  -In light of the above, will plan for repeat LHC/attempt at RCA intervention tmw by Dr. Beltran. All risks, benefits, and treatment alternatives explained to patient in detail. All questions answered. He has agreed to proceed. NPO after MN. Pre-medication due to contrast allergy    Dyspnea on exertion  -Secondary to underlying COPD  -May have mild component of volume overload as well  -Give one time dose of IV Lasix 20 mg and assess response  -Continue nebs    Coronary artery disease involving left main coronary artery  -See plan under UA    Hypertension  -Continue BB, ARB  -Monitor        VTE Risk Mitigation (From admission, onward)         Ordered     enoxaparin injection 40 mg  Daily      05/05/20 2039     Place sequential compression device  Until discontinued      05/05/20 2039                Thank you for your consult. I will follow-up with patient. Please contact us if you have any additional questions.    Daysi Greenberg PA-C  Cardiology   [facility]

## 2020-05-06 NOTE — PROGRESS NOTES
Lab states there was an error with the ordered appt crossing over. New order entered into system and they state some one will be up to draw it shortly

## 2020-05-06 NOTE — PLAN OF CARE
Pt AAO  SR on tele   NPO after MN for LHC   Pt has allergy to contrast so cards did order pre medications for that   Heparin gtt to be initiated this shift

## 2020-05-06 NOTE — ASSESSMENT & PLAN NOTE
Patient is chronically home oxygen dependent at 2 liters/minute.  Continue supplemental oxygen to maintain saturations greater than 92%.  Chest x-ray reveals no evidence of infiltrates, masses or effusions.

## 2020-05-06 NOTE — SUBJECTIVE & OBJECTIVE
Interval History: seen by Cardiology who recommends who recommends LHC. Placed on heparin infusion.     Review of Systems   Constitutional: Negative.  Negative for appetite change, fatigue and fever.   HENT: Negative.  Negative for congestion, nosebleeds and sore throat.    Eyes: Negative.  Negative for visual disturbance.   Respiratory: Positive for shortness of breath. Negative for cough and wheezing.    Cardiovascular: Positive for chest pain. Negative for palpitations and leg swelling.   Gastrointestinal: Negative.  Negative for abdominal pain, constipation, diarrhea, nausea and vomiting.   Endocrine: Negative.  Negative for polyuria.   Genitourinary: Negative.  Negative for dysuria, flank pain, frequency and urgency.   Musculoskeletal: Negative.  Negative for arthralgias, back pain and joint swelling.   Skin: Negative.  Negative for color change, pallor and rash.   Allergic/Immunologic: Negative.  Negative for immunocompromised state.   Neurological: Negative.  Negative for dizziness, syncope, weakness, light-headedness, numbness and headaches.   Hematological: Negative.    Psychiatric/Behavioral: Negative for confusion and hallucinations. The patient is not nervous/anxious.    All other systems reviewed and are negative.     Objective:     Vital Signs (Most Recent):  Temp: 98 °F (36.7 °C) (05/06/20 1633)  Pulse: 62 (05/06/20 1633)  Resp: 17 (05/06/20 1633)  BP: 133/63 (05/06/20 1633)  SpO2: 96 % (05/06/20 1633) Vital Signs (24h Range):  Temp:  [96.7 °F (35.9 °C)-98.3 °F (36.8 °C)] 98 °F (36.7 °C)  Pulse:  [59-90] 62  Resp:  [16-19] 17  SpO2:  [91 %-97 %] 96 %  BP: (123-174)/(61-76) 133/63     Weight: 62.5 kg (137 lb 12.6 oz)  Body mass index is 20.95 kg/m².    Intake/Output Summary (Last 24 hours) at 5/6/2020 1749  Last data filed at 5/6/2020 1727  Gross per 24 hour   Intake 635.5 ml   Output 850 ml   Net -214.5 ml      Physical Exam   Constitutional: He is oriented to person, place, and time. He appears  well-developed. No distress.   Thinly built  male, appears anxious, but in no respiratory distress.   HENT:   Head: Normocephalic and atraumatic.   Eyes: Conjunctivae are normal. No scleral icterus.   Neck: Normal range of motion. Neck supple. No thyromegaly present.   Cardiovascular: Normal rate, normal heart sounds and intact distal pulses. An irregular rhythm present.   No murmur heard.  Pulmonary/Chest: Effort normal. No accessory muscle usage. No respiratory distress. He has no wheezes. He has rhonchi. He exhibits no tenderness.   Abdominal: Soft. There is no tenderness.   Musculoskeletal: Normal range of motion. He exhibits no edema or tenderness.   Neurological: He is alert and oriented to person, place, and time. No cranial nerve deficit. He exhibits normal muscle tone. Coordination normal.   Skin: Skin is warm and dry. He is not diaphoretic.   Psychiatric: He has a normal mood and affect. His behavior is normal.   Nursing note and vitals reviewed.    Significant Labs:   CBC:   Recent Labs   Lab 05/05/20 1925 05/06/20  0439   WBC 12.70 11.46   HGB 12.0* 12.2*   HCT 37.2* 39.1*    212     CMP:   Recent Labs   Lab 05/05/20 1925 05/06/20  0439    145   K 4.6 5.0    106   CO2 29 28   GLU 93 78   BUN 23 22   CREATININE 1.1 1.0   CALCIUM 9.5 9.5   PROT 6.9  --    ALBUMIN 3.0*  --    BILITOT 0.4  --    ALKPHOS 272*  --    *  --    ALT 93*  --    ANIONGAP 10 11   EGFRNONAA >60 >60       Significant Imaging: I have reviewed all pertinent imaging results/findings within the past 24 hours.

## 2020-05-06 NOTE — CONSULTS
[facility]  Cardiology  Consult Note    Patient Name: Noble Tripp  MRN: [id number]  Admission Date: 5/5/2020  Hospital Length of Stay: 0 days  Code Status: Prior   Attending Provider: Cas Hernandez, *   Consulting Provider: Daysi Greenberg PA-C  Primary Care Physician: Dwaine Davis MD  Principal Problem:Chest pain, rule out acute myocardial infarction    Patient information was obtained from patient, past medical records and ER records.     Inpatient consult to Cardiology  Consult performed by: Daysi Greenberg PA-C  Consult ordered by: Briana Guzman MD        Subjective:     Chief Complaint:  Shortness of breath/chest pain    HPI:   Mr. Tripp is a 74 year old male patient whose current medical conditions include COPD (on supplemental O2), CAD, cardiomyopathy, CHF, and anxiety who presented to [facility] ED yesterday with a chief complaint of increasing SOB/SUAREZ over the past two weeks. Associated symptoms included hypoxia (patient reported O2 dipping into 80's at home) and left-sided pressure/fullness that radiated to his left arm. Patient denied any associated fever, chills, palpitations, near syncope, or syncope. Reported he was concerned that he may have aspiration PNA again. Initial workup in ED revealed BNp of 380 and troponin of 0.028 and patient was subsequently admitted for further evaluation and treatment. Cardiology consulted to assist with management. Patient seen and examined today, sitting up in bed. Feeling better. States SOB has improved. Chest pain free during exam. Does report intermittent bouts of left-sided fullness/gas-like discomfort over the past few weeks. Generally self-resolving. He reports compliance with his medications. Followed as OP by Dr. Beltran and previously had C in 12/19 with unsuccessful intervention of RCA. He was deemed not to be a suitable surgical candidate due to his underlying co-morbidities. Recent cardiac PET scan in 3/20  "showed significant ischemic burden/viability  in RCA distribution. Chart reviewed. Troponin 0.028>0.021>0.028. EKG reviewed, chronic ST-T wave abnormalities, noted inferolaterally.         However in spite of that, oxygen saturations have been dropping into the high 80s, hence had pulmonary telemedicine visit today.  Patient was prescribed Omnicef, but has not picked it up.  He went home, felt anxious, hence presented to the ED.  Currently he reports the chest pain, located in the left lower chest region, occasionally radiating to the left upper arm.  Denies nausea, vomiting, but complains of shortness of breath with exertion.  EKG reveals sinus rhythm with no ST or T-wave changes.  Initial troponin 0.028.  Patient had left heart catheterization done in December 2019, revealed two vessel coronary artery disease with unsuccessful attempt at PTCA of the RCA. Recent cardiac PET scan in 3/20 showed significant ischemic burden/viability  in RCA distribution. Chart reviewed. Troponin 0.028>0.021>0.028. EKG reviewed, chronic ST-T wave abnormalities, noted inferolaterally. CXR shows interstitial coarsening.        Past Medical History:   Diagnosis Date    Adrenal adenoma     david;nl fxn w/u;tran 11/18    Aspiration pneumonia 10/15    puree/honey    Benign prostate hyperplasia     CAD (coronary artery disease)     dr padilla    Chronic pain     dr santos    Chronic systolic congestive heart failure 10/17/2019    COPD (chronic obstructive pulmonary disease)     papi    Ex-smoker     11/13    Hyperlipidemia     Ischemic cardiomyopathy 11/22/2019    JUANITO on CPAP     cpap    Osteopenia 1/15 tran 1/18    PSVT (paroxysmal supraventricular tachycardia)     PVD (peripheral vascular disease)     noobs 07 latrell    Pyriform sinus cancer     dr ponce radiation 1/2-14 dr king perales    Squamous cell carcinoma of skin     roberto    Tongue cancer     "superficial" removed 11/14    Vocal cord cancer 2011    " Xerostomia     radiation       Past Surgical History:   Procedure Laterality Date    APPENDECTOMY      BRONCHOSCOPY Bilateral 2/5/2019    Procedure: Bronchoscopy;  Surgeon: Santos Cardozo MD;  Location: [facility] ENDO;  Service: Endoscopy;  Laterality: Bilateral;    COLONOSCOPY N/A 5/1/2017    Procedure: Due for screening colonoscopy;  Surgeon: Anushka Yoder MD;  Location: [facility] ENDO;  Service: Endoscopy;  Laterality: N/A;    ESOPHAGOGASTRODUODENOSCOPY N/A 5/29/2018    Procedure: ESOPHAGOGASTRODUODENOSCOPY (EGD);  Surgeon: Audie Hill III, MD;  Location: [facility] ENDO;  Service: Endoscopy;  Laterality: N/A;    ESOPHAGOGASTRODUODENOSCOPY N/A 8/29/2018    Procedure: ESOPHAGOGASTRODUODENOSCOPY (EGD);  Surgeon: Audie Hill III, MD;  Location: [facility];  Service: Endoscopy;  Laterality: N/A;    PERCUTANEOUS TRANSLUMINAL BALLOON ANGIOPLASTY OF CORONARY ARTERY Right 12/9/2019    Procedure: PTCA, USING BALLOON/ IABP or Impella multivessel angioplasty/poss stent;  Surgeon: Abel Beltran MD;  Location: [facility] CATH LAB;  Service: Cardiology;  Laterality: Right;    THORACENTESIS Left 8/7/2018    Procedure: Thoracentesis;  Surgeon: Santos Cardozo MD;  Location: [facility] ENDO;  Service: Endoscopy;  Laterality: Left;    THORACENTESIS Right 2/25/2019    Procedure: Thoracentesis;  Surgeon: Santos Cardozo MD;  Location: [facility] ENDO;  Service: Endoscopy;  Laterality: Right;    tongue cancer excision  11/14    dr vitale    VOCAL CORD LATERALIZATION, ENDOSCOPIC APPROACH W/ MLB      kris       Review of patient's allergies indicates:   Allergen Reactions    Contrast media Hives and Itching    Iodinated contrast media Hives and Itching    Iodine Hives and Itching    Pravastatin      Other reaction(s): fatigue  Other reaction(s): fatigue    Rosuvastatin      Other reaction(s): fatigue  Other reaction(s): fatigue    Simvastatin      Other reaction(s): fatigue  Other reaction(s): fatigue    Statins-hmg-coa  reductase inhibitors Other (See Comments)     Fatigue       No current facility-administered medications on file prior to encounter.      Current Outpatient Medications on File Prior to Encounter   Medication Sig    albuterol (PROAIR HFA) 90 mcg/actuation inhaler INHALE 2 PUFFS BY MOUTH EVERY 4 HOURS AS NEEDED FOR WHEEZING OR  SHORTNESS  OF  BREATH    atorvastatin (LIPITOR) 80 MG tablet Take 1 tablet (80 mg total) by mouth once daily.    clopidogrel (PLAVIX) 75 mg tablet Take 1 tablet (75 mg total) by mouth once daily.    furosemide (LASIX) 40 MG tablet Take 0.5 tablets (20 mg total) by mouth once daily.    gabapentin (NEURONTIN) 800 MG tablet Take 1 tablet (800 mg total) by mouth 3 (three) times daily.    losartan (COZAAR) 25 MG tablet Take 1 tablet (25 mg total) by mouth every evening.    pantoprazole (PROTONIX) 40 MG tablet TAKE ONE TABLET BY MOUTH ONCE DAILY    aspirin 81 mg Tab Take 1 tablet by mouth Daily. Over the counter to help prevent stroke/heart attack    carvedilol (COREG) 3.125 MG tablet Take 1 tablet (3.125 mg total) by mouth 2 (two) times daily.    cefdinir (OMNICEF) 300 MG capsule Take 1 capsule (300 mg total) by mouth 2 (two) times daily. for 10 days    evolocumab (REPATHA SURECLICK) 140 mg/mL PnIj Inject 1 mL (140 mg total) into the skin every 14 (fourteen) days.    nebulizer and compressor Bailey Use as directed    oxyCODONE-acetaminophen (PERCOCET) 7.5-325 mg per tablet Take 1 tablet by mouth every 8 (eight) hours as needed for Pain.    [START ON 5/14/2020] oxyCODONE-acetaminophen (PERCOCET) 7.5-325 mg per tablet Take 1 tablet by mouth every 8 (eight) hours as needed for Pain.    OXYGEN-AIR DELIVERY SYSTEMS MISC Inhale 2-3 L into the lungs continuous.    SPIRIVA WITH HANDIHALER 18 mcg inhalation capsule INHALE 1 CAPSULE BY MOUTH ONCE DAILY     Family History     Problem Relation (Age of Onset)    Lung cancer Father, Brother    No Known Problems Mother        Tobacco Use    Smoking  status: Former Smoker     Packs/day: 0.50     Years: 55.00     Pack years: 27.50     Types: Cigarettes     Last attempt to quit: 10/1/2019     Years since quittin.5    Smokeless tobacco: Never Used    Tobacco comment: 6-7 cigs/day   Substance and Sexual Activity    Alcohol use: Not Currently    Drug use: No    Sexual activity: Not Currently     Review of Systems   HENT: Negative.    Eyes: Negative.    Cardiovascular: Positive for chest pain and dyspnea on exertion.   Respiratory: Positive for shortness of breath.    Endocrine: Negative.    Hematologic/Lymphatic: Negative.    Skin: Negative.    Musculoskeletal: Negative.    Gastrointestinal: Negative.    Genitourinary: Negative.    Neurological: Negative.    Psychiatric/Behavioral: Negative.    Allergic/Immunologic: Negative.      Objective:     Vital Signs (Most Recent):  Temp: 97.7 °F (36.5 °C) (20 1137)  Pulse: 64 (20 1137)  Resp: 18 (20 1137)  BP: 134/64 (20 1137)  SpO2: 95 % (20 113) Vital Signs (24h Range):  Temp:  [96.7 °F (35.9 °C)-98.3 °F (36.8 °C)] 97.7 °F (36.5 °C)  Pulse:  [59-90] 64  Resp:  [16-19] 18  SpO2:  [91 %-97 %] 95 %  BP: (123-174)/(61-76) 134/64     Weight: 62.5 kg (137 lb 12.6 oz)  Body mass index is 20.95 kg/m².    SpO2: 95 %  O2 Device (Oxygen Therapy): nasal cannula    No intake or output data in the 24 hours ending 20 1218    Lines/Drains/Airways     Peripheral Intravenous Line                 Peripheral IV - Single Lumen 20 1920 20 G Right Antecubital less than 1 day                Physical Exam   Constitutional: He is oriented to person, place, and time. He appears well-developed and well-nourished. No distress.   On supplemental O2   HENT:   Head: Normocephalic and atraumatic.   Eyes: Pupils are equal, round, and reactive to light. Right eye exhibits no discharge. Left eye exhibits no discharge.   Neck: Neck supple. No JVD present.   Cardiovascular: Normal rate, regular rhythm, S1  normal, S2 normal and normal heart sounds.   No murmur heard.  Pulmonary/Chest: Effort normal. No respiratory distress. He has wheezes. He has rales.   Abdominal: Soft. He exhibits no distension.   Musculoskeletal: He exhibits no edema.   Neurological: He is alert and oriented to person, place, and time.   Skin: Skin is warm and dry. He is not diaphoretic. No erythema.   Psychiatric: He has a normal mood and affect. His behavior is normal. Thought content normal.   Nursing note and vitals reviewed.      Significant Labs:   CMP   Recent Labs   Lab 05/05/20 1925 05/06/20  0439    145   K 4.6 5.0    106   CO2 29 28   GLU 93 78   BUN 23 22   CREATININE 1.1 1.0   CALCIUM 9.5 9.5   PROT 6.9  --    ALBUMIN 3.0*  --    BILITOT 0.4  --    ALKPHOS 272*  --    *  --    ALT 93*  --    ANIONGAP 10 11   ESTGFRAFRICA >60 >60   EGFRNONAA >60 >60   , CBC   Recent Labs   Lab 05/05/20 1925 05/06/20  0439   WBC 12.70 11.46   HGB 12.0* 12.2*   HCT 37.2* 39.1*    212   , Troponin   Recent Labs   Lab 05/05/20 2127 05/06/20  0220 05/06/20  0817   TROPONINI 0.021 0.028* 0.022    and All pertinent lab results from the last 24 hours have been reviewed.    Significant Imaging: Echocardiogram:   2D echo with color flow doppler:   Results for orders placed or performed during the hospital encounter of 09/13/19   2D echo with color flow doppler   Result Value Ref Range    QEF 55 55 - 65    Est. PA Systolic Pressure 23.98     Narrative    Date of Procedure: 09/15/2019        TEST DESCRIPTION   Technical Quality: This is a technically challenging study. There is poor endocardial definition.     Aorta: The aortic root is normal in size, measuring 3.0 cm at sinotubular junction and 3.3 cm at Sinuses of Valsalva. The proximal ascending aorta is normal in size, measuring 3.3 cm across.     Left Atrium: The left atrial volume index is severely enlarged, measuring 87.13 cc/m2.     Left Ventricle: The left ventricle is normal  in size, with an end-diastolic diameter of 5.3 cm, and an end-systolic diameter of 3.6 cm. LV wall thickness is normal, with the septum and the posterior wall each measuring 1.3 cm across. Relative wall   thickness was increased at 0.49, and the LV mass index was increased at 189.2 g/m2 consistent with concentric left ventricular hypertrophy. There are no regional wall motion abnormalities. Left ventricular systolic function appears normal. Visually   estimated ejection fraction is 55-60%. The LV Doppler derived stroke volume equals 47.0 ccs.     Diastolic indices: E wave velocity 1.0 m/s, E/A ratio 1.8,  msec., E/e' ratio(avg) 12. Diastolic function is indeterminate.     Right Atrium: The right atrium is normal in size, measuring 6.3 cm in length and 5.0 cm in width in the apical view.     Right Ventricle: The right ventricle is normal in size measuring 2.3 cm at the base in the apical right ventricle-focused view. Global right ventricular systolic function appears normal. Tricuspid annular plane systolic excursion (TAPSE) is 2.9 cm. The   estimated PA systolic pressure is 24 mmHg.     Aortic Valve:  The aortic valve is mildly sclerotic.     Mitral Valve:  The mitral valve is normal in structure. The pressure half time is 61 msec. The calculated mitral valve area is 3.61 cm2.     Tricuspid Valve:  The tricuspid valve is normal in structure.     Pulmonary Valve:  The pulmonic valve is not well seen.     IVC: IVC is normal in size and collapses > 50% with a sniff, suggesting normal right atrial pressure of 3 mmHg.     Intracavitary: There is no evidence of pericardial effusion, intracavity mass, thrombi, or vegetation.         CONCLUSIONS     1 - Normal left ventricular systolic function (EF 55-60%).     2 - Indeterminate LV diastolic function.     3 - Normal right ventricular systolic function .             This document has been electronically    SIGNED BY: Stan Yeager MD On: 09/15/2019 15:00   , EKG:  Reviewed and X-Ray: CXR: X-Ray Chest 1 View (CXR): No results found for this visit on 05/05/20. and X-Ray Chest PA and Lateral (CXR): No results found for this visit on 05/05/20.    Assessment and Plan:   Patient who presents with symptoms concerning for UA. Gently diurese today. Continue OMT. LHC planned for tmw. Further rec's to follow.    * Chest pain, rule out acute myocardial infarction  -See plan under UA    Unstable angina  -Patient who presents with left-sided chest fullness/gas-like discomfort over the past few weeks with radiation to his left arm concerning for UA  -Known RCA occlusion with significant ischemic burden on recent cardiac PET  -Previously turned down from CABG due to significant co-morbidities  -Continue ASA, BB, ARB, statin  -Start heparin gtt  -In light of the above, will plan for repeat LHC/attempt at RCA intervention tmw by Dr. Beltran. All risks, benefits, and treatment alternatives explained to patient in detail. All questions answered. He has agreed to proceed. NPO after MN. Pre-medication due to contrast allergy    Dyspnea on exertion  -Secondary to underlying COPD  -May have mild component of volume overload as well  -Give one time dose of IV Lasix 20 mg and assess response  -Continue nebs    Coronary artery disease involving left main coronary artery  -See plan under UA    Hypertension  -Continue BB, ARB  -Monitor        VTE Risk Mitigation (From admission, onward)         Ordered     enoxaparin injection 40 mg  Daily      05/05/20 2039     Place sequential compression device  Until discontinued      05/05/20 2039                Thank you for your consult. I will follow-up with patient. Please contact us if you have any additional questions.    Daysi Greenberg PA-C  Cardiology   [facility]

## 2020-05-06 NOTE — ED PROVIDER NOTES
SCRIBE #1 NOTE: I, Shahzad Valerio, am scribing for, and in the presence of, Briana Guzman MD. I have scribed the entire note.     History     Chief Complaint   Patient presents with    Shortness of Breath     started 2-3 days ago    Chest Pain    Cough     Review of patient's allergies indicates:   Allergen Reactions    Contrast media Hives and Itching    Iodinated contrast media Hives and Itching    Iodine Hives and Itching    Pravastatin      Other reaction(s): fatigue  Other reaction(s): fatigue    Rosuvastatin      Other reaction(s): fatigue  Other reaction(s): fatigue    Simvastatin      Other reaction(s): fatigue  Other reaction(s): fatigue    Statins-hmg-coa reductase inhibitors Other (See Comments)     Fatigue         History of Present Illness     HPI    5/5/2020, 7:26 PM  History obtained from the patient      History of Present Illness: Noble Tripp is a 74 y.o. male patient with a history of COPD, former smoker, JUANITO (CPAP at home), intermittent home oxygen use who presents to the Emergency Department for evaluation of worsening SOB with minimal exertion which onset 3 days ago. Symptoms are constant and moderate in severity. No mitigating or exacerbating factors reported. Associated sxs include chronic cough unchanged from baseline. Patient denies any fever, chills, n/v, chest pain, leg swelling, and all other sxs at this time. Prior Tx includes none. No further complaints or concerns at this time. Patient seen by Pulmonology earlier today and had CXR performed. Patient started on Omnicef by pulmonology, which patient has not yet picked up from pharmacy. Patient states he got anxious at home, which is why he presents to the ED. Patient reports occasional chest pain at home but none at present.      Arrival mode: Personal transportation      PCP: Dwaine Davis MD      Past Medical History:  Past Medical History:   Diagnosis Date    Adrenal adenoma     david;nl fxn  "w/u;tran 11/18    Aspiration pneumonia 10/15    puree/honey    Benign prostate hyperplasia     CAD (coronary artery disease)     dr padilla    Chronic pain     dr santos    Chronic systolic congestive heart failure 10/17/2019    COPD (chronic obstructive pulmonary disease)     papi    Ex-smoker     11/13    Hyperlipidemia     Ischemic cardiomyopathy 11/22/2019    JUANITO on CPAP     cpap    Osteopenia 1/15 tran 1/18    PSVT (paroxysmal supraventricular tachycardia)     PVD (peripheral vascular disease)     noobs 07 khuri    Pyriform sinus cancer     dr ponce radiation 1/2-14 dr king perales    Squamous cell carcinoma of skin     roberto    Tongue cancer     "superficial" removed 11/14    Vocal cord cancer 2011    Xerostomia     radiation       Past Surgical History:  Past Surgical History:   Procedure Laterality Date    APPENDECTOMY      BRONCHOSCOPY Bilateral 2/5/2019    Procedure: Bronchoscopy;  Surgeon: Santos Carodzo MD;  Location: [facility];  Service: Endoscopy;  Laterality: Bilateral;    COLONOSCOPY N/A 5/1/2017    Procedure: Due for screening colonoscopy;  Surgeon: Anushka Yoder MD;  Location: [facility];  Service: Endoscopy;  Laterality: N/A;    ESOPHAGOGASTRODUODENOSCOPY N/A 5/29/2018    Procedure: ESOPHAGOGASTRODUODENOSCOPY (EGD);  Surgeon: Audie Hill III, MD;  Location: [facility];  Service: Endoscopy;  Laterality: N/A;    ESOPHAGOGASTRODUODENOSCOPY N/A 8/29/2018    Procedure: ESOPHAGOGASTRODUODENOSCOPY (EGD);  Surgeon: Audie Hill III, MD;  Location: [facility];  Service: Endoscopy;  Laterality: N/A;    PERCUTANEOUS TRANSLUMINAL BALLOON ANGIOPLASTY OF CORONARY ARTERY Right 12/9/2019    Procedure: PTCA, USING BALLOON/ IABP or Impella multivessel angioplasty/poss stent;  Surgeon: Abel Beltran MD;  Location: [facility] CATH LAB;  Service: Cardiology;  Laterality: Right;    THORACENTESIS Left 8/7/2018    Procedure: Thoracentesis;  Surgeon: Santos Cardozo MD;  Location: " [facility] ENDO;  Service: Endoscopy;  Laterality: Left;    THORACENTESIS Right 2019    Procedure: Thoracentesis;  Surgeon: Santos Cardozo MD;  Location: [facility] ENDO;  Service: Endoscopy;  Laterality: Right;    tongue cancer excision      dr vitale    VOCAL CORD LATERALIZATION, ENDOSCOPIC APPROACH W/ MLB      kris         Family History:  Family History   Problem Relation Age of Onset    Lung cancer Father         + Smoker    No Known Problems Mother     Lung cancer Brother         + Smoker       Social History:   Social History     Tobacco Use    Smoking status: Former Smoker     Packs/day: 0.50     Years: 55.00     Pack years: 27.50     Types: Cigarettes     Last attempt to quit: 10/1/2019     Years since quittin.5    Smokeless tobacco: Never Used    Tobacco comment: 6-7 cigs/day   Substance and Sexual Activity    Alcohol use: Not Currently    Drug use: No    Sexual activity: Not Currently        Review of Systems     Review of Systems   Constitutional: Negative for chills and fever.   HENT: Negative for sore throat.    Respiratory: Positive for cough and shortness of breath.    Cardiovascular: Negative for chest pain and leg swelling.   Gastrointestinal: Negative for nausea and vomiting.   Genitourinary: Negative for dysuria.   Musculoskeletal: Negative for back pain.   Skin: Negative for rash.   Neurological: Negative for weakness.   Hematological: Does not bruise/bleed easily.   Psychiatric/Behavioral: The patient is nervous/anxious.    All other systems reviewed and are negative.       Physical Exam     Initial Vitals   BP Pulse Resp Temp SpO2   20 1908 20 1908 20 1908 20 1915 20 1908   136/61 76 18 98.3 °F (36.8 °C) (!) 92 %      MAP       --                 Physical Exam  Nursing Notes and Vital Signs Reviewed.  Constitutional: Well-developed and well-nourished. Patient is in NAD.  Head: Atraumatic. Normocephalic.  Eyes: PERRL. EOM intact. Conjunctivae  are not pale. No scleral icterus.  ENT: Mucous membranes are moist. Oropharynx is clear and symmetric.    Neck: Supple. Full ROM. No lymphadenopathy.  Cardiovascular: Regular rate. Regular rhythm. No murmurs, rubs, or gallops. Distal pulses are 2+ and symmetric.  Pulmonary/Chest: No respiratory distress. Clear to auscultation bilaterally. No wheezing or rales.  Abdominal: Soft and non-distended.  There is no tenderness.  No rebound, guarding, or rigidity. Good bowel sounds.  Genitourinary: No CVA tenderness  Musculoskeletal: Moves all extremities. No obvious deformities. No calf tenderness.  Skin: Warm and dry.  Neurological:  Alert, awake, and appropriate.  Normal speech.  No acute focal neurological deficits are appreciated.  Psychiatric: Anxious. Good eye contact. Appropriate in content.     ED Course   Procedures  ED Vital Signs:  Vitals:    05/05/20 2300 05/05/20 2322 05/06/20 0115 05/06/20 0309   BP:  (!) 142/68     Pulse: 85 68 88 90   Resp:  19     Temp:  97.6 °F (36.4 °C)     TempSrc:  Axillary     SpO2:  97%     Weight:  62.5 kg (137 lb 12.6 oz)     Height:        05/06/20 0351 05/06/20 0503 05/06/20 0705 05/06/20 0732   BP: (!) 160/73   (!) 174/76   Pulse: 62 61 78 78   Resp: 19   18   Temp: 97.8 °F (36.6 °C)   98.3 °F (36.8 °C)   TempSrc: Axillary      SpO2: (!) 94%   95%   Weight:       Height:        05/06/20 0905 05/06/20 0923 05/06/20 1105 05/06/20 1137   BP:    134/64   Pulse: 75  68 64   Resp:    18   Temp:    97.7 °F (36.5 °C)   TempSrc:    Oral   SpO2:  (!) 94%  95%   Weight:       Height:        05/06/20 1305 05/06/20 1505 05/06/20 1522   BP:      Pulse: 75 64    Resp:      Temp:      TempSrc:      SpO2:   95%   Weight:      Height:          Abnormal Lab Results:  Labs Reviewed   CBC W/ AUTO DIFFERENTIAL - Abnormal; Notable for the following components:       Result Value    RBC 3.97 (*)     Hemoglobin 12.0 (*)     Hematocrit 37.2 (*)     RDW 16.6 (*)     Immature Granulocytes 2.4 (*)     Gran #  (ANC) 9.9 (*)     Immature Grans (Abs) 0.31 (*)     Gran% 77.7 (*)     Lymph% 9.8 (*)     All other components within normal limits   COMPREHENSIVE METABOLIC PANEL - Abnormal; Notable for the following components:    Albumin 3.0 (*)     Alkaline Phosphatase 272 (*)      (*)     ALT 93 (*)     All other components within normal limits   TROPONIN I - Abnormal; Notable for the following components:    Troponin I 0.028 (*)     All other components within normal limits   B-TYPE NATRIURETIC PEPTIDE - Abnormal; Notable for the following components:     (*)     All other components within normal limits   SARS-COV-2 RNA AMPLIFICATION, QUAL    Narrative:     What symptom criteria does the patient meet?->Cough  What symptom criteria does the patient meet?->Shortness of  breath or difficulty breathing   PROTIME-INR   APTT   APTT   PROTIME-INR        All Lab Results:  Results for orders placed or performed during the hospital encounter of 05/05/20   COVID-19 Rapid Screening   Result Value Ref Range    SARS-CoV-2 RNA, Amplification, Qual Negative Negative   CBC auto differential   Result Value Ref Range    WBC 12.70 3.90 - 12.70 K/uL    RBC 3.97 (L) 4.60 - 6.20 M/uL    Hemoglobin 12.0 (L) 14.0 - 18.0 g/dL    Hematocrit 37.2 (L) 40.0 - 54.0 %    Mean Corpuscular Volume 94 82 - 98 fL    Mean Corpuscular Hemoglobin 30.2 27.0 - 31.0 pg    Mean Corpuscular Hemoglobin Conc 32.3 32.0 - 36.0 g/dL    RDW 16.6 (H) 11.5 - 14.5 %    Platelets 218 150 - 350 K/uL    MPV 12.4 9.2 - 12.9 fL    Immature Granulocytes 2.4 (H) 0.0 - 0.5 %    Gran # (ANC) 9.9 (H) 1.8 - 7.7 K/uL    Immature Grans (Abs) 0.31 (H) 0.00 - 0.04 K/uL    Lymph # 1.2 1.0 - 4.8 K/uL    Mono # 1.0 0.3 - 1.0 K/uL    Eos # 0.1 0.0 - 0.5 K/uL    Baso # 0.15 0.00 - 0.20 K/uL    nRBC 0 0 /100 WBC    Gran% 77.7 (H) 38.0 - 73.0 %    Lymph% 9.8 (L) 18.0 - 48.0 %    Mono% 8.0 4.0 - 15.0 %    Eosinophil% 0.9 0.0 - 8.0 %    Basophil% 1.2 0.0 - 1.9 %    Differential Method  Automated    Comprehensive metabolic panel   Result Value Ref Range    Sodium 144 136 - 145 mmol/L    Potassium 4.6 3.5 - 5.1 mmol/L    Chloride 105 95 - 110 mmol/L    CO2 29 23 - 29 mmol/L    Glucose 93 70 - 110 mg/dL    BUN, Bld 23 8 - 23 mg/dL    Creatinine 1.1 0.5 - 1.4 mg/dL    Calcium 9.5 8.7 - 10.5 mg/dL    Total Protein 6.9 6.0 - 8.4 g/dL    Albumin 3.0 (L) 3.5 - 5.2 g/dL    Total Bilirubin 0.4 0.1 - 1.0 mg/dL    Alkaline Phosphatase 272 (H) 55 - 135 U/L     (H) 10 - 40 U/L    ALT 93 (H) 10 - 44 U/L    Anion Gap 10 8 - 16 mmol/L    eGFR if African American >60 >60 mL/min/1.73 m^2    eGFR if non African American >60 >60 mL/min/1.73 m^2   Troponin I #1   Result Value Ref Range    Troponin I 0.028 (H) 0.000 - 0.026 ng/mL   B-Type natriuretic peptide (BNP)   Result Value Ref Range     (H) 0 - 99 pg/mL   APTT   Result Value Ref Range    aPTT 29.2 21.0 - 32.0 sec   Protime-INR   Result Value Ref Range    Prothrombin Time 10.8 9.0 - 12.5 sec    INR 1.0 0.8 - 1.2   Troponin I   Result Value Ref Range    Troponin I 0.021 0.000 - 0.026 ng/mL   CBC auto differential   Result Value Ref Range    WBC 11.46 3.90 - 12.70 K/uL    RBC 4.13 (L) 4.60 - 6.20 M/uL    Hemoglobin 12.2 (L) 14.0 - 18.0 g/dL    Hematocrit 39.1 (L) 40.0 - 54.0 %    Mean Corpuscular Volume 95 82 - 98 fL    Mean Corpuscular Hemoglobin 29.5 27.0 - 31.0 pg    Mean Corpuscular Hemoglobin Conc 31.2 (L) 32.0 - 36.0 g/dL    RDW 16.7 (H) 11.5 - 14.5 %    Platelets 212 150 - 350 K/uL    MPV 12.4 9.2 - 12.9 fL    Immature Granulocytes 2.4 (H) 0.0 - 0.5 %    Gran # (ANC) 8.7 (H) 1.8 - 7.7 K/uL    Immature Grans (Abs) 0.28 (H) 0.00 - 0.04 K/uL    Lymph # 1.3 1.0 - 4.8 K/uL    Mono # 0.9 0.3 - 1.0 K/uL    Eos # 0.2 0.0 - 0.5 K/uL    Baso # 0.13 0.00 - 0.20 K/uL    nRBC 0 0 /100 WBC    Gran% 75.9 (H) 38.0 - 73.0 %    Lymph% 11.3 (L) 18.0 - 48.0 %    Mono% 7.9 4.0 - 15.0 %    Eosinophil% 1.4 0.0 - 8.0 %    Basophil% 1.1 0.0 - 1.9 %    Differential Method  Automated    Magnesium   Result Value Ref Range    Magnesium 2.1 1.6 - 2.6 mg/dL   Basic metabolic panel   Result Value Ref Range    Sodium 145 136 - 145 mmol/L    Potassium 5.0 3.5 - 5.1 mmol/L    Chloride 106 95 - 110 mmol/L    CO2 28 23 - 29 mmol/L    Glucose 78 70 - 110 mg/dL    BUN, Bld 22 8 - 23 mg/dL    Creatinine 1.0 0.5 - 1.4 mg/dL    Calcium 9.5 8.7 - 10.5 mg/dL    Anion Gap 11 8 - 16 mmol/L    eGFR if African American >60 >60 mL/min/1.73 m^2    eGFR if non African American >60 >60 mL/min/1.73 m^2   Troponin I   Result Value Ref Range    Troponin I 0.028 (H) 0.000 - 0.026 ng/mL   Troponin I   Result Value Ref Range    Troponin I 0.022 0.000 - 0.026 ng/mL     *Note: Due to a large number of results and/or encounters for the requested time period, some results have not been displayed. A complete set of results can be found in Results Review.       The EKG was ordered, reviewed, and independently interpreted by the ED provider.  Interpretation time: 1919  Rate: 74 BPM  Rhythm: NSR  Interpretation: 1st degree AV block. No STEMI.  No change compared to ECG on 3/2/20.      The Emergency Provider reviewed the vital signs and test results, which are outlined above.     ED Discussion     7:36 PM: Records review shows patient had cardiac PET stress test March 2020, followed up with Dr. Beltran via tele med on 4/22/20. Notes show extensive cardiac disease involving RCA(he has an occluded RCA moderate and LAD disease with preserved LVF now with significant ischemia and viability of 25% myocardium).    8:14 PM: Discussed case with Dr. Wilkins ([facility]). Dr. Wilkins agrees with current care and management of pt and accepts admission.   Admitting Service: Hospital Medicine  Admit to: observation / tele    Re-evaluated pt. I have discussed test results, shared treatment plan, and the need for admission with patient and family at bedside. Pt and family express understanding at this time and agree with all  information. All questions answered. Pt and family have no further questions or concerns at this time. Pt is ready for admit.       MDM     X-Ray Chest PA And Lateral   Order: [id number]   Status:  Final result   Visible to patient:  Yes (Patient Portal) Next appt:  06/08/2020 at 01:00 PM in Cardiology (Adolph Beltran MD) Dx:  Shortness of breath on exertion   Details     Reading Physician Reading Date Result Priority   Zacarias Unger III, MD 5/5/2020 STAT      Narrative     EXAMINATION:  XR CHEST PA AND LATERAL    CLINICAL HISTORY:  Shortness of breath    TECHNIQUE:  PA and lateral views of the chest were performed.    COMPARISON:  February 5, 2020    FINDINGS:  Allowing for positioning there has been no significant oval change from prior exam.  Skin folds project over the chest bilaterally.  Bilateral blunted CP angles consistent with effusions.  Diffuse coarsening interstitial markings throughout the lungs with granulomas noted bilaterally.  Slight increased coarsening of the lung markings within the apices, left greater than right.  Bilateral apically pleural thickening.  Curvilinear band of increased density projects over the lateral margin of the left base.  Additional areas of scarring and or subsegmental atelectasis within both bases.  Generalized osteopenia and spondylosis.  There is limited visualization of the T-spine on this exam.  Cannot exclude chronic insufficiency fractures      Impression       Coarsening of interstitial markings throughout the upper lobes and apically region, left greater than right without dense consolidation.    Bilateral blunted CP angles consistent with effusions similar to prior exam.    Bibasilar scarring and or subsegmental atelectasis, greater on the left.    Prior granulomatous disease.    Generalized osteopenia and spondylosis.      Electronically signed by: Zacarias Unger MD  Date: 05/05/2020  Time: 15:07             Last Resulted: 05/05/20 15:07                Medical  Decision Making:   Clinical Tests:   Lab Tests: Ordered and Reviewed  Medical Tests: Reviewed and Ordered           ED Medication(s):  Medications   aspirin chewable tablet 81 mg (81 mg Oral Given 5/6/20 0804)   carvediloL tablet 3.125 mg (3.125 mg Oral Given 5/6/20 0804)   clopidogreL tablet 75 mg (75 mg Oral Given 5/6/20 0804)   furosemide tablet 20 mg (20 mg Oral Given 5/6/20 0804)   losartan tablet 25 mg (25 mg Oral Given 5/5/20 2218)   oxyCODONE-acetaminophen 7.5-325 mg per tablet 1 tablet (1 tablet Oral Given 5/6/20 1507)   pantoprazole EC tablet 40 mg (40 mg Oral Given 5/6/20 0804)   hydrALAZINE injection 10 mg (has no administration in time range)   acetaminophen tablet 650 mg (has no administration in time range)   ondansetron injection 4 mg (has no administration in time range)   diphenhydrAMINE capsule 25 mg (has no administration in time range)   guaifenesin 100 mg/5 ml syrup 200 mg (has no administration in time range)   aluminum-magnesium hydroxide-simethicone 200-200-20 mg/5 mL suspension 30 mL (has no administration in time range)   albuterol-ipratropium 2.5 mg-0.5 mg/3 mL nebulizer solution 3 mL (has no administration in time range)   enoxaparin injection 40 mg (40 mg Subcutaneous Given 5/5/20 2217)   ALPRAZolam tablet 0.5 mg (has no administration in time range)   gabapentin capsule 300 mg (300 mg Oral Given 5/6/20 1507)   aspirin tablet 325 mg (325 mg Oral Given 5/5/20 2006)   furosemide injection 20 mg (20 mg Intravenous Given 5/6/20 1256)       Current Discharge Medication List                  Scribe Attestation:   Scribe #1: I performed the above scribed service and the documentation accurately describes the services I performed. I attest to the accuracy of the note.     Attending:   Physician Attestation Statement for Scribe #1: I, Briana Guzman MD, personally performed the services described in this documentation, as scribed by Shahzad Valerio, in my presence, and it is both accurate  and complete.          Clinical Impression       ICD-10-CM ICD-9-CM   1. Dyspnea on exertion R06.09 786.09   2. Shortness of breath R06.02 786.05   3. Chest pain with high risk of acute coronary syndrome R07.9 786.50   4. History of COPD Z87.09 V12.69   5. JUANITO (obstructive sleep apnea) G47.33 327.23       Disposition:   Disposition: Placed in Observation  Condition: Julia Guzman MD  05/06/20 8939

## 2020-05-06 NOTE — HPI
Mr. Tripp is a 74-year-old thinly built  male, with PMH significant for CAD, CHF, COPD, anxiety, followed by Dr. Ramirez in the Cardiology Clinic, was in his usual state of health until 1-2 weeks ago.  He had tele clinic visit with pulmonary earlier today with complaints of 1-2 weeks of progressive progressively worsening dyspnea on exertion.  Patient is chronically home oxygen dependent, at 2 liters/minute.  However in spite of that, oxygen saturations have been dropping into the high 80s, hence had pulmonary telemedicine visit today.  Patient was prescribed Omnicef, but has not picked it up.  He went home, felt anxious, hence presented to the ED.  Currently he reports the chest pain, located in the left lower chest region, occasionally radiating to the left upper arm.  Denies nausea, vomiting, but complains of shortness of breath with exertion.  EKG reveals sinus rhythm with no ST or T-wave changes.  Initial troponin 0.028.  Patient had left heart catheterization done in December 2019, revealed two vessel coronary artery disease with unsuccessful attempt at PTCA of the RCA.    Admitting diagnosis chest pain, rule out ACS.

## 2020-05-06 NOTE — HPI
Mr. Tripp is a 74 year old male patient whose current medical conditions include COPD (on supplemental O2), CAD, cardiomyopathy, CHF, and anxiety who presented to [facility] ED yesterday with a chief complaint of increasing SOB/SUAREZ over the past two weeks. Associated symptoms included hypoxia (patient reported O2 dipping into 80's at home) and left-sided pressure/fullness that radiated to his left arm. Patient denied any associated fever, chills, palpitations, near syncope, or syncope. Reported he was concerned that he may have aspiration PNA again. Initial workup in ED revealed BNp of 380 and troponin of 0.028 and patient was subsequently admitted for further evaluation and treatment. Cardiology consulted to assist with management. Patient seen and examined today, sitting up in bed. Feeling better. States SOB has improved. Chest pain free during exam. Does report intermittent bouts of left-sided fullness/gas-like discomfort over the past few weeks. Generally self-resolving. He reports compliance with his medications. Followed as OP by Dr. Beltran and previously had [facility] in 12/19 with unsuccessful intervention of RCA. He was deemed not to be a suitable surgical candidate due to his underlying co-morbidities. Recent cardiac PET scan in 3/20 showed significant ischemic burden/viability  in RCA distribution. Chart reviewed. Troponin 0.028>0.021>0.028. EKG reviewed, chronic ST-T wave abnormalities, noted inferolaterally.

## 2020-05-06 NOTE — ASSESSMENT & PLAN NOTE
Plans for C tomorrow  On heparin infusion  ASA, Plavix, BB. Will discuss. No statin due to allergy  Cardiology input appreciated

## 2020-05-06 NOTE — PROGRESS NOTES
[facility] Hospital Medicine  Progress Note    Patient Name: Noble Tripp  MRN: [id number]  Patient Class: IP- Inpatient   Admission Date: 5/5/2020  Length of Stay: 0 days  Attending Physician: Cas Hernandez, *  Primary Care Provider: Dwaine Davis MD    Subjective:     Principal Problem:Chest pain, rule out acute myocardial infarction        HPI:  Mr. Tripp is a 74-year-old thinly built  male, with PMH significant for CAD, CHF, COPD, anxiety, followed by Dr. Ramirez in the Cardiology Clinic, was in his usual state of health until 1-2 weeks ago.  He had tele clinic visit with pulmonary earlier today with complaints of 1-2 weeks of progressive progressively worsening dyspnea on exertion.  Patient is chronically home oxygen dependent, at 2 liters/minute.  However in spite of that, oxygen saturations have been dropping into the high 80s, hence had pulmonary telemedicine visit today.  Patient was prescribed Omnicef, but has not picked it up.  He went home, felt anxious, hence presented to the ED.  Currently he reports the chest pain, located in the left lower chest region, occasionally radiating to the left upper arm.  Denies nausea, vomiting, but complains of shortness of breath with exertion.  EKG reveals sinus rhythm with no ST or T-wave changes.  Initial troponin 0.028.  Patient had left heart catheterization done in December 2019, revealed two vessel coronary artery disease with unsuccessful attempt at PTCA of the RCA.    Admitting diagnosis chest pain, rule out ACS.    Overview/Hospital Course:  No notes on file    Interval History: seen by Cardiology who recommends who recommends LHC. Placed on heparin infusion.     Review of Systems   Constitutional: Negative.  Negative for appetite change, fatigue and fever.   HENT: Negative.  Negative for congestion, nosebleeds and sore throat.    Eyes: Negative.  Negative for visual disturbance.   Respiratory: Positive for  shortness of breath. Negative for cough and wheezing.    Cardiovascular: Positive for chest pain. Negative for palpitations and leg swelling.   Gastrointestinal: Negative.  Negative for abdominal pain, constipation, diarrhea, nausea and vomiting.   Endocrine: Negative.  Negative for polyuria.   Genitourinary: Negative.  Negative for dysuria, flank pain, frequency and urgency.   Musculoskeletal: Negative.  Negative for arthralgias, back pain and joint swelling.   Skin: Negative.  Negative for color change, pallor and rash.   Allergic/Immunologic: Negative.  Negative for immunocompromised state.   Neurological: Negative.  Negative for dizziness, syncope, weakness, light-headedness, numbness and headaches.   Hematological: Negative.    Psychiatric/Behavioral: Negative for confusion and hallucinations. The patient is not nervous/anxious.    All other systems reviewed and are negative.     Objective:     Vital Signs (Most Recent):  Temp: 98 °F (36.7 °C) (05/06/20 1633)  Pulse: 62 (05/06/20 1633)  Resp: 17 (05/06/20 1633)  BP: 133/63 (05/06/20 1633)  SpO2: 96 % (05/06/20 1633) Vital Signs (24h Range):  Temp:  [96.7 °F (35.9 °C)-98.3 °F (36.8 °C)] 98 °F (36.7 °C)  Pulse:  [59-90] 62  Resp:  [16-19] 17  SpO2:  [91 %-97 %] 96 %  BP: (123-174)/(61-76) 133/63     Weight: 62.5 kg (137 lb 12.6 oz)  Body mass index is 20.95 kg/m².    Intake/Output Summary (Last 24 hours) at 5/6/2020 0749  Last data filed at 5/6/2020 1727  Gross per 24 hour   Intake 635.5 ml   Output 850 ml   Net -214.5 ml      Physical Exam   Constitutional: He is oriented to person, place, and time. He appears well-developed. No distress.   Thinly built  male, appears anxious, but in no respiratory distress.   HENT:   Head: Normocephalic and atraumatic.   Eyes: Conjunctivae are normal. No scleral icterus.   Neck: Normal range of motion. Neck supple. No thyromegaly present.   Cardiovascular: Normal rate, normal heart sounds and intact distal pulses. An  irregular rhythm present.   No murmur heard.  Pulmonary/Chest: Effort normal. No accessory muscle usage. No respiratory distress. He has no wheezes. He has rhonchi. He exhibits no tenderness.   Abdominal: Soft. There is no tenderness.   Musculoskeletal: Normal range of motion. He exhibits no edema or tenderness.   Neurological: He is alert and oriented to person, place, and time. No cranial nerve deficit. He exhibits normal muscle tone. Coordination normal.   Skin: Skin is warm and dry. He is not diaphoretic.   Psychiatric: He has a normal mood and affect. His behavior is normal.   Nursing note and vitals reviewed.    Significant Labs:   CBC:   Recent Labs   Lab 05/05/20 1925 05/06/20  0439   WBC 12.70 11.46   HGB 12.0* 12.2*   HCT 37.2* 39.1*    212     CMP:   Recent Labs   Lab 05/05/20 1925 05/06/20  0439    145   K 4.6 5.0    106   CO2 29 28   GLU 93 78   BUN 23 22   CREATININE 1.1 1.0   CALCIUM 9.5 9.5   PROT 6.9  --    ALBUMIN 3.0*  --    BILITOT 0.4  --    ALKPHOS 272*  --    *  --    ALT 93*  --    ANIONGAP 10 11   EGFRNONAA >60 >60       Significant Imaging: I have reviewed all pertinent imaging results/findings within the past 24 hours.      Assessment/Plan:      * Chest pain, rule out acute myocardial infarction  See plan for unstable angina      Unstable angina  Plans for [facility] tomorrow  On heparin infusion  ASA, Plavix, BB. Will discuss. No statin due to allergy  Cardiology input appreciated    Dyspnea on exertion  Patient is chronically home oxygen dependent at 2 liters/minute.  Continue supplemental oxygen to maintain saturations greater than 92%.  Chest x-ray reveals no evidence of infiltrates, masses or effusions.    5/6/2020  See plan for unstable angina as well    Coronary artery disease involving left main coronary artery  Left heart catheterization in December 2019, reveals two vessel CAD with unsuccessful attempt at PTCA of RCA.  Continue aspirin, Plavix, Coreg,  ARB.  Patient allergic to statins.      Hypertension  Continue Coreg, losartan.  Monitor and make adjustments as needed      Benign prostate hyperplasia          VTE Risk Mitigation (From admission, onward)         Ordered     heparin 25,000 units in dextrose 5% 250 mL (100 units/mL) infusion LOW INTENSITY nomogram - [facility]  Continuous     Question:  Heparin Infusion Adjustment (DO NOT MODIFY ANSWER)  Answer:  [email or web address]\epic\Images\Pharmacy\HeparinInfusions\heparin LOW INTENSITY nomogram for [facility] [id number].pdf    05/06/20 1601     heparin 25,000 units in dextrose 5% (100 units/ml) IV bolus from bag - ADDITIONAL PRN BOLUS - 60 units/kg (max bolus 4000 units)  As needed (PRN)     Question:  Heparin Infusion Adjustment (DO NOT MODIFY ANSWER)  Answer:  [email or web address]\epic\Images\Pharmacy\HeparinInfusions\heparin LOW INTENSITY nomogram for [facility] [id number].pdf    05/06/20 1601     heparin 25,000 units in dextrose 5% (100 units/ml) IV bolus from bag - ADDITIONAL PRN BOLUS - 30 units/kg (max bolus 4000 units)  As needed (PRN)     Question:  Heparin Infusion Adjustment (DO NOT MODIFY ANSWER)  Answer:  [email or web address]\epic\Images\Pharmacy\HeparinInfusions\heparin LOW INTENSITY nomogram for [facility] [id number].pdf    05/06/20 1601     Place sequential compression device  Until discontinued      05/05/20 2039              Vera Browne NP  Department of Hospital Medicine   [facility]

## 2020-05-06 NOTE — PT/OT/SLP PROGRESS
Speech Language Pathology      Noble Tripp  MRN: [id number]    ST received an order for a bedside swallow evaluation.  This pt is known to ST from prior admissions and outpatient MBSS where NPO with tube feeding was recommended due to significant dysphagia and aspiration.  Pt declined recommendations each time it was given.  ST spoke with pt at bedside today.  He states that he eats whatever he wants at home but it is becoming increasingly hard and chokes often.  He continues to decline NPO recommendation and alternative diet of puree with thickened liquids (as this would be the safest po diet).  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  Pt politely declined ST services and stated he just wants to go home.       Tabby Addison CCC-SLP  10:30 am

## 2020-05-06 NOTE — PLAN OF CARE
POC reviwed,verbalized understanding.Pt remained free from falls. Fall precautions in place. Pt is NSR on monitor. VSS. PIV intact. Call bell and personal belongings within reach. Hourly rounding complete. No c/o at this time. Reminded to call for assistance. Will continue to monitor.

## 2020-05-06 NOTE — ASSESSMENT & PLAN NOTE
Currently chest pain-free.  Initial troponin 0.028.  EKG NSR with nonspecific ST T-wave changes  Trend cardiac enzymes.  Consult cardiology in a.m..

## 2020-05-06 NOTE — ASSESSMENT & PLAN NOTE
-Secondary to underlying COPD  -May have mild component of volume overload as well  -Give one time dose of IV Lasix 20 mg and assess response  -Continue nebs

## 2020-05-06 NOTE — ASSESSMENT & PLAN NOTE
Patient is chronically home oxygen dependent at 2 liters/minute.  Continue supplemental oxygen to maintain saturations greater than 92%.  Chest x-ray reveals no evidence of infiltrates, masses or effusions.    5/6/2020  See plan for unstable angina as well

## 2020-05-06 NOTE — TELEPHONE ENCOUNTER
Called and spoke with patients daughter about possibly being seen to have a peg tube placed. Patient was told back in 2018 that he may need one placed and patient declined at the time. Patient  Wanted to know if he will have to be seen to be assest of needed. Patient was told an appt would have to be made. Patient daughter stated she will schedule once things slow down with covid virus. Patients daughter will call back to schedule.

## 2020-05-06 NOTE — SUBJECTIVE & OBJECTIVE
"Past Medical History:   Diagnosis Date    Adrenal adenoma     david;nl fxn w/u;tran 11/18    Aspiration pneumonia 10/15    puree/honey    Benign prostate hyperplasia     CAD (coronary artery disease)     dr padilla    Chronic pain     dr santos    Chronic systolic congestive heart failure 10/17/2019    COPD (chronic obstructive pulmonary disease)     papi    Ex-smoker     11/13    Hyperlipidemia     Ischemic cardiomyopathy 11/22/2019    JUANITO on CPAP     cpap    Osteopenia 1/15 tran 1/18    PSVT (paroxysmal supraventricular tachycardia)     PVD (peripheral vascular disease)     noobs 07 elizabethuri    Pyriform sinus cancer     dr ponce radiation 1/2-14 dr king perales    Squamous cell carcinoma of skin     roberto    Tongue cancer     "superficial" removed 11/14    Vocal cord cancer 2011    Xerostomia     radiation       Past Surgical History:   Procedure Laterality Date    APPENDECTOMY      BRONCHOSCOPY Bilateral 2/5/2019    Procedure: Bronchoscopy;  Surgeon: Santos Cardozo MD;  Location: [facility] ENDO;  Service: Endoscopy;  Laterality: Bilateral;    COLONOSCOPY N/A 5/1/2017    Procedure: Due for screening colonoscopy;  Surgeon: Anushka Yoder MD;  Location: [facility] ENDO;  Service: Endoscopy;  Laterality: N/A;    ESOPHAGOGASTRODUODENOSCOPY N/A 5/29/2018    Procedure: ESOPHAGOGASTRODUODENOSCOPY (EGD);  Surgeon: Audie Hill III, MD;  Location: [facility] ENDO;  Service: Endoscopy;  Laterality: N/A;    ESOPHAGOGASTRODUODENOSCOPY N/A 8/29/2018    Procedure: ESOPHAGOGASTRODUODENOSCOPY (EGD);  Surgeon: Audie Hill III, MD;  Location: [facility];  Service: Endoscopy;  Laterality: N/A;    PERCUTANEOUS TRANSLUMINAL BALLOON ANGIOPLASTY OF CORONARY ARTERY Right 12/9/2019    Procedure: PTCA, USING BALLOON/ IABP or Impella multivessel angioplasty/poss stent;  Surgeon: Abel Beltran MD;  Location: [facility] CATH LAB;  Service: Cardiology;  Laterality: Right;    THORACENTESIS Left 8/7/2018    Procedure: " Thoracentesis;  Surgeon: Santos Cardozo MD;  Location: [facility] ENDO;  Service: Endoscopy;  Laterality: Left;    THORACENTESIS Right 2/25/2019    Procedure: Thoracentesis;  Surgeon: Santos Cardozo MD;  Location: [facility] ENDO;  Service: Endoscopy;  Laterality: Right;    tongue cancer excision  11/14    dr vitale    VOCAL CORD LATERALIZATION, ENDOSCOPIC APPROACH W/ MLB      kris       Review of patient's allergies indicates:   Allergen Reactions    Contrast media Hives and Itching    Iodinated contrast media Hives and Itching    Iodine Hives and Itching    Pravastatin      Other reaction(s): fatigue  Other reaction(s): fatigue    Rosuvastatin      Other reaction(s): fatigue  Other reaction(s): fatigue    Simvastatin      Other reaction(s): fatigue  Other reaction(s): fatigue    Statins-hmg-coa reductase inhibitors Other (See Comments)     Fatigue       No current facility-administered medications on file prior to encounter.      Current Outpatient Medications on File Prior to Encounter   Medication Sig    albuterol (PROAIR HFA) 90 mcg/actuation inhaler INHALE 2 PUFFS BY MOUTH EVERY 4 HOURS AS NEEDED FOR WHEEZING OR  SHORTNESS  OF  BREATH    atorvastatin (LIPITOR) 80 MG tablet Take 1 tablet (80 mg total) by mouth once daily.    clopidogrel (PLAVIX) 75 mg tablet Take 1 tablet (75 mg total) by mouth once daily.    furosemide (LASIX) 40 MG tablet Take 0.5 tablets (20 mg total) by mouth once daily.    gabapentin (NEURONTIN) 800 MG tablet Take 1 tablet (800 mg total) by mouth 3 (three) times daily.    losartan (COZAAR) 25 MG tablet Take 1 tablet (25 mg total) by mouth every evening.    pantoprazole (PROTONIX) 40 MG tablet TAKE ONE TABLET BY MOUTH ONCE DAILY    aspirin 81 mg Tab Take 1 tablet by mouth Daily. Over the counter to help prevent stroke/heart attack    carvedilol (COREG) 3.125 MG tablet Take 1 tablet (3.125 mg total) by mouth 2 (two) times daily.    cefdinir (OMNICEF) 300 MG capsule Take 1  capsule (300 mg total) by mouth 2 (two) times daily. for 10 days    evolocumab (REPATHA SURECLICK) 140 mg/mL PnIj Inject 1 mL (140 mg total) into the skin every 14 (fourteen) days.    nebulizer and compressor Bailey Use as directed    oxyCODONE-acetaminophen (PERCOCET) 7.5-325 mg per tablet Take 1 tablet by mouth every 8 (eight) hours as needed for Pain.    [START ON 2020] oxyCODONE-acetaminophen (PERCOCET) 7.5-325 mg per tablet Take 1 tablet by mouth every 8 (eight) hours as needed for Pain.    OXYGEN-AIR DELIVERY SYSTEMS MISC Inhale 2-3 L into the lungs continuous.    SPIRIVA WITH HANDIHALER 18 mcg inhalation capsule INHALE 1 CAPSULE BY MOUTH ONCE DAILY     Family History     Problem Relation (Age of Onset)    Lung cancer Father, Brother    No Known Problems Mother        Tobacco Use    Smoking status: Former Smoker     Packs/day: 0.50     Years: 55.00     Pack years: 27.50     Types: Cigarettes     Last attempt to quit: 10/1/2019     Years since quittin.5    Smokeless tobacco: Never Used    Tobacco comment: 6-7 cigs/day   Substance and Sexual Activity    Alcohol use: Not Currently    Drug use: No    Sexual activity: Not Currently     Review of Systems   Constitutional: Negative.  Negative for appetite change, fatigue and fever.   HENT: Negative.  Negative for congestion, nosebleeds and sore throat.    Eyes: Negative.  Negative for visual disturbance.   Respiratory: Positive for shortness of breath. Negative for cough and wheezing.    Cardiovascular: Positive for chest pain. Negative for palpitations and leg swelling.   Gastrointestinal: Negative.  Negative for abdominal pain, constipation, diarrhea, nausea and vomiting.   Endocrine: Negative.  Negative for polyuria.   Genitourinary: Negative.  Negative for dysuria, flank pain, frequency and urgency.   Musculoskeletal: Negative.  Negative for arthralgias, back pain and joint swelling.   Skin: Negative.  Negative for color change, pallor and  rash.   Allergic/Immunologic: Negative.  Negative for immunocompromised state.   Neurological: Negative.  Negative for dizziness, syncope, weakness, light-headedness, numbness and headaches.   Hematological: Negative.    Psychiatric/Behavioral: Negative for confusion and hallucinations. The patient is nervous/anxious.    All other systems reviewed and are negative.    Objective:     Vital Signs (Most Recent):  Temp: 96.7 °F (35.9 °C) (05/05/20 2056)  Pulse: 76 (05/05/20 2056)  Resp: 16 (05/05/20 2056)  BP: (!) 144/63 (05/05/20 2056)  SpO2: (!) 91 % (05/05/20 2056) Vital Signs (24h Range):  Temp:  [96.7 °F (35.9 °C)-98.3 °F (36.8 °C)] 96.7 °F (35.9 °C)  Pulse:  [72-77] 76  Resp:  [16-19] 16  SpO2:  [91 %-93 %] 91 %  BP: (123-144)/(61-67) 144/63     Weight: 62.1 kg (136 lb 14.5 oz)  Body mass index is 20.82 kg/m².    Physical Exam   Constitutional: He is oriented to person, place, and time. He appears well-developed. No distress.   Thinly built  male, appears anxious, but in no respiratory distress.   HENT:   Head: Normocephalic and atraumatic.   Eyes: Conjunctivae are normal. No scleral icterus.   Neck: Normal range of motion. Neck supple. No thyromegaly present.   Cardiovascular: Normal rate, normal heart sounds and intact distal pulses. An irregular rhythm present.   No murmur heard.  Pulmonary/Chest: Effort normal. No accessory muscle usage. No respiratory distress. He has no wheezes. He has rhonchi. He exhibits no tenderness.   Abdominal: Soft. There is no tenderness.   Musculoskeletal: Normal range of motion. He exhibits no edema or tenderness.   Neurological: He is alert and oriented to person, place, and time. No cranial nerve deficit. He exhibits normal muscle tone. Coordination normal.   Skin: Skin is warm and dry. He is not diaphoretic.   Psychiatric: He has a normal mood and affect. His behavior is normal.   Nursing note and vitals reviewed.          Significant Labs:   BMP:   Recent Labs   Lab  05/05/20 1925   GLU 93      K 4.6      CO2 29   BUN 23   CREATININE 1.1   CALCIUM 9.5     CBC:   Recent Labs   Lab 05/05/20 1925   WBC 12.70   HGB 12.0*   HCT 37.2*        CMP:   Recent Labs   Lab 05/05/20 1925      K 4.6      CO2 29   GLU 93   BUN 23   CREATININE 1.1   CALCIUM 9.5   PROT 6.9   ALBUMIN 3.0*   BILITOT 0.4   ALKPHOS 272*   *   ALT 93*   ANIONGAP 10   EGFRNONAA >60     Cardiac Markers:   Recent Labs   Lab 05/05/20 1925   *     Troponin:   Recent Labs   Lab 05/05/20 1925   TROPONINI 0.028*     All pertinent labs within the past 24 hours have been reviewed.    Significant Imaging: I have reviewed and interpreted all pertinent imaging results/findings within the past 24 hours.     Imaging Results    None         I have independently reviewed and interpreted the EKG.     I have independently reviewed all pertinent labs within the past 24 hours.    I have independently reviewed, visualized and interpreted all pertinent imaging results within the past 24 hours and discussed the findings with the ED physician, Dr. RAYMUNDO Guzman.

## 2020-05-06 NOTE — PLAN OF CARE
Sw spoke with patient by phone to assess for discharge planning.  Patient sounded alert and oriented, but short of breath.  Patient reported utilizing a CPAP machine, but denied the use of any other medical/respiratory assistive equipment and home health services before coming to the hospital.  Patient reported that he lives alone.  The patient identifies his daughter as his help at home; stating that she lives close to him and is there when he needs her.  Patient stated that he manages his own healthcare.  Patient denied the need for any assistive equipment, home health services, and all other community resources at this time.  Patient anticipates discharging with no needs.  SW discussed information on advanced directives, information on pharmacy bedside delivery, and discharge planning begins on admission with contact information for any needs/questions.     D/C Plan:  Home  PCP:  Dr Dwaine Daivs  Preferred Pharmacy:    [id number] [address]  [address] [facility] [address]  Phone: [phone] Fax: [phone]    [facility]  [address] [place] [address]  Phone: [phone] Fax: [phone]    [facility] [address]  [address] [facility] [address]  Phone: [phone] Fax: [phone]    Discharge transportation:  Daughter  [facility]:  Active  Pharmacy Bedside Delivery:  Yes       05/06/20 1431   Discharge Assessment   Assessment Type Discharge Planning Assessment   Confirmed/corrected address and phone number on facesheet? Yes   Assessment information obtained from? Patient   Expected Length of Stay (days)   (TBD)   Communicated expected length of stay with patient/caregiver yes   Prior to hospitilization cognitive status: Alert/Oriented   Prior to hospitalization functional status: Independent;Assistive Equipment   Current cognitive status: Alert/Oriented   Current Functional Status:  Independent;Assistive Equipment   Facility Arrived From: Home   Lives With alone  (Daughter lives near by)   Who are your caregiver(s) and their phone number(s)? Ana Rosa Matthews (daughter) [phone] and Noble Tripp (son) [phone]   Patient's perception of discharge disposition home health   Readmission Within the Last 30 Days no previous admission in last 30 days   Patient currently being followed by outpatient case management? No   Patient currently receives any other outside agency services? No   Equipment Currently Used at Home CPAP   Do you have any problems affording any of your prescribed medications? No   Is the patient taking medications as prescribed? no   Does the patient have transportation home? Yes   Transportation Anticipated family or friend will provide   Dialysis Name and Scheduled days N/A   Does the patient receive services at the Coumadin Clinic? No   Discharge Plan A Home   DME Needed Upon Discharge    (TBD)   Patient/Family in Agreement with Plan yes

## 2020-05-06 NOTE — ASSESSMENT & PLAN NOTE
Left heart catheterization in December 2019, reveals two vessel CAD with unsuccessful attempt at PTCA of RCA.  Continue aspirin, Plavix, Coreg, ARB.  Patient allergic to statins.

## 2020-05-06 NOTE — SUBJECTIVE & OBJECTIVE
"Past Medical History:   Diagnosis Date    Adrenal adenoma     david;nl fxn w/u;tran 11/18    Aspiration pneumonia 10/15    puree/honey    Benign prostate hyperplasia     CAD (coronary artery disease)     dr padilla    Chronic pain     dr santos    Chronic systolic congestive heart failure 10/17/2019    COPD (chronic obstructive pulmonary disease)     papi    Ex-smoker     11/13    Hyperlipidemia     Ischemic cardiomyopathy 11/22/2019    JUANITO on CPAP     cpap    Osteopenia 1/15 tran 1/18    PSVT (paroxysmal supraventricular tachycardia)     PVD (peripheral vascular disease)     noobs 07 elizabethuri    Pyriform sinus cancer     dr ponce radiation 1/2-14 dr king perales    Squamous cell carcinoma of skin     roberto    Tongue cancer     "superficial" removed 11/14    Vocal cord cancer 2011    Xerostomia     radiation       Past Surgical History:   Procedure Laterality Date    APPENDECTOMY      BRONCHOSCOPY Bilateral 2/5/2019    Procedure: Bronchoscopy;  Surgeon: Santos Cardozo MD;  Location: [facility] ENDO;  Service: Endoscopy;  Laterality: Bilateral;    COLONOSCOPY N/A 5/1/2017    Procedure: Due for screening colonoscopy;  Surgeon: Anushka Yoder MD;  Location: [facility] ENDO;  Service: Endoscopy;  Laterality: N/A;    ESOPHAGOGASTRODUODENOSCOPY N/A 5/29/2018    Procedure: ESOPHAGOGASTRODUODENOSCOPY (EGD);  Surgeon: Audie Hill III, MD;  Location: [facility] ENDO;  Service: Endoscopy;  Laterality: N/A;    ESOPHAGOGASTRODUODENOSCOPY N/A 8/29/2018    Procedure: ESOPHAGOGASTRODUODENOSCOPY (EGD);  Surgeon: Audie Hill III, MD;  Location: [facility];  Service: Endoscopy;  Laterality: N/A;    PERCUTANEOUS TRANSLUMINAL BALLOON ANGIOPLASTY OF CORONARY ARTERY Right 12/9/2019    Procedure: PTCA, USING BALLOON/ IABP or Impella multivessel angioplasty/poss stent;  Surgeon: Abel Beltran MD;  Location: [facility] CATH LAB;  Service: Cardiology;  Laterality: Right;    THORACENTESIS Left 8/7/2018    Procedure: " Thoracentesis;  Surgeon: Santos Cardozo MD;  Location: [facility] ENDO;  Service: Endoscopy;  Laterality: Left;    THORACENTESIS Right 2/25/2019    Procedure: Thoracentesis;  Surgeon: Santos Cardozo MD;  Location: [facility] ENDO;  Service: Endoscopy;  Laterality: Right;    tongue cancer excision  11/14    dr vitale    VOCAL CORD LATERALIZATION, ENDOSCOPIC APPROACH W/ MLB      kris       Review of patient's allergies indicates:   Allergen Reactions    Contrast media Hives and Itching    Iodinated contrast media Hives and Itching    Iodine Hives and Itching    Pravastatin      Other reaction(s): fatigue  Other reaction(s): fatigue    Rosuvastatin      Other reaction(s): fatigue  Other reaction(s): fatigue    Simvastatin      Other reaction(s): fatigue  Other reaction(s): fatigue    Statins-hmg-coa reductase inhibitors Other (See Comments)     Fatigue       No current facility-administered medications on file prior to encounter.      Current Outpatient Medications on File Prior to Encounter   Medication Sig    albuterol (PROAIR HFA) 90 mcg/actuation inhaler INHALE 2 PUFFS BY MOUTH EVERY 4 HOURS AS NEEDED FOR WHEEZING OR  SHORTNESS  OF  BREATH    atorvastatin (LIPITOR) 80 MG tablet Take 1 tablet (80 mg total) by mouth once daily.    clopidogrel (PLAVIX) 75 mg tablet Take 1 tablet (75 mg total) by mouth once daily.    furosemide (LASIX) 40 MG tablet Take 0.5 tablets (20 mg total) by mouth once daily.    gabapentin (NEURONTIN) 800 MG tablet Take 1 tablet (800 mg total) by mouth 3 (three) times daily.    losartan (COZAAR) 25 MG tablet Take 1 tablet (25 mg total) by mouth every evening.    pantoprazole (PROTONIX) 40 MG tablet TAKE ONE TABLET BY MOUTH ONCE DAILY    aspirin 81 mg Tab Take 1 tablet by mouth Daily. Over the counter to help prevent stroke/heart attack    carvedilol (COREG) 3.125 MG tablet Take 1 tablet (3.125 mg total) by mouth 2 (two) times daily.    cefdinir (OMNICEF) 300 MG capsule Take 1  capsule (300 mg total) by mouth 2 (two) times daily. for 10 days    evolocumab (REPATHA SURECLICK) 140 mg/mL PnIj Inject 1 mL (140 mg total) into the skin every 14 (fourteen) days.    nebulizer and compressor Bailey Use as directed    oxyCODONE-acetaminophen (PERCOCET) 7.5-325 mg per tablet Take 1 tablet by mouth every 8 (eight) hours as needed for Pain.    [START ON 2020] oxyCODONE-acetaminophen (PERCOCET) 7.5-325 mg per tablet Take 1 tablet by mouth every 8 (eight) hours as needed for Pain.    OXYGEN-AIR DELIVERY SYSTEMS MISC Inhale 2-3 L into the lungs continuous.    SPIRIVA WITH HANDIHALER 18 mcg inhalation capsule INHALE 1 CAPSULE BY MOUTH ONCE DAILY     Family History     Problem Relation (Age of Onset)    Lung cancer Father, Brother    No Known Problems Mother        Tobacco Use    Smoking status: Former Smoker     Packs/day: 0.50     Years: 55.00     Pack years: 27.50     Types: Cigarettes     Last attempt to quit: 10/1/2019     Years since quittin.5    Smokeless tobacco: Never Used    Tobacco comment: 6-7 cigs/day   Substance and Sexual Activity    Alcohol use: Not Currently    Drug use: No    Sexual activity: Not Currently     Review of Systems   HENT: Negative.    Eyes: Negative.    Cardiovascular: Positive for chest pain and dyspnea on exertion.   Respiratory: Positive for shortness of breath.    Endocrine: Negative.    Hematologic/Lymphatic: Negative.    Skin: Negative.    Musculoskeletal: Negative.    Gastrointestinal: Negative.    Genitourinary: Negative.    Neurological: Negative.    Psychiatric/Behavioral: Negative.    Allergic/Immunologic: Negative.      Objective:     Vital Signs (Most Recent):  Temp: 97.7 °F (36.5 °C) (20 1137)  Pulse: 64 (20 1137)  Resp: 18 (20 1137)  BP: 134/64 (20 1137)  SpO2: 95 % (20 1137) Vital Signs (24h Range):  Temp:  [96.7 °F (35.9 °C)-98.3 °F (36.8 °C)] 97.7 °F (36.5 °C)  Pulse:  [59-90] 64  Resp:  [16-19] 18  SpO2:  [91  %-97 %] 95 %  BP: (123-174)/(61-76) 134/64     Weight: 62.5 kg (137 lb 12.6 oz)  Body mass index is 20.95 kg/m².    SpO2: 95 %  O2 Device (Oxygen Therapy): nasal cannula    No intake or output data in the 24 hours ending 05/06/20 1218    Lines/Drains/Airways     Peripheral Intravenous Line                 Peripheral IV - Single Lumen 05/05/20 1920 20 G Right Antecubital less than 1 day                Physical Exam   Constitutional: He is oriented to person, place, and time. He appears well-developed and well-nourished. No distress.   On supplemental O2   HENT:   Head: Normocephalic and atraumatic.   Eyes: Pupils are equal, round, and reactive to light. Right eye exhibits no discharge. Left eye exhibits no discharge.   Neck: Neck supple. No JVD present.   Cardiovascular: Normal rate, regular rhythm, S1 normal, S2 normal and normal heart sounds.   No murmur heard.  Pulmonary/Chest: Effort normal. No respiratory distress. He has wheezes. He has rales.   Abdominal: Soft. He exhibits no distension.   Musculoskeletal: He exhibits no edema.   Neurological: He is alert and oriented to person, place, and time.   Skin: Skin is warm and dry. He is not diaphoretic. No erythema.   Psychiatric: He has a normal mood and affect. His behavior is normal. Thought content normal.   Nursing note and vitals reviewed.      Significant Labs:   CMP   Recent Labs   Lab 05/05/20 1925 05/06/20 0439    145   K 4.6 5.0    106   CO2 29 28   GLU 93 78   BUN 23 22   CREATININE 1.1 1.0   CALCIUM 9.5 9.5   PROT 6.9  --    ALBUMIN 3.0*  --    BILITOT 0.4  --    ALKPHOS 272*  --    *  --    ALT 93*  --    ANIONGAP 10 11   ESTGFRAFRICA >60 >60   EGFRNONAA >60 >60   , CBC   Recent Labs   Lab 05/05/20 1925 05/06/20  0439   WBC 12.70 11.46   HGB 12.0* 12.2*   HCT 37.2* 39.1*    212   , Troponin   Recent Labs   Lab 05/05/20 2127 05/06/20  0220 05/06/20  0817   TROPONINI 0.021 0.028* 0.022    and All pertinent lab results from  the last 24 hours have been reviewed.    Significant Imaging: Echocardiogram:   2D echo with color flow doppler:   Results for orders placed or performed during the hospital encounter of 09/13/19   2D echo with color flow doppler   Result Value Ref Range    QEF 55 55 - 65    Est. PA Systolic Pressure 23.98     Narrative    Date of Procedure: 09/15/2019        TEST DESCRIPTION   Technical Quality: This is a technically challenging study. There is poor endocardial definition.     Aorta: The aortic root is normal in size, measuring 3.0 cm at sinotubular junction and 3.3 cm at Sinuses of Valsalva. The proximal ascending aorta is normal in size, measuring 3.3 cm across.     Left Atrium: The left atrial volume index is severely enlarged, measuring 87.13 cc/m2.     Left Ventricle: The left ventricle is normal in size, with an end-diastolic diameter of 5.3 cm, and an end-systolic diameter of 3.6 cm. LV wall thickness is normal, with the septum and the posterior wall each measuring 1.3 cm across. Relative wall   thickness was increased at 0.49, and the LV mass index was increased at 189.2 g/m2 consistent with concentric left ventricular hypertrophy. There are no regional wall motion abnormalities. Left ventricular systolic function appears normal. Visually   estimated ejection fraction is 55-60%. The LV Doppler derived stroke volume equals 47.0 ccs.     Diastolic indices: E wave velocity 1.0 m/s, E/A ratio 1.8,  msec., E/e' ratio(avg) 12. Diastolic function is indeterminate.     Right Atrium: The right atrium is normal in size, measuring 6.3 cm in length and 5.0 cm in width in the apical view.     Right Ventricle: The right ventricle is normal in size measuring 2.3 cm at the base in the apical right ventricle-focused view. Global right ventricular systolic function appears normal. Tricuspid annular plane systolic excursion (TAPSE) is 2.9 cm. The   estimated PA systolic pressure is 24 mmHg.     Aortic Valve:  The aortic  valve is mildly sclerotic.     Mitral Valve:  The mitral valve is normal in structure. The pressure half time is 61 msec. The calculated mitral valve area is 3.61 cm2.     Tricuspid Valve:  The tricuspid valve is normal in structure.     Pulmonary Valve:  The pulmonic valve is not well seen.     IVC: IVC is normal in size and collapses > 50% with a sniff, suggesting normal right atrial pressure of 3 mmHg.     Intracavitary: There is no evidence of pericardial effusion, intracavity mass, thrombi, or vegetation.         CONCLUSIONS     1 - Normal left ventricular systolic function (EF 55-60%).     2 - Indeterminate LV diastolic function.     3 - Normal right ventricular systolic function .             This document has been electronically    SIGNED BY: Stan Yeager MD On: 09/15/2019 15:00   , EKG: Reviewed and X-Ray: CXR: X-Ray Chest 1 View (CXR): No results found for this visit on 05/05/20. and X-Ray Chest PA and Lateral (CXR): No results found for this visit on 05/05/20.

## 2020-05-06 NOTE — H&P
[facility] Hospital Medicine  History & Physical    Patient Name: Noble Tripp  MRN: [id number]  Admission Date: 5/5/2020  Attending Physician: Pranay Wilkins MD  Primary Care Provider: Dwaine Davis MD         Patient information was obtained from patient, past medical records and ER records.     Subjective:     Principal Problem:Chest pain, rule out acute myocardial infarction    Chief Complaint:   Chief Complaint   Patient presents with    Shortness of Breath     started 2-3 days ago    Chest Pain    Cough        HPI: Mr. Tripp is a 74-year-old thinly built  male, with PMH significant for CAD, CHF, COPD, anxiety, followed by Dr. Ramirez in the Cardiology Clinic, was in his usual state of health until 1-2 weeks ago.  He had tele clinic visit with pulmonary earlier today with complaints of 1-2 weeks of progressive progressively worsening dyspnea on exertion.  Patient is chronically home oxygen dependent, at 2 liters/minute.  However in spite of that, oxygen saturations have been dropping into the high 80s, hence had pulmonary telemedicine visit today.  Patient was prescribed Omnicef, but has not picked it up.  He went home, felt anxious, hence presented to the ED.  Currently he reports the chest pain, located in the left lower chest region, occasionally radiating to the left upper arm.  Denies nausea, vomiting, but complains of shortness of breath with exertion.  EKG reveals sinus rhythm with no ST or T-wave changes.  Initial troponin 0.028.  Patient had left heart catheterization done in December 2019, revealed two vessel coronary artery disease with unsuccessful attempt at PTCA of the RCA.    Admitting diagnosis chest pain, rule out ACS.    Past Medical History:   Diagnosis Date    Adrenal adenoma     david;nl fxn w/u;tran 11/18    Aspiration pneumonia 10/15    puree/honey    Benign prostate hyperplasia     CAD (coronary artery disease)     dr valerie Espana  "pain     dr santos    Chronic systolic congestive heart failure 10/17/2019    COPD (chronic obstructive pulmonary disease)     papi    Ex-smoker     11/13    Hyperlipidemia     Ischemic cardiomyopathy 11/22/2019    JUANITO on CPAP     cpap    Osteopenia 1/15 tran 1/18    PSVT (paroxysmal supraventricular tachycardia)     PVD (peripheral vascular disease)     noobs 07 latrell    Pyriform sinus cancer     dr ponce radiation 1/2-14 dr king perales    Squamous cell carcinoma of skin     roberto    Tongue cancer     "superficial" removed 11/14    Vocal cord cancer 2011    Xerostomia     radiation       Past Surgical History:   Procedure Laterality Date    APPENDECTOMY      BRONCHOSCOPY Bilateral 2/5/2019    Procedure: Bronchoscopy;  Surgeon: Santos Cardozo MD;  Location: [facility];  Service: Endoscopy;  Laterality: Bilateral;    COLONOSCOPY N/A 5/1/2017    Procedure: Due for screening colonoscopy;  Surgeon: Anushka Yoder MD;  Location: [facility];  Service: Endoscopy;  Laterality: N/A;    ESOPHAGOGASTRODUODENOSCOPY N/A 5/29/2018    Procedure: ESOPHAGOGASTRODUODENOSCOPY (EGD);  Surgeon: Audie Hill III, MD;  Location: [facility];  Service: Endoscopy;  Laterality: N/A;    ESOPHAGOGASTRODUODENOSCOPY N/A 8/29/2018    Procedure: ESOPHAGOGASTRODUODENOSCOPY (EGD);  Surgeon: Audie Hill III, MD;  Location: [facility];  Service: Endoscopy;  Laterality: N/A;    PERCUTANEOUS TRANSLUMINAL BALLOON ANGIOPLASTY OF CORONARY ARTERY Right 12/9/2019    Procedure: PTCA, USING BALLOON/ IABP or Impella multivessel angioplasty/poss stent;  Surgeon: Abel Beltran MD;  Location: [facility] CATH LAB;  Service: Cardiology;  Laterality: Right;    THORACENTESIS Left 8/7/2018    Procedure: Thoracentesis;  Surgeon: Santos Cardozo MD;  Location: [facility];  Service: Endoscopy;  Laterality: Left;    THORACENTESIS Right 2/25/2019    Procedure: Thoracentesis;  Surgeon: Santos Cardozo MD;  Location: [facility];  " Service: Endoscopy;  Laterality: Right;    tongue cancer excision  11/14    dr vitale    VOCAL CORD LATERALIZATION, ENDOSCOPIC APPROACH W/ MLB      kris       Review of patient's allergies indicates:   Allergen Reactions    Contrast media Hives and Itching    Iodinated contrast media Hives and Itching    Iodine Hives and Itching    Pravastatin      Other reaction(s): fatigue  Other reaction(s): fatigue    Rosuvastatin      Other reaction(s): fatigue  Other reaction(s): fatigue    Simvastatin      Other reaction(s): fatigue  Other reaction(s): fatigue    Statins-hmg-coa reductase inhibitors Other (See Comments)     Fatigue       No current facility-administered medications on file prior to encounter.      Current Outpatient Medications on File Prior to Encounter   Medication Sig    albuterol (PROAIR HFA) 90 mcg/actuation inhaler INHALE 2 PUFFS BY MOUTH EVERY 4 HOURS AS NEEDED FOR WHEEZING OR  SHORTNESS  OF  BREATH    atorvastatin (LIPITOR) 80 MG tablet Take 1 tablet (80 mg total) by mouth once daily.    clopidogrel (PLAVIX) 75 mg tablet Take 1 tablet (75 mg total) by mouth once daily.    furosemide (LASIX) 40 MG tablet Take 0.5 tablets (20 mg total) by mouth once daily.    gabapentin (NEURONTIN) 800 MG tablet Take 1 tablet (800 mg total) by mouth 3 (three) times daily.    losartan (COZAAR) 25 MG tablet Take 1 tablet (25 mg total) by mouth every evening.    pantoprazole (PROTONIX) 40 MG tablet TAKE ONE TABLET BY MOUTH ONCE DAILY    aspirin 81 mg Tab Take 1 tablet by mouth Daily. Over the counter to help prevent stroke/heart attack    carvedilol (COREG) 3.125 MG tablet Take 1 tablet (3.125 mg total) by mouth 2 (two) times daily.    cefdinir (OMNICEF) 300 MG capsule Take 1 capsule (300 mg total) by mouth 2 (two) times daily. for 10 days    evolocumab (REPATHA SURECLICK) 140 mg/mL PnIj Inject 1 mL (140 mg total) into the skin every 14 (fourteen) days.    nebulizer and compressor Bailey Use as  directed    oxyCODONE-acetaminophen (PERCOCET) 7.5-325 mg per tablet Take 1 tablet by mouth every 8 (eight) hours as needed for Pain.    [START ON 2020] oxyCODONE-acetaminophen (PERCOCET) 7.5-325 mg per tablet Take 1 tablet by mouth every 8 (eight) hours as needed for Pain.    OXYGEN-AIR DELIVERY SYSTEMS MISC Inhale 2-3 L into the lungs continuous.    SPIRIVA WITH HANDIHALER 18 mcg inhalation capsule INHALE 1 CAPSULE BY MOUTH ONCE DAILY     Family History     Problem Relation (Age of Onset)    Lung cancer Father, Brother    No Known Problems Mother        Tobacco Use    Smoking status: Former Smoker     Packs/day: 0.50     Years: 55.00     Pack years: 27.50     Types: Cigarettes     Last attempt to quit: 10/1/2019     Years since quittin.5    Smokeless tobacco: Never Used    Tobacco comment: 6-7 cigs/day   Substance and Sexual Activity    Alcohol use: Not Currently    Drug use: No    Sexual activity: Not Currently     Review of Systems   Constitutional: Negative.  Negative for appetite change, fatigue and fever.   HENT: Negative.  Negative for congestion, nosebleeds and sore throat.    Eyes: Negative.  Negative for visual disturbance.   Respiratory: Positive for shortness of breath. Negative for cough and wheezing.    Cardiovascular: Positive for chest pain. Negative for palpitations and leg swelling.   Gastrointestinal: Negative.  Negative for abdominal pain, constipation, diarrhea, nausea and vomiting.   Endocrine: Negative.  Negative for polyuria.   Genitourinary: Negative.  Negative for dysuria, flank pain, frequency and urgency.   Musculoskeletal: Negative.  Negative for arthralgias, back pain and joint swelling.   Skin: Negative.  Negative for color change, pallor and rash.   Allergic/Immunologic: Negative.  Negative for immunocompromised state.   Neurological: Negative.  Negative for dizziness, syncope, weakness, light-headedness, numbness and headaches.   Hematological: Negative.     Psychiatric/Behavioral: Negative for confusion and hallucinations. The patient is nervous/anxious.    All other systems reviewed and are negative.    Objective:     Vital Signs (Most Recent):  Temp: 96.7 °F (35.9 °C) (05/05/20 2056)  Pulse: 76 (05/05/20 2056)  Resp: 16 (05/05/20 2056)  BP: (!) 144/63 (05/05/20 2056)  SpO2: (!) 91 % (05/05/20 2056) Vital Signs (24h Range):  Temp:  [96.7 °F (35.9 °C)-98.3 °F (36.8 °C)] 96.7 °F (35.9 °C)  Pulse:  [72-77] 76  Resp:  [16-19] 16  SpO2:  [91 %-93 %] 91 %  BP: (123-144)/(61-67) 144/63     Weight: 62.1 kg (136 lb 14.5 oz)  Body mass index is 20.82 kg/m².    Physical Exam   Constitutional: He is oriented to person, place, and time. He appears well-developed. No distress.   Thinly built  male, appears anxious, but in no respiratory distress.   HENT:   Head: Normocephalic and atraumatic.   Eyes: Conjunctivae are normal. No scleral icterus.   Neck: Normal range of motion. Neck supple. No thyromegaly present.   Cardiovascular: Normal rate, normal heart sounds and intact distal pulses. An irregular rhythm present.   No murmur heard.  Pulmonary/Chest: Effort normal. No accessory muscle usage. No respiratory distress. He has no wheezes. He has rhonchi. He exhibits no tenderness.   Abdominal: Soft. There is no tenderness.   Musculoskeletal: Normal range of motion. He exhibits no edema or tenderness.   Neurological: He is alert and oriented to person, place, and time. No cranial nerve deficit. He exhibits normal muscle tone. Coordination normal.   Skin: Skin is warm and dry. He is not diaphoretic.   Psychiatric: He has a normal mood and affect. His behavior is normal.   Nursing note and vitals reviewed.          Significant Labs:   BMP:   Recent Labs   Lab 05/05/20 1925   GLU 93      K 4.6      CO2 29   BUN 23   CREATININE 1.1   CALCIUM 9.5     CBC:   Recent Labs   Lab 05/05/20 1925   WBC 12.70   HGB 12.0*   HCT 37.2*        CMP:   Recent Labs   Lab  05/05/20 1925      K 4.6      CO2 29   GLU 93   BUN 23   CREATININE 1.1   CALCIUM 9.5   PROT 6.9   ALBUMIN 3.0*   BILITOT 0.4   ALKPHOS 272*   *   ALT 93*   ANIONGAP 10   EGFRNONAA >60     Cardiac Markers:   Recent Labs   Lab 05/05/20 1925   *     Troponin:   Recent Labs   Lab 05/05/20 1925   TROPONINI 0.028*     All pertinent labs within the past 24 hours have been reviewed.    Significant Imaging: I have reviewed and interpreted all pertinent imaging results/findings within the past 24 hours.     Imaging Results    None         I have independently reviewed and interpreted the EKG.     I have independently reviewed all pertinent labs within the past 24 hours.    I have independently reviewed, visualized and interpreted all pertinent imaging results within the past 24 hours and discussed the findings with the ED physician, Dr. RAYMUNDO Guzman.            Assessment/Plan:     * Chest pain, rule out acute myocardial infarction  Currently chest pain-free.  Initial troponin 0.028.  EKG NSR with nonspecific ST T-wave changes  Trend cardiac enzymes.  Consult cardiology in a.m..      Dyspnea on exertion  Patient is chronically home oxygen dependent at 2 liters/minute.  Continue supplemental oxygen to maintain saturations greater than 92%.  Chest x-ray reveals no evidence of infiltrates, masses or effusions.      Coronary artery disease involving left main coronary artery  Left heart catheterization in December 2019, reveals two vessel CAD with unsuccessful attempt at PTCA of RCA.  Continue aspirin, Plavix, Coreg, ARB.  Patient allergic to statins.      Hypertension  Continue Coreg, losartan.  Monitor and make adjustments as needed      Benign prostate hyperplasia          VTE Risk Mitigation (From admission, onward)         Ordered     enoxaparin injection 40 mg  Daily      05/05/20 2039     Place sequential compression device  Until discontinued      05/05/20 2039                   Pranay Wilkins  MD  Department of Hospital Medicine   [facility]

## 2020-05-06 NOTE — TELEPHONE ENCOUNTER
----- Message from Марина Graham MA sent at 5/6/2020  9:27 AM CDT -----  Contact: pt daughter / Ana Rosa  Please call pt daughter. She has questions regarding pt getting feeding tube.   ----- Message -----  From: Yanely France  Sent: 5/6/2020   9:06 AM CDT  To: Juliane Graham Staff    Caller request call back regarding a feeding tube, pt is currently in hospital and will like to have the feeding tube now ... Call back: [phone]

## 2020-05-07 ENCOUNTER — TELEPHONE (OUTPATIENT)
Dept: PHARMACY | Facility: CLINIC | Age: 75
End: 2020-05-07

## 2020-05-07 LAB
ANION GAP SERPL CALC-SCNC: 16 MMOL/L (ref 8–16)
APTT BLDCRRT: 45.1 SEC (ref 21–32)
APTT BLDCRRT: 66.7 SEC (ref 21–32)
APTT BLDCRRT: 71.4 SEC (ref 21–32)
BASOPHILS # BLD AUTO: 0.08 K/UL (ref 0–0.2)
BASOPHILS NFR BLD: 0.7 % (ref 0–1.9)
BUN SERPL-MCNC: 26 MG/DL (ref 8–23)
CALCIUM SERPL-MCNC: 9.7 MG/DL (ref 8.7–10.5)
CATH EF QUANTITATIVE: 55 %
CHLORIDE SERPL-SCNC: 99 MMOL/L (ref 95–110)
CO2 SERPL-SCNC: 26 MMOL/L (ref 23–29)
CREAT SERPL-MCNC: 1 MG/DL (ref 0.5–1.4)
DIFFERENTIAL METHOD: ABNORMAL
EOSINOPHIL # BLD AUTO: 0 K/UL (ref 0–0.5)
EOSINOPHIL NFR BLD: 0.1 % (ref 0–8)
ERYTHROCYTE [DISTWIDTH] IN BLOOD BY AUTOMATED COUNT: 16.4 % (ref 11.5–14.5)
EST. GFR  (AFRICAN AMERICAN): >60 ML/MIN/1.73 M^2
EST. GFR  (NON AFRICAN AMERICAN): >60 ML/MIN/1.73 M^2
GLUCOSE SERPL-MCNC: 87 MG/DL (ref 70–110)
HCT VFR BLD AUTO: 41.6 % (ref 40–54)
HGB BLD-MCNC: 13.1 G/DL (ref 14–18)
IMM GRANULOCYTES # BLD AUTO: 0.24 K/UL (ref 0–0.04)
IMM GRANULOCYTES NFR BLD AUTO: 2.1 % (ref 0–0.5)
LYMPHOCYTES # BLD AUTO: 0.9 K/UL (ref 1–4.8)
LYMPHOCYTES NFR BLD: 7.4 % (ref 18–48)
MCH RBC QN AUTO: 29.6 PG (ref 27–31)
MCHC RBC AUTO-ENTMCNC: 31.5 G/DL (ref 32–36)
MCV RBC AUTO: 94 FL (ref 82–98)
MONOCYTES # BLD AUTO: 0.1 K/UL (ref 0.3–1)
MONOCYTES NFR BLD: 0.6 % (ref 4–15)
NEUTROPHILS # BLD AUTO: 10.3 K/UL (ref 1.8–7.7)
NEUTROPHILS NFR BLD: 89.1 % (ref 38–73)
NRBC BLD-RTO: 0 /100 WBC
PLATELET # BLD AUTO: 233 K/UL (ref 150–350)
PMV BLD AUTO: 12.7 FL (ref 9.2–12.9)
POC ACTIVATED CLOTTING TIME K: 136 SEC (ref 74–137)
POC ACTIVATED CLOTTING TIME K: 219 SEC (ref 74–137)
POTASSIUM SERPL-SCNC: 4.9 MMOL/L (ref 3.5–5.1)
RBC # BLD AUTO: 4.42 M/UL (ref 4.6–6.2)
SAMPLE: ABNORMAL
SAMPLE: NORMAL
SODIUM SERPL-SCNC: 141 MMOL/L (ref 136–145)
WBC # BLD AUTO: 11.52 K/UL (ref 3.9–12.7)

## 2020-05-07 PROCEDURE — 99152 PR MOD CONSCIOUS SEDATION, SAME PHYS, 5+ YRS, FIRST 15 MIN: ICD-10-PCS | Mod: ,,, | Performed by: INTERNAL MEDICINE

## 2020-05-07 PROCEDURE — 21400001 HC TELEMETRY ROOM

## 2020-05-07 PROCEDURE — 80048 BASIC METABOLIC PNL TOTAL CA: CPT

## 2020-05-07 PROCEDURE — 92943 PRQ TRLUML REVSC CH OCC ANT: CPT | Mod: 53,RC | Performed by: INTERNAL MEDICINE

## 2020-05-07 PROCEDURE — C1894 INTRO/SHEATH, NON-LASER: HCPCS | Performed by: INTERNAL MEDICINE

## 2020-05-07 PROCEDURE — 25500020 PHARM REV CODE 255: Performed by: INTERNAL MEDICINE

## 2020-05-07 PROCEDURE — 25000003 PHARM REV CODE 250: Performed by: INTERNAL MEDICINE

## 2020-05-07 PROCEDURE — 93458 PR CATH PLACE/CORON ANGIO, IMG SUPER/INTERP,W LEFT HEART VENTRICULOGRAPHY: ICD-10-PCS | Mod: 26,59,51, | Performed by: INTERNAL MEDICINE

## 2020-05-07 PROCEDURE — 93458 L HRT ARTERY/VENTRICLE ANGIO: CPT | Mod: 59 | Performed by: INTERNAL MEDICINE

## 2020-05-07 PROCEDURE — 92943 PRQ TRLUML REVSC CH OCC ANT: CPT | Mod: 53,RC,, | Performed by: INTERNAL MEDICINE

## 2020-05-07 PROCEDURE — 85025 COMPLETE CBC W/AUTO DIFF WBC: CPT

## 2020-05-07 PROCEDURE — 63600175 PHARM REV CODE 636 W HCPCS: Performed by: PHYSICIAN ASSISTANT

## 2020-05-07 PROCEDURE — 63600175 PHARM REV CODE 636 W HCPCS: Performed by: INTERNAL MEDICINE

## 2020-05-07 PROCEDURE — 99152 MOD SED SAME PHYS/QHP 5/>YRS: CPT | Performed by: INTERNAL MEDICINE

## 2020-05-07 PROCEDURE — 85347 COAGULATION TIME ACTIVATED: CPT | Performed by: INTERNAL MEDICINE

## 2020-05-07 PROCEDURE — 99152 MOD SED SAME PHYS/QHP 5/>YRS: CPT | Mod: ,,, | Performed by: INTERNAL MEDICINE

## 2020-05-07 PROCEDURE — 36415 COLL VENOUS BLD VENIPUNCTURE: CPT

## 2020-05-07 PROCEDURE — 27201423 OPTIME MED/SURG SUP & DEVICES STERILE SUPPLY: Performed by: INTERNAL MEDICINE

## 2020-05-07 PROCEDURE — 85730 THROMBOPLASTIN TIME PARTIAL: CPT | Mod: 91

## 2020-05-07 PROCEDURE — 99153 MOD SED SAME PHYS/QHP EA: CPT | Performed by: INTERNAL MEDICINE

## 2020-05-07 PROCEDURE — C1769 GUIDE WIRE: HCPCS | Performed by: INTERNAL MEDICINE

## 2020-05-07 PROCEDURE — 93458 L HRT ARTERY/VENTRICLE ANGIO: CPT | Mod: 26,59,51, | Performed by: INTERNAL MEDICINE

## 2020-05-07 PROCEDURE — 25000003 PHARM REV CODE 250: Performed by: PHYSICIAN ASSISTANT

## 2020-05-07 PROCEDURE — 92943 PR CTO: ICD-10-PCS | Mod: 53,RC,, | Performed by: INTERNAL MEDICINE

## 2020-05-07 RX ORDER — FENTANYL CITRATE 50 UG/ML
INJECTION, SOLUTION INTRAMUSCULAR; INTRAVENOUS
Status: DISCONTINUED | OUTPATIENT
Start: 2020-05-07 | End: 2020-05-07 | Stop reason: HOSPADM

## 2020-05-07 RX ORDER — MIDAZOLAM HYDROCHLORIDE 1 MG/ML
INJECTION, SOLUTION INTRAMUSCULAR; INTRAVENOUS
Status: DISCONTINUED | OUTPATIENT
Start: 2020-05-07 | End: 2020-05-07 | Stop reason: HOSPADM

## 2020-05-07 RX ORDER — SODIUM CHLORIDE 9 MG/ML
INJECTION, SOLUTION INTRAVENOUS
Status: DISCONTINUED | OUTPATIENT
Start: 2020-05-07 | End: 2020-05-07

## 2020-05-07 RX ORDER — LIDOCAINE HYDROCHLORIDE 20 MG/ML
INJECTION, SOLUTION EPIDURAL; INFILTRATION; INTRACAUDAL; PERINEURAL
Status: DISCONTINUED | OUTPATIENT
Start: 2020-05-07 | End: 2020-05-07 | Stop reason: HOSPADM

## 2020-05-07 RX ORDER — SODIUM CHLORIDE 9 MG/ML
INJECTION, SOLUTION INTRAVENOUS CONTINUOUS
Status: ACTIVE | OUTPATIENT
Start: 2020-05-07 | End: 2020-05-08

## 2020-05-07 RX ORDER — HYDROMORPHONE HYDROCHLORIDE 2 MG/ML
INJECTION, SOLUTION INTRAMUSCULAR; INTRAVENOUS; SUBCUTANEOUS
Status: DISCONTINUED | OUTPATIENT
Start: 2020-05-07 | End: 2020-05-07 | Stop reason: HOSPADM

## 2020-05-07 RX ORDER — DIPHENHYDRAMINE HYDROCHLORIDE 50 MG/ML
INJECTION INTRAMUSCULAR; INTRAVENOUS
Status: DISCONTINUED | OUTPATIENT
Start: 2020-05-07 | End: 2020-05-07 | Stop reason: HOSPADM

## 2020-05-07 RX ORDER — PROTAMINE SULFATE 10 MG/ML
INJECTION, SOLUTION INTRAVENOUS
Status: DISCONTINUED | OUTPATIENT
Start: 2020-05-07 | End: 2020-05-07 | Stop reason: HOSPADM

## 2020-05-07 RX ORDER — HYDROMORPHONE HYDROCHLORIDE 1 MG/ML
1 INJECTION, SOLUTION INTRAMUSCULAR; INTRAVENOUS; SUBCUTANEOUS ONCE
Status: COMPLETED | OUTPATIENT
Start: 2020-05-07 | End: 2020-05-07

## 2020-05-07 RX ORDER — HEPARIN SODIUM 1000 [USP'U]/ML
INJECTION INTRAVENOUS; SUBCUTANEOUS
Status: DISCONTINUED | OUTPATIENT
Start: 2020-05-07 | End: 2020-05-07 | Stop reason: HOSPADM

## 2020-05-07 RX ADMIN — CLOPIDOGREL BISULFATE 75 MG: 75 TABLET ORAL at 07:05

## 2020-05-07 RX ADMIN — DIPHENHYDRAMINE HYDROCHLORIDE 50 MG: 50 CAPSULE ORAL at 11:05

## 2020-05-07 RX ADMIN — DIPHENHYDRAMINE HYDROCHLORIDE 50 MG: 50 CAPSULE ORAL at 05:05

## 2020-05-07 RX ADMIN — PREDNISONE 50 MG: 20 TABLET ORAL at 11:05

## 2020-05-07 RX ADMIN — GABAPENTIN 300 MG: 300 CAPSULE ORAL at 09:05

## 2020-05-07 RX ADMIN — SODIUM CHLORIDE: 0.9 INJECTION, SOLUTION INTRAVENOUS at 11:05

## 2020-05-07 RX ADMIN — HYDRALAZINE HYDROCHLORIDE 10 MG: 20 INJECTION INTRAMUSCULAR; INTRAVENOUS at 09:05

## 2020-05-07 RX ADMIN — SODIUM CHLORIDE: 0.9 INJECTION, SOLUTION INTRAVENOUS at 12:05

## 2020-05-07 RX ADMIN — FAMOTIDINE 40 MG: 20 TABLET ORAL at 11:05

## 2020-05-07 RX ADMIN — OXYCODONE HYDROCHLORIDE AND ACETAMINOPHEN 1 TABLET: 7.5; 325 TABLET ORAL at 07:05

## 2020-05-07 RX ADMIN — HYDROMORPHONE HYDROCHLORIDE 1 MG: 1 INJECTION, SOLUTION INTRAMUSCULAR; INTRAVENOUS; SUBCUTANEOUS at 06:05

## 2020-05-07 RX ADMIN — ASPIRIN 81 MG 81 MG: 81 TABLET ORAL at 07:05

## 2020-05-07 RX ADMIN — LOSARTAN POTASSIUM 25 MG: 25 TABLET ORAL at 09:05

## 2020-05-07 RX ADMIN — CARVEDILOL 3.12 MG: 3.12 TABLET, FILM COATED ORAL at 09:05

## 2020-05-07 RX ADMIN — PREDNISONE 50 MG: 20 TABLET ORAL at 05:05

## 2020-05-07 RX ADMIN — FAMOTIDINE 40 MG: 20 TABLET ORAL at 05:05

## 2020-05-07 RX ADMIN — FUROSEMIDE 20 MG: 20 TABLET ORAL at 09:05

## 2020-05-07 RX ADMIN — CARVEDILOL 3.12 MG: 3.12 TABLET, FILM COATED ORAL at 07:05

## 2020-05-07 RX ADMIN — SODIUM CHLORIDE: 0.9 INJECTION, SOLUTION INTRAVENOUS at 07:05

## 2020-05-07 NOTE — OP NOTE
INPATIENT Operative Note         SUMMARY     Surgery Date: 5/7/2020     Surgeon(s) and Role:     * Abel Beltran MD - Primary    ASSISTANT:none    Pre-op Diagnosis:  Unstable angina [I20.0]      Post-op Diagnosis:  Unstable angina [I20.0]    Procedure(s) (LRB):  CATHETERIZATION, HEART, LEFT (Left)  Angioplasty-coronary    COMPLICATION:none    Anesthesia: RN IV Sedation    Findings/Key Components:  rca  recanalized unable to cross   Lad 70-80% stenosis collaterals to rca   lcx non obs   Ef 55% with inf ak.    Estimated Blood Loss: < 50 ML.         SPECIMEN: NONE    Devices/Prostetics: None    PLAN:  Routine post cath care   Refer to ocno for intervention.

## 2020-05-07 NOTE — PLAN OF CARE
AAOX 4  Cath 5/7/20  Swallows pills one at a time with water, sitting up on side of bed is best for him  Stand by assist, steady gait  3L NC    Ana Rosa Matthews-dtr- [phone]

## 2020-05-07 NOTE — ASSESSMENT & PLAN NOTE
Plans for [facility] tomorrow  On heparin infusion  ASA, Plavix, BB. Will discuss. No statin due to allergy  Cardiology input appreciated    5/7/2020  [facility] today

## 2020-05-07 NOTE — SUBJECTIVE & OBJECTIVE
Interval History: Awaiting [facility] today. No events overnight.    Review of Systems   Constitutional: Negative.  Negative for appetite change, fatigue and fever.   HENT: Negative.  Negative for congestion, nosebleeds and sore throat.    Eyes: Negative.  Negative for visual disturbance.   Respiratory: Positive for shortness of breath. Negative for cough and wheezing.    Cardiovascular: Positive for chest pain. Negative for palpitations and leg swelling.   Gastrointestinal: Negative.  Negative for abdominal pain, constipation, diarrhea, nausea and vomiting.   Endocrine: Negative.  Negative for polyuria.   Genitourinary: Negative.  Negative for dysuria, flank pain, frequency and urgency.   Musculoskeletal: Negative.  Negative for arthralgias, back pain and joint swelling.   Skin: Negative.  Negative for color change, pallor and rash.   Allergic/Immunologic: Negative.  Negative for immunocompromised state.   Neurological: Negative.  Negative for dizziness, syncope, weakness, light-headedness, numbness and headaches.   Hematological: Negative.    Psychiatric/Behavioral: Negative for confusion and hallucinations. The patient is not nervous/anxious.    All other systems reviewed and are negative.         Objective:     Vital Signs (Most Recent):  Temp: 97.9 °F (36.6 °C) (05/07/20 1248)  Pulse: 84 (05/07/20 1315)  Resp: 18 (05/07/20 1248)  BP: 129/75 (05/07/20 1248)  SpO2: 96 % (05/07/20 1248) Vital Signs (24h Range):  Temp:  [97.3 °F (36.3 °C)-98 °F (36.7 °C)] 97.9 °F (36.6 °C)  Pulse:  [61-88] 84  Resp:  [17-20] 18  SpO2:  [94 %-97 %] 96 %  BP: (129-175)/(62-85) 129/75     Weight: 62.7 kg (138 lb 3.7 oz)  Body mass index is 21.02 kg/m².    Intake/Output Summary (Last 24 hours) at 5/7/2020 1519  Last data filed at 5/7/2020 0400  Gross per 24 hour   Intake 1164.25 ml   Output 1350 ml   Net -185.75 ml      Physical Exam   Constitutional: He is oriented to person, place, and time. He appears well-developed. No distress.   Thin,  elderly     HENT:   Head: Normocephalic and atraumatic.   Eyes: Conjunctivae are normal. No scleral icterus.   Neck: Normal range of motion. Neck supple. No thyromegaly present.   Cardiovascular: Normal rate, normal heart sounds and intact distal pulses. An irregular rhythm present.   No murmur heard.  Pulmonary/Chest: Effort normal. No accessory muscle usage. No respiratory distress. He has no wheezes. He has rhonchi. He exhibits no tenderness.   Abdominal: Soft. There is no tenderness.   Musculoskeletal: Normal range of motion. He exhibits no edema or tenderness.   Neurological: He is alert and oriented to person, place, and time. No cranial nerve deficit. He exhibits normal muscle tone. Coordination normal.   Skin: Skin is warm and dry. He is not diaphoretic.   Psychiatric: He has a normal mood and affect. His behavior is normal.   Nursing note and vitals reviewed.          Significant Labs:   CBC:   Recent Labs   Lab 05/05/20 1925 05/06/20  0439 05/07/20  0603   WBC 12.70 11.46 11.52   HGB 12.0* 12.2* 13.1*   HCT 37.2* 39.1* 41.6    212 233     CMP:   Recent Labs   Lab 05/05/20 1925 05/06/20  0439 05/07/20  0603    145 141   K 4.6 5.0 4.9    106 99   CO2 29 28 26   GLU 93 78 87   BUN 23 22 26*   CREATININE 1.1 1.0 1.0   CALCIUM 9.5 9.5 9.7   PROT 6.9  --   --    ALBUMIN 3.0*  --   --    BILITOT 0.4  --   --    ALKPHOS 272*  --   --    *  --   --    ALT 93*  --   --    ANIONGAP 10 11 16   EGFRNONAA >60 >60 >60       Significant Imaging: I have reviewed all pertinent imaging results/findings within the past 24 hours.

## 2020-05-07 NOTE — NURSING TRANSFER
Nursing Transfer Note      5/7/2020     Transfer from [facility] to Room 217    Transfer via hospital bed    Transfer with belongings    Transported by Marylou/Shauna    Medicines sent: normal saline    Chart send with patient: YES     Notified: Graciela GOLDBERG    Patient reassessed at: 5/7/20 5466    Upon arrival to floor: right groin dressing clean, dry and intact, no hematoma to site, tele monitor in place

## 2020-05-07 NOTE — INTERVAL H&P NOTE
The patient has been examined and the H&P has been reviewed:    I concur with the findings and no changes have occurred since H&P was written.    Anesthesia/Surgery risks, benefits and alternative options discussed and understood by patient/family.  I have explained the risks, benefits , and alternatives of the procedure in detail.the patient voices understanding and all questions have been answered.the patient agrees to proceed as planned.          Active Hospital Problems    Diagnosis  POA    *Chest pain, rule out acute myocardial infarction [R07.9]  Yes    Unstable angina [I20.0]  Unknown    Dyspnea on exertion [R06.09]  Yes    Coronary artery disease involving left main coronary artery [I25.10]  Yes    Hypertension [I10]  Yes      Resolved Hospital Problems   No resolved problems to display.

## 2020-05-07 NOTE — PROGRESS NOTES
[facility] Hospital Medicine  Progress Note    Patient Name: Noble Tripp  MRN: [id number]  Patient Class: IP- Inpatient   Admission Date: 5/5/2020  Length of Stay: 1 days  Attending Physician: Cas Hernandez, *  Primary Care Provider: Dwaine Davis MD    Subjective:     Principal Problem:Chest pain, rule out acute myocardial infarction        HPI:  Mr. Tripp is a 74-year-old thinly built  male, with PMH significant for CAD, CHF, COPD, anxiety, followed by Dr. Ramirez in the Cardiology Clinic, was in his usual state of health until 1-2 weeks ago.  He had tele clinic visit with pulmonary earlier today with complaints of 1-2 weeks of progressive progressively worsening dyspnea on exertion.  Patient is chronically home oxygen dependent, at 2 liters/minute.  However in spite of that, oxygen saturations have been dropping into the high 80s, hence had pulmonary telemedicine visit today.  Patient was prescribed Omnicef, but has not picked it up.  He went home, felt anxious, hence presented to the ED.  Currently he reports the chest pain, located in the left lower chest region, occasionally radiating to the left upper arm.  Denies nausea, vomiting, but complains of shortness of breath with exertion.  EKG reveals sinus rhythm with no ST or T-wave changes.  Initial troponin 0.028.  Patient had left heart catheterization done in December 2019, revealed two vessel coronary artery disease with unsuccessful attempt at PTCA of the RCA.    Admitting diagnosis chest pain, rule out ACS.    Overview/Hospital Course:  No notes on file    Interval History: Awaiting [facility] today. No events overnight.    Review of Systems   Constitutional: Negative.  Negative for appetite change, fatigue and fever.   HENT: Negative.  Negative for congestion, nosebleeds and sore throat.    Eyes: Negative.  Negative for visual disturbance.   Respiratory: Positive for shortness of breath. Negative for cough and  wheezing.    Cardiovascular: Positive for chest pain. Negative for palpitations and leg swelling.   Gastrointestinal: Negative.  Negative for abdominal pain, constipation, diarrhea, nausea and vomiting.   Endocrine: Negative.  Negative for polyuria.   Genitourinary: Negative.  Negative for dysuria, flank pain, frequency and urgency.   Musculoskeletal: Negative.  Negative for arthralgias, back pain and joint swelling.   Skin: Negative.  Negative for color change, pallor and rash.   Allergic/Immunologic: Negative.  Negative for immunocompromised state.   Neurological: Negative.  Negative for dizziness, syncope, weakness, light-headedness, numbness and headaches.   Hematological: Negative.    Psychiatric/Behavioral: Negative for confusion and hallucinations. The patient is not nervous/anxious.    All other systems reviewed and are negative.         Objective:     Vital Signs (Most Recent):  Temp: 97.9 °F (36.6 °C) (05/07/20 1248)  Pulse: 84 (05/07/20 1315)  Resp: 18 (05/07/20 1248)  BP: 129/75 (05/07/20 1248)  SpO2: 96 % (05/07/20 1248) Vital Signs (24h Range):  Temp:  [97.3 °F (36.3 °C)-98 °F (36.7 °C)] 97.9 °F (36.6 °C)  Pulse:  [61-88] 84  Resp:  [17-20] 18  SpO2:  [94 %-97 %] 96 %  BP: (129-175)/(62-85) 129/75     Weight: 62.7 kg (138 lb 3.7 oz)  Body mass index is 21.02 kg/m².    Intake/Output Summary (Last 24 hours) at 5/7/2020 1519  Last data filed at 5/7/2020 0400  Gross per 24 hour   Intake 1164.25 ml   Output 1350 ml   Net -185.75 ml      Physical Exam   Constitutional: He is oriented to person, place, and time. He appears well-developed. No distress.   Thin, elderly     HENT:   Head: Normocephalic and atraumatic.   Eyes: Conjunctivae are normal. No scleral icterus.   Neck: Normal range of motion. Neck supple. No thyromegaly present.   Cardiovascular: Normal rate, normal heart sounds and intact distal pulses. An irregular rhythm present.   No murmur heard.  Pulmonary/Chest: Effort normal. No accessory muscle  usage. No respiratory distress. He has no wheezes. He has rhonchi. He exhibits no tenderness.   Abdominal: Soft. There is no tenderness.   Musculoskeletal: Normal range of motion. He exhibits no edema or tenderness.   Neurological: He is alert and oriented to person, place, and time. No cranial nerve deficit. He exhibits normal muscle tone. Coordination normal.   Skin: Skin is warm and dry. He is not diaphoretic.   Psychiatric: He has a normal mood and affect. His behavior is normal.   Nursing note and vitals reviewed.          Significant Labs:   CBC:   Recent Labs   Lab 05/05/20 1925 05/06/20 0439 05/07/20  0603   WBC 12.70 11.46 11.52   HGB 12.0* 12.2* 13.1*   HCT 37.2* 39.1* 41.6    212 233     CMP:   Recent Labs   Lab 05/05/20 1925 05/06/20 0439 05/07/20  0603    145 141   K 4.6 5.0 4.9    106 99   CO2 29 28 26   GLU 93 78 87   BUN 23 22 26*   CREATININE 1.1 1.0 1.0   CALCIUM 9.5 9.5 9.7   PROT 6.9  --   --    ALBUMIN 3.0*  --   --    BILITOT 0.4  --   --    ALKPHOS 272*  --   --    *  --   --    ALT 93*  --   --    ANIONGAP 10 11 16   EGFRNONAA >60 >60 >60       Significant Imaging: I have reviewed all pertinent imaging results/findings within the past 24 hours.      Assessment/Plan:      * Chest pain, rule out acute myocardial infarction  See plan for unstable angina      Unstable angina  Plans for [facility] tomorrow  On heparin infusion  ASA, Plavix, BB. Will discuss. No statin due to allergy  Cardiology input appreciated    5/7/2020  [facility] today    Dyspnea on exertion  Patient is chronically home oxygen dependent at 2 liters/minute.  Continue supplemental oxygen to maintain saturations greater than 92%.  Chest x-ray reveals no evidence of infiltrates, masses or effusions.    5/6/2020  See plan for unstable angina as well    Coronary artery disease involving left main coronary artery  Left heart catheterization in December 2019, reveals two vessel CAD with unsuccessful attempt at PTCA  of RCA.  Continue aspirin, Plavix, Coreg, ARB.  Patient allergic to statins.      Hypertension  Continue Coreg, losartan.  Monitor and make adjustments as needed      Benign prostate hyperplasia          VTE Risk Mitigation (From admission, onward)         Ordered     heparin 25,000 units in dextrose 5% 250 mL (100 units/mL) infusion LOW INTENSITY nomogram - [facility]  Continuous     Question:  Heparin Infusion Adjustment (DO NOT MODIFY ANSWER)  Answer:  [email or web address]\epic\Images\Pharmacy\HeparinInfusions\heparin LOW INTENSITY nomogram for [facility] [id number]    05/06/20 1601     heparin 25,000 units in dextrose 5% (100 units/ml) IV bolus from bag - ADDITIONAL PRN BOLUS - 60 units/kg (max bolus 4000 units)  As needed (PRN)     Question:  Heparin Infusion Adjustment (DO NOT MODIFY ANSWER)  Answer:  [email or web address]\epic\Images\Pharmacy\HeparinInfusions\heparin LOW INTENSITY nomogram for [facility] [id number]    05/06/20 1601     heparin 25,000 units in dextrose 5% (100 units/ml) IV bolus from bag - ADDITIONAL PRN BOLUS - 30 units/kg (max bolus 4000 units)  As needed (PRN)     Question:  Heparin Infusion Adjustment (DO NOT MODIFY ANSWER)  Answer:  [email or web address]\epic\Images\Pharmacy\HeparinInfusions\heparin LOW INTENSITY nomogram for [facility] [id number].pdf    05/06/20 1601     Place sequential compression device  Until discontinued      05/05/20 2039                Vera Browne NP  Department of Hospital Medicine   [facility]

## 2020-05-07 NOTE — PLAN OF CARE
Pt remains fall free this shift.  AAOx4, verbal, clear speech.  3L via NC and CPAP at bedtime.  NPO diet for LHC today, premedicated for iodine contrast allergy.  Heparin gtt at 15U/kg. Bolus 60U/kg given this shift. Next aPTT at 0600.  Prn pain med x8 hrs due to chronic back pain.  No c/o chest pain this shift.  Telemonitoring in place.  POC updated and reviewed with pt. Will continue POC.

## 2020-05-07 NOTE — NURSING
Cath lab present to tfr pt for procedure.  Ana Rosa Matthews, pt's dtr notified as requested by Mr. Tripp.

## 2020-05-08 ENCOUNTER — TELEPHONE (OUTPATIENT)
Dept: CARDIOLOGY | Facility: HOSPITAL | Age: 75
End: 2020-05-08

## 2020-05-08 VITALS
HEART RATE: 81 BPM | TEMPERATURE: 98 F | HEIGHT: 68 IN | BODY MASS INDEX: 20.89 KG/M2 | OXYGEN SATURATION: 95 % | WEIGHT: 137.81 LBS | SYSTOLIC BLOOD PRESSURE: 135 MMHG | DIASTOLIC BLOOD PRESSURE: 62 MMHG | RESPIRATION RATE: 18 BRPM

## 2020-05-08 LAB
ANION GAP SERPL CALC-SCNC: 11 MMOL/L (ref 8–16)
APTT BLDCRRT: 28.8 SEC (ref 21–32)
BASOPHILS # BLD AUTO: 0.06 K/UL (ref 0–0.2)
BASOPHILS NFR BLD: 0.4 % (ref 0–1.9)
BUN SERPL-MCNC: 41 MG/DL (ref 8–23)
CALCIUM SERPL-MCNC: 8.8 MG/DL (ref 8.7–10.5)
CHLORIDE SERPL-SCNC: 104 MMOL/L (ref 95–110)
CO2 SERPL-SCNC: 24 MMOL/L (ref 23–29)
CREAT SERPL-MCNC: 1.1 MG/DL (ref 0.5–1.4)
DIFFERENTIAL METHOD: ABNORMAL
EOSINOPHIL # BLD AUTO: 0 K/UL (ref 0–0.5)
EOSINOPHIL NFR BLD: 0 % (ref 0–8)
ERYTHROCYTE [DISTWIDTH] IN BLOOD BY AUTOMATED COUNT: 16.4 % (ref 11.5–14.5)
EST. GFR  (AFRICAN AMERICAN): >60 ML/MIN/1.73 M^2
EST. GFR  (NON AFRICAN AMERICAN): >60 ML/MIN/1.73 M^2
GLUCOSE SERPL-MCNC: 91 MG/DL (ref 70–110)
HCT VFR BLD AUTO: 36.6 % (ref 40–54)
HGB BLD-MCNC: 11.9 G/DL (ref 14–18)
IMM GRANULOCYTES # BLD AUTO: 0.45 K/UL (ref 0–0.04)
IMM GRANULOCYTES NFR BLD AUTO: 3.3 % (ref 0–0.5)
LYMPHOCYTES # BLD AUTO: 0.8 K/UL (ref 1–4.8)
LYMPHOCYTES NFR BLD: 5.5 % (ref 18–48)
MCH RBC QN AUTO: 29.8 PG (ref 27–31)
MCHC RBC AUTO-ENTMCNC: 32.5 G/DL (ref 32–36)
MCV RBC AUTO: 92 FL (ref 82–98)
MONOCYTES # BLD AUTO: 1 K/UL (ref 0.3–1)
MONOCYTES NFR BLD: 7.3 % (ref 4–15)
NEUTROPHILS # BLD AUTO: 11.5 K/UL (ref 1.8–7.7)
NEUTROPHILS NFR BLD: 83.5 % (ref 38–73)
NRBC BLD-RTO: 0 /100 WBC
PLATELET # BLD AUTO: 222 K/UL (ref 150–350)
PMV BLD AUTO: 12.5 FL (ref 9.2–12.9)
POTASSIUM SERPL-SCNC: 4.3 MMOL/L (ref 3.5–5.1)
RBC # BLD AUTO: 3.99 M/UL (ref 4.6–6.2)
SODIUM SERPL-SCNC: 139 MMOL/L (ref 136–145)
WBC # BLD AUTO: 13.76 K/UL (ref 3.9–12.7)

## 2020-05-08 PROCEDURE — 99232 SBSQ HOSP IP/OBS MODERATE 35: CPT | Mod: ,,, | Performed by: INTERNAL MEDICINE

## 2020-05-08 PROCEDURE — 80048 BASIC METABOLIC PNL TOTAL CA: CPT

## 2020-05-08 PROCEDURE — 85730 THROMBOPLASTIN TIME PARTIAL: CPT

## 2020-05-08 PROCEDURE — 99900035 HC TECH TIME PER 15 MIN (STAT)

## 2020-05-08 PROCEDURE — 36415 COLL VENOUS BLD VENIPUNCTURE: CPT

## 2020-05-08 PROCEDURE — 85025 COMPLETE CBC W/AUTO DIFF WBC: CPT

## 2020-05-08 PROCEDURE — 99232 PR SUBSEQUENT HOSPITAL CARE,LEVL II: ICD-10-PCS | Mod: ,,, | Performed by: INTERNAL MEDICINE

## 2020-05-08 PROCEDURE — 25000003 PHARM REV CODE 250: Performed by: INTERNAL MEDICINE

## 2020-05-08 RX ADMIN — CLOPIDOGREL BISULFATE 75 MG: 75 TABLET ORAL at 08:05

## 2020-05-08 RX ADMIN — OXYCODONE HYDROCHLORIDE AND ACETAMINOPHEN 1 TABLET: 7.5; 325 TABLET ORAL at 08:05

## 2020-05-08 RX ADMIN — ASPIRIN 81 MG 81 MG: 81 TABLET ORAL at 08:05

## 2020-05-08 RX ADMIN — GABAPENTIN 300 MG: 300 CAPSULE ORAL at 08:05

## 2020-05-08 RX ADMIN — CARVEDILOL 3.12 MG: 3.12 TABLET, FILM COATED ORAL at 08:05

## 2020-05-08 RX ADMIN — FUROSEMIDE 20 MG: 20 TABLET ORAL at 08:05

## 2020-05-08 RX ADMIN — OXYCODONE HYDROCHLORIDE AND ACETAMINOPHEN 1 TABLET: 7.5; 325 TABLET ORAL at 12:05

## 2020-05-08 NOTE — HOSPITAL COURSE
5/8/2020-Patient seen and examined today, s/p LHC yesterday, unsuccessful intervention of  RCA. No complaints. Denies chest pain/SOB. Labs stable.

## 2020-05-08 NOTE — PLAN OF CARE
Pt remains fall free this shift.  AAOx4, verbal, clear speech.  Skin warm and dry. Right groin, soft, no hematoma, dressing dry and intact.  Bedrest completed at 2230.   Telemonitoring in place, SR.   PRN Percocet 7.5/325mg q 8 hrs, inquire of getting med early after administration.   2L via NC.  CPAP at bedtime.  POC updated and reviewed with pt. Will continue POC.

## 2020-05-08 NOTE — ASSESSMENT & PLAN NOTE
-Patient who presents with left-sided chest fullness/gas-like discomfort over the past few weeks with radiation to his left arm concerning for UA  -Known RCA occlusion with significant ischemic burden on recent cardiac PET  -Previously turned down from CABG due to significant co-morbidities  -Continue ASA, BB, ARB, statin  -Start heparin gtt  -In light of the above, will plan for repeat LHC/attempt at RCA intervention tmw by Dr. Beltran. All risks, benefits, and treatment alternatives explained to patient in detail. All questions answered. He has agreed to proceed. NPO after MN. Pre-medication due to contrast allergy    5/8/2020  -s/p LHC with unsuccessful intervention of  RCA  -Will plan to refer to [facility]-NO  -Continue OMT with ASA, Plavix, BB, ARB, statin     No

## 2020-05-08 NOTE — SUBJECTIVE & OBJECTIVE
Review of Systems   Constitution: Negative.   HENT: Negative.    Eyes: Negative.    Cardiovascular: Positive for dyspnea on exertion (chronic, unchanged).   Respiratory: Negative.    Endocrine: Negative.    Hematologic/Lymphatic: Negative.    Skin: Negative.    Musculoskeletal: Negative.    Gastrointestinal: Negative.    Genitourinary: Negative.    Neurological: Negative.    Psychiatric/Behavioral: Negative.    Allergic/Immunologic: Negative.      Objective:     Vital Signs (Most Recent):  Temp: 97.5 °F (36.4 °C) (05/08/20 0743)  Pulse: 81 (05/08/20 0842)  Resp: 18 (05/08/20 0743)  BP: 135/62 (05/08/20 0743)  SpO2: 95 % (05/08/20 0743) Vital Signs (24h Range):  Temp:  [97.3 °F (36.3 °C)-97.9 °F (36.6 °C)] 97.5 °F (36.4 °C)  Pulse:  [78-98] 81  Resp:  [13-22] 18  SpO2:  [92 %-99 %] 95 %  BP: ()/(51-90) 135/62     Weight: 62.5 kg (137 lb 12.6 oz)  Body mass index is 20.95 kg/m².     SpO2: 95 %  O2 Device (Oxygen Therapy): nasal cannula      Intake/Output Summary (Last 24 hours) at 5/8/2020 1153  Last data filed at 5/8/2020 0400  Gross per 24 hour   Intake 1023.33 ml   Output 250 ml   Net 773.33 ml       Lines/Drains/Airways     None                 Physical Exam   Constitutional: He is oriented to person, place, and time. He appears well-developed and well-nourished. No distress.   On supplemental O2   HENT:   Head: Normocephalic and atraumatic.   Eyes: Pupils are equal, round, and reactive to light. Right eye exhibits no discharge. Left eye exhibits no discharge.   Neck: Neck supple. No JVD present.   Cardiovascular: Normal rate, regular rhythm, S1 normal, S2 normal and normal heart sounds.   No murmur heard.  Pulmonary/Chest: Effort normal. No respiratory distress. He has no wheezes. He has no rales.   Coarse BS   Abdominal: Soft. He exhibits no distension.   Musculoskeletal: He exhibits no edema.   Neurological: He is alert and oriented to person, place, and time.   Skin: Skin is warm and dry. He is not  diaphoretic. No erythema.   Right groin access site C/D/I; no bleeding erythema or drainage, no hematoma   Psychiatric: He has a normal mood and affect. His behavior is normal.   Nursing note and vitals reviewed.      Significant Labs:   CMP   Recent Labs   Lab 05/07/20  0603 05/08/20  0431    139   K 4.9 4.3   CL 99 104   CO2 26 24   GLU 87 91   BUN 26* 41*   CREATININE 1.0 1.1   CALCIUM 9.7 8.8   ANIONGAP 16 11   ESTGFRAFRICA >60 >60   EGFRNONAA >60 >60   , CBC   Recent Labs   Lab 05/07/20  0603 05/08/20  0431   WBC 11.52 13.76*   HGB 13.1* 11.9*   HCT 41.6 36.6*    222   , Troponin No results for input(s): TROPONINI in the last 48 hours. and All pertinent lab results from the last 24 hours have been reviewed.    Significant Imaging: Echocardiogram:   2D echo with color flow doppler:   Results for orders placed or performed during the hospital encounter of 09/13/19   2D echo with color flow doppler   Result Value Ref Range    QEF 55 55 - 65    Est. PA Systolic Pressure 23.98     Narrative    Date of Procedure: 09/15/2019        TEST DESCRIPTION   Technical Quality: This is a technically challenging study. There is poor endocardial definition.     Aorta: The aortic root is normal in size, measuring 3.0 cm at sinotubular junction and 3.3 cm at Sinuses of Valsalva. The proximal ascending aorta is normal in size, measuring 3.3 cm across.     Left Atrium: The left atrial volume index is severely enlarged, measuring 87.13 cc/m2.     Left Ventricle: The left ventricle is normal in size, with an end-diastolic diameter of 5.3 cm, and an end-systolic diameter of 3.6 cm. LV wall thickness is normal, with the septum and the posterior wall each measuring 1.3 cm across. Relative wall   thickness was increased at 0.49, and the LV mass index was increased at 189.2 g/m2 consistent with concentric left ventricular hypertrophy. There are no regional wall motion abnormalities. Left ventricular systolic function appears  normal. Visually   estimated ejection fraction is 55-60%. The LV Doppler derived stroke volume equals 47.0 ccs.     Diastolic indices: E wave velocity 1.0 m/s, E/A ratio 1.8,  msec., E/e' ratio(avg) 12. Diastolic function is indeterminate.     Right Atrium: The right atrium is normal in size, measuring 6.3 cm in length and 5.0 cm in width in the apical view.     Right Ventricle: The right ventricle is normal in size measuring 2.3 cm at the base in the apical right ventricle-focused view. Global right ventricular systolic function appears normal. Tricuspid annular plane systolic excursion (TAPSE) is 2.9 cm. The   estimated PA systolic pressure is 24 mmHg.     Aortic Valve:  The aortic valve is mildly sclerotic.     Mitral Valve:  The mitral valve is normal in structure. The pressure half time is 61 msec. The calculated mitral valve area is 3.61 cm2.     Tricuspid Valve:  The tricuspid valve is normal in structure.     Pulmonary Valve:  The pulmonic valve is not well seen.     IVC: IVC is normal in size and collapses > 50% with a sniff, suggesting normal right atrial pressure of 3 mmHg.     Intracavitary: There is no evidence of pericardial effusion, intracavity mass, thrombi, or vegetation.         CONCLUSIONS     1 - Normal left ventricular systolic function (EF 55-60%).     2 - Indeterminate LV diastolic function.     3 - Normal right ventricular systolic function .             This document has been electronically    SIGNED BY: Stan Yeager MD On: 09/15/2019 15:00   , EKG: Reviewed and X-Ray: CXR: X-Ray Chest 1 View (CXR): No results found for this visit on 05/05/20. and X-Ray Chest PA and Lateral (CXR): No results found for this visit on 05/05/20.

## 2020-05-08 NOTE — PROGRESS NOTES
[facility]  Cardiology  Progress Note    Patient Name: Noble Tripp  MRN: [id number]  Admission Date: 5/5/2020  Hospital Length of Stay: 2 days  Code Status: Prior   Attending Physician: Cas Hernandez, *   Primary Care Physician: Dwaine Davis MD  Expected Discharge Date: 5/8/2020  Principal Problem:Chest pain, rule out acute myocardial infarction    Subjective:   HPI:  Mr. Tripp is a 74 year old male patient whose current medical conditions include COPD (on supplemental O2), CAD, cardiomyopathy, CHF, and anxiety who presented to [facility] ED yesterday with a chief complaint of increasing SOB/SUAREZ over the past two weeks. Associated symptoms included hypoxia (patient reported O2 dipping into 80's at home) and left-sided pressure/fullness that radiated to his left arm. Patient denied any associated fever, chills, palpitations, near syncope, or syncope. Reported he was concerned that he may have aspiration PNA again. Initial workup in ED revealed BNp of 380 and troponin of 0.028 and patient was subsequently admitted for further evaluation and treatment. Cardiology consulted to assist with management. Patient seen and examined today, sitting up in bed. Feeling better. States SOB has improved. Chest pain free during exam. Does report intermittent bouts of left-sided fullness/gas-like discomfort over the past few weeks. Generally self-resolving. He reports compliance with his medications. Followed as OP by Dr. Beltran and previously had [facility] in 12/19 with unsuccessful intervention of RCA. He was deemed not to be a suitable surgical candidate due to his underlying co-morbidities. Recent cardiac PET scan in 3/20 showed significant ischemic burden/viability  in RCA distribution. Chart reviewed. Troponin 0.028>0.021>0.028. EKG reviewed, chronic ST-T wave abnormalities, noted inferolaterally.    Hospital Course:   5/8/2020-Patient seen and examined today, s/p LHC yesterday, unsuccessful  intervention of  RCA. No complaints. Denies chest pain/SOB. Labs stable.      Review of Systems   Constitution: Negative.   HENT: Negative.    Eyes: Negative.    Cardiovascular: Positive for dyspnea on exertion (chronic, unchanged).   Respiratory: Negative.    Endocrine: Negative.    Hematologic/Lymphatic: Negative.    Skin: Negative.    Musculoskeletal: Negative.    Gastrointestinal: Negative.    Genitourinary: Negative.    Neurological: Negative.    Psychiatric/Behavioral: Negative.    Allergic/Immunologic: Negative.      Objective:     Vital Signs (Most Recent):  Temp: 97.5 °F (36.4 °C) (05/08/20 0743)  Pulse: 81 (05/08/20 0842)  Resp: 18 (05/08/20 0743)  BP: 135/62 (05/08/20 0743)  SpO2: 95 % (05/08/20 0743) Vital Signs (24h Range):  Temp:  [97.3 °F (36.3 °C)-97.9 °F (36.6 °C)] 97.5 °F (36.4 °C)  Pulse:  [78-98] 81  Resp:  [13-22] 18  SpO2:  [92 %-99 %] 95 %  BP: ()/(51-90) 135/62     Weight: 62.5 kg (137 lb 12.6 oz)  Body mass index is 20.95 kg/m².     SpO2: 95 %  O2 Device (Oxygen Therapy): nasal cannula      Intake/Output Summary (Last 24 hours) at 5/8/2020 1153  Last data filed at 5/8/2020 0400  Gross per 24 hour   Intake 1023.33 ml   Output 250 ml   Net 773.33 ml       Lines/Drains/Airways     None                 Physical Exam   Constitutional: He is oriented to person, place, and time. He appears well-developed and well-nourished. No distress.   On supplemental O2   HENT:   Head: Normocephalic and atraumatic.   Eyes: Pupils are equal, round, and reactive to light. Right eye exhibits no discharge. Left eye exhibits no discharge.   Neck: Neck supple. No JVD present.   Cardiovascular: Normal rate, regular rhythm, S1 normal, S2 normal and normal heart sounds.   No murmur heard.  Pulmonary/Chest: Effort normal. No respiratory distress. He has no wheezes. He has no rales.   Coarse BS   Abdominal: Soft. He exhibits no distension.   Musculoskeletal: He exhibits no edema.   Neurological: He is alert and  oriented to person, place, and time.   Skin: Skin is warm and dry. He is not diaphoretic. No erythema.   Right groin access site C/D/I; no bleeding erythema or drainage, no hematoma   Psychiatric: He has a normal mood and affect. His behavior is normal.   Nursing note and vitals reviewed.      Significant Labs:   CMP   Recent Labs   Lab 05/07/20  0603 05/08/20  0431    139   K 4.9 4.3   CL 99 104   CO2 26 24   GLU 87 91   BUN 26* 41*   CREATININE 1.0 1.1   CALCIUM 9.7 8.8   ANIONGAP 16 11   ESTGFRAFRICA >60 >60   EGFRNONAA >60 >60   , CBC   Recent Labs   Lab 05/07/20  0603 05/08/20  0431   WBC 11.52 13.76*   HGB 13.1* 11.9*   HCT 41.6 36.6*    222   , Troponin No results for input(s): TROPONINI in the last 48 hours. and All pertinent lab results from the last 24 hours have been reviewed.    Significant Imaging: Echocardiogram:   2D echo with color flow doppler:   Results for orders placed or performed during the hospital encounter of 09/13/19   2D echo with color flow doppler   Result Value Ref Range    QEF 55 55 - 65    Est. PA Systolic Pressure 23.98     Narrative    Date of Procedure: 09/15/2019        TEST DESCRIPTION   Technical Quality: This is a technically challenging study. There is poor endocardial definition.     Aorta: The aortic root is normal in size, measuring 3.0 cm at sinotubular junction and 3.3 cm at Sinuses of Valsalva. The proximal ascending aorta is normal in size, measuring 3.3 cm across.     Left Atrium: The left atrial volume index is severely enlarged, measuring 87.13 cc/m2.     Left Ventricle: The left ventricle is normal in size, with an end-diastolic diameter of 5.3 cm, and an end-systolic diameter of 3.6 cm. LV wall thickness is normal, with the septum and the posterior wall each measuring 1.3 cm across. Relative wall   thickness was increased at 0.49, and the LV mass index was increased at 189.2 g/m2 consistent with concentric left ventricular hypertrophy. There are no  regional wall motion abnormalities. Left ventricular systolic function appears normal. Visually   estimated ejection fraction is 55-60%. The LV Doppler derived stroke volume equals 47.0 ccs.     Diastolic indices: E wave velocity 1.0 m/s, E/A ratio 1.8,  msec., E/e' ratio(avg) 12. Diastolic function is indeterminate.     Right Atrium: The right atrium is normal in size, measuring 6.3 cm in length and 5.0 cm in width in the apical view.     Right Ventricle: The right ventricle is normal in size measuring 2.3 cm at the base in the apical right ventricle-focused view. Global right ventricular systolic function appears normal. Tricuspid annular plane systolic excursion (TAPSE) is 2.9 cm. The   estimated PA systolic pressure is 24 mmHg.     Aortic Valve:  The aortic valve is mildly sclerotic.     Mitral Valve:  The mitral valve is normal in structure. The pressure half time is 61 msec. The calculated mitral valve area is 3.61 cm2.     Tricuspid Valve:  The tricuspid valve is normal in structure.     Pulmonary Valve:  The pulmonic valve is not well seen.     IVC: IVC is normal in size and collapses > 50% with a sniff, suggesting normal right atrial pressure of 3 mmHg.     Intracavitary: There is no evidence of pericardial effusion, intracavity mass, thrombi, or vegetation.         CONCLUSIONS     1 - Normal left ventricular systolic function (EF 55-60%).     2 - Indeterminate LV diastolic function.     3 - Normal right ventricular systolic function .             This document has been electronically    SIGNED BY: Stan Yeager MD On: 09/15/2019 15:00   , EKG: Reviewed and X-Ray: CXR: X-Ray Chest 1 View (CXR): No results found for this visit on 05/05/20. and X-Ray Chest PA and Lateral (CXR): No results found for this visit on 05/05/20.    Assessment and Plan:   Patient who presents with chest pain/UA. Unsuccessful intervention of  RCA. Continue OMT. Follow-up in clinic. Will refer to [facility].    * Chest pain, rule  out acute myocardial infarction  -See plan under UA    Unstable angina  -Patient who presents with left-sided chest fullness/gas-like discomfort over the past few weeks with radiation to his left arm concerning for UA  -Known RCA occlusion with significant ischemic burden on recent cardiac PET  -Previously turned down from CABG due to significant co-morbidities  -Continue ASA, BB, ARB, statin  -Start heparin gtt  -In light of the above, will plan for repeat LHC/attempt at RCA intervention tmw by Dr. Beltran. All risks, benefits, and treatment alternatives explained to patient in detail. All questions answered. He has agreed to proceed. NPO after MN. Pre-medication due to contrast allergy    5/8/2020  -s/p LHC with unsuccessful intervention of  RCA  -Will plan to refer to [facility]  -Continue OMT with ASA, Plavix, BB, ARB, statin      Dyspnea on exertion  -Secondary to underlying COPD  -May have mild component of volume overload as well  -Give one time dose of IV Lasix 20 mg and assess response  -Continue nebs    5/8/2020  -Stable, at baseline per patient    Coronary artery disease involving left main coronary artery  -See plan under UA    Hypertension  -Continue BB, ARB  -Monitor        VTE Risk Mitigation (From admission, onward)         Ordered     Place sequential compression device  Until discontinued      05/05/20 2039                Daysi Greenberg PA-C  Cardiology  [facility]

## 2020-05-08 NOTE — DISCHARGE SUMMARY
[facility] Hospital Medicine  Discharge Summary      Patient Name: Noble Tripp  MRN: [id number]  Admission Date: 5/5/2020  Hospital Length of Stay: 2 days  Discharge Date and Time:  05/08/2020 1:33 PM  Attending Physician: Ashley att. providers found   Discharging Provider: Vera Browne NP  Primary Care Provider: Dwaine Davis MD      HPI:   Mr. Tripp is a 74-year-old thinly built  male, with PMH significant for CAD, CHF, COPD, anxiety, followed by Dr. Ramirez in the Cardiology Clinic, was in his usual state of health until 1-2 weeks ago.  He had tele clinic visit with pulmonary earlier today with complaints of 1-2 weeks of progressive progressively worsening dyspnea on exertion.  Patient is chronically home oxygen dependent, at 2 liters/minute.  However in spite of that, oxygen saturations have been dropping into the high 80s, hence had pulmonary telemedicine visit today.  Patient was prescribed Omnicef, but has not picked it up.  He went home, felt anxious, hence presented to the ED.  Currently he reports the chest pain, located in the left lower chest region, occasionally radiating to the left upper arm.  Denies nausea, vomiting, but complains of shortness of breath with exertion.  EKG reveals sinus rhythm with no ST or T-wave changes.  Initial troponin 0.028.  Patient had left heart catheterization done in December 2019, revealed two vessel coronary artery disease with unsuccessful attempt at PTCA of the RCA.    Admitting diagnosis chest pain, rule out ACS.    Procedure(s) (LRB):  CATHETERIZATION, HEART, LEFT (Left)  Angioplasty-coronary      Hospital Course:   Noble Tripp is a 74 year old male who was admitted to [facility] for unstable angina. C performed, but unable to intervene to  RCA. Therapy optimized by Cardiology and referral sent [facility]. Patient will continue ASA, Plavix, BB, ARB, and STATIN.      Consults:   Consults (From admission,  onward)        Status Ordering Provider     Inpatient consult to Cardiology  Once     Provider:  Abel Beltran MD    Completed KIM KUMARI          No new Assessment & Plan notes have been filed under this hospital service since the last note was generated.  Service: Hospital Medicine    Final Active Diagnoses:    Diagnosis Date Noted POA    PRINCIPAL PROBLEM:  Chest pain, rule out acute myocardial infarction [R07.9] 05/05/2020 Yes    Unstable angina [I20.0] 05/06/2020 Unknown    Dyspnea on exertion [R06.09] 05/05/2020 Yes    Coronary artery disease involving left main coronary artery [I25.10] 10/23/2019 Yes    Hypertension [I10]  Yes      Problems Resolved During this Admission:       Discharged Condition: stable    Disposition: Home or Self Care    Follow Up:  Follow-up Information     Dwaine Davis MD In 3 days.    Specialty:  Family Medicine  Contact information:  [address]  [place] [id number] [phone]                 Patient Instructions:   No discharge procedures on file.    Significant Diagnostic Studies: Labs:   CMP   Recent Labs   Lab 05/07/20  0603 05/08/20  0431    139   K 4.9 4.3   CL 99 104   CO2 26 24   GLU 87 91   BUN 26* 41*   CREATININE 1.0 1.1   CALCIUM 9.7 8.8   ANIONGAP 16 11   ESTGFRAFRICA >60 >60   EGFRNONAA >60 >60    and CBC   Recent Labs   Lab 05/07/20  0603 05/08/20  0431   WBC 11.52 13.76*   HGB 13.1* 11.9*   HCT 41.6 36.6*    222       Pending Diagnostic Studies:     Procedure Component Value Units Date/Time    APTT [[id number]] Collected:  05/06/20 1706    Order Status:  Sent Lab Status:  In process Updated:  05/06/20 1706    Specimen:  Blood     Narrative:       Collection has been rescheduled by BDD at 05/06/2020 16:10 Reason:   nurse draw    Protime-INR [[id number]] Collected:  05/06/20 1706    Order Status:  Sent Lab Status:  In process Updated:  05/06/20 1706    Specimen:  Blood     Narrative:       Collection has been rescheduled  by OLYA at 05/06/2020 16:10 Reason:   nurse draw         Medications:  Reconciled Home Medications:      Medication List      CONTINUE taking these medications    albuterol 90 mcg/actuation inhaler  Commonly known as:  PROAIR HFA  INHALE 2 PUFFS BY MOUTH EVERY 4 HOURS AS NEEDED FOR WHEEZING OR  SHORTNESS  OF  BREATH     aspirin 81 mg Tab  Take 1 tablet by mouth Daily. Over the counter to help prevent stroke/heart attack     atorvastatin 80 MG tablet  Commonly known as:  LIPITOR  Take 1 tablet (80 mg total) by mouth once daily.     carvediloL 3.125 MG tablet  Commonly known as:  COREG  Take 1 tablet (3.125 mg total) by mouth 2 (two) times daily.     cefdinir 300 MG capsule  Commonly known as:  OMNICEF  Take 1 capsule (300 mg total) by mouth 2 (two) times daily. for 10 days     clopidogreL 75 mg tablet  Commonly known as:  PLAVIX  Take 1 tablet (75 mg total) by mouth once daily.     COMP-AIR NEBULIZER COMPRESSOR Bailey  Generic drug:  nebulizer and compressor  Use as directed     furosemide 40 MG tablet  Commonly known as:  LASIX  Take 0.5 tablets (20 mg total) by mouth once daily.     gabapentin 800 MG tablet  Commonly known as:  NEURONTIN  Take 1 tablet (800 mg total) by mouth 3 (three) times daily.     losartan 25 MG tablet  Commonly known as:  COZAAR  Take 1 tablet (25 mg total) by mouth every evening.     * oxyCODONE-acetaminophen 7.5-325 mg per tablet  Commonly known as:  PERCOCET  Take 1 tablet by mouth every 8 (eight) hours as needed for Pain.     * oxyCODONE-acetaminophen 7.5-325 mg per tablet  Commonly known as:  PERCOCET  Take 1 tablet by mouth every 8 (eight) hours as needed for Pain.  Start taking on:  May 14, 2020     OXYGEN-AIR DELIVERY SYSTEMS MISC  Inhale 2-3 L into the lungs continuous.     pantoprazole 40 MG tablet  Commonly known as:  PROTONIX  TAKE ONE TABLET BY MOUTH ONCE DAILY     REPATHA SURECLICK 140 mg/mL Pnij  Generic drug:  evolocumab  Inject 1 mL (140 mg total) into the skin every 14  (fourteen) days.     SPIRIVA WITH HANDIHALER 18 mcg inhalation capsule  Generic drug:  tiotropium  INHALE 1 CAPSULE BY MOUTH ONCE DAILY         * This list has 2 medication(s) that are the same as other medications prescribed for you. Read the directions carefully, and ask your doctor or other care provider to review them with you.                Indwelling Lines/Drains at time of discharge:   Lines/Drains/Airways     None                 Time spent on the discharge of patient: >35 minutes  Patient was seen and examined on the date of discharge and determined to be suitable for discharge.         Vera Bronwe NP  Department of Hospital Medicine  [facility]

## 2020-05-08 NOTE — HOSPITAL COURSE
Noble Tripp is a 74 year old male who was admitted to [facility] for unstable angina. C performed, but unable to intervene to  RCA. Therapy optimized by Cardiology and referral sent [facility]. Patient will continue ASA, Plavix, BB, ARB, and STATIN.

## 2020-05-08 NOTE — NURSING
Pt complaining of back pain. Pt received Percocet 7.5/325mg at 1905. Ordered for q 8 hrs. Dr. Wilkins notified, new order to administer one extra dose now. Med administered. Pt tolerated well. Will continue POC.

## 2020-05-08 NOTE — ASSESSMENT & PLAN NOTE
-Secondary to underlying COPD  -May have mild component of volume overload as well  -Give one time dose of IV Lasix 20 mg and assess response  -Continue nebs    5/8/2020  -Stable, at baseline per patient

## 2020-05-08 NOTE — NURSING
Written and verbal discharge instructions given.  Pt verbalizes understanding.  Medications reviewed as well as follow up appts thoroughly.  Verbalizes understanding.

## 2020-05-08 NOTE — PLAN OF CARE
05/08/20 1409   Final Note   Assessment Type Final Discharge Note   Anticipated Discharge Disposition Home   Right Care Referral Info   Post Acute Recommendation No Care

## 2020-05-10 ENCOUNTER — HOSPITAL ENCOUNTER (INPATIENT)
Facility: HOSPITAL | Age: 75
LOS: 2 days | Discharge: HOME | DRG: 177 | End: 2020-05-12
Attending: EMERGENCY MEDICINE | Admitting: INTERNAL MEDICINE
Payer: MEDICARE

## 2020-05-10 DIAGNOSIS — I50.22 CHRONIC SYSTOLIC CONGESTIVE HEART FAILURE: ICD-10-CM

## 2020-05-10 DIAGNOSIS — R79.89 ELEVATED TROPONIN: ICD-10-CM

## 2020-05-10 DIAGNOSIS — J96.01 ACUTE HYPOXEMIC RESPIRATORY FAILURE: Primary | ICD-10-CM

## 2020-05-10 DIAGNOSIS — R09.02 HYPOXIA: ICD-10-CM

## 2020-05-10 DIAGNOSIS — Z85.89 HISTORY OF HEAD AND NECK CANCER: ICD-10-CM

## 2020-05-10 DIAGNOSIS — R07.9 CHEST PAIN, UNSPECIFIED TYPE: ICD-10-CM

## 2020-05-10 DIAGNOSIS — I25.10 CORONARY ARTERY DISEASE INVOLVING LEFT MAIN CORONARY ARTERY: ICD-10-CM

## 2020-05-10 DIAGNOSIS — R06.00 DYSPNEA, UNSPECIFIED TYPE: ICD-10-CM

## 2020-05-10 DIAGNOSIS — J44.9 STAGE 3 SEVERE COPD BY GOLD CLASSIFICATION: ICD-10-CM

## 2020-05-10 DIAGNOSIS — G47.33 OSA ON CPAP: Chronic | ICD-10-CM

## 2020-05-10 LAB
ALBUMIN SERPL BCP-MCNC: 2.9 G/DL (ref 3.5–5.2)
ALLENS TEST: ABNORMAL
ALP SERPL-CCNC: 215 U/L (ref 55–135)
ALT SERPL W/O P-5'-P-CCNC: 95 U/L (ref 10–44)
ANION GAP SERPL CALC-SCNC: 14 MMOL/L (ref 8–16)
APTT BLDCRRT: 28.7 SEC (ref 21–32)
AST SERPL-CCNC: 100 U/L (ref 10–40)
BASOPHILS # BLD AUTO: 0.03 K/UL (ref 0–0.2)
BASOPHILS NFR BLD: 0.2 % (ref 0–1.9)
BILIRUB SERPL-MCNC: 0.6 MG/DL (ref 0.1–1)
BNP SERPL-MCNC: 170 PG/ML (ref 0–99)
BUN SERPL-MCNC: 52 MG/DL (ref 8–23)
CALCIUM SERPL-MCNC: 8.5 MG/DL (ref 8.7–10.5)
CHLORIDE SERPL-SCNC: 103 MMOL/L (ref 95–110)
CO2 SERPL-SCNC: 25 MMOL/L (ref 23–29)
CREAT SERPL-MCNC: 1.4 MG/DL (ref 0.5–1.4)
DELSYS: ABNORMAL
DIFFERENTIAL METHOD: ABNORMAL
EOSINOPHIL # BLD AUTO: 0 K/UL (ref 0–0.5)
EOSINOPHIL NFR BLD: 0.2 % (ref 0–8)
ERYTHROCYTE [DISTWIDTH] IN BLOOD BY AUTOMATED COUNT: 16.8 % (ref 11.5–14.5)
EST. GFR  (AFRICAN AMERICAN): 57 ML/MIN/1.73 M^2
EST. GFR  (NON AFRICAN AMERICAN): 49 ML/MIN/1.73 M^2
FIO2: 21
GLUCOSE SERPL-MCNC: 93 MG/DL (ref 70–110)
HCO3 UR-SCNC: 28.2 MMOL/L (ref 24–28)
HCT VFR BLD AUTO: 37.1 % (ref 40–54)
HCV AB SERPL QL IA: NEGATIVE
HGB BLD-MCNC: 11.7 G/DL (ref 14–18)
IMM GRANULOCYTES # BLD AUTO: 0.17 K/UL (ref 0–0.04)
IMM GRANULOCYTES NFR BLD AUTO: 1.1 % (ref 0–0.5)
INR PPP: 1.1 (ref 0.8–1.2)
LACTATE SERPL-SCNC: 1 MMOL/L (ref 0.5–2.2)
LYMPHOCYTES # BLD AUTO: 0.7 K/UL (ref 1–4.8)
LYMPHOCYTES NFR BLD: 4.2 % (ref 18–48)
MCH RBC QN AUTO: 29.5 PG (ref 27–31)
MCHC RBC AUTO-ENTMCNC: 31.5 G/DL (ref 32–36)
MCV RBC AUTO: 94 FL (ref 82–98)
MODE: ABNORMAL
MONOCYTES # BLD AUTO: 1.3 K/UL (ref 0.3–1)
MONOCYTES NFR BLD: 8.4 % (ref 4–15)
NEUTROPHILS # BLD AUTO: 13.1 K/UL (ref 1.8–7.7)
NEUTROPHILS NFR BLD: 85.9 % (ref 38–73)
NRBC BLD-RTO: 0 /100 WBC
PCO2 BLDA: 45.5 MMHG (ref 35–45)
PH SMN: 7.4 [PH] (ref 7.35–7.45)
PLATELET # BLD AUTO: 185 K/UL (ref 150–350)
PMV BLD AUTO: 12.6 FL (ref 9.2–12.9)
PO2 BLDA: 45 MMHG (ref 80–100)
POC BE: 3 MMOL/L
POC SATURATED O2: 80 % (ref 95–100)
POTASSIUM SERPL-SCNC: 4.1 MMOL/L (ref 3.5–5.1)
PROT SERPL-MCNC: 6.4 G/DL (ref 6–8.4)
PROTHROMBIN TIME: 11.3 SEC (ref 9–12.5)
RBC # BLD AUTO: 3.96 M/UL (ref 4.6–6.2)
SAMPLE: ABNORMAL
SARS-COV-2 RDRP RESP QL NAA+PROBE: NEGATIVE
SITE: ABNORMAL
SODIUM SERPL-SCNC: 142 MMOL/L (ref 136–145)
TROPONIN I SERPL DL<=0.01 NG/ML-MCNC: 0.03 NG/ML (ref 0–0.03)
TROPONIN I SERPL DL<=0.01 NG/ML-MCNC: 0.04 NG/ML (ref 0–0.03)
TROPONIN I SERPL DL<=0.01 NG/ML-MCNC: 0.04 NG/ML (ref 0–0.03)
WBC # BLD AUTO: 15.3 K/UL (ref 3.9–12.7)

## 2020-05-10 PROCEDURE — 99900035 HC TECH TIME PER 15 MIN (STAT)

## 2020-05-10 PROCEDURE — 94660 CPAP INITIATION&MGMT: CPT

## 2020-05-10 PROCEDURE — 82803 BLOOD GASES ANY COMBINATION: CPT

## 2020-05-10 PROCEDURE — 83880 ASSAY OF NATRIURETIC PEPTIDE: CPT

## 2020-05-10 PROCEDURE — 27000221 HC OXYGEN, UP TO 24 HOURS

## 2020-05-10 PROCEDURE — 25000003 PHARM REV CODE 250: Performed by: INTERNAL MEDICINE

## 2020-05-10 PROCEDURE — 63600175 PHARM REV CODE 636 W HCPCS: Performed by: NURSE PRACTITIONER

## 2020-05-10 PROCEDURE — 25000242 PHARM REV CODE 250 ALT 637 W/ HCPCS: Performed by: INTERNAL MEDICINE

## 2020-05-10 PROCEDURE — U0002 COVID-19 LAB TEST NON-CDC: HCPCS

## 2020-05-10 PROCEDURE — 85730 THROMBOPLASTIN TIME PARTIAL: CPT

## 2020-05-10 PROCEDURE — 84484 ASSAY OF TROPONIN QUANT: CPT

## 2020-05-10 PROCEDURE — 25000003 PHARM REV CODE 250: Performed by: EMERGENCY MEDICINE

## 2020-05-10 PROCEDURE — 36000 PLACE NEEDLE IN VEIN: CPT

## 2020-05-10 PROCEDURE — 85610 PROTHROMBIN TIME: CPT

## 2020-05-10 PROCEDURE — 25000003 PHARM REV CODE 250: Performed by: NURSE PRACTITIONER

## 2020-05-10 PROCEDURE — 86803 HEPATITIS C AB TEST: CPT

## 2020-05-10 PROCEDURE — 27000190 HC CPAP FULL FACE MASK W/VALVE

## 2020-05-10 PROCEDURE — 83605 ASSAY OF LACTIC ACID: CPT

## 2020-05-10 PROCEDURE — 87040 BLOOD CULTURE FOR BACTERIA: CPT

## 2020-05-10 PROCEDURE — 63600175 PHARM REV CODE 636 W HCPCS: Performed by: INTERNAL MEDICINE

## 2020-05-10 PROCEDURE — 94640 AIRWAY INHALATION TREATMENT: CPT

## 2020-05-10 PROCEDURE — 25000242 PHARM REV CODE 250 ALT 637 W/ HCPCS: Performed by: NURSE PRACTITIONER

## 2020-05-10 PROCEDURE — 36415 COLL VENOUS BLD VENIPUNCTURE: CPT

## 2020-05-10 PROCEDURE — 99223 1ST HOSP IP/OBS HIGH 75: CPT | Mod: ,,, | Performed by: INTERNAL MEDICINE

## 2020-05-10 PROCEDURE — 99291 CRITICAL CARE FIRST HOUR: CPT | Mod: 25

## 2020-05-10 PROCEDURE — 87205 SMEAR GRAM STAIN: CPT

## 2020-05-10 PROCEDURE — 87070 CULTURE OTHR SPECIMN AEROBIC: CPT

## 2020-05-10 PROCEDURE — 85025 COMPLETE CBC W/AUTO DIFF WBC: CPT

## 2020-05-10 PROCEDURE — 21400001 HC TELEMETRY ROOM

## 2020-05-10 PROCEDURE — 94761 N-INVAS EAR/PLS OXIMETRY MLT: CPT

## 2020-05-10 PROCEDURE — 36600 WITHDRAWAL OF ARTERIAL BLOOD: CPT

## 2020-05-10 PROCEDURE — 99223 PR INITIAL HOSPITAL CARE,LEVL III: ICD-10-PCS | Mod: ,,, | Performed by: INTERNAL MEDICINE

## 2020-05-10 PROCEDURE — 80053 COMPREHEN METABOLIC PANEL: CPT

## 2020-05-10 PROCEDURE — 82800 BLOOD PH: CPT

## 2020-05-10 PROCEDURE — 84484 ASSAY OF TROPONIN QUANT: CPT | Mod: 91

## 2020-05-10 RX ORDER — CEFEPIME HYDROCHLORIDE 1 G/50ML
2 INJECTION, SOLUTION INTRAVENOUS
Status: DISCONTINUED | OUTPATIENT
Start: 2020-05-10 | End: 2020-05-11

## 2020-05-10 RX ORDER — TIOTROPIUM BROMIDE 18 UG/1
1 CAPSULE ORAL; RESPIRATORY (INHALATION) DAILY
Status: DISCONTINUED | OUTPATIENT
Start: 2020-05-10 | End: 2020-05-10

## 2020-05-10 RX ORDER — GABAPENTIN 400 MG/1
800 CAPSULE ORAL 3 TIMES DAILY
Status: DISCONTINUED | OUTPATIENT
Start: 2020-05-10 | End: 2020-05-12 | Stop reason: HOSPADM

## 2020-05-10 RX ORDER — SODIUM CHLORIDE 9 MG/ML
INJECTION, SOLUTION INTRAVENOUS CONTINUOUS
Status: DISCONTINUED | OUTPATIENT
Start: 2020-05-10 | End: 2020-05-11

## 2020-05-10 RX ORDER — ASPIRIN 325 MG
325 TABLET ORAL
Status: COMPLETED | OUTPATIENT
Start: 2020-05-10 | End: 2020-05-10

## 2020-05-10 RX ORDER — ENOXAPARIN SODIUM 100 MG/ML
40 INJECTION SUBCUTANEOUS EVERY 24 HOURS
Status: DISCONTINUED | OUTPATIENT
Start: 2020-05-10 | End: 2020-05-12 | Stop reason: HOSPADM

## 2020-05-10 RX ORDER — ARFORMOTEROL TARTRATE 15 UG/2ML
15 SOLUTION RESPIRATORY (INHALATION) 2 TIMES DAILY
Status: DISCONTINUED | OUTPATIENT
Start: 2020-05-10 | End: 2020-05-12 | Stop reason: HOSPADM

## 2020-05-10 RX ORDER — NAPROXEN SODIUM 220 MG/1
81 TABLET, FILM COATED ORAL DAILY
Status: DISCONTINUED | OUTPATIENT
Start: 2020-05-10 | End: 2020-05-12 | Stop reason: HOSPADM

## 2020-05-10 RX ORDER — OXYCODONE AND ACETAMINOPHEN 7.5; 325 MG/1; MG/1
1 TABLET ORAL EVERY 8 HOURS PRN
Status: DISCONTINUED | OUTPATIENT
Start: 2020-05-10 | End: 2020-05-12 | Stop reason: HOSPADM

## 2020-05-10 RX ORDER — CARVEDILOL 3.12 MG/1
3.12 TABLET ORAL 2 TIMES DAILY
Status: DISCONTINUED | OUTPATIENT
Start: 2020-05-10 | End: 2020-05-12 | Stop reason: HOSPADM

## 2020-05-10 RX ORDER — IPRATROPIUM BROMIDE 0.5 MG/2.5ML
0.5 SOLUTION RESPIRATORY (INHALATION) EVERY 6 HOURS
Status: DISCONTINUED | OUTPATIENT
Start: 2020-05-10 | End: 2020-05-12 | Stop reason: HOSPADM

## 2020-05-10 RX ORDER — BUDESONIDE 0.5 MG/2ML
0.5 INHALANT ORAL EVERY 12 HOURS
Status: DISCONTINUED | OUTPATIENT
Start: 2020-05-10 | End: 2020-05-12 | Stop reason: HOSPADM

## 2020-05-10 RX ORDER — LOSARTAN POTASSIUM 25 MG/1
25 TABLET ORAL NIGHTLY
Status: DISCONTINUED | OUTPATIENT
Start: 2020-05-10 | End: 2020-05-12 | Stop reason: HOSPADM

## 2020-05-10 RX ORDER — CLOPIDOGREL BISULFATE 75 MG/1
75 TABLET ORAL DAILY
Status: DISCONTINUED | OUTPATIENT
Start: 2020-05-10 | End: 2020-05-12 | Stop reason: HOSPADM

## 2020-05-10 RX ORDER — PANTOPRAZOLE SODIUM 40 MG/1
40 TABLET, DELAYED RELEASE ORAL DAILY
Status: DISCONTINUED | OUTPATIENT
Start: 2020-05-10 | End: 2020-05-12 | Stop reason: HOSPADM

## 2020-05-10 RX ADMIN — OXYCODONE HYDROCHLORIDE AND ACETAMINOPHEN 1 TABLET: 7.5; 325 TABLET ORAL at 08:05

## 2020-05-10 RX ADMIN — CEFEPIME HYDROCHLORIDE 2 G: 2 INJECTION, SOLUTION INTRAVENOUS at 09:05

## 2020-05-10 RX ADMIN — CARVEDILOL 3.12 MG: 3.12 TABLET, FILM COATED ORAL at 08:05

## 2020-05-10 RX ADMIN — ASPIRIN 325 MG: 325 TABLET, FILM COATED ORAL at 06:05

## 2020-05-10 RX ADMIN — IPRATROPIUM BROMIDE 0.5 MG: 0.5 SOLUTION RESPIRATORY (INHALATION) at 01:05

## 2020-05-10 RX ADMIN — CEFEPIME HYDROCHLORIDE 2 G: 2 INJECTION, SOLUTION INTRAVENOUS at 10:05

## 2020-05-10 RX ADMIN — GABAPENTIN 800 MG: 400 CAPSULE ORAL at 08:05

## 2020-05-10 RX ADMIN — DEXTROSE MONOHYDRATE 1250 MG: 50 INJECTION, SOLUTION INTRAVENOUS at 10:05

## 2020-05-10 RX ADMIN — IPRATROPIUM BROMIDE 0.5 MG: 0.5 SOLUTION RESPIRATORY (INHALATION) at 07:05

## 2020-05-10 RX ADMIN — ENOXAPARIN SODIUM 40 MG: 100 INJECTION SUBCUTANEOUS at 04:05

## 2020-05-10 RX ADMIN — GABAPENTIN 800 MG: 400 CAPSULE ORAL at 10:05

## 2020-05-10 RX ADMIN — SODIUM CHLORIDE: 0.9 INJECTION, SOLUTION INTRAVENOUS at 11:05

## 2020-05-10 RX ADMIN — ARFORMOTEROL TARTRATE 15 MCG: 15 SOLUTION RESPIRATORY (INHALATION) at 07:05

## 2020-05-10 RX ADMIN — ASPIRIN 81 MG 81 MG: 81 TABLET ORAL at 08:05

## 2020-05-10 RX ADMIN — GABAPENTIN 800 MG: 400 CAPSULE ORAL at 02:05

## 2020-05-10 RX ADMIN — PANTOPRAZOLE SODIUM 40 MG: 40 TABLET, DELAYED RELEASE ORAL at 08:05

## 2020-05-10 RX ADMIN — NITROGLYCERIN 1 INCH: 20 OINTMENT TOPICAL at 09:05

## 2020-05-10 RX ADMIN — LOSARTAN POTASSIUM 25 MG: 25 TABLET, FILM COATED ORAL at 08:05

## 2020-05-10 RX ADMIN — CLOPIDOGREL BISULFATE 75 MG: 75 TABLET ORAL at 08:05

## 2020-05-10 RX ADMIN — NITROGLYCERIN 1 INCH: 20 OINTMENT TOPICAL at 04:05

## 2020-05-10 RX ADMIN — BUDESONIDE 0.5 MG: 0.5 INHALANT RESPIRATORY (INHALATION) at 07:05

## 2020-05-10 NOTE — ASSESSMENT & PLAN NOTE
Admit to Inpatient   Supplemental O 2   Nebs   May be related to pneumonia   BC X 2   Sputum cultures   IV antibiotic

## 2020-05-10 NOTE — NURSING
"Pt asked for more pain medication 2 hours early. Says, "You people are trying to kill me. My back hurts. It has hurt for 30 years.". I told him that we could not get another dose until 16:45. At 16:45, I came to room with pain medication and pt was sleeping. Returned medication.  "

## 2020-05-10 NOTE — ED NOTES
Pt resting in bed in NAD.  Complains of lower back pain which is a chronic thing for him.  MD aware.

## 2020-05-10 NOTE — SUBJECTIVE & OBJECTIVE
"Past Medical History:   Diagnosis Date    Adrenal adenoma     david;nl fxn w/u;tran 11/18    Aspiration pneumonia 10/15    puree/honey    Benign prostate hyperplasia     CAD (coronary artery disease)     dr padilla    Chronic pain     dr santos    Chronic systolic congestive heart failure 10/17/2019    COPD (chronic obstructive pulmonary disease)     papi    Ex-smoker     11/13    Hyperlipidemia     Ischemic cardiomyopathy 11/22/2019    JUANITO on CPAP     cpap    Osteopenia 1/15 tran 1/18    PSVT (paroxysmal supraventricular tachycardia)     PVD (peripheral vascular disease)     noobs 07 elizabethuri    Pyriform sinus cancer     dr ponce radiation 1/2-14 dr king perales    Squamous cell carcinoma of skin     roberto    Tongue cancer     "superficial" removed 11/14    Vocal cord cancer 2011    Xerostomia     radiation       Past Surgical History:   Procedure Laterality Date    APPENDECTOMY      BRONCHOSCOPY Bilateral 2/5/2019    Procedure: Bronchoscopy;  Surgeon: Santos Cardozo MD;  Location: [facility] ENDO;  Service: Endoscopy;  Laterality: Bilateral;    COLONOSCOPY N/A 5/1/2017    Procedure: Due for screening colonoscopy;  Surgeon: Anushka Yoder MD;  Location: [facility] ENDO;  Service: Endoscopy;  Laterality: N/A;    ESOPHAGOGASTRODUODENOSCOPY N/A 5/29/2018    Procedure: ESOPHAGOGASTRODUODENOSCOPY (EGD);  Surgeon: Audie Hill III, MD;  Location: [facility] ENDO;  Service: Endoscopy;  Laterality: N/A;    ESOPHAGOGASTRODUODENOSCOPY N/A 8/29/2018    Procedure: ESOPHAGOGASTRODUODENOSCOPY (EGD);  Surgeon: Audie Hill III, MD;  Location: [facility];  Service: Endoscopy;  Laterality: N/A;    PERCUTANEOUS TRANSLUMINAL BALLOON ANGIOPLASTY OF CORONARY ARTERY Right 12/9/2019    Procedure: PTCA, USING BALLOON/ IABP or Impella multivessel angioplasty/poss stent;  Surgeon: Abel Beltran MD;  Location: [facility] CATH LAB;  Service: Cardiology;  Laterality: Right;    THORACENTESIS Left 8/7/2018    Procedure: " Thoracentesis;  Surgeon: Satnos Cardozo MD;  Location: [facility] ENDO;  Service: Endoscopy;  Laterality: Left;    THORACENTESIS Right 2/25/2019    Procedure: Thoracentesis;  Surgeon: Santos Cardozo MD;  Location: [facility] ENDO;  Service: Endoscopy;  Laterality: Right;    tongue cancer excision  11/14    dr vitale    VOCAL CORD LATERALIZATION, ENDOSCOPIC APPROACH W/ MLB      kris       Review of patient's allergies indicates:   Allergen Reactions    Contrast media Hives and Itching    Iodinated contrast media Hives and Itching    Iodine Hives and Itching    Pravastatin      Other reaction(s): fatigue  Other reaction(s): fatigue    Rosuvastatin      Other reaction(s): fatigue  Other reaction(s): fatigue    Simvastatin      Other reaction(s): fatigue  Other reaction(s): fatigue    Statins-hmg-coa reductase inhibitors Other (See Comments)     Fatigue       No current facility-administered medications on file prior to encounter.      Current Outpatient Medications on File Prior to Encounter   Medication Sig    albuterol (PROAIR HFA) 90 mcg/actuation inhaler INHALE 2 PUFFS BY MOUTH EVERY 4 HOURS AS NEEDED FOR WHEEZING OR  SHORTNESS  OF  BREATH    aspirin 81 mg Tab Take 1 tablet by mouth Daily. Over the counter to help prevent stroke/heart attack    atorvastatin (LIPITOR) 80 MG tablet Take 1 tablet (80 mg total) by mouth once daily.    carvedilol (COREG) 3.125 MG tablet Take 1 tablet (3.125 mg total) by mouth 2 (two) times daily.    clopidogrel (PLAVIX) 75 mg tablet Take 1 tablet (75 mg total) by mouth once daily.    ELDERBERRY FRUIT ORAL Take by mouth.    furosemide (LASIX) 40 MG tablet Take 0.5 tablets (20 mg total) by mouth once daily.    gabapentin (NEURONTIN) 800 MG tablet Take 1 tablet (800 mg total) by mouth 3 (three) times daily.    L.acid/B.bifidum/B.animal/FOS (PROBIOTIC COMPLEX ORAL) Take by mouth.    losartan (COZAAR) 25 MG tablet Take 1 tablet (25 mg total) by mouth every evening.     [START ON 2020] oxyCODONE-acetaminophen (PERCOCET) 7.5-325 mg per tablet Take 1 tablet by mouth every 8 (eight) hours as needed for Pain.    pantoprazole (PROTONIX) 40 MG tablet TAKE ONE TABLET BY MOUTH ONCE DAILY    SPIRIVA WITH HANDIHALER 18 mcg inhalation capsule INHALE 1 CAPSULE BY MOUTH ONCE DAILY    cefdinir (OMNICEF) 300 MG capsule Take 1 capsule (300 mg total) by mouth 2 (two) times daily. for 10 days (Patient not taking: Reported on 5/10/2020)    evolocumab (REPATHA SURECLICK) 140 mg/mL PnIj Inject 1 mL (140 mg total) into the skin every 14 (fourteen) days.    nebulizer and compressor Bailey Use as directed    oxyCODONE-acetaminophen (PERCOCET) 7.5-325 mg per tablet Take 1 tablet by mouth every 8 (eight) hours as needed for Pain.    OXYGEN-AIR DELIVERY SYSTEMS MISC Inhale 2-3 L into the lungs continuous.     Family History     Problem Relation (Age of Onset)    Lung cancer Father, Brother    No Known Problems Mother        Tobacco Use    Smoking status: Former Smoker     Packs/day: 0.50     Years: 55.00     Pack years: 27.50     Types: Cigarettes     Last attempt to quit: 10/1/2019     Years since quittin.6    Smokeless tobacco: Never Used    Tobacco comment: 6-7 cigs/day   Substance and Sexual Activity    Alcohol use: Not Currently    Drug use: No    Sexual activity: Not Currently     Review of Systems   HENT: Negative.    Eyes: Negative.    Cardiovascular: Positive for chest pain and dyspnea on exertion.   Respiratory: Positive for shortness of breath.    Endocrine: Negative.    Hematologic/Lymphatic: Negative.    Skin: Negative.    Musculoskeletal: Negative.    Gastrointestinal: Negative.    Genitourinary: Negative.    Neurological: Negative.    Psychiatric/Behavioral: Negative.    Allergic/Immunologic: Negative.      Objective:     Vital Signs (Most Recent):  Temp: 98.2 °F (36.8 °C) (05/10/20 1225)  Pulse: 75 (05/10/20 1324)  Resp: 16 (05/10/20 1324)  BP: (!) 143/64 (05/10/20  1225)  SpO2: 95 % (05/10/20 1324) Vital Signs (24h Range):  Temp:  [97.5 °F (36.4 °C)-98.2 °F (36.8 °C)] 98.2 °F (36.8 °C)  Pulse:  [66-76] 75  Resp:  [16-29] 16  SpO2:  [88 %-99 %] 95 %  BP: (119-162)/(58-71) 143/64     Weight: 56.6 kg (124 lb 11.2 oz)  Body mass index is 18.96 kg/m².    SpO2: 95 %  O2 Device (Oxygen Therapy): nasal cannula    No intake or output data in the 24 hours ending 05/10/20 1540    Lines/Drains/Airways     Peripheral Intravenous Line                 Peripheral IV - Single Lumen 05/10/20 0337 20 G Right Forearm less than 1 day                Physical Exam   Constitutional: He is oriented to person, place, and time. He appears well-developed and well-nourished. No distress.   On supplemental O2   HENT:   Head: Normocephalic and atraumatic.   Eyes: Pupils are equal, round, and reactive to light. Right eye exhibits no discharge. Left eye exhibits no discharge.   Neck: Neck supple. No JVD present.   Cardiovascular: Normal rate, regular rhythm, S1 normal, S2 normal and normal heart sounds.   No murmur heard.  Pulmonary/Chest: Effort normal. No respiratory distress. He has decreased breath sounds. He has no wheezes. He has no rales.   Coarse BS   Abdominal: Soft. He exhibits no distension.   Musculoskeletal: He exhibits no edema.   Neurological: He is alert and oriented to person, place, and time.   Skin: Skin is warm and dry. He is not diaphoretic. No erythema.   Right groin access site C/D/I; no bleeding erythema or drainage, no hematoma   Psychiatric: He has a normal mood and affect. His behavior is normal.   Nursing note and vitals reviewed.      Significant Labs:   ABG:   Recent Labs   Lab 05/10/20  0416   PH 7.400   PCO2 45.5*   HCO3 28.2*   POCSATURATED 80*   BE 3   , Blood Culture: No results for input(s): LABBLOO in the last 48 hours., BMP:   Recent Labs   Lab 05/10/20  0403   GLU 93      K 4.1      CO2 25   BUN 52*   CREATININE 1.4   CALCIUM 8.5*   , CMP   Recent Labs   Lab  05/10/20  0403      K 4.1      CO2 25   GLU 93   BUN 52*   CREATININE 1.4   CALCIUM 8.5*   PROT 6.4   ALBUMIN 2.9*   BILITOT 0.6   ALKPHOS 215*   *   ALT 95*   ANIONGAP 14   ESTGFRAFRICA 57*   EGFRNONAA 49*   , CBC   Recent Labs   Lab 05/10/20  0403   WBC 15.30*   HGB 11.7*   HCT 37.1*      , INR   Recent Labs   Lab 05/10/20  0403   INR 1.1   , Lipid Panel No results for input(s): CHOL, HDL, LDLCALC, TRIG, CHOLHDL in the last 48 hours. and Troponin   Recent Labs   Lab 05/10/20  0403 05/10/20  0931   TROPONINI 0.040* 0.040*       Significant Imaging: EKG:

## 2020-05-10 NOTE — H&P
[facility] Hospital Medicine  History & Physical    Patient Name: Noble Tripp  MRN: [id number]  Admission Date: 5/10/2020  Attending Physician: Vasile Gar MD   Primary Care Provider: Dwaine Davis MD         Patient information was obtained from patient and ER records.     Subjective:     Principal Problem:Acute hypoxemic respiratory failure    Chief Complaint:   Chief Complaint   Patient presents with    Shortness of Breath     Recently DC'd, c/o shortness of breath. 81% on RA upon fire department arrival. Placed on 6L by EMS and sats increased to 99%. Hx COPD, CHF.    Chest Pain        HPI: Mr Tripp is a 74 year old male with PMHx of COPD, CAD, CHF and cancer who presented to [facility] Emergency Room with complaints of increase shortness of breath that started on yesterday. Associated symptoms include chest pain and decrease aspirated. Denies associated symptoms fever, chills, nausea, vomiting or diaphoresis. Patient was recently discharge from [facility] on 5/8/2020 after having Left Heart Cath  with unsuccessful intervention of  RCA. He was referred to [facility]. EMS reported O 2 sat was 81 % on room air, he was then place on 6 L. ABG showed PO 2 of 45 on room air. Patient reports has not wore home O 2 since October 2019. Troponin mildly elevated at 0.040. Chest x ray showed mildly worsening patchy multifocal bilateral pulmonary infiltrates suspicious for atypical pneumonia, stable small pleural effusions. Patient is a full code, daughter Ana Rosa is surrogate decision.  He is being admitted under the care of Hospital Medicine.     Past Medical History:   Diagnosis Date    Adrenal adenoma     david;nl fxn w/u;tran 11/18    Aspiration pneumonia 10/15    puree/honey    Benign prostate hyperplasia     CAD (coronary artery disease)     dr padilla    Chronic pain     dr santos    Chronic systolic congestive heart failure 10/17/2019    COPD (chronic obstructive pulmonary  "disease)     papi    Ex-smoker     11/13    Hyperlipidemia     Ischemic cardiomyopathy 11/22/2019    JUANITO on CPAP     cpap    Osteopenia 1/15 tran 1/18    PSVT (paroxysmal supraventricular tachycardia)     PVD (peripheral vascular disease)     noobs 07 latrell    Pyriform sinus cancer     dr ponce radiation 1/2-14 dr king perales    Squamous cell carcinoma of skin     roberto    Tongue cancer     "superficial" removed 11/14    Vocal cord cancer 2011    Xerostomia     radiation       Past Surgical History:   Procedure Laterality Date    APPENDECTOMY      BRONCHOSCOPY Bilateral 2/5/2019    Procedure: Bronchoscopy;  Surgeon: Santos Cardozo MD;  Location: [facility] ENDO;  Service: Endoscopy;  Laterality: Bilateral;    COLONOSCOPY N/A 5/1/2017    Procedure: Due for screening colonoscopy;  Surgeon: Anushka Yoder MD;  Location: [facility];  Service: Endoscopy;  Laterality: N/A;    ESOPHAGOGASTRODUODENOSCOPY N/A 5/29/2018    Procedure: ESOPHAGOGASTRODUODENOSCOPY (EGD);  Surgeon: Audie Hill III, MD;  Location: [facility];  Service: Endoscopy;  Laterality: N/A;    ESOPHAGOGASTRODUODENOSCOPY N/A 8/29/2018    Procedure: ESOPHAGOGASTRODUODENOSCOPY (EGD);  Surgeon: Audie Hill III, MD;  Location: [facility] ENDO;  Service: Endoscopy;  Laterality: N/A;    PERCUTANEOUS TRANSLUMINAL BALLOON ANGIOPLASTY OF CORONARY ARTERY Right 12/9/2019    Procedure: PTCA, USING BALLOON/ IABP or Impella multivessel angioplasty/poss stent;  Surgeon: Abel Beltran MD;  Location: [facility] CATH LAB;  Service: Cardiology;  Laterality: Right;    THORACENTESIS Left 8/7/2018    Procedure: Thoracentesis;  Surgeon: Santos Cardozo MD;  Location: [facility] ENDO;  Service: Endoscopy;  Laterality: Left;    THORACENTESIS Right 2/25/2019    Procedure: Thoracentesis;  Surgeon: Santos Cardozo MD;  Location: [facility] ENDO;  Service: Endoscopy;  Laterality: Right;    tongue cancer excision  11/14    dr vitale    VOCAL CORD LATERALIZATION, " ENDOSCOPIC APPROACH W/ MLB      kris       Review of patient's allergies indicates:   Allergen Reactions    Contrast media Hives and Itching    Iodinated contrast media Hives and Itching    Iodine Hives and Itching    Pravastatin      Other reaction(s): fatigue  Other reaction(s): fatigue    Rosuvastatin      Other reaction(s): fatigue  Other reaction(s): fatigue    Simvastatin      Other reaction(s): fatigue  Other reaction(s): fatigue    Statins-hmg-coa reductase inhibitors Other (See Comments)     Fatigue       No current facility-administered medications on file prior to encounter.      Current Outpatient Medications on File Prior to Encounter   Medication Sig    albuterol (PROAIR HFA) 90 mcg/actuation inhaler INHALE 2 PUFFS BY MOUTH EVERY 4 HOURS AS NEEDED FOR WHEEZING OR  SHORTNESS  OF  BREATH    aspirin 81 mg Tab Take 1 tablet by mouth Daily. Over the counter to help prevent stroke/heart attack    atorvastatin (LIPITOR) 80 MG tablet Take 1 tablet (80 mg total) by mouth once daily.    carvedilol (COREG) 3.125 MG tablet Take 1 tablet (3.125 mg total) by mouth 2 (two) times daily.    clopidogrel (PLAVIX) 75 mg tablet Take 1 tablet (75 mg total) by mouth once daily.    ELDERBERRY FRUIT ORAL Take by mouth.    furosemide (LASIX) 40 MG tablet Take 0.5 tablets (20 mg total) by mouth once daily.    gabapentin (NEURONTIN) 800 MG tablet Take 1 tablet (800 mg total) by mouth 3 (three) times daily.    L.acid/B.bifidum/B.animal/FOS (PROBIOTIC COMPLEX ORAL) Take by mouth.    losartan (COZAAR) 25 MG tablet Take 1 tablet (25 mg total) by mouth every evening.    [START ON 5/14/2020] oxyCODONE-acetaminophen (PERCOCET) 7.5-325 mg per tablet Take 1 tablet by mouth every 8 (eight) hours as needed for Pain.    pantoprazole (PROTONIX) 40 MG tablet TAKE ONE TABLET BY MOUTH ONCE DAILY    SPIRIVA WITH HANDIHALER 18 mcg inhalation capsule INHALE 1 CAPSULE BY MOUTH ONCE DAILY    cefdinir (OMNICEF) 300 MG capsule  Take 1 capsule (300 mg total) by mouth 2 (two) times daily. for 10 days (Patient not taking: Reported on 5/10/2020)    evolocumab (REPATHA SURECLICK) 140 mg/mL PnIj Inject 1 mL (140 mg total) into the skin every 14 (fourteen) days.    nebulizer and compressor Bailey Use as directed    oxyCODONE-acetaminophen (PERCOCET) 7.5-325 mg per tablet Take 1 tablet by mouth every 8 (eight) hours as needed for Pain.    OXYGEN-AIR DELIVERY SYSTEMS MISC Inhale 2-3 L into the lungs continuous.     Family History     Problem Relation (Age of Onset)    Lung cancer Father, Brother    No Known Problems Mother        Tobacco Use    Smoking status: Former Smoker     Packs/day: 0.50     Years: 55.00     Pack years: 27.50     Types: Cigarettes     Last attempt to quit: 10/1/2019     Years since quittin.6    Smokeless tobacco: Never Used    Tobacco comment: 6-7 cigs/day   Substance and Sexual Activity    Alcohol use: Not Currently    Drug use: No    Sexual activity: Not Currently     Review of Systems   Constitutional: Positive for activity change, appetite change (decreased) and fatigue. Negative for chills, diaphoresis and fever.   HENT: Negative for congestion, ear discharge, rhinorrhea, sinus pressure, sore throat and trouble swallowing.    Eyes: Negative for discharge and visual disturbance.   Respiratory: Positive for shortness of breath. Negative for apnea, cough, choking, chest tightness, wheezing and stridor.    Cardiovascular: Positive for chest pain. Negative for palpitations and leg swelling.   Gastrointestinal: Negative for abdominal distention, abdominal pain, diarrhea, nausea and vomiting.   Endocrine: Negative for cold intolerance and heat intolerance.   Genitourinary: Negative for dysuria, frequency and hematuria.   Musculoskeletal: Negative for arthralgias, back pain, myalgias and neck pain.   Skin: Negative for pallor and rash.   Neurological: Positive for weakness. Negative for dizziness, seizures, syncope  and headaches.   Psychiatric/Behavioral: Negative for agitation, confusion and sleep disturbance.     Objective:     Vital Signs (Most Recent):  Temp: 97.7 °F (36.5 °C) (05/10/20 0745)  Pulse: 74 (05/10/20 0745)  Resp: 16 (05/10/20 0745)  BP: (!) 146/69 (05/10/20 0745)  SpO2: (!) 92 % (05/10/20 0745) Vital Signs (24h Range):  Temp:  [97.5 °F (36.4 °C)-97.7 °F (36.5 °C)] 97.7 °F (36.5 °C)  Pulse:  [66-74] 74  Resp:  [16-29] 16  SpO2:  [88 %-99 %] 92 %  BP: (119-162)/(58-71) 146/69     Weight: 56.6 kg (124 lb 11.2 oz)  Body mass index is 18.96 kg/m².    Physical Exam   Constitutional: He is oriented to person, place, and time. He appears cachectic. No distress. Nasal cannula in place.   HENT:   Mouth/Throat: No oropharyngeal exudate.   Eyes: Right eye exhibits no discharge. Left eye exhibits no discharge.   Neck: No JVD present.   Cardiovascular: Exam reveals no gallop and no friction rub.   No murmur heard.  Pulmonary/Chest: No stridor. No respiratory distress. He has decreased breath sounds in the left lower field. He has no wheezes. He has no rales. He exhibits no tenderness.   Abdominal: He exhibits no distension and no mass. There is no tenderness. There is no rebound and no guarding. No hernia.   Musculoskeletal: He exhibits no edema or deformity.   Neurological: He is alert and oriented to person, place, and time.   Skin: Skin is warm and dry. Capillary refill takes less than 2 seconds. He is not diaphoretic.   Psychiatric: He has a normal mood and affect.   Nursing note and vitals reviewed.          Significant Labs:   CBC:   Recent Labs   Lab 05/10/20  0403   WBC 15.30*   HGB 11.7*   HCT 37.1*        CMP:   Recent Labs   Lab 05/10/20  0403      K 4.1      CO2 25   GLU 93   BUN 52*   CREATININE 1.4   CALCIUM 8.5*   PROT 6.4   ALBUMIN 2.9*   BILITOT 0.6   ALKPHOS 215*   *   ALT 95*   ANIONGAP 14   EGFRNONAA 49*     Cardiac Markers:   Recent Labs   Lab 05/10/20  0403   *      Coagulation:   Recent Labs   Lab 05/10/20  0403   INR 1.1   APTT 28.7     Troponin:   Recent Labs   Lab 05/10/20  0403   TROPONINI 0.040*       Significant Imaging:     Imaging Results          X-Ray Chest AP Portable (Final result)  Result time 05/10/20 08:20:02    Final result by David Han III, MD (05/10/20 08:20:02)                 Impression:      Mildly worsening patchy multifocal bilateral pulmonary infiltrates suspicious for atypical pneumonia.  Stable small pleural effusions.      Electronically signed by: David Han MD  Date:    05/10/2020  Time:    08:20             Narrative:    EXAMINATION:  XR CHEST AP PORTABLE    CLINICAL HISTORY:  shortness of breath;    COMPARISON:  05/05/2020    FINDINGS:  Heart size is normal.  Aortic atherosclerosis is again noted.  There are stable small bilateral pleural effusions.  There has been interval worsening of a patchy infiltrate in the left lung apex with increasing mild adjacent left apical pleural thickening.  Patchy multifocal ground-glass infiltrates in the bilateral mid and lower lung zones appear slightly increased compared to prior exam.  Stable small calcified granulomas in the right lung.  No pneumothorax identified.                              Assessment/Plan:     * Acute hypoxemic respiratory failure  Admit to Inpatient   Supplemental O 2   Nebs   May be related to pneumonia   BC X 2   Sputum cultures   IV antibiotic           Pneumonia    Chest X ray suspicious for atypical pneumonia.   BC x 2   Sputum culture   IV antibiotics       Coronary artery disease  Consult cardiology   Pt has  of RCA   Continue B blocker, ASA, Plavix and ARB       Elevated troponin  Troponin 0.040  Cardiology consulted   Continue serial troponin      Statin intolerance  Pt has statin allergic   Refuses to restart home atorvastatin   Continue Repatha at home       JUANITO on CPAP  CPAP at night       Hypertension  Continue Coreg and losartan       Hyperlipidemia      Continue Repatha at home       Elevated LFT's        Refused statin, history of intolerance        Hep C antibody negative        Check RUQ abdominal ultrasound      VTE Risk Mitigation (From admission, onward)         Ordered     enoxaparin injection 40 mg  Daily      05/10/20 4135                   Shaan Garcia NP  Department of Hospital Medicine   [facility]

## 2020-05-10 NOTE — ASSESSMENT & PLAN NOTE
Severe multiple vessel Dz.   Continue medical RX  Aggressive COPD Rx with keeping SaO2 > 94%    -add NTG oilment patch  -continue ASA, Plavix, Coreg, losartan. Repath as OP  -DASH

## 2020-05-10 NOTE — PROGRESS NOTES
RT called to rm.  Nurse stated sats were low.  RT requested help to pull pt up in bed to help improve oxygenation.. sats  On 5 lpm. 91!. RT will wean FIO2 accordingly. SAT order stats to keep greater 90%.

## 2020-05-10 NOTE — PROGRESS NOTES
Pharmacokinetic Initial Assessment: IV Vancomycin    Assessment/Plan:    Initiate intravenous vancomycin with loading dose of 20 mg/kg rounded to 1250 mg once with subsequent doses when random concentrations are less than 20 mcg/mL.  Desired empiric serum trough concentration is 15 to 20 mcg/mL.  Draw vancomycin random level on 5/11/20 at 1100 (approximately 24 hours post dose). .  Pharmacy will continue to follow and monitor vancomycin.      Please contact pharmacy at extension [phone] with any questions regarding this assessment.     Thank you for the consult,   Ale HansenD       Patient brief summary:  Noble Tripp is a 74 y.o. male initiated on antimicrobial therapy with IV Vancomycin for treatment of suspected lower respiratory infection.    Drug Allergies:   Review of patient's allergies indicates:   Allergen Reactions    Contrast media Hives and Itching    Iodinated contrast media Hives and Itching    Iodine Hives and Itching    Pravastatin      Other reaction(s): fatigue  Other reaction(s): fatigue    Rosuvastatin      Other reaction(s): fatigue  Other reaction(s): fatigue    Simvastatin      Other reaction(s): fatigue  Other reaction(s): fatigue    Statins-hmg-coa reductase inhibitors Other (See Comments)     Fatigue       Actual Body Weight:   56.6 kg    Renal Function:   Estimated Creatinine Clearance: 37.6 mL/min (based on SCr of 1.4 mg/dL).,     Dialysis Method (if applicable):  N/A    CBC (last 72 hours):  Recent Labs   Lab Result Units 05/08/20  0431 05/10/20  0403   WBC K/uL 13.76* 15.30*   Hemoglobin g/dL 11.9* 11.7*   Hematocrit % 36.6* 37.1*   Platelets K/uL 222 185   Gran% % 83.5* 85.9*   Lymph% % 5.5* 4.2*   Mono% % 7.3 8.4   Eosinophil% % 0.0 0.2   Basophil% % 0.4 0.2   Differential Method  Automated Automated       Metabolic Panel (last 72 hours):  Recent Labs   Lab Result Units 05/08/20  0431 05/10/20  0403   Sodium mmol/L 139 142   Potassium mmol/L 4.3 4.1   Chloride  mmol/L 104 103   CO2 mmol/L 24 25   Glucose mg/dL 91 93   BUN, Bld mg/dL 41* 52*   Creatinine mg/dL 1.1 1.4   Albumin g/dL  --  2.9*   Total Bilirubin mg/dL  --  0.6   Alkaline Phosphatase U/L  --  215*   AST U/L  --  100*   ALT U/L  --  95*       Drug levels (last 3 results):  No results for input(s): VANCOMYCINRA, VANCOMYCINPE, VANCOMYCINTR in the last 72 hours.    Microbiologic Results:  Microbiology Results (last 7 days)     Procedure Component Value Units Date/Time    Blood culture [[id number]] Collected:  05/10/20 0931    Order Status:  Sent Specimen:  Blood Updated:  05/10/20 0932    Blood culture [[id number]] Collected:  05/10/20 0931    Order Status:  Sent Specimen:  Blood Updated:  05/10/20 0931    Culture, Respiratory with Gram Stain [[id number]]     Order Status:  No result Specimen:  Respiratory         Thank you for allowing us to participate in this patient's care.     Janiya Mario PharmD 05/10/2020 12:52 PM

## 2020-05-10 NOTE — PROGRESS NOTES
Educated pt on indication and goals of nebs and oxygen. Placed sputum  Specimen cup  at bedsid. Told pt when he got one to call nurse. To send to lab.

## 2020-05-10 NOTE — PLAN OF CARE
Pt AAO x4.  Pt remains free of falls throughout shift.  Chronic back pain addressed.  Plan of care reviewed. Pt verbalizes understanding.  Pt moving and turning independently. Frequent weight shifting encouraged.  Normal sinus rhythm on monitor.  Hourly rounding completed.  Will continue to monitor.

## 2020-05-10 NOTE — SUBJECTIVE & OBJECTIVE
"Past Medical History:   Diagnosis Date    Adrenal adenoma     david;nl fxn w/u;tran 11/18    Aspiration pneumonia 10/15    puree/honey    Benign prostate hyperplasia     CAD (coronary artery disease)     dr padilla    Chronic pain     dr santos    Chronic systolic congestive heart failure 10/17/2019    COPD (chronic obstructive pulmonary disease)     papi    Ex-smoker     11/13    Hyperlipidemia     Ischemic cardiomyopathy 11/22/2019    JUANITO on CPAP     cpap    Osteopenia 1/15 tran 1/18    PSVT (paroxysmal supraventricular tachycardia)     PVD (peripheral vascular disease)     noobs 07 elizabethuri    Pyriform sinus cancer     dr ponce radiation 1/2-14 dr king perales    Squamous cell carcinoma of skin     roberto    Tongue cancer     "superficial" removed 11/14    Vocal cord cancer 2011    Xerostomia     radiation       Past Surgical History:   Procedure Laterality Date    APPENDECTOMY      BRONCHOSCOPY Bilateral 2/5/2019    Procedure: Bronchoscopy;  Surgeon: Santos Cardozo MD;  Location: [facility] ENDO;  Service: Endoscopy;  Laterality: Bilateral;    COLONOSCOPY N/A 5/1/2017    Procedure: Due for screening colonoscopy;  Surgeon: Anushka Yoder MD;  Location: [facility] ENDO;  Service: Endoscopy;  Laterality: N/A;    ESOPHAGOGASTRODUODENOSCOPY N/A 5/29/2018    Procedure: ESOPHAGOGASTRODUODENOSCOPY (EGD);  Surgeon: Audie Hill III, MD;  Location: [facility] ENDO;  Service: Endoscopy;  Laterality: N/A;    ESOPHAGOGASTRODUODENOSCOPY N/A 8/29/2018    Procedure: ESOPHAGOGASTRODUODENOSCOPY (EGD);  Surgeon: Audie Hill III, MD;  Location: [facility];  Service: Endoscopy;  Laterality: N/A;    PERCUTANEOUS TRANSLUMINAL BALLOON ANGIOPLASTY OF CORONARY ARTERY Right 12/9/2019    Procedure: PTCA, USING BALLOON/ IABP or Impella multivessel angioplasty/poss stent;  Surgeon: Abel Beltran MD;  Location: [facility] CATH LAB;  Service: Cardiology;  Laterality: Right;    THORACENTESIS Left 8/7/2018    Procedure: " Thoracentesis;  Surgeon: Santos Cardozo MD;  Location: [facility] ENDO;  Service: Endoscopy;  Laterality: Left;    THORACENTESIS Right 2/25/2019    Procedure: Thoracentesis;  Surgeon: Santos Cardozo MD;  Location: [facility] ENDO;  Service: Endoscopy;  Laterality: Right;    tongue cancer excision  11/14    dr vitale    VOCAL CORD LATERALIZATION, ENDOSCOPIC APPROACH W/ MLB      kris       Review of patient's allergies indicates:   Allergen Reactions    Contrast media Hives and Itching    Iodinated contrast media Hives and Itching    Iodine Hives and Itching    Pravastatin      Other reaction(s): fatigue  Other reaction(s): fatigue    Rosuvastatin      Other reaction(s): fatigue  Other reaction(s): fatigue    Simvastatin      Other reaction(s): fatigue  Other reaction(s): fatigue    Statins-hmg-coa reductase inhibitors Other (See Comments)     Fatigue       No current facility-administered medications on file prior to encounter.      Current Outpatient Medications on File Prior to Encounter   Medication Sig    albuterol (PROAIR HFA) 90 mcg/actuation inhaler INHALE 2 PUFFS BY MOUTH EVERY 4 HOURS AS NEEDED FOR WHEEZING OR  SHORTNESS  OF  BREATH    aspirin 81 mg Tab Take 1 tablet by mouth Daily. Over the counter to help prevent stroke/heart attack    atorvastatin (LIPITOR) 80 MG tablet Take 1 tablet (80 mg total) by mouth once daily.    carvedilol (COREG) 3.125 MG tablet Take 1 tablet (3.125 mg total) by mouth 2 (two) times daily.    clopidogrel (PLAVIX) 75 mg tablet Take 1 tablet (75 mg total) by mouth once daily.    ELDERBERRY FRUIT ORAL Take by mouth.    furosemide (LASIX) 40 MG tablet Take 0.5 tablets (20 mg total) by mouth once daily.    gabapentin (NEURONTIN) 800 MG tablet Take 1 tablet (800 mg total) by mouth 3 (three) times daily.    L.acid/B.bifidum/B.animal/FOS (PROBIOTIC COMPLEX ORAL) Take by mouth.    losartan (COZAAR) 25 MG tablet Take 1 tablet (25 mg total) by mouth every evening.     [START ON 2020] oxyCODONE-acetaminophen (PERCOCET) 7.5-325 mg per tablet Take 1 tablet by mouth every 8 (eight) hours as needed for Pain.    pantoprazole (PROTONIX) 40 MG tablet TAKE ONE TABLET BY MOUTH ONCE DAILY    SPIRIVA WITH HANDIHALER 18 mcg inhalation capsule INHALE 1 CAPSULE BY MOUTH ONCE DAILY    cefdinir (OMNICEF) 300 MG capsule Take 1 capsule (300 mg total) by mouth 2 (two) times daily. for 10 days (Patient not taking: Reported on 5/10/2020)    evolocumab (REPATHA SURECLICK) 140 mg/mL PnIj Inject 1 mL (140 mg total) into the skin every 14 (fourteen) days.    nebulizer and compressor Bailey Use as directed    oxyCODONE-acetaminophen (PERCOCET) 7.5-325 mg per tablet Take 1 tablet by mouth every 8 (eight) hours as needed for Pain.    OXYGEN-AIR DELIVERY SYSTEMS MISC Inhale 2-3 L into the lungs continuous.     Family History     Problem Relation (Age of Onset)    Lung cancer Father, Brother    No Known Problems Mother        Tobacco Use    Smoking status: Former Smoker     Packs/day: 0.50     Years: 55.00     Pack years: 27.50     Types: Cigarettes     Last attempt to quit: 10/1/2019     Years since quittin.6    Smokeless tobacco: Never Used    Tobacco comment: 6-7 cigs/day   Substance and Sexual Activity    Alcohol use: Not Currently    Drug use: No    Sexual activity: Not Currently     Review of Systems   Constitutional: Positive for activity change, appetite change (decreased) and fatigue. Negative for chills, diaphoresis and fever.   HENT: Negative for congestion, ear discharge, rhinorrhea, sinus pressure, sore throat and trouble swallowing.    Eyes: Negative for discharge and visual disturbance.   Respiratory: Positive for shortness of breath. Negative for apnea, cough, choking, chest tightness, wheezing and stridor.    Cardiovascular: Positive for chest pain. Negative for palpitations and leg swelling.   Gastrointestinal: Negative for abdominal distention, abdominal pain, diarrhea,  nausea and vomiting.   Endocrine: Negative for cold intolerance and heat intolerance.   Genitourinary: Negative for dysuria, frequency and hematuria.   Musculoskeletal: Negative for arthralgias, back pain, myalgias and neck pain.   Skin: Negative for pallor and rash.   Neurological: Positive for weakness. Negative for dizziness, seizures, syncope and headaches.   Psychiatric/Behavioral: Negative for agitation, confusion and sleep disturbance.     Objective:     Vital Signs (Most Recent):  Temp: 97.7 °F (36.5 °C) (05/10/20 0745)  Pulse: 74 (05/10/20 0745)  Resp: 16 (05/10/20 0745)  BP: (!) 146/69 (05/10/20 0745)  SpO2: (!) 92 % (05/10/20 0745) Vital Signs (24h Range):  Temp:  [97.5 °F (36.4 °C)-97.7 °F (36.5 °C)] 97.7 °F (36.5 °C)  Pulse:  [66-74] 74  Resp:  [16-29] 16  SpO2:  [88 %-99 %] 92 %  BP: (119-162)/(58-71) 146/69     Weight: 56.6 kg (124 lb 11.2 oz)  Body mass index is 18.96 kg/m².    Physical Exam   Constitutional: He is oriented to person, place, and time. He appears cachectic. No distress. Nasal cannula in place.   HENT:   Mouth/Throat: No oropharyngeal exudate.   Eyes: Right eye exhibits no discharge. Left eye exhibits no discharge.   Neck: No JVD present.   Cardiovascular: Exam reveals no gallop and no friction rub.   No murmur heard.  Pulmonary/Chest: No stridor. No respiratory distress. He has decreased breath sounds in the left lower field. He has no wheezes. He has no rales. He exhibits no tenderness.   Abdominal: He exhibits no distension and no mass. There is no tenderness. There is no rebound and no guarding. No hernia.   Musculoskeletal: He exhibits no edema or deformity.   Neurological: He is alert and oriented to person, place, and time.   Skin: Skin is warm and dry. Capillary refill takes less than 2 seconds. He is not diaphoretic.   Psychiatric: He has a normal mood and affect.   Nursing note and vitals reviewed.          Significant Labs:   CBC:   Recent Labs   Lab 05/10/20  0403   WBC  15.30*   HGB 11.7*   HCT 37.1*        CMP:   Recent Labs   Lab 05/10/20  0403      K 4.1      CO2 25   GLU 93   BUN 52*   CREATININE 1.4   CALCIUM 8.5*   PROT 6.4   ALBUMIN 2.9*   BILITOT 0.6   ALKPHOS 215*   *   ALT 95*   ANIONGAP 14   EGFRNONAA 49*     Cardiac Markers:   Recent Labs   Lab 05/10/20  0403   *     Coagulation:   Recent Labs   Lab 05/10/20  0403   INR 1.1   APTT 28.7     Troponin:   Recent Labs   Lab 05/10/20  0403   TROPONINI 0.040*       Significant Imaging:     Imaging Results          X-Ray Chest AP Portable (Final result)  Result time 05/10/20 08:20:02    Final result by David Han III, MD (05/10/20 08:20:02)                 Impression:      Mildly worsening patchy multifocal bilateral pulmonary infiltrates suspicious for atypical pneumonia.  Stable small pleural effusions.      Electronically signed by: David Han MD  Date:    05/10/2020  Time:    08:20             Narrative:    EXAMINATION:  XR CHEST AP PORTABLE    CLINICAL HISTORY:  shortness of breath;    COMPARISON:  05/05/2020    FINDINGS:  Heart size is normal.  Aortic atherosclerosis is again noted.  There are stable small bilateral pleural effusions.  There has been interval worsening of a patchy infiltrate in the left lung apex with increasing mild adjacent left apical pleural thickening.  Patchy multifocal ground-glass infiltrates in the bilateral mid and lower lung zones appear slightly increased compared to prior exam.  Stable small calcified granulomas in the right lung.  No pneumothorax identified.

## 2020-05-10 NOTE — CONSULTS
"[facility] -   Cardiology  Consult Note    Patient Name: Noble Tripp  MRN: [id number]  Admission Date: 5/10/2020  Hospital Length of Stay: 0 days  Code Status: Prior   Attending Provider: Vasile Gar MD   Consulting Provider: Andres Whitfield MD  Primary Care Physician: Dwaine Davis MD  Principal Problem:Acute hypoxemic respiratory failure    Patient information was obtained from patient and ER records.     Inpatient consult to Cardiology  Consult performed by: Andres Whitfield MD  Consult ordered by: Shaan Garcia NP        Subjective:       HPI:        75 yo, male, consult for SOB  PMH PSVT, CAD, CHfrEF 35%, HLD, COPD on home O2, JUANITO on CPAP, vocal cord cancer s/p surgery and subsequent XRT in 2011, and recent tongue precancer mass removal.  05/07/2020 LC fro NSTEMI showing rca  recanalized unable to cross Lad 70-80% stenosis collaterals to rca lcx non obs   Ef 55% with inf ak. By Dr. Beltran  Pt was discharged for optimized medical Rx. Will get second opinion at [facility] for possible PCI  Pt admitted again for SOB with chest tightness. SaO2 was 88% at RA  Troponin 0.04 flat, , ekg NSR and no acute stt change       Past Medical History:   Diagnosis Date    Adrenal adenoma     david;nl fxn w/u;tran 11/18    Aspiration pneumonia 10/15    puree/honey    Benign prostate hyperplasia     CAD (coronary artery disease)     dr whitfield    Chronic pain     dr santos    Chronic systolic congestive heart failure 10/17/2019    COPD (chronic obstructive pulmonary disease)     papi    Ex-smoker     11/13    Hyperlipidemia     Ischemic cardiomyopathy 11/22/2019    JUANITO on CPAP     cpap    Osteopenia 1/15 tran 1/18    PSVT (paroxysmal supraventricular tachycardia)     PVD (peripheral vascular disease)     noobs 07 latrell    Pyriform sinus cancer     dr ponce radiation 1/2-14 dr king perales    Squamous cell carcinoma of skin     roberto    Tongue cancer     "superficial" removed " 11/14    Vocal cord cancer 2011    Xerostomia     radiation       Past Surgical History:   Procedure Laterality Date    APPENDECTOMY      BRONCHOSCOPY Bilateral 2/5/2019    Procedure: Bronchoscopy;  Surgeon: Santos Cardozo MD;  Location: [facility];  Service: Endoscopy;  Laterality: Bilateral;    COLONOSCOPY N/A 5/1/2017    Procedure: Due for screening colonoscopy;  Surgeon: Anushka Yoder MD;  Location: [facility] ENDO;  Service: Endoscopy;  Laterality: N/A;    ESOPHAGOGASTRODUODENOSCOPY N/A 5/29/2018    Procedure: ESOPHAGOGASTRODUODENOSCOPY (EGD);  Surgeon: Audie Hill III, MD;  Location: [facility];  Service: Endoscopy;  Laterality: N/A;    ESOPHAGOGASTRODUODENOSCOPY N/A 8/29/2018    Procedure: ESOPHAGOGASTRODUODENOSCOPY (EGD);  Surgeon: Audie Hill III, MD;  Location: [facility] ENDO;  Service: Endoscopy;  Laterality: N/A;    PERCUTANEOUS TRANSLUMINAL BALLOON ANGIOPLASTY OF CORONARY ARTERY Right 12/9/2019    Procedure: PTCA, USING BALLOON/ IABP or Impella multivessel angioplasty/poss stent;  Surgeon: Abel Beltran MD;  Location: [facility] CATH LAB;  Service: Cardiology;  Laterality: Right;    THORACENTESIS Left 8/7/2018    Procedure: Thoracentesis;  Surgeon: Santos Cardozo MD;  Location: [facility] ENDO;  Service: Endoscopy;  Laterality: Left;    THORACENTESIS Right 2/25/2019    Procedure: Thoracentesis;  Surgeon: Santos Cardozo MD;  Location: [facility] ENDO;  Service: Endoscopy;  Laterality: Right;    tongue cancer excision  11/14    dr vitale    VOCAL CORD LATERALIZATION, ENDOSCOPIC APPROACH W/ MLB      kris       Review of patient's allergies indicates:   Allergen Reactions    Contrast media Hives and Itching    Iodinated contrast media Hives and Itching    Iodine Hives and Itching    Pravastatin      Other reaction(s): fatigue  Other reaction(s): fatigue    Rosuvastatin      Other reaction(s): fatigue  Other reaction(s): fatigue    Simvastatin      Other reaction(s): fatigue  Other  reaction(s): fatigue    Statins-hmg-coa reductase inhibitors Other (See Comments)     Fatigue       No current facility-administered medications on file prior to encounter.      Current Outpatient Medications on File Prior to Encounter   Medication Sig    albuterol (PROAIR HFA) 90 mcg/actuation inhaler INHALE 2 PUFFS BY MOUTH EVERY 4 HOURS AS NEEDED FOR WHEEZING OR  SHORTNESS  OF  BREATH    aspirin 81 mg Tab Take 1 tablet by mouth Daily. Over the counter to help prevent stroke/heart attack    atorvastatin (LIPITOR) 80 MG tablet Take 1 tablet (80 mg total) by mouth once daily.    carvedilol (COREG) 3.125 MG tablet Take 1 tablet (3.125 mg total) by mouth 2 (two) times daily.    clopidogrel (PLAVIX) 75 mg tablet Take 1 tablet (75 mg total) by mouth once daily.    ELDERBERRY FRUIT ORAL Take by mouth.    furosemide (LASIX) 40 MG tablet Take 0.5 tablets (20 mg total) by mouth once daily.    gabapentin (NEURONTIN) 800 MG tablet Take 1 tablet (800 mg total) by mouth 3 (three) times daily.    L.acid/B.bifidum/B.animal/FOS (PROBIOTIC COMPLEX ORAL) Take by mouth.    losartan (COZAAR) 25 MG tablet Take 1 tablet (25 mg total) by mouth every evening.    [START ON 5/14/2020] oxyCODONE-acetaminophen (PERCOCET) 7.5-325 mg per tablet Take 1 tablet by mouth every 8 (eight) hours as needed for Pain.    pantoprazole (PROTONIX) 40 MG tablet TAKE ONE TABLET BY MOUTH ONCE DAILY    SPIRIVA WITH HANDIHALER 18 mcg inhalation capsule INHALE 1 CAPSULE BY MOUTH ONCE DAILY    cefdinir (OMNICEF) 300 MG capsule Take 1 capsule (300 mg total) by mouth 2 (two) times daily. for 10 days (Patient not taking: Reported on 5/10/2020)    evolocumab (REPATHA SURECLICK) 140 mg/mL PnIj Inject 1 mL (140 mg total) into the skin every 14 (fourteen) days.    nebulizer and compressor Bailey Use as directed    oxyCODONE-acetaminophen (PERCOCET) 7.5-325 mg per tablet Take 1 tablet by mouth every 8 (eight) hours as needed for Pain.    OXYGEN-AIR  DELIVERY SYSTEMS MISC Inhale 2-3 L into the lungs continuous.     Family History     Problem Relation (Age of Onset)    Lung cancer Father, Brother    No Known Problems Mother        Tobacco Use    Smoking status: Former Smoker     Packs/day: 0.50     Years: 55.00     Pack years: 27.50     Types: Cigarettes     Last attempt to quit: 10/1/2019     Years since quittin.6    Smokeless tobacco: Never Used    Tobacco comment: 6-7 cigs/day   Substance and Sexual Activity    Alcohol use: Not Currently    Drug use: No    Sexual activity: Not Currently     Review of Systems   HENT: Negative.    Eyes: Negative.    Cardiovascular: Positive for chest pain and dyspnea on exertion.   Respiratory: Positive for shortness of breath.    Endocrine: Negative.    Hematologic/Lymphatic: Negative.    Skin: Negative.    Musculoskeletal: Negative.    Gastrointestinal: Negative.    Genitourinary: Negative.    Neurological: Negative.    Psychiatric/Behavioral: Negative.    Allergic/Immunologic: Negative.      Objective:     Vital Signs (Most Recent):  Temp: 98.2 °F (36.8 °C) (05/10/20 1225)  Pulse: 75 (05/10/20 1324)  Resp: 16 (05/10/20 1324)  BP: (!) 143/64 (05/10/20 1225)  SpO2: 95 % (05/10/20 1324) Vital Signs (24h Range):  Temp:  [97.5 °F (36.4 °C)-98.2 °F (36.8 °C)] 98.2 °F (36.8 °C)  Pulse:  [66-76] 75  Resp:  [16-29] 16  SpO2:  [88 %-99 %] 95 %  BP: (119-162)/(58-71) 143/64     Weight: 56.6 kg (124 lb 11.2 oz)  Body mass index is 18.96 kg/m².    SpO2: 95 %  O2 Device (Oxygen Therapy): nasal cannula    No intake or output data in the 24 hours ending 05/10/20 1540    Lines/Drains/Airways     Peripheral Intravenous Line                 Peripheral IV - Single Lumen 05/10/20 0337 20 G Right Forearm less than 1 day                Physical Exam   Constitutional: He is oriented to person, place, and time. He appears well-developed and well-nourished. No distress.   On supplemental O2   HENT:   Head: Normocephalic and atraumatic.    Eyes: Pupils are equal, round, and reactive to light. Right eye exhibits no discharge. Left eye exhibits no discharge.   Neck: Neck supple. No JVD present.   Cardiovascular: Normal rate, regular rhythm, S1 normal, S2 normal and normal heart sounds.   No murmur heard.  Pulmonary/Chest: Effort normal. No respiratory distress. He has decreased breath sounds. He has no wheezes. He has no rales.   Coarse BS   Abdominal: Soft. He exhibits no distension.   Musculoskeletal: He exhibits no edema.   Neurological: He is alert and oriented to person, place, and time.   Skin: Skin is warm and dry. He is not diaphoretic. No erythema.   Right groin access site C/D/I; no bleeding erythema or drainage, no hematoma   Psychiatric: He has a normal mood and affect. His behavior is normal.   Nursing note and vitals reviewed.      Significant Labs:   ABG:   Recent Labs   Lab 05/10/20  0416   PH 7.400   PCO2 45.5*   HCO3 28.2*   POCSATURATED 80*   BE 3   , Blood Culture: No results for input(s): LABBLOO in the last 48 hours., BMP:   Recent Labs   Lab 05/10/20  0403   GLU 93      K 4.1      CO2 25   BUN 52*   CREATININE 1.4   CALCIUM 8.5*   , CMP   Recent Labs   Lab 05/10/20  0403      K 4.1      CO2 25   GLU 93   BUN 52*   CREATININE 1.4   CALCIUM 8.5*   PROT 6.4   ALBUMIN 2.9*   BILITOT 0.6   ALKPHOS 215*   *   ALT 95*   ANIONGAP 14   ESTGFRAFRICA 57*   EGFRNONAA 49*   , CBC   Recent Labs   Lab 05/10/20  0403   WBC 15.30*   HGB 11.7*   HCT 37.1*      , INR   Recent Labs   Lab 05/10/20  0403   INR 1.1   , Lipid Panel No results for input(s): CHOL, HDL, LDLCALC, TRIG, CHOLHDL in the last 48 hours. and Troponin   Recent Labs   Lab 05/10/20  0403 05/10/20  0931   TROPONINI 0.040* 0.040*       Significant Imaging: EKG:      Assessment and Plan:     * Acute hypoxemic respiratory failure  Rx per HM team    Elevated troponin  Chronic elevation, demand    Coronary artery disease  Severe multiple vessel Dz.    Continue medical RX  Aggressive COPD Rx with keeping SaO2 > 94%    -add NTG oilment patch  -continue ASA, Plavix, Coreg, losartan. Repath as OP  -DASH    Hypertension  See above note    Hyperlipidemia  See above note        VTE Risk Mitigation (From admission, onward)         Ordered     enoxaparin injection 40 mg  Daily      05/10/20 8416                Thank you for your consult. I will sign off. Please contact us if you have any additional questions.    Andres Whitfield MD  Cardiology   [facility]

## 2020-05-10 NOTE — ASSESSMENT & PLAN NOTE
Refused Statin   Hep C antibiotic negative    RUQ ultrasound showed enlarged, cirrhotic liver with splenomegaly   Follow with GI as outpatient

## 2020-05-10 NOTE — ED PROVIDER NOTES
SCRIBE #1 NOTE: I, Juany Avitia, am scribing for, and in the presence of, Bharathi Villavicencio MD. I have scribed the entire note.       History     Chief Complaint   Patient presents with    Shortness of Breath     Recently DC'd, c/o shortness of breath. 81% on RA upon fire department arrival. Placed on 6L by EMS and sats increased to 99%. Hx COPD, CHF.    Chest Pain     Review of patient's allergies indicates:   Allergen Reactions    Contrast media Hives and Itching    Iodinated contrast media Hives and Itching    Iodine Hives and Itching    Pravastatin      Other reaction(s): fatigue  Other reaction(s): fatigue    Rosuvastatin      Other reaction(s): fatigue  Other reaction(s): fatigue    Simvastatin      Other reaction(s): fatigue  Other reaction(s): fatigue    Statins-hmg-coa reductase inhibitors Other (See Comments)     Fatigue         History of Present Illness     HPI    5/10/2020, 3:50 AM  History obtained from the patient      History of Present Illness: Noble Tripp is a 74 y.o. male patient with a PMHx of cancer, CAD, CHF, COPD, and HLD who presents to the Emergency Department for evaluation of exertional CP and SOB which onset gradually this morning. Patient was unable to sleep because he was worried about his oxygen levels.  Patient used to be on home oxygen, has since been weaned off by his physicians.  Symptoms are waxing/waning and moderate in severity. No mitigating factors reported. No associated sxs. Patient denies any fever, chills, cough, extremity weakness/numbness, n/v, wheezing, diaphoresis, radiation of the chest pain, and all other sxs at this time. EMS reports patient was 81% on RA when fire department arrived and after EMS put him on 6 L his sats increased to 99%. He had negative COVID test on 5/5 and was discharged on 5/8. Patient had L heart cath done on 5/8 with unsuccessful intervention of  RCA. Cardiology plans to refer patient to [facility] [place] No further complaints or  "concerns at this time.       Arrival mode: EMS    PCP: Dwaine Davis MD        Past Medical History:  Past Medical History:   Diagnosis Date    Adrenal adenoma     david;nl fxn w/u;tran 11/18    Aspiration pneumonia 10/15    puree/honey    Benign prostate hyperplasia     CAD (coronary artery disease)     dr padilla    Chronic pain     dr santos    Chronic systolic congestive heart failure 10/17/2019    COPD (chronic obstructive pulmonary disease)     papi    Ex-smoker     11/13    Hyperlipidemia     Ischemic cardiomyopathy 11/22/2019    JUANITO on CPAP     cpap    Osteopenia 1/15 tran 1/18    PSVT (paroxysmal supraventricular tachycardia)     PVD (peripheral vascular disease)     noobs 07 khuri    Pyriform sinus cancer     dr ponce radiation 1/2-14 dr king perales    Squamous cell carcinoma of skin     roberto    Tongue cancer     "superficial" removed 11/14    Vocal cord cancer 2011    Xerostomia     radiation       Past Surgical History:  Past Surgical History:   Procedure Laterality Date    APPENDECTOMY      BRONCHOSCOPY Bilateral 2/5/2019    Procedure: Bronchoscopy;  Surgeon: Santos Cardozo MD;  Location: [facility];  Service: Endoscopy;  Laterality: Bilateral;    COLONOSCOPY N/A 5/1/2017    Procedure: Due for screening colonoscopy;  Surgeon: Anushka Yoder MD;  Location: [facility];  Service: Endoscopy;  Laterality: N/A;    ESOPHAGOGASTRODUODENOSCOPY N/A 5/29/2018    Procedure: ESOPHAGOGASTRODUODENOSCOPY (EGD);  Surgeon: Audie Hill III, MD;  Location: [facility];  Service: Endoscopy;  Laterality: N/A;    ESOPHAGOGASTRODUODENOSCOPY N/A 8/29/2018    Procedure: ESOPHAGOGASTRODUODENOSCOPY (EGD);  Surgeon: Audie Hill III, MD;  Location: [facility];  Service: Endoscopy;  Laterality: N/A;    PERCUTANEOUS TRANSLUMINAL BALLOON ANGIOPLASTY OF CORONARY ARTERY Right 12/9/2019    Procedure: PTCA, USING BALLOON/ IABP or Impella multivessel angioplasty/poss stent;  Surgeon: Abel" SARIKA Beltran MD;  Location: [facility] CATH LAB;  Service: Cardiology;  Laterality: Right;    THORACENTESIS Left 2018    Procedure: Thoracentesis;  Surgeon: Santos Cardozo MD;  Location: [facility] ENDO;  Service: Endoscopy;  Laterality: Left;    THORACENTESIS Right 2019    Procedure: Thoracentesis;  Surgeon: Santos Cardozo MD;  Location: [facility] ENDO;  Service: Endoscopy;  Laterality: Right;    tongue cancer excision      dr vitale    VOCAL CORD LATERALIZATION, ENDOSCOPIC APPROACH W/ MLB      kris         Family History:  Family History   Problem Relation Age of Onset    Lung cancer Father         + Smoker    No Known Problems Mother     Lung cancer Brother         + Smoker       Social History:  Social History     Tobacco Use    Smoking status: Former Smoker     Packs/day: 0.50     Years: 55.00     Pack years: 27.50     Types: Cigarettes     Last attempt to quit: 10/1/2019     Years since quittin.6    Smokeless tobacco: Never Used    Tobacco comment: 6-7 cigs/day   Substance and Sexual Activity    Alcohol use: Not Currently    Drug use: No    Sexual activity: Not Currently        Review of Systems     Review of Systems   Constitutional: Negative for activity change, chills, diaphoresis and fever.   HENT: Negative for congestion, rhinorrhea, sneezing, sore throat and trouble swallowing.    Eyes: Negative for pain.   Respiratory: Positive for shortness of breath. Negative for cough, chest tightness, wheezing and stridor.    Cardiovascular: Positive for chest pain. Negative for palpitations and leg swelling.   Gastrointestinal: Negative for abdominal distention, abdominal pain, constipation, diarrhea, nausea and vomiting.   Genitourinary: Negative for difficulty urinating, dysuria, frequency and urgency.   Musculoskeletal: Negative for arthralgias, back pain, myalgias, neck pain and neck stiffness.   Skin: Negative for pallor, rash and wound.   Neurological: Negative for dizziness, syncope,  weakness, light-headedness, numbness and headaches.   Hematological: Does not bruise/bleed easily.   All other systems reviewed and are negative.     Physical Exam     Initial Vitals [05/10/20 0323]   BP Pulse Resp Temp SpO2   127/71 74 20 97.5 °F (36.4 °C) 99 %      MAP       --          Physical Exam  Nursing Notes and Vital Signs Reviewed.  Constitutional: Patient is in no acute distress. Well-developed and well-nourished.  Head: Atraumatic. Normocephalic.  Eyes: PERRL. EOM intact. Conjunctivae are not pale. No scleral icterus.  ENT: Mucous membranes are moist. Oropharynx is clear and symmetric.    Neck: Supple. Full ROM. No lymphadenopathy.  Cardiovascular: Regular rate. Regular rhythm. No murmurs, rubs, or gallops. Distal pulses are 2+ and symmetric.  Pulmonary/Chest: No respiratory distress. Clear to auscultation bilaterally. No wheezing or rales.  Abdominal: Soft and non-distended.  There is no tenderness.  No rebound, guarding, or rigidity. Good bowel sounds.  Genitourinary: No CVA tenderness  Musculoskeletal: Moves all extremities. No obvious deformities. 2+ bilateral pedal edema. No calf tenderness.  Skin: Warm and dry.  Neurological:  Alert, awake, and appropriate.  Normal speech. No acute focal neurological deficits are appreciated.  Psychiatric: Normal affect. Good eye contact. Appropriate in content.     ED Course   Critical Care  Date/Time: 5/10/2020 4:28 AM  Performed by: Bharathi Villavicencio MD  Authorized by: Bharathi Villavicencio MD   Direct patient critical care time: 15 minutes  Additional history critical care time: 6 minutes  Ordering / reviewing critical care time: 11 minutes  Documentation critical care time: 7 minutes  Consulting other physicians critical care time: 6 minutes  Consult with family critical care time: 0 minutes  Other critical care time: 0 minutes  Total critical care time (exclusive of procedural time) : 45 minutes  Critical care time was exclusive of separately billable procedures and  "treating other patients and teaching time.  Critical care was necessary to treat or prevent imminent or life-threatening deterioration of the following conditions: hypoxia.  Critical care was time spent personally by me on the following activities: blood draw for specimens, development of treatment plan with patient or surrogate, discussions with consultants, interpretation of cardiac output measurements, evaluation of patient's response to treatment, examination of patient, obtaining history from patient or surrogate, ordering and performing treatments and interventions, ordering and review of laboratory studies, ordering and review of radiographic studies, pulse oximetry, re-evaluation of patient's condition and review of old charts.        ED Vital Signs:  Vitals:    05/10/20 0323 05/10/20 0343 05/10/20 0400   BP: 127/71  (!) 119/58   Pulse: 74  74   Resp: 20  (!) 29   Temp: 97.5 °F (36.4 °C)     TempSrc: Oral     SpO2: 99%  (!) 88%   Weight: (S) 62.1 kg (137 lb) 56.6 kg (124 lb 11.2 oz)    Height: 5' 8" (1.727 m)         Abnormal Lab Results:  Labs Reviewed   CBC W/ AUTO DIFFERENTIAL - Abnormal; Notable for the following components:       Result Value    WBC 15.30 (*)     RBC 3.96 (*)     Hemoglobin 11.7 (*)     Hematocrit 37.1 (*)     Mean Corpuscular Hemoglobin Conc 31.5 (*)     RDW 16.8 (*)     Immature Granulocytes 1.1 (*)     Gran # (ANC) 13.1 (*)     Immature Grans (Abs) 0.17 (*)     Lymph # 0.7 (*)     Mono # 1.3 (*)     Gran% 85.9 (*)     Lymph% 4.2 (*)     All other components within normal limits   ISTAT PROCEDURE - Abnormal; Notable for the following components:    POC PCO2 45.5 (*)     POC PO2 45 (*)     POC HCO3 28.2 (*)     POC SATURATED O2 80 (*)     All other components within normal limits   HEPATITIS C ANTIBODY   COMPREHENSIVE METABOLIC PANEL   B-TYPE NATRIURETIC PEPTIDE   TROPONIN I   PROTIME-INR   APTT   SARS-COV-2 RNA AMPLIFICATION, QUAL        All Lab Results:  Results for DEBI CERON " DARRELL (MRN [id number]) as of 5/11/2020 23:26   Ref. Range 5/10/2020 04:03 5/10/2020 04:13 5/10/2020 04:16   WBC Latest Ref Range: 3.90 - 12.70 K/uL 15.30 (H)     RBC Latest Ref Range: 4.60 - 6.20 M/uL 3.96 (L)     Hemoglobin Latest Ref Range: 14.0 - 18.0 g/dL 11.7 (L)     Hematocrit Latest Ref Range: 40.0 - 54.0 % 37.1 (L)     MCV Latest Ref Range: 82 - 98 fL 94     MCH Latest Ref Range: 27.0 - 31.0 pg 29.5     MCHC Latest Ref Range: 32.0 - 36.0 g/dL 31.5 (L)     RDW Latest Ref Range: 11.5 - 14.5 % 16.8 (H)     Platelets Latest Ref Range: 150 - 350 K/uL 185     MPV Latest Ref Range: 9.2 - 12.9 fL 12.6     Gran% Latest Ref Range: 38.0 - 73.0 % 85.9 (H)     Gran # (ANC) Latest Ref Range: 1.8 - 7.7 K/uL 13.1 (H)     Lymph% Latest Ref Range: 18.0 - 48.0 % 4.2 (L)     Lymph # Latest Ref Range: 1.0 - 4.8 K/uL 0.7 (L)     Mono% Latest Ref Range: 4.0 - 15.0 % 8.4     Mono # Latest Ref Range: 0.3 - 1.0 K/uL 1.3 (H)     Eosinophil% Latest Ref Range: 0.0 - 8.0 % 0.2     Eos # Latest Ref Range: 0.0 - 0.5 K/uL 0.0     Basophil% Latest Ref Range: 0.0 - 1.9 % 0.2     Baso # Latest Ref Range: 0.00 - 0.20 K/uL 0.03     nRBC Latest Ref Range: 0 /100 WBC 0     Differential Method Unknown Automated     Immature Grans (Abs) Latest Ref Range: 0.00 - 0.04 K/uL 0.17 (H)     Immature Granulocytes Latest Ref Range: 0.0 - 0.5 % 1.1 (H)     Protime Latest Ref Range: 9.0 - 12.5 sec 11.3     INR Latest Ref Range: 0.8 - 1.2  1.1     aPTT Latest Ref Range: 21.0 - 32.0 sec 28.7     Sodium Latest Ref Range: 136 - 145 mmol/L 142     Potassium Latest Ref Range: 3.5 - 5.1 mmol/L 4.1     Chloride Latest Ref Range: 95 - 110 mmol/L 103     CO2 Latest Ref Range: 23 - 29 mmol/L 25     Anion Gap Latest Ref Range: 8 - 16 mmol/L 14     BUN, Bld Latest Ref Range: 8 - 23 mg/dL 52 (H)     Creatinine Latest Ref Range: 0.5 - 1.4 mg/dL 1.4     eGFR if non African American Latest Ref Range: >60 mL/min/1.73 m^2 49 (A)     eGFR if African American Latest Ref Range: >60  mL/min/1.73 m^2 57 (A)     Glucose Latest Ref Range: 70 - 110 mg/dL 93     Calcium Latest Ref Range: 8.7 - 10.5 mg/dL 8.5 (L)     Alkaline Phosphatase Latest Ref Range: 55 - 135 U/L 215 (H)     PROTEIN TOTAL Latest Ref Range: 6.0 - 8.4 g/dL 6.4     Albumin Latest Ref Range: 3.5 - 5.2 g/dL 2.9 (L)     BILIRUBIN TOTAL Latest Ref Range: 0.1 - 1.0 mg/dL 0.6     AST Latest Ref Range: 10 - 40 U/L 100 (H)     ALT Latest Ref Range: 10 - 44 U/L 95 (H)     BNP Latest Ref Range: 0 - 99 pg/mL 170 (H)     Troponin I Latest Ref Range: 0.000 - 0.026 ng/mL 0.040 (H)     Hepatitis C Ab Latest Ref Range: Negative  Negative     SARS-CoV-2 RNA, Amplification, Qual Latest Ref Range: Negative   Negative    POC PH Latest Ref Range: 7.35 - 7.45    7.400   POC PCO2 Latest Ref Range: 35 - 45 mmHg   45.5 (H)   POC PO2 Latest Ref Range: 80 - 100 mmHg   45 (LL)   POC BE Latest Ref Range: -2 to 2 mmol/L   3   POC HCO3 Latest Ref Range: 24 - 28 mmol/L   28.2 (H)   POC SATURATED O2 Latest Ref Range: 95 - 100 %   80 (L)   FiO2 Unknown   21   Sample Unknown   ARTERIAL   DelSys Unknown   Room Air   Allens Test Unknown   Pass   Site Unknown   RR   Mode Unknown   SPONT         Imaging Results:  Imaging Results    None          The EKG was ordered, reviewed, and independently interpreted by the ED provider.  Interpretation time: 3:34 AM  Normal sinus rhythm with first-degree AV block, normal axis, normal QRS and QTC intervals, ST T-wave abnormalities in inferior and lateral leads.  No STEMI             The Emergency Provider reviewed the vital signs and test results, which are outlined above.     ED Discussion              Medical Decision Making:   Patient with known coronary artery disease presenting with complaints of exertional chest pain and shortness of breath; patient hypoxic; ABG shows O2 sat of 80%; patient placed on oxygen (notably patient used to be on home oxygen, however was weaned off), given aspirin, and admitted to Medicine; observation  telemetry with Dr. Mak           ED Medication(s):  Medications - No data to display    New Prescriptions    No medications on file               Scribe Attestation:   Scribe #1: I performed the above scribed service and the documentation accurately describes the services I performed. I attest to the accuracy of the note.     Attending:   Physician Attestation Statement for Scribe #1: I, Bharathi Villavicencio MD, personally performed the services described in this documentation, as scribed by Juany Avitia, in my presence, and it is both accurate and complete.           Clinical Impression       ICD-10-CM ICD-9-CM   1. Hypoxia R09.02 799.02   2. Dyspnea, unspecified type R06.00 786.09   3. Chest pain, unspecified type R07.9 786.50   4. Elevated troponin R79.89 790.6       Disposition:   Disposition: Admitted  Condition: Fair         Bharathi Villavicencio MD  05/11/20 0838

## 2020-05-10 NOTE — NURSING
"Called to room, pt O2 sat 77% on 2L NC. Called RT. Increased O2 to 5L. O2 sat 90-91%. Pt in no distress. "Feeling a little better." Notified Antelmo Garcia NP and Dr. Gar. Will continue to monitor.  "

## 2020-05-10 NOTE — HPI
75 yo, male, consult for SOB  PMH PSVT, CAD, CHfrEF 35%, HLD, COPD on home O2, JUANITO on CPAP, vocal cord cancer s/p surgery and subsequent XRT in 2011, and recent tongue precancer mass removal.  05/07/2020 [facility] fro NSTEMI showing rca  recanalized unable to cross Lad 70-80% stenosis collaterals to rca lcx non obs   Ef 55% with inf ak. By Dr. Beltran  Pt was discharged for optimized medical Rx. Will get second opinion at [facility] for possible PCI  Pt admitted again for SOB with chest tightness. SaO2 was 88% at RA  Troponin 0.04 flat, , ekg NSR and no acute stt change

## 2020-05-10 NOTE — HPI
Mr Tripp is a 74 year old male with PMHx of COPD, CAD, CHF and cancer who presented to [facility] Emergency Room with complaints of increase shortness of breath that started on yesterday. Associated symptoms include chest pain and decrease aspirated. Denies associated symptoms fever, chills, nausea, vomiting or diaphoresis. Patient was recently discharge from [facility] on 5/8/2020 after having Left Heart Cath  with unsuccessful intervention of  RCA. He was referred to [facility]. EMS reported O 2 sat was 81 % on room air, he was then place on 6 L. ABG showed PO 2 of 45 on room air. Patient reports has not wore home O 2 since October 2019. Troponin mildly elevated at 0.040. Chest x ray showed mildly worsening patchy multifocal bilateral pulmonary infiltrates suspicious for atypical pneumonia, stable small pleural effusions. Patient is a full code, daughter Ana Rosa is surrogate decision.  He is being placed in Observation under the care of Hospital Medicine.

## 2020-05-11 LAB
ALBUMIN SERPL BCP-MCNC: 2.6 G/DL (ref 3.5–5.2)
ALP SERPL-CCNC: 245 U/L (ref 55–135)
ALT SERPL W/O P-5'-P-CCNC: 89 U/L (ref 10–44)
AMMONIA PLAS-SCNC: 42 UMOL/L (ref 10–50)
ANION GAP SERPL CALC-SCNC: 10 MMOL/L (ref 8–16)
AST SERPL-CCNC: 93 U/L (ref 10–40)
BASOPHILS # BLD AUTO: 0.03 K/UL (ref 0–0.2)
BASOPHILS NFR BLD: 0.2 % (ref 0–1.9)
BILIRUB SERPL-MCNC: 0.7 MG/DL (ref 0.1–1)
BUN SERPL-MCNC: 40 MG/DL (ref 8–23)
CALCIUM SERPL-MCNC: 8.7 MG/DL (ref 8.7–10.5)
CHLORIDE SERPL-SCNC: 107 MMOL/L (ref 95–110)
CO2 SERPL-SCNC: 26 MMOL/L (ref 23–29)
CREAT SERPL-MCNC: 1.2 MG/DL (ref 0.5–1.4)
DIFFERENTIAL METHOD: ABNORMAL
EOSINOPHIL # BLD AUTO: 0 K/UL (ref 0–0.5)
EOSINOPHIL NFR BLD: 0.1 % (ref 0–8)
ERYTHROCYTE [DISTWIDTH] IN BLOOD BY AUTOMATED COUNT: 16.9 % (ref 11.5–14.5)
EST. GFR  (AFRICAN AMERICAN): >60 ML/MIN/1.73 M^2
EST. GFR  (NON AFRICAN AMERICAN): 60 ML/MIN/1.73 M^2
GLUCOSE SERPL-MCNC: 82 MG/DL (ref 70–110)
HCT VFR BLD AUTO: 35.8 % (ref 40–54)
HGB BLD-MCNC: 11.2 G/DL (ref 14–18)
IMM GRANULOCYTES # BLD AUTO: 0.23 K/UL (ref 0–0.04)
IMM GRANULOCYTES NFR BLD AUTO: 1.7 % (ref 0–0.5)
LYMPHOCYTES # BLD AUTO: 0.8 K/UL (ref 1–4.8)
LYMPHOCYTES NFR BLD: 5.4 % (ref 18–48)
MAGNESIUM SERPL-MCNC: 2.7 MG/DL (ref 1.6–2.6)
MCH RBC QN AUTO: 29.5 PG (ref 27–31)
MCHC RBC AUTO-ENTMCNC: 31.3 G/DL (ref 32–36)
MCV RBC AUTO: 94 FL (ref 82–98)
MONOCYTES # BLD AUTO: 1 K/UL (ref 0.3–1)
MONOCYTES NFR BLD: 7.1 % (ref 4–15)
NEUTROPHILS # BLD AUTO: 11.9 K/UL (ref 1.8–7.7)
NEUTROPHILS NFR BLD: 85.5 % (ref 38–73)
NRBC BLD-RTO: 0 /100 WBC
PLATELET # BLD AUTO: 146 K/UL (ref 150–350)
PMV BLD AUTO: 12.7 FL (ref 9.2–12.9)
POTASSIUM SERPL-SCNC: 4.3 MMOL/L (ref 3.5–5.1)
PROCALCITONIN SERPL IA-MCNC: 3.49 NG/ML
PROT SERPL-MCNC: 5.9 G/DL (ref 6–8.4)
RBC # BLD AUTO: 3.8 M/UL (ref 4.6–6.2)
SODIUM SERPL-SCNC: 143 MMOL/L (ref 136–145)
VANCOMYCIN SERPL-MCNC: 8.3 UG/ML
WBC # BLD AUTO: 13.89 K/UL (ref 3.9–12.7)

## 2020-05-11 PROCEDURE — 25000003 PHARM REV CODE 250: Performed by: NURSE PRACTITIONER

## 2020-05-11 PROCEDURE — 25000242 PHARM REV CODE 250 ALT 637 W/ HCPCS: Performed by: INTERNAL MEDICINE

## 2020-05-11 PROCEDURE — 94640 AIRWAY INHALATION TREATMENT: CPT

## 2020-05-11 PROCEDURE — 36415 COLL VENOUS BLD VENIPUNCTURE: CPT

## 2020-05-11 PROCEDURE — 25000003 PHARM REV CODE 250: Performed by: INTERNAL MEDICINE

## 2020-05-11 PROCEDURE — 25000242 PHARM REV CODE 250 ALT 637 W/ HCPCS: Performed by: NURSE PRACTITIONER

## 2020-05-11 PROCEDURE — 82140 ASSAY OF AMMONIA: CPT

## 2020-05-11 PROCEDURE — 83735 ASSAY OF MAGNESIUM: CPT

## 2020-05-11 PROCEDURE — 63600175 PHARM REV CODE 636 W HCPCS: Performed by: INTERNAL MEDICINE

## 2020-05-11 PROCEDURE — 80053 COMPREHEN METABOLIC PANEL: CPT

## 2020-05-11 PROCEDURE — 63600175 PHARM REV CODE 636 W HCPCS: Performed by: NURSE PRACTITIONER

## 2020-05-11 PROCEDURE — 84145 PROCALCITONIN (PCT): CPT

## 2020-05-11 PROCEDURE — 80202 ASSAY OF VANCOMYCIN: CPT

## 2020-05-11 PROCEDURE — 27000221 HC OXYGEN, UP TO 24 HOURS

## 2020-05-11 PROCEDURE — 85025 COMPLETE CBC W/AUTO DIFF WBC: CPT

## 2020-05-11 PROCEDURE — 94761 N-INVAS EAR/PLS OXIMETRY MLT: CPT

## 2020-05-11 PROCEDURE — 21400001 HC TELEMETRY ROOM

## 2020-05-11 RX ORDER — DOCUSATE SODIUM 100 MG/1
100 CAPSULE, LIQUID FILLED ORAL 2 TIMES DAILY
Status: DISCONTINUED | OUTPATIENT
Start: 2020-05-11 | End: 2020-05-12 | Stop reason: HOSPADM

## 2020-05-11 RX ORDER — LEVOFLOXACIN 5 MG/ML
750 INJECTION, SOLUTION INTRAVENOUS
Status: DISCONTINUED | OUTPATIENT
Start: 2020-05-11 | End: 2020-05-12 | Stop reason: HOSPADM

## 2020-05-11 RX ADMIN — GABAPENTIN 800 MG: 400 CAPSULE ORAL at 02:05

## 2020-05-11 RX ADMIN — DOCUSATE SODIUM 100 MG: 100 CAPSULE, LIQUID FILLED ORAL at 10:05

## 2020-05-11 RX ADMIN — CARVEDILOL 3.12 MG: 3.12 TABLET, FILM COATED ORAL at 09:05

## 2020-05-11 RX ADMIN — CEFTRIAXONE 1 G: 1 INJECTION, SOLUTION INTRAVENOUS at 10:05

## 2020-05-11 RX ADMIN — IPRATROPIUM BROMIDE 0.5 MG: 0.5 SOLUTION RESPIRATORY (INHALATION) at 07:05

## 2020-05-11 RX ADMIN — GABAPENTIN 800 MG: 400 CAPSULE ORAL at 09:05

## 2020-05-11 RX ADMIN — NITROGLYCERIN 1 INCH: 20 OINTMENT TOPICAL at 02:05

## 2020-05-11 RX ADMIN — ARFORMOTEROL TARTRATE 15 MCG: 15 SOLUTION RESPIRATORY (INHALATION) at 07:05

## 2020-05-11 RX ADMIN — OXYCODONE HYDROCHLORIDE AND ACETAMINOPHEN 1 TABLET: 7.5; 325 TABLET ORAL at 10:05

## 2020-05-11 RX ADMIN — NITROGLYCERIN 1 INCH: 20 OINTMENT TOPICAL at 06:05

## 2020-05-11 RX ADMIN — DOCUSATE SODIUM 100 MG: 100 CAPSULE, LIQUID FILLED ORAL at 09:05

## 2020-05-11 RX ADMIN — CLOPIDOGREL BISULFATE 75 MG: 75 TABLET ORAL at 10:05

## 2020-05-11 RX ADMIN — NITROGLYCERIN 1 INCH: 20 OINTMENT TOPICAL at 09:05

## 2020-05-11 RX ADMIN — OXYCODONE HYDROCHLORIDE AND ACETAMINOPHEN 1 TABLET: 7.5; 325 TABLET ORAL at 07:05

## 2020-05-11 RX ADMIN — ASPIRIN 81 MG 81 MG: 81 TABLET ORAL at 10:05

## 2020-05-11 RX ADMIN — BUDESONIDE 0.5 MG: 0.5 INHALANT RESPIRATORY (INHALATION) at 07:05

## 2020-05-11 RX ADMIN — IPRATROPIUM BROMIDE 0.5 MG: 0.5 SOLUTION RESPIRATORY (INHALATION) at 01:05

## 2020-05-11 RX ADMIN — IPRATROPIUM BROMIDE 0.5 MG: 0.5 SOLUTION RESPIRATORY (INHALATION) at 12:05

## 2020-05-11 RX ADMIN — CARVEDILOL 3.12 MG: 3.12 TABLET, FILM COATED ORAL at 10:05

## 2020-05-11 RX ADMIN — LEVOFLOXACIN 750 MG: 5 INJECTION, SOLUTION INTRAVENOUS at 10:05

## 2020-05-11 RX ADMIN — ENOXAPARIN SODIUM 40 MG: 100 INJECTION SUBCUTANEOUS at 05:05

## 2020-05-11 RX ADMIN — OXYCODONE HYDROCHLORIDE AND ACETAMINOPHEN 1 TABLET: 7.5; 325 TABLET ORAL at 02:05

## 2020-05-11 RX ADMIN — LOSARTAN POTASSIUM 25 MG: 25 TABLET, FILM COATED ORAL at 09:05

## 2020-05-11 RX ADMIN — PANTOPRAZOLE SODIUM 40 MG: 40 TABLET, DELAYED RELEASE ORAL at 10:05

## 2020-05-11 RX ADMIN — GABAPENTIN 800 MG: 400 CAPSULE ORAL at 10:05

## 2020-05-11 NOTE — HOSPITAL COURSE
5/11- Afebrile since admission. Reports SOB is better . Respiratory status stable . Currently on O2 via NC at 5L/min.  Patient has history of aspiration pneumonia with swallowing difficulty sec to radiation treatment for head and neck cancer in 2011. He reports 6 pounds wt loss in the past week due to difficulty swallowing.  ST evaluated pt today but pt declined NPO  Status with tube feeding recommendation. Pt also refused thickened  liquids. CXR on admit resulted patchy multifocal ground-glass infiltrates in the bilateral mid and lower lung zones  slightly increased compared to prior exam. Current antibiotic - Rocephin and Levofloxacin . Of note U/S abd was done yesterday to evaluate the cause of elevated LFT and found to have hepatomegaly and splenomegaly with cirrhotic liver . Pt denies regular alcohol use. Hep C is neg. Labs- WBC 13> 15, Pl 146, Creatinine 1.2>1.4 . Elevated LFT.     5/12- Pt continues to feel better . O2 weaned  down to 2 L/min. Antibiotic transitioned to oral . Pt wants to go home as he feels his SOB is much better. Pt advised to keep appt with Gastroenterologist for evaluation liver cirrhosis as out patient. Home O2 eval performed and pt qualified. Pt was examined and deemed medically stable and suitable for discharge .

## 2020-05-11 NOTE — PLAN OF CARE
05/11/20 0830   Readmission Questionnaire   What do you believe caused you to be sick enough to be re-admitted? Per son, didnt get antibiotic until day before readmit this time   Do you have problems taking your medications as prescribed? Yes   Do you have any problems affording any of  your prescribed medications? No   Do you have problems obtaining/receiving your medications? No   Does the patient have transportation to healthcare appointments? Yes   Lives With alone   Who are your caregiver(s) and their phone number(s)? Noble Tripp JR- Son- [phone]; Ana Rosa Matthews, Daughter- [phone]   Does your caregiver provide all the help you need? Yes

## 2020-05-11 NOTE — PLAN OF CARE
Pt remains fall free this shift.   AAOx4, verbal, hard of hearing.  IV antibiotics.  Urinal.  NS at 75mL/hr.  Skin warm and dry. NO new skin issues.  Prn pain meds for chronic back pain.   5L O2 via NC.  Pt refused CPAP tonight.   POC updated and reviewed with pt and Chantal, via phone. Will continue POC.

## 2020-05-11 NOTE — PROGRESS NOTES
[facility] Hospital Medicine  Progress Note    Patient Name: Noble Tripp  MRN: [id number]  Patient Class: IP- Inpatient   Admission Date: 5/10/2020  Length of Stay: 1 days  Attending Physician: Vasile Gar MD  Primary Care Provider: Dwaine Davis MD        Subjective:     Principal Problem:Acute hypoxemic respiratory failure        HPI:  Mr Tripp is a 74 year old male with PMHx of COPD, CAD, CHF and cancer who presented to [facility] with complaints of increase shortness of breath that started on yesterday. Associated symptoms include chest pain and decrease aspirated. Denies associated symptoms fever, chills, nausea, vomiting or diaphoresis. Patient was recently discharge from [facility] on 5/8/2020 after having Left Heart Cath  with unsuccessful intervention of  RCA. He was referred to [facility]. EMS reported O 2 sat was 81 % on room air, he was then place on 6 L. ABG showed PO 2 of 45 on room air. Patient reports has not wore home O 2 since October 2019. Troponin mildly elevated at 0.040. Chest x ray showed mildly worsening patchy multifocal bilateral pulmonary infiltrates suspicious for atypical pneumonia, stable small pleural effusions. Patient is a full code, daughter Ana Rosa is surrogate decision.  He is being placed in Observation under the care of Hospital Medicine.     Overview/Hospital Course:  5/11- Afebrile since admission. Reports SOB is better . Respiratory status stable . Currently on O2 via NC at 5L/min.  Patient has history of aspiration pneumonia with swallowing difficulty sec to radiation treatment for head and neck cancer in 2011. He reports 6 pounds wt loss in the past week due to difficulty swallowing.  ST evaluated pt today but pt declined NPO  Status with tube feeding recommendation. Pt also refused thickened  liquids. CXR on admit resulted patchy multifocal ground-glass infiltrates in the bilateral mid and lower lung zones  slightly increased  compared to prior exam. Current antibiotic - Rocephin and Levofloxacin . Of note U/S abd was done yesterday to evaluate the cause of elevated LFT and found to have hepatomegaly and splenomegaly with cirrhotic liver . Pt denies regular alcohol use. Hep C is neg. Labs- WBC 13> 15, Pl 146, Creatinine 1.2>1.4 . Elevated LFT.     Interval History:   Afebrile since admission. Reports SOB is better . Respiratory status stable . Currently on O2 via NC at 5L/min.  Patient has history of aspiration pneumonia with swallowing difficulty sec to radiation treatment for head and neck cancer in 2011. He reports 6 pounds wt loss in the past week due to difficulty swallowing.  ST evaluated pt today but pt declined NPO  Status with tube feeding recommendation. Pt also refused thickened  liquids. CXR on admit resulted patchy multifocal ground-glass infiltrates in the bilateral mid and lower lung zones  slightly increased compared to prior exam. Current antibiotic - Rocephin and Levofloxacin . Of note U/S abd was done yesterday to evaluate the cause of elevated LFT and found to have hepatomegaly and splenomegaly with cirrhotic liver . Pt denies regular alcohol use. Hep C is neg. Labs- WBC 13> 15, Pl 146, Creatinine 1.2>1.4 . Elevated LFT.       Review of Systems   Constitutional: Positive for activity change, appetite change and unexpected weight change. Negative for fever.   HENT: Positive for trouble swallowing. Negative for sore throat.    Eyes: Negative for visual disturbance.   Respiratory: Positive for cough and shortness of breath. Negative for chest tightness.    Cardiovascular: Negative for chest pain, palpitations and leg swelling.   Gastrointestinal: Negative for abdominal distention, abdominal pain, constipation, diarrhea, nausea and vomiting.   Endocrine: Negative for polyuria.   Genitourinary: Negative for decreased urine volume, dysuria, flank pain, frequency and hematuria.   Musculoskeletal: Negative for back pain and  gait problem.   Skin: Negative for rash.   Neurological: Negative for syncope, speech difficulty, weakness, light-headedness and headaches.   Psychiatric/Behavioral: Negative for confusion, hallucinations and sleep disturbance.     Objective:     Vital Signs (Most Recent):  Temp: 96.4 °F (35.8 °C) (05/11/20 1159)  Pulse: 78 (05/11/20 1316)  Resp: (!) 22 (05/11/20 1316)  BP: (!) 118/56 (05/11/20 1159)  SpO2: 96 % (05/11/20 1316) Vital Signs (24h Range):  Temp:  [96.4 °F (35.8 °C)-98 °F (36.7 °C)] 96.4 °F (35.8 °C)  Pulse:  [61-85] 78  Resp:  [16-22] 22  SpO2:  [90 %-97 %] 96 %  BP: (118-145)/(56-70) 118/56     Weight: 52.5 kg (115 lb 11.9 oz)  Body mass index is 17.6 kg/m².    Intake/Output Summary (Last 24 hours) at 5/11/2020 1432  Last data filed at 5/11/2020 0500  Gross per 24 hour   Intake 1340 ml   Output 900 ml   Net 440 ml      Physical Exam   Constitutional: He is oriented to person, place, and time. He appears well-developed. No distress.   HENT:   Head: Normocephalic and atraumatic.   Mouth/Throat: No oropharyngeal exudate.   Eyes: Pupils are equal, round, and reactive to light. Conjunctivae and EOM are normal.   Neck: Normal range of motion. Neck supple. No JVD present. No thyromegaly present.   Cardiovascular: Normal rate, regular rhythm and normal heart sounds.   No murmur heard.  Pulmonary/Chest: Effort normal. No respiratory distress. He has no wheezes. He has no rales. He exhibits no tenderness.   Coarse breath sounds sheldon    Abdominal: Soft. Bowel sounds are normal. He exhibits no distension. There is no tenderness. There is no rebound and no guarding.   Musculoskeletal: Normal range of motion. He exhibits no edema.   Lymphadenopathy:     He has no cervical adenopathy.   Neurological: He is alert and oriented to person, place, and time. He displays normal reflexes. No cranial nerve deficit or sensory deficit.   Skin: Skin is warm and dry. No rash noted. He is not diaphoretic.   Psychiatric: He has a  normal mood and affect.       Significant Labs:   CBC:   Recent Labs   Lab 05/10/20  0403 05/11/20  0434   WBC 15.30* 13.89*   HGB 11.7* 11.2*   HCT 37.1* 35.8*    146*     CMP:   Recent Labs   Lab 05/10/20  0403 05/11/20  0434    143   K 4.1 4.3    107   CO2 25 26   GLU 93 82   BUN 52* 40*   CREATININE 1.4 1.2   CALCIUM 8.5* 8.7   PROT 6.4 5.9*   ALBUMIN 2.9* 2.6*   BILITOT 0.6 0.7   ALKPHOS 215* 245*   * 93*   ALT 95* 89*   ANIONGAP 14 10   EGFRNONAA 49* 60       Significant Imaging:       Assessment/Plan:      * Acute hypoxemic respiratory failure  Admit to Inpatient   Supplemental O 2   Nebs   May be related to pneumonia   BC X 2   Sputum cultures   IV antibiotic   5/11- Current antibiotic Rocephin and Levofloxacin .           Pneumonia involving bilateral lungs     Chest X ray suspicious for atypical pneumonia.   BC x 2   Sputum culture   IV antibiotics   Likely aspiration pneumonia given long standing h/o oropharyngeal dysphagia   Pt declined ST recommendation of NPO status and alternative feeding method       Elevated troponin  Troponin 0.040  Cardiology consulted   Continue serial troponin  H/O multivessel CAD with  of RCA   Continue ASA, plavix , BB, ARB,  Repatha(out pt)  and NTG       Coronary artery disease  Consult cardiology   Pt has  of RCA   Continue B blocker, ASA, Plavix and ARB , NTG      Elevated LFTs  Refused Statin   Hep C antibiotic negative    RUQ ultrasound showed enlarged, cirrhotic liver with splenomegaly   Follow with GI as outpatient           Hypertension  Continue Coreg and losartan       Hyperlipidemia     Continue Repatha at home         Statin intolerance  Pt has statin allergic   Refuses to restart home atorvastatin   Continue Repatha at home       JUANITO on CPAP  CPAP at night         VTE Risk Mitigation (From admission, onward)         Ordered     enoxaparin injection 40 mg  Daily      05/10/20 0936                      Vasile Gar MD  Department  of [facility] Medicine   [facility]

## 2020-05-11 NOTE — SUBJECTIVE & OBJECTIVE
Interval History:   Afebrile since admission. Reports SOB is better . Respiratory status stable . Currently on O2 via NC at 5L/min.  Patient has history of aspiration pneumonia with swallowing difficulty sec to radiation treatment for head and neck cancer in 2011. He reports 6 pounds wt loss in the past week due to difficulty swallowing.  ST evaluated pt today but pt declined NPO  Status with tube feeding recommendation. Pt also refused thickened  liquids. CXR on admit resulted patchy multifocal ground-glass infiltrates in the bilateral mid and lower lung zones  slightly increased compared to prior exam. Current antibiotic - Rocephin and Levofloxacin . Of note U/S abd was done yesterday to evaluate the cause of elevated LFT and found to have hepatomegaly and splenomegaly with cirrhotic liver . Pt denies regular alcohol use. Hep C is neg. Labs- WBC 13> 15, Pl 146, Creatinine 1.2>1.4 . Elevated LFT.       Review of Systems   Constitutional: Positive for activity change, appetite change and unexpected weight change. Negative for fever.   HENT: Positive for trouble swallowing. Negative for sore throat.    Eyes: Negative for visual disturbance.   Respiratory: Positive for cough and shortness of breath. Negative for chest tightness.    Cardiovascular: Negative for chest pain, palpitations and leg swelling.   Gastrointestinal: Negative for abdominal distention, abdominal pain, constipation, diarrhea, nausea and vomiting.   Endocrine: Negative for polyuria.   Genitourinary: Negative for decreased urine volume, dysuria, flank pain, frequency and hematuria.   Musculoskeletal: Negative for back pain and gait problem.   Skin: Negative for rash.   Neurological: Negative for syncope, speech difficulty, weakness, light-headedness and headaches.   Psychiatric/Behavioral: Negative for confusion, hallucinations and sleep disturbance.     Objective:     Vital Signs (Most Recent):  Temp: 96.4 °F (35.8 °C) (05/11/20 1159)  Pulse: 78  (05/11/20 1316)  Resp: (!) 22 (05/11/20 1316)  BP: (!) 118/56 (05/11/20 1159)  SpO2: 96 % (05/11/20 1316) Vital Signs (24h Range):  Temp:  [96.4 °F (35.8 °C)-98 °F (36.7 °C)] 96.4 °F (35.8 °C)  Pulse:  [61-85] 78  Resp:  [16-22] 22  SpO2:  [90 %-97 %] 96 %  BP: (118-145)/(56-70) 118/56     Weight: 52.5 kg (115 lb 11.9 oz)  Body mass index is 17.6 kg/m².    Intake/Output Summary (Last 24 hours) at 5/11/2020 1432  Last data filed at 5/11/2020 0500  Gross per 24 hour   Intake 1340 ml   Output 900 ml   Net 440 ml      Physical Exam   Constitutional: He is oriented to person, place, and time. He appears well-developed. No distress.   HENT:   Head: Normocephalic and atraumatic.   Mouth/Throat: No oropharyngeal exudate.   Eyes: Pupils are equal, round, and reactive to light. Conjunctivae and EOM are normal.   Neck: Normal range of motion. Neck supple. No JVD present. No thyromegaly present.   Cardiovascular: Normal rate, regular rhythm and normal heart sounds.   No murmur heard.  Pulmonary/Chest: Effort normal. No respiratory distress. He has no wheezes. He has no rales. He exhibits no tenderness.   Coarse breath sounds sheldon    Abdominal: Soft. Bowel sounds are normal. He exhibits no distension. There is no tenderness. There is no rebound and no guarding.   Musculoskeletal: Normal range of motion. He exhibits no edema.   Lymphadenopathy:     He has no cervical adenopathy.   Neurological: He is alert and oriented to person, place, and time. He displays normal reflexes. No cranial nerve deficit or sensory deficit.   Skin: Skin is warm and dry. No rash noted. He is not diaphoretic.   Psychiatric: He has a normal mood and affect.       Significant Labs:   CBC:   Recent Labs   Lab 05/10/20  0403 05/11/20  0434   WBC 15.30* 13.89*   HGB 11.7* 11.2*   HCT 37.1* 35.8*    146*     CMP:   Recent Labs   Lab 05/10/20  0403 05/11/20  0434    143   K 4.1 4.3    107   CO2 25 26   GLU 93 82   BUN 52* 40*   CREATININE 1.4  1.2   CALCIUM 8.5* 8.7   PROT 6.4 5.9*   ALBUMIN 2.9* 2.6*   BILITOT 0.6 0.7   ALKPHOS 215* 245*   * 93*   ALT 95* 89*   ANIONGAP 14 10   EGFRNONAA 49* 60       Significant Imaging:

## 2020-05-11 NOTE — ASSESSMENT & PLAN NOTE
Chest X ray suspicious for atypical pneumonia.   BC x 2   Sputum culture   IV antibiotics   Likely aspiration pneumonia given long standing h/o oropharyngeal dysphagia   Pt declined ST recommendation of NPO status and alternative feeding method

## 2020-05-11 NOTE — PROGRESS NOTES
Pharmacy Consult Sign Off    Therapy with vancomycin is complete and/or consult has been discontinued by the provider.   Pharmacy will sign off. Please re-consult as needed.     Thank you for allowing us to participate in this patient's care.     Alvarez Pichardo, PharmD 5/11/2020 11:46 AM          Pharmacokinetic Assessment Follow Up: IV Vancomycin    Vancomycin serum concentration assessment(s):    The trough level was drawn correctly and can be used to guide therapy at this time. The measurement is below the desired definitive target range of 15 to 20 mcg/mL.    Vancomycin Regimen Plan:    Antibiotics have been discontinued. Pharmacy will sign-off.    Drug levels (last 3 results):  Recent Labs   Lab Result Units 05/11/20  1056   Vancomycin, Random ug/mL 8.3

## 2020-05-11 NOTE — PLAN OF CARE
POC reviewed with patient and lucía via phone. Pt is AAO. C/O pain - prn pain medication given and effective. SR on cardiac monitor. Diet changed to dysphagia mechanical soft. Tolerated dinner meal. IV abx continue for tx of PNA. O2 weaned from 5l to 4l/nc . Ambulates to bathroom independently. PIV - SL. Non skid socks when out of bed. Educated to call for assistance. Coccyx blanchable red. Encouraged frequent weight shifting. Call  light in reach.

## 2020-05-11 NOTE — ASSESSMENT & PLAN NOTE
Troponin 0.040  Cardiology consulted   Continue serial troponin  H/O multivessel CAD with  of RCA   Continue ASA, plavix , BB, ARB,  Repatha(out pt)  and NTG

## 2020-05-11 NOTE — NURSING
"Pt's family called, Alexander Stewart's wife (son, POEVERTON). She wanted to notify us that many family members have talked to pt and noticed that he has been "loopy," and having some short term memory loss. Also that he has not eaten or drank much in the last few days. Wants MD to revisit the PEG tube discussion with pt while he is here. Pt c/o of bilateral hand tremor. PEG Bradley notified of families concerns. Added on labs for morning draw and NS at 75mL/ hr. Family was called and notified.   "

## 2020-05-11 NOTE — PLAN OF CARE
"CM spoke with patients son via telephone to assess for discharge needs. Pt son states that his father lives at home alone and is independent with his needs. He uses a CPAP machine at home for assistance. He denies having home health. He was recently admitted to hospital for same issue per son and "didn't get the medicine they gave him at discharge until right before this readmit." CM informed son that needs will be dependent on hospital progress at this time.     D/C Plan: TBD  PCP: Dwaine Davis MD  Preferred Pharmacy: [facility] [address]  Discharge transportation: Family  [facility]: Active  Pharmacy Bedside Delivery: Yes       05/11/20 0830   Discharge Assessment   Assessment Type Discharge Planning Assessment   Confirmed/corrected address and phone number on facesheet? Yes   Assessment information obtained from? Patient   Expected Length of Stay (days)   (TBD)   Communicated expected length of stay with patient/caregiver yes   Prior to hospitilization cognitive status: Alert/Oriented   Prior to hospitalization functional status: Independent;Assistive Equipment   Current cognitive status: Unable to Assess   Current Functional Status: Independent;Assistive Equipment   Facility Arrived From: Home   Lives With alone   Able to Return to Prior Arrangements yes   Is patient able to care for self after discharge? Unable to determine at this time (comments)   Who are your caregiver(s) and their phone number(s)? Noble Tripp, - Son- [phone]; Ana Rosa Byron, Daughter- [phone]   Patient's perception of discharge disposition home or selfcare   Readmission Within the Last 30 Days no previous admission in last 30 days   Patient currently being followed by outpatient case management? No   Patient currently receives any other outside agency services? No   Equipment Currently Used at Home CPAP   Do you have any problems affording any of your prescribed medications? No   Is the patient taking medications as prescribed? " yes   Does the patient have transportation home? Yes   Transportation Anticipated family or friend will provide   Dialysis Name and Scheduled days NA   Does the patient receive services at the Coumadin Clinic? No   Discharge Plan A Home   DME Needed Upon Discharge  other (see comments)  (TBD)   Patient/Family in Agreement with Plan yes

## 2020-05-11 NOTE — ASSESSMENT & PLAN NOTE
Admit to Inpatient   Supplemental O 2   Nebs   May be related to pneumonia   BC X 2   Sputum cultures   IV antibiotic   5/11- Current antibiotic Rocephin and Levofloxacin .

## 2020-05-11 NOTE — PT/OT/SLP PROGRESS
Speech Language Pathology      Noble Tripp  MRN: [id number]    ST received an order for a bedside swallow evaluation.  This pt is known to ST from prior admissions and outpatient MBSS where NPO with tube feeding was recommended due to significant dysphagia and aspiration.  Pt declined recommendations each time it was given.  During pt's last admission on 5/6/20 ST spoke with pt at bedside.  He states that he eats whatever he wants at home but it is becoming increasingly hard and chokes often.  He continued to decline NPO recommendation and alternative diet of puree with thickened liquids (as this would be the safest po diet).  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  Pt politely declined ST services and stated he just wants to go home.  Pt was re-admitted yesterday and family had expressed their wishes for a PEG.  Pt has known aspiration and is not safe for po intake, NPO with alternative feedings continues to be recommended.     Tabby Addison CCC-SLP  9:21am

## 2020-05-12 ENCOUNTER — TELEPHONE (OUTPATIENT)
Dept: INTERNAL MEDICINE | Facility: CLINIC | Age: 75
End: 2020-05-12

## 2020-05-12 ENCOUNTER — PATIENT OUTREACH (OUTPATIENT)
Dept: ADMINISTRATIVE | Facility: OTHER | Age: 75
End: 2020-05-12

## 2020-05-12 ENCOUNTER — PATIENT MESSAGE (OUTPATIENT)
Dept: GASTROENTEROLOGY | Facility: CLINIC | Age: 75
End: 2020-05-12

## 2020-05-12 VITALS
SYSTOLIC BLOOD PRESSURE: 136 MMHG | DIASTOLIC BLOOD PRESSURE: 58 MMHG | HEART RATE: 68 BPM | OXYGEN SATURATION: 97 % | WEIGHT: 118.63 LBS | HEIGHT: 68 IN | BODY MASS INDEX: 17.98 KG/M2 | TEMPERATURE: 98 F | RESPIRATION RATE: 16 BRPM

## 2020-05-12 PROBLEM — R79.89 ELEVATED TROPONIN: Status: RESOLVED | Noted: 2020-05-10 | Resolved: 2020-05-12

## 2020-05-12 LAB
ALBUMIN SERPL BCP-MCNC: 2.4 G/DL (ref 3.5–5.2)
ALP SERPL-CCNC: 324 U/L (ref 55–135)
ALT SERPL W/O P-5'-P-CCNC: 115 U/L (ref 10–44)
ANION GAP SERPL CALC-SCNC: 10 MMOL/L (ref 8–16)
AST SERPL-CCNC: 125 U/L (ref 10–40)
BASOPHILS # BLD AUTO: 0.05 K/UL (ref 0–0.2)
BASOPHILS NFR BLD: 0.4 % (ref 0–1.9)
BILIRUB SERPL-MCNC: 0.7 MG/DL (ref 0.1–1)
BUN SERPL-MCNC: 28 MG/DL (ref 8–23)
CALCIUM SERPL-MCNC: 8.5 MG/DL (ref 8.7–10.5)
CHLORIDE SERPL-SCNC: 106 MMOL/L (ref 95–110)
CO2 SERPL-SCNC: 27 MMOL/L (ref 23–29)
CREAT SERPL-MCNC: 0.9 MG/DL (ref 0.5–1.4)
DIFFERENTIAL METHOD: ABNORMAL
EOSINOPHIL # BLD AUTO: 0 K/UL (ref 0–0.5)
EOSINOPHIL NFR BLD: 0.3 % (ref 0–8)
ERYTHROCYTE [DISTWIDTH] IN BLOOD BY AUTOMATED COUNT: 16.7 % (ref 11.5–14.5)
EST. GFR  (AFRICAN AMERICAN): >60 ML/MIN/1.73 M^2
EST. GFR  (NON AFRICAN AMERICAN): >60 ML/MIN/1.73 M^2
GGT SERPL-CCNC: 648 U/L (ref 8–55)
GLUCOSE SERPL-MCNC: 85 MG/DL (ref 70–110)
HCT VFR BLD AUTO: 35.5 % (ref 40–54)
HGB BLD-MCNC: 11.2 G/DL (ref 14–18)
IMM GRANULOCYTES # BLD AUTO: 0.28 K/UL (ref 0–0.04)
IMM GRANULOCYTES NFR BLD AUTO: 2.4 % (ref 0–0.5)
LYMPHOCYTES # BLD AUTO: 0.7 K/UL (ref 1–4.8)
LYMPHOCYTES NFR BLD: 6.1 % (ref 18–48)
MAGNESIUM SERPL-MCNC: 2.4 MG/DL (ref 1.6–2.6)
MCH RBC QN AUTO: 29.9 PG (ref 27–31)
MCHC RBC AUTO-ENTMCNC: 31.5 G/DL (ref 32–36)
MCV RBC AUTO: 95 FL (ref 82–98)
MONOCYTES # BLD AUTO: 0.9 K/UL (ref 0.3–1)
MONOCYTES NFR BLD: 7.8 % (ref 4–15)
NEUTROPHILS # BLD AUTO: 9.7 K/UL (ref 1.8–7.7)
NEUTROPHILS NFR BLD: 83 % (ref 38–73)
NRBC BLD-RTO: 0 /100 WBC
PHOSPHATE SERPL-MCNC: 1.9 MG/DL (ref 2.7–4.5)
PLATELET # BLD AUTO: 132 K/UL (ref 150–350)
PMV BLD AUTO: 12.5 FL (ref 9.2–12.9)
POTASSIUM SERPL-SCNC: 4.1 MMOL/L (ref 3.5–5.1)
PROT SERPL-MCNC: 5.8 G/DL (ref 6–8.4)
RBC # BLD AUTO: 3.74 M/UL (ref 4.6–6.2)
SODIUM SERPL-SCNC: 143 MMOL/L (ref 136–145)
WBC # BLD AUTO: 11.72 K/UL (ref 3.9–12.7)

## 2020-05-12 PROCEDURE — 25000003 PHARM REV CODE 250: Performed by: INTERNAL MEDICINE

## 2020-05-12 PROCEDURE — 86706 HEP B SURFACE ANTIBODY: CPT

## 2020-05-12 PROCEDURE — 87340 HEPATITIS B SURFACE AG IA: CPT

## 2020-05-12 PROCEDURE — 83735 ASSAY OF MAGNESIUM: CPT

## 2020-05-12 PROCEDURE — 63600175 PHARM REV CODE 636 W HCPCS: Performed by: INTERNAL MEDICINE

## 2020-05-12 PROCEDURE — 80053 COMPREHEN METABOLIC PANEL: CPT

## 2020-05-12 PROCEDURE — 25000242 PHARM REV CODE 250 ALT 637 W/ HCPCS: Performed by: INTERNAL MEDICINE

## 2020-05-12 PROCEDURE — 94640 AIRWAY INHALATION TREATMENT: CPT

## 2020-05-12 PROCEDURE — 99900035 HC TECH TIME PER 15 MIN (STAT)

## 2020-05-12 PROCEDURE — 84100 ASSAY OF PHOSPHORUS: CPT

## 2020-05-12 PROCEDURE — 25000003 PHARM REV CODE 250: Performed by: NURSE PRACTITIONER

## 2020-05-12 PROCEDURE — 25000242 PHARM REV CODE 250 ALT 637 W/ HCPCS: Performed by: NURSE PRACTITIONER

## 2020-05-12 PROCEDURE — 94761 N-INVAS EAR/PLS OXIMETRY MLT: CPT

## 2020-05-12 PROCEDURE — 36415 COLL VENOUS BLD VENIPUNCTURE: CPT

## 2020-05-12 PROCEDURE — 85025 COMPLETE CBC W/AUTO DIFF WBC: CPT

## 2020-05-12 PROCEDURE — 82977 ASSAY OF GGT: CPT

## 2020-05-12 PROCEDURE — 27000221 HC OXYGEN, UP TO 24 HOURS

## 2020-05-12 RX ORDER — ISOSORBIDE MONONITRATE 30 MG/1
30 TABLET, EXTENDED RELEASE ORAL DAILY
Status: DISCONTINUED | OUTPATIENT
Start: 2020-05-12 | End: 2020-05-12 | Stop reason: HOSPADM

## 2020-05-12 RX ORDER — LEVOFLOXACIN 750 MG/1
750 TABLET ORAL EVERY OTHER DAY
Qty: 5 TABLET | Refills: 0 | Status: ON HOLD | OUTPATIENT
Start: 2020-05-12 | End: 2020-05-19 | Stop reason: HOSPADM

## 2020-05-12 RX ORDER — ISOSORBIDE MONONITRATE 30 MG/1
30 TABLET, EXTENDED RELEASE ORAL DAILY
Qty: 30 TABLET | Refills: 0 | Status: SHIPPED | OUTPATIENT
Start: 2020-05-13 | End: 2020-06-12

## 2020-05-12 RX ADMIN — IPRATROPIUM BROMIDE 0.5 MG: 0.5 SOLUTION RESPIRATORY (INHALATION) at 07:05

## 2020-05-12 RX ADMIN — IPRATROPIUM BROMIDE 0.5 MG: 0.5 SOLUTION RESPIRATORY (INHALATION) at 01:05

## 2020-05-12 RX ADMIN — OXYCODONE HYDROCHLORIDE AND ACETAMINOPHEN 1 TABLET: 7.5; 325 TABLET ORAL at 07:05

## 2020-05-12 RX ADMIN — GABAPENTIN 800 MG: 400 CAPSULE ORAL at 03:05

## 2020-05-12 RX ADMIN — ASPIRIN 81 MG 81 MG: 81 TABLET ORAL at 08:05

## 2020-05-12 RX ADMIN — ISOSORBIDE MONONITRATE 30 MG: 30 TABLET, EXTENDED RELEASE ORAL at 11:05

## 2020-05-12 RX ADMIN — CLOPIDOGREL BISULFATE 75 MG: 75 TABLET ORAL at 08:05

## 2020-05-12 RX ADMIN — CARVEDILOL 3.12 MG: 3.12 TABLET, FILM COATED ORAL at 08:05

## 2020-05-12 RX ADMIN — CEFTRIAXONE 1 G: 1 INJECTION, SOLUTION INTRAVENOUS at 08:05

## 2020-05-12 RX ADMIN — GABAPENTIN 800 MG: 400 CAPSULE ORAL at 08:05

## 2020-05-12 RX ADMIN — BUDESONIDE 0.5 MG: 0.5 INHALANT RESPIRATORY (INHALATION) at 07:05

## 2020-05-12 RX ADMIN — NITROGLYCERIN 1 INCH: 20 OINTMENT TOPICAL at 05:05

## 2020-05-12 RX ADMIN — DOCUSATE SODIUM 100 MG: 100 CAPSULE, LIQUID FILLED ORAL at 08:05

## 2020-05-12 RX ADMIN — ARFORMOTEROL TARTRATE 15 MCG: 15 SOLUTION RESPIRATORY (INHALATION) at 07:05

## 2020-05-12 RX ADMIN — PANTOPRAZOLE SODIUM 40 MG: 40 TABLET, DELAYED RELEASE ORAL at 08:05

## 2020-05-12 NOTE — TELEPHONE ENCOUNTER
----- Message from Jarocho Diallo LPN sent at 5/12/2020  2:05 PM CDT -----  Good afternoon. I am attempting to make an appointment for this patient for a hospital follow up. He is being discharged today. If possible, he needs to be seen within one week of discharge. Thank you     PANDA Avendaño

## 2020-05-12 NOTE — PLAN OF CARE
05/12/20 1527   Final Note   Assessment Type Final Discharge Note   Anticipated Discharge Disposition Home   Right Care Referral Info   Post Acute Recommendation No Care

## 2020-05-12 NOTE — NURSING
Messages sent to Dr. Hernandez and Dr. Cardozo's nurses to have hospital follow up appointments scheduled. Both stated they would take care of scheduling appointments for this patient.

## 2020-05-12 NOTE — DISCHARGE SUMMARY
[facility] Hospital Medicine  Discharge Summary      Patient Name: Noble Tripp  MRN: [id number]  Admission Date: 5/10/2020  Hospital Length of Stay: 2 days  Discharge Date and Time:  05/12/2020 5:47 PM  Attending Physician: No att. providers found   Discharging Provider: Vasile Gar MD  Primary Care Provider: Dwaine Davis MD      HPI:   Mr Tripp is a 74 year old male with PMHx of COPD, CAD, CHF and cancer who presented to [facility] Emergency Room with complaints of increase shortness of breath that started on yesterday. Associated symptoms include chest pain and decrease aspirated. Denies associated symptoms fever, chills, nausea, vomiting or diaphoresis. Patient was recently discharge from [facility] on 5/8/2020 after having Left Heart Cath  with unsuccessful intervention of  RCA. He was referred to [facility]. EMS reported O 2 sat was 81 % on room air, he was then place on 6 L. ABG showed PO 2 of 45 on room air. Patient reports has not wore home O 2 since October 2019. Troponin mildly elevated at 0.040. Chest x ray showed mildly worsening patchy multifocal bilateral pulmonary infiltrates suspicious for atypical pneumonia, stable small pleural effusions. Patient is a full code, daughter Ana Rosa is surrogate decision.  He is being placed in Observation under the care of Hospital Medicine.     * No surgery found *      Hospital Course:   5/11- Afebrile since admission. Reports SOB is better . Respiratory status stable . Currently on O2 via NC at 5L/min.  Patient has history of aspiration pneumonia with swallowing difficulty sec to radiation treatment for head and neck cancer in 2011. He reports 6 pounds wt loss in the past week due to difficulty swallowing.  ST evaluated pt today but pt declined NPO  Status with tube feeding recommendation. Pt also refused thickened  liquids. CXR on admit resulted patchy multifocal ground-glass infiltrates in the bilateral mid and lower lung zones   slightly increased compared to prior exam. Current antibiotic - Rocephin and Levofloxacin . Of note U/S abd was done yesterday to evaluate the cause of elevated LFT and found to have hepatomegaly and splenomegaly with cirrhotic liver . Pt denies regular alcohol use. Hep C is neg. Labs- WBC 13> 15, Pl 146, Creatinine 1.2>1.4 . Elevated LFT.     5/12- Pt continues to feel better . O2 weaned  down to 2 L/min. Antibiotic transitioned to oral . Pt wants to go home as he feels his SOB is much better. Pt advised to keep appt with Gastroenterologist for evaluation liver cirrhosis as out patient. Home O2 eval performed and pt qualified. Pt was examined and deemed medically stable and suitable for discharge .      Consults:   Consults (From admission, onward)        Status Ordering Provider     Inpatient consult to Cardiology  Once     Provider:  Andres Whitfield MD    Completed NIMISHA GONZALES          No new Assessment & Plan notes have been filed under this hospital service since the last note was generated.  Service: Hospital Medicine    Final Active Diagnoses:    Diagnosis Date Noted POA    PRINCIPAL PROBLEM:  Acute hypoxemic respiratory failure [J96.01] 10/10/2015 Yes    Pneumonia involving bilateral lungs  [J18.9]  Yes    Coronary artery disease [I25.10] 05/17/2016 Yes    Elevated LFTs [R94.5] 05/10/2020 Yes    Hypertension [I10]  Yes    Hyperlipidemia [E78.5]  Yes    Statin intolerance [Z78.9] 09/06/2016 Yes    JUANITO on CPAP [G47.33, Z99.89] 10/07/2014 Not Applicable     Chronic      Problems Resolved During this Admission:    Diagnosis Date Noted Date Resolved POA    Elevated troponin [R79.89] 05/10/2020 05/12/2020 Yes       Discharged Condition: stable    Disposition: Home or Self Care    Follow Up:  Follow-up Information     RUSSELL Sanchez. Schedule an appointment as soon as possible for a visit in 1 week.    Specialty:  Gastroenterology  Why:  elevated LFT, abdominal ultrasound enlarged, cirrhotic  "liver.  Contact information:  [address] [address]  [phone]             Dwaine Davis MD. Schedule an appointment as soon as possible for a visit in 3 days.    Specialty:  Family Medicine  Why:  Discharge follow up   Contact information:  [address]  [place] [address]  [phone]             Santos Cardozo MD. Schedule an appointment as soon as possible for a visit in 1 week.    Specialty:  Pulmonary Disease  Why:  Discharge Follow up   Contact information:  [address]  [place] [address]  [phone]                 Patient Instructions:      OXYGEN FOR HOME USE     Order Specific Question Answer Comments   Liter Flow 2    Duration Continuous    Qualifying SpO2: 88%    Testing done at: Rest    Route nasal cannula    Device home concentrator with portable unit    Length of need (in months): 99 mos    Height: 5' 8" (1.727 m)    Weight: 53.8 kg (118 lb 9.7 oz)    Does patient have medical equipment at home? CPAP    Alternative treatment measures have been tried or considered and deemed clinically ineffective. Yes    Vendor: [facility]    Expected Date of Delivery: 5/12/2020      Diet Dysphagia Mechanical Soft   Order Comments: Thick liquid     Activity as tolerated       Significant Diagnostic Studies: Labs:   BMP:   Recent Labs   Lab 05/11/20  0434 05/12/20  0456   GLU 82 85    143   K 4.3 4.1    106   CO2 26 27   BUN 40* 28*   CREATININE 1.2 0.9   CALCIUM 8.7 8.5*   MG 2.7* 2.4   , CMP   Recent Labs   Lab 05/11/20  0434 05/12/20  0456    143   K 4.3 4.1    106   CO2 26 27   GLU 82 85   BUN 40* 28*   CREATININE 1.2 0.9   CALCIUM 8.7 8.5*   PROT 5.9* 5.8*   ALBUMIN 2.6* 2.4*   BILITOT 0.7 0.7   ALKPHOS 245* 324*   AST 93* 125*   ALT 89* 115*   ANIONGAP 10 10   ESTGFRAFRICA >60 >60   EGFRNONAA 60 >60    and CBC   Recent Labs   Lab 05/11/20  0434 05/12/20  0456   WBC 13.89* 11.72   HGB 11.2* 11.2*   HCT 35.8* 35.5*   * 132* "       Pending Diagnostic Studies:     Procedure Component Value Units Date/Time    Hepatitis B surface antibody [[id number]] Collected:  05/12/20 0809    Order Status:  Sent Lab Status:  In process Updated:  05/12/20 1600    Specimen:  Blood     Hepatitis B surface antigen [[id number]] Collected:  05/12/20 0809    Order Status:  Sent Lab Status:  In process Updated:  05/12/20 1600    Specimen:  Blood          Medications:  Reconciled Home Medications:      Medication List      START taking these medications    isosorbide mononitrate 30 MG 24 hr tablet  Commonly known as:  IMDUR  Take 1 tablet (30 mg total) by mouth once daily.  Start taking on:  May 13, 2020     levoFLOXacin 750 MG tablet  Commonly known as:  LEVAQUIN  Take 1 tablet (750 mg total) by mouth every other day. for 10 days        CHANGE how you take these medications    oxyCODONE-acetaminophen 7.5-325 mg per tablet  Commonly known as:  PERCOCET  Take 1 tablet by mouth every 8 (eight) hours as needed for Pain.  What changed:  Another medication with the same name was removed. Continue taking this medication, and follow the directions you see here.        CONTINUE taking these medications    albuterol 90 mcg/actuation inhaler  Commonly known as:  PROAIR HFA  INHALE 2 PUFFS BY MOUTH EVERY 4 HOURS AS NEEDED FOR WHEEZING OR  SHORTNESS  OF  BREATH     aspirin 81 mg Tab  Take 1 tablet by mouth Daily. Over the counter to help prevent stroke/heart attack     carvediloL 3.125 MG tablet  Commonly known as:  COREG  Take 1 tablet (3.125 mg total) by mouth 2 (two) times daily.     clopidogreL 75 mg tablet  Commonly known as:  PLAVIX  Take 1 tablet (75 mg total) by mouth once daily.     COMP-AIR NEBULIZER COMPRESSOR Bailye  Generic drug:  nebulizer and compressor  Use as directed     ELDERBERRY FRUIT ORAL  Take by mouth.     furosemide 40 MG tablet  Commonly known as:  LASIX  Take 0.5 tablets (20 mg total) by mouth once daily.     gabapentin 800 MG tablet  Commonly known  as:  NEURONTIN  Take 1 tablet (800 mg total) by mouth 3 (three) times daily.     losartan 25 MG tablet  Commonly known as:  COZAAR  Take 1 tablet (25 mg total) by mouth every evening.     pantoprazole 40 MG tablet  Commonly known as:  PROTONIX  TAKE ONE TABLET BY MOUTH ONCE DAILY     PROBIOTIC COMPLEX ORAL  Take by mouth.     REPATHA SURECLICK 140 mg/mL Pnij  Generic drug:  evolocumab  Inject 1 mL (140 mg total) into the skin every 14 (fourteen) days.     SPIRIVA WITH HANDIHALER 18 mcg inhalation capsule  Generic drug:  tiotropium  INHALE 1 CAPSULE BY MOUTH ONCE DAILY        STOP taking these medications    atorvastatin 80 MG tablet  Commonly known as:  LIPITOR     cefdinir 300 MG capsule  Commonly known as:  OMNICEF     OXYGEN-AIR DELIVERY SYSTEMS MISC            Indwelling Lines/Drains at time of discharge:   Lines/Drains/Airways     None                 Time spent on the discharge of patient:  30  minutes  Patient was seen and examined on the date of discharge and determined to be suitable for discharge.         Vasile Gar MD  Department of Hospital Medicine  [facility]

## 2020-05-12 NOTE — NURSING
Discharge instructions reviewed with patient, he verbalized understanding. PIV removed, catheter intact. Tele box removed and returned to monitor tech. Educated patient of all medication changes. Informed patient that follow up appointments would be scheduled by provider nurses. Patient to be transported home via personal transportation.

## 2020-05-12 NOTE — PLAN OF CARE
05/12/20 1002   Medicare Message   Important Message from Medicare regarding Discharge Appeal Rights Explained to patient/caregiver   Date IMM was signed 05/12/20   Time IMM was signed 1006

## 2020-05-12 NOTE — PROGRESS NOTES
Home Oxygen Evaluation    Date Performed: 2020    1) Patient's Home O2 Sat on room air, while at rest: 94        If O2 sats on room air at rest are 88% or below, patient qualifies. No additional testing needed. Document N/A in steps 2 and 3. If 89% or above, complete steps 2.      2) Patient's O2 Sat on room air while exercisin        If O2 sats on room air while exercising remain 89% or above patient does not qualify, no further testing needed Document N/A in step 3. If O2 sats on room air while exercising are 88% or below, continue to step 3.      3) Patient's O2 Sat while exercising on O2: 95 at 2 LPM         (Must show improvement from #2 for patients to qualify)    If O2 sats improve on oxygen, patient qualifies for portable oxygen. If not, the patient does not qualify.

## 2020-05-12 NOTE — PLAN OF CARE
Pt remains fall free this shift.  Pt AAOx4, verbal, clear speech.  Skin warm and dry. No new skin issues.  Telemonitoring in progress, 60s- 70s SR with RBBB.   4L O2 weaned to 2L O2.  Pt refused CPAP.   Urinal and up to restroom.  Standby assist with transfers.  Dyphagia mech soft diet.  POC updated and reviewed with pt. Will continue POC.

## 2020-05-13 ENCOUNTER — OFFICE VISIT (OUTPATIENT)
Dept: CARDIOLOGY | Facility: CLINIC | Age: 75
End: 2020-05-13
Payer: MEDICARE

## 2020-05-13 ENCOUNTER — PATIENT MESSAGE (OUTPATIENT)
Dept: PULMONOLOGY | Facility: CLINIC | Age: 75
End: 2020-05-13

## 2020-05-13 ENCOUNTER — PATIENT OUTREACH (OUTPATIENT)
Dept: ADMINISTRATIVE | Facility: CLINIC | Age: 75
End: 2020-05-13

## 2020-05-13 VITALS — SYSTOLIC BLOOD PRESSURE: 96 MMHG | DIASTOLIC BLOOD PRESSURE: 53 MMHG

## 2020-05-13 DIAGNOSIS — I10 ESSENTIAL HYPERTENSION: ICD-10-CM

## 2020-05-13 DIAGNOSIS — G47.33 OSA ON CPAP: Chronic | ICD-10-CM

## 2020-05-13 DIAGNOSIS — E87.5 HYPERKALEMIA: Chronic | ICD-10-CM

## 2020-05-13 DIAGNOSIS — I25.10 CORONARY ARTERY DISEASE, ANGINA PRESENCE UNSPECIFIED, UNSPECIFIED VESSEL OR LESION TYPE, UNSPECIFIED WHETHER NATIVE OR TRANSPLANTED HEART: Primary | ICD-10-CM

## 2020-05-13 DIAGNOSIS — I65.21 STENOSIS OF RIGHT CAROTID ARTERY: ICD-10-CM

## 2020-05-13 DIAGNOSIS — Z88.8 ALLERGY TO STATIN MEDICATION: ICD-10-CM

## 2020-05-13 DIAGNOSIS — E78.00 PURE HYPERCHOLESTEROLEMIA: ICD-10-CM

## 2020-05-13 DIAGNOSIS — R06.09 DYSPNEA ON EXERTION: ICD-10-CM

## 2020-05-13 DIAGNOSIS — J44.9 STAGE 3 SEVERE COPD BY GOLD CLASSIFICATION: ICD-10-CM

## 2020-05-13 DIAGNOSIS — I25.10 CORONARY ARTERY DISEASE INVOLVING LEFT MAIN CORONARY ARTERY: ICD-10-CM

## 2020-05-13 DIAGNOSIS — I50.9 CONGESTIVE HEART FAILURE, UNSPECIFIED HF CHRONICITY, UNSPECIFIED HEART FAILURE TYPE: ICD-10-CM

## 2020-05-13 LAB
BACTERIA SPEC AEROBE CULT: NORMAL
BACTERIA SPEC AEROBE CULT: NORMAL
GRAM STN SPEC: NORMAL
HBV SURFACE AB SER-ACNC: NEGATIVE M[IU]/ML
HBV SURFACE AG SERPL QL IA: NEGATIVE

## 2020-05-13 PROCEDURE — 99213 PR OFFICE/OUTPT VISIT, EST, LEVL III, 20-29 MIN: ICD-10-PCS | Mod: 95,,, | Performed by: INTERNAL MEDICINE

## 2020-05-13 PROCEDURE — 99213 OFFICE O/P EST LOW 20 MIN: CPT | Mod: 95,,, | Performed by: INTERNAL MEDICINE

## 2020-05-13 PROCEDURE — 99490 PR CHRONIC CARE MGMT, 1ST 20 MIN: ICD-10-PCS | Mod: S$PBB,,, | Performed by: FAMILY MEDICINE

## 2020-05-13 PROCEDURE — G2058 PR CHRON CARE MGMT, EA ADDTL 20 MINS: ICD-10-PCS | Mod: S$PBB,,, | Performed by: FAMILY MEDICINE

## 2020-05-13 PROCEDURE — G2058 CCM ADD 20MIN: HCPCS | Mod: S$PBB,,, | Performed by: FAMILY MEDICINE

## 2020-05-13 PROCEDURE — 99490 CHRNC CARE MGMT STAFF 1ST 20: CPT | Mod: S$PBB,,, | Performed by: FAMILY MEDICINE

## 2020-05-13 NOTE — PROGRESS NOTES
"Subjective:   Patient ID:  Noble Tripp is a 74 y.o. male who presents for follow up of No chief complaint on file.    The patient location is: home due to covid 19   The chief complaint leading to consultation is: acd  Visit type: audiovisual  Total time spent with patient: 13 MINUTES  Each patient to whom he or she provides medical services by telemedicine is:  (1) informed of the relationship between the physician and patient and the respective role of any other health care provider with respect to management of the patient; and (2) notified that he or she may decline to receive medical services by telemedicine and may withdraw from such care at any time.    Notes:   HPI  A 73 yo male with copd now using o2 cad s/p attempt at recanalization a  rca due to significant ischemic burden is following he had a repeat admission due to hypoxia he was not using his o2 he feels better he is compliant with meds. He wears the o2 all the time he is walking around the house feels better. Has some leg swelling took lasix no angina no chf symptoms.ha sno plapitation. No groin complaints. Compliant withs alt.   Past Medical History:   Diagnosis Date    Adrenal adenoma     david;nl fxn w/u;tran 11/18    Aspiration pneumonia 10/15    puree/honey    Benign prostate hyperplasia     CAD (coronary artery disease)     dr padilla    Chronic pain     dr santos    Chronic systolic congestive heart failure 10/17/2019    COPD (chronic obstructive pulmonary disease)     papi    Ex-smoker     11/13    Hyperlipidemia     Ischemic cardiomyopathy 11/22/2019    JUANITO on CPAP     cpap    Osteopenia 1/15 tran 1/18    PSVT (paroxysmal supraventricular tachycardia)     PVD (peripheral vascular disease)     noobs 07 latrell    Pyriform sinus cancer     dr ponce radiation 1/2-14 dr king perales    Squamous cell carcinoma of skin     roberto    Tongue cancer     "superficial" removed 11/14    Vocal cord cancer 2011    " Xerostomia     radiation       Past Surgical History:   Procedure Laterality Date    APPENDECTOMY      BRONCHOSCOPY Bilateral 2/5/2019    Procedure: Bronchoscopy;  Surgeon: Santos Cardozo MD;  Location: [facility];  Service: Endoscopy;  Laterality: Bilateral;    COLONOSCOPY N/A 5/1/2017    Procedure: Due for screening colonoscopy;  Surgeon: Anushka Yoder MD;  Location: [facility];  Service: Endoscopy;  Laterality: N/A;    ESOPHAGOGASTRODUODENOSCOPY N/A 5/29/2018    Procedure: ESOPHAGOGASTRODUODENOSCOPY (EGD);  Surgeon: Audie Hill III, MD;  Location: [facility];  Service: Endoscopy;  Laterality: N/A;    ESOPHAGOGASTRODUODENOSCOPY N/A 8/29/2018    Procedure: ESOPHAGOGASTRODUODENOSCOPY (EGD);  Surgeon: Audie Hill III, MD;  Location: [facility];  Service: Endoscopy;  Laterality: N/A;    LEFT HEART CATHETERIZATION Left 5/7/2020    Procedure: CATHETERIZATION, HEART, LEFT;  Surgeon: Abel Beltran MD;  Location: [facility] CATH LAB;  Service: Cardiology;  Laterality: Left;  iodine allergy    PERCUTANEOUS TRANSLUMINAL BALLOON ANGIOPLASTY OF CORONARY ARTERY Right 12/9/2019    Procedure: PTCA, USING BALLOON/ IABP or Impella multivessel angioplasty/poss stent;  Surgeon: Abel Beltran MD;  Location: [facility] CATH LAB;  Service: Cardiology;  Laterality: Right;    PERCUTANEOUS TRANSLUMINAL BALLOON ANGIOPLASTY OF CORONARY ARTERY  5/7/2020    Procedure: Angioplasty-coronary;  Surgeon: Abel Beltran MD;  Location: [facility] CATH LAB;  Service: Cardiology;;    THORACENTESIS Left 8/7/2018    Procedure: Thoracentesis;  Surgeon: Santos Cardozo MD;  Location: [facility];  Service: Endoscopy;  Laterality: Left;    THORACENTESIS Right 2/25/2019    Procedure: Thoracentesis;  Surgeon: Santos Cardozo MD;  Location: [facility];  Service: Endoscopy;  Laterality: Right;    tongue cancer excision  11/14    dr vitale    VOCAL CORD LATERALIZATION, ENDOSCOPIC APPROACH W/ MLB      kris       Social History     Tobacco Use     Smoking status: Former Smoker     Packs/day: 0.50     Years: 55.00     Pack years: 27.50     Types: Cigarettes     Last attempt to quit: 10/1/2019     Years since quittin.6    Smokeless tobacco: Never Used    Tobacco comment: 6-7 cigs/day   Substance Use Topics    Alcohol use: Not Currently    Drug use: No       Family History   Problem Relation Age of Onset    Lung cancer Father         + Smoker    No Known Problems Mother     Lung cancer Brother         + Smoker       Current Outpatient Medications   Medication Sig    albuterol (PROAIR HFA) 90 mcg/actuation inhaler INHALE 2 PUFFS BY MOUTH EVERY 4 HOURS AS NEEDED FOR WHEEZING OR  SHORTNESS  OF  BREATH    aspirin 81 mg Tab Take 1 tablet by mouth Daily. Over the counter to help prevent stroke/heart attack    carvedilol (COREG) 3.125 MG tablet Take 1 tablet (3.125 mg total) by mouth 2 (two) times daily.    clopidogrel (PLAVIX) 75 mg tablet Take 1 tablet (75 mg total) by mouth once daily.    ELDERBERRY FRUIT ORAL Take by mouth.    evolocumab (REPATHA SURECLICK) 140 mg/mL PnIj Inject 1 mL (140 mg total) into the skin every 14 (fourteen) days.    furosemide (LASIX) 40 MG tablet Take 0.5 tablets (20 mg total) by mouth once daily.    gabapentin (NEURONTIN) 800 MG tablet Take 1 tablet (800 mg total) by mouth 3 (three) times daily.    isosorbide mononitrate (IMDUR) 30 MG 24 hr tablet Take 1 tablet (30 mg total) by mouth once daily.    L.acid/B.bifidum/B.animal/FOS (PROBIOTIC COMPLEX ORAL) Take by mouth.    levoFLOXacin (LEVAQUIN) 750 MG tablet Take 1 tablet (750 mg total) by mouth every other day. for 10 days    losartan (COZAAR) 25 MG tablet Take 1 tablet (25 mg total) by mouth every evening.    nebulizer and compressor Bailey Use as directed    oxyCODONE-acetaminophen (PERCOCET) 7.5-325 mg per tablet Take 1 tablet by mouth every 8 (eight) hours as needed for Pain.    pantoprazole (PROTONIX) 40 MG tablet TAKE ONE TABLET BY MOUTH ONCE DAILY     SPIRIVA WITH HANDIHALER 18 mcg inhalation capsule INHALE 1 CAPSULE BY MOUTH ONCE DAILY     No current facility-administered medications for this visit.      Current Outpatient Medications on File Prior to Visit   Medication Sig    albuterol (PROAIR HFA) 90 mcg/actuation inhaler INHALE 2 PUFFS BY MOUTH EVERY 4 HOURS AS NEEDED FOR WHEEZING OR  SHORTNESS  OF  BREATH    aspirin 81 mg Tab Take 1 tablet by mouth Daily. Over the counter to help prevent stroke/heart attack    carvedilol (COREG) 3.125 MG tablet Take 1 tablet (3.125 mg total) by mouth 2 (two) times daily.    clopidogrel (PLAVIX) 75 mg tablet Take 1 tablet (75 mg total) by mouth once daily.    ELDERBERRY FRUIT ORAL Take by mouth.    evolocumab (REPATHA SURECLICK) 140 mg/mL PnIj Inject 1 mL (140 mg total) into the skin every 14 (fourteen) days.    furosemide (LASIX) 40 MG tablet Take 0.5 tablets (20 mg total) by mouth once daily.    gabapentin (NEURONTIN) 800 MG tablet Take 1 tablet (800 mg total) by mouth 3 (three) times daily.    isosorbide mononitrate (IMDUR) 30 MG 24 hr tablet Take 1 tablet (30 mg total) by mouth once daily.    L.acid/B.bifidum/B.animal/FOS (PROBIOTIC COMPLEX ORAL) Take by mouth.    levoFLOXacin (LEVAQUIN) 750 MG tablet Take 1 tablet (750 mg total) by mouth every other day. for 10 days    losartan (COZAAR) 25 MG tablet Take 1 tablet (25 mg total) by mouth every evening.    nebulizer and compressor Bailey Use as directed    oxyCODONE-acetaminophen (PERCOCET) 7.5-325 mg per tablet Take 1 tablet by mouth every 8 (eight) hours as needed for Pain.    pantoprazole (PROTONIX) 40 MG tablet TAKE ONE TABLET BY MOUTH ONCE DAILY    SPIRIVA WITH HANDIHALER 18 mcg inhalation capsule INHALE 1 CAPSULE BY MOUTH ONCE DAILY     Current Facility-Administered Medications on File Prior to Visit   Medication    [DISCONTINUED] arformoteroL nebulizer solution 15 mcg    [DISCONTINUED] aspirin chewable tablet 81 mg    [DISCONTINUED] budesonide  nebulizer solution 0.5 mg    [DISCONTINUED] carvediloL tablet 3.125 mg    [DISCONTINUED] cefTRIAXone (ROCEPHIN) 1 g/50 mL D5W IVPB    [DISCONTINUED] clopidogreL tablet 75 mg    [DISCONTINUED] docusate sodium capsule 100 mg    [DISCONTINUED] enoxaparin injection 40 mg    [DISCONTINUED] gabapentin capsule 800 mg    [DISCONTINUED] ipratropium 0.02 % nebulizer solution 0.5 mg    [DISCONTINUED] isosorbide mononitrate 24 hr tablet 30 mg    [DISCONTINUED] levoFLOXacin 750 mg/150 mL IVPB 750 mg    [DISCONTINUED] losartan tablet 25 mg    [DISCONTINUED] oxyCODONE-acetaminophen 7.5-325 mg per tablet 1 tablet    [DISCONTINUED] pantoprazole EC tablet 40 mg     Review of patient's allergies indicates:   Allergen Reactions    Contrast media Hives and Itching    Iodinated contrast media Hives and Itching    Iodine Hives and Itching    Pravastatin      Other reaction(s): fatigue  Other reaction(s): fatigue    Rosuvastatin      Other reaction(s): fatigue  Other reaction(s): fatigue    Simvastatin      Other reaction(s): fatigue  Other reaction(s): fatigue    Statins-hmg-coa reductase inhibitors Other (See Comments)     Fatigue     Review of Systems   Constitution: Negative for malaise/fatigue.   Eyes: Negative for blurred vision.   Cardiovascular: Positive for dyspnea on exertion. Negative for chest pain, claudication, cyanosis, irregular heartbeat, leg swelling, near-syncope, orthopnea, palpitations and paroxysmal nocturnal dyspnea.   Respiratory: Positive for shortness of breath. Negative for cough and hemoptysis.    Hematologic/Lymphatic: Negative for bleeding problem. Does not bruise/bleed easily.   Skin: Negative for dry skin and itching.   Musculoskeletal: Negative for falls, muscle weakness and myalgias.   Gastrointestinal: Negative for abdominal pain, diarrhea, heartburn, hematemesis, hematochezia and melena.   Genitourinary: Negative for flank pain and hematuria.   Neurological: Negative for dizziness,  focal weakness, headaches, light-headedness, numbness, paresthesias, seizures and weakness.   Psychiatric/Behavioral: Negative for altered mental status and memory loss. The patient is not nervous/anxious.    Allergic/Immunologic: Negative for hives.       Objective:   Physical Exam   Constitutional: He is oriented to person, place, and time. He appears well-developed and well-nourished. No distress.   Pulmonary/Chest: Effort normal and breath sounds normal.   Neurological: He is alert and oriented to person, place, and time.   Skin: He is not diaphoretic.   Nursing note and vitals reviewed.    Vitals:    05/13/20 1600   BP: (!) 96/53     Lab Results   Component Value Date    CHOL 80 (L) 12/19/2019    CHOL 77 (L) 02/06/2019    CHOL 98 (L) 08/13/2018     Lab Results   Component Value Date    HDL 42 12/19/2019    HDL 44 02/06/2019    HDL 43 08/13/2018     Lab Results   Component Value Date    LDLCALC 28.0 (L) 12/19/2019    LDLCALC 25.0 (L) 02/06/2019    LDLCALC 39.8 (L) 08/13/2018     Lab Results   Component Value Date    TRIG 50 12/19/2019    TRIG 40 02/06/2019    TRIG 76 08/13/2018     Lab Results   Component Value Date    CHOLHDL 52.5 (H) 12/19/2019    CHOLHDL 57.1 (H) 02/06/2019    CHOLHDL 43.9 08/13/2018       Chemistry        Component Value Date/Time     05/12/2020 0456    K 4.1 05/12/2020 0456     05/12/2020 0456    CO2 27 05/12/2020 0456    BUN 28 (H) 05/12/2020 0456    CREATININE 0.9 05/12/2020 0456    GLU 85 05/12/2020 0456        Component Value Date/Time    CALCIUM 8.5 (L) 05/12/2020 0456    ALKPHOS 324 (H) 05/12/2020 0456     (H) 05/12/2020 0456     (H) 05/12/2020 0456    BILITOT 0.7 05/12/2020 0456    ESTGFRAFRICA >60 05/12/2020 0456    EGFRNONAA >60 05/12/2020 0456          Lab Results   Component Value Date    TSH 1.259 02/06/2019     Lab Results   Component Value Date    INR 1.1 05/10/2020    INR 1.0 05/05/2020    INR 1.0 12/05/2019     Lab Results   Component Value Date     WBC 11.72 05/12/2020    HGB 11.2 (L) 05/12/2020    HCT 35.5 (L) 05/12/2020    MCV 95 05/12/2020     (L) 05/12/2020     BMP  Sodium   Date Value Ref Range Status   05/12/2020 143 136 - 145 mmol/L Final     Potassium   Date Value Ref Range Status   05/12/2020 4.1 3.5 - 5.1 mmol/L Final     Chloride   Date Value Ref Range Status   05/12/2020 106 95 - 110 mmol/L Final     CO2   Date Value Ref Range Status   05/12/2020 27 23 - 29 mmol/L Final     BUN, Bld   Date Value Ref Range Status   05/12/2020 28 (H) 8 - 23 mg/dL Final     Creatinine   Date Value Ref Range Status   05/12/2020 0.9 0.5 - 1.4 mg/dL Final     Calcium   Date Value Ref Range Status   05/12/2020 8.5 (L) 8.7 - 10.5 mg/dL Final     Anion Gap   Date Value Ref Range Status   05/12/2020 10 8 - 16 mmol/L Final     eGFR if    Date Value Ref Range Status   05/12/2020 >60 >60 mL/min/1.73 m^2 Final     eGFR if non    Date Value Ref Range Status   05/12/2020 >60 >60 mL/min/1.73 m^2 Final     Comment:     Calculation used to obtain the estimated glomerular filtration  rate (eGFR) is the CKD-EPI equation.        CrCl cannot be calculated (Unknown ideal weight.).    Assessment:     1. Coronary artery disease, angina presence unspecified, unspecified vessel or lesion type, unspecified whether native or transplanted heart    2. JUANITO on CPAP    3. Hyperkalemia    4. Congestive heart failure, unspecified HF chronicity, unspecified heart failure type    5. Pure hypercholesterolemia    6. Essential hypertension    7. Stage 3 severe COPD by GOLD classification    8. Allergy to statin medication    9. Stenosis of right carotid artery    10. Coronary artery disease involving left main coronary artery    11. Dyspnea on exertion      Stable clinically responded to o2 therapy encouraged to walk in his land   Will continue same therapy he will follow with DR NGO No flowsheet data found.     WILL FORWARD A MESSAGE TO Dr Antonio on main campus to  review his films and decide if intervention for  evaluation.  Plan:   Continue current therapy  Cardiac low salt diet.  Risk factor modification and excercise program.  F/u in 1 month with DR NGO.

## 2020-05-13 NOTE — PATIENT INSTRUCTIONS
Pneumonia (Adult)  Pneumonia is an infection deep within the lungs. It is in the small air sacs (alveoli). Pneumonia may be caused by a virus or bacteria. Pneumonia caused by bacteria is usually treated with an antibiotic. Severe cases may need to be treated in the hospital. Milder cases can be treated at home. Symptoms usually start to get better during the first 2 days of treatment.    Home care  Follow these guidelines when caring for yourself at home:  · Rest at home for the first 2 to 3 days, or until you feel stronger. Dont let yourself get overly tired when you go back to your activities.  · Stay away from cigarette smoke - yours or other peoples.  · You may use acetaminophen or ibuprofen to control fever or pain, unless another medicine was prescribed. If you have chronic liver or kidney disease, talk with your healthcare provider before using these medicines. Also talk with your provider if youve had a stomach ulcer or gastrointestinal bleeding. Dont give aspirin to anyone younger than 18 years of age who is ill with a fever. It may cause severe liver damage.  · Your appetite may be poor, so a light diet is fine.  · Drink 6 to 8 glasses of fluids every day to make sure you are getting enough fluids. Beverages can include water, sport drinks, sodas without caffeine, juices, tea, or soup. Fluids will help loosen secretions in the lung. This will make it easier for you to cough up the phlegm (sputum). If you also have heart or kidney disease, check with your healthcare provider before you drink extra fluids.  · Take antibiotic medicine prescribed until it is all gone, even if you are feeling better after a few days.  Follow-up care  Follow up with your healthcare provider in the next 2 to 3 days, or as advised. This is to be sure the medicine is helping you get better.  If you are 65 or older, you should get a pneumococcal vaccine and a yearly flu (influenza) shot. You should also get these vaccines if  you have chronic lung disease like asthma, emphysema, or COPD. Recently, a second type of pneumonia vaccine has become available for everyone over 65 years old. This is in addition to the previous vaccine. Ask your provider about this.  When to seek medical advice  Call your healthcare provider right away if any of these occur:  · You dont get better within the first 48 hours of treatment  · Shortness of breath gets worse  · Rapid breathing (more than 25 breaths per minute)  · Coughing up blood  · Chest pain gets worse with breathing  · Fever of 100.4°F (38°C) or higher that doesnt get better with fever medicine  · Weakness, dizziness, or fainting that gets worse  · Thirst or dry mouth that gets worse  · Sinus pain, headache, or a stiff neck  · Chest pain not caused by coughing  Date Last Reviewed: 1/1/2017  © 2534-7013 [employer]. [address] [place] [id number]. All rights reserved. This information is not intended as a substitute for professional medical care. Always follow your healthcare professional's instructions.

## 2020-05-14 ENCOUNTER — OFFICE VISIT (OUTPATIENT)
Dept: INTERNAL MEDICINE | Facility: CLINIC | Age: 75
End: 2020-05-14
Payer: MEDICARE

## 2020-05-14 VITALS
HEART RATE: 87 BPM | BODY MASS INDEX: 20.37 KG/M2 | TEMPERATURE: 96 F | SYSTOLIC BLOOD PRESSURE: 106 MMHG | WEIGHT: 134 LBS | DIASTOLIC BLOOD PRESSURE: 51 MMHG

## 2020-05-14 DIAGNOSIS — I50.22 CHRONIC SYSTOLIC CONGESTIVE HEART FAILURE: ICD-10-CM

## 2020-05-14 DIAGNOSIS — K74.60 HEPATIC CIRRHOSIS, UNSPECIFIED HEPATIC CIRRHOSIS TYPE, UNSPECIFIED WHETHER ASCITES PRESENT: ICD-10-CM

## 2020-05-14 DIAGNOSIS — J18.9 PNEUMONIA DUE TO INFECTIOUS ORGANISM, UNSPECIFIED LATERALITY, UNSPECIFIED PART OF LUNG: Primary | ICD-10-CM

## 2020-05-14 DIAGNOSIS — J44.9 STAGE 3 SEVERE COPD BY GOLD CLASSIFICATION: ICD-10-CM

## 2020-05-14 DIAGNOSIS — I25.10 CORONARY ARTERY DISEASE, ANGINA PRESENCE UNSPECIFIED, UNSPECIFIED VESSEL OR LESION TYPE, UNSPECIFIED WHETHER NATIVE OR TRANSPLANTED HEART: ICD-10-CM

## 2020-05-14 LAB
BACTERIA BLD CULT: NORMAL
BACTERIA BLD CULT: NORMAL

## 2020-05-14 PROCEDURE — 99211 OFF/OP EST MAY X REQ PHY/QHP: CPT

## 2020-05-14 PROCEDURE — G0463 HOSPITAL OUTPT CLINIC VISIT: HCPCS

## 2020-05-14 PROCEDURE — 99214 PR OFFICE/OUTPT VISIT, EST, LEVL IV, 30-39 MIN: ICD-10-PCS | Mod: 95,,, | Performed by: FAMILY MEDICINE

## 2020-05-14 PROCEDURE — 99214 OFFICE O/P EST MOD 30 MIN: CPT | Mod: 95,,, | Performed by: FAMILY MEDICINE

## 2020-05-14 RX ORDER — NYSTATIN 100000 [USP'U]/ML
4 SUSPENSION ORAL 4 TIMES DAILY
Qty: 112 ML | Refills: 0 | Status: SHIPPED | OUTPATIENT
Start: 2020-05-14 | End: 2020-05-21

## 2020-05-14 NOTE — Clinical Note
Staci declined feeding tube in hospital but now wants this. Can you all help set him up? He is seeing someone in the dept for new diagnosis cirrhosis.CB

## 2020-05-14 NOTE — PROGRESS NOTES
Subjective:       Patient ID: Noble Tripp is a 74 y.o. male.    Chief Complaint: Multiple issues see below    HPI The patient location is:home  The chief complaint leading to consultation is in hpi  Visit type: Virtual visit with synchronous audio and video  Total time spent with patient:25  Each patient to whom he or she provides medical services by telemedicine is:  (1) informed of the relationship between the physician and patient and the respective role of any other health care provider with respect to management of the patient; and (2) notified that he or she may decline to receive medical services by telemedicine and may withdraw from such care at any time.    hosp pneum (?diff from prior aspir pneum) on abx;O2 weaned  down to 2 L/min;covid negative    Status with tube feeding recommendation; prev declined. He is now ready    Cirrhosis/elev lfts no etoh actually in many yrs except a beer every few months;Pt denies regular alcohol use. Hep C is neg. Labs- WBC 13> 15, Pl 146, Creatinine 1.2>1.4 . Elevated LFT.     Throat ca utd specialists; /hxDr Vasireddy;  Follow anemia also          Hx smoking      Hypertension: blood pressures ok. Tolerating medicine.            . radha / cpap    chronic pain well controlled w  pain meds dr degroot.     Copd utd dr brown . Stable/ s/p thoracentesis in past     Coronary artery disease/micah: up to date with cardiology. No chest pain, palpitations or shortness of breath. Tolerating medication.         Osteopenia not needing med 11/18      Prior constipation resolved from last vsiit. Some recurr will retry our last recommendation    .  Past Medical History:   Diagnosis Date    Adrenal adenoma     david;nl fxn w/u;tran 11/18    Aspiration pneumonia 10/15    puree/honey    Benign prostate hyperplasia     CAD (coronary artery disease)     dr padilla    Chronic pain     dr santos    Chronic systolic congestive heart failure 10/17/2019    Cirrhosis     w 5/20  "[facility]    COPD (chronic obstructive pulmonary disease)     papi    Ex-smoker     11/13    Hyperlipidemia     Ischemic cardiomyopathy 11/22/2019    JUANITO on CPAP     cpap    Osteopenia 1/15 tran 1/18    PSVT (paroxysmal supraventricular tachycardia)     PVD (peripheral vascular disease)     noobs 07 uri    Pyriform sinus cancer     dr ponce radiation 1/2-14 dr king perales    Squamous cell carcinoma of skin     roberto    Tongue cancer     "superficial" removed 11/14    Vocal cord cancer 2011    Xerostomia     radiation     Past Surgical History:   Procedure Laterality Date    APPENDECTOMY      BRONCHOSCOPY Bilateral 2/5/2019    Procedure: Bronchoscopy;  Surgeon: Santos Cardozo MD;  Location: [facility];  Service: Endoscopy;  Laterality: Bilateral;    COLONOSCOPY N/A 5/1/2017    Procedure: Due for screening colonoscopy;  Surgeon: Anushka Yoder MD;  Location: [facility];  Service: Endoscopy;  Laterality: N/A;    ESOPHAGOGASTRODUODENOSCOPY N/A 5/29/2018    Procedure: ESOPHAGOGASTRODUODENOSCOPY (EGD);  Surgeon: Audie Hill III, MD;  Location: [facility];  Service: Endoscopy;  Laterality: N/A;    ESOPHAGOGASTRODUODENOSCOPY N/A 8/29/2018    Procedure: ESOPHAGOGASTRODUODENOSCOPY (EGD);  Surgeon: Audie Hill III, MD;  Location: [facility];  Service: Endoscopy;  Laterality: N/A;    LEFT HEART CATHETERIZATION Left 5/7/2020    Procedure: CATHETERIZATION, HEART, LEFT;  Surgeon: Abel Beltran MD;  Location: [facility] CATH LAB;  Service: Cardiology;  Laterality: Left;  iodine allergy    PERCUTANEOUS TRANSLUMINAL BALLOON ANGIOPLASTY OF CORONARY ARTERY Right 12/9/2019    Procedure: PTCA, USING BALLOON/ IABP or Impella multivessel angioplasty/poss stent;  Surgeon: Abel Beltran MD;  Location: [facility] CATH LAB;  Service: Cardiology;  Laterality: Right;    PERCUTANEOUS TRANSLUMINAL BALLOON ANGIOPLASTY OF CORONARY ARTERY  5/7/2020    Procedure: Angioplasty-coronary;  Surgeon: Abel Beltran MD;  " Location: [facility] CATH LAB;  Service: Cardiology;;    THORACENTESIS Left 2018    Procedure: Thoracentesis;  Surgeon: Santos Cardooz MD;  Location: [facility] ENDO;  Service: Endoscopy;  Laterality: Left;    THORACENTESIS Right 2019    Procedure: Thoracentesis;  Surgeon: Santos Cardozo MD;  Location: [facility] ENDO;  Service: Endoscopy;  Laterality: Right;    tongue cancer excision      dr vitael    VOCAL CORD LATERALIZATION, ENDOSCOPIC APPROACH W/ MLB      kris     Family History   Problem Relation Age of Onset    Lung cancer Father         + Smoker    No Known Problems Mother     Lung cancer Brother         + Smoker     Social History     Socioeconomic History    Marital status:      Spouse name: JYOTI    Number of children: 6    Years of education: Not on file    Highest education level: Not on file   Occupational History    Not on file   Social Needs    Financial resource strain: Not on file    Food insecurity:     Worry: Not on file     Inability: Not on file    Transportation needs:     Medical: Not on file     Non-medical: Not on file   Tobacco Use    Smoking status: Former Smoker     Packs/day: 0.50     Years: 55.00     Pack years: 27.50     Types: Cigarettes     Last attempt to quit: 10/1/2019     Years since quittin.6    Smokeless tobacco: Never Used    Tobacco comment: 6-7 cigs/day   Substance and Sexual Activity    Alcohol use: Not Currently    Drug use: No    Sexual activity: Not Currently   Lifestyle    Physical activity:     Days per week: Not on file     Minutes per session: Not on file    Stress: Not on file   Relationships    Social connections:     Talks on phone: Not on file     Gets together: Not on file     Attends Bahai service: Not on file     Active member of club or organization: Not on file     Attends meetings of clubs or organizations: Not on file     Relationship status: Not on file   Other Topics Concern    Not on file   Social  History Narrative     2019; 6 kids live nearby, Former smoker as of 10/2019. 1 dog, grandson lives with him, no smokers in household.       Review of Systems  no co cp  Objective:      Physical Exam  gen nad   A and o x 3; wearing o2  Assessment:       1. Pneumonia due to infectious organism, unspecified laterality, unspecified part of lung    2. Hepatic cirrhosis, unspecified hepatic cirrhosis type, unspecified whether ascites present      loss weight  dysphagia  Plan:     nystatin. C/o thrush mouth  *f/u card , pulm as sched  rfer gi cirrhosis**    Video visit one month w me  Monitor wt  Pneumonia due to infectious organism, unspecified laterality, unspecified part of lung    Hepatic cirrhosis, unspecified hepatic cirrhosis type, unspecified whether ascites present  -     Ambulatory referral/consult to Gastroenterology; Future; Expected date: 05/21/2020    add:he asks about feeding tube to help prev aspir pneumonia and helps with meds;msg gi about help setting up

## 2020-05-15 ENCOUNTER — TELEPHONE (OUTPATIENT)
Dept: PAIN MEDICINE | Facility: CLINIC | Age: 75
End: 2020-05-15

## 2020-05-15 NOTE — TELEPHONE ENCOUNTER
----- Message from Jael Trinh sent at 5/15/2020 12:10 PM CDT -----  Contact: Patient  Patient states that his percocet is the wrong strength its suppose to be 7.25 and the pharmacy states its 5mg, please call him back at [phone]. Thank you

## 2020-05-15 NOTE — TELEPHONE ENCOUNTER
oxyCODONE-acetaminophen (PERCOCET) 7.5-325 mg per tablet (Discontinued) 90 tablet 0 5/14/2020 5/12/2020 --   Sig - Route: Take 1 tablet by mouth every 8 (eight) hours as needed for Pain. - Oral   Sent to pharmacy as: oxyCODONE-acetaminophen (PERCOCET) 7.5-325 mg per tablet   Class: Normal   Earliest Fill Date: 5/14/2020   Notes to Pharmacy: Greater than 7 days medically necessary     Pt notified. All questions answered

## 2020-05-16 ENCOUNTER — HOSPITAL ENCOUNTER (INPATIENT)
Facility: HOSPITAL | Age: 75
LOS: 2 days | Discharge: HOSPICE | DRG: 291 | End: 2020-05-19
Attending: EMERGENCY MEDICINE | Admitting: FAMILY MEDICINE
Payer: MEDICARE

## 2020-05-16 DIAGNOSIS — I50.42 CHRONIC COMBINED SYSTOLIC AND DIASTOLIC HEART FAILURE: ICD-10-CM

## 2020-05-16 DIAGNOSIS — I50.9 CONGESTIVE HEART FAILURE, UNSPECIFIED HF CHRONICITY, UNSPECIFIED HEART FAILURE TYPE: ICD-10-CM

## 2020-05-16 DIAGNOSIS — C80.1 METASTATIC MALIGNANT NEOPLASM OF UNKNOWN PRIMARY SITE: ICD-10-CM

## 2020-05-16 DIAGNOSIS — I50.32 CHRONIC DIASTOLIC HEART FAILURE: Chronic | ICD-10-CM

## 2020-05-16 DIAGNOSIS — N17.9 ACUTE RENAL FAILURE, UNSPECIFIED ACUTE RENAL FAILURE TYPE: ICD-10-CM

## 2020-05-16 DIAGNOSIS — T17.208S: ICD-10-CM

## 2020-05-16 DIAGNOSIS — J96.11 CHRONIC RESPIRATORY FAILURE WITH HYPOXIA: Chronic | ICD-10-CM

## 2020-05-16 DIAGNOSIS — Z85.89 HISTORY OF HEAD AND NECK CANCER: ICD-10-CM

## 2020-05-16 DIAGNOSIS — J96.21 ACUTE ON CHRONIC RESPIRATORY FAILURE WITH HYPOXIA: Primary | ICD-10-CM

## 2020-05-16 DIAGNOSIS — I50.9 CHF (CONGESTIVE HEART FAILURE): ICD-10-CM

## 2020-05-16 DIAGNOSIS — J18.9 PNEUMONIA OF LEFT LOWER LOBE DUE TO INFECTIOUS ORGANISM: ICD-10-CM

## 2020-05-16 DIAGNOSIS — C79.9 METASTATIC MALIGNANT NEOPLASM OF UNKNOWN PRIMARY SITE: ICD-10-CM

## 2020-05-16 DIAGNOSIS — R60.0 BILATERAL LEG EDEMA: ICD-10-CM

## 2020-05-16 DIAGNOSIS — J44.9 STAGE 3 SEVERE COPD BY GOLD CLASSIFICATION: ICD-10-CM

## 2020-05-16 DIAGNOSIS — R06.02 SOB (SHORTNESS OF BREATH): ICD-10-CM

## 2020-05-16 LAB
ALBUMIN SERPL BCP-MCNC: 2.8 G/DL (ref 3.5–5.2)
ALP SERPL-CCNC: 391 U/L (ref 55–135)
ALT SERPL W/O P-5'-P-CCNC: 100 U/L (ref 10–44)
ANION GAP SERPL CALC-SCNC: 10 MMOL/L (ref 8–16)
APTT BLDCRRT: 28.2 SEC (ref 21–32)
AST SERPL-CCNC: 95 U/L (ref 10–40)
BASOPHILS # BLD AUTO: 0.05 K/UL (ref 0–0.2)
BASOPHILS NFR BLD: 0.4 % (ref 0–1.9)
BILIRUB SERPL-MCNC: 0.8 MG/DL (ref 0.1–1)
BILIRUB UR QL STRIP: NEGATIVE
BNP SERPL-MCNC: 254 PG/ML (ref 0–99)
BUN SERPL-MCNC: 50 MG/DL (ref 8–23)
CALCIUM SERPL-MCNC: 9.1 MG/DL (ref 8.7–10.5)
CHLORIDE SERPL-SCNC: 106 MMOL/L (ref 95–110)
CLARITY UR: CLEAR
CO2 SERPL-SCNC: 27 MMOL/L (ref 23–29)
COLOR UR: YELLOW
CREAT SERPL-MCNC: 1.7 MG/DL (ref 0.5–1.4)
DIFFERENTIAL METHOD: ABNORMAL
EOSINOPHIL # BLD AUTO: 0 K/UL (ref 0–0.5)
EOSINOPHIL NFR BLD: 0.2 % (ref 0–8)
ERYTHROCYTE [DISTWIDTH] IN BLOOD BY AUTOMATED COUNT: 17.4 % (ref 11.5–14.5)
EST. GFR  (AFRICAN AMERICAN): 45 ML/MIN/1.73 M^2
EST. GFR  (NON AFRICAN AMERICAN): 39 ML/MIN/1.73 M^2
GLUCOSE SERPL-MCNC: 89 MG/DL (ref 70–110)
GLUCOSE UR QL STRIP: NEGATIVE
HCT VFR BLD AUTO: 35.9 % (ref 40–54)
HGB BLD-MCNC: 11.3 G/DL (ref 14–18)
HGB UR QL STRIP: NEGATIVE
IMM GRANULOCYTES # BLD AUTO: 0.54 K/UL (ref 0–0.04)
IMM GRANULOCYTES NFR BLD AUTO: 4.1 % (ref 0–0.5)
INR PPP: 1.1 (ref 0.8–1.2)
KETONES UR QL STRIP: NEGATIVE
LEUKOCYTE ESTERASE UR QL STRIP: NEGATIVE
LYMPHOCYTES # BLD AUTO: 0.7 K/UL (ref 1–4.8)
LYMPHOCYTES NFR BLD: 5.4 % (ref 18–48)
MCH RBC QN AUTO: 29.7 PG (ref 27–31)
MCHC RBC AUTO-ENTMCNC: 31.5 G/DL (ref 32–36)
MCV RBC AUTO: 95 FL (ref 82–98)
MONOCYTES # BLD AUTO: 0.8 K/UL (ref 0.3–1)
MONOCYTES NFR BLD: 5.9 % (ref 4–15)
NEUTROPHILS # BLD AUTO: 11.1 K/UL (ref 1.8–7.7)
NEUTROPHILS NFR BLD: 84 % (ref 38–73)
NITRITE UR QL STRIP: NEGATIVE
NRBC BLD-RTO: 0 /100 WBC
PH UR STRIP: 6 [PH] (ref 5–8)
PLATELET # BLD AUTO: 212 K/UL (ref 150–350)
PMV BLD AUTO: 12.4 FL (ref 9.2–12.9)
POTASSIUM SERPL-SCNC: 4.6 MMOL/L (ref 3.5–5.1)
PROT SERPL-MCNC: 6.3 G/DL (ref 6–8.4)
PROT UR QL STRIP: NEGATIVE
PROTHROMBIN TIME: 12 SEC (ref 9–12.5)
RBC # BLD AUTO: 3.8 M/UL (ref 4.6–6.2)
SARS-COV-2 RDRP RESP QL NAA+PROBE: NEGATIVE
SODIUM SERPL-SCNC: 143 MMOL/L (ref 136–145)
SP GR UR STRIP: 1.01 (ref 1–1.03)
TROPONIN I SERPL DL<=0.01 NG/ML-MCNC: 0.03 NG/ML (ref 0–0.03)
TROPONIN I SERPL DL<=0.01 NG/ML-MCNC: 0.04 NG/ML (ref 0–0.03)
TROPONIN I SERPL DL<=0.01 NG/ML-MCNC: 0.04 NG/ML (ref 0–0.03)
URN SPEC COLLECT METH UR: NORMAL
UROBILINOGEN UR STRIP-ACNC: NEGATIVE EU/DL
WBC # BLD AUTO: 13.23 K/UL (ref 3.9–12.7)

## 2020-05-16 PROCEDURE — 80053 COMPREHEN METABOLIC PANEL: CPT

## 2020-05-16 PROCEDURE — 99900035 HC TECH TIME PER 15 MIN (STAT)

## 2020-05-16 PROCEDURE — 85025 COMPLETE CBC W/AUTO DIFF WBC: CPT

## 2020-05-16 PROCEDURE — 93005 ELECTROCARDIOGRAM TRACING: CPT

## 2020-05-16 PROCEDURE — U0002 COVID-19 LAB TEST NON-CDC: HCPCS

## 2020-05-16 PROCEDURE — 93010 EKG 12-LEAD: ICD-10-PCS | Mod: ,,, | Performed by: INTERNAL MEDICINE

## 2020-05-16 PROCEDURE — 81003 URINALYSIS AUTO W/O SCOPE: CPT

## 2020-05-16 PROCEDURE — 94640 AIRWAY INHALATION TREATMENT: CPT

## 2020-05-16 PROCEDURE — G0378 HOSPITAL OBSERVATION PER HR: HCPCS

## 2020-05-16 PROCEDURE — 85610 PROTHROMBIN TIME: CPT

## 2020-05-16 PROCEDURE — 83880 ASSAY OF NATRIURETIC PEPTIDE: CPT

## 2020-05-16 PROCEDURE — 84484 ASSAY OF TROPONIN QUANT: CPT | Mod: 91

## 2020-05-16 PROCEDURE — 85730 THROMBOPLASTIN TIME PARTIAL: CPT

## 2020-05-16 PROCEDURE — 36415 COLL VENOUS BLD VENIPUNCTURE: CPT

## 2020-05-16 PROCEDURE — 25000242 PHARM REV CODE 250 ALT 637 W/ HCPCS: Performed by: FAMILY MEDICINE

## 2020-05-16 PROCEDURE — 84484 ASSAY OF TROPONIN QUANT: CPT

## 2020-05-16 PROCEDURE — 94761 N-INVAS EAR/PLS OXIMETRY MLT: CPT

## 2020-05-16 PROCEDURE — 93010 ELECTROCARDIOGRAM REPORT: CPT | Mod: ,,, | Performed by: INTERNAL MEDICINE

## 2020-05-16 PROCEDURE — 96374 THER/PROPH/DIAG INJ IV PUSH: CPT

## 2020-05-16 PROCEDURE — 63600175 PHARM REV CODE 636 W HCPCS: Performed by: EMERGENCY MEDICINE

## 2020-05-16 PROCEDURE — 25000242 PHARM REV CODE 250 ALT 637 W/ HCPCS: Performed by: NURSE PRACTITIONER

## 2020-05-16 PROCEDURE — 25000003 PHARM REV CODE 250: Performed by: NURSE PRACTITIONER

## 2020-05-16 PROCEDURE — 99285 EMERGENCY DEPT VISIT HI MDM: CPT | Mod: 25

## 2020-05-16 RX ORDER — SODIUM CHLORIDE 0.9 % (FLUSH) 0.9 %
10 SYRINGE (ML) INJECTION
Status: DISCONTINUED | OUTPATIENT
Start: 2020-05-16 | End: 2020-05-19 | Stop reason: HOSPADM

## 2020-05-16 RX ORDER — LEVOFLOXACIN 750 MG/1
750 TABLET ORAL EVERY OTHER DAY
Status: DISCONTINUED | OUTPATIENT
Start: 2020-05-12 | End: 2020-05-17

## 2020-05-16 RX ORDER — CLOPIDOGREL BISULFATE 75 MG/1
75 TABLET ORAL DAILY
Status: DISCONTINUED | OUTPATIENT
Start: 2020-05-17 | End: 2020-05-17

## 2020-05-16 RX ORDER — TIOTROPIUM BROMIDE 18 UG/1
1 CAPSULE ORAL; RESPIRATORY (INHALATION) DAILY
Status: DISCONTINUED | OUTPATIENT
Start: 2020-05-17 | End: 2020-05-16 | Stop reason: CLARIF

## 2020-05-16 RX ORDER — NAPROXEN SODIUM 220 MG/1
81 TABLET, FILM COATED ORAL DAILY
Status: DISCONTINUED | OUTPATIENT
Start: 2020-05-17 | End: 2020-05-17

## 2020-05-16 RX ORDER — ISOSORBIDE MONONITRATE 30 MG/1
30 TABLET, EXTENDED RELEASE ORAL DAILY
Status: DISCONTINUED | OUTPATIENT
Start: 2020-05-17 | End: 2020-05-19 | Stop reason: HOSPADM

## 2020-05-16 RX ORDER — ONDANSETRON 2 MG/ML
4 INJECTION INTRAMUSCULAR; INTRAVENOUS EVERY 8 HOURS PRN
Status: DISCONTINUED | OUTPATIENT
Start: 2020-05-16 | End: 2020-05-19 | Stop reason: HOSPADM

## 2020-05-16 RX ORDER — POLYETHYLENE GLYCOL 3350 17 G/17G
17 POWDER, FOR SOLUTION ORAL DAILY
Status: DISCONTINUED | OUTPATIENT
Start: 2020-05-17 | End: 2020-05-19 | Stop reason: HOSPADM

## 2020-05-16 RX ORDER — PANTOPRAZOLE SODIUM 40 MG/1
40 TABLET, DELAYED RELEASE ORAL DAILY
Status: DISCONTINUED | OUTPATIENT
Start: 2020-05-17 | End: 2020-05-18

## 2020-05-16 RX ORDER — IPRATROPIUM BROMIDE AND ALBUTEROL SULFATE 2.5; .5 MG/3ML; MG/3ML
3 SOLUTION RESPIRATORY (INHALATION) EVERY 4 HOURS PRN
Status: DISCONTINUED | OUTPATIENT
Start: 2020-05-16 | End: 2020-05-19 | Stop reason: HOSPADM

## 2020-05-16 RX ORDER — FUROSEMIDE 10 MG/ML
60 INJECTION INTRAMUSCULAR; INTRAVENOUS
Status: COMPLETED | OUTPATIENT
Start: 2020-05-16 | End: 2020-05-16

## 2020-05-16 RX ORDER — IPRATROPIUM BROMIDE 0.5 MG/2.5ML
0.5 SOLUTION RESPIRATORY (INHALATION) EVERY 6 HOURS
Status: DISCONTINUED | OUTPATIENT
Start: 2020-05-16 | End: 2020-05-19 | Stop reason: HOSPADM

## 2020-05-16 RX ORDER — FUROSEMIDE 10 MG/ML
40 INJECTION INTRAMUSCULAR; INTRAVENOUS 2 TIMES DAILY
Status: DISCONTINUED | OUTPATIENT
Start: 2020-05-17 | End: 2020-05-18

## 2020-05-16 RX ORDER — OXYCODONE AND ACETAMINOPHEN 7.5; 325 MG/1; MG/1
1 TABLET ORAL EVERY 4 HOURS PRN
Status: DISCONTINUED | OUTPATIENT
Start: 2020-05-16 | End: 2020-05-19 | Stop reason: HOSPADM

## 2020-05-16 RX ORDER — CARVEDILOL 3.12 MG/1
3.12 TABLET ORAL 2 TIMES DAILY
Status: DISCONTINUED | OUTPATIENT
Start: 2020-05-16 | End: 2020-05-19 | Stop reason: HOSPADM

## 2020-05-16 RX ORDER — OXYCODONE AND ACETAMINOPHEN 7.5; 325 MG/1; MG/1
TABLET ORAL
Status: ON HOLD | COMMUNITY
Start: 2020-05-15 | End: 2020-05-19 | Stop reason: SDUPTHER

## 2020-05-16 RX ADMIN — CARVEDILOL 3.12 MG: 3.12 TABLET, FILM COATED ORAL at 09:05

## 2020-05-16 RX ADMIN — IPRATROPIUM BROMIDE 0.5 MG: 0.5 SOLUTION RESPIRATORY (INHALATION) at 07:05

## 2020-05-16 RX ADMIN — OXYCODONE AND ACETAMINOPHEN 1 TABLET: 7.5; 325 TABLET ORAL at 03:05

## 2020-05-16 RX ADMIN — FUROSEMIDE 60 MG: 10 INJECTION, SOLUTION INTRAMUSCULAR; INTRAVENOUS at 11:05

## 2020-05-16 RX ADMIN — IPRATROPIUM BROMIDE AND ALBUTEROL SULFATE 3 ML: .5; 3 SOLUTION RESPIRATORY (INHALATION) at 10:05

## 2020-05-16 NOTE — ASSESSMENT & PLAN NOTE
- Observation  - Inpatient consult to Cardiology, awaiting input  - 2D ECHO pending  - Pt received Lasix 60 mg IV x 1 in ED  - Lasix 40 mg IV BID  - Strict I&Os  - Daily weights  - Fluid restriction 1500 mL  - Continue BB

## 2020-05-16 NOTE — PLAN OF CARE
Discussed Plan of Care with patient and verbalized understanding - Patient remains AAOx4 - remains free of falls, accidents and trauma during the day shift. Bed is in the low position and the call light is within reach. CT Chest and ECHO completed. Will continue to monitor

## 2020-05-16 NOTE — ASSESSMENT & PLAN NOTE
- Initial troponin 0.044  - Pt denies chest pain and/or equivalent  - Serial troponin pending  - Elevation likely from demand ischemia from HF

## 2020-05-16 NOTE — ASSESSMENT & PLAN NOTE
- Pt was being treated for aspiration pna on last admission  - Continue treatment with PO Levaquin -- last dose will be Monday, 05/18/20

## 2020-05-16 NOTE — ED PROVIDER NOTES
"SCRIBE #1 NOTE: I, Joe Griffin, am scribing for, and in the presence of, Briana Guzman MD. I have scribed the entire note.       History     Chief Complaint   Patient presents with    Shortness of Breath     PT states, "I feel like I can't breathe."     Review of patient's allergies indicates:   Allergen Reactions    Contrast media Hives and Itching    Iodinated contrast media Hives and Itching    Iodine Hives and Itching    Pravastatin      Other reaction(s): fatigue  Other reaction(s): fatigue    Rosuvastatin      Other reaction(s): fatigue  Other reaction(s): fatigue    Simvastatin      Other reaction(s): fatigue  Other reaction(s): fatigue    Statins-hmg-coa reductase inhibitors Other (See Comments)     Fatigue         History of Present Illness     HPI    5/16/2020, 10:40 AM   History obtained from the patient      History of Present Illness: Noble Tripp is a 74 y.o. male patient with a PMHx of CAD, COPD, CHF, PSVT, PVD, pyriform sinus cancer, and ischemic cardiomyopathy who presents to the Emergency Department for evaluation of SOB which onset gradually today. Pt saw cardiology 2 days ago. Pt noticed Symptoms are constant and moderate in severity. No mitigating factors reported. Pt states that movement exacerbates sxs. Associated sxs include swelling to BLE. Patient denies any fever, chills, diaphoresis, recent travel, CP, cough, abd pain, N/V, back pain, HA, weakness, and all other sxs at this time. No prior Tx reported. Pt states he is on abx and lasix and is on 2L of home O2. No further complaints or concerns at this time.        Arrival mode: Personal vehicle     PCP: Dwaine Davis MD        Past Medical History:  Past Medical History:   Diagnosis Date    Acute on chronic diastolic congestive heart failure (EF = 60% on 5/16/2020) 12/9/2019     Echo Color Flow Doppler 05/16/20  Narrative · Left ventricular systolic function. The estimated ejection fraction is  60%. · " "Grade I (mild) left ventricular diastolic dysfunction consistent with  impaired relaxation. · Normal right ventricular systolic function.   .     Adrenal adenoma     david;nl fxn w/u;tran 11/18    Aspiration pneumonia 10/15    puree/honey    Benign prostate hyperplasia     CAD (coronary artery disease)     dr padilla    Chronic diastolic heart failure 5/19/2020    Chronic pain     dr santos    Chronic systolic congestive heart failure 10/17/2019    Cirrhosis     w 5/20 hospital    COPD (chronic obstructive pulmonary disease)     papi    Ex-smoker     11/13    Hyperlipidemia     Ischemic cardiomyopathy 11/22/2019    Oropharyngeal dysphagia 5/17/2020    JUANITO on CPAP     cpap    Osteopenia 1/15 tran 1/18    Pneumonia involving bilateral lungs     PSVT (paroxysmal supraventricular tachycardia)     PVD (peripheral vascular disease)     noobs 07 khuri    Pyriform sinus cancer     dr ponce radiation 1/2-14 dr king perales    Squamous cell carcinoma of skin     roberto    Tongue cancer     "superficial" removed 11/14    Vocal cord cancer 2011    Xerostomia     radiation       Past Surgical History:  Past Surgical History:   Procedure Laterality Date    APPENDECTOMY      BRONCHOSCOPY Bilateral 2/5/2019    Procedure: Bronchoscopy;  Surgeon: Santos Cardozo MD;  Location: [facility];  Service: Endoscopy;  Laterality: Bilateral;    COLONOSCOPY N/A 5/1/2017    Procedure: Due for screening colonoscopy;  Surgeon: Anushka Yoder MD;  Location: [facility];  Service: Endoscopy;  Laterality: N/A;    ESOPHAGOGASTRODUODENOSCOPY N/A 5/29/2018    Procedure: ESOPHAGOGASTRODUODENOSCOPY (EGD);  Surgeon: Audie Hill III, MD;  Location: [facility];  Service: Endoscopy;  Laterality: N/A;    ESOPHAGOGASTRODUODENOSCOPY N/A 8/29/2018    Procedure: ESOPHAGOGASTRODUODENOSCOPY (EGD);  Surgeon: Audie Hill III, MD;  Location: [facility];  Service: Endoscopy;  Laterality: N/A;    LEFT HEART CATHETERIZATION " Left 2020    Procedure: CATHETERIZATION, HEART, LEFT;  Surgeon: Abel Beltran MD;  Location: [facility] CATH LAB;  Service: Cardiology;  Laterality: Left;  iodine allergy    PERCUTANEOUS TRANSLUMINAL BALLOON ANGIOPLASTY OF CORONARY ARTERY Right 2019    Procedure: PTCA, USING BALLOON/ IABP or Impella multivessel angioplasty/poss stent;  Surgeon: Abel Beltran MD;  Location: [facility] CATH LAB;  Service: Cardiology;  Laterality: Right;    PERCUTANEOUS TRANSLUMINAL BALLOON ANGIOPLASTY OF CORONARY ARTERY  2020    Procedure: Angioplasty-coronary;  Surgeon: Abel Beltran MD;  Location: [facility] CATH LAB;  Service: Cardiology;;    THORACENTESIS Left 2018    Procedure: Thoracentesis;  Surgeon: Santos Cardozo MD;  Location: [facility] ENDO;  Service: Endoscopy;  Laterality: Left;    THORACENTESIS Right 2019    Procedure: Thoracentesis;  Surgeon: Santos Cardozo MD;  Location: [facility] ENDO;  Service: Endoscopy;  Laterality: Right;    tongue cancer excision      dr vitale    VOCAL CORD LATERALIZATION, ENDOSCOPIC APPROACH W/ MLB      kris         Family History:  Family History   Problem Relation Age of Onset    Lung cancer Father         + Smoker    No Known Problems Mother     Lung cancer Brother         + Smoker       Social History:  Social History     Tobacco Use    Smoking status: Former Smoker     Packs/day: 0.50     Years: 55.00     Pack years: 27.50     Types: Cigarettes     Last attempt to quit: 10/1/2019     Years since quittin.6    Smokeless tobacco: Never Used    Tobacco comment: 6-7 cigs/day   Substance and Sexual Activity    Alcohol use: Not Currently    Drug use: No    Sexual activity: Not on file        Review of Systems     Review of Systems   Constitutional: Negative for fever.   HENT: Negative for sore throat.    Respiratory: Positive for shortness of breath. Negative for cough.    Cardiovascular: Positive for leg swelling (BLE). Negative for chest pain.    Gastrointestinal: Negative for abdominal pain, nausea and vomiting.   Genitourinary: Negative for dysuria.   Musculoskeletal: Negative for back pain.   Skin: Negative for rash.   Neurological: Negative for weakness and headaches.   Hematological: Does not bruise/bleed easily.   All other systems reviewed and are negative.       Physical Exam     Initial Vitals [05/16/20 1007]   BP Pulse Resp Temp SpO2   114/62 77 (!) 22 97.9 °F (36.6 °C) 97 %      MAP       --          Physical Exam  Nursing Notes and Vital Signs Reviewed.  Constitutional: Patient is in no acute distress. Well-developed and well-nourished. Chronically ill-appearing.   Head: Atraumatic. Normocephalic.  Eyes: PERRL. EOM intact. Conjunctivae are not pale. No scleral icterus.  ENT: Mucous membranes are moist. Oropharynx is clear and symmetric.    Neck: Supple. Full ROM. No lymphadenopathy.  Cardiovascular: Regular rate. Regular rhythm. No murmurs, rubs, or gallops. Distal pulses are 2+ and symmetric.  Pulmonary/Chest: No respiratory distress. Clear to auscultation bilaterally. Crackles bilaterally. No wheezing or rales.  Abdominal: Soft and non-distended.  There is no tenderness.  No rebound, guarding, or rigidity. Good bowel sounds.  Genitourinary: No CVA tenderness  Musculoskeletal: Moves all extremities. No obvious deformities. 2-3+ edema to bilaterally feet and distal legs. NVI. No calf tenderness.  Skin: Warm and dry. Pale  Neurological:  Alert, awake, and appropriate.  Normal speech.  No acute focal neurological deficits are appreciated.  Psychiatric: Normal affect. Good eye contact. Appropriate in content.     ED Course   Critical Care  Date/Time: 5/16/2020 11:00 AM  Performed by: Briana Guzman MD  Authorized by: DAVID Rojas MD   Direct patient critical care time: 20 minutes  Additional history critical care time: 5 minutes  Ordering / reviewing critical care time: 5 minutes  Documentation critical care time: 5  minutes  Consulting other physicians critical care time: 5 minutes  Total critical care time (exclusive of procedural time) : 40 minutes  Critical care was necessary to treat or prevent imminent or life-threatening deterioration of the following conditions: respiratory failure, dehydration and renal failure.  Critical care was time spent personally by me on the following activities: blood draw for specimens, discussions with consultants, evaluation of patient's response to treatment, obtaining history from patient or surrogate, ordering and review of laboratory studies, pulse oximetry, review of old charts, re-evaluation of patient's condition, ordering and review of radiographic studies, ordering and performing treatments and interventions, examination of patient, interpretation of cardiac output measurements, discussions with primary provider and development of treatment plan with patient or surrogate.        ED Vital Signs:  Vitals:    05/19/20 0946 05/19/20 1112 05/19/20 1146 05/19/20 1256   BP:   134/62 (!) 141/56   Pulse: 73 77 88 85   Resp:   20 14   Temp:   98.2 °F (36.8 °C)    TempSrc:   Oral    SpO2:   96% 100%   Weight:       Height:        05/19/20 1305 05/19/20 1310 05/19/20 1315 05/19/20 1320   BP: (!) 126/51 (!) 121/47 (!) 119/52 (!) 122/51   Pulse: 84 83 84 88   Resp: 12 12 12 16   Temp:       TempSrc:       SpO2: 100% 100% 100% 100%   Weight:       Height:        05/19/20 1325 05/19/20 1330 05/19/20 1352 05/19/20 1400   BP: (!) 124/51 (!) 128/52 (!) 152/69 (!) 148/76   Pulse: 88 88 81 80   Resp: 16 15 16 16   Temp:       TempSrc:       SpO2: 97% 100% 99% 99%   Weight:       Height:        05/19/20 1447 05/19/20 1531 05/19/20 1545   BP: 137/63 (!) 128/57    Pulse: 88 85 86   Resp: 20 20    Temp: 98 °F (36.7 °C) 98.3 °F (36.8 °C)    TempSrc: Oral Oral    SpO2: 95% (!) 92%    Weight:      Height:          Abnormal Lab Results:  Labs Reviewed   CBC W/ AUTO DIFFERENTIAL - Abnormal; Notable for the  following components:       Result Value    WBC 13.23 (*)     RBC 3.80 (*)     Hemoglobin 11.3 (*)     Hematocrit 35.9 (*)     Mean Corpuscular Hemoglobin Conc 31.5 (*)     RDW 17.4 (*)     Immature Granulocytes 4.1 (*)     Gran # (ANC) 11.1 (*)     Immature Grans (Abs) 0.54 (*)     Lymph # 0.7 (*)     Gran% 84.0 (*)     Lymph% 5.4 (*)     All other components within normal limits   COMPREHENSIVE METABOLIC PANEL - Abnormal; Notable for the following components:    BUN, Bld 50 (*)     Creatinine 1.7 (*)     Albumin 2.8 (*)     Alkaline Phosphatase 391 (*)     AST 95 (*)      (*)     eGFR if  45 (*)     eGFR if non  39 (*)     All other components within normal limits   TROPONIN I - Abnormal; Notable for the following components:    Troponin I 0.044 (*)     All other components within normal limits   B-TYPE NATRIURETIC PEPTIDE - Abnormal; Notable for the following components:     (*)     All other components within normal limits   PROTIME-INR   APTT   SARS-COV-2 RNA AMPLIFICATION, QUAL    Narrative:     What symptom criteria does the patient meet?->Shortness of  breath or difficulty breathing  What symptom criteria does the patient meet?->Cough   URINALYSIS, REFLEX TO URINE CULTURE    Narrative:     Preferred Collection Type->Urine, Clean Catch        All Lab Results:  Results for orders placed or performed during the hospital encounter of 05/16/20   Blood culture   Result Value Ref Range    Blood Culture, Routine No growth after 5 days.    CBC auto differential   Result Value Ref Range    WBC 13.23 (H) 3.90 - 12.70 K/uL    RBC 3.80 (L) 4.60 - 6.20 M/uL    Hemoglobin 11.3 (L) 14.0 - 18.0 g/dL    Hematocrit 35.9 (L) 40.0 - 54.0 %    Mean Corpuscular Volume 95 82 - 98 fL    Mean Corpuscular Hemoglobin 29.7 27.0 - 31.0 pg    Mean Corpuscular Hemoglobin Conc 31.5 (L) 32.0 - 36.0 g/dL    RDW 17.4 (H) 11.5 - 14.5 %    Platelets 212 150 - 350 K/uL    MPV 12.4 9.2 - 12.9 fL     Immature Granulocytes 4.1 (H) 0.0 - 0.5 %    Gran # (ANC) 11.1 (H) 1.8 - 7.7 K/uL    Immature Grans (Abs) 0.54 (H) 0.00 - 0.04 K/uL    Lymph # 0.7 (L) 1.0 - 4.8 K/uL    Mono # 0.8 0.3 - 1.0 K/uL    Eos # 0.0 0.0 - 0.5 K/uL    Baso # 0.05 0.00 - 0.20 K/uL    nRBC 0 0 /100 WBC    Gran% 84.0 (H) 38.0 - 73.0 %    Lymph% 5.4 (L) 18.0 - 48.0 %    Mono% 5.9 4.0 - 15.0 %    Eosinophil% 0.2 0.0 - 8.0 %    Basophil% 0.4 0.0 - 1.9 %    Differential Method Automated    Comprehensive metabolic panel   Result Value Ref Range    Sodium 143 136 - 145 mmol/L    Potassium 4.6 3.5 - 5.1 mmol/L    Chloride 106 95 - 110 mmol/L    CO2 27 23 - 29 mmol/L    Glucose 89 70 - 110 mg/dL    BUN, Bld 50 (H) 8 - 23 mg/dL    Creatinine 1.7 (H) 0.5 - 1.4 mg/dL    Calcium 9.1 8.7 - 10.5 mg/dL    Total Protein 6.3 6.0 - 8.4 g/dL    Albumin 2.8 (L) 3.5 - 5.2 g/dL    Total Bilirubin 0.8 0.1 - 1.0 mg/dL    Alkaline Phosphatase 391 (H) 55 - 135 U/L    AST 95 (H) 10 - 40 U/L     (H) 10 - 44 U/L    Anion Gap 10 8 - 16 mmol/L    eGFR if African American 45 (A) >60 mL/min/1.73 m^2    eGFR if non African American 39 (A) >60 mL/min/1.73 m^2   Troponin I #1   Result Value Ref Range    Troponin I 0.044 (H) 0.000 - 0.026 ng/mL   B-Type natriuretic peptide (BNP)   Result Value Ref Range     (H) 0 - 99 pg/mL   Protime-INR   Result Value Ref Range    Prothrombin Time 12.0 9.0 - 12.5 sec    INR 1.1 0.8 - 1.2   APTT   Result Value Ref Range    aPTT 28.2 21.0 - 32.0 sec   COVID-19 Rapid Screening   Result Value Ref Range    SARS-CoV-2 RNA, Amplification, Qual Negative Negative   Urinalysis, Reflex to Urine Culture Urine, Clean Catch   Result Value Ref Range    Specimen UA Urine, Clean Catch     Color, UA Yellow Yellow, Straw, Annelise    Appearance, UA Clear Clear    pH, UA 6.0 5.0 - 8.0    Specific Gravity, UA 1.015 1.005 - 1.030    Protein, UA Negative Negative    Glucose, UA Negative Negative    Ketones, UA Negative Negative    Bilirubin (UA) Negative  Negative    Occult Blood UA Negative Negative    Nitrite, UA Negative Negative    Urobilinogen, UA Negative <2.0 EU/dL    Leukocytes, UA Negative Negative   Troponin I   Result Value Ref Range    Troponin I 0.034 (H) 0.000 - 0.026 ng/mL   Troponin I   Result Value Ref Range    Troponin I 0.038 (H) 0.000 - 0.026 ng/mL   Basic metabolic panel   Result Value Ref Range    Sodium 146 (H) 136 - 145 mmol/L    Potassium 4.6 3.5 - 5.1 mmol/L    Chloride 104 95 - 110 mmol/L    CO2 32 (H) 23 - 29 mmol/L    Glucose 79 70 - 110 mg/dL    BUN, Bld 48 (H) 8 - 23 mg/dL    Creatinine 1.5 (H) 0.5 - 1.4 mg/dL    Calcium 9.0 8.7 - 10.5 mg/dL    Anion Gap 10 8 - 16 mmol/L    eGFR if African American 53 (A) >60 mL/min/1.73 m^2    eGFR if non African American 46 (A) >60 mL/min/1.73 m^2   Magnesium   Result Value Ref Range    Magnesium 2.5 1.6 - 2.6 mg/dL   D dimer, quantitative   Result Value Ref Range    D-Dimer 6.22 (H) <0.50 mg/L FEU   Basic metabolic panel   Result Value Ref Range    Sodium 144 136 - 145 mmol/L    Potassium 4.5 3.5 - 5.1 mmol/L    Chloride 102 95 - 110 mmol/L    CO2 29 23 - 29 mmol/L    Glucose 71 70 - 110 mg/dL    BUN, Bld 57 (H) 8 - 23 mg/dL    Creatinine 1.5 (H) 0.5 - 1.4 mg/dL    Calcium 8.8 8.7 - 10.5 mg/dL    Anion Gap 13 8 - 16 mmol/L    eGFR if African American 53 (A) >60 mL/min/1.73 m^2    eGFR if non African American 46 (A) >60 mL/min/1.73 m^2   Echo Color Flow Doppler? Yes   Result Value Ref Range    BSA 1.66 m2    TDI SEPTAL 0.06 m/s    LV LATERAL E/E' RATIO 4.67 m/s    LV SEPTAL E/E' RATIO 7.00 m/s    LA WIDTH 3.40 cm    TDI LATERAL 0.09 m/s    LVIDD 4.36 3.5 - 6.0 cm    IVS 0.65 0.6 - 1.1 cm    PW 1.23 (A) 0.6 - 1.1 cm    Ao root annulus 2.97 cm    LVIDS 2.07 (A) 2.1 - 4.0 cm    FS 53 28 - 44 %    LA volume 29.76 cm3    Sinus 3.24 cm    STJ 2.98 cm    LV mass 133.79 g    LA size 2.80 cm    TAPSE 2.10 cm    Left Ventricle Relative Wall Thickness 0.56 cm    AV mean gradient 4 mmHg    AV valve area 2.61 cm2     AV Velocity Ratio 0.51     AV index (prosthetic) 0.57     E/A ratio 0.66     Mean e' 0.08 m/s    E wave decelartion time 269.74 msec    LVOT diameter 2.42 cm    LVOT area 4.6 cm2    LVOT peak billy 0.83 m/s    LVOT peak VTI 16.80 cm    Ao peak billy 1.64 m/s    Ao VTI 29.60 cm    LVOT stroke volume 77.23 cm3    AV peak gradient 11 mmHg    E/E' ratio 5.60 m/s    MV Peak E Billy 0.42 m/s    MV Peak A Billy 0.64 m/s    LV Systolic Volume 13.83 mL    LV Systolic Volume Index 8.2 mL/m2    LV Diastolic Volume 85.78 mL    LV Diastolic Volume Index 50.98 mL/m2    LA Volume Index 17.7 mL/m2    LV Mass Index 80 g/m2    RA Major Axis 3.38 cm    Left Atrium Minor Axis 3.29 cm    Left Atrium Major Axis 4.17 cm     *Note: Due to a large number of results and/or encounters for the requested time period, some results have not been displayed. A complete set of results can be found in Results Review.         Imaging Results:  Imaging Results          CT Chest Without Contrast (Final result)  Result time 05/16/20 18:18:14    Final result by Kye Cunningham Jr., MD (05/16/20 18:18:14)                 Impression:      1.  Findings concerning for suprahilar mass with lymphangitic spread/postobstructive pneumonia left upper lobe.    2.  Metastatic mediastinal lymphadenopathy as well as innumerable number of metastatic lesions throughout the partially visualized enlarged liver    3.  Findings concerning for pathologic T6 fracture with mild compression.  No retropulsion.    4.  Lower lobe and lingular infiltrates.    All CT scans at this facility are performed  using dose modulation techniques as appropriate to performed exam including the following:  automated exposure control; adjustment of mA and/or kV according to the patients size (this includes techniques or standardized protocols for targeted exams where dose is matched to indication/reason for exam: i.e. extremities or head);  iterative reconstruction technique.      Electronically  signed by: Kye Cunningham Jr., MD  Date:    05/16/2020  Time:    18:18             Narrative:    EXAMINATION:  CT CHEST WITHOUT CONTRAST    CLINICAL HISTORY:  Shortness of breath;    TECHNIQUE:  CT scan of the chest was obtained without administration of contrast.  Coronal and sagittal reconstructions were performed on these images.    COMPARISON:  2/5/2019    FINDINGS:  Interval development of masslike area of consolidation left suprahilar region measuring 3 x 2.4 cm with patchy areas of alveolar consolidation extending to the apex and interstitial thickening.  Peripheral areas of consolidation within each lower lobe as well as the lingula.  Granuloma in the right lower lobe.  No effusion or pneumothorax.  Airway is widely patent.  Interval development of mediastinal adenopathy.  Enlarged prevascular lymph node measures 2.0 x 2.0 cm enlarged left paratracheal lymph node measures 2.2 x 1.7 cm.  There and innumerable mild of small low-density lesions throughout the liver concerning for metastasis.  Remaining visualized abdominal organs are unremarkable.  Osseous structures are intact.  No suspicious osseous lesions.  Mild compression fracture and lytic changes of the superior endplate concerning for pathologic fracture.                               X-Ray Chest AP Portable (Final result)  Result time 05/16/20 11:46:34    Final result by Bharathi Velez III, MD (05/16/20 11:46:34)                 Impression:      Stable chest x-ray compared to May 10th.      Electronically signed by: Bharathi Velez MD  Date:    05/16/2020  Time:    11:46             Narrative:    EXAMINATION:  XR CHEST AP PORTABLE    CLINICAL HISTORY:  shortness of breath;    COMPARISON:  May 10th    FINDINGS:  The heart size remains normal with mild tortuosity of the thoracic aorta.  Patient is rotated.  Persistent left upper lung field infiltrate with adjacent pleural thickening as well as the patchy mid to lower lung field bilateral  infiltrates again noted.  Overall there has been no significant change when accounting for the difference in projection and technique.  Small effusions again noted.                                 The EKG was ordered, reviewed, and independently interpreted by the ED provider.  Interpretation time: 1023  Rate: 68 BPM  Rhythm: Sinus rhythm with 1st degree AV block  Interpretation: ST and T wave abnormality. No STEMI.  When compared to EKG performed 5/10/20, there are no significant changes.           The Emergency Provider reviewed the vital signs and test results, which are outlined above.     ED Discussion     12:27 PM: Discussed case with Марина Melgar NP ([facility]). Dr. Rojas agrees with current care and management of pt and accepts admission.   Admitting Service: Hospital Medicine  Admitting Physician: Dr. Rojas  Admit to: Obs tele    12:39 PM: Re-evaluated pt. I have discussed test results, shared treatment plan, and the need for admission with patient and family at bedside. Pt and family express understanding at this time and agree with all information. All questions answered. Pt and family have no further questions or concerns at this time. Pt is ready for admit.        ED Medication(s):  Medications   furosemide injection 60 mg (60 mg Intravenous Given 5/16/20 1145)   heparin (porcine) injection 5,000 Units (5,000 Units Subcutaneous Given 5/17/20 2116)   sodium chloride 0.9% bolus 1,000 mL (1,000 mLs Intravenous New Bag 5/18/20 1953)   midazolam (VERSED) 1 mg/mL injection (0.5 mg Intravenous Given 5/19/20 1304)   fentaNYL injection (25 mcg Intravenous Given 5/19/20 1304)   lidocaine HCL 10 mg/ml (1%) injection (20 mLs Other Given 5/19/20 1317)       Discharge Medication List as of 5/19/2020  4:34 PM      START taking these medications    Details   amoxicillin (AMOXIL) 500 MG Tab 2 tablets (1,000 mg total) by Per G Tube route every 12 (twelve) hours. for 14 days, Starting Tue 5/19/2020,  Until Tue 6/2/2020, Print      amoxicillin-clavulanate 500-125mg (AUGMENTIN) 500-125 mg Tab 1 tablet (500 mg total) by Per G Tube route 2 (two) times daily. for 14 days, Starting Tue 5/19/2020, Until Tue 6/2/2020, Print             Follow-up Information     [facility], [place].    Specialties:  Hospice and Palliative Medicine, Hospice Services  Contact information:  [id number]  [facility] [address] [phone]             Dwaine Davis MD. Schedule an appointment as soon as possible for a visit in 3 days.    Specialty:  Family Medicine  Contact information:  [address] [facility] [address] [phone]                         Medical Decision Making:   Clinical Tests:   Lab Tests: Ordered and Reviewed  Radiological Study: Ordered and Reviewed  Medical Tests: Ordered and Reviewed             Scribe Attestation:   Scribe #1: I performed the above scribed service and the documentation accurately describes the services I performed. I attest to the accuracy of the note.     Attending:   Physician Attestation Statement for Scribe #1: I, Briana Guzman MD, personally performed the services described in this documentation, as scribed by Joe Griffin, in my presence, and it is both accurate and complete.           Clinical Impression       ICD-10-CM ICD-9-CM   1. Acute on chronic respiratory failure with hypoxia J96.21 518.84     799.02   2. SOB (shortness of breath) R06.02 786.05   3. Bilateral leg edema R60.0 782.3   4. Chronic combined systolic and diastolic heart failure I50.42 428.42   5. Acute renal failure, unspecified acute renal failure type N17.9 584.9   6. CHF (congestive heart failure) I50.9 428.0   7. History of head and neck cancer Z85.89 V10.89   8. Oropharyngeal aspiration, sequela T17.208S 908.5   9. Metastatic malignant neoplasm of unknown primary site C79.9 199.1    C80.1    10. Congestive heart failure, unspecified HF chronicity, unspecified heart failure  type I50.9 428.0   11. Pneumonia of left lower lobe due to infectious organism J18.1 486   12. Stage 3 severe COPD by GOLD classification J44.9 496   13. Chronic respiratory failure with hypoxia J96.11 518.83     799.02   14. Chronic diastolic heart failure (EF = 60% on 05/16/2020) I50.32 428.32       Disposition:   Disposition: Placed in Observation  Condition: Fair        Briana Guzman MD  05/16/20 1459       Briana Guzman MD  05/26/20 1903

## 2020-05-16 NOTE — H&P
"[facility]  History & Physical    Patient Name: Noble Tripp  MRN: [id number]  Admission Date: 5/16/2020  Attending Physician: Briana Guzman MD   Primary Care Provider: Dwaine Davis MD         Patient information was obtained from patient and ER records.     Subjective:     Principal Problem:CHF (congestive heart failure)    Chief Complaint:   Chief Complaint   Patient presents with    Shortness of Breath     PT states, "I feel like I can't breathe."        HPI: Noble Tripp is a 74 year old male with a PMHx of CAD, CHF, PSVT, PVD, JUANITO on CPAP, HLD, COPD and BPH who presented to the Emergency Department with c/o SOB on exertion. Pt reports SOB began last night. He reports having to increase his home O2 to 3.5LPM. Pt recently discharged from [facility] on 05/12/20 for same symptom complaint. CXR upon admit during that time showed mildly worsening patchy multifocal bilateral pulmonary infiltrates suspicious for atypical pneumonia. Hx of aspiration pna with swallowing difficulty sec to radiation treatment for head and neck cancer in 2011. Has declined PEG tube placement multiple times and refuses to follow puree diet with thickened liquids. He was treated with rocephin and levaquin. He was weaned down to 2LPM - qualified for home oxygen.   ED workup showed: WBC count 13.23K, Hgb/Hct 11.3/35.9, creatinine 1.7, troponin 0.044, , COVID negative. CXR stable compared to May 10. Pt received Lasix 60 mg IV x 1 in ED. Pt admitted for acute on chronic congestive heart failure.     Past Medical History:   Diagnosis Date    Adrenal adenoma     david;nl fxn w/u;tran 11/18    Aspiration pneumonia 10/15    puree/honey    Benign prostate hyperplasia     CAD (coronary artery disease)     dr padilla    Chronic pain     dr santos    Chronic systolic congestive heart failure 10/17/2019    Cirrhosis     w 5/20 hospital    COPD (chronic obstructive pulmonary " "disease)     papi    Ex-smoker     11/13    Hyperlipidemia     Ischemic cardiomyopathy 11/22/2019    JUANITO on CPAP     cpap    Osteopenia 1/15 tran 1/18    PSVT (paroxysmal supraventricular tachycardia)     PVD (peripheral vascular disease)     noobs 07 uri    Pyriform sinus cancer     dr ponce radiation 1/2-14 dr king perales    Squamous cell carcinoma of skin     roberto    Tongue cancer     "superficial" removed 11/14    Vocal cord cancer 2011    Xerostomia     radiation       Past Surgical History:   Procedure Laterality Date    APPENDECTOMY      BRONCHOSCOPY Bilateral 2/5/2019    Procedure: Bronchoscopy;  Surgeon: Santos Cardozo MD;  Location: [facility];  Service: Endoscopy;  Laterality: Bilateral;    COLONOSCOPY N/A 5/1/2017    Procedure: Due for screening colonoscopy;  Surgeon: Anushka Yoder MD;  Location: [facility];  Service: Endoscopy;  Laterality: N/A;    ESOPHAGOGASTRODUODENOSCOPY N/A 5/29/2018    Procedure: ESOPHAGOGASTRODUODENOSCOPY (EGD);  Surgeon: Audie Hill III, MD;  Location: [facility];  Service: Endoscopy;  Laterality: N/A;    ESOPHAGOGASTRODUODENOSCOPY N/A 8/29/2018    Procedure: ESOPHAGOGASTRODUODENOSCOPY (EGD);  Surgeon: Audie Hill III, MD;  Location: [facility];  Service: Endoscopy;  Laterality: N/A;    LEFT HEART CATHETERIZATION Left 5/7/2020    Procedure: CATHETERIZATION, HEART, LEFT;  Surgeon: Abel Beltran MD;  Location: [facility] CATH LAB;  Service: Cardiology;  Laterality: Left;  iodine allergy    PERCUTANEOUS TRANSLUMINAL BALLOON ANGIOPLASTY OF CORONARY ARTERY Right 12/9/2019    Procedure: PTCA, USING BALLOON/ IABP or Impella multivessel angioplasty/poss stent;  Surgeon: Abel Beltran MD;  Location: [facility] CATH LAB;  Service: Cardiology;  Laterality: Right;    PERCUTANEOUS TRANSLUMINAL BALLOON ANGIOPLASTY OF CORONARY ARTERY  5/7/2020    Procedure: Angioplasty-coronary;  Surgeon: Abel Beltran MD;  Location: [facility] CATH LAB;  Service: Cardiology;;    " THORACENTESIS Left 8/7/2018    Procedure: Thoracentesis;  Surgeon: Santos Cardozo MD;  Location: [facility] ENDO;  Service: Endoscopy;  Laterality: Left;    THORACENTESIS Right 2/25/2019    Procedure: Thoracentesis;  Surgeon: Santos Cardozo MD;  Location: [facility] ENDO;  Service: Endoscopy;  Laterality: Right;    tongue cancer excision  11/14    dr vitale    VOCAL CORD LATERALIZATION, ENDOSCOPIC APPROACH W/ MLB      kris       Review of patient's allergies indicates:   Allergen Reactions    Contrast media Hives and Itching    Iodinated contrast media Hives and Itching    Iodine Hives and Itching    Pravastatin      Other reaction(s): fatigue  Other reaction(s): fatigue    Rosuvastatin      Other reaction(s): fatigue  Other reaction(s): fatigue    Simvastatin      Other reaction(s): fatigue  Other reaction(s): fatigue    Statins-hmg-coa reductase inhibitors Other (See Comments)     Fatigue       No current facility-administered medications on file prior to encounter.      Current Outpatient Medications on File Prior to Encounter   Medication Sig    albuterol (PROAIR HFA) 90 mcg/actuation inhaler INHALE 2 PUFFS BY MOUTH EVERY 4 HOURS AS NEEDED FOR WHEEZING OR  SHORTNESS  OF  BREATH    aspirin 81 mg Tab Take 1 tablet by mouth Daily. Over the counter to help prevent stroke/heart attack    carvedilol (COREG) 3.125 MG tablet Take 1 tablet (3.125 mg total) by mouth 2 (two) times daily.    clopidogrel (PLAVIX) 75 mg tablet Take 1 tablet (75 mg total) by mouth once daily.    ELDERBERRY FRUIT ORAL Take by mouth.    evolocumab (REPATHA SURECLICK) 140 mg/mL PnIj Inject 1 mL (140 mg total) into the skin every 14 (fourteen) days.    furosemide (LASIX) 40 MG tablet Take 0.5 tablets (20 mg total) by mouth once daily.    gabapentin (NEURONTIN) 800 MG tablet Take 1 tablet (800 mg total) by mouth 3 (three) times daily.    L.acid/B.bifidum/B.animal/FOS (PROBIOTIC COMPLEX ORAL) Take by mouth.    levoFLOXacin  (LEVAQUIN) 750 MG tablet Take 1 tablet (750 mg total) by mouth every other day. for 10 days    losartan (COZAAR) 25 MG tablet Take 1 tablet (25 mg total) by mouth every evening.    oxyCODONE-acetaminophen (PERCOCET) 7.5-325 mg per tablet     pantoprazole (PROTONIX) 40 MG tablet TAKE ONE TABLET BY MOUTH ONCE DAILY    SPIRIVA WITH HANDIHALER 18 mcg inhalation capsule INHALE 1 CAPSULE BY MOUTH ONCE DAILY    isosorbide mononitrate (IMDUR) 30 MG 24 hr tablet Take 1 tablet (30 mg total) by mouth once daily.    nebulizer and compressor Bailey Use as directed    nystatin (MYCOSTATIN) 100,000 unit/mL suspension Take 4 mLs (400,000 Units total) by mouth 4 (four) times daily. Swish and spit for 7 days     Family History     Problem Relation (Age of Onset)    Lung cancer Father, Brother    No Known Problems Mother        Tobacco Use    Smoking status: Former Smoker     Packs/day: 0.50     Years: 55.00     Pack years: 27.50     Types: Cigarettes     Last attempt to quit: 10/1/2019     Years since quittin.6    Smokeless tobacco: Never Used    Tobacco comment: 6-7 cigs/day   Substance and Sexual Activity    Alcohol use: Not Currently    Drug use: No    Sexual activity: Not Currently     Review of Systems   Constitutional: Positive for activity change and appetite change (decreased). Negative for chills and fever.   HENT: Negative for trouble swallowing.    Eyes: Negative for visual disturbance.   Respiratory: Positive for cough (dry) and shortness of breath. Negative for apnea, choking, chest tightness, wheezing and stridor.    Cardiovascular: Positive for leg swelling. Negative for chest pain and palpitations.   Gastrointestinal: Positive for constipation. Negative for abdominal pain, diarrhea, nausea and vomiting.   Endocrine: Negative for polyuria.   Genitourinary: Negative for decreased urine volume, difficulty urinating, dysuria, frequency and urgency.   Musculoskeletal: Negative for arthralgias, back pain,  gait problem, joint swelling, myalgias, neck pain and neck stiffness.   Skin: Negative for color change.   Neurological: Positive for weakness. Negative for dizziness, tremors, seizures, syncope, facial asymmetry, speech difficulty, light-headedness, numbness and headaches.   Hematological: Negative.    Psychiatric/Behavioral: Negative for agitation, behavioral problems and confusion.     Objective:     Vital Signs (Most Recent):  Temp: 97.9 °F (36.6 °C) (05/16/20 1007)  Pulse: 71 (05/16/20 1302)  Resp: (!) 23 (05/16/20 1302)  BP: (!) 147/67 (05/16/20 1302)  SpO2: (!) 94 % (05/16/20 1302) Vital Signs (24h Range):  Temp:  [97.9 °F (36.6 °C)] 97.9 °F (36.6 °C)  Pulse:  [71-77] 71  Resp:  [18-23] 23  SpO2:  [93 %-97 %] 94 %  BP: (114-147)/(61-67) 147/67     Weight: 60.2 kg (132 lb 11.2 oz)  Body mass index is 20.18 kg/m².    Physical Exam   Constitutional: He is oriented to person, place, and time. He appears well-developed. He is cooperative. He is easily aroused. He appears ill. No distress. Nasal cannula in place.   HENT:   Head: Normocephalic and atraumatic.   Eyes: EOM are normal.   Neck: Normal range of motion. Neck supple.   Cardiovascular: Normal rate and intact distal pulses.   No murmur heard.  2-3+ pitting edema   Pulmonary/Chest: No accessory muscle usage. Tachypnea noted. No respiratory distress. He has decreased breath sounds.   Abdominal: Soft. Bowel sounds are normal. He exhibits no distension. There is no tenderness. There is no rebound and no guarding.   Genitourinary:   Genitourinary Comments: Deferred   Musculoskeletal: Normal range of motion. He exhibits edema. He exhibits no tenderness or deformity.   Neurological: He is alert, oriented to person, place, and time and easily aroused. No sensory deficit.   Skin: Skin is warm and dry. Capillary refill takes less than 2 seconds. He is not diaphoretic.   Psychiatric: He has a normal mood and affect. His behavior is normal.   Nursing note and vitals  reviewed.        CRANIAL NERVES     CN III, IV, VI   Extraocular motions are normal.        Significant Labs:   CBC:   Recent Labs   Lab 05/16/20  1053   WBC 13.23*   HGB 11.3*   HCT 35.9*        CMP:   Recent Labs   Lab 05/16/20  1053      K 4.6      CO2 27   GLU 89   BUN 50*   CREATININE 1.7*   CALCIUM 9.1   PROT 6.3   ALBUMIN 2.8*   BILITOT 0.8   ALKPHOS 391*   AST 95*   *   ANIONGAP 10   EGFRNONAA 39*     Coagulation:   Recent Labs   Lab 05/16/20  1053   INR 1.1   APTT 28.2     Troponin:   Recent Labs   Lab 05/16/20  1053   TROPONINI 0.044*     Urine Studies:   Recent Labs   Lab 05/16/20  1250   COLORU Yellow   APPEARANCEUA Clear   PHUR 6.0   SPECGRAV 1.015   PROTEINUA Negative   GLUCUA Negative   KETONESU Negative   BILIRUBINUA Negative   OCCULTUA Negative   NITRITE Negative   UROBILINOGEN Negative   LEUKOCYTESUR Negative     BNP:  254    Significant Imaging:   Imaging Results          X-Ray Chest AP Portable (Final result)  Result time 05/16/20 11:46:34    Final result by Bharathi Velez III, MD (05/16/20 11:46:34)                 Impression:      Stable chest x-ray compared to May 10th.      Electronically signed by: Bharathi Velez MD  Date:    05/16/2020  Time:    11:46             Narrative:    EXAMINATION:  XR CHEST AP PORTABLE    CLINICAL HISTORY:  shortness of breath;    COMPARISON:  May 10th    FINDINGS:  The heart size remains normal with mild tortuosity of the thoracic aorta.  Patient is rotated.  Persistent left upper lung field infiltrate with adjacent pleural thickening as well as the patchy mid to lower lung field bilateral infiltrates again noted.  Overall there has been no significant change when accounting for the difference in projection and technique.  Small effusions again noted.                              Assessment/Plan:     * CHF (congestive heart failure)  - Observation  - Inpatient consult to Cardiology, awaiting input  - 2D ECHO pending  - Pt received  Lasix 60 mg IV x 1 in ED  - Lasix 40 mg IV BID  - Strict I&Os  - Daily weights  - Fluid restriction 1500 mL  - Continue BB      NEYMAR (acute kidney injury)  - Creatinine 1.7  - Hold ARB for now  - Monitor while on lasix ivp  - BMP in AM      Elevated troponin  - Initial troponin 0.044  - Pt denies chest pain and/or equivalent  - Serial troponin pending  - Elevation likely from demand ischemia from HF      Stage 3 severe COPD by GOLD classification  - Currently not in exacerbation  - Continue home oxygen  - Duonebs PRN      Oropharyngeal aspiration  - Pt has declined PEG placement in past. Refuses to adhere to recommended diet (puree with thickened liquids)  - Pt currently declines PEG placement at this time  - Puree diet with nectar thick liquids  - Consult SLP to re evaluate      Pneumonia involving bilateral lungs   - Pt was being treated for aspiration pna on last admission  - Continue treatment with PO Levaquin -- last dose will be Monday, 05/18/20    JUANITO on CPAP  - Nightly CPAP      Hypertension  - Continue home medications as prescribed        VTE Risk Mitigation (From admission, onward)         Ordered     IP VTE HIGH RISK PATIENT  Once      05/16/20 1456     Place sequential compression device  Until discontinued      05/16/20 1456                   RUSSELL West  Department of Hospital Medicine   [facility]

## 2020-05-16 NOTE — ASSESSMENT & PLAN NOTE
- Pt has declined PEG placement in past. Refuses to adhere to recommended diet (puree with thickened liquids)  - Pt currently declines PEG placement at this time  - Puree diet with nectar thick liquids  - Consult SLP to re evaluate

## 2020-05-16 NOTE — HPI
Noble Tripp is a 74 year old male with a PMHx of CAD, CHF, PSVT, PVD, JUANITO on CPAP, HLD, COPD and BPH who presented to the Emergency Department with c/o SOB on exertion. Pt reports SOB began last night. He reports having to increase his home O2 to 3.5LPM. Pt recently discharged from [facility] on 05/12/20 for same symptom complaint. CXR upon admit during that time showed mildly worsening patchy multifocal bilateral pulmonary infiltrates suspicious for atypical pneumonia. Hx of aspiration pna with swallowing difficulty sec to radiation treatment for head and neck cancer in 2011. Has declined PEG tube placement multiple times and refuses to follow puree diet with thickened liquids. He was treated with rocephin and levaquin. He was weaned down to 2LPM - qualified for home oxygen.   ED workup showed: WBC count 13.23K, Hgb/Hct 11.3/35.9, creatinine 1.7, troponin 0.044, , COVID negative. CXR stable compared to May 10. Pt received Lasix 60 mg IV x 1 in ED. Pt admitted for acute on chronic congestive heart failure.

## 2020-05-16 NOTE — SUBJECTIVE & OBJECTIVE
"Past Medical History:   Diagnosis Date    Adrenal adenoma     david;nl fxn w/u;tran 11/18    Aspiration pneumonia 10/15    puree/honey    Benign prostate hyperplasia     CAD (coronary artery disease)     dr padilla    Chronic pain     dr santos    Chronic systolic congestive heart failure 10/17/2019    Cirrhosis     w 5/20 hospital    COPD (chronic obstructive pulmonary disease)     papi    Ex-smoker     11/13    Hyperlipidemia     Ischemic cardiomyopathy 11/22/2019    JUANITO on CPAP     cpap    Osteopenia 1/15 tran 1/18    PSVT (paroxysmal supraventricular tachycardia)     PVD (peripheral vascular disease)     noobs 07 elizabethuri    Pyriform sinus cancer     dr ponce radiation 1/2-14 dr king perales    Squamous cell carcinoma of skin     roberto    Tongue cancer     "superficial" removed 11/14    Vocal cord cancer 2011    Xerostomia     radiation       Past Surgical History:   Procedure Laterality Date    APPENDECTOMY      BRONCHOSCOPY Bilateral 2/5/2019    Procedure: Bronchoscopy;  Surgeon: Santos Cardozo MD;  Location: [facility];  Service: Endoscopy;  Laterality: Bilateral;    COLONOSCOPY N/A 5/1/2017    Procedure: Due for screening colonoscopy;  Surgeon: Anushka Yoder MD;  Location: [facility] ENDO;  Service: Endoscopy;  Laterality: N/A;    ESOPHAGOGASTRODUODENOSCOPY N/A 5/29/2018    Procedure: ESOPHAGOGASTRODUODENOSCOPY (EGD);  Surgeon: Audie Hill III, MD;  Location: [facility];  Service: Endoscopy;  Laterality: N/A;    ESOPHAGOGASTRODUODENOSCOPY N/A 8/29/2018    Procedure: ESOPHAGOGASTRODUODENOSCOPY (EGD);  Surgeon: Audie Hill III, MD;  Location: [facility];  Service: Endoscopy;  Laterality: N/A;    LEFT HEART CATHETERIZATION Left 5/7/2020    Procedure: CATHETERIZATION, HEART, LEFT;  Surgeon: Abel Beltran MD;  Location: [facility] CATH LAB;  Service: Cardiology;  Laterality: Left;  iodine allergy    PERCUTANEOUS TRANSLUMINAL BALLOON ANGIOPLASTY OF CORONARY ARTERY Right 12/9/2019 "    Procedure: PTCA, USING BALLOON/ IABP or Impella multivessel angioplasty/poss stent;  Surgeon: Abel Beltran MD;  Location: [facility] CATH LAB;  Service: Cardiology;  Laterality: Right;    PERCUTANEOUS TRANSLUMINAL BALLOON ANGIOPLASTY OF CORONARY ARTERY  5/7/2020    Procedure: Angioplasty-coronary;  Surgeon: Abel Beltran MD;  Location: [facility] CATH LAB;  Service: Cardiology;;    THORACENTESIS Left 8/7/2018    Procedure: Thoracentesis;  Surgeon: Santos Cardozo MD;  Location: [facility] ENDO;  Service: Endoscopy;  Laterality: Left;    THORACENTESIS Right 2/25/2019    Procedure: Thoracentesis;  Surgeon: Santos Cardozo MD;  Location: [facility] ENDO;  Service: Endoscopy;  Laterality: Right;    tongue cancer excision  11/14    dr vitale    VOCAL CORD LATERALIZATION, ENDOSCOPIC APPROACH W/ MLB      kris       Review of patient's allergies indicates:   Allergen Reactions    Contrast media Hives and Itching    Iodinated contrast media Hives and Itching    Iodine Hives and Itching    Pravastatin      Other reaction(s): fatigue  Other reaction(s): fatigue    Rosuvastatin      Other reaction(s): fatigue  Other reaction(s): fatigue    Simvastatin      Other reaction(s): fatigue  Other reaction(s): fatigue    Statins-hmg-coa reductase inhibitors Other (See Comments)     Fatigue       No current facility-administered medications on file prior to encounter.      Current Outpatient Medications on File Prior to Encounter   Medication Sig    albuterol (PROAIR HFA) 90 mcg/actuation inhaler INHALE 2 PUFFS BY MOUTH EVERY 4 HOURS AS NEEDED FOR WHEEZING OR  SHORTNESS  OF  BREATH    aspirin 81 mg Tab Take 1 tablet by mouth Daily. Over the counter to help prevent stroke/heart attack    carvedilol (COREG) 3.125 MG tablet Take 1 tablet (3.125 mg total) by mouth 2 (two) times daily.    clopidogrel (PLAVIX) 75 mg tablet Take 1 tablet (75 mg total) by mouth once daily.    ELDERBERRY FRUIT ORAL Take by mouth.    evolocumab  (REPATHA SURECLICK) 140 mg/mL PnIj Inject 1 mL (140 mg total) into the skin every 14 (fourteen) days.    furosemide (LASIX) 40 MG tablet Take 0.5 tablets (20 mg total) by mouth once daily.    gabapentin (NEURONTIN) 800 MG tablet Take 1 tablet (800 mg total) by mouth 3 (three) times daily.    L.acid/B.bifidum/B.animal/FOS (PROBIOTIC COMPLEX ORAL) Take by mouth.    levoFLOXacin (LEVAQUIN) 750 MG tablet Take 1 tablet (750 mg total) by mouth every other day. for 10 days    losartan (COZAAR) 25 MG tablet Take 1 tablet (25 mg total) by mouth every evening.    oxyCODONE-acetaminophen (PERCOCET) 7.5-325 mg per tablet     pantoprazole (PROTONIX) 40 MG tablet TAKE ONE TABLET BY MOUTH ONCE DAILY    SPIRIVA WITH HANDIHALER 18 mcg inhalation capsule INHALE 1 CAPSULE BY MOUTH ONCE DAILY    isosorbide mononitrate (IMDUR) 30 MG 24 hr tablet Take 1 tablet (30 mg total) by mouth once daily.    nebulizer and compressor Bailey Use as directed    nystatin (MYCOSTATIN) 100,000 unit/mL suspension Take 4 mLs (400,000 Units total) by mouth 4 (four) times daily. Swish and spit for 7 days     Family History     Problem Relation (Age of Onset)    Lung cancer Father, Brother    No Known Problems Mother        Tobacco Use    Smoking status: Former Smoker     Packs/day: 0.50     Years: 55.00     Pack years: 27.50     Types: Cigarettes     Last attempt to quit: 10/1/2019     Years since quittin.6    Smokeless tobacco: Never Used    Tobacco comment: 6-7 cigs/day   Substance and Sexual Activity    Alcohol use: Not Currently    Drug use: No    Sexual activity: Not Currently     Review of Systems   Constitutional: Positive for activity change and appetite change (decreased). Negative for chills and fever.   HENT: Negative for trouble swallowing.    Eyes: Negative for visual disturbance.   Respiratory: Positive for cough (dry) and shortness of breath. Negative for apnea, choking, chest tightness, wheezing and stridor.     Cardiovascular: Positive for leg swelling. Negative for chest pain and palpitations.   Gastrointestinal: Positive for constipation. Negative for abdominal pain, diarrhea, nausea and vomiting.   Endocrine: Negative for polyuria.   Genitourinary: Negative for decreased urine volume, difficulty urinating, dysuria, frequency and urgency.   Musculoskeletal: Negative for arthralgias, back pain, gait problem, joint swelling, myalgias, neck pain and neck stiffness.   Skin: Negative for color change.   Neurological: Positive for weakness. Negative for dizziness, tremors, seizures, syncope, facial asymmetry, speech difficulty, light-headedness, numbness and headaches.   Hematological: Negative.    Psychiatric/Behavioral: Negative for agitation, behavioral problems and confusion.     Objective:     Vital Signs (Most Recent):  Temp: 97.9 °F (36.6 °C) (05/16/20 1007)  Pulse: 71 (05/16/20 1302)  Resp: (!) 23 (05/16/20 1302)  BP: (!) 147/67 (05/16/20 1302)  SpO2: (!) 94 % (05/16/20 1302) Vital Signs (24h Range):  Temp:  [97.9 °F (36.6 °C)] 97.9 °F (36.6 °C)  Pulse:  [71-77] 71  Resp:  [18-23] 23  SpO2:  [93 %-97 %] 94 %  BP: (114-147)/(61-67) 147/67     Weight: 60.2 kg (132 lb 11.2 oz)  Body mass index is 20.18 kg/m².    Physical Exam   Constitutional: He is oriented to person, place, and time. He appears well-developed. He is cooperative. He is easily aroused. He appears ill. No distress. Nasal cannula in place.   HENT:   Head: Normocephalic and atraumatic.   Eyes: EOM are normal.   Neck: Normal range of motion. Neck supple.   Cardiovascular: Normal rate and intact distal pulses.   No murmur heard.  2-3+ pitting edema   Pulmonary/Chest: No accessory muscle usage. Tachypnea noted. No respiratory distress. He has decreased breath sounds.   Abdominal: Soft. Bowel sounds are normal. He exhibits no distension. There is no tenderness. There is no rebound and no guarding.   Genitourinary:   Genitourinary Comments: Deferred    Musculoskeletal: Normal range of motion. He exhibits edema. He exhibits no tenderness or deformity.   Neurological: He is alert, oriented to person, place, and time and easily aroused. No sensory deficit.   Skin: Skin is warm and dry. Capillary refill takes less than 2 seconds. He is not diaphoretic.   Psychiatric: He has a normal mood and affect. His behavior is normal.   Nursing note and vitals reviewed.        CRANIAL NERVES     CN III, IV, VI   Extraocular motions are normal.        Significant Labs:   CBC:   Recent Labs   Lab 05/16/20  1053   WBC 13.23*   HGB 11.3*   HCT 35.9*        CMP:   Recent Labs   Lab 05/16/20  1053      K 4.6      CO2 27   GLU 89   BUN 50*   CREATININE 1.7*   CALCIUM 9.1   PROT 6.3   ALBUMIN 2.8*   BILITOT 0.8   ALKPHOS 391*   AST 95*   *   ANIONGAP 10   EGFRNONAA 39*     Coagulation:   Recent Labs   Lab 05/16/20  1053   INR 1.1   APTT 28.2     Troponin:   Recent Labs   Lab 05/16/20  1053   TROPONINI 0.044*     Urine Studies:   Recent Labs   Lab 05/16/20  1250   COLORU Yellow   APPEARANCEUA Clear   PHUR 6.0   SPECGRAV 1.015   PROTEINUA Negative   GLUCUA Negative   KETONESU Negative   BILIRUBINUA Negative   OCCULTUA Negative   NITRITE Negative   UROBILINOGEN Negative   LEUKOCYTESUR Negative     BNP:  254    Significant Imaging:   Imaging Results          X-Ray Chest AP Portable (Final result)  Result time 05/16/20 11:46:34    Final result by Bharathi Velez III, MD (05/16/20 11:46:34)                 Impression:      Stable chest x-ray compared to May 10th.      Electronically signed by: Bharathi Velez MD  Date:    05/16/2020  Time:    11:46             Narrative:    EXAMINATION:  XR CHEST AP PORTABLE    CLINICAL HISTORY:  shortness of breath;    COMPARISON:  May 10th    FINDINGS:  The heart size remains normal with mild tortuosity of the thoracic aorta.  Patient is rotated.  Persistent left upper lung field infiltrate with adjacent pleural thickening  as well as the patchy mid to lower lung field bilateral infiltrates again noted.  Overall there has been no significant change when accounting for the difference in projection and technique.  Small effusions again noted.

## 2020-05-17 PROBLEM — R49.0 DYSPHONIA: Status: ACTIVE | Noted: 2018-03-23

## 2020-05-17 PROBLEM — Z66 DNR NO CODE (DO NOT RESUSCITATE): Status: ACTIVE | Noted: 2020-05-17

## 2020-05-17 PROBLEM — C80.1 CANCER: Status: ACTIVE | Noted: 2020-05-17

## 2020-05-17 PROBLEM — R13.12 OROPHARYNGEAL DYSPHAGIA: Status: ACTIVE | Noted: 2020-05-17

## 2020-05-17 PROBLEM — R13.12 OROPHARYNGEAL DYSPHAGIA: Chronic | Status: ACTIVE | Noted: 2020-05-17

## 2020-05-17 PROBLEM — R13.13 PHARYNGEAL DYSPHAGIA: Status: ACTIVE | Noted: 2020-05-17

## 2020-05-17 PROBLEM — J38.7 LEUKOPLAKIA OF LARYNX: Status: ACTIVE | Noted: 2017-04-19

## 2020-05-17 PROBLEM — C80.1 METASTATIC MALIGNANT NEOPLASM OF UNKNOWN PRIMARY SITE: Status: ACTIVE | Noted: 2020-05-17

## 2020-05-17 PROBLEM — I50.33 ACUTE ON CHRONIC DIASTOLIC CONGESTIVE HEART FAILURE: Status: ACTIVE | Noted: 2019-12-09

## 2020-05-17 PROBLEM — J18.9 PNEUMONIA: Status: ACTIVE | Noted: 2019-10-09

## 2020-05-17 PROBLEM — I50.32 CHRONIC DIASTOLIC CONGESTIVE HEART FAILURE: Chronic | Status: ACTIVE | Noted: 2019-12-09

## 2020-05-17 PROBLEM — I24.9 ACS (ACUTE CORONARY SYNDROME): Status: ACTIVE | Noted: 2019-10-09

## 2020-05-17 PROBLEM — G47.30 SLEEP APNEA: Status: ACTIVE | Noted: 2020-05-10

## 2020-05-17 PROBLEM — R06.02 SHORTNESS OF BREATH: Status: ACTIVE | Noted: 2019-10-09

## 2020-05-17 PROBLEM — K59.09 OTHER CONSTIPATION: Chronic | Status: ACTIVE | Noted: 2020-05-17

## 2020-05-17 PROBLEM — E87.70 FLUID OVERLOAD: Status: ACTIVE | Noted: 2019-10-09

## 2020-05-17 PROBLEM — C79.9 METASTATIC MALIGNANT NEOPLASM OF UNKNOWN PRIMARY SITE: Status: ACTIVE | Noted: 2020-05-17

## 2020-05-17 LAB
ANION GAP SERPL CALC-SCNC: 10 MMOL/L (ref 8–16)
AORTIC ROOT ANNULUS: 2.97 CM
AV INDEX (PROSTH): 0.57
AV MEAN GRADIENT: 4 MMHG
AV PEAK GRADIENT: 11 MMHG
AV VALVE AREA: 2.61 CM2
AV VELOCITY RATIO: 0.51
BSA FOR ECHO PROCEDURE: 1.66 M2
BUN SERPL-MCNC: 48 MG/DL (ref 8–23)
CALCIUM SERPL-MCNC: 9 MG/DL (ref 8.7–10.5)
CHLORIDE SERPL-SCNC: 104 MMOL/L (ref 95–110)
CO2 SERPL-SCNC: 32 MMOL/L (ref 23–29)
CREAT SERPL-MCNC: 1.5 MG/DL (ref 0.5–1.4)
CV ECHO LV RWT: 0.56 CM
D DIMER PPP IA.FEU-MCNC: 6.22 MG/L FEU
DOP CALC AO PEAK VEL: 1.64 M/S
DOP CALC AO VTI: 29.6 CM
DOP CALC LVOT AREA: 4.6 CM2
DOP CALC LVOT DIAMETER: 2.42 CM
DOP CALC LVOT PEAK VEL: 0.83 M/S
DOP CALC LVOT STROKE VOLUME: 77.23 CM3
DOP CALCLVOT PEAK VEL VTI: 16.8 CM
E WAVE DECELERATION TIME: 269.74 MSEC
E/A RATIO: 0.66
E/E' RATIO: 5.6 M/S
ECHO LV POSTERIOR WALL: 1.23 CM (ref 0.6–1.1)
EST. GFR  (AFRICAN AMERICAN): 53 ML/MIN/1.73 M^2
EST. GFR  (NON AFRICAN AMERICAN): 46 ML/MIN/1.73 M^2
FRACTIONAL SHORTENING: 53 % (ref 28–44)
GLUCOSE SERPL-MCNC: 79 MG/DL (ref 70–110)
INTERVENTRICULAR SEPTUM: 0.65 CM (ref 0.6–1.1)
LA MAJOR: 4.17 CM
LA MINOR: 3.29 CM
LA WIDTH: 3.4 CM
LEFT ATRIUM SIZE: 2.8 CM
LEFT ATRIUM VOLUME INDEX: 17.7 ML/M2
LEFT ATRIUM VOLUME: 29.76 CM3
LEFT INTERNAL DIMENSION IN SYSTOLE: 2.07 CM (ref 2.1–4)
LEFT VENTRICLE DIASTOLIC VOLUME INDEX: 50.98 ML/M2
LEFT VENTRICLE DIASTOLIC VOLUME: 85.78 ML
LEFT VENTRICLE MASS INDEX: 80 G/M2
LEFT VENTRICLE SYSTOLIC VOLUME INDEX: 8.2 ML/M2
LEFT VENTRICLE SYSTOLIC VOLUME: 13.83 ML
LEFT VENTRICULAR INTERNAL DIMENSION IN DIASTOLE: 4.36 CM (ref 3.5–6)
LEFT VENTRICULAR MASS: 133.79 G
LV LATERAL E/E' RATIO: 4.67 M/S
LV SEPTAL E/E' RATIO: 7 M/S
MAGNESIUM SERPL-MCNC: 2.5 MG/DL (ref 1.6–2.6)
MV PEAK A VEL: 0.64 M/S
MV PEAK E VEL: 0.42 M/S
POTASSIUM SERPL-SCNC: 4.6 MMOL/L (ref 3.5–5.1)
RA MAJOR: 3.38 CM
SINUS: 3.24 CM
SODIUM SERPL-SCNC: 146 MMOL/L (ref 136–145)
STJ: 2.98 CM
TDI LATERAL: 0.09 M/S
TDI SEPTAL: 0.06 M/S
TDI: 0.08 M/S
TRICUSPID ANNULAR PLANE SYSTOLIC EXCURSION: 2.1 CM

## 2020-05-17 PROCEDURE — 25000242 PHARM REV CODE 250 ALT 637 W/ HCPCS: Performed by: FAMILY MEDICINE

## 2020-05-17 PROCEDURE — 99222 PR INITIAL HOSPITAL CARE,LEVL II: ICD-10-PCS | Mod: ,,, | Performed by: INTERNAL MEDICINE

## 2020-05-17 PROCEDURE — 63600175 PHARM REV CODE 636 W HCPCS: Performed by: NURSE PRACTITIONER

## 2020-05-17 PROCEDURE — 85379 FIBRIN DEGRADATION QUANT: CPT

## 2020-05-17 PROCEDURE — 99222 1ST HOSP IP/OBS MODERATE 55: CPT | Mod: ,,, | Performed by: INTERNAL MEDICINE

## 2020-05-17 PROCEDURE — 80048 BASIC METABOLIC PNL TOTAL CA: CPT

## 2020-05-17 PROCEDURE — 99223 PR INITIAL HOSPITAL CARE,LEVL III: ICD-10-PCS | Mod: ,,, | Performed by: INTERNAL MEDICINE

## 2020-05-17 PROCEDURE — 21400001 HC TELEMETRY ROOM

## 2020-05-17 PROCEDURE — 25000003 PHARM REV CODE 250: Performed by: NURSE PRACTITIONER

## 2020-05-17 PROCEDURE — 99223 1ST HOSP IP/OBS HIGH 75: CPT | Mod: ,,, | Performed by: INTERNAL MEDICINE

## 2020-05-17 PROCEDURE — 97161 PT EVAL LOW COMPLEX 20 MIN: CPT

## 2020-05-17 PROCEDURE — 94640 AIRWAY INHALATION TREATMENT: CPT

## 2020-05-17 PROCEDURE — 97530 THERAPEUTIC ACTIVITIES: CPT

## 2020-05-17 PROCEDURE — 63600175 PHARM REV CODE 636 W HCPCS: Performed by: INTERNAL MEDICINE

## 2020-05-17 PROCEDURE — 87040 BLOOD CULTURE FOR BACTERIA: CPT

## 2020-05-17 PROCEDURE — 83735 ASSAY OF MAGNESIUM: CPT

## 2020-05-17 PROCEDURE — 27000221 HC OXYGEN, UP TO 24 HOURS

## 2020-05-17 PROCEDURE — 96376 TX/PRO/DX INJ SAME DRUG ADON: CPT | Performed by: EMERGENCY MEDICINE

## 2020-05-17 PROCEDURE — 63600175 PHARM REV CODE 636 W HCPCS: Performed by: FAMILY MEDICINE

## 2020-05-17 PROCEDURE — 97165 OT EVAL LOW COMPLEX 30 MIN: CPT

## 2020-05-17 PROCEDURE — 36415 COLL VENOUS BLD VENIPUNCTURE: CPT

## 2020-05-17 PROCEDURE — 92610 EVALUATE SWALLOWING FUNCTION: CPT

## 2020-05-17 RX ORDER — CEFEPIME HYDROCHLORIDE 1 G/50ML
2 INJECTION, SOLUTION INTRAVENOUS EVERY 12 HOURS
Status: DISCONTINUED | OUTPATIENT
Start: 2020-05-17 | End: 2020-05-19 | Stop reason: HOSPADM

## 2020-05-17 RX ORDER — BISACODYL 10 MG
10 SUPPOSITORY, RECTAL RECTAL DAILY PRN
Status: DISCONTINUED | OUTPATIENT
Start: 2020-05-17 | End: 2020-05-19 | Stop reason: HOSPADM

## 2020-05-17 RX ORDER — HEPARIN SODIUM 5000 [USP'U]/ML
5000 INJECTION, SOLUTION INTRAVENOUS; SUBCUTANEOUS EVERY 8 HOURS
Status: COMPLETED | OUTPATIENT
Start: 2020-05-17 | End: 2020-05-17

## 2020-05-17 RX ORDER — LACTULOSE 10 G/15ML
15 SOLUTION ORAL 3 TIMES DAILY PRN
Status: DISCONTINUED | OUTPATIENT
Start: 2020-05-17 | End: 2020-05-17

## 2020-05-17 RX ORDER — PSEUDOEPHEDRINE/ACETAMINOPHEN 30MG-500MG
100 TABLET ORAL ONCE
Status: DISCONTINUED | OUTPATIENT
Start: 2020-05-17 | End: 2020-05-17

## 2020-05-17 RX ORDER — SYRING-NEEDL,DISP,INSUL,0.3 ML 29 G X1/2"
296 SYRINGE, EMPTY DISPOSABLE MISCELLANEOUS ONCE
Status: DISCONTINUED | OUTPATIENT
Start: 2020-05-17 | End: 2020-05-17

## 2020-05-17 RX ADMIN — IPRATROPIUM BROMIDE 0.5 MG: 0.5 SOLUTION RESPIRATORY (INHALATION) at 07:05

## 2020-05-17 RX ADMIN — CARVEDILOL 3.12 MG: 3.12 TABLET, FILM COATED ORAL at 09:05

## 2020-05-17 RX ADMIN — HEPARIN SODIUM 5000 UNITS: 5000 INJECTION INTRAVENOUS; SUBCUTANEOUS at 02:05

## 2020-05-17 RX ADMIN — OXYCODONE AND ACETAMINOPHEN 1 TABLET: 7.5; 325 TABLET ORAL at 08:05

## 2020-05-17 RX ADMIN — IPRATROPIUM BROMIDE 0.5 MG: 0.5 SOLUTION RESPIRATORY (INHALATION) at 12:05

## 2020-05-17 RX ADMIN — HEPARIN SODIUM 5000 UNITS: 5000 INJECTION INTRAVENOUS; SUBCUTANEOUS at 09:05

## 2020-05-17 RX ADMIN — FUROSEMIDE 40 MG: 10 INJECTION, SOLUTION INTRAMUSCULAR; INTRAVENOUS at 05:05

## 2020-05-17 RX ADMIN — FUROSEMIDE 40 MG: 10 INJECTION, SOLUTION INTRAMUSCULAR; INTRAVENOUS at 09:05

## 2020-05-17 RX ADMIN — CEFEPIME HYDROCHLORIDE 2 G: 2 INJECTION, SOLUTION INTRAVENOUS at 10:05

## 2020-05-17 RX ADMIN — CEFEPIME HYDROCHLORIDE 2 G: 2 INJECTION, SOLUTION INTRAVENOUS at 11:05

## 2020-05-17 RX ADMIN — POLYETHYLENE GLYCOL 3350 17 G: 17 POWDER, FOR SOLUTION ORAL at 09:05

## 2020-05-17 RX ADMIN — PANTOPRAZOLE SODIUM 40 MG: 40 TABLET, DELAYED RELEASE ORAL at 09:05

## 2020-05-17 RX ADMIN — OXYCODONE AND ACETAMINOPHEN 1 TABLET: 7.5; 325 TABLET ORAL at 01:05

## 2020-05-17 RX ADMIN — OXYCODONE AND ACETAMINOPHEN 1 TABLET: 7.5; 325 TABLET ORAL at 11:05

## 2020-05-17 RX ADMIN — ISOSORBIDE MONONITRATE 30 MG: 30 TABLET, EXTENDED RELEASE ORAL at 09:05

## 2020-05-17 NOTE — PT/OT/SLP EVAL
"Speech Language Pathology Evaluation  Bedside Swallow    Patient Name:  Noble Tripp   MRN:  [id number]  Admitting Diagnosis: Acute on chronic respiratory failure with hypoxia    Recommendations:                 General Recommendations:  Dysphagia therapy and Modified barium swallow study  Diet recommendations:  Puree, Nectar Thick   Aspiration Precautions: 1 bite/sip at a time, Alternating bites/sips, Alternate means of nutrition/hydration, Frequent oral care, HOB to 90 degrees, Remain upright 30 minutes post meal, Small bites/sips and Standard aspiration precautions   General Precautions: Standard,    Communication strategies:  none    History:     Past Medical History:   Diagnosis Date    Adrenal adenoma     david;nl fxn w/u;tran 11/18    Aspiration pneumonia 10/15    puree/honey    Benign prostate hyperplasia     CAD (coronary artery disease)     dr padilla    Chronic pain     dr santos    Chronic systolic congestive heart failure 10/17/2019    Cirrhosis     w 5/20 hospital    COPD (chronic obstructive pulmonary disease)     papi    Ex-smoker     11/13    Hyperlipidemia     Ischemic cardiomyopathy 11/22/2019    Oropharyngeal dysphagia 5/17/2020    JUANITO on CPAP     cpap    Osteopenia 1/15 tran 1/18    Pneumonia involving bilateral lungs     PSVT (paroxysmal supraventricular tachycardia)     PVD (peripheral vascular disease)     noobs 07 khuri    Pyriform sinus cancer     dr ponce radiation 1/2-14 dr king preales    Squamous cell carcinoma of skin     roberto    Tongue cancer     "superficial" removed 11/14    Vocal cord cancer 2011    Xerostomia     radiation       Past Surgical History:   Procedure Laterality Date    APPENDECTOMY      BRONCHOSCOPY Bilateral 2/5/2019    Procedure: Bronchoscopy;  Surgeon: Santos Cardozo MD;  Location: [facility];  Service: Endoscopy;  Laterality: Bilateral;    COLONOSCOPY N/A 5/1/2017    Procedure: Due for screening colonoscopy;  Surgeon: " Anushka Yoder MD;  Location: [facility];  Service: Endoscopy;  Laterality: N/A;    ESOPHAGOGASTRODUODENOSCOPY N/A 5/29/2018    Procedure: ESOPHAGOGASTRODUODENOSCOPY (EGD);  Surgeon: Audie Hill III, MD;  Location: [facility] ENDO;  Service: Endoscopy;  Laterality: N/A;    ESOPHAGOGASTRODUODENOSCOPY N/A 8/29/2018    Procedure: ESOPHAGOGASTRODUODENOSCOPY (EGD);  Surgeon: Audie Hill III, MD;  Location: [facility];  Service: Endoscopy;  Laterality: N/A;    LEFT HEART CATHETERIZATION Left 5/7/2020    Procedure: CATHETERIZATION, HEART, LEFT;  Surgeon: Abel Beltran MD;  Location: [facility] CATH LAB;  Service: Cardiology;  Laterality: Left;  iodine allergy    PERCUTANEOUS TRANSLUMINAL BALLOON ANGIOPLASTY OF CORONARY ARTERY Right 12/9/2019    Procedure: PTCA, USING BALLOON/ IABP or Impella multivessel angioplasty/poss stent;  Surgeon: Abel Beltran MD;  Location: [facility] CATH LAB;  Service: Cardiology;  Laterality: Right;    PERCUTANEOUS TRANSLUMINAL BALLOON ANGIOPLASTY OF CORONARY ARTERY  5/7/2020    Procedure: Angioplasty-coronary;  Surgeon: Abel Beltran MD;  Location: [facility] CATH LAB;  Service: Cardiology;;    THORACENTESIS Left 8/7/2018    Procedure: Thoracentesis;  Surgeon: Santos Cardozo MD;  Location: [facility];  Service: Endoscopy;  Laterality: Left;    THORACENTESIS Right 2/25/2019    Procedure: Thoracentesis;  Surgeon: Santos Cardozo MD;  Location: [facility];  Service: Endoscopy;  Laterality: Right;    tongue cancer excision  11/14    dr vitale    VOCAL CORD LATERALIZATION, ENDOSCOPIC APPROACH W/ MORENA yanceyorter       Social History: Patient lives with family.    Modified Barium Swallow: recommend MBSS to further assess oropharyngeal swallow function.     Prior diet: NPO    Occupation/hobbies/homemaking: retired    Subjective     Pt alert and cooperative, seen sitting upright at bedside. Pt noted history of swallowing difficulty related to throat cancer and radiation therapy.     Patient goals:  Improve ability to eat safely.     Pain/Comfort:  · Pain Rating 1: 0/10    Objective:     Oral Musculature Evaluation  · Oral Musculature: general weakness  · Mucosal Quality: dry  · Lingual Strength and Mobility: impaired strength  · Velar Elevation: WFL  · Volitional Cough: non-productive  · Volitional Swallow: delayed     Bedside Swallow Eval:   Consistencies Assessed:  · Thin liquids water via cup rim  · Felsenthal thick liquids apple juice via cup rim  · Mixed consistencies pudding via teaspoon  · Soft solids curt cracker      Oral Phase:   · Dry mouth  · Prolonged mastication  · Oral residue  · Slow oral transit time    Pharyngeal Phase:   · decreased hyolaryngeal excursion to palpation  · globus sensation  · multiple spontaneous swallows    Compensatory Strategies  · Effortful swallow    Treatment: ST completed bedside swallow evaluation. Pt was educated on recommended diet, swallowing strategies, and aspiration precautions to maximize safety during PO intake.     Assessment:     Noble Tripp is a 74 y.o. male with an SLP diagnosis of Dysphagia.  He presents with oropharyngeal dysphagia and an increased risk of aspiration.    Goals:   Multidisciplinary Problems     SLP Goals        Problem: SLP Goal    Goal Priority Disciplines Outcome   SLP Goal     SLP    Description:  1. Complete MBSS to further assess oropharyngeal swallow function.   2. Pt will complete exercises to strengthen tongue base retraction, laryngeal elevation, and pharyngeal constriction x 20 repetitions each.  3. Pt will tolerate least restrictive diet with use swallowing strategies to maintain adequate nutrition/ hydration.                     Plan:     · Patient to be seen:  3 x/week   · Plan of Care expires:     · Plan of Care reviewed with:  patient   · SLP Follow-Up:  Yes       Discharge recommendations:  home health speech therapy   Barriers to Discharge:  Level of Skilled Assistance Needed mod A    Time Tracking:     SLP  Treatment Date:   05/17/20  Speech Start Time:  0833  Speech Stop Time:  0855     Speech Total Time (min):  22 min    Billable Minutes: Eval Swallow and Oral Function 22    Mckayla Dexter CCC-SLP  05/17/2020

## 2020-05-17 NOTE — PROGRESS NOTES
Pharmacist Renal Dose Adjustment Note    Noble Tripp is a 74 y.o. male being treated with the medication cefepime    Patient Data:    Vital Signs (Most Recent):  Temp: 97.7 °F (36.5 °C) (05/17/20 0749)  Pulse: 70 (05/17/20 0749)  Resp: 18 (05/17/20 0749)  BP: 121/60 (05/17/20 0749)  SpO2: (!) 92 % (05/17/20 0749)   Vital Signs (72h Range):  Temp:  [97.4 °F (36.3 °C)-98 °F (36.7 °C)]   Pulse:  [60-80]   Resp:  [18-23]   BP: (112-147)/(56-67)   SpO2:  [89 %-97 %]      Recent Labs   Lab 05/12/20  0456 05/16/20  1053 05/17/20  0448   CREATININE 0.9 1.7* 1.5*     Serum creatinine: 1.5 mg/dL (H) 05/17/20 0448  Estimated creatinine clearance: 36.4 mL/min (A)    Medication: Cefepime 2mg iv q24hrs will be changed to medication: cefepime 2 gm [id number] per renal dose protocol for crcl of 30-60ml/min.    Pharmacist's Name: Judie Johnson [facility]  Pharmacist's Extension: [phone]

## 2020-05-17 NOTE — ASSESSMENT & PLAN NOTE
05/17/2020  ASSESSMENT: Clinically stable. Etiology? He does not drink alcohol currently.    PLAN:  He is choosing palliative management.    MELD-Na score: 11 at 5/17/2020  4:48 AM  MELD score: 11 at 5/17/2020  4:48 AM  Calculated from:  Serum Creatinine: 1.5 mg/dL at 5/17/2020  4:48 AM  Serum Sodium: 146 mmol/L (Rounded to 137 mmol/L) at 5/17/2020  4:48 AM  Total Bilirubin: 0.8 mg/dL (Rounded to 1 mg/dL) at 5/16/2020 10:53 AM  INR(ratio): 1.1 at 5/16/2020 10:53 AM  Age: 74 years   Recent Labs   Lab 05/11/20  0434 05/12/20  0456 05/12/20  0809 05/16/20  1053   AMMONIA 42  --   --   --    AST 93* 125*  --  95*   ALT 89* 115*  --  100*   GGT  --   --  648*  --    ALKPHOS 245* 324*  --  391*   BILITOT 0.7 0.7  --  0.8   INR  --   --   --  1.1   APTT  --   --   --  28.2   * 132*  --  212   ALBUMIN 2.6* 2.4*  --  2.8*   LABPROT  --   --   --  12.0

## 2020-05-17 NOTE — HOSPITAL COURSE
05/17/2020 CT chest: 1.  Findings concerning for suprahilar mass with lymphangitic spread/postobstructive pneumonia left upper lobe. 2.  Metastatic mediastinal lymphadenopathy as well as innumerable number of metastatic lesions throughout the partially visualized enlarged liver. 3.  Findings concerning for pathologic T6 fracture with mild compression.  No retropulsion. 4.  Lower lobe and lingular infiltrates. I discussed his case with pulmonology and oncology. I reviewed his CT results with him. We had LENGTHY discussion about the apparent diagnosis, differential diagnosis and risks and benefits of treatment options. He verbalized understanding. He has decided he does not want bronchoscopy or liver biopsy because he would not be willing to take chemotherapy or radiation therapy, regardless of the pathology results. I discussed advanced directives with him, and he wants to be DNR. I discussed treatment options, including palliative treatment options and hospice. He has decided that he wants to go home with hospice, but he does want PEG tube prior to discharge, which I arranged with GI to be done in the morning. Social work consult for home hospice ordered.  05/18/2020 GI was unsuccessful with PEG tube placement due to his anatomy. Interventional radiology consulted for PEG tube placement in the morning. His oral nutritional and fluid intake has been minimal because he has no appetite, although he says he feels dehydrated and wants IV fluids. IV fluids ordered. Anticipate discharge home with hospice tomorrow after PEG tube placement.  05/19/2020 PEG tube successfully placed by IR. He says he feels better after IV fluids. He still want to go home with hospice. Home hospice arranged.

## 2020-05-17 NOTE — ASSESSMENT & PLAN NOTE
05/17/2020 He has Acute on Chronic Hypoxic respiratory failure. ETIOLOGY = Pneumonia, COPD. He WAS previously on oxygen at home. ASSESSMENT: Modestly improved. D-dimer elevated, but he declined PE evaluation, explaining that he would not want anticoagulation. Calves are non-tender and reasonably symmetric. PLAN: Treat pneumonia and adjust management of supplemental oxygen and respiratory treatments as needed.  Flow (L/min): 3  Oxygen Concentration (%):  [32-40] 32  Temp:  [97.4 °F (36.3 °C)-98 °F (36.7 °C)] 97.7 °F (36.5 °C)  Pulse:  [60-76] 73  Resp:  [18-20] 18  SpO2:  [89 %-97 %] 92 %  BP: (112-126)/(56-61) 121/60       Recent Labs   Lab 05/17/20  1228   DDIMER 6.22*

## 2020-05-17 NOTE — ASSESSMENT & PLAN NOTE
05/17/2020  ASSESSMENT: Adequately controlled.   PLAN:  Continue present treatment plan.  Temp:  [97.4 °F (36.3 °C)-98 °F (36.7 °C)] 97.7 °F (36.5 °C)  Pulse:  [60-76] 73  Resp:  [18-20] 18  SpO2:  [89 %-97 %] 92 %  BP: (112-126)/(56-61) 121/60

## 2020-05-17 NOTE — PT/OT/SLP EVAL
Occupational Therapy   Evaluation and Discharge Note    Name: Noble Tripp  MRN: [id number]  Admitting Diagnosis:  Acute on chronic respiratory failure with hypoxia      Recommendations:     Discharge Recommendations: home with hospice  Discharge Equipment Recommendations:  none  Barriers to discharge:  None    Assessment:     Noble Tripp is a 74 y.o. male with a medical diagnosis of Acute on chronic respiratory failure with hypoxia. At this time, patient is functioning at their prior level of function and does not require further acute OT services.     Plan:     During this hospitalization, patient does not require further acute OT services.  Please re-consult if situation changes.    · Plan of Care Reviewed with: patient    Subjective     Chief Complaint: C/O BORED WITH PUREED MEAL.  Patient/Family Comments/goals: NONE STATED.    Occupational Profile:  Living Environment: PATIENT STATED HE LIVES ALONE IN A 1-STORY HOUSE WITH 0 STEPS.   Previous level of function: PATIENT STATED HE WAS (I) WITH ADL'S AND T/F'S.  Roles and Routines: PATIENT STATED HE ORDERS HIS GROCERIES ONLINE THEN GOES TO GROCERY FOR .  Equipment Used at home:  CPAP, cane, straight, oxygen  Assistance upon Discharge: PATIENT STATED A FEW OF HIS 6 KIDS CHECK ON HIM.    Pain/Comfort:  · Pain Rating 1: 0/10    Patients cultural, spiritual, [employer] conflicts given the current situation: no    Objective:     Communicated with: NURSE prior to session.  Patient found supine with telemetry, peripheral IV, pulse ox (continuous), oxygen upon OT entry to room.    General Precautions: Standard, aspiration, fall   Orthopedic Precautions:N/A   Braces: N/A     Occupational Performance:    Bed Mobility:    · Patient completed Scooting/Bridging with modified independence  · Patient completed Supine to Sit with modified independence  · Patient completed Sit to Supine with modified independence    Functional Mobility/Transfers:  · Patient  completed Sit <> Stand Transfer with modified independence  with  straight cane   · Patient completed Toilet Transfer Stand Pivot technique with modified independence with  straight cane  · Functional Mobility: MOD (I) WITH STRAIGHT CANE    Activities of Daily Living:  · Upper Body Dressing: modified independence      · Lower Body Dressing: modified independence      · Toileting: modified independence        Cognitive/Visual Perceptual:  APPEARS INTACT    Physical Exam:  Balance:    -       NO LOB TO AMBULATE WITH STRAIGHT CANE  Postural examination/scapula alignment:    -       No postural abnormalities identified  Upper Extremity Range of Motion:     -       Right Upper Extremity: WFL  -       Left Upper Extremity: WFL    AMPAC 6 Click ADL:  AMPAC Total Score: 23    Treatment & Education:  OT EVAL COMPLETED.  PATIENT WAS EDUCATED RE: PURPOSE OF OT.  PATIENT PRACTICED BED MOBILITY, GROOMING, TOILET T/F, AND LB DRESSING TO ASSESS SAFETY WITH SELF CARE TASKS.  Education:    Patient left HOB elevated with all lines intact, call button in reach and MD present    GOALS:   Multidisciplinary Problems     Occupational Therapy Goals     Not on file                History:     Past Medical History:   Diagnosis Date    Adrenal adenoma     david;nl fxn w/u;tran 11/18    Aspiration pneumonia 10/15    puree/honey    Benign prostate hyperplasia     CAD (coronary artery disease)     dr padilla    Chronic pain     dr santos    Chronic systolic congestive heart failure 10/17/2019    Cirrhosis     w 5/20 hospital    COPD (chronic obstructive pulmonary disease)     papi    Ex-smoker     11/13    Hyperlipidemia     Ischemic cardiomyopathy 11/22/2019    Oropharyngeal dysphagia 5/17/2020    JUANITO on CPAP     cpap    Osteopenia 1/15 tran 1/18    Pneumonia involving bilateral lungs     PSVT (paroxysmal supraventricular tachycardia)     PVD (peripheral vascular disease)     noobs 07 elizabethuri    Pyriform sinus cancer       "[facility] radiation 1/2-14 dr king perales    Squamous cell carcinoma of skin     roberto    Tongue cancer     "superficial" removed 11/14    Vocal cord cancer 2011    Xerostomia     radiation       Past Surgical History:   Procedure Laterality Date    APPENDECTOMY      BRONCHOSCOPY Bilateral 2/5/2019    Procedure: Bronchoscopy;  Surgeon: Santos Cardozo MD;  Location: [facility];  Service: Endoscopy;  Laterality: Bilateral;    COLONOSCOPY N/A 5/1/2017    Procedure: Due for screening colonoscopy;  Surgeon: Anushka Yoder MD;  Location: [facility];  Service: Endoscopy;  Laterality: N/A;    ESOPHAGOGASTRODUODENOSCOPY N/A 5/29/2018    Procedure: ESOPHAGOGASTRODUODENOSCOPY (EGD);  Surgeon: Audie Hill III, MD;  Location: [facility];  Service: Endoscopy;  Laterality: N/A;    ESOPHAGOGASTRODUODENOSCOPY N/A 8/29/2018    Procedure: ESOPHAGOGASTRODUODENOSCOPY (EGD);  Surgeon: Audie Hill III, MD;  Location: [facility];  Service: Endoscopy;  Laterality: N/A;    LEFT HEART CATHETERIZATION Left 5/7/2020    Procedure: CATHETERIZATION, HEART, LEFT;  Surgeon: Abel Beltran MD;  Location: [facility] CATH LAB;  Service: Cardiology;  Laterality: Left;  iodine allergy    PERCUTANEOUS TRANSLUMINAL BALLOON ANGIOPLASTY OF CORONARY ARTERY Right 12/9/2019    Procedure: PTCA, USING BALLOON/ IABP or Impella multivessel angioplasty/poss stent;  Surgeon: Abel Beltran MD;  Location: [facility] CATH LAB;  Service: Cardiology;  Laterality: Right;    PERCUTANEOUS TRANSLUMINAL BALLOON ANGIOPLASTY OF CORONARY ARTERY  5/7/2020    Procedure: Angioplasty-coronary;  Surgeon: Abel Beltran MD;  Location: [facility] CATH LAB;  Service: Cardiology;;    THORACENTESIS Left 8/7/2018    Procedure: Thoracentesis;  Surgeon: Santos Cardozo MD;  Location: [facility];  Service: Endoscopy;  Laterality: Left;    THORACENTESIS Right 2/25/2019    Procedure: Thoracentesis;  Surgeon: Santos Cardozo MD;  Location: [facility];  Service: Endoscopy;  " Laterality: Right;    tongue cancer excision  11/14    dr vitale    VOCAL CORD LATERALIZATION, ENDOSCOPIC APPROACH W/ MLB      kris       Time Tracking:     OT Date of Treatment: 05/17/20  OT Start Time: 1154  OT Stop Time: 1210  OT Total Time (min): 16 min    Billable Minutes:Evaluation 16    Saira Lopez OT  5/17/2020

## 2020-05-17 NOTE — ASSESSMENT & PLAN NOTE
05/17/2020  ASSESSMENT: Modestly improved. (Post-obstructive +/- aspiration.)   PLAN:  Plan to transition to Augmentin upon discharge home with hospice.

## 2020-05-17 NOTE — ASSESSMENT & PLAN NOTE
05/17/2020  ASSESSMENT: He reported moderately severe constipation, but then later in the morning he has VERY LARGE bowel movement and he no longer felt constipated. No melena or BRBPR.   PLAN:  Miralax as needed.

## 2020-05-17 NOTE — ASSESSMENT & PLAN NOTE
05/17/2020  ASSESSMENT: Troponin stable. Suspect demand ischemia. Cardiology consulted.  PLAN:  Continue beta-blocker, aspirin, plavix, isisorbide mononitrate. Not on statin due to statin intolerance.  Recent Labs   Lab 05/16/20  1053 05/16/20  1711 05/16/20  2252   TROPONINI 0.044* 0.034* 0.038*     Results for orders placed or performed during the hospital encounter of 05/16/20   EKG 12-lead    Collection Time: 05/16/20 10:23 AM    Narrative    Test Reason : R06.02,    Vent. Rate : 068 BPM     Atrial Rate : 068 BPM     P-R Int : 254 ms          QRS Dur : 092 ms      QT Int : 378 ms       P-R-T Axes : 013 -05 175 degrees     QTc Int : 401 ms    Sinus rhythm with 1st degree A-V block  ST and T wave abnormality, consider lateral ischemia  Abnormal ECG  When compared with ECG of 10-MAY-2020 03:34,  Questionable change in The axis  Non-specific change in ST segment in Inferior leads  T wave inversion no longer evident in Inferior leads  T wave inversion less evident in Anterior leads  T wave inversion more evident in Lateral leads    Referred By: [email or web address]   SELF           Confirmed By:

## 2020-05-17 NOTE — CONSULTS
[facility] -   Pulmonology  Consult Note    Patient Name: Noble Tripp  MRN: [id number]  Admission Date: 5/16/2020  Hospital Length of Stay: 0 days  Code Status: DNR  Attending Physician: DAVID Rojas MD  Primary Care Provider: Dwaine Davis MD   Principal Problem: Acute on chronic respiratory failure with hypoxia      Subjective: shortness of breath and paroxysmal nocturnal dyspnea      HPI:  CC:  Respiratory failure and abnormal chest x-ray/CT scan of chest  HPI:   74 year old male former smoker with a history of head and neck cancer s/p  Disection chin in 11/ 2011 s/p  Radiation implant w/ ultrasound in neck 11 2011, 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  His most recent pulmonary history has been complicated by dysphagia and aspiration pneumonia.  He had a history of an abnormal PET scan on 01/23/2019 and subsequently underwent bronchoscopy by  on 02/05/2019 which demonstrated localized fibrosis (no evidence of cancer on lung biopsies.  Since that evaluation the patient has developed a new left hilar mass that was identified during this hospitalization along with liver abnormalities on CT scan suspicious for metastatic malignancy.  Prior CT scan of the abdomen revealed - liver demonstrates right lobe cyst as well as adrenal adenoma.   He presents to the emergency room on 05/16/2020 with a 1-2 day history of increasing shortness of breath, paroxysmal nocturnal dyspnea, nonproductive cough and peripheral edema.  He has a history of congestive heart failure was noted to have bilateral pleural effusions on most recent exam.  He was admitted for acute on chronic diastolic congestive heart failure and probable aspiration pneumonia.  He has a  PMHx of CAD, CHF, PSVT, PVD, JUANITO on CPAP, HLD, COPD and BPH.  Patient reports that he is  and  lives alone and cares for himself and has frequent assistance from his children.    Past  "Medical History:   Diagnosis Date    Adrenal adenoma     david;nl fxn w/u;tran 11/18    Aspiration pneumonia 10/15    puree/honey    Benign prostate hyperplasia     CAD (coronary artery disease)     dr padilla    Chronic pain     dr santos    Chronic systolic congestive heart failure 10/17/2019    Cirrhosis     w 5/20 hospital    COPD (chronic obstructive pulmonary disease)     papi    Ex-smoker     11/13    Hyperlipidemia     Ischemic cardiomyopathy 11/22/2019    Oropharyngeal dysphagia 5/17/2020    JUANITO on CPAP     cpap    Osteopenia 1/15 tran 1/18    Pneumonia involving bilateral lungs     PSVT (paroxysmal supraventricular tachycardia)     PVD (peripheral vascular disease)     noobs 07 khuri    Pyriform sinus cancer     dr ponce radiation 1/2-14 dr king perales    Squamous cell carcinoma of skin     roberto    Tongue cancer     "superficial" removed 11/14    Vocal cord cancer 2011    Xerostomia     radiation       Past Surgical History:   Procedure Laterality Date    APPENDECTOMY      BRONCHOSCOPY Bilateral 2/5/2019    Procedure: Bronchoscopy;  Surgeon: Santos Cardozo MD;  Location: [facility];  Service: Endoscopy;  Laterality: Bilateral;    COLONOSCOPY N/A 5/1/2017    Procedure: Due for screening colonoscopy;  Surgeon: Anushka Yoder MD;  Location: [facility];  Service: Endoscopy;  Laterality: N/A;    ESOPHAGOGASTRODUODENOSCOPY N/A 5/29/2018    Procedure: ESOPHAGOGASTRODUODENOSCOPY (EGD);  Surgeon: Audie Hill III, MD;  Location: [facility];  Service: Endoscopy;  Laterality: N/A;    ESOPHAGOGASTRODUODENOSCOPY N/A 8/29/2018    Procedure: ESOPHAGOGASTRODUODENOSCOPY (EGD);  Surgeon: Audie Hill III, MD;  Location: [facility];  Service: Endoscopy;  Laterality: N/A;    LEFT HEART CATHETERIZATION Left 5/7/2020    Procedure: CATHETERIZATION, HEART, LEFT;  Surgeon: Abel Beltran MD;  Location: [facility] CATH LAB;  Service: Cardiology;  Laterality: Left;  iodine allergy    " PERCUTANEOUS TRANSLUMINAL BALLOON ANGIOPLASTY OF CORONARY ARTERY Right 2019    Procedure: PTCA, USING BALLOON/ IABP or Impella multivessel angioplasty/poss stent;  Surgeon: Abel Beltran MD;  Location: [facility] CATH LAB;  Service: Cardiology;  Laterality: Right;    PERCUTANEOUS TRANSLUMINAL BALLOON ANGIOPLASTY OF CORONARY ARTERY  2020    Procedure: Angioplasty-coronary;  Surgeon: Abel Beltran MD;  Location: [facility] CATH LAB;  Service: Cardiology;;    THORACENTESIS Left 2018    Procedure: Thoracentesis;  Surgeon: Santos Cardozo MD;  Location: [facility] ENDO;  Service: Endoscopy;  Laterality: Left;    THORACENTESIS Right 2019    Procedure: Thoracentesis;  Surgeon: Santos Cardozo MD;  Location: [facility] ENDO;  Service: Endoscopy;  Laterality: Right;    tongue cancer excision      dr vitale    VOCAL CORD LATERALIZATION, ENDOSCOPIC APPROACH W/ MLB      kris       Review of patient's allergies indicates:   Allergen Reactions    Contrast media Hives and Itching    Iodinated contrast media Hives and Itching    Iodine Hives and Itching    Pravastatin      Other reaction(s): fatigue  Other reaction(s): fatigue    Rosuvastatin      Other reaction(s): fatigue  Other reaction(s): fatigue    Simvastatin      Other reaction(s): fatigue  Other reaction(s): fatigue    Statins-hmg-coa reductase inhibitors Other (See Comments)     Fatigue       Family History     Problem Relation (Age of Onset)    Lung cancer Father, Brother    No Known Problems Mother        Tobacco Use    Smoking status: Former Smoker     Packs/day: 0.50     Years: 55.00     Pack years: 27.50     Types: Cigarettes     Last attempt to quit: 10/1/2019     Years since quittin.6    Smokeless tobacco: Never Used    Tobacco comment: 6-7 cigs/day   Substance and Sexual Activity    Alcohol use: Not Currently    Drug use: No    Sexual activity: Not Currently         Review of Systems   Constitutional: Positive for appetite  change, diaphoresis, fatigue and unexpected weight change.   HENT: Positive for trouble swallowing.    Respiratory: Positive for cough, choking and shortness of breath.    Cardiovascular: Positive for palpitations and leg swelling.   Gastrointestinal: Negative.    Endocrine: Negative.    Genitourinary: Negative.    Musculoskeletal: Positive for arthralgias.   Neurological: Positive for weakness.   Psychiatric/Behavioral: Positive for sleep disturbance.     Objective:     Vital Signs (Most Recent):  Temp: 97.7 °F (36.5 °C) (05/17/20 0749)  Pulse: 73 (05/17/20 1330)  Resp: 18 (05/17/20 1232)  BP: 121/60 (05/17/20 0749)  SpO2: (!) 92 % (05/17/20 1232) Vital Signs (24h Range):  Temp:  [97.4 °F (36.3 °C)-98 °F (36.7 °C)] 97.7 °F (36.5 °C)  Pulse:  [60-76] 73  Resp:  [18-20] 18  SpO2:  [89 %-97 %] 92 %  BP: (112-126)/(56-61) 121/60     Weight: 58.7 kg (129 lb 6.6 oz)  Body mass index is 19.68 kg/m².      Intake/Output Summary (Last 24 hours) at 5/17/2020 1543  Last data filed at 5/17/2020 0600  Gross per 24 hour   Intake 240 ml   Output 400 ml   Net -160 ml       Physical Exam   Constitutional: He is oriented to person, place, and time. He appears well-developed and well-nourished.   HENT:   Head: Normocephalic and atraumatic.   Eyes: Pupils are equal, round, and reactive to light. Conjunctivae are normal.   Neck: Neck supple. JVD present. No tracheal deviation present. No thyromegaly present.   Cardiovascular: Normal rate. A regularly irregular rhythm present. PMI is displaced.   Murmur heard.   Systolic murmur is present with a grade of 2/6.  Pulmonary/Chest: Effort normal. He has decreased breath sounds. He has rales in the right lower field and the left lower field.   Abdominal: Soft.   Musculoskeletal: Normal range of motion. He exhibits edema.   Lymphadenopathy:     He has no cervical adenopathy.   Neurological: He is alert and oriented to person, place, and time.   Skin: Skin is warm and dry.   Nursing note and  vitals reviewed.      Vents:  Oxygen Concentration (%): 32 (20 1232)    Lines/Drains/Airways     Peripheral Intravenous Line                 Peripheral IV - Single Lumen 20 1053 20 G Left Antecubital 1 day                Significant Labs:    CBC/Anemia Profile:  Recent Labs   Lab 20  1053   WBC 13.23*   HGB 11.3*   HCT 35.9*      MCV 95   RDW 17.4*        Chemistries:  Recent Labs   Lab 20  1053 20  0448    146*   K 4.6 4.6    104   CO2 27 32*   BUN 50* 48*   CREATININE 1.7* 1.5*   CALCIUM 9.1 9.0   ALBUMIN 2.8*  --    PROT 6.3  --    BILITOT 0.8  --    ALKPHOS 391*  --    *  --    AST 95*  --    MG  --  2.5       ABGs: No results for input(s): PH, PCO2, HCO3, POCSATURATED, BE in the last 48 hours.  BMP:   Recent Labs   Lab 20  0448   GLU 79   *   K 4.6      CO2 32*   BUN 48*   CREATININE 1.5*   CALCIUM 9.0   MG 2.5     CMP:   Recent Labs   Lab 20  1053 20  0448    146*   K 4.6 4.6    104   CO2 27 32*   GLU 89 79   BUN 50* 48*   CREATININE 1.7* 1.5*   CALCIUM 9.1 9.0   PROT 6.3  --    ALBUMIN 2.8*  --    BILITOT 0.8  --    ALKPHOS 391*  --    AST 95*  --    *  --    ANIONGAP 10 10   EGFRNONAA 39* 46*     All pertinent labs within the past 24 hours have been reviewed.    Significant Imaging:   I have reviewed all pertinent imaging results/findings within the past 24 hours.  I have reviewed and interpreted all pertinent imaging results/findings within the past 24 hours.     Radiology Result      Name:   :  Patient MRN:   Noble Tripp [id number]   Account Number: Room & Bed Accession Number:   [id number] [facility] TELE [id number]   Authorizing Physician: Patient Class: Diagnosis:   Mckayla Presley OP- Observation     Procedure:  Exam Date: Reason for Exam:   CT Chest Without Contrast 2020 Shortness of breath             RESULTS:     EXAMINATION:  CT CHEST WITHOUT CONTRAST     CLINICAL  HISTORY:  Shortness of breath;     TECHNIQUE:  CT scan of the chest was obtained without administration of contrast.    Coronal and sagittal reconstructions were performed on these images.     COMPARISON:  2/5/2019     FINDINGS:  Interval development of masslike area of consolidation left suprahilar   region measuring 3 x 2.4 cm with patchy areas of alveolar consolidation   extending to the apex and interstitial thickening.  Peripheral areas of   consolidation within each lower lobe as well as the lingula.  Granuloma in   the right lower lobe.  No effusion or pneumothorax.  Airway is widely   patent.  Interval development of mediastinal adenopathy.  Enlarged   prevascular lymph node measures 2.0 x 2.0 cm enlarged left paratracheal   lymph node measures 2.2 x 1.7 cm.  There and innumerable mild of small   low-density lesions throughout the liver concerning for metastasis.    Remaining visualized abdominal organs are unremarkable.  Osseous   structures are intact.  No suspicious osseous lesions.  Mild compression   fracture and lytic changes of the superior endplate concerning for   pathologic fracture.     Impression:     1.  Findings concerning for suprahilar mass with lymphangitic   spread/postobstructive pneumonia left upper lobe.     2.  Metastatic mediastinal lymphadenopathy as well as innumerable number   of metastatic lesions throughout the partially visualized enlarged liver     3.  Findings concerning for pathologic T6 fracture with mild compression.    No retropulsion.     4.  Lower lobe and lingular infiltrates.     All CT scans at this facility are performed  using dose modulation   techniques as appropriate to performed exam including the following:    automated exposure control; adjustment of mA and/or kV according to the   patients size (this includes techniques or standardized protocols for   targeted exams where dose is matched to indication/reason for exam: i.e.   extremities or head);  iterative  reconstruction technique.        Electronically signed by:     Kye Cunningham Jr., MD  Date:                                    05/16/2020  Time:                                   18:18                    Signed By:  Kye Cunningham Jr., MD on 5/16/2020  6:18 PM              Echo Color Flow Doppler? Yes  · Left ventricular systolic function. The estimated ejection fraction is   60%.  · Grade I (mild) left ventricular diastolic dysfunction consistent with   impaired relaxation.  · Normal right ventricular systolic function.             ABG  No results for input(s): PH, PO2, PCO2, HCO3, BE in the last 168 hours.  Assessment/Plan:     * Acute on chronic respiratory failure with hypoxia  Combination of COPD and congestive heart failure.  Continue bronchodilators and diuretics.    Acute on chronic diastolic congestive heart failure  Continue beta-blockers and diuresis.    Mass of left lung -  new compared to prior evaluation in February 2019 5/17/2020 overall appearance is suspicious for a left hilar lung mass/lung cancer.  Abnormalities on CT liver suggest possible metastasis.  Patient does have a history of abnormalities on the liver in the past due to a cyst.  Patient has reconsidered his options and at this time once more information concerning a liver biopsy.    Oropharyngeal aspiration  Aspiration precautions, IV antibiotics     His functional status appears to be fair as the patient is presently living alone in caring for himself with assistance by his children.  Overall complicated situation with probable new lung cancer and abnormalities on CT of the liver suggestive of metastasis.  Patient wants some more information concerning diagnostic workup and is open to having a liver biopsy performed.    Recommendation:  Aggressive treatment of heart failure and COPD, hold aspirin Plavix and other long-term anticoagulants.  Consider performing biopsy of liver lesions in Radiology later in week.        Thank you for  your consult. I will follow-up with patient. Please contact us if you have any additional questions.     Sanket Wright MD  Pulmonology  [facility]

## 2020-05-17 NOTE — H&P (VIEW-ONLY)
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 CT of chest noted a suprahilar mass with metastatic disease to the liver. The patient has chosen to enter hospice but is requesting a PEG for nutrition.     Patient had previously been seen by Dr. Hill in 2018 for KIM and dysphagia. EGD 05/2018 notable for gastritis and GERD and salmon colored mucosa in the esophagus. Repeat EGD 08/2018 showed candidal esophagitis and was treated. Last colonoscopy in 05/2017 was notable for hemorrhoids and diverticulosis. Colonoscopy prior in 02/2014 was notable for adenomatous colon polyps.    Patient last used tobacco over 13 years ago. CT chest reviewed with Dr. Velez of Radiology last night. There is displacement of the esophagus by the suprahilar mass.    Past Medical History:   Diagnosis Date    Adrenal adenoma     david;nl fxn w/u;tran 11/18    Aspiration pneumonia 10/15    puree/honey    Benign prostate hyperplasia     CAD (coronary artery disease)     dr padilla    Chronic pain     dr santos    Chronic systolic congestive heart failure 10/17/2019    Cirrhosis     w 5/20 hospital    COPD (chronic obstructive pulmonary disease)     papi    Ex-smoker     11/13    Hyperlipidemia     Ischemic cardiomyopathy 11/22/2019  "   Oropharyngeal dysphagia 5/17/2020    JUANITO on CPAP     cpap    Osteopenia 1/15 tran 1/18    Pneumonia involving bilateral lungs     PSVT (paroxysmal supraventricular tachycardia)     PVD (peripheral vascular disease)     noobs 07 khuri    Pyriform sinus cancer     dr ponce radiation 1/2-14 dr king perales    Squamous cell carcinoma of skin     roberto    Tongue cancer     "superficial" removed 11/14    Vocal cord cancer 2011    Xerostomia     radiation       Past Surgical History:   Procedure Laterality Date    APPENDECTOMY      BRONCHOSCOPY Bilateral 2/5/2019    Procedure: Bronchoscopy;  Surgeon: Santos Cardozo MD;  Location: [facility];  Service: Endoscopy;  Laterality: Bilateral;    COLONOSCOPY N/A 5/1/2017    Procedure: Due for screening colonoscopy;  Surgeon: Anushka Yoder MD;  Location: [facility];  Service: Endoscopy;  Laterality: N/A;    ESOPHAGOGASTRODUODENOSCOPY N/A 5/29/2018    Procedure: ESOPHAGOGASTRODUODENOSCOPY (EGD);  Surgeon: Audie Hill III, MD;  Location: [facility];  Service: Endoscopy;  Laterality: N/A;    ESOPHAGOGASTRODUODENOSCOPY N/A 8/29/2018    Procedure: ESOPHAGOGASTRODUODENOSCOPY (EGD);  Surgeon: Audie Hill III, MD;  Location: [facility];  Service: Endoscopy;  Laterality: N/A;    LEFT HEART CATHETERIZATION Left 5/7/2020    Procedure: CATHETERIZATION, HEART, LEFT;  Surgeon: Abel Beltran MD;  Location: [facility] C[facility] LAB;  Service: Cardiology;  Laterality: Left;  iodine allergy    PERCUTANEOUS TRANSLUMINAL BALLOON ANGIOPLASTY OF CORONARY ARTERY Right 12/9/2019    Procedure: PTCA, USING BALLOON/ IABP or Impella multivessel angioplasty/poss stent;  Surgeon: Abel Beltran MD;  Location: [facility] C[facility];  Service: Cardiology;  Laterality: Right;    PERCUTANEOUS TRANSLUMINAL BALLOON ANGIOPLASTY OF CORONARY ARTERY  5/7/2020    Procedure: Angioplasty-coronary;  Surgeon: Abel Beltran MD;  Location: [facility] CATH LAB;  Service: Cardiology;;    THORACENTESIS Left " 2018    Procedure: Thoracentesis;  Surgeon: Santos Cardozo MD;  Location: [facility] ENDO;  Service: Endoscopy;  Laterality: Left;    THORACENTESIS Right 2019    Procedure: Thoracentesis;  Surgeon: Santos Cardozo MD;  Location: [facility] ENDO;  Service: Endoscopy;  Laterality: Right;    tongue cancer excision      dr vitale    VOCAL CORD LATERALIZATION, ENDOSCOPIC APPROACH W/ MLB      kris       Review of patient's allergies indicates:   Allergen Reactions    Contrast media Hives and Itching    Iodinated contrast media Hives and Itching    Iodine Hives and Itching    Pravastatin      Other reaction(s): fatigue  Other reaction(s): fatigue    Rosuvastatin      Other reaction(s): fatigue  Other reaction(s): fatigue    Simvastatin      Other reaction(s): fatigue  Other reaction(s): fatigue    Statins-hmg-coa reductase inhibitors Other (See Comments)     Fatigue     Family History     Problem Relation (Age of Onset)    Lung cancer Father, Brother    No Known Problems Mother        Tobacco Use    Smoking status: Former Smoker     Packs/day: 0.50     Years: 55.00     Pack years: 27.50     Types: Cigarettes     Last attempt to quit: 10/1/2019     Years since quittin.6    Smokeless tobacco: Never Used    Tobacco comment: 6-7 cigs/day   Substance and Sexual Activity    Alcohol use: Not Currently    Drug use: No    Sexual activity: Not Currently     Review of Systems  Objective:     Vital Signs (Most Recent):  Temp: 97.7 °F (36.5 °C) (20 0749)  Pulse: 75 (20 1232)  Resp: 18 (20 1232)  BP: 121/60 (20 0749)  SpO2: (!) 93 % (20 1232) Vital Signs (24h Range):  Temp:  [97.4 °F (36.3 °C)-98 °F (36.7 °C)] 97.7 °F (36.5 °C)  Pulse:  [60-80] 75  Resp:  [18-23] 18  SpO2:  [89 %-97 %] 93 %  BP: (112-147)/(56-67) 121/60     Weight: 58.7 kg (129 lb 6.6 oz) (20 0600)  Body mass index is 19.68 kg/m².      Intake/Output Summary (Last 24 hours) at 2020 1234  Last data  filed at 5/17/2020 0600  Gross per 24 hour   Intake 240 ml   Output 700 ml   Net -460 ml       Lines/Drains/Airways     Peripheral Intravenous Line                 Peripheral IV - Single Lumen 05/16/20 1053 20 G Left Antecubital 1 day                Physical Exam    Significant Labs:  Recent Lab Results       05/17/20  1228   05/17/20  0448   05/16/20  2252   05/16/20  1711   05/16/20  1622        Anion Gap   10           Ao root annulus         2.97     Ao peak lulu         1.64     Ao VTI         29.60     AV valve area         2.61     AV mean gradient         4     AV index (prosthetic)         0.57     AV peak gradient         11     AV Velocity Ratio         0.51     BSA         1.66     BUN, Bld   48           Calcium   9.0           Chloride   104           CO2   32           Creatinine   1.5           Left Ventricle Relative Wall Thickness         0.56     D-Dimer 6.22  Comment:  The quantitative D-dimer assay should be used as an aid in   the diagnosis of deep vein thrombosis and pulmonary embolism  in patients with the appropriate presentation and clinical  history. The upper limit of the reference interval and the clinical   cut off   point are identical. Causes of a positive (>0.50 mg/L FEU) D-Dimer   test  include, but are not limited to: DVT, PE, DIC, thrombolytic   therapy, anticoagulant therapy, recent surgery, trauma, or   pregnancy, disseminated malignancy, aortic aneurysm, cirrhosis,  and severe infection. False negative results may occur in   patients with distal DVT.               E/A ratio         0.66     E/E' ratio         5.60     eGFR if    53           eGFR if non    46  Comment:  Calculation used to obtain the estimated glomerular filtration  rate (eGFR) is the CKD-EPI equation.              E wave decelartion time         269.74     FS         53     Glucose   79           IVS         0.65     LA WIDTH         3.40     Left Atrium Major Axis         4.17      Left Atrium Minor Axis         3.29     LA size         2.80     LA volume         29.76     LA Volume Index         17.7     LVOT area         4.6     LV LATERAL E/E' RATIO         4.67     LV SEPTAL E/E' RATIO         7.00     LV Diastolic Volume         85.78     LV Diastolic Volume Index         50.98     LVIDD         4.36     LVIDS         2.07     LV mass         133.79     LV Mass Index         80     LVOT diameter         2.42     LVOT peak billy         0.83     LVOT stroke volume         77.23     LVOT peak VTI         16.80     LV Systolic Volume         13.83     LV Systolic Volume Index         8.2     Magnesium   2.5           Mean e'         0.08     MV Peak A Billy         0.64     MV Peak E Billy         0.42     Potassium   4.6           PW         1.23     RA Major Axis         3.38     Sinus         3.24     Sodium   146           STJ         2.98     TAPSE         2.10     TDI SEPTAL         0.06     TDI LATERAL         0.09     Troponin I     0.038  Comment:  The reference interval for Troponin I represents the 99th percentile   cutoff   for our facility and is consistent with 3rd generation assay   performance.   0.034  Comment:  The reference interval for Troponin I represents the 99th percentile   cutoff   for our facility and is consistent with 3rd generation assay   performance.                              Significant Imaging:  Imaging results within the past 24 hours have been reviewed.    Assessment/Plan:     Active Diagnoses:    Diagnosis Date Noted POA    PRINCIPAL PROBLEM:  Acute on chronic respiratory failure with hypoxia [J96.21] 05/16/2020 Yes    Oropharyngeal dysphagia [R13.12] 05/17/2020 Yes     Chronic    Other constipation [K59.09] 05/17/2020 Yes     Chronic    NEYMAR (acute kidney injury) [N17.9] 05/16/2020 Yes    Cirrhosis of liver without ascites [K74.60]  Unknown    Elevated troponin [R79.89] 05/10/2020 Yes    Chronic diastolic congestive heart failure [I50.32]  12/09/2019 Yes     Chronic    Coronary artery disease involving left main coronary artery [I25.10] 10/23/2019 Yes    Stage 3 severe COPD by GOLD classification [J44.9] 02/02/2016 Yes    Oropharyngeal aspiration [T17.208A] 10/11/2015 Yes    Pneumonia involving bilateral lungs [J18.9]  Yes    JUANITO on CPAP [G47.33, Z99.89] 10/07/2014 Not Applicable     Chronic    Hypertension [I10]  Yes      Problems Resolved During this Admission:       A: 74 y.o male with suspected lung cancer with metastatic disease    Recommendations:  1. EGD with MAC. Will assess if PEG can safely be placed endoscopically    The risks, benefits and alternatives of the procedure were discussed with the patient in detail. This discussion was had in the presence of endoscopy staff. The risks include, risks of adverse reaction to sedation requiring the use of reversal agents, bleeding requiring blood transfusion, perforation requiring surgical intervention and technical failure. Other risks include aspiration leading to respiratory distress and respiratory failure resulting in endotracheal intubation and mechanical ventilation including death. If anesthesia is being utilized for this procedure, it is up to the anesthesiologist to determine airway safety including elective endotracheal intubation. Questions were answered, they agree to proceed. There was no language barriers.       Thank you for your consult. I will follow-up with patient. Please contact us if you have any additional questions.    Debbie Soriano MD  Gastroenterology  [facility]

## 2020-05-17 NOTE — CONSULTS
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 COVID-19 test was negative    CT chest without contrast showed suprahilar mass measuring 3 x 2.4 cm with patchy area of alveolar consolidation extending to the apex and interstitial thickening.  There was also interval development of her mediastinal adenopathy.  There were enlarged prevascular lymph nodes measuring 2 x 2 cm on the left paratracheal lymph node.  There were also innumerable mild and small density lesions throughout the liver concerning for metastasis.  Given the above findings, Oncology was consulted for evaluation and recommendation.    Patient denies current smoking history, quit smoking about 13 years ago.  No extensive family history for malignancy per patient.  At the time of interview he complains of severe constipation but denies unintentional weight loss, fever, night sweats, nausea or vomiting, chest pain, dysuria.  He also denies hemoptysis, hematemesis, hematochezia, melena or hematuria.    Continuous Infusions:  Scheduled Meds:   carvediloL  3.125 mg Oral BID    ceFEPime (MAXIPIME) IVPB  2 g Intravenous Q12H    furosemide (LASIX) IV  40 mg Intravenous BID    heparin (porcine)  5,000 Units Subcutaneous Q8H    ipratropium  0.5 mg Nebulization Q6H    isosorbide mononitrate  30 mg Oral Daily    pantoprazole  40 mg Oral Daily    polyethylene glycol  17 g Oral Daily     PRN Meds:albuterol-ipratropium, bisacodyL, ondansetron, oxyCODONE-acetaminophen, sodium chloride 0.9%    Past Medical History:   Diagnosis Date     "Adrenal adenoma     david;nl fxn w/u;tran 11/18    Aspiration pneumonia 10/15    puree/honey    Benign prostate hyperplasia     CAD (coronary artery disease)     dr padilla    Chronic pain     dr santos    Chronic systolic congestive heart failure 10/17/2019    Cirrhosis     w 5/20 hospital    COPD (chronic obstructive pulmonary disease)     papi    Ex-smoker     11/13    Hyperlipidemia     Ischemic cardiomyopathy 11/22/2019    Oropharyngeal dysphagia 5/17/2020    JUANITO on CPAP     cpap    Osteopenia 1/15 tran 1/18    Pneumonia involving bilateral lungs     PSVT (paroxysmal supraventricular tachycardia)     PVD (peripheral vascular disease)     noobs 07 elizabethuri    Pyriform sinus cancer     dr ponce radiation 1/2-14 dr king perales    Squamous cell carcinoma of skin     roberto    Tongue cancer     "superficial" removed 11/14    Vocal cord cancer 2011    Xerostomia     radiation     Past Surgical History:   Procedure Laterality Date    APPENDECTOMY      BRONCHOSCOPY Bilateral 2/5/2019    Procedure: Bronchoscopy;  Surgeon: Santos Cardozo MD;  Location: [facility];  Service: Endoscopy;  Laterality: Bilateral;    COLONOSCOPY N/A 5/1/2017    Procedure: Due for screening colonoscopy;  Surgeon: Anushka Yoder MD;  Location: [facility];  Service: Endoscopy;  Laterality: N/A;    ESOPHAGOGASTRODUODENOSCOPY N/A 5/29/2018    Procedure: ESOPHAGOGASTRODUODENOSCOPY (EGD);  Surgeon: Audie Hill III, MD;  Location: [facility];  Service: Endoscopy;  Laterality: N/A;    ESOPHAGOGASTRODUODENOSCOPY N/A 8/29/2018    Procedure: ESOPHAGOGASTRODUODENOSCOPY (EGD);  Surgeon: Audie Hill III, MD;  Location: [facility];  Service: Endoscopy;  Laterality: N/A;    LEFT HEART CATHETERIZATION Left 5/7/2020    Procedure: CATHETERIZATION, HEART, LEFT;  Surgeon: Abel Beltran MD;  Location: [facility] CATH LAB;  Service: Cardiology;  Laterality: Left;  iodine allergy    PERCUTANEOUS TRANSLUMINAL BALLOON ANGIOPLASTY " OF CORONARY ARTERY Right 2019    Procedure: PTCA, USING BALLOON/ IABP or Impella multivessel angioplasty/poss stent;  Surgeon: Abel Beltran MD;  Location: [facility] CATH LAB;  Service: Cardiology;  Laterality: Right;    PERCUTANEOUS TRANSLUMINAL BALLOON ANGIOPLASTY OF CORONARY ARTERY  2020    Procedure: Angioplasty-coronary;  Surgeon: Abel Beltran MD;  Location: [facility] CATH LAB;  Service: Cardiology;;    THORACENTESIS Left 2018    Procedure: Thoracentesis;  Surgeon: Santos Cardozo MD;  Location: [facility] ENDO;  Service: Endoscopy;  Laterality: Left;    THORACENTESIS Right 2019    Procedure: Thoracentesis;  Surgeon: Santos Cardozo MD;  Location: [facility] ENDO;  Service: Endoscopy;  Laterality: Right;    tongue cancer excision      dr vitale    VOCAL CORD LATERALIZATION, ENDOSCOPIC APPROACH W/ MLB      kris     Family History   Problem Relation Age of Onset    Lung cancer Father         + Smoker    No Known Problems Mother     Lung cancer Brother         + Smoker     Social History     Tobacco Use    Smoking status: Former Smoker     Packs/day: 0.50     Years: 55.00     Pack years: 27.50     Types: Cigarettes     Last attempt to quit: 10/1/2019     Years since quittin.6    Smokeless tobacco: Never Used    Tobacco comment: 6-7 cigs/day   Substance Use Topics    Alcohol use: Not Currently    Drug use: No       Review of patient's allergies indicates:   Allergen Reactions    Contrast media Hives and Itching    Iodinated contrast media Hives and Itching    Iodine Hives and Itching    Pravastatin      Other reaction(s): fatigue  Other reaction(s): fatigue    Rosuvastatin      Other reaction(s): fatigue  Other reaction(s): fatigue    Simvastatin      Other reaction(s): fatigue  Other reaction(s): fatigue    Statins-hmg-coa reductase inhibitors Other (See Comments)     Fatigue        Review of Systems:  Review of Systems   Constitutional: Positive for activity change,  appetite change and fatigue. Negative for chills, fever and unexpected weight change.   HENT: Negative for hearing loss, mouth sores, nosebleeds, sore throat, tinnitus, trouble swallowing and voice change.    Eyes: Negative for visual disturbance.   Respiratory: Positive for cough and shortness of breath. Negative for chest tightness and wheezing.    Cardiovascular: Negative for chest pain, palpitations and leg swelling.   Gastrointestinal: Positive for constipation. Negative for abdominal pain, anal bleeding, blood in stool, diarrhea, nausea and vomiting.   Genitourinary: Negative for frequency, hematuria and testicular pain.   Musculoskeletal: Negative for arthralgias, back pain, gait problem and neck pain.   Skin: Negative for color change, pallor, rash and wound.   Allergic/Immunologic: Negative for immunocompromised state.   Neurological: Positive for weakness. Negative for seizures, syncope, numbness and headaches.   Hematological: Negative for adenopathy. Does not bruise/bleed easily.   Psychiatric/Behavioral: Negative for agitation, confusion, decreased concentration, hallucinations and sleep disturbance. The patient is not nervous/anxious.            OBJECTIVE:     Vital Signs (Most Recent)  Temp: 97.7 °F (36.5 °C) (05/17/20 0749)  Pulse: 72 (05/17/20 0939)  Resp: 18 (05/17/20 0749)  BP: 121/60 (05/17/20 0749)  SpO2: (!) 92 % (05/17/20 0749)    Pain Assessment: No pain reported at this time    Vital Signs Range (Last 24H):  Temp:  [97.4 °F (36.3 °C)-98 °F (36.7 °C)]   Pulse:  [60-80]   Resp:  [18-23]   BP: (112-147)/(56-67)   SpO2:  [89 %-97 %]     Physical Exam:  Physical Exam   Constitutional: He is oriented to person, place, and time. He appears well-developed and well-nourished. He appears ill. He appears distressed.   HENT:   Head: Normocephalic and atraumatic.   Right Ear: External ear normal.   Left Ear: External ear normal.   Mouth/Throat: No oropharyngeal exudate.   Eyes: Pupils are equal, round,  and reactive to light. Conjunctivae are normal. Right eye exhibits no discharge. Left eye exhibits no discharge. No scleral icterus.   Neck: Normal range of motion. Neck supple. No thyromegaly present.   Cardiovascular: Normal rate, regular rhythm and normal heart sounds.   No murmur heard.  Pulmonary/Chest: Effort normal. No respiratory distress. He has no wheezes. He exhibits no tenderness.   Diminished breath sounds bilaterally   Abdominal: Soft. Bowel sounds are normal. He exhibits no distension and no mass. There is no tenderness. There is no rebound.   Musculoskeletal: Normal range of motion. He exhibits no edema or tenderness.   Lymphadenopathy:     He has no cervical adenopathy.        Right cervical: No superficial cervical adenopathy present.       Left cervical: No superficial cervical adenopathy present.        Right axillary: No pectoral adenopathy present.        Left axillary: No pectoral adenopathy present.       Right: No inguinal and no supraclavicular adenopathy present.        Left: No inguinal and no supraclavicular adenopathy present.   Neurological: He is alert and oriented to person, place, and time. No cranial nerve deficit or sensory deficit.   Skin: Skin is warm and dry. Capillary refill takes 2 to 3 seconds. No rash noted. No erythema. No pallor.   Psychiatric: He has a normal mood and affect. His behavior is normal. Judgment normal.           Laboratory:  CBC with Differential:  No results for input(s): WBC, NEUTROPCT, LYMPH, MONOPCT, EOSPCT, BASOPHIL, RBC, HCT, HGB, MCV, MCH, RDW, PLT, MPV in the last 24 hours.    Invalid input(s): MCMC  CMP:  Recent Labs   Lab 05/17/20  0448   GLU 79   CALCIUM 9.0   *   K 4.6   CO2 32*      BUN 48*   CREATININE 1.5*     BMP:   Recent Labs   Lab 05/17/20  0448   GLU 79   CALCIUM 9.0   *   K 4.6   CO2 32*      BUN 48*   CREATININE 1.5*     LFTs: No results for input(s): ALT, AST, ALKPHOS, BILITOT, PROT, ALBUMIN in the last 24  hours.  Haptoglobin: No results for input(s): HAPTOGLOBIN in the last 24 hours.  Tumor Markers: No results for input(s): PSA, CEA, , AFPTM, VC7210,  in the last 24 hours.    Invalid input(s): ALGTM  Immunology: No results for input(s): SPEP, MEHRAN, KENA, FREELAMBDALI in the last 24 hours.  Coagulation: No results for input(s): PT, INR, APTT in the last 24 hours.  Specimen (12h ago, onward)    None        Microbiology Results (last 7 days)     Procedure Component Value Units Date/Time    Blood culture [[id number]] Collected:  05/17/20 0946    Order Status:  Sent Specimen:  Blood Updated:  05/17/20 0946          Diagnostic Results:      ASSESSMENT/PLAN:     Patient Active Problem List   Diagnosis    Thoracic or lumbosacral neuritis or radiculitis, unspecified    Neuropathy    Benign prostate hyperplasia    Hyperlipidemia    Osteopenia    Hypertension    Chronic pain    History of head and neck cancer    Personal history of colonic polyps    JUANITO on CPAP    Cancer of tongue    Adrenal benign neoplasm    Acute hypoxemic respiratory failure    Hypomagnesemia    Pneumonia involving bilateral lungs    Oropharyngeal aspiration    Stage 3 severe COPD by GOLD classification    Coronary artery disease    Allergy to statin medication    Statin intolerance    History of iron deficiency    Chronic anemia    Pleural effusion, bilateral    Esophagitis    Lung mass    Carotid artery disease    Chronic systolic congestive heart failure    Weight decreasing    Cardiac left ventricular ejection fraction 21-40 percent    Coronary artery disease involving left main coronary artery    Bradycardia    Hyperkalemia    Mastoiditis of both sides    Anxiety    Ischemic cardiomyopathy    Chronic diastolic congestive heart failure    PVD (peripheral vascular disease)    Dyspnea on exertion    Chest pain, rule out acute myocardial infarction    Unstable angina    Hypoxia    Elevated troponin     Elevated LFTs    Cirrhosis of liver without ascites    Acute on chronic respiratory failure with hypoxia    NEYMAR (acute kidney injury)    Oropharyngeal dysphagia    Other constipation         Plan    # diffuse lung and hepatic lesions:  -likely malignancy however the primary is unknown.  -recommended bronchoscopy plus liver biopsy.  -following conversation with patient and family, they opted not to pursue any more diagnostics at this time.  Instead he will be set up for home hospice.  -arrangement for PEG placement is being made through hospitalist service.  -patient knows to contact us if anything changes.  -agree with the Augmentin antibiotics for possible pneumonia in the lung based on CT.  Noted persistent leukocytosis with WBC at 13,000.    # normocytic anemia:  -noted hemoglobin at 11.3 grams/deciliter with hematocrit of 35.9.  That this is likely due to chronic inflammation.  No evidence of active bleed.  -continue to monitor.  No need for further workup.    # constipation, COPD exacerbation:  Management by hospitalist team.    Thank you for allowing us to participate in the care of this patient.  Contact us with any question or concern.    Alexei Rodríguez MD

## 2020-05-17 NOTE — SUBJECTIVE & OBJECTIVE
Interval History: 05/17/2020 CT chest: 1.  Findings concerning for suprahilar mass with lymphangitic spread/postobstructive pneumonia left upper lobe. 2.  Metastatic mediastinal lymphadenopathy as well as innumerable number of metastatic lesions throughout the partially visualized enlarged liver. 3.  Findings concerning for pathologic T6 fracture with mild compression.  No retropulsion. 4.  Lower lobe and lingular infiltrates. I discussed his case with pulmonology and oncology. I reviewed his CT results with him. We had LENGTHY discussion about the apparent diagnosis, differential diagnosis and risks and benefits of treatment options. He verbalized understanding. He has decided he does not want bronchoscopy or liver biopsy because he would not be willing to take chemotherapy or radiation therapy, regardless of the pathology results. I discussed advanced directives with him, and he wants to be DNR. I discussed treatment options, including palliative treatment options and hospice. He has decided that he wants to go home with hospice, but he does want PEG tube prior to discharge, which I arranged with GI to be done in the morning. Social work consult for home hospice ordered. Anticipate discharge home with hospice tomorrow after PEG tube placement.    Review of Systems   Constitutional: Positive for appetite change. Negative for chills and fever.   Respiratory: Positive for cough and shortness of breath. Negative for chest tightness and wheezing.    Cardiovascular: Negative for chest pain.     Objective:     Vital Signs (Most Recent):  Temp: 97.7 °F (36.5 °C) (05/17/20 0749)  Pulse: 73 (05/17/20 1330)  Resp: 18 (05/17/20 1232)  BP: 121/60 (05/17/20 0749)  SpO2: (!) 92 % (05/17/20 1232) Vital Signs (24h Range):  Temp:  [97.4 °F (36.3 °C)-98 °F (36.7 °C)] 97.7 °F (36.5 °C)  Pulse:  [60-76] 73  Resp:  [18-20] 18  SpO2:  [89 %-97 %] 92 %  BP: (112-126)/(56-61) 121/60     Weight: 58.7 kg (129 lb 6.6 oz)  Body mass index is  19.68 kg/m².    Intake/Output Summary (Last 24 hours) at 5/17/2020 1733  Last data filed at 5/17/2020 0600  Gross per 24 hour   Intake 240 ml   Output 400 ml   Net -160 ml      Physical Exam   Constitutional: He is oriented to person, place, and time. No distress.   HENT:   Mouth/Throat: Oropharynx is clear and moist.   Eyes: No scleral icterus.   Cardiovascular: Normal rate, regular rhythm and normal heart sounds.   Pulmonary/Chest: Effort normal. No respiratory distress. He has no wheezes. He has no rales.   Mild scattered rhonchi. Adequate air movement.   Abdominal: Soft. He exhibits no distension. There is no tenderness.   Musculoskeletal: He exhibits no tenderness. Edema:  mild bilatera ankle edema.   Neurological: He is alert and oriented to person, place, and time.   Skin: Skin is warm and dry. He is not diaphoretic.   Psychiatric: He has a normal mood and affect. His behavior is normal. Judgment and thought content normal.       Significant Labs: All pertinent labs within the past 24 hours have been reviewed.    Significant Imaging: I have reviewed all pertinent imaging results/findings within the past 24 hours.

## 2020-05-17 NOTE — ASSESSMENT & PLAN NOTE
Advance Care Planning   05/17/2020 12:25PM - DAVID Rojas MD    [facility]  I engaged the patient and family (son Alexander and daughter-in-law Pat) in a conversation about advance care planning and we specifically addressed what the goals of care would be moving forward, in light of the patient's change in clinical status, specifically apparent metastatic cancer.  We did specifically address the patient's likely prognosis, which is poor.  We explored the patient's values and preferences for future care.  The patient and family endorses that what is most important right now is to focus on spending time at home, avoiding the hospital, symptom/pain control and quality of life, even if it means sacrificing a little time    Accordingly, we have decided that the best plan to meet the patient's goals includes enrolling in hospice care.    I did explain the role for hospice care at this stage of the patient's illness, including its ability to help the patient live with the best quality of life possible.  We will be making a hospice referral.      Code Status  In light of the patients advanced and life limiting illness,I engaged the the patient and family (son Alexander and daughter-in-law Pat) in a conversation about the patient's preferences for care  at the very end of life. The patient wishes to have a natural, peaceful death.  Along those lines, the patient does not wish to have CPR or other invasive treatments performed when his heart and/or breathing stops. I communicated to the patient and family that a DNR order would be placed in his medical record to reflect this preference and DNR form was completed and will be scanned into EPIC.    I spent a total of 60 minutes engaging the patient in this advance care planning discussion.

## 2020-05-17 NOTE — ASSESSMENT & PLAN NOTE
05/17/2020  ASSESSMENT: Clinically stable. No angina reported.  PLAN:  Continue beta-blocker, aspirin, plavix, isisorbide mononitrate. Not on statin due to statin intolerance.

## 2020-05-17 NOTE — PROGRESS NOTES
[facility] Hospital Medicine  Progress Note    Patient Name: Noble Tripp  MRN: [id number]  Patient Class: IP- Inpatient   Admission Date: 5/16/2020  Length of Stay: 0 days  Attending Physician: DAVID Rojas MD  Primary Care Provider: Dwaine Davis MD        Subjective:     Principal Problem:Acute on chronic respiratory failure with hypoxia        HPI:  Noble Tripp is a 74 year old male with a PMHx of CAD, CHF, PSVT, PVD, JUANITO on CPAP, HLD, COPD and BPH who presented to the Emergency Department with c/o SOB on exertion. Pt reports SOB began last night. He reports having to increase his home O2 to 3.5LPM. Pt recently discharged from [facility] on [facility]/12/20 for same symptom complaint. CXR upon admit during that time showed mildly worsening patchy multifocal bilateral pulmonary infiltrates suspicious for atypical pneumonia. Hx of aspiration pna with swallowing difficulty sec to radiation treatment for head and neck cancer in 2011. Has declined PEG tube placement multiple times and refuses to follow puree diet with thickened liquids. He was treated with rocephin and levaquin. He was weaned down to 2LPM - qualified for home oxygen.   ED workup showed: WBC count 13.23K, Hgb/Hct 11.3/35.9, creatinine 1.7, troponin 0.044, , COVID negative. CXR stable compared to May 10. Pt received Lasix 60 mg IV x 1 in ED. Pt admitted for acute on chronic congestive heart failure.     Overview/Hospital Course:  05/17/2020 CT chest: 1.  Findings concerning for suprahilar mass with lymphangitic spread/postobstructive pneumonia left upper lobe. 2.  Metastatic mediastinal lymphadenopathy as well as innumerable number of metastatic lesions throughout the partially visualized enlarged liver. 3.  Findings concerning for pathologic T6 fracture with mild compression.  No retropulsion. 4.  Lower lobe and lingular infiltrates. I discussed his case with pulmonology and oncology. I reviewed his CT results with  him. We had LENGTHY discussion about the apparent diagnosis, differential diagnosis and risks and benefits of treatment options. He verbalized understanding. He has decided he does not want bronchoscopy or liver biopsy because he would not be willing to take chemotherapy or radiation therapy, regardless of the pathology results. I discussed advanced directives with him, and he wants to be DNR. I discussed treatment options, including palliative treatment options and hospice. He has decided that he wants to go home with hospice, but he does want PEG tube prior to discharge, which I arranged with GI to be done in the morning. Social work consult for home hospice ordered. Anticipate discharge home with hospice tomorrow after PEG tube placement.    Interval History: 05/17/2020 CT chest: 1.  Findings concerning for suprahilar mass with lymphangitic spread/postobstructive pneumonia left upper lobe. 2.  Metastatic mediastinal lymphadenopathy as well as innumerable number of metastatic lesions throughout the partially visualized enlarged liver. 3.  Findings concerning for pathologic T6 fracture with mild compression.  No retropulsion. 4.  Lower lobe and lingular infiltrates. I discussed his case with pulmonology and oncology. I reviewed his CT results with him. We had LENGTHY discussion about the apparent diagnosis, differential diagnosis and risks and benefits of treatment options. He verbalized understanding. He has decided he does not want bronchoscopy or liver biopsy because he would not be willing to take chemotherapy or radiation therapy, regardless of the pathology results. I discussed advanced directives with him, and he wants to be DNR. I discussed treatment options, including palliative treatment options and hospice. He has decided that he wants to go home with hospice, but he does want PEG tube prior to discharge, which I arranged with GI to be done in the morning. Social work consult for home hospice ordered.  Anticipate discharge home with hospice tomorrow after PEG tube placement.    Review of Systems   Constitutional: Positive for appetite change. Negative for chills and fever.   Respiratory: Positive for cough and shortness of breath. Negative for chest tightness and wheezing.    Cardiovascular: Negative for chest pain.     Objective:     Vital Signs (Most Recent):  Temp: 97.7 °F (36.5 °C) (05/17/20 0749)  Pulse: 73 (05/17/20 1330)  Resp: 18 (05/17/20 1232)  BP: 121/60 (05/17/20 0749)  SpO2: (!) 92 % (05/17/20 1232) Vital Signs (24h Range):  Temp:  [97.4 °F (36.3 °C)-98 °F (36.7 °C)] 97.7 °F (36.5 °C)  Pulse:  [60-76] 73  Resp:  [18-20] 18  SpO2:  [89 %-97 %] 92 %  BP: (112-126)/(56-61) 121/60     Weight: 58.7 kg (129 lb 6.6 oz)  Body mass index is 19.68 kg/m².    Intake/Output Summary (Last 24 hours) at 5/17/2020 1733  Last data filed at 5/17/2020 0600  Gross per 24 hour   Intake 240 ml   Output 400 ml   Net -160 ml      Physical Exam   Constitutional: He is oriented to person, place, and time. No distress.   HENT:   Mouth/Throat: Oropharynx is clear and moist.   Eyes: No scleral icterus.   Cardiovascular: Normal rate, regular rhythm and normal heart sounds.   Pulmonary/Chest: Effort normal. No respiratory distress. He has no wheezes. He has no rales.   Mild scattered rhonchi. Adequate air movement.   Abdominal: Soft. He exhibits no distension. There is no tenderness.   Musculoskeletal: He exhibits no tenderness. Edema:  mild bilatera ankle edema.   Neurological: He is alert and oriented to person, place, and time.   Skin: Skin is warm and dry. He is not diaphoretic.   Psychiatric: He has a normal mood and affect. His behavior is normal. Judgment and thought content normal.       Significant Labs: All pertinent labs within the past 24 hours have been reviewed.    Significant Imaging: I have reviewed all pertinent imaging results/findings within the past 24 hours.      Assessment/Plan:      * Acute on chronic  respiratory failure with hypoxia  05/17/2020 He has Acute on Chronic Hypoxic respiratory failure. ETIOLOGY = Pneumonia, COPD. He WAS previously on oxygen at home. ASSESSMENT: Modestly improved. D-dimer elevated, but he declined PE evaluation, explaining that he would not want anticoagulation. Calves are non-tender and reasonably symmetric. PLAN: Treat pneumonia and adjust management of supplemental oxygen and respiratory treatments as needed.  Flow (L/min): 3  Oxygen Concentration (%):  [32-40] 32  Temp:  [97.4 °F (36.3 °C)-98 °F (36.7 °C)] 97.7 °F (36.5 °C)  Pulse:  [60-76] 73  Resp:  [18-20] 18  SpO2:  [89 %-97 %] 92 %  BP: (112-126)/(56-61) 121/60       Recent Labs   Lab 05/17/20  1228   DDIMER 6.22*        Metastatic malignant neoplasm of unknown primary site  CT CHEST WITHOUT CONTRAST 05/16/2020   IMPRESSION:  1.  Findings concerning for suprahilar mass with lymphangitic spread/postobstructive pneumonia left upper lobe.  2.  Metastatic mediastinal lymphadenopathy as well as innumerable number of metastatic lesions throughout the partially visualized enlarged liver  3.  Findings concerning for pathologic T6 fracture with mild compression.  No retropulsion.  4.  Lower lobe and lingular infiltrates.  FINDINGS:  Interval development of masslike area of consolidation left suprahilar region measuring 3 x 2.4 cm with patchy areas of alveolar consolidation extending to the apex and interstitial thickening.  Peripheral areas of consolidation within each lower lobe as well as the lingula.  Granuloma in the right lower lobe.  No effusion or pneumothorax.  Airway is widely patent.  Interval development of mediastinal adenopathy.  Enlarged prevascular lymph node measures 2.0 x 2.0 cm enlarged left paratracheal lymph node measures 2.2 x 1.7 cm.  There and innumerable mild of small low-density lesions throughout the liver concerning for metastasis.  Remaining visualized abdominal organs are unremarkable.  Osseous structures  are intact.  No suspicious osseous lesions.  Mild compression fracture and lytic changes of the superior endplate concerning for pathologic fracture.    05/17/2020  ASSESSMENT: We had LENGTHY discussion about the apparent diagnosis, differential diagnosis and risks and benefits of treatment options. He verbalized understanding and is opting for palliative care with home hospice.   PLAN:  Social work consult for home hospice ordered.      DNR no code (do not resuscitate)  Advance Care Planning   05/17/2020 12:25PM - DAVID Rojas MD    [facility]  I engaged the patient and family (son Alexander and daughter-in-law Pat) in a conversation about advance care planning and we specifically addressed what the goals of care would be moving forward, in light of the patient's change in clinical status, specifically apparent metastatic cancer.  We did specifically address the patient's likely prognosis, which is poor.  We explored the patient's values and preferences for future care.  The patient and family endorses that what is most important right now is to focus on spending time at home, avoiding the hospital, symptom/pain control and quality of life, even if it means sacrificing a little time    Accordingly, we have decided that the best plan to meet the patient's goals includes enrolling in hospice care.    I did explain the role for hospice care at this stage of the patient's illness, including its ability to help the patient live with the best quality of life possible.  We will be making a hospice referral.      Code Status  In light of the patients advanced and life limiting illness,I engaged the the patient and family (radha Munoz and daughter-in-law Pat) in a conversation about the patient's preferences for care  at the very end of life. The patient wishes to have a natural, peaceful death.  Along those lines, the patient does not wish to have CPR or other invasive treatments performed when his heart and/or breathing  "stops. I communicated to the patient and family that a DNR order would be placed in his medical record to reflect this preference and DNR form was completed and will be scanned into EPIC.    I spent a total of 60 minutes engaging the patient in this advance care planning discussion.       Pneumonia involving bilateral lungs  05/17/2020  ASSESSMENT: Modestly improved. (Post-obstructive +/- aspiration.)   PLAN:  Plan to transition to Augmentin upon discharge home with hospice.      Stage 3 severe COPD by GOLD classification  05/17/2020  ASSESSMENT: Chronic, stable.   PLAN:  Continue present treatment plan.        Oropharyngeal dysphagia  05/17/2020  ASSESSMENT: After lengthy discussion of risks and benefits of treatment options, he agreed to proceed with PEG tube placement.   PLAN:  Barium swallow study and GI consult ordered.      Oropharyngeal aspiration  05/17/2020 Refer to Assessment & Plan note for "Oropharyngeal dysphagia."       Other constipation  05/17/2020  ASSESSMENT: He reported moderately severe constipation, but then later in the morning he has VERY LARGE bowel movement and he no longer felt constipated. No melena or BRBPR.   PLAN:  Miralax as needed.      NEYMAR (acute kidney injury)  05/17/2020  ASSESSMENT: Improved.   PLAN:  Better hydration encouraged. (More easily done after PEG placement.)   Recent Labs   Lab 05/11/20  0434 05/12/20  0456 05/16/20  1053 05/17/20  0448   BUN 40* 28* 50* 48*   CREATININE 1.2 0.9 1.7* 1.5*   ESTGFRAFRICA >60 >60 45* 53*   EGFRNONAA 60 >60 39* 46*    143 143 146*   K 4.3 4.1 4.6 4.6    106 106 104   CO2 26 27 27 32*   PHOS  --  1.9*  --   --    MG 2.7* 2.4  --  2.5   HGB 11.2* 11.2* 11.3*  --    HCT 35.8* 35.5* 35.9*  --        Cirrhosis of liver without ascites  LIVER ULTRASOUND (US ABDOMEN LIMITED)  IMPRESSION: Enlarged, cirrhotic liver.  Benign-appearing 2 cm hepatic cyst.  No sonographic evidence of focal solid liver mass. Gallbladder wall thickening " suspected to be related to 3rd spacing or reactive to the adjacent liver disease rather than acalculous cholecystitis. Borderline splenomegaly.  FINDINGS:  · Liver: Enlarged measuring 19.6 cm. Diffusely heterogeneous echotexture with surface lobularity compatible with cirrhosis.  There is a benign-appearing 2 cm cyst in the right hepatic lobe.  No suspicious focal solid liver masses identified.  Appropriate hepatopedal flow is seen in the main portal vein.  · Gallbladder: The gallbladder wall is thickened measuring up to 7 mm.  No pericholecystic fluid or gallstones identified.  · Biliary system: The common duct is not dilated, measuring 3 mm.  No intrahepatic ductal dilatation.  · Right kidney: Normal in size and echotexture, measuring 10.3 cm.  No hydronephrosis or nephrolithiasis identified.  · Pancreas: Visualized segments are unremarkable.  · Miscellaneous: No upper abdominal ascites.  The spleen is upper limits of normal in size measuring 12.6 cm.    05/17/2020  ASSESSMENT: Clinically stable. Etiology? He does not drink alcohol currently.    PLAN:  He is choosing palliative management.    MELD-Na score: 11 at 5/17/2020  4:48 AM  MELD score: 11 at 5/17/2020  4:48 AM  Calculated from:  Serum Creatinine: 1.5 mg/dL at 5/17/2020  4:48 AM  Serum Sodium: 146 mmol/L (Rounded to 137 mmol/L) at 5/17/2020  4:48 AM  Total Bilirubin: 0.8 mg/dL (Rounded to 1 mg/dL) at 5/16/2020 10:53 AM  INR(ratio): 1.1 at 5/16/2020 10:53 AM  Age: 74 years   Recent Labs   Lab 05/11/20  0434 05/12/20  0456 05/12/20  0809 05/16/20  1053   AMMONIA 42  --   --   --    AST 93* 125*  --  95*   ALT 89* 115*  --  100*   GGT  --   --  648*  --    ALKPHOS 245* 324*  --  391*   BILITOT 0.7 0.7  --  0.8   INR  --   --   --  1.1   APTT  --   --   --  28.2   * 132*  --  212   ALBUMIN 2.6* 2.4*  --  2.8*   LABPROT  --   --   --  12.0        Elevated troponin  05/17/2020  ASSESSMENT: Troponin stable. Suspect demand ischemia. Cardiology consulted.   PLAN:  Continue beta-blocker, aspirin, plavix, isisorbide mononitrate. Not on statin due to statin intolerance.  Recent Labs   Lab 05/16/20  1053 05/16/20  1711 05/16/20  2252   TROPONINI 0.044* 0.034* 0.038*     Results for orders placed or performed during the hospital encounter of 05/16/20   EKG 12-lead    Collection Time: 05/16/20 10:23 AM    Narrative    Test Reason : R06.02,    Vent. Rate : 068 BPM     Atrial Rate : 068 BPM     P-R Int : 254 ms          QRS Dur : 092 ms      QT Int : 378 ms       P-R-T Axes : 013 -05 175 degrees     QTc Int : 401 ms    Sinus rhythm with 1st degree A-V block  ST and T wave abnormality, consider lateral ischemia  Abnormal ECG  When compared with ECG of 10-MAY-2020 03:34,  Questionable change in The axis  Non-specific change in ST segment in Inferior leads  T wave inversion no longer evident in Inferior leads  T wave inversion less evident in Anterior leads  T wave inversion more evident in Lateral leads    Referred By: [email or web address]   SELF           Confirmed By:           Acute on chronic diastolic congestive heart failure  05/17/2020 He has Diastolic heart failure that is Chronic. ASSESSMENT: Stable. I'm unconvinced that his CHF is major contributor to his respiratory failure. Cardiology consulted.  PLAN:  De-escalate diuresis.   Temp:  [97.4 °F (36.3 °C)-98 °F (36.7 °C)] 97.7 °F (36.5 °C)  Pulse:  [60-80] 70  Resp:  [18-23] 18  SpO2:  [89 %-97 %] 92 %  BP: (112-147)/(56-67) 121/60   Diuretics (From admission, onward)    Start     Stop Route Frequency Ordered    05/17/20 0900  furosemide injection 40 mg      -- IV 2 times daily 05/16/20 1456           Intake/Output Summary (Last 24 hours) at 5/17/2020 1024  Last data filed at 5/17/2020 0600  Gross per 24 hour   Intake 240 ml   Output 700 ml   Net -460 ml     Patient Vitals for the past 72 hrs (Last 3 readings):   Weight   05/17/20 0600 58.7 kg (129 lb 6.6 oz)   05/17/20 0350 58.7 kg (129 lb 6.6 oz)   05/16/20 1955 58.5 kg (128 lb  15.5 oz)      Recent Labs   Lab 05/11/20  0434 05/12/20  0456 05/16/20  1053 05/17/20  0448   BNP  --   --  254*  --    BUN 40* 28* 50* 48*   CREATININE 1.2 0.9 1.7* 1.5*   ESTGFRAFRICA >60 >60 45* 53*   EGFRNONAA 60 >60 39* 46*    143 143 146*   K 4.3 4.1 4.6 4.6    106 106 104   CO2 26 27 27 32*   PHOS  --  1.9*  --   --    MG 2.7* 2.4  --  2.5   HGB 11.2* 11.2* 11.3*  --    HCT 35.8* 35.5* 35.9*  --        Coronary artery disease involving left main coronary artery  05/17/2020  ASSESSMENT: Clinically stable. No angina reported.  PLAN:  Continue beta-blocker, aspirin, plavix, isisorbide mononitrate. Not on statin due to statin intolerance.      Mass of left lung -  new compared to prior evaluation in February 2019 05/17/2020  ASSESSMENT: Believed to be malignant.  PLAN:  He gave informed refusal for bronchoscopy or any other other further diagnostic evaluation, opting for palliative care with home hospice.      JUANITO on CPAP  05/17/2020  ASSESSMENT: Chronic condition. Stable.    Hypertension  05/17/2020  ASSESSMENT: Adequately controlled.   PLAN:  Continue present treatment plan.  Temp:  [97.4 °F (36.3 °C)-98 °F (36.7 °C)] 97.7 °F (36.5 °C)  Pulse:  [60-76] 73  Resp:  [18-20] 18  SpO2:  [89 %-97 %] 92 %  BP: (112-126)/(56-61) 121/60        VTE Risk Mitigation (From admission, onward)         Ordered     heparin (porcine) injection 5,000 Units  Every 8 hours      05/17/20 0937     IP VTE HIGH RISK PATIENT  Once      05/17/20 0937     Place sequential compression device  Until discontinued      05/16/20 1456                      L Sundeep Rojas MD  Department of Hospital Medicine   [facility]

## 2020-05-17 NOTE — CONSULTS
"[facility] -   Cardiology  Consult Note    Patient Name: Noble Tripp  MRN: [id number]  Admission Date: 5/16/2020  Hospital Length of Stay: 0 days  Code Status: Full Code   Attending Provider: DAVID Rojas MD   Consulting Provider: Pranay Crespo MD  Primary Care Physician: Dwaine Davis MD  Principal Problem:Acute on chronic respiratory failure with hypoxia    Patient information was obtained from patient and ER records.     Inpatient consult to Cardiology  Consult performed by: Pranay Crespo MD  Consult ordered by: RUSSELL Colon        Subjective:     Chief Complaint:  SOB     HPI:   74 year old male with a PMHx of CAD, CHF, PSVT, PVD, JUANITO on CPAP, HLD, COPD and BPH admitted for acute on chronic diastolic CHF and pneumonia.  Pt has CAD not amenable to PCI, tx medically. Pt denies CP, has worsening SOB. EKG no changes, echo nml lv function, enzymes (-)    Past Medical History:   Diagnosis Date    Adrenal adenoma     david;nl fxn w/u;tran 11/18    Aspiration pneumonia 10/15    puree/honey    Benign prostate hyperplasia     CAD (coronary artery disease)     dr padilla    Chronic pain     dr santos    Chronic systolic congestive heart failure 10/17/2019    Cirrhosis     w 5/20 hospital    COPD (chronic obstructive pulmonary disease)     papi    Ex-smoker     11/13    Hyperlipidemia     Ischemic cardiomyopathy 11/22/2019    Oropharyngeal dysphagia 5/17/2020    JUANITO on CPAP     cpap    Osteopenia 1/15 tran 1/18    Pneumonia involving bilateral lungs     PSVT (paroxysmal supraventricular tachycardia)     PVD (peripheral vascular disease)     noobs 07 latrell    Pyriform sinus cancer     dr ponce radiation 1/2-14 dr king perales    Squamous cell carcinoma of skin     roberto    Tongue cancer     "superficial" removed 11/14    Vocal cord cancer 2011    Xerostomia     radiation       Past Surgical History:   Procedure Laterality Date    APPENDECTOMY  "     BRONCHOSCOPY Bilateral 2/5/2019    Procedure: Bronchoscopy;  Surgeon: Santos Cardozo MD;  Location: [facility];  Service: Endoscopy;  Laterality: Bilateral;    COLONOSCOPY N/A 5/1/2017    Procedure: Due for screening colonoscopy;  Surgeon: Anushka Yoder MD;  Location: [facility];  Service: Endoscopy;  Laterality: N/A;    ESOPHAGOGASTRODUODENOSCOPY N/A 5/29/2018    Procedure: ESOPHAGOGASTRODUODENOSCOPY (EGD);  Surgeon: Audie Hill III, MD;  Location: [facility];  Service: Endoscopy;  Laterality: N/A;    ESOPHAGOGASTRODUODENOSCOPY N/A 8/29/2018    Procedure: ESOPHAGOGASTRODUODENOSCOPY (EGD);  Surgeon: Audie Hill III, MD;  Location: [facility];  Service: Endoscopy;  Laterality: N/A;    LEFT HEART CATHETERIZATION Left 5/7/2020    Procedure: CATHETERIZATION, HEART, LEFT;  Surgeon: Abel Beltran MD;  Location: [facility] CATH LAB;  Service: Cardiology;  Laterality: Left;  iodine allergy    PERCUTANEOUS TRANSLUMINAL BALLOON ANGIOPLASTY OF CORONARY ARTERY Right 12/9/2019    Procedure: PTCA, USING BALLOON/ IABP or Impella multivessel angioplasty/poss stent;  Surgeon: Abel Beltran MD;  Location: [facility] CATH LAB;  Service: Cardiology;  Laterality: Right;    PERCUTANEOUS TRANSLUMINAL BALLOON ANGIOPLASTY OF CORONARY ARTERY  5/7/2020    Procedure: Angioplasty-coronary;  Surgeon: Abel Beltran MD;  Location: [facility] CATH LAB;  Service: Cardiology;;    THORACENTESIS Left 8/7/2018    Procedure: Thoracentesis;  Surgeon: Santos Cardozo MD;  Location: [facility];  Service: Endoscopy;  Laterality: Left;    THORACENTESIS Right 2/25/2019    Procedure: Thoracentesis;  Surgeon: Santos Cardozo MD;  Location: [facility];  Service: Endoscopy;  Laterality: Right;    tongue cancer excision  11/14    dr vitale    VOCAL CORD LATERALIZATION, ENDOSCOPIC APPROACH W/ MLB      kris       Review of patient's allergies indicates:   Allergen Reactions    Contrast media Hives and Itching    Iodinated contrast media Hives  and Itching    Iodine Hives and Itching    Pravastatin      Other reaction(s): fatigue  Other reaction(s): fatigue    Rosuvastatin      Other reaction(s): fatigue  Other reaction(s): fatigue    Simvastatin      Other reaction(s): fatigue  Other reaction(s): fatigue    Statins-hmg-coa reductase inhibitors Other (See Comments)     Fatigue       No current facility-administered medications on file prior to encounter.      Current Outpatient Medications on File Prior to Encounter   Medication Sig    albuterol (PROAIR HFA) 90 mcg/actuation inhaler INHALE 2 PUFFS BY MOUTH EVERY 4 HOURS AS NEEDED FOR WHEEZING OR  SHORTNESS  OF  BREATH    aspirin 81 mg Tab Take 1 tablet by mouth Daily. Over the counter to help prevent stroke/heart attack    carvedilol (COREG) 3.125 MG tablet Take 1 tablet (3.125 mg total) by mouth 2 (two) times daily.    clopidogrel (PLAVIX) 75 mg tablet Take 1 tablet (75 mg total) by mouth once daily.    ELDERBERRY FRUIT ORAL Take by mouth.    evolocumab (REPATHA SURECLICK) 140 mg/mL PnIj Inject 1 mL (140 mg total) into the skin every 14 (fourteen) days.    furosemide (LASIX) 40 MG tablet Take 0.5 tablets (20 mg total) by mouth once daily.    gabapentin (NEURONTIN) 800 MG tablet Take 1 tablet (800 mg total) by mouth 3 (three) times daily.    L.acid/B.bifidum/B.animal/FOS (PROBIOTIC COMPLEX ORAL) Take by mouth.    levoFLOXacin (LEVAQUIN) 750 MG tablet Take 1 tablet (750 mg total) by mouth every other day. for 10 days    losartan (COZAAR) 25 MG tablet Take 1 tablet (25 mg total) by mouth every evening.    oxyCODONE-acetaminophen (PERCOCET) 7.5-325 mg per tablet     pantoprazole (PROTONIX) 40 MG tablet TAKE ONE TABLET BY MOUTH ONCE DAILY    SPIRIVA WITH HANDIHALER 18 mcg inhalation capsule INHALE 1 CAPSULE BY MOUTH ONCE DAILY    isosorbide mononitrate (IMDUR) 30 MG 24 hr tablet Take 1 tablet (30 mg total) by mouth once daily.    nebulizer and compressor Bailey Use as directed    nystatin  (MYCOSTATIN) 100,000 unit/mL suspension Take 4 mLs (400,000 Units total) by mouth 4 (four) times daily. Swish and spit for 7 days     Family History     Problem Relation (Age of Onset)    Lung cancer Father, Brother    No Known Problems Mother        Tobacco Use    Smoking status: Former Smoker     Packs/day: 0.50     Years: 55.00     Pack years: 27.50     Types: Cigarettes     Last attempt to quit: 10/1/2019     Years since quittin.6    Smokeless tobacco: Never Used    Tobacco comment: 6-7 cigs/day   Substance and Sexual Activity    Alcohol use: Not Currently    Drug use: No    Sexual activity: Not Currently     Review of Systems   Constitution: Negative. Negative for weight gain.   HENT: Negative.    Eyes: Negative.    Cardiovascular: Negative.  Negative for chest pain, leg swelling and palpitations.   Respiratory: Negative.  Negative for shortness of breath.    Endocrine: Negative.    Hematologic/Lymphatic: Negative.    Skin: Negative.    Musculoskeletal: Negative for muscle weakness.   Gastrointestinal: Negative.    Genitourinary: Negative.    Neurological: Negative.  Negative for dizziness.   Psychiatric/Behavioral: Negative.    Allergic/Immunologic: Negative.      Objective:     Vital Signs (Most Recent):  Temp: 97.7 °F (36.5 °C) (20)  Pulse: 72 (20 0939)  Resp: 18 (20)  BP: 121/60 (20)  SpO2: (Abnormal) 92 % (20) Vital Signs (24h Range):  Temp:  [97.4 °F (36.3 °C)-98 °F (36.7 °C)] 97.7 °F (36.5 °C)  Pulse:  [60-80] 72  Resp:  [18-23] 18  SpO2:  [89 %-97 %] 92 %  BP: (112-147)/(56-67) 121/60     Weight: 58.7 kg (129 lb 6.6 oz)  Body mass index is 19.68 kg/m².    SpO2: (Abnormal) 92 %  O2 Device (Oxygen Therapy): nasal cannula      Intake/Output Summary (Last 24 hours) at 2020 1223  Last data filed at 2020 0600  Gross per 24 hour   Intake 240 ml   Output 700 ml   Net -460 ml       Lines/Drains/Airways     Peripheral Intravenous Line     Name  Duration         Peripheral IV - Single Lumen 05/16/20 1053 20 G Left Antecubital 1 day                Physical Exam   Constitutional: He is oriented to person, place, and time. He appears well-developed and well-nourished.   HENT:   Head: Normocephalic and atraumatic.   Eyes: Pupils are equal, round, and reactive to light. Conjunctivae are normal.   Neck: Normal range of motion. Neck supple.   Cardiovascular: Normal rate, regular rhythm, normal heart sounds and intact distal pulses.   Pulmonary/Chest: Effort normal and breath sounds normal.   Abdominal: Soft. Bowel sounds are normal.   Neurological: He is alert and oriented to person, place, and time.   Skin: Skin is warm and dry.   Psychiatric: He has a normal mood and affect.   Nursing note and vitals reviewed.      Significant Labs: All pertinent lab results from the last 24 hours have been reviewed.    Significant Imaging: X-Ray: CXR: X-Ray Chest 1 View (CXR): No results found for this visit on 05/16/20.    Assessment and Plan:     Chronic diastolic congestive heart failure    Echo Color Flow Doppler 05/16/20    Narrative · Left ventricular systolic function. The estimated ejection fraction is   60%.  · Grade I (mild) left ventricular diastolic dysfunction consistent with   impaired relaxation.  · Normal right ventricular systolic function.      .     74 year old male with a PMHx of CAD, CHF, PSVT, PVD, JUANITO on CPAP, HLD, COPD and BPH admitted for acute on chronic diastolic CHF and pneumonia.  Pt has CAD not amenable to PCI, tx medically. Pt denies CP, has worsening SOB. EKG no changes, echo nml lv function, enzymes (-). BP was fine.    Continue diuretics, pulmonary toilet and treatment for pneumonia.    Coronary artery disease involving left main coronary artery  Left Heart Cath 05/07/2020  ·LVEDP (Pre): 17  ·LVEDP (Post): 20  ·The ejection fraction is calculated to be 55%.  ·Prox LAD lesion , 70% stenosed.  ·Prox RCA to Mid RCA lesion , 99%  stenosed.  ·Estimated blood loss: none  ·Two vessel coronary artery disease.  ·UNSUCCESSFUL ATTEMPT AT RECANALIZATION OF RCA         VTE Risk Mitigation (From admission, onward)         Ordered     heparin (porcine) injection 5,000 Units  Every 8 hours      05/17/20 0937     IP VTE HIGH RISK PATIENT  Once      05/17/20 0937     Place sequential compression device  Until discontinued      05/16/20 3466                Thank you for your consult. I will follow-up with patient. Please contact us if you have any additional questions.    Pranay Crespo MD  Cardiology   [facility] [place]

## 2020-05-17 NOTE — HOSPITAL COURSE
5/17/2020 patient shortness of breath has improved with diuresis but he still is feeling weak and has a poor appetite.  5/18 patient has decided he wants hospice at this time

## 2020-05-17 NOTE — HPI
74 year old male with a PMHx of CAD, CHF, PSVT, PVD, JUANITO on CPAP, HLD, COPD and BPH admitted for acute on chronic diastolic CHF and pneumonia.  Pt has CAD not amenable to PCI, tx medically. Pt denies CP, has worsening SOB. EKG no changes, echo nml lv function, enzymes (-)

## 2020-05-17 NOTE — H&P (VIEW-ONLY)
[facility] -   Pulmonology  Consult Note    Patient Name: Noble Tripp  MRN: [id number]  Admission Date: 5/16/2020  Hospital Length of Stay: 0 days  Code Status: DNR  Attending Physician: DAVID Rojas MD  Primary Care Provider: Dwaine Davis MD   Principal Problem: Acute on chronic respiratory failure with hypoxia      Subjective: shortness of breath and paroxysmal nocturnal dyspnea      HPI:  CC:  Respiratory failure and abnormal chest x-ray/CT scan of chest  HPI:   74 year old male former smoker with a history of head and neck cancer s/p  Disection chin in 11/ 2011 s/p  Radiation implant w/ ultrasound in neck 11 2011, 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  His most recent pulmonary history has been complicated by dysphagia and aspiration pneumonia.  He had a history of an abnormal PET scan on 01/23/2019 and subsequently underwent bronchoscopy by  on 02/05/2019 which demonstrated localized fibrosis (no evidence of cancer on lung biopsies.  Since that evaluation the patient has developed a new left hilar mass that was identified during this hospitalization along with liver abnormalities on CT scan suspicious for metastatic malignancy.  Prior CT scan of the abdomen revealed - liver demonstrates right lobe cyst as well as adrenal adenoma.   He presents to the emergency room on 05/16/2020 with a 1-2 day history of increasing shortness of breath, paroxysmal nocturnal dyspnea, nonproductive cough and peripheral edema.  He has a history of congestive heart failure was noted to have bilateral pleural effusions on most recent exam.  He was admitted for acute on chronic diastolic congestive heart failure and probable aspiration pneumonia.  He has a  PMHx of CAD, CHF, PSVT, PVD, JUANITO on CPAP, HLD, COPD and BPH.  Patient reports that he is  and  lives alone and cares for himself and has frequent assistance from his children.    Past  "Medical History:   Diagnosis Date    Adrenal adenoma     david;nl fxn w/u;tran 11/18    Aspiration pneumonia 10/15    puree/honey    Benign prostate hyperplasia     CAD (coronary artery disease)     dr padilla    Chronic pain     dr santos    Chronic systolic congestive heart failure 10/17/2019    Cirrhosis     w 5/20 hospital    COPD (chronic obstructive pulmonary disease)     papi    Ex-smoker     11/13    Hyperlipidemia     Ischemic cardiomyopathy 11/22/2019    Oropharyngeal dysphagia 5/17/2020    JUANITO on CPAP     cpap    Osteopenia 1/15 tran 1/18    Pneumonia involving bilateral lungs     PSVT (paroxysmal supraventricular tachycardia)     PVD (peripheral vascular disease)     noobs 07 khuri    Pyriform sinus cancer     dr ponce radiation 1/2-14 dr king perales    Squamous cell carcinoma of skin     roberto    Tongue cancer     "superficial" removed 11/14    Vocal cord cancer 2011    Xerostomia     radiation       Past Surgical History:   Procedure Laterality Date    APPENDECTOMY      BRONCHOSCOPY Bilateral 2/5/2019    Procedure: Bronchoscopy;  Surgeon: Santos Cardozo MD;  Location: [facility];  Service: Endoscopy;  Laterality: Bilateral;    COLONOSCOPY N/A 5/1/2017    Procedure: Due for screening colonoscopy;  Surgeon: Anushka Yoder MD;  Location: [facility];  Service: Endoscopy;  Laterality: N/A;    ESOPHAGOGASTRODUODENOSCOPY N/A 5/29/2018    Procedure: ESOPHAGOGASTRODUODENOSCOPY (EGD);  Surgeon: Audie Hill III, MD;  Location: [facility];  Service: Endoscopy;  Laterality: N/A;    ESOPHAGOGASTRODUODENOSCOPY N/A 8/29/2018    Procedure: ESOPHAGOGASTRODUODENOSCOPY (EGD);  Surgeon: Audie Hill III, MD;  Location: [facility];  Service: Endoscopy;  Laterality: N/A;    LEFT HEART CATHETERIZATION Left 5/7/2020    Procedure: CATHETERIZATION, HEART, LEFT;  Surgeon: Abel Beltran MD;  Location: [facility] CATH LAB;  Service: Cardiology;  Laterality: Left;  iodine allergy    " PERCUTANEOUS TRANSLUMINAL BALLOON ANGIOPLASTY OF CORONARY ARTERY Right 2019    Procedure: PTCA, USING BALLOON/ IABP or Impella multivessel angioplasty/poss stent;  Surgeon: Abel Beltran MD;  Location: [facility] CATH LAB;  Service: Cardiology;  Laterality: Right;    PERCUTANEOUS TRANSLUMINAL BALLOON ANGIOPLASTY OF CORONARY ARTERY  2020    Procedure: Angioplasty-coronary;  Surgeon: Abel Beltran MD;  Location: [facility] CATH LAB;  Service: Cardiology;;    THORACENTESIS Left 2018    Procedure: Thoracentesis;  Surgeon: Santos Cardozo MD;  Location: [facility] ENDO;  Service: Endoscopy;  Laterality: Left;    THORACENTESIS Right 2019    Procedure: Thoracentesis;  Surgeon: Santos Cardozo MD;  Location: [facility] ENDO;  Service: Endoscopy;  Laterality: Right;    tongue cancer excision      dr vitale    VOCAL CORD LATERALIZATION, ENDOSCOPIC APPROACH W/ MLB      kris       Review of patient's allergies indicates:   Allergen Reactions    Contrast media Hives and Itching    Iodinated contrast media Hives and Itching    Iodine Hives and Itching    Pravastatin      Other reaction(s): fatigue  Other reaction(s): fatigue    Rosuvastatin      Other reaction(s): fatigue  Other reaction(s): fatigue    Simvastatin      Other reaction(s): fatigue  Other reaction(s): fatigue    Statins-hmg-coa reductase inhibitors Other (See Comments)     Fatigue       Family History     Problem Relation (Age of Onset)    Lung cancer Father, Brother    No Known Problems Mother        Tobacco Use    Smoking status: Former Smoker     Packs/day: 0.50     Years: 55.00     Pack years: 27.50     Types: Cigarettes     Last attempt to quit: 10/1/2019     Years since quittin.6    Smokeless tobacco: Never Used    Tobacco comment: 6-7 cigs/day   Substance and Sexual Activity    Alcohol use: Not Currently    Drug use: No    Sexual activity: Not Currently         Review of Systems   Constitutional: Positive for appetite  change, diaphoresis, fatigue and unexpected weight change.   HENT: Positive for trouble swallowing.    Respiratory: Positive for cough, choking and shortness of breath.    Cardiovascular: Positive for palpitations and leg swelling.   Gastrointestinal: Negative.    Endocrine: Negative.    Genitourinary: Negative.    Musculoskeletal: Positive for arthralgias.   Neurological: Positive for weakness.   Psychiatric/Behavioral: Positive for sleep disturbance.     Objective:     Vital Signs (Most Recent):  Temp: 97.7 °F (36.5 °C) (05/17/20 0749)  Pulse: 73 (05/17/20 1330)  Resp: 18 (05/17/20 1232)  BP: 121/60 (05/17/20 0749)  SpO2: (!) 92 % (05/17/20 1232) Vital Signs (24h Range):  Temp:  [97.4 °F (36.3 °C)-98 °F (36.7 °C)] 97.7 °F (36.5 °C)  Pulse:  [60-76] 73  Resp:  [18-20] 18  SpO2:  [89 %-97 %] 92 %  BP: (112-126)/(56-61) 121/60     Weight: 58.7 kg (129 lb 6.6 oz)  Body mass index is 19.68 kg/m².      Intake/Output Summary (Last 24 hours) at 5/17/2020 1543  Last data filed at 5/17/2020 0600  Gross per 24 hour   Intake 240 ml   Output 400 ml   Net -160 ml       Physical Exam   Constitutional: He is oriented to person, place, and time. He appears well-developed and well-nourished.   HENT:   Head: Normocephalic and atraumatic.   Eyes: Pupils are equal, round, and reactive to light. Conjunctivae are normal.   Neck: Neck supple. JVD present. No tracheal deviation present. No thyromegaly present.   Cardiovascular: Normal rate. A regularly irregular rhythm present. PMI is displaced.   Murmur heard.   Systolic murmur is present with a grade of 2/6.  Pulmonary/Chest: Effort normal. He has decreased breath sounds. He has rales in the right lower field and the left lower field.   Abdominal: Soft.   Musculoskeletal: Normal range of motion. He exhibits edema.   Lymphadenopathy:     He has no cervical adenopathy.   Neurological: He is alert and oriented to person, place, and time.   Skin: Skin is warm and dry.   Nursing note and  vitals reviewed.      Vents:  Oxygen Concentration (%): 32 (20 1232)    Lines/Drains/Airways     Peripheral Intravenous Line                 Peripheral IV - Single Lumen 20 1053 20 G Left Antecubital 1 day                Significant Labs:    CBC/Anemia Profile:  Recent Labs   Lab 20  1053   WBC 13.23*   HGB 11.3*   HCT 35.9*      MCV 95   RDW 17.4*        Chemistries:  Recent Labs   Lab 20  1053 20  0448    146*   K 4.6 4.6    104   CO2 27 32*   BUN 50* 48*   CREATININE 1.7* 1.5*   CALCIUM 9.1 9.0   ALBUMIN 2.8*  --    PROT 6.3  --    BILITOT 0.8  --    ALKPHOS 391*  --    *  --    AST 95*  --    MG  --  2.5       ABGs: No results for input(s): PH, PCO2, HCO3, POCSATURATED, BE in the last 48 hours.  BMP:   Recent Labs   Lab 20  0448   GLU 79   *   K 4.6      CO2 32*   BUN 48*   CREATININE 1.5*   CALCIUM 9.0   MG 2.5     CMP:   Recent Labs   Lab 20  1053 20  0448    146*   K 4.6 4.6    104   CO2 27 32*   GLU 89 79   BUN 50* 48*   CREATININE 1.7* 1.5*   CALCIUM 9.1 9.0   PROT 6.3  --    ALBUMIN 2.8*  --    BILITOT 0.8  --    ALKPHOS 391*  --    AST 95*  --    *  --    ANIONGAP 10 10   EGFRNONAA 39* 46*     All pertinent labs within the past 24 hours have been reviewed.    Significant Imaging:   I have reviewed all pertinent imaging results/findings within the past 24 hours.  I have reviewed and interpreted all pertinent imaging results/findings within the past 24 hours.     Radiology Result      Name:   :  Patient MRN:   Noble Tripp [id number]   Account Number: Room & Bed Accession Number:   [id number] [facility] TELE [id number] A [id number]   Authorizing Physician: Patient Class: Diagnosis:   Mckayla Presley OP- Observation     Procedure:  Exam Date: Reason for Exam:   CT Chest Without Contrast 2020 Shortness of breath             RESULTS:     EXAMINATION:  CT CHEST WITHOUT CONTRAST     CLINICAL  HISTORY:  Shortness of breath;     TECHNIQUE:  CT scan of the chest was obtained without administration of contrast.    Coronal and sagittal reconstructions were performed on these images.     COMPARISON:  2/5/2019     FINDINGS:  Interval development of masslike area of consolidation left suprahilar   region measuring 3 x 2.4 cm with patchy areas of alveolar consolidation   extending to the apex and interstitial thickening.  Peripheral areas of   consolidation within each lower lobe as well as the lingula.  Granuloma in   the right lower lobe.  No effusion or pneumothorax.  Airway is widely   patent.  Interval development of mediastinal adenopathy.  Enlarged   prevascular lymph node measures 2.0 x 2.0 cm enlarged left paratracheal   lymph node measures 2.2 x 1.7 cm.  There and innumerable mild of small   low-density lesions throughout the liver concerning for metastasis.    Remaining visualized abdominal organs are unremarkable.  Osseous   structures are intact.  No suspicious osseous lesions.  Mild compression   fracture and lytic changes of the superior endplate concerning for   pathologic fracture.     Impression:     1.  Findings concerning for suprahilar mass with lymphangitic   spread/postobstructive pneumonia left upper lobe.     2.  Metastatic mediastinal lymphadenopathy as well as innumerable number   of metastatic lesions throughout the partially visualized enlarged liver     3.  Findings concerning for pathologic T6 fracture with mild compression.    No retropulsion.     4.  Lower lobe and lingular infiltrates.     All CT scans at this facility are performed  using dose modulation   techniques as appropriate to performed exam including the following:    automated exposure control; adjustment of mA and/or kV according to the   patients size (this includes techniques or standardized protocols for   targeted exams where dose is matched to indication/reason for exam: i.e.   extremities or head);  iterative  reconstruction technique.        Electronically signed by:     Kye Cunningham Jr., MD  Date:                                    05/16/2020  Time:                                   18:18                    Signed By:  Kye Cunningham Jr., MD on 5/16/2020  6:18 PM              Echo Color Flow Doppler? Yes  · Left ventricular systolic function. The estimated ejection fraction is   60%.  · Grade I (mild) left ventricular diastolic dysfunction consistent with   impaired relaxation.  · Normal right ventricular systolic function.             ABG  No results for input(s): PH, PO2, PCO2, HCO3, BE in the last 168 hours.  Assessment/Plan:     * Acute on chronic respiratory failure with hypoxia  Combination of COPD and congestive heart failure.  Continue bronchodilators and diuretics.    Acute on chronic diastolic congestive heart failure  Continue beta-blockers and diuresis.    Mass of left lung -  new compared to prior evaluation in February 2019 5/17/2020 overall appearance is suspicious for a left hilar lung mass/lung cancer.  Abnormalities on CT liver suggest possible metastasis.  Patient does have a history of abnormalities on the liver in the past due to a cyst.  Patient has reconsidered his options and at this time once more information concerning a liver biopsy.    Oropharyngeal aspiration  Aspiration precautions, IV antibiotics     His functional status appears to be fair as the patient is presently living alone in caring for himself with assistance by his children.  Overall complicated situation with probable new lung cancer and abnormalities on CT of the liver suggestive of metastasis.  Patient wants some more information concerning diagnostic workup and is open to having a liver biopsy performed.    Recommendation:  Aggressive treatment of heart failure and COPD, hold aspirin Plavix and other long-term anticoagulants.  Consider performing biopsy of liver lesions in Radiology later in week.        Thank you for  your consult. I will follow-up with patient. Please contact us if you have any additional questions.     Sanket Wright MD  Pulmonology  [facility]

## 2020-05-17 NOTE — SUBJECTIVE & OBJECTIVE
"Past Medical History:   Diagnosis Date    Adrenal adenoma     david;nl fxn w/u;tran 11/18    Aspiration pneumonia 10/15    puree/honey    Benign prostate hyperplasia     CAD (coronary artery disease)     dr padilla    Chronic pain     dr santos    Chronic systolic congestive heart failure 10/17/2019    Cirrhosis     w 5/20 hospital    COPD (chronic obstructive pulmonary disease)     papi    Ex-smoker     11/13    Hyperlipidemia     Ischemic cardiomyopathy 11/22/2019    Oropharyngeal dysphagia 5/17/2020    JUANITO on CPAP     cpap    Osteopenia 1/15 tran 1/18    Pneumonia involving bilateral lungs     PSVT (paroxysmal supraventricular tachycardia)     PVD (peripheral vascular disease)     noobs 07 elizabethuri    Pyriform sinus cancer     dr ponce radiation 1/2-14 dr king perales    Squamous cell carcinoma of skin     roberto    Tongue cancer     "superficial" removed 11/14    Vocal cord cancer 2011    Xerostomia     radiation       Past Surgical History:   Procedure Laterality Date    APPENDECTOMY      BRONCHOSCOPY Bilateral 2/5/2019    Procedure: Bronchoscopy;  Surgeon: Santos Cardozo MD;  Location: [facility];  Service: Endoscopy;  Laterality: Bilateral;    COLONOSCOPY N/A 5/1/2017    Procedure: Due for screening colonoscopy;  Surgeon: Anushka Yoder MD;  Location: [facility];  Service: Endoscopy;  Laterality: N/A;    ESOPHAGOGASTRODUODENOSCOPY N/A 5/29/2018    Procedure: ESOPHAGOGASTRODUODENOSCOPY (EGD);  Surgeon: Audie Hill III, MD;  Location: [facility];  Service: Endoscopy;  Laterality: N/A;    ESOPHAGOGASTRODUODENOSCOPY N/A 8/29/2018    Procedure: ESOPHAGOGASTRODUODENOSCOPY (EGD);  Surgeon: Audie Hill III, MD;  Location: [facility];  Service: Endoscopy;  Laterality: N/A;    LEFT HEART CATHETERIZATION Left 5/7/2020    Procedure: CATHETERIZATION, HEART, LEFT;  Surgeon: Abel Beltran MD;  Location: [facility] CATH LAB;  Service: Cardiology;  Laterality: Left;  iodine allergy    " PERCUTANEOUS TRANSLUMINAL BALLOON ANGIOPLASTY OF CORONARY ARTERY Right 12/9/2019    Procedure: PTCA, USING BALLOON/ IABP or Impella multivessel angioplasty/poss stent;  Surgeon: Abel Beltran MD;  Location: [facility] CATH LAB;  Service: Cardiology;  Laterality: Right;    PERCUTANEOUS TRANSLUMINAL BALLOON ANGIOPLASTY OF CORONARY ARTERY  5/7/2020    Procedure: Angioplasty-coronary;  Surgeon: Abel Beltran MD;  Location: [facility] CATH LAB;  Service: Cardiology;;    THORACENTESIS Left 8/7/2018    Procedure: Thoracentesis;  Surgeon: Santos Cardozo MD;  Location: [facility] ENDO;  Service: Endoscopy;  Laterality: Left;    THORACENTESIS Right 2/25/2019    Procedure: Thoracentesis;  Surgeon: Santos Cardozo MD;  Location: [facility] ENDO;  Service: Endoscopy;  Laterality: Right;    tongue cancer excision  11/14    dr vitale    VOCAL CORD LATERALIZATION, ENDOSCOPIC APPROACH W/ MLB      kris       Review of patient's allergies indicates:   Allergen Reactions    Contrast media Hives and Itching    Iodinated contrast media Hives and Itching    Iodine Hives and Itching    Pravastatin      Other reaction(s): fatigue  Other reaction(s): fatigue    Rosuvastatin      Other reaction(s): fatigue  Other reaction(s): fatigue    Simvastatin      Other reaction(s): fatigue  Other reaction(s): fatigue    Statins-hmg-coa reductase inhibitors Other (See Comments)     Fatigue       No current facility-administered medications on file prior to encounter.      Current Outpatient Medications on File Prior to Encounter   Medication Sig    albuterol (PROAIR HFA) 90 mcg/actuation inhaler INHALE 2 PUFFS BY MOUTH EVERY 4 HOURS AS NEEDED FOR WHEEZING OR  SHORTNESS  OF  BREATH    aspirin 81 mg Tab Take 1 tablet by mouth Daily. Over the counter to help prevent stroke/heart attack    carvedilol (COREG) 3.125 MG tablet Take 1 tablet (3.125 mg total) by mouth 2 (two) times daily.    clopidogrel (PLAVIX) 75 mg tablet Take 1 tablet (75 mg total) by  mouth once daily.    ELDERBERRY FRUIT ORAL Take by mouth.    evolocumab (REPATHA SURECLICK) 140 mg/mL PnIj Inject 1 mL (140 mg total) into the skin every 14 (fourteen) days.    furosemide (LASIX) 40 MG tablet Take 0.5 tablets (20 mg total) by mouth once daily.    gabapentin (NEURONTIN) 800 MG tablet Take 1 tablet (800 mg total) by mouth 3 (three) times daily.    L.acid/B.bifidum/B.animal/FOS (PROBIOTIC COMPLEX ORAL) Take by mouth.    levoFLOXacin (LEVAQUIN) 750 MG tablet Take 1 tablet (750 mg total) by mouth every other day. for 10 days    losartan (COZAAR) 25 MG tablet Take 1 tablet (25 mg total) by mouth every evening.    oxyCODONE-acetaminophen (PERCOCET) 7.5-325 mg per tablet     pantoprazole (PROTONIX) 40 MG tablet TAKE ONE TABLET BY MOUTH ONCE DAILY    SPIRIVA WITH HANDIHALER 18 mcg inhalation capsule INHALE 1 CAPSULE BY MOUTH ONCE DAILY    isosorbide mononitrate (IMDUR) 30 MG 24 hr tablet Take 1 tablet (30 mg total) by mouth once daily.    nebulizer and compressor Bailey Use as directed    nystatin (MYCOSTATIN) 100,000 unit/mL suspension Take 4 mLs (400,000 Units total) by mouth 4 (four) times daily. Swish and spit for 7 days     Family History     Problem Relation (Age of Onset)    Lung cancer Father, Brother    No Known Problems Mother        Tobacco Use    Smoking status: Former Smoker     Packs/day: 0.50     Years: 55.00     Pack years: 27.50     Types: Cigarettes     Last attempt to quit: 10/1/2019     Years since quittin.6    Smokeless tobacco: Never Used    Tobacco comment: 6-7 cigs/day   Substance and Sexual Activity    Alcohol use: Not Currently    Drug use: No    Sexual activity: Not Currently     Review of Systems   Constitution: Negative. Negative for weight gain.   HENT: Negative.    Eyes: Negative.    Cardiovascular: Negative.  Negative for chest pain, leg swelling and palpitations.   Respiratory: Negative.  Negative for shortness of breath.    Endocrine: Negative.     Hematologic/Lymphatic: Negative.    Skin: Negative.    Musculoskeletal: Negative for muscle weakness.   Gastrointestinal: Negative.    Genitourinary: Negative.    Neurological: Negative.  Negative for dizziness.   Psychiatric/Behavioral: Negative.    Allergic/Immunologic: Negative.      Objective:     Vital Signs (Most Recent):  Temp: 97.7 °F (36.5 °C) (05/17/20 0749)  Pulse: 72 (05/17/20 0939)  Resp: 18 (05/17/20 0749)  BP: 121/60 (05/17/20 0749)  SpO2: (Abnormal) 92 % (05/17/20 0749) Vital Signs (24h Range):  Temp:  [97.4 °F (36.3 °C)-98 °F (36.7 °C)] 97.7 °F (36.5 °C)  Pulse:  [60-80] 72  Resp:  [18-23] 18  SpO2:  [89 %-97 %] 92 %  BP: (112-147)/(56-67) 121/60     Weight: 58.7 kg (129 lb 6.6 oz)  Body mass index is 19.68 kg/m².    SpO2: (Abnormal) 92 %  O2 Device (Oxygen Therapy): nasal cannula      Intake/Output Summary (Last 24 hours) at 5/17/2020 1223  Last data filed at 5/17/2020 0600  Gross per 24 hour   Intake 240 ml   Output 700 ml   Net -460 ml       Lines/Drains/Airways     Peripheral Intravenous Line     Name Duration         Peripheral IV - Single Lumen 05/16/20 1053 20 G Left Antecubital 1 day                Physical Exam   Constitutional: He is oriented to person, place, and time. He appears well-developed and well-nourished.   HENT:   Head: Normocephalic and atraumatic.   Eyes: Pupils are equal, round, and reactive to light. Conjunctivae are normal.   Neck: Normal range of motion. Neck supple.   Cardiovascular: Normal rate, regular rhythm, normal heart sounds and intact distal pulses.   Pulmonary/Chest: Effort normal and breath sounds normal.   Abdominal: Soft. Bowel sounds are normal.   Neurological: He is alert and oriented to person, place, and time.   Skin: Skin is warm and dry.   Psychiatric: He has a normal mood and affect.   Nursing note and vitals reviewed.      Significant Labs: All pertinent lab results from the last 24 hours have been reviewed.    Significant Imaging: X-Ray: CXR: X-Ray  Chest 1 View (CXR): No results found for this visit on 05/16/20.

## 2020-05-17 NOTE — ASSESSMENT & PLAN NOTE
05/17/2020  ASSESSMENT: We had LENGTHY discussion about the apparent diagnosis, differential diagnosis and risks and benefits of treatment options. He verbalized understanding and is opting for palliative care with home hospice.   PLAN:  Social work consult for home hospice ordered.

## 2020-05-17 NOTE — ASSESSMENT & PLAN NOTE
05/17/2020  ASSESSMENT: Improved.   PLAN:  Better hydration encouraged. (More easily done after PEG placement.)   Recent Labs   Lab 05/11/20  0434 05/12/20  0456 05/16/20  1053 05/17/20  0448   BUN 40* 28* 50* 48*   CREATININE 1.2 0.9 1.7* 1.5*   ESTGFRAFRICA >60 >60 45* 53*   EGFRNONAA 60 >60 39* 46*    143 143 146*   K 4.3 4.1 4.6 4.6    106 106 104   CO2 26 27 27 32*   PHOS  --  1.9*  --   --    MG 2.7* 2.4  --  2.5   HGB 11.2* 11.2* 11.3*  --    HCT 35.8* 35.5* 35.9*  --

## 2020-05-17 NOTE — SUBJECTIVE & OBJECTIVE
"Past Medical History:   Diagnosis Date    Adrenal adenoma     david;nl fxn w/u;tran 11/18    Aspiration pneumonia 10/15    puree/honey    Benign prostate hyperplasia     CAD (coronary artery disease)     dr padilla    Chronic pain     dr santos    Chronic systolic congestive heart failure 10/17/2019    Cirrhosis     w 5/20 hospital    COPD (chronic obstructive pulmonary disease)     papi    Ex-smoker     11/13    Hyperlipidemia     Ischemic cardiomyopathy 11/22/2019    Oropharyngeal dysphagia 5/17/2020    JUANITO on CPAP     cpap    Osteopenia 1/15 tran 1/18    Pneumonia involving bilateral lungs     PSVT (paroxysmal supraventricular tachycardia)     PVD (peripheral vascular disease)     noobs 07 elizabethuri    Pyriform sinus cancer     dr ponce radiation 1/2-14 dr king perales    Squamous cell carcinoma of skin     roberto    Tongue cancer     "superficial" removed 11/14    Vocal cord cancer 2011    Xerostomia     radiation       Past Surgical History:   Procedure Laterality Date    APPENDECTOMY      BRONCHOSCOPY Bilateral 2/5/2019    Procedure: Bronchoscopy;  Surgeon: Santos Cardozo MD;  Location: [facility];  Service: Endoscopy;  Laterality: Bilateral;    COLONOSCOPY N/A 5/1/2017    Procedure: Due for screening colonoscopy;  Surgeon: Anushka Yoder MD;  Location: [facility];  Service: Endoscopy;  Laterality: N/A;    ESOPHAGOGASTRODUODENOSCOPY N/A 5/29/2018    Procedure: ESOPHAGOGASTRODUODENOSCOPY (EGD);  Surgeon: Audie Hill III, MD;  Location: [facility];  Service: Endoscopy;  Laterality: N/A;    ESOPHAGOGASTRODUODENOSCOPY N/A 8/29/2018    Procedure: ESOPHAGOGASTRODUODENOSCOPY (EGD);  Surgeon: Audie Hill III, MD;  Location: [facility];  Service: Endoscopy;  Laterality: N/A;    LEFT HEART CATHETERIZATION Left 5/7/2020    Procedure: CATHETERIZATION, HEART, LEFT;  Surgeon: Abel Beltran MD;  Location: [facility] CATH LAB;  Service: Cardiology;  Laterality: Left;  iodine allergy    " PERCUTANEOUS TRANSLUMINAL BALLOON ANGIOPLASTY OF CORONARY ARTERY Right 2019    Procedure: PTCA, USING BALLOON/ IABP or Impella multivessel angioplasty/poss stent;  Surgeon: Abel Beltran MD;  Location: [facility] CATH LAB;  Service: Cardiology;  Laterality: Right;    PERCUTANEOUS TRANSLUMINAL BALLOON ANGIOPLASTY OF CORONARY ARTERY  2020    Procedure: Angioplasty-coronary;  Surgeon: Abel Beltran MD;  Location: [facility] CATH LAB;  Service: Cardiology;;    THORACENTESIS Left 2018    Procedure: Thoracentesis;  Surgeon: Santos Cardozo MD;  Location: [facility] ENDO;  Service: Endoscopy;  Laterality: Left;    THORACENTESIS Right 2019    Procedure: Thoracentesis;  Surgeon: Santos Cardozo MD;  Location: [facility] ENDO;  Service: Endoscopy;  Laterality: Right;    tongue cancer excision      dr vitale    VOCAL CORD LATERALIZATION, ENDOSCOPIC APPROACH W/ MLB      kris       Review of patient's allergies indicates:   Allergen Reactions    Contrast media Hives and Itching    Iodinated contrast media Hives and Itching    Iodine Hives and Itching    Pravastatin      Other reaction(s): fatigue  Other reaction(s): fatigue    Rosuvastatin      Other reaction(s): fatigue  Other reaction(s): fatigue    Simvastatin      Other reaction(s): fatigue  Other reaction(s): fatigue    Statins-hmg-coa reductase inhibitors Other (See Comments)     Fatigue       Family History     Problem Relation (Age of Onset)    Lung cancer Father, Brother    No Known Problems Mother        Tobacco Use    Smoking status: Former Smoker     Packs/day: 0.50     Years: 55.00     Pack years: 27.50     Types: Cigarettes     Last attempt to quit: 10/1/2019     Years since quittin.6    Smokeless tobacco: Never Used    Tobacco comment: 6-7 cigs/day   Substance and Sexual Activity    Alcohol use: Not Currently    Drug use: No    Sexual activity: Not Currently         Review of Systems   Constitutional: Positive for appetite  change, diaphoresis, fatigue and unexpected weight change.   HENT: Positive for trouble swallowing.    Respiratory: Positive for cough, choking and shortness of breath.    Cardiovascular: Positive for palpitations and leg swelling.   Gastrointestinal: Negative.    Endocrine: Negative.    Genitourinary: Negative.    Musculoskeletal: Positive for arthralgias.   Neurological: Positive for weakness.   Psychiatric/Behavioral: Positive for sleep disturbance.     Objective:     Vital Signs (Most Recent):  Temp: 97.7 °F (36.5 °C) (05/17/20 0749)  Pulse: 73 (05/17/20 1330)  Resp: 18 (05/17/20 1232)  BP: 121/60 (05/17/20 0749)  SpO2: (!) 92 % (05/17/20 1232) Vital Signs (24h Range):  Temp:  [97.4 °F (36.3 °C)-98 °F (36.7 °C)] 97.7 °F (36.5 °C)  Pulse:  [60-76] 73  Resp:  [18-20] 18  SpO2:  [89 %-97 %] 92 %  BP: (112-126)/(56-61) 121/60     Weight: 58.7 kg (129 lb 6.6 oz)  Body mass index is 19.68 kg/m².      Intake/Output Summary (Last 24 hours) at 5/17/2020 1543  Last data filed at 5/17/2020 0600  Gross per 24 hour   Intake 240 ml   Output 400 ml   Net -160 ml       Physical Exam   Constitutional: He is oriented to person, place, and time. He appears well-developed and well-nourished.   HENT:   Head: Normocephalic and atraumatic.   Eyes: Pupils are equal, round, and reactive to light. Conjunctivae are normal.   Neck: Neck supple. JVD present. No tracheal deviation present. No thyromegaly present.   Cardiovascular: Normal rate. A regularly irregular rhythm present. PMI is displaced.   Murmur heard.   Systolic murmur is present with a grade of 2/6.  Pulmonary/Chest: Effort normal. He has decreased breath sounds. He has rales in the right lower field and the left lower field.   Abdominal: Soft.   Musculoskeletal: Normal range of motion. He exhibits edema.   Lymphadenopathy:     He has no cervical adenopathy.   Neurological: He is alert and oriented to person, place, and time.   Skin: Skin is warm and dry.   Nursing note and  vitals reviewed.      Vents:  Oxygen Concentration (%): 32 (20 1232)    Lines/Drains/Airways     Peripheral Intravenous Line                 Peripheral IV - Single Lumen 20 1053 20 G Left Antecubital 1 day                Significant Labs:    CBC/Anemia Profile:  Recent Labs   Lab 20  1053   WBC 13.23*   HGB 11.3*   HCT 35.9*      MCV 95   RDW 17.4*        Chemistries:  Recent Labs   Lab 20  1053 20  0448    146*   K 4.6 4.6    104   CO2 27 32*   BUN 50* 48*   CREATININE 1.7* 1.5*   CALCIUM 9.1 9.0   ALBUMIN 2.8*  --    PROT 6.3  --    BILITOT 0.8  --    ALKPHOS 391*  --    *  --    AST 95*  --    MG  --  2.5       ABGs: No results for input(s): PH, PCO2, HCO3, POCSATURATED, BE in the last 48 hours.  BMP:   Recent Labs   Lab 20  0448   GLU 79   *   K 4.6      CO2 32*   BUN 48*   CREATININE 1.5*   CALCIUM 9.0   MG 2.5     CMP:   Recent Labs   Lab 20  1053 20  0448    146*   K 4.6 4.6    104   CO2 27 32*   GLU 89 79   BUN 50* 48*   CREATININE 1.7* 1.5*   CALCIUM 9.1 9.0   PROT 6.3  --    ALBUMIN 2.8*  --    BILITOT 0.8  --    ALKPHOS 391*  --    AST 95*  --    *  --    ANIONGAP 10 10   EGFRNONAA 39* 46*     All pertinent labs within the past 24 hours have been reviewed.    Significant Imaging:   I have reviewed all pertinent imaging results/findings within the past 24 hours.  I have reviewed and interpreted all pertinent imaging results/findings within the past 24 hours.     Radiology Result      Name:   :  Patient MRN:   Noble Tripp [id number]   Account Number: Room & Bed Accession Number:   [id number] [facility] TELE [id number] A [id number]   Authorizing Physician: Patient Class: Diagnosis:   Mckayla Presley OP- Observation     Procedure:  Exam Date: Reason for Exam:   CT Chest Without Contrast 2020 Shortness of breath             RESULTS:     EXAMINATION:  CT CHEST WITHOUT CONTRAST     CLINICAL  HISTORY:  Shortness of breath;     TECHNIQUE:  CT scan of the chest was obtained without administration of contrast.    Coronal and sagittal reconstructions were performed on these images.     COMPARISON:  2/5/2019     FINDINGS:  Interval development of masslike area of consolidation left suprahilar   region measuring 3 x 2.4 cm with patchy areas of alveolar consolidation   extending to the apex and interstitial thickening.  Peripheral areas of   consolidation within each lower lobe as well as the lingula.  Granuloma in   the right lower lobe.  No effusion or pneumothorax.  Airway is widely   patent.  Interval development of mediastinal adenopathy.  Enlarged   prevascular lymph node measures 2.0 x 2.0 cm enlarged left paratracheal   lymph node measures 2.2 x 1.7 cm.  There and innumerable mild of small   low-density lesions throughout the liver concerning for metastasis.    Remaining visualized abdominal organs are unremarkable.  Osseous   structures are intact.  No suspicious osseous lesions.  Mild compression   fracture and lytic changes of the superior endplate concerning for   pathologic fracture.     Impression:     1.  Findings concerning for suprahilar mass with lymphangitic   spread/postobstructive pneumonia left upper lobe.     2.  Metastatic mediastinal lymphadenopathy as well as innumerable number   of metastatic lesions throughout the partially visualized enlarged liver     3.  Findings concerning for pathologic T6 fracture with mild compression.    No retropulsion.     4.  Lower lobe and lingular infiltrates.     All CT scans at this facility are performed  using dose modulation   techniques as appropriate to performed exam including the following:    automated exposure control; adjustment of mA and/or kV according to the   patients size (this includes techniques or standardized protocols for   targeted exams where dose is matched to indication/reason for exam: i.e.   extremities or head);  iterative  reconstruction technique.        Electronically signed by:     Kye Cunningham Jr., MD  Date:                                    05/16/2020  Time:                                   18:18                    Signed By:  Kye Cunningham Jr., MD on 5/16/2020  6:18 PM              Echo Color Flow Doppler? Yes  · Left ventricular systolic function. The estimated ejection fraction is   60%.  · Grade I (mild) left ventricular diastolic dysfunction consistent with   impaired relaxation.  · Normal right ventricular systolic function.

## 2020-05-17 NOTE — PROGRESS NOTES
Contacted by RN stating patient was desating on nasal cannula. Upon arrival, patient SpO2 on 4LNC 89%. Patient was given a PRN Duoneb treatment.Patient also placed on 40% Venti Mask and a continuous pulse oximeter. No distress noted at this jordan, will continue to monitor.

## 2020-05-17 NOTE — ASSESSMENT & PLAN NOTE
74 year old male with a PMHx of CAD, CHF, PSVT, PVD, JUANITO on CPAP, HLD, COPD and BPH admitted for acute on chronic diastolic CHF and pneumonia.  Pt has CAD not amenable to PCI, tx medically. Pt denies CP, has worsening SOB. EKG no changes, echo nml lv function, enzymes (-). BP was fine.    Continue diuretics, pulmonary toilet and treatment for pneumonia.

## 2020-05-17 NOTE — CONSULTS
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 CT of chest noted a suprahilar mass with metastatic disease to the liver. The patient has chosen to enter hospice but is requesting a PEG for nutrition.     Patient had previously been seen by Dr. Hill in 2018 for KIM and dysphagia. EGD 05/2018 notable for gastritis and GERD and salmon colored mucosa in the esophagus. Repeat EGD 08/2018 showed candidal esophagitis and was treated. Last colonoscopy in 05/2017 was notable for hemorrhoids and diverticulosis. Colonoscopy prior in 02/2014 was notable for adenomatous colon polyps.    Patient last used tobacco over 13 years ago. CT chest reviewed with Dr. Velez of Radiology last night. There is displacement of the esophagus by the suprahilar mass.    Past Medical History:   Diagnosis Date    Adrenal adenoma     david;nl fxn w/u;tran 11/18    Aspiration pneumonia 10/15    puree/honey    Benign prostate hyperplasia     CAD (coronary artery disease)     dr padilla    Chronic pain     dr santos    Chronic systolic congestive heart failure 10/17/2019    Cirrhosis     w 5/20 hospital    COPD (chronic obstructive pulmonary disease)     papi    Ex-smoker     11/13    Hyperlipidemia     Ischemic cardiomyopathy 11/22/2019  "   Oropharyngeal dysphagia 5/17/2020    JUANITO on CPAP     cpap    Osteopenia 1/15 tran 1/18    Pneumonia involving bilateral lungs     PSVT (paroxysmal supraventricular tachycardia)     PVD (peripheral vascular disease)     noobs 07 khuri    Pyriform sinus cancer     dr ponce radiation 1/2-14 dr king perales    Squamous cell carcinoma of skin     roberto    Tongue cancer     "superficial" removed 11/14    Vocal cord cancer 2011    Xerostomia     radiation       Past Surgical History:   Procedure Laterality Date    APPENDECTOMY      BRONCHOSCOPY Bilateral 2/5/2019    Procedure: Bronchoscopy;  Surgeon: Santos Cardozo MD;  Location: [facility];  Service: Endoscopy;  Laterality: Bilateral;    COLONOSCOPY N/A 5/1/2017    Procedure: Due for screening colonoscopy;  Surgeon: Anushka Yoder MD;  Location: [facility];  Service: Endoscopy;  Laterality: N/A;    ESOPHAGOGASTRODUODENOSCOPY N/A 5/29/2018    Procedure: ESOPHAGOGASTRODUODENOSCOPY (EGD);  Surgeon: Audie Hill III, MD;  Location: [facility];  Service: Endoscopy;  Laterality: N/A;    ESOPHAGOGASTRODUODENOSCOPY N/A 8/29/2018    Procedure: ESOPHAGOGASTRODUODENOSCOPY (EGD);  Surgeon: Audie Hill III, MD;  Location: [facility];  Service: Endoscopy;  Laterality: N/A;    LEFT HEART CATHETERIZATION Left 5/7/2020    Procedure: CATHETERIZATION, HEART, LEFT;  Surgeon: Abel Beltran MD;  Location: [facility] CATH LAB;  Service: Cardiology;  Laterality: Left;  iodine allergy    PERCUTANEOUS TRANSLUMINAL BALLOON ANGIOPLASTY OF CORONARY ARTERY Right 12/9/2019    Procedure: PTCA, USING BALLOON/ IABP or Impella multivessel angioplasty/poss stent;  Surgeon: Abel Beltran MD;  Location: [facility] CATH LAB;  Service: Cardiology;  Laterality: Right;    PERCUTANEOUS TRANSLUMINAL BALLOON ANGIOPLASTY OF CORONARY ARTERY  5/7/2020    Procedure: Angioplasty-coronary;  Surgeon: Abel Beltran MD;  Location: [facility] CATH LAB;  Service: Cardiology;;    THORACENTESIS Left " 2018    Procedure: Thoracentesis;  Surgeon: Santos Cardozo MD;  Location: [facility] ENDO;  Service: Endoscopy;  Laterality: Left;    THORACENTESIS Right 2019    Procedure: Thoracentesis;  Surgeon: Santos Cardozo MD;  Location: [facility] ENDO;  Service: Endoscopy;  Laterality: Right;    tongue cancer excision      dr vitale    VOCAL CORD LATERALIZATION, ENDOSCOPIC APPROACH W/ MLB      kris       Review of patient's allergies indicates:   Allergen Reactions    Contrast media Hives and Itching    Iodinated contrast media Hives and Itching    Iodine Hives and Itching    Pravastatin      Other reaction(s): fatigue  Other reaction(s): fatigue    Rosuvastatin      Other reaction(s): fatigue  Other reaction(s): fatigue    Simvastatin      Other reaction(s): fatigue  Other reaction(s): fatigue    Statins-hmg-coa reductase inhibitors Other (See Comments)     Fatigue     Family History     Problem Relation (Age of Onset)    Lung cancer Father, Brother    No Known Problems Mother        Tobacco Use    Smoking status: Former Smoker     Packs/day: 0.50     Years: 55.00     Pack years: 27.50     Types: Cigarettes     Last attempt to quit: 10/1/2019     Years since quittin.6    Smokeless tobacco: Never Used    Tobacco comment: 6-7 cigs/day   Substance and Sexual Activity    Alcohol use: Not Currently    Drug use: No    Sexual activity: Not Currently     Review of Systems  Objective:     Vital Signs (Most Recent):  Temp: 97.7 °F (36.5 °C) (20 0749)  Pulse: 75 (20 1232)  Resp: 18 (20 1232)  BP: 121/60 (20 0749)  SpO2: (!) 93 % (20 1232) Vital Signs (24h Range):  Temp:  [97.4 °F (36.3 °C)-98 °F (36.7 °C)] 97.7 °F (36.5 °C)  Pulse:  [60-80] 75  Resp:  [18-23] 18  SpO2:  [89 %-97 %] 93 %  BP: (112-147)/(56-67) 121/60     Weight: 58.7 kg (129 lb 6.6 oz) (20 0600)  Body mass index is 19.68 kg/m².      Intake/Output Summary (Last 24 hours) at 2020 1234  Last data  filed at 5/17/2020 0600  Gross per 24 hour   Intake 240 ml   Output 700 ml   Net -460 ml       Lines/Drains/Airways     Peripheral Intravenous Line                 Peripheral IV - Single Lumen 05/16/20 1053 20 G Left Antecubital 1 day                Physical Exam    Significant Labs:  Recent Lab Results       05/17/20  1228   05/17/20  0448   05/16/20  2252   05/16/20  1711   05/16/20  1622        Anion Gap   10           Ao root annulus         2.97     Ao peak lulu         1.64     Ao VTI         29.60     AV valve area         2.61     AV mean gradient         4     AV index (prosthetic)         0.57     AV peak gradient         11     AV Velocity Ratio         0.51     BSA         1.66     BUN, Bld   48           Calcium   9.0           Chloride   104           CO2   32           Creatinine   1.5           Left Ventricle Relative Wall Thickness         0.56     D-Dimer 6.22  Comment:  The quantitative D-dimer assay should be used as an aid in   the diagnosis of deep vein thrombosis and pulmonary embolism  in patients with the appropriate presentation and clinical  history. The upper limit of the reference interval and the clinical   cut off   point are identical. Causes of a positive (>0.50 mg/L FEU) D-Dimer   test  include, but are not limited to: DVT, PE, DIC, thrombolytic   therapy, anticoagulant therapy, recent surgery, trauma, or   pregnancy, disseminated malignancy, aortic aneurysm, cirrhosis,  and severe infection. False negative results may occur in   patients with distal DVT.               E/A ratio         0.66     E/E' ratio         5.60     eGFR if    53           eGFR if non    46  Comment:  Calculation used to obtain the estimated glomerular filtration  rate (eGFR) is the CKD-EPI equation.              E wave decelartion time         269.74     FS         53     Glucose   79           IVS         0.65     LA WIDTH         3.40     Left Atrium Major Axis         4.17      Left Atrium Minor Axis         3.29     LA size         2.80     LA volume         29.76     LA Volume Index         17.7     LVOT area         4.6     LV LATERAL E/E' RATIO         4.67     LV SEPTAL E/E' RATIO         7.00     LV Diastolic Volume         85.78     LV Diastolic Volume Index         50.98     LVIDD         4.36     LVIDS         2.07     LV mass         133.79     LV Mass Index         80     LVOT diameter         2.42     LVOT peak billy         0.83     LVOT stroke volume         77.23     LVOT peak VTI         16.80     LV Systolic Volume         13.83     LV Systolic Volume Index         8.2     Magnesium   2.5           Mean e'         0.08     MV Peak A Billy         0.64     MV Peak E Billy         0.42     Potassium   4.6           PW         1.23     RA Major Axis         3.38     Sinus         3.24     Sodium   146           STJ         2.98     TAPSE         2.10     TDI SEPTAL         0.06     TDI LATERAL         0.09     Troponin I     0.038  Comment:  The reference interval for Troponin I represents the 99th percentile   cutoff   for our facility and is consistent with 3rd generation assay   performance.   0.034  Comment:  The reference interval for Troponin I represents the 99th percentile   cutoff   for our facility and is consistent with 3rd generation assay   performance.                              Significant Imaging:  Imaging results within the past 24 hours have been reviewed.    Assessment/Plan:     Active Diagnoses:    Diagnosis Date Noted POA    PRINCIPAL PROBLEM:  Acute on chronic respiratory failure with hypoxia [J96.21] 05/16/2020 Yes    Oropharyngeal dysphagia [R13.12] 05/17/2020 Yes     Chronic    Other constipation [K59.09] 05/17/2020 Yes     Chronic    NEYMAR (acute kidney injury) [N17.9] 05/16/2020 Yes    Cirrhosis of liver without ascites [K74.60]  Unknown    Elevated troponin [R79.89] 05/10/2020 Yes    Chronic diastolic congestive heart failure [I50.32]  12/09/2019 Yes     Chronic    Coronary artery disease involving left main coronary artery [I25.10] 10/23/2019 Yes    Stage 3 severe COPD by GOLD classification [J44.9] 02/02/2016 Yes    Oropharyngeal aspiration [T17.208A] 10/11/2015 Yes    Pneumonia involving bilateral lungs [J18.9]  Yes    JUANITO on CPAP [G47.33, Z99.89] 10/07/2014 Not Applicable     Chronic    Hypertension [I10]  Yes      Problems Resolved During this Admission:       A: 74 y.o male with suspected lung cancer with metastatic disease    Recommendations:  1. EGD with MAC. Will assess if PEG can safely be placed endoscopically    The risks, benefits and alternatives of the procedure were discussed with the patient in detail. This discussion was had in the presence of endoscopy staff. The risks include, risks of adverse reaction to sedation requiring the use of reversal agents, bleeding requiring blood transfusion, perforation requiring surgical intervention and technical failure. Other risks include aspiration leading to respiratory distress and respiratory failure resulting in endotracheal intubation and mechanical ventilation including death. If anesthesia is being utilized for this procedure, it is up to the anesthesiologist to determine airway safety including elective endotracheal intubation. Questions were answered, they agree to proceed. There was no language barriers.       Thank you for your consult. I will follow-up with patient. Please contact us if you have any additional questions.    Debbie Soriano MD  Gastroenterology  [facility]

## 2020-05-17 NOTE — ASSESSMENT & PLAN NOTE
05/17/2020 He has Diastolic heart failure that is Chronic. ASSESSMENT: Stable. I'm unconvinced that his CHF is major contributor to his respiratory failure. Cardiology consulted.  PLAN:  De-escalate diuresis.   Temp:  [97.4 °F (36.3 °C)-98 °F (36.7 °C)] 97.7 °F (36.5 °C)  Pulse:  [60-80] 70  Resp:  [18-23] 18  SpO2:  [89 %-97 %] 92 %  BP: (112-147)/(56-67) 121/60   Diuretics (From admission, onward)    Start     Stop Route Frequency Ordered    05/17/20 0900  furosemide injection 40 mg      -- IV 2 times daily 05/16/20 1456           Intake/Output Summary (Last 24 hours) at 5/17/2020 1024  Last data filed at 5/17/2020 0600  Gross per 24 hour   Intake 240 ml   Output 700 ml   Net -460 ml     Patient Vitals for the past 72 hrs (Last 3 readings):   Weight   05/17/20 0600 58.7 kg (129 lb 6.6 oz)   05/17/20 0350 58.7 kg (129 lb 6.6 oz)   05/16/20 1955 58.5 kg (128 lb 15.5 oz)      Recent Labs   Lab 05/11/20  0434 05/12/20  0456 05/16/20  1053 05/17/20  0448   BNP  --   --  254*  --    BUN 40* 28* 50* 48*   CREATININE 1.2 0.9 1.7* 1.5*   ESTGFRAFRICA >60 >60 45* 53*   EGFRNONAA 60 >60 39* 46*    143 143 146*   K 4.3 4.1 4.6 4.6    106 106 104   CO2 26 27 27 32*   PHOS  --  1.9*  --   --    MG 2.7* 2.4  --  2.5   HGB 11.2* 11.2* 11.3*  --    HCT 35.8* 35.5* 35.9*  --

## 2020-05-17 NOTE — ASSESSMENT & PLAN NOTE
05/17/2020  ASSESSMENT: After lengthy discussion of risks and benefits of treatment options, he agreed to proceed with PEG tube placement.   PLAN:  Barium swallow study and GI consult ordered.

## 2020-05-17 NOTE — PLAN OF CARE
Patient awake, alert and oriented x 4, calm, cooperative.  Patient able to make needs known.   Needs met by staff for shift.  Patient denies pain at this time.   PRN pain meds administered per MAR.  IV site patent and intact, no redness.  No other complaints voiced at this time.   Continue on telemetry monitoring.

## 2020-05-17 NOTE — PLAN OF CARE
Patient aao x 4.   Vital signs stable.  Afebrile.  Speech clear.  Patient SpO2 9% on 4LNC, respiratory notified.  SpO2 now 96% on 40% Venti Mask.  Continuous pulse oximeter.  Pt NSR on monitor.  Pt refused bed alarm.  Ambulates with cane.  Plan of Care reviewed, patient verbalized understanding.  Bed in low position, side rails up x 2, call light in reach.  Will continue to monitor.

## 2020-05-17 NOTE — HPI
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  His most recent pulmonary history has been complicated by dysphagia and aspiration pneumonia.  He had a history of an abnormal PET scan on 01/23/2019 and subsequently underwent bronchoscopy by  on 02/05/2019 which demonstrated localized fibrosis (no evidence of cancer on lung biopsies.  Since that evaluation the patient has developed a new left hilar mass that was identified during this hospitalization along with liver abnormalities on CT scan suspicious for metastatic malignancy.  Prior CT scan of the abdomen revealed - liver demonstrates right lobe cyst as well as adrenal adenoma.   He presents to the emergency room on 05/16/2020 with a 1-2 day history of increasing shortness of breath, paroxysmal nocturnal dyspnea, nonproductive cough and peripheral edema.  He has a history of congestive heart failure was noted to have bilateral pleural effusions on most recent exam.  He was admitted for acute on chronic diastolic congestive heart failure and probable aspiration pneumonia.  He has a  PMHx of CAD, CHF, PSVT, PVD, JUANITO on CPAP, HLD, COPD and BPH.  Patient reports that he is  and  lives alone and cares for himself and has frequent assistance from his children.

## 2020-05-17 NOTE — ASSESSMENT & PLAN NOTE
05/17/2020  ASSESSMENT: Believed to be malignant.  PLAN:  He gave informed refusal for bronchoscopy or any other other further diagnostic evaluation, opting for palliative care with home hospice.

## 2020-05-17 NOTE — PT/OT/SLP EVAL
Physical Therapy Evaluation and Discharge Note    Patient Name:  Noble Tripp   MRN:  [id number]    Recommendations:     Discharge Recommendations:  home   Discharge Equipment Recommendations: none   Barriers to discharge: None    Assessment:     Noble Tripp is a 74 y.o. male admitted with a medical diagnosis of Acute on chronic respiratory failure with hypoxia. .  At this time, patient is functioning at their prior level of function and does not require further acute PT services.     Recent Surgery: Procedure(s) (LRB):  INSERTION, PEG TUBE (N/A)      Plan:     During this hospitalization, patient does not require further acute PT services.  Please re-consult if situation changes.      Subjective     Chief Complaint: none; pureed food  Patient/Family Comments/goals: to go home  Pain/Comfort:  · Pain Rating 1: 0/10    Patients cultural, spiritual, Buddhist conflicts given the current situation:      Living Environment:  Lives alone but has a lot of family to help him prn - no steps to enter home  Prior to admission, patients level of function was mod indep.  Equipment used at home: CPAP, cane, straight, oxygen.  DME owned (not currently used): none.  Upon discharge, patient will have assistance from family.    Objective:     Communicated with RN prior to session.  Patient found HOB elevated with pulse ox (continuous), oxygen, peripheral IV, telemetry upon PT entry to room.    General Precautions: Standard, aspiration   Orthopedic Precautions:N/A   Braces: N/A     Exams:  · b LE rom wfl and strength grossly 4+/5 at least    Functional Mobility:  · All mobility spv; amb in room ad adarsh spv with spc and O2 donned    AM-PAC 6 CLICK MOBILITY  Total Score:21       Therapeutic Activities and Exercises:   PT educated patient on POC to d/c PT, B LE TE to prep mobility and safety precautions with mobility using SPC    AM-PAC 6 CLICK MOBILITY  Total Score:21     Patient left HOB elevated with all lines intact,  "call button in reach and RN notified.    GOALS:   Multidisciplinary Problems     Physical Therapy Goals     Not on file                History:     Past Medical History:   Diagnosis Date    Adrenal adenoma     david;nl fxn w/u;tran 11/18    Aspiration pneumonia 10/15    puree/honey    Benign prostate hyperplasia     CAD (coronary artery disease)     dr padilla    Chronic pain     dr santos    Chronic systolic congestive heart failure 10/17/2019    Cirrhosis     w 5/20 hospital    COPD (chronic obstructive pulmonary disease)     papi    Ex-smoker     11/13    Hyperlipidemia     Ischemic cardiomyopathy 11/22/2019    Oropharyngeal dysphagia 5/17/2020    JUANITO on CPAP     cpap    Osteopenia 1/15 tran 1/18    Pneumonia involving bilateral lungs     PSVT (paroxysmal supraventricular tachycardia)     PVD (peripheral vascular disease)     noobs 07 khuri    Pyriform sinus cancer     dr ponce radiation 1/2-14 dr king perales    Squamous cell carcinoma of skin     roberto    Tongue cancer     "superficial" removed 11/14    Vocal cord cancer 2011    Xerostomia     radiation       Past Surgical History:   Procedure Laterality Date    APPENDECTOMY      BRONCHOSCOPY Bilateral 2/5/2019    Procedure: Bronchoscopy;  Surgeon: Santos Cardozo MD;  Location: [facility];  Service: Endoscopy;  Laterality: Bilateral;    COLONOSCOPY N/A 5/1/2017    Procedure: Due for screening colonoscopy;  Surgeon: Anushka Yoder MD;  Location: [facility];  Service: Endoscopy;  Laterality: N/A;    ESOPHAGOGASTRODUODENOSCOPY N/A 5/29/2018    Procedure: ESOPHAGOGASTRODUODENOSCOPY (EGD);  Surgeon: Audie Hill III, MD;  Location: [facility];  Service: Endoscopy;  Laterality: N/A;    ESOPHAGOGASTRODUODENOSCOPY N/A 8/29/2018    Procedure: ESOPHAGOGASTRODUODENOSCOPY (EGD);  Surgeon: Audie Hill III, MD;  Location: [facility];  Service: Endoscopy;  Laterality: N/A;    LEFT HEART CATHETERIZATION Left 5/7/2020    Procedure: " CATHETERIZATION, HEART, LEFT;  Surgeon: Abel Beltran MD;  Location: [facility] CATH LAB;  Service: Cardiology;  Laterality: Left;  iodine allergy    PERCUTANEOUS TRANSLUMINAL BALLOON ANGIOPLASTY OF CORONARY ARTERY Right 12/9/2019    Procedure: PTCA, USING BALLOON/ IABP or Impella multivessel angioplasty/poss stent;  Surgeon: Abel Beltran MD;  Location: [facility] CATH LAB;  Service: Cardiology;  Laterality: Right;    PERCUTANEOUS TRANSLUMINAL BALLOON ANGIOPLASTY OF CORONARY ARTERY  5/7/2020    Procedure: Angioplasty-coronary;  Surgeon: Abel Beltran MD;  Location: [facility] CATH LAB;  Service: Cardiology;;    THORACENTESIS Left 8/7/2018    Procedure: Thoracentesis;  Surgeon: Santos Cardozo MD;  Location: [facility] ENDO;  Service: Endoscopy;  Laterality: Left;    THORACENTESIS Right 2/25/2019    Procedure: Thoracentesis;  Surgeon: Santos Cardozo MD;  Location: [facility] ENDO;  Service: Endoscopy;  Laterality: Right;    tongue cancer excision  11/14    dr vitale    VOCAL CORD LATERALIZATION, ENDOSCOPIC APPROACH W/ MLB      kris       Time Tracking:     PT Received On: 05/17/20  PT Start Time: 1245     PT Stop Time: 1310  PT Total Time (min): 25 min     Billable Minutes: Evaluation 15 and Therapeutic Activity 10      Dutch Bower, PT  05/17/2020

## 2020-05-18 ENCOUNTER — PATIENT OUTREACH (OUTPATIENT)
Dept: ADMINISTRATIVE | Facility: OTHER | Age: 75
End: 2020-05-18

## 2020-05-18 ENCOUNTER — ANESTHESIA (OUTPATIENT)
Dept: ENDOSCOPY | Facility: HOSPITAL | Age: 75
DRG: 291 | End: 2020-05-18
Payer: MEDICARE

## 2020-05-18 ENCOUNTER — ANESTHESIA EVENT (OUTPATIENT)
Dept: ENDOSCOPY | Facility: HOSPITAL | Age: 75
DRG: 291 | End: 2020-05-18
Payer: MEDICARE

## 2020-05-18 PROBLEM — E43 SEVERE MALNUTRITION: Status: ACTIVE | Noted: 2020-05-18

## 2020-05-18 LAB
ANION GAP SERPL CALC-SCNC: 13 MMOL/L (ref 8–16)
BUN SERPL-MCNC: 57 MG/DL (ref 8–23)
CALCIUM SERPL-MCNC: 8.8 MG/DL (ref 8.7–10.5)
CHLORIDE SERPL-SCNC: 102 MMOL/L (ref 95–110)
CO2 SERPL-SCNC: 29 MMOL/L (ref 23–29)
CREAT SERPL-MCNC: 1.5 MG/DL (ref 0.5–1.4)
EST. GFR  (AFRICAN AMERICAN): 53 ML/MIN/1.73 M^2
EST. GFR  (NON AFRICAN AMERICAN): 46 ML/MIN/1.73 M^2
GLUCOSE SERPL-MCNC: 71 MG/DL (ref 70–110)
POTASSIUM SERPL-SCNC: 4.5 MMOL/L (ref 3.5–5.1)
SODIUM SERPL-SCNC: 144 MMOL/L (ref 136–145)

## 2020-05-18 PROCEDURE — 94640 AIRWAY INHALATION TREATMENT: CPT

## 2020-05-18 PROCEDURE — 99232 SBSQ HOSP IP/OBS MODERATE 35: CPT | Mod: ,,, | Performed by: INTERNAL MEDICINE

## 2020-05-18 PROCEDURE — 43235 PR EGD, FLEX, DIAGNOSTIC: ICD-10-PCS | Mod: ,,, | Performed by: INTERNAL MEDICINE

## 2020-05-18 PROCEDURE — 43235 EGD DIAGNOSTIC BRUSH WASH: CPT | Performed by: INTERNAL MEDICINE

## 2020-05-18 PROCEDURE — 99232 PR SUBSEQUENT HOSPITAL CARE,LEVL II: ICD-10-PCS | Mod: ,,, | Performed by: INTERNAL MEDICINE

## 2020-05-18 PROCEDURE — 63600175 PHARM REV CODE 636 W HCPCS: Performed by: NURSE PRACTITIONER

## 2020-05-18 PROCEDURE — 63600175 PHARM REV CODE 636 W HCPCS: Performed by: FAMILY MEDICINE

## 2020-05-18 PROCEDURE — 99231 PR SUBSEQUENT HOSPITAL CARE,LEVL I: ICD-10-PCS | Mod: ,,, | Performed by: INTERNAL MEDICINE

## 2020-05-18 PROCEDURE — 37000008 HC ANESTHESIA 1ST 15 MINUTES: Performed by: INTERNAL MEDICINE

## 2020-05-18 PROCEDURE — 99231 SBSQ HOSP IP/OBS SF/LOW 25: CPT | Mod: ,,, | Performed by: INTERNAL MEDICINE

## 2020-05-18 PROCEDURE — 43235 EGD DIAGNOSTIC BRUSH WASH: CPT | Mod: ,,, | Performed by: INTERNAL MEDICINE

## 2020-05-18 PROCEDURE — 25000003 PHARM REV CODE 250: Performed by: NURSE ANESTHETIST, CERTIFIED REGISTERED

## 2020-05-18 PROCEDURE — 80048 BASIC METABOLIC PNL TOTAL CA: CPT

## 2020-05-18 PROCEDURE — 37000009 HC ANESTHESIA EA ADD 15 MINS: Performed by: INTERNAL MEDICINE

## 2020-05-18 PROCEDURE — 94761 N-INVAS EAR/PLS OXIMETRY MLT: CPT

## 2020-05-18 PROCEDURE — 21400001 HC TELEMETRY ROOM

## 2020-05-18 PROCEDURE — 36415 COLL VENOUS BLD VENIPUNCTURE: CPT

## 2020-05-18 PROCEDURE — 25000242 PHARM REV CODE 250 ALT 637 W/ HCPCS: Performed by: FAMILY MEDICINE

## 2020-05-18 PROCEDURE — 99900035 HC TECH TIME PER 15 MIN (STAT)

## 2020-05-18 PROCEDURE — 27000221 HC OXYGEN, UP TO 24 HOURS

## 2020-05-18 PROCEDURE — 63600175 PHARM REV CODE 636 W HCPCS: Performed by: NURSE ANESTHETIST, CERTIFIED REGISTERED

## 2020-05-18 PROCEDURE — 25000003 PHARM REV CODE 250: Performed by: NURSE PRACTITIONER

## 2020-05-18 PROCEDURE — 25000003 PHARM REV CODE 250: Performed by: FAMILY MEDICINE

## 2020-05-18 RX ORDER — SODIUM CHLORIDE 9 MG/ML
INJECTION, SOLUTION INTRAVENOUS CONTINUOUS
Status: DISCONTINUED | OUTPATIENT
Start: 2020-05-18 | End: 2020-05-19 | Stop reason: HOSPADM

## 2020-05-18 RX ORDER — PROPOFOL 10 MG/ML
VIAL (ML) INTRAVENOUS
Status: DISCONTINUED | OUTPATIENT
Start: 2020-05-18 | End: 2020-05-18

## 2020-05-18 RX ORDER — PANTOPRAZOLE SODIUM 40 MG/10ML
40 INJECTION, POWDER, LYOPHILIZED, FOR SOLUTION INTRAVENOUS
Status: DISCONTINUED | OUTPATIENT
Start: 2020-05-19 | End: 2020-05-19 | Stop reason: HOSPADM

## 2020-05-18 RX ORDER — LIDOCAINE HYDROCHLORIDE 10 MG/ML
INJECTION, SOLUTION EPIDURAL; INFILTRATION; INTRACAUDAL; PERINEURAL
Status: DISCONTINUED | OUTPATIENT
Start: 2020-05-18 | End: 2020-05-18

## 2020-05-18 RX ORDER — SODIUM CHLORIDE, SODIUM LACTATE, POTASSIUM CHLORIDE, CALCIUM CHLORIDE 600; 310; 30; 20 MG/100ML; MG/100ML; MG/100ML; MG/100ML
INJECTION, SOLUTION INTRAVENOUS CONTINUOUS PRN
Status: DISCONTINUED | OUTPATIENT
Start: 2020-05-18 | End: 2020-05-18

## 2020-05-18 RX ADMIN — CARVEDILOL 3.12 MG: 3.12 TABLET, FILM COATED ORAL at 08:05

## 2020-05-18 RX ADMIN — PROPOFOL 60 MG: 10 INJECTION, EMULSION INTRAVENOUS at 08:05

## 2020-05-18 RX ADMIN — PANTOPRAZOLE SODIUM 40 MG: 40 TABLET, DELAYED RELEASE ORAL at 10:05

## 2020-05-18 RX ADMIN — PROPOFOL 10 MG: 10 INJECTION, EMULSION INTRAVENOUS at 08:05

## 2020-05-18 RX ADMIN — OXYCODONE AND ACETAMINOPHEN 1 TABLET: 7.5; 325 TABLET ORAL at 07:05

## 2020-05-18 RX ADMIN — IPRATROPIUM BROMIDE 0.5 MG: 0.5 SOLUTION RESPIRATORY (INHALATION) at 12:05

## 2020-05-18 RX ADMIN — ISOSORBIDE MONONITRATE 30 MG: 30 TABLET, EXTENDED RELEASE ORAL at 10:05

## 2020-05-18 RX ADMIN — PROPOFOL 20 MG: 10 INJECTION, EMULSION INTRAVENOUS at 08:05

## 2020-05-18 RX ADMIN — FUROSEMIDE 40 MG: 10 INJECTION, SOLUTION INTRAMUSCULAR; INTRAVENOUS at 10:05

## 2020-05-18 RX ADMIN — SODIUM CHLORIDE 1000 ML: 0.9 INJECTION, SOLUTION INTRAVENOUS at 07:05

## 2020-05-18 RX ADMIN — OXYCODONE AND ACETAMINOPHEN 1 TABLET: 7.5; 325 TABLET ORAL at 05:05

## 2020-05-18 RX ADMIN — FUROSEMIDE 40 MG: 10 INJECTION, SOLUTION INTRAMUSCULAR; INTRAVENOUS at 05:05

## 2020-05-18 RX ADMIN — CEFEPIME HYDROCHLORIDE 2 G: 2 INJECTION, SOLUTION INTRAVENOUS at 09:05

## 2020-05-18 RX ADMIN — LIDOCAINE HYDROCHLORIDE 80 MG: 10 INJECTION, SOLUTION EPIDURAL; INFILTRATION; INTRACAUDAL; PERINEURAL at 08:05

## 2020-05-18 RX ADMIN — SODIUM CHLORIDE, SODIUM LACTATE, POTASSIUM CHLORIDE, AND CALCIUM CHLORIDE: .6; .31; .03; .02 INJECTION, SOLUTION INTRAVENOUS at 08:05

## 2020-05-18 RX ADMIN — OXYCODONE AND ACETAMINOPHEN 1 TABLET: 7.5; 325 TABLET ORAL at 10:05

## 2020-05-18 RX ADMIN — CARVEDILOL 3.12 MG: 3.12 TABLET, FILM COATED ORAL at 10:05

## 2020-05-18 RX ADMIN — IPRATROPIUM BROMIDE 0.5 MG: 0.5 SOLUTION RESPIRATORY (INHALATION) at 07:05

## 2020-05-18 RX ADMIN — POLYETHYLENE GLYCOL 3350 17 G: 17 POWDER, FOR SOLUTION ORAL at 10:05

## 2020-05-18 RX ADMIN — CEFEPIME HYDROCHLORIDE 2 G: 2 INJECTION, SOLUTION INTRAVENOUS at 10:05

## 2020-05-18 NOTE — DISCHARGE INSTRUCTIONS
Esophagitis     With esophagitis, the lining of the esophagus is inflamed.   Do you often have burning pain in your chest? You may have esophagitis. This is when the lining of the esophagus becomes red and swollen (inflamed). The esophagus is the tube that connects your throat to your stomach. This sheet tells you more about esophagitis. It also explains your treatment options.  Main types of esophagitis  Reflux esophagitis. This is the more common type. It is caused by GERD (gastroesophageal reflux disease). Stomach contents with stomach acid flow back up into the esophagus. This happens over and over. It leads to inflammation. Risk factors can include:  · Being overweight  · Asthma  · Smoking  · Pregnancy  · Frequent vomiting  · Certain medicines (such as aspirin and other anti-inflammatories)  · Hiatal hernia  Infectious esophagitis. This is caused by an infection. You are more at risk for this if you have a weakened immune system and poor nutrition. Antibiotic use can also be a factor. The infection is often due to the following:  · A type of fungus (typically candida)  · A virus, such as herpes simplex virus 1 (HSV-1) or cytomegalovirus (CMV)  Eosinophilic esophagitis. Foods or other things around you can give you an allergic reaction. This triggers an immune response and leads to esophagitis.  Pill-induced esophagitis. Certain types of medicines can cause inflammation and ulcers in the esophagus. These include doxycycline, aspirin, NSAIDs, alendronate, potassium, quinidine, iron.  Symptoms of esophagitis  The following symptoms can occur with esophagitis:  · Pain when swallowing, or trouble swallowing  · Pain behind your breastbone (heartburn)  · Acid regurgitation  · Chronic sore throat  · Gum Inflammation  · Cavities  · Bad breath  · Nausea  · Pain in your upper belly (abdomen)  · Bleeding (indicated by bright red vomit or black, tarry stool)  These symptoms occur more often with reflux  esophagitis:  · Coughing, wheezing, or asthma  · Hoarseness  Diagnosis of esophagitis  Your healthcare provider will ask about your health history and symptoms. Youll also be examined. Sometimes certain tests are needed. These may include:  · Upper endoscopy. A thin, flexible tube with a tiny light and camera is used. It is inserted through the mouth down into the esophagus. This lets the provider look for damage. A small sample of tissue (biopsy) may also be removed. The sample is sent to a lab for testing.  · Upper GI X-ray with barium. An X-ray is done after you drink a substance called barium. Barium may make problems in the esophagus easier to see on an x-ray.  · Esophageal pH. A soft, thin tube is passed into the esophagus through the nose or mouth for 24 hours. It measures the acid level in the esophagus.  · Esophageal manometry. A soft, thin tube is passed into the esophagus through the nose or mouth. It measures muscle contractions in the esophagus.  Treatment of esophagitis  Medicines. Different medicines can help treat esophagitis. The medicine used will depend on the type of esophagitis you have. Talk with your healthcare provider.  Lifestyle changes. Making the following changes can help reduce irritation and ease your symptoms:  · Avoid spicy foods (pepper, chili powder, dempsey). Also avoid hard foods (nuts, crackers, raw vegetables) and acidic foods and drinks (tomatoes, citrus fruits and juices). Other problem foods include chocolate, peppermint, nutmeg, and foods high in fat.  · Until you can swallow without pain, follow a combined liquid and soft diet. Try foods such as cooked cereals, mashed potatoes, and soups.  · Take small bites and chew your food thoroughly.  · Avoid large meals and heavy evening meals. Don't lie down within 2 to 3 hours of eating.  · Get to or stay at a healthy weight.  · Avoid alcohol, caffeine, and smoking or tobacco products.  · Brush and floss your teeth  · Raise your  upper body by 4 to 6 inches when lying in bed. This can be done using a foam wedge. Or put blocks under the legs at the head of your bed.  Surgery. This may be needed for severe reflux esophagitis. Other noninvasive procedures to treat GERD and esophagitis are being studied. Your provider can tell you more.  Why treatment Is important  Without treatment, esophagitis can get worse. This is especially true with severe reflux esophagitis. For instance, continued symptoms can cause scarring of the esophagus. Over time, this can cause a narrowing the esophagus (stricture). This can make it hard to pass food down to the stomach. As symptoms go on they can also cause changes in the lining of the esophagus. These changes can put you at a slightly higher risk of cancer of the esophagus.   Date Last Reviewed: 7/1/2016 © 2000-2017 [employer]. [address] [place] [address]. All rights reserved. This information is not intended as a substitute for professional medical care. Always follow your healthcare professional's instructions.        Gastritis (Adult)    Gastritis is inflammation and irritation of the stomach lining. It can be present for a short time (acute) or be long lasting (chronic). Gastritis is often caused by infection with bacteria called H pylori. More than a third of people in the [place] have this bacteria in their bodies. In many cases, H pylori causes no problems or symptoms. In some people, though, the infection irritates the stomach lining and causes gastritis. Other causes of stomach irritation include drinking alcohol or taking pain-relieving medicines called NSAIDs (such as aspirin or ibuprofen).   Symptoms of gastritis can include:  · Abdominal pain or bloating  · Loss of appetite  · Nausea or vomiting  · Vomiting blood or having black stools  · Feeling more tired than usual  An inflamed and irritated stomach lining is more likely to develop a sore called an ulcer. To help prevent  this, gastritis should be treated.  Home care  If needed, medicines may be prescribed. If you have H pylori infection, treating it will likely relieve your symptoms. Other changes can help reduce stomach irritation and help it heal.  · If you have been prescribed medicines for H pylori infection, take them as directed. Take all of the medicine until it is finished or your healthcare provider tells you to stop, even if you feel better.  · Your healthcare provider may recommend avoiding NSAIDs. If you take daily aspirin for your heart or other medical reasons, do not stop without talking to your healthcare provider first.  · Avoid drinking alcohol.  · Stop smoking. Smoking can irritate the stomach and delay healing. As much as possible, stay away from second hand smoke.  Follow-up care  Follow up with your healthcare provider, or as advised by our staff. Testing may be needed to check for inflammation or an ulcer.  When to seek medical advice  Call your healthcare provider for any of the following:  · Stomach pain that gets worse or moves to the lower right abdomen (appendix area)  · Chest pain that appears or gets worse, or spreads to the back, neck, shoulder, or arm  · Frequent vomiting (cant keep down liquids)  · Blood in the stool or vomit (red or black in color)  · Feeling weak or dizzy  · Fever of 100.4ºF (38ºC) or higher, or as directed by your healthcare provider  Date Last Reviewed: 6/22/2015  © 9393-8472 [employer]. [address] [place] [address], [place] [address]. All rights reserved. This information is not intended as a substitute for professional medical care. Always follow your healthcare professional's instructions.

## 2020-05-18 NOTE — PROGRESS NOTES
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 COVID-19 test was negative     CT chest without contrast showed suprahilar mass measuring 3 x 2.4 cm with patchy area of alveolar consolidation extending to the apex and interstitial thickening.  There was also interval development of her mediastinal adenopathy.  There were enlarged prevascular lymph nodes measuring 2 x 2 cm on the left paratracheal lymph node.  There were also innumerable mild and small density lesions throughout the liver concerning for metastasis.  Given the above findings, Oncology was consulted for evaluation and recommendation.     Patient denies current smoking history, quit smoking about 13 years ago.  No extensive family history for malignancy per patient.  At the time of interview he complains of severe constipation but denies unintentional weight loss, fever, night sweats, nausea or vomiting, chest pain, dysuria.  He also denies hemoptysis, hematemesis, hematochezia, melena or hematuria.    Interval history:  Sitting up by the side of his bed.  Reports improvement in breathing.  Plan for PEG tube placement.  Hospice arrangement through .      Continuous Infusions:  Scheduled Meds:   carvediloL  3.125 mg Oral BID    ceFEPime (MAXIPIME) IVPB  2 g Intravenous Q12H    furosemide (LASIX) IV  40 mg Intravenous BID    ipratropium  0.5 mg Nebulization Q6H    isosorbide mononitrate  30 mg Oral Daily    pantoprazole  40 mg Oral Daily    polyethylene glycol  17 g Oral Daily     PRN  "Meds:albuterol-ipratropium, bisacodyL, ondansetron, oxyCODONE-acetaminophen, sodium chloride 0.9%    Past Medical History:   Diagnosis Date    Adrenal adenoma     david;nl fxn w/u;tran 11/18    Aspiration pneumonia 10/15    puree/honey    Benign prostate hyperplasia     CAD (coronary artery disease)     dr padilla    Chronic pain     dr santos    Chronic systolic congestive heart failure 10/17/2019    Cirrhosis     w 5/20 hospital    COPD (chronic obstructive pulmonary disease)     papi    Ex-smoker     11/13    Hyperlipidemia     Ischemic cardiomyopathy 11/22/2019    Oropharyngeal dysphagia 5/17/2020    JUANITO on CPAP     cpap    Osteopenia 1/15 tran 1/18    Pneumonia involving bilateral lungs     PSVT (paroxysmal supraventricular tachycardia)     PVD (peripheral vascular disease)     noobs 07 khuri    Pyriform sinus cancer     dr ponce radiation 1/2-14 dr king perales    Squamous cell carcinoma of skin     rboerto    Tongue cancer     "superficial" removed 11/14    Vocal cord cancer 2011    Xerostomia     radiation     Past Surgical History:   Procedure Laterality Date    APPENDECTOMY      BRONCHOSCOPY Bilateral 2/5/2019    Procedure: Bronchoscopy;  Surgeon: Santos Cardozo MD;  Location: [facility];  Service: Endoscopy;  Laterality: Bilateral;    COLONOSCOPY N/A 5/1/2017    Procedure: Due for screening colonoscopy;  Surgeon: Anushka Yoder MD;  Location: [facility];  Service: Endoscopy;  Laterality: N/A;    ESOPHAGOGASTRODUODENOSCOPY N/A 5/29/2018    Procedure: ESOPHAGOGASTRODUODENOSCOPY (EGD);  Surgeon: Audie Hill III, MD;  Location: [facility];  Service: Endoscopy;  Laterality: N/A;    ESOPHAGOGASTRODUODENOSCOPY N/A 8/29/2018    Procedure: ESOPHAGOGASTRODUODENOSCOPY (EGD);  Surgeon: Audie Hill III, MD;  Location: [facility];  Service: Endoscopy;  Laterality: N/A;    LEFT HEART CATHETERIZATION Left 5/7/2020    Procedure: CATHETERIZATION, HEART, LEFT;  Surgeon: bAel BAUM" MD Ruby;  Location: [facility] CATH LAB;  Service: Cardiology;  Laterality: Left;  iodine allergy    PERCUTANEOUS TRANSLUMINAL BALLOON ANGIOPLASTY OF CORONARY ARTERY Right 2019    Procedure: PTCA, USING BALLOON/ IABP or Impella multivessel angioplasty/poss stent;  Surgeon: Abel Beltran MD;  Location: [facility] CATH LAB;  Service: Cardiology;  Laterality: Right;    PERCUTANEOUS TRANSLUMINAL BALLOON ANGIOPLASTY OF CORONARY ARTERY  2020    Procedure: Angioplasty-coronary;  Surgeon: Abel Beltran MD;  Location: [facility] CATH LAB;  Service: Cardiology;;    THORACENTESIS Left 2018    Procedure: Thoracentesis;  Surgeon: Santos Cardozo MD;  Location: [facility] ENDO;  Service: Endoscopy;  Laterality: Left;    THORACENTESIS Right 2019    Procedure: Thoracentesis;  Surgeon: Santos Cardozo MD;  Location: [facility] ENDO;  Service: Endoscopy;  Laterality: Right;    tongue cancer excision      dr vitale    VOCAL CORD LATERALIZATION, ENDOSCOPIC APPROACH W/ MLB      kris     Family History   Problem Relation Age of Onset    Lung cancer Father         + Smoker    No Known Problems Mother     Lung cancer Brother         + Smoker     Social History     Tobacco Use    Smoking status: Former Smoker     Packs/day: 0.50     Years: 55.00     Pack years: 27.50     Types: Cigarettes     Last attempt to quit: 10/1/2019     Years since quittin.6    Smokeless tobacco: Never Used    Tobacco comment: 6-7 cigs/day   Substance Use Topics    Alcohol use: Not Currently    Drug use: No       Review of patient's allergies indicates:   Allergen Reactions    Contrast media Hives and Itching    Iodinated contrast media Hives and Itching    Iodine Hives and Itching    Pravastatin      Other reaction(s): fatigue  Other reaction(s): fatigue    Rosuvastatin      Other reaction(s): fatigue  Other reaction(s): fatigue    Simvastatin      Other reaction(s): fatigue  Other reaction(s): fatigue    Statins-hmg-coa reductase  inhibitors Other (See Comments)     Fatigue        Review of Systems:  Review of Systems   Constitutional: Positive for activity change, appetite change and fatigue. Negative for chills, fever and unexpected weight change.   HENT: Negative for hearing loss, mouth sores, nosebleeds, sore throat, tinnitus, trouble swallowing and voice change.    Eyes: Negative for visual disturbance.   Respiratory: Positive for cough and shortness of breath. Negative for chest tightness and wheezing.    Cardiovascular: Negative for chest pain, palpitations and leg swelling.   Gastrointestinal: Positive for constipation. Negative for abdominal pain, anal bleeding, blood in stool, diarrhea, nausea and vomiting.   Genitourinary: Negative for frequency, hematuria and testicular pain.   Musculoskeletal: Negative for arthralgias, back pain, gait problem and neck pain.   Skin: Negative for color change, pallor, rash and wound.   Allergic/Immunologic: Negative for immunocompromised state.   Neurological: Positive for weakness. Negative for seizures, syncope, numbness and headaches.   Hematological: Negative for adenopathy. Does not bruise/bleed easily.   Psychiatric/Behavioral: Negative for agitation, confusion, decreased concentration, hallucinations and sleep disturbance. The patient is not nervous/anxious.         OBJECTIVE:     Vital Signs (Most Recent)  Temp: 97.2 °F (36.2 °C) (05/18/20 1142)  Pulse: 76 (05/18/20 1241)  Resp: 16 (05/18/20 1241)  BP: (!) 91/52 (05/18/20 1142)  SpO2: (!) 92 % (05/18/20 1241)    Pain Assessment: No pain reported at this time    Vital Signs Range (Last 24H):  Temp:  [96.6 °F (35.9 °C)-98.5 °F (36.9 °C)]   Pulse:  [69-91]   Resp:  [15-20]   BP: ()/(49-66)   SpO2:  [91 %-97 %]     Physical Exam:  Constitutional: He is oriented to person, place, and time. He appears well-developed and well-nourished. He appears ill. He appears distressed.   HENT:   Head: Normocephalic and atraumatic.   Right Ear: External  ear normal.   Left Ear: External ear normal.   Mouth/Throat: No oropharyngeal exudate.   Eyes: Pupils are equal, round, and reactive to light. Conjunctivae are normal. Right eye exhibits no discharge. Left eye exhibits no discharge. No scleral icterus.   Neck: Normal range of motion. Neck supple. No thyromegaly present.   Cardiovascular: Normal rate, regular rhythm and normal heart sounds.   No murmur heard.  Pulmonary/Chest: Effort normal. No respiratory distress. He has no wheezes. He exhibits no tenderness.   Diminished breath sounds bilaterally   Abdominal: Soft. Bowel sounds are normal. He exhibits no distension and no mass. There is no tenderness. There is no rebound.   Musculoskeletal: Normal range of motion. He exhibits no edema or tenderness.   Lymphadenopathy:     He has no cervical adenopathy.        Right cervical: No superficial cervical adenopathy present.       Left cervical: No superficial cervical adenopathy present.        Right axillary: No pectoral adenopathy present.        Left axillary: No pectoral adenopathy present.       Right: No inguinal and no supraclavicular adenopathy present.        Left: No inguinal and no supraclavicular adenopathy present.   Neurological: He is alert and oriented to person, place, and time. No cranial nerve deficit or sensory deficit.   Skin: Skin is warm and dry. Capillary refill takes 2 to 3 seconds. No rash noted. No erythema. No pallor.   Psychiatric: He has a normal mood and affect. His behavior is normal. Judgment normal.           Laboratory:  CBC with Differential:  No results for input(s): WBC, NEUTROPCT, LYMPH, MONOPCT, EOSPCT, BASOPHIL, RBC, HCT, HGB, MCV, MCH, RDW, PLT, MPV in the last 24 hours.    Invalid input(s): MCMC  CMP:  Recent Labs   Lab 05/18/20 0427   GLU 71   CALCIUM 8.8      K 4.5   CO2 29      BUN 57*   CREATININE 1.5*     BMP:   Recent Labs   Lab 05/18/20 0427   GLU 71   CALCIUM 8.8      K 4.5   CO2 29      BUN  57*   CREATININE 1.5*     LFTs: No results for input(s): ALT, AST, ALKPHOS, BILITOT, PROT, ALBUMIN in the last 24 hours.  Haptoglobin: No results for input(s): HAPTOGLOBIN in the last 24 hours.  Tumor Markers: No results for input(s): PSA, CEA, , AFPTM, VO1860,  in the last 24 hours.    Invalid input(s): ALGTM  Immunology: No results for input(s): SPEP, MEHRAN, KENA, FREELAMBDALI in the last 24 hours.  Coagulation: No results for input(s): PT, INR, APTT in the last 24 hours.  Specimen (12h ago, onward)    None        Microbiology Results (last 7 days)     Procedure Component Value Units Date/Time    Blood culture [[id number]] Collected:  05/17/20 0946    Order Status:  Completed Specimen:  Blood Updated:  05/17/20 2145     Blood Culture, Routine No Growth to date          Diagnostic Results:      ASSESSMENT/PLAN:     Patient Active Problem List   Diagnosis    Thoracic or lumbosacral neuritis or radiculitis, unspecified    Neuropathy    Benign prostate hyperplasia    Hyperlipidemia    Osteopenia    Hypertension    Chronic pain    History of head and neck cancer    Personal history of colonic polyps    JUANITO on CPAP    Cancer of tongue    Adrenal benign neoplasm    Acute hypoxemic respiratory failure    Hypomagnesemia    Pneumonia involving bilateral lungs    Oropharyngeal aspiration    Stage 3 severe COPD by GOLD classification    Coronary artery disease    Allergy to statin medication    Statin intolerance    History of iron deficiency    Chronic anemia    Pleural effusion, bilateral    Esophagitis    Mass of left lung -  new compared to prior evaluation in February 2019    Carotid artery disease    Acute on chronic diastolic heart failure    Chronic systolic congestive heart failure    Weight decreasing    Cardiac left ventricular ejection fraction 21-40 percent    Coronary artery disease involving native coronary artery of native heart without angina pectoris    Bradycardia     Hyperkalemia    Mastoiditis of both sides    Anxiety    Ischemic cardiomyopathy    Acute on chronic diastolic congestive heart failure    PVD (peripheral vascular disease)    Shortness of breath    Chest pain, rule out acute myocardial infarction    Unstable angina    Sleep apnea    Elevated troponin    Elevated LFTs    Cirrhosis of liver without ascites    Acute on chronic respiratory failure with hypoxia    NEYMAR (acute kidney injury)    Oropharyngeal dysphagia    Other constipation    DNR no code (do not resuscitate)    ACS (acute coronary syndrome)    Cancer    Dysphonia    Fluid overload    History of tongue cancer    Personal history of malignant neoplasm of larynx    Radiation adverse effect    Leukoplakia of larynx    Tongue lesion    Metastatic malignant neoplasm of unknown primary site    Severe malnutrition         Plan    # diffuse lung and hepatic lesions:  -likely malignancy however the primary is unknown.  -following conversation with patient and family, they opted not to pursue any more diagnostics at this time.   hospice arrangement being made through .  -arrangement for PEG placement is being made through hospitalist service.     # normocytic anemia:  -noted hemoglobin at 11.3 grams/deciliter with hematocrit of 35.9.  That this is likely due to chronic inflammation.  No evidence of active bleed.  -continue to monitor.  No need for further workup.     # constipation, COPD exacerbation:  Management by hospitalist team.      Alexei Rodríguez MD

## 2020-05-18 NOTE — PLAN OF CARE
Patient aao x 4.   Vital signs stable.  Afebrile.  Speech clear.  SpO2 now 95% on 4LNC  Continuous pulse oximeter.  Pt NSR on monitor.  Pt refused bed alarm.  Ambulates with cane.  Plan of Care reviewed, patient verbalized understanding.  Bed in low position, side rails up x 2, call light in reach.  Will continue to monitor.

## 2020-05-18 NOTE — ASSESSMENT & PLAN NOTE
74 year old male with a PMHx of CAD, CHF, PSVT, PVD, JUANITO on CPAP, HLD, COPD and BPH admitted for acute on chronic diastolic CHF and pneumonia.  Pt has CAD not amenable to PCI, tx medically. Pt denies CP, has worsening SOB. EKG no changes, echo nml lv function, enzymes (-). BP was fine.    Continue diuretics, pulmonary toilet and treatment for pneumonia.  Continue Coreg and Imdur.  End-stage heart failure: recommend palliative care/hospice after PEG.  Can follow up with Dr. Whitfield in one week.

## 2020-05-18 NOTE — PLAN OF CARE
Recommendation:  1. Once PEG/NG tube placed, slow continuous feeds of Peptamen AF @ 30mL/hr to reach a goal rate of 55mL/hr. Will provide 1584 calories, 100g protein, and 1072 free water. Water flushes per MD or NP 2' CHF.   2. At risk for refeeding syndrome. Replete electrolytes PRN.   3. Daily weights.    Tonia Guthrie, RD, LDN

## 2020-05-18 NOTE — TRANSFER OF CARE
"Anesthesia Transfer of Care Note    Patient: Noble Tripp    Procedure(s) Performed: Procedure(s) (LRB):  INSERTION, PEG TUBE (N/A)    Patient location: PACU    Anesthesia Type: MAC    Transport from OR: Transported from OR on 2-3 L/min O2 by NC with adequate spontaneous ventilation    Post pain: adequate analgesia    Post assessment: no apparent anesthetic complications    Post vital signs: stable    Level of consciousness: awake    Nausea/Vomiting: no nausea/vomiting    Complications: none    Transfer of care protocol was followed      Last vitals:   Visit Vitals  BP (!) 97/49   Pulse 78   Temp 36.8 °C (98.2 °F) (Temporal)   Resp 16   Ht 5' 8" (1.727 m)   Wt 56.5 kg (124 lb 9 oz)   SpO2 (!) 93%   BMI 18.94 kg/m²     "

## 2020-05-18 NOTE — PLAN OF CARE
Pt is 74 year old male with a hx of CAD, CHF, PAD who presented to the hospital for increased SOB that onset at home.  Pt was recently admitted for same symptoms less than two weeks ago.  During assessment the pt is alert and oriented x 4.  He reports that he lives alone and receives help from his children who live nearby but is able to manage his needs without live in assistance.  SW discussed pt's medical condition with him and educated on chronic nature of disease.  Pt verbalized understanding of disease process, was open to discussing comfort care and agreed with recommendation.  SW called pt's daughter Ana Rosa to discuss the case with her and confirm family agreement with recommendation.  Family is in agreement and they elected to use [facility] for home hospice services.  SW initiated referral to [facility] and contacted [facility] to inform of referral.  CM provided a transitional care folder, information on advanced directives, information on pharmacy bedside delivery, and discharge planning begins on admission with contact information for any needs/questions.      D/C plan: home hospice       05/18/20 1110   Discharge Assessment   Assessment Type Discharge Planning Assessment   Confirmed/corrected address and phone number on facesheet? Yes   Assessment information obtained from? Patient;Medical Record;Caregiver   Expected Length of Stay (days)   (0)   Prior to hospitilization cognitive status: Alert/Oriented   Prior to hospitalization functional status: Assistive Equipment;Needs Assistance   Current cognitive status: Alert/Oriented   Current Functional Status: Assistive Equipment;Needs Assistance   Facility Arrived From: home    Lives With alone   Able to Return to Prior Arrangements yes   Is patient able to care for self after discharge? Yes   Who are your caregiver(s) and their phone number(s)? Ana Rosa Matthews, daughter: [phone]   Patient's perception of discharge disposition hospice/home   Readmission  Within the Last 30 Days no previous admission in last 30 days   Patient currently being followed by outpatient case management? No   Patient currently receives any other outside agency services? No   Equipment Currently Used at Home CPAP;cane, straight;oxygen   Do you have any problems affording any of your prescribed medications? No   Is the patient taking medications as prescribed? yes   Does the patient have transportation home? Yes   Transportation Anticipated family or friend will provide   Does the patient receive services at the [facility]? No   Discharge Plan A Hospice/home   Discharge Plan B Hospice/home   DME Needed Upon Discharge  none   Patient/Family in Agreement with Plan yes

## 2020-05-18 NOTE — ANESTHESIA PREPROCEDURE EVALUATION
05/18/2020  Noble Tripp is a 74 y.o., male.    Anesthesia Evaluation    I have reviewed the Patient Summary Reports.    I have reviewed the Nursing Notes.   I have reviewed the Medications.     Review of Systems  Anesthesia Hx:  Denies Family Hx of Anesthesia complications.   Denies Personal Hx of Anesthesia complications.   Cardiovascular:   Hypertension CAD   Angina, with exertion CHF    Pulmonary:   COPD, moderate Shortness of breath Sleep Apnea, CPAP    Renal/:   Chronic Renal Disease, ESRD    Hepatic/GI:   Liver Disease,        Physical Exam  General:  Well nourished    Airway/Jaw/Neck:  Airway Findings: Mouth Opening: Normal Tongue: Normal  General Airway Assessment: Adult  Mallampati: II  TM Distance: Normal, at least 6 cm      Dental:  Dental Findings: Upper Dentures, Lower Dentures   Chest/Lungs:  Chest/Lungs Clear    Heart/Vascular:  Heart Findings: Rate: Normal  Rhythm: Regular Rhythm  Sounds: Normal             Anesthesia Plan  Type of Anesthesia, risks & benefits discussed:  Anesthesia Type:  MAC  Patient's Preference:   Intra-op Monitoring Plan: standard ASA monitors  Intra-op Monitoring Plan Comments:   Post Op Pain Control Plan: multimodal analgesia  Post Op Pain Control Plan Comments:   Induction:   IV  Beta Blocker:  Patient is on a Beta-Blocker and has received one dose within the past 24 hours (No further documentation required).       Informed Consent: Patient understands risks and agrees with Anesthesia plan.  Questions answered. Anesthesia consent signed with patient.  ASA Score: 3     Day of Surgery Review of History & Physical: I have interviewed and examined the patient. I have reviewed the patient's H&P dated:  There are no significant changes.          Ready For Surgery From Anesthesia Perspective.

## 2020-05-18 NOTE — ASSESSMENT & PLAN NOTE
Contributing Nutrition Diagnosis  Chronic Disease Related Malnutrition    Related to (etiology):   Physiological causes resulting in diminished intake- oropharyngeal dysphagia and aspiration    Signs and Symptoms (as evidenced by):   >7.5% weight loss in 3 months and severe muscle wasting (temples and clavicle region)     Interventions/Recommendations (treatment strategy):  Enteral nutrition recommendations    Nutrition Diagnosis Status:   New

## 2020-05-18 NOTE — PLAN OF CARE
05/18/20 1123   Post-Acute Status   Post-Acute Authorization Hospice   Hospice Status Referrals Sent   Discharge Delays (!) Procedure Scheduling (IR, OR, Labs, Echo, Cath, Echo, EEG)

## 2020-05-18 NOTE — INTERVAL H&P NOTE
The patient has been examined and the H&P has been reviewed:    I concur with the findings and no changes have occurred since H&P was written.    Anesthesia/Surgery risks, benefits and alternative options discussed and understood by patient/family.          Active Hospital Problems    Diagnosis  POA    *Acute on chronic respiratory failure with hypoxia [J96.21]  Yes    Oropharyngeal dysphagia [R13.12]  Yes     Chronic    Other constipation [K59.09]  Yes     Chronic    DNR no code (do not resuscitate) [Z66]  Unknown    Metastatic malignant neoplasm of unknown primary site [C79.9, C80.1]  Yes     CT CHEST WITHOUT CONTRAST 05/16/2020   IMPRESSION:  1.  Findings concerning for suprahilar mass with lymphangitic spread/postobstructive pneumonia left upper lobe.  2.  Metastatic mediastinal lymphadenopathy as well as innumerable number of metastatic lesions throughout the partially visualized enlarged liver  3.  Findings concerning for pathologic T6 fracture with mild compression.  No retropulsion.  4.  Lower lobe and lingular infiltrates.  FINDINGS:  Interval development of masslike area of consolidation left suprahilar region measuring 3 x 2.4 cm with patchy areas of alveolar consolidation extending to the apex and interstitial thickening.  Peripheral areas of consolidation within each lower lobe as well as the lingula.  Granuloma in the right lower lobe.  No effusion or pneumothorax.  Airway is widely patent.  Interval development of mediastinal adenopathy.  Enlarged prevascular lymph node measures 2.0 x 2.0 cm enlarged left paratracheal lymph node measures 2.2 x 1.7 cm.  There and innumerable mild of small low-density lesions throughout the liver concerning for metastasis.  Remaining visualized abdominal organs are unremarkable.  Osseous structures are intact.  No suspicious osseous lesions.  Mild compression fracture and lytic changes of the superior endplate concerning for pathologic fracture.      NEYMAR (acute  kidney injury) [N17.9]  Yes    Cirrhosis of liver without ascites [K74.60]  Unknown     LIVER ULTRASOUND (US ABDOMEN LIMITED)  IMPRESSION: Enlarged, cirrhotic liver.  Benign-appearing 2 cm hepatic cyst.  No sonographic evidence of focal solid liver mass. Gallbladder wall thickening suspected to be related to 3rd spacing or reactive to the adjacent liver disease rather than acalculous cholecystitis. Borderline splenomegaly.  FINDINGS:  · Liver: Enlarged measuring 19.6 cm. Diffusely heterogeneous echotexture with surface lobularity compatible with cirrhosis.  There is a benign-appearing 2 cm cyst in the right hepatic lobe.  No suspicious focal solid liver masses identified.  Appropriate hepatopedal flow is seen in the main portal vein.  · Gallbladder: The gallbladder wall is thickened measuring up to 7 mm.  No pericholecystic fluid or gallstones identified.  · Biliary system: The common duct is not dilated, measuring 3 mm.  No intrahepatic ductal dilatation.  · Right kidney: Normal in size and echotexture, measuring 10.3 cm.  No hydronephrosis or nephrolithiasis identified.  · Pancreas: Visualized segments are unremarkable.  · Miscellaneous: No upper abdominal ascites.  The spleen is upper limits of normal in size measuring 12.6 cm.      Elevated troponin [R79.89]  Yes    Acute on chronic diastolic congestive heart failure [I50.33]  Yes       Echo Color Flow Doppler 05/16/20    Narrative · Left ventricular systolic function. The estimated ejection fraction is   60%.  · Grade I (mild) left ventricular diastolic dysfunction consistent with   impaired relaxation.  · Normal right ventricular systolic function.      .       Coronary artery disease involving left main coronary artery [I25.10]  Yes     Left Heart Cath 05/07/2020  ·LVEDP (Pre): 17  ·LVEDP (Post): 20  ·The ejection fraction is calculated to be 55%.  ·Prox LAD lesion , 70% stenosed.  ·Prox RCA to Mid RCA lesion , 99% stenosed.  ·Estimated blood loss:  none  ·Two vessel coronary artery disease.  ·UNSUCCESSFUL ATTEMPT AT RECANALIZATION OF RCA       Pneumonia involving bilateral lungs [J18.9]  Yes    Mass of left lung -  new compared to prior evaluation in February 2019 [R91.8]  Yes    Stage 3 severe COPD by GOLD classification [J44.9]  Yes    Oropharyngeal aspiration [T17.208A]  Yes    JUANITO on CPAP [G47.33, Z99.89]  Not Applicable     Chronic     14 cm      Hypertension [I10]  Yes      Resolved Hospital Problems   No resolved problems to display.

## 2020-05-18 NOTE — PROGRESS NOTES
"[facility] -   Pulmonology  Progress Note    Patient Name: Noble Tripp  MRN: [id number]  Admission Date: 5/16/2020  Hospital Length of Stay: 1 days  Code Status: DNR  Attending Provider: DAVID Rojas MD  Primary Care Provider: Dwaine Davis MD   Principal Problem: Oropharyngeal aspiration    Subjective:  Feels better less dyspnea     Past Medical History:   Diagnosis Date    Adrenal adenoma     david;nl fxn w/u;tran 11/18    Aspiration pneumonia 10/15    puree/honey    Benign prostate hyperplasia     CAD (coronary artery disease)     dr padilla    Chronic pain     dr santos    Chronic systolic congestive heart failure 10/17/2019    Cirrhosis     w 5/20 hospital    COPD (chronic obstructive pulmonary disease)     papi    Ex-smoker     11/13    Hyperlipidemia     Ischemic cardiomyopathy 11/22/2019    Oropharyngeal dysphagia 5/17/2020    JUANITO on CPAP     cpap    Osteopenia 1/15 tran 1/18    Pneumonia involving bilateral lungs     PSVT (paroxysmal supraventricular tachycardia)     PVD (peripheral vascular disease)     noobs 07 khuri    Pyriform sinus cancer     dr ponce radiation 1/2-14 dr king perales    Squamous cell carcinoma of skin     roberto    Tongue cancer     "superficial" removed 11/14    Vocal cord cancer 2011    Xerostomia     radiation       Past Surgical History:   Procedure Laterality Date    APPENDECTOMY      BRONCHOSCOPY Bilateral 2/5/2019    Procedure: Bronchoscopy;  Surgeon: Santos Cardozo MD;  Location: [facility];  Service: Endoscopy;  Laterality: Bilateral;    COLONOSCOPY N/A 5/1/2017    Procedure: Due for screening colonoscopy;  Surgeon: Anushka Yoder MD;  Location: [facility];  Service: Endoscopy;  Laterality: N/A;    ESOPHAGOGASTRODUODENOSCOPY N/A 5/29/2018    Procedure: ESOPHAGOGASTRODUODENOSCOPY (EGD);  Surgeon: Audie Hill III, MD;  Location: [facility];  Service: Endoscopy;  Laterality: N/A;    ESOPHAGOGASTRODUODENOSCOPY " N/A 8/29/2018    Procedure: ESOPHAGOGASTRODUODENOSCOPY (EGD);  Surgeon: Audie Hill III, MD;  Location: [facility] ENDO;  Service: Endoscopy;  Laterality: N/A;    LEFT HEART CATHETERIZATION Left 5/7/2020    Procedure: CATHETERIZATION, HEART, LEFT;  Surgeon: Abel Beltran MD;  Location: [facility] CATH LAB;  Service: Cardiology;  Laterality: Left;  iodine allergy    PERCUTANEOUS TRANSLUMINAL BALLOON ANGIOPLASTY OF CORONARY ARTERY Right 12/9/2019    Procedure: PTCA, USING BALLOON/ IABP or Impella multivessel angioplasty/poss stent;  Surgeon: Abel Beltran MD;  Location: [facility] CATH LAB;  Service: Cardiology;  Laterality: Right;    PERCUTANEOUS TRANSLUMINAL BALLOON ANGIOPLASTY OF CORONARY ARTERY  5/7/2020    Procedure: Angioplasty-coronary;  Surgeon: Abel Beltran MD;  Location: [facility] CATH LAB;  Service: Cardiology;;    THORACENTESIS Left 8/7/2018    Procedure: Thoracentesis;  Surgeon: Santos Cardozo MD;  Location: [facility] ENDO;  Service: Endoscopy;  Laterality: Left;    THORACENTESIS Right 2/25/2019    Procedure: Thoracentesis;  Surgeon: Santos Cardozo MD;  Location: [facility] ENDO;  Service: Endoscopy;  Laterality: Right;    tongue cancer excision  11/14    dr vitale    VOCAL CORD LATERALIZATION, ENDOSCOPIC APPROACH W/ MLVOLODYMYR yanceyorter       Review of patient's allergies indicates:   Allergen Reactions    Contrast media Hives and Itching    Iodinated contrast media Hives and Itching    Iodine Hives and Itching    Pravastatin      Other reaction(s): fatigue  Other reaction(s): fatigue    Rosuvastatin      Other reaction(s): fatigue  Other reaction(s): fatigue    Simvastatin      Other reaction(s): fatigue  Other reaction(s): fatigue    Statins-hmg-coa reductase inhibitors Other (See Comments)     Fatigue       Family History     Problem Relation (Age of Onset)    Lung cancer Father, Brother    No Known Problems Mother        Tobacco Use    Smoking status: Former Smoker     Packs/day: 0.50     Years: 55.00      Pack years: 27.50     Types: Cigarettes     Last attempt to quit: 10/1/2019     Years since quittin.6    Smokeless tobacco: Never Used    Tobacco comment: 6-7 cigs/day   Substance and Sexual Activity    Alcohol use: Not Currently    Drug use: No    Sexual activity: Not on file         Review of Systems   Constitutional: Positive for appetite change, diaphoresis, fatigue and unexpected weight change.   HENT: Positive for trouble swallowing.    Respiratory: Positive for cough, choking and shortness of breath.    Cardiovascular: Positive for palpitations and leg swelling.   Gastrointestinal: Negative.    Endocrine: Negative.    Genitourinary: Negative.    Musculoskeletal: Positive for arthralgias.   Neurological: Positive for weakness.   Psychiatric/Behavioral: Positive for sleep disturbance.     Objective:     Vital Signs (Most Recent):  Temp: 97.3 °F (36.3 °C) (20 151)  Pulse: 87 (20 151)  Resp: 20 (20 151)  BP: (!) 110/55 (20 151)  SpO2: (!) 93 % (20) Vital Signs (24h Range):  Temp:  [96.6 °F (35.9 °C)-98.5 °F (36.9 °C)] 97.3 °F (36.3 °C)  Pulse:  [69-91] 87  Resp:  [15-20] 20  SpO2:  [91 %-97 %] 93 %  BP: ()/(49-66) 110/55     Weight: 56.5 kg (124 lb 9 oz)  Body mass index is 18.94 kg/m².      Intake/Output Summary (Last 24 hours) at 2020 9000  Last data filed at 2020 0883  Gross per 24 hour   Intake 800 ml   Output 1000 ml   Net -200 ml       Physical Exam   Constitutional: He is oriented to person, place, and time. He appears well-developed and well-nourished.   HENT:   Head: Normocephalic and atraumatic.   Eyes: Pupils are equal, round, and reactive to light. Conjunctivae are normal.   Neck: Neck supple. JVD present. No tracheal deviation present. No thyromegaly present.   Cardiovascular: Normal rate. A regularly irregular rhythm present. PMI is displaced.   Murmur heard.   Systolic murmur is present with a grade of 2/6.  Pulmonary/Chest: Effort  normal. He has decreased breath sounds. He has rales in the right lower field and the left lower field.   Abdominal: Soft.   Musculoskeletal: Normal range of motion. He exhibits edema.   Lymphadenopathy:     He has no cervical adenopathy.   Neurological: He is alert and oriented to person, place, and time.   Skin: Skin is warm and dry.   Nursing note and vitals reviewed.      Vents:  Oxygen Concentration (%): 36 (20 1241)    Lines/Drains/Airways     Peripheral Intravenous Line                 Peripheral IV - Single Lumen 20 1053 20 G Left Antecubital 2 days                Significant Labs:    CBC/Anemia Profile:  No results for input(s): WBC, HGB, HCT, PLT, MCV, RDW, IRON, FERRITIN, RETIC, FOLATE, [id number], OCCULTBLOOD in the last 48 hours.     Chemistries:  Recent Labs   Lab 20  0448 20  0427   * 144   K 4.6 4.5    102   CO2 32* 29   BUN 48* 57*   CREATININE 1.5* 1.5*   CALCIUM 9.0 8.8   MG 2.5  --        ABGs: No results for input(s): PH, PCO2, HCO3, POCSATURATED, BE in the last 48 hours.  BMP:   Recent Labs   Lab 208 20  0427   GLU 79 71   * 144   K 4.6 4.5    102   CO2 32* 29   BUN 48* 57*   CREATININE 1.5* 1.5*   CALCIUM 9.0 8.8   MG 2.5  --      CMP:   Recent Labs   Lab 208 20  0427   * 144   K 4.6 4.5    102   CO2 32* 29   GLU 79 71   BUN 48* 57*   CREATININE 1.5* 1.5*   CALCIUM 9.0 8.8   ANIONGAP 10 13   EGFRNONAA 46* 46*     All pertinent labs within the past 24 hours have been reviewed.    Significant Imaging:   I have reviewed all pertinent imaging results/findings within the past 24 hours.  I have reviewed and interpreted all pertinent imaging results/findings within the past 24 hours.     Radiology Result      Name:   :  Patient MRN:   Noble Tripp 1945 [id number]   Account Number: Room & Bed Accession Number:   [id number] [facility] TELE [id number]   Authorizing Physician: Patient Class:  Diagnosis:   Mckayla Presley OP- Observation     Procedure:  Exam Date: Reason for Exam:   CT Chest Without Contrast 05/16/2020 Shortness of breath             RESULTS:     EXAMINATION:  CT CHEST WITHOUT CONTRAST     CLINICAL HISTORY:  Shortness of breath;     TECHNIQUE:  CT scan of the chest was obtained without administration of contrast.    Coronal and sagittal reconstructions were performed on these images.     COMPARISON:  2/5/2019     FINDINGS:  Interval development of masslike area of consolidation left suprahilar   region measuring 3 x 2.4 cm with patchy areas of alveolar consolidation   extending to the apex and interstitial thickening.  Peripheral areas of   consolidation within each lower lobe as well as the lingula.  Granuloma in   the right lower lobe.  No effusion or pneumothorax.  Airway is widely   patent.  Interval development of mediastinal adenopathy.  Enlarged   prevascular lymph node measures 2.0 x 2.0 cm enlarged left paratracheal   lymph node measures 2.2 x 1.7 cm.  There and innumerable mild of small   low-density lesions throughout the liver concerning for metastasis.    Remaining visualized abdominal organs are unremarkable.  Osseous   structures are intact.  No suspicious osseous lesions.  Mild compression   fracture and lytic changes of the superior endplate concerning for   pathologic fracture.     Impression:     1.  Findings concerning for suprahilar mass with lymphangitic   spread/postobstructive pneumonia left upper lobe.     2.  Metastatic mediastinal lymphadenopathy as well as innumerable number   of metastatic lesions throughout the partially visualized enlarged liver     3.  Findings concerning for pathologic T6 fracture with mild compression.    No retropulsion.     4.  Lower lobe and lingular infiltrates.     All CT scans at this facility are performed  using dose modulation   techniques as appropriate to performed exam including the following:    automated exposure control;  adjustment of mA and/or kV according to the   patients size (this includes techniques or standardized protocols for   targeted exams where dose is matched to indication/reason for exam: i.e.   extremities or head);  iterative reconstruction technique.        Electronically signed by:     Kye Cunningham Jr., MD  Date:                                    05/16/2020  Time:                                   18:18                    Signed By:  Kye Cunningham Jr., MD on 5/16/2020  6:18 PM              Echo Color Flow Doppler? Yes  · Left ventricular systolic function. The estimated ejection fraction is   60%.  · Grade I (mild) left ventricular diastolic dysfunction consistent with   impaired relaxation.  · Normal right ventricular systolic function.             ABG  No results for input(s): PH, PO2, PCO2, HCO3, BE in the last 168 hours.  Assessment/Plan:     Oropharyngeal dysphagia  Peg tube    Mass of left lung -  new compared to prior evaluation in February 2019  Or 5/17/2020 overall appearance is suspicious for a left hilar lung mass/lung cancer.  Abnormalities on CT liver suggest possible metastasis.  Patient does have a history of abnormalities on the liver in the past due to a cyst.  Patient has reconsidered his options and at this time once more information concerning a liver biopsy.  5/18 clinically this appears to be a lung cancer.  No biopsies have been obtained.  Patient wishes to be treated with hospice      Will sign off thank for allowing me to be involved in this patient's care.  Please re-consult if needed     Sanket Wright MD  Pulmonology  [facility] -

## 2020-05-18 NOTE — ASSESSMENT & PLAN NOTE
Or 5/17/2020 overall appearance is suspicious for a left hilar lung mass/lung cancer.  Abnormalities on CT liver suggest possible metastasis.  Patient does have a history of abnormalities on the liver in the past due to a cyst.  Patient has reconsidered his options and at this time once more information concerning a liver biopsy.  5/18 clinically this appears to be a lung cancer.  No biopsies have been obtained.  Patient wishes to be treated with hospice

## 2020-05-18 NOTE — SUBJECTIVE & OBJECTIVE
Review of Systems   Constitution: Positive for malaise/fatigue. Negative for chills, diaphoresis, fever and night sweats.   Cardiovascular: Negative for chest pain, cyanosis, irregular heartbeat, leg swelling, near-syncope, orthopnea, palpitations, paroxysmal nocturnal dyspnea and syncope.   Respiratory: Positive for cough. Shortness of breath: improved.      Objective:     Vital Signs (Most Recent):  Temp: 98.2 °F (36.8 °C) (05/18/20 0800)  Pulse: 78 (05/18/20 0800)  Resp: 16 (05/18/20 0800)  BP: (!) 97/49 (05/18/20 0800)  SpO2: (!) 93 % (05/18/20 0800) Vital Signs (24h Range):  Temp:  [96.6 °F (35.9 °C)-98.5 °F (36.9 °C)] 98.2 °F (36.8 °C)  Pulse:  [69-91] 78  Resp:  [15-20] 16  SpO2:  [92 %-97 %] 93 %  BP: ()/(49-66) 97/49     Weight: 56.5 kg (124 lb 9 oz)  Body mass index is 18.94 kg/m².     SpO2: (!) 93 %  O2 Device (Oxygen Therapy): (pt not in room for check)      Intake/Output Summary (Last 24 hours) at 5/18/2020 0844  Last data filed at 5/18/2020 0828  Gross per 24 hour   Intake 800 ml   Output 1000 ml   Net -200 ml       Lines/Drains/Airways     Peripheral Intravenous Line                 Peripheral IV - Single Lumen 05/16/20 1053 20 G Left Antecubital 1 day                Physical Exam   Constitutional: He is oriented to person, place, and time. He appears well-developed and well-nourished. No distress.   Frail and cachectic.   HENT:   Head: Normocephalic and atraumatic.   Eyes: Conjunctivae are normal. No scleral icterus.   Neck: Neck supple. No JVD present. No tracheal deviation present. No thyromegaly present.   Cardiovascular: Normal rate, regular rhythm, normal heart sounds and intact distal pulses. Exam reveals no gallop and no friction rub.   No murmur heard.  Pulmonary/Chest: Effort normal. No respiratory distress. He has no wheezes. He has no rales. He exhibits no tenderness.   Coarse breath sounds bilaterally.   Abdominal: Soft. Bowel sounds are normal. He exhibits no distension and no  mass. There is no tenderness. There is no rebound and no guarding.   Musculoskeletal: Normal range of motion. He exhibits no edema.   Lymphadenopathy:     He has no cervical adenopathy.   Neurological: He is alert and oriented to person, place, and time.   Skin: Skin is warm and dry. No rash noted. He is not diaphoretic. No erythema. No pallor.   Pink   Psychiatric: He has a normal mood and affect.       Significant Labs:   ABG: No results for input(s): PH, PCO2, HCO3, POCSATURATED, BE in the last 48 hours., Blood Culture:   Recent Labs   Lab 05/17/20  0946   LABBLOO No Growth to date   , BMP:   Recent Labs   Lab 05/16/20  1053 05/17/20  0448 05/18/20  0427   GLU 89 79 71    146* 144   K 4.6 4.6 4.5    104 102   CO2 27 32* 29   BUN 50* 48* 57*   CREATININE 1.7* 1.5* 1.5*   CALCIUM 9.1 9.0 8.8   MG  --  2.5  --    , CMP   Recent Labs   Lab 05/16/20  1053 05/17/20  0448 05/18/20  0427    146* 144   K 4.6 4.6 4.5    104 102   CO2 27 32* 29   GLU 89 79 71   BUN 50* 48* 57*   CREATININE 1.7* 1.5* 1.5*   CALCIUM 9.1 9.0 8.8   PROT 6.3  --   --    ALBUMIN 2.8*  --   --    BILITOT 0.8  --   --    ALKPHOS 391*  --   --    AST 95*  --   --    *  --   --    ANIONGAP 10 10 13   ESTGFRAFRICA 45* 53* 53*   EGFRNONAA 39* 46* 46*   , CBC   Recent Labs   Lab 05/16/20  1053   WBC 13.23*   HGB 11.3*   HCT 35.9*      , INR   Recent Labs   Lab 05/16/20  1053   INR 1.1   , Lipid Panel No results for input(s): CHOL, HDL, LDLCALC, TRIG, CHOLHDL in the last 48 hours. and Troponin   Recent Labs   Lab 05/16/20  1053 05/16/20  1711 05/16/20  2252   TROPONINI 0.044* 0.034* 0.038*     D-Dimer 5/17/2020: 6.22    BNP 5/16/2020: 254    Significant Imaging:     Echo 5/16/2020: Conclusion   · Left ventricular systolic function. The estimated ejection fraction is 60%.  · Grade I (mild) left ventricular diastolic dysfunction consistent with impaired relaxation.  · Normal right ventricular systolic function.

## 2020-05-18 NOTE — [employer] PATIENT INSTRUCTIONS
Discharge Summary/Instructions after an Endoscopic Procedure  Patient Name: Noble Tripp  Patient MRN: [id number]  Patient YOB: 1945  Monday, May 18, 2020 Debbie Soriano MD  RESTRICTIONS:  During your procedure today, you received medications for sedation.  These   medications may affect your judgment, balance and coordination.  Therefore,   for 24 hours, you have the following restrictions:   - DO NOT drive a car, operate machinery, make legal/financial decisions,   sign important papers or drink alcohol.    ACTIVITY:  Today: no heavy lifting, straining or running due to procedural   sedation/anesthesia.  The following day: return to full activity including work.  DIET:  Eat and drink normally unless instructed otherwise.     TREATMENT FOR COMMON SIDE EFFECTS:  - Mild abdominal pain, nausea, belching, bloating or excessive gas:  rest,   eat lightly and use a heating pad.  - Sore Throat: treat with throat lozenges and/or gargle with warm salt   water.  - Because air was used during the procedure, expelling large amounts of air   from your rectum or belching is normal.  - If a bowel prep was taken, you may not have a bowel movement for 1-3 days.    This is normal.  SYMPTOMS TO WATCH FOR AND REPORT TO YOUR PHYSICIAN:  1. Abdominal pain or bloating, other than gas cramps.  2. Chest pain.  3. Back pain.  4. Signs of infection such as: chills or fever occurring within 24 hours   after the procedure.  5. Rectal bleeding, which would show as bright red, maroon, or black stools.   (A tablespoon of blood from the rectum is not serious, especially if   hemorrhoids are present.)  6. Vomiting.  7. Weakness or dizziness.  GO DIRECTLY TO THE NEAREST EMERGENCY ROOM IF YOU HAVE ANY OF THE FOLLOWING:      Difficulty breathing              Chills and/or fever over 101 F   Persistent vomiting and/or vomiting blood   Severe abdominal pain   Severe chest pain   Black, tarry stools   Bleeding- more than one tablespoon   Any  other symptom or condition that you feel may need urgent attention  Your doctor recommends these additional instructions:  If any biopsies were taken, your doctors clinic will contact you in 1 to 2   weeks with any results.  - [facility] to [facility] for [facility] care.   - Refer to an interventional radiologist for IR placement of PEG.  - The findings and recommendations were discussed with the referring   physician, Dr. Rojas, [facility] [place] [facility].   - Use Protonix (pantoprazole) 40 mg IV BID.  For questions, problems or results please call your physician Debbie Soriano MD at Work:  [phone]  If you have any questions about the above instructions, call the GI   department at [phone] or call the endoscopy unit at [phone]   from 7am until 3 pm.  [facility] [place], EMERGENCY ROOM PHONE NUMBER:   [phone]  IF A COMPLICATION OR EMERGENCY SITUATION ARISES AND YOU ARE UNABLE TO REACH   YOUR PHYSICIAN - GO DIRECTLY TO THE EMERGENCY ROOM.  I have read or have had read to me these discharge instructions for my   procedure and have received a written copy.  I understand these   instructions and will follow-up with my physician if I have any questions.     __________________________________       _____________________________________  Nurse Signature                                          Patient/Designated   Responsible Party Signature  MD Debbie Grayson MD  5/18/2020 8:50:10 AM  This report has been verified and signed electronically.  [employer]

## 2020-05-18 NOTE — NURSING
Pt was transported to surgery prior to my arrival. Therefore, I did not complete a morning assessment, vitals. Will assess pt when he returns to the unit.

## 2020-05-18 NOTE — SUBJECTIVE & OBJECTIVE
"Past Medical History:   Diagnosis Date    Adrenal adenoma     david;nl fxn w/u;tran 11/18    Aspiration pneumonia 10/15    puree/honey    Benign prostate hyperplasia     CAD (coronary artery disease)     dr padilla    Chronic pain     dr santos    Chronic systolic congestive heart failure 10/17/2019    Cirrhosis     w 5/20 hospital    COPD (chronic obstructive pulmonary disease)     papi    Ex-smoker     11/13    Hyperlipidemia     Ischemic cardiomyopathy 11/22/2019    Oropharyngeal dysphagia 5/17/2020    JUANITO on CPAP     cpap    Osteopenia 1/15 tran 1/18    Pneumonia involving bilateral lungs     PSVT (paroxysmal supraventricular tachycardia)     PVD (peripheral vascular disease)     noobs 07 elizabethuri    Pyriform sinus cancer     dr ponce radiation 1/2-14 dr king perales    Squamous cell carcinoma of skin     roberto    Tongue cancer     "superficial" removed 11/14    Vocal cord cancer 2011    Xerostomia     radiation       Past Surgical History:   Procedure Laterality Date    APPENDECTOMY      BRONCHOSCOPY Bilateral 2/5/2019    Procedure: Bronchoscopy;  Surgeon: Santos Cardozo MD;  Location: [facility];  Service: Endoscopy;  Laterality: Bilateral;    COLONOSCOPY N/A 5/1/2017    Procedure: Due for screening colonoscopy;  Surgeon: Anushka Yoder MD;  Location: [facility];  Service: Endoscopy;  Laterality: N/A;    ESOPHAGOGASTRODUODENOSCOPY N/A 5/29/2018    Procedure: ESOPHAGOGASTRODUODENOSCOPY (EGD);  Surgeon: Audie Hill III, MD;  Location: [facility];  Service: Endoscopy;  Laterality: N/A;    ESOPHAGOGASTRODUODENOSCOPY N/A 8/29/2018    Procedure: ESOPHAGOGASTRODUODENOSCOPY (EGD);  Surgeon: Audie Hill III, MD;  Location: [facility];  Service: Endoscopy;  Laterality: N/A;    LEFT HEART CATHETERIZATION Left 5/7/2020    Procedure: CATHETERIZATION, HEART, LEFT;  Surgeon: Abel Beltran MD;  Location: [facility] CATH LAB;  Service: Cardiology;  Laterality: Left;  iodine allergy    " PERCUTANEOUS TRANSLUMINAL BALLOON ANGIOPLASTY OF CORONARY ARTERY Right 2019    Procedure: PTCA, USING BALLOON/ IABP or Impella multivessel angioplasty/poss stent;  Surgeon: Abel Beltran MD;  Location: [facility] CATH LAB;  Service: Cardiology;  Laterality: Right;    PERCUTANEOUS TRANSLUMINAL BALLOON ANGIOPLASTY OF CORONARY ARTERY  2020    Procedure: Angioplasty-coronary;  Surgeon: Abel Beltran MD;  Location: [facility] CATH LAB;  Service: Cardiology;;    THORACENTESIS Left 2018    Procedure: Thoracentesis;  Surgeon: Santos Cardozo MD;  Location: [facility] ENDO;  Service: Endoscopy;  Laterality: Left;    THORACENTESIS Right 2019    Procedure: Thoracentesis;  Surgeon: Santos Cardozo MD;  Location: [facility] ENDO;  Service: Endoscopy;  Laterality: Right;    tongue cancer excision      dr vitale    VOCAL CORD LATERALIZATION, ENDOSCOPIC APPROACH W/ MLB      kris       Review of patient's allergies indicates:   Allergen Reactions    Contrast media Hives and Itching    Iodinated contrast media Hives and Itching    Iodine Hives and Itching    Pravastatin      Other reaction(s): fatigue  Other reaction(s): fatigue    Rosuvastatin      Other reaction(s): fatigue  Other reaction(s): fatigue    Simvastatin      Other reaction(s): fatigue  Other reaction(s): fatigue    Statins-hmg-coa reductase inhibitors Other (See Comments)     Fatigue       Family History     Problem Relation (Age of Onset)    Lung cancer Father, Brother    No Known Problems Mother        Tobacco Use    Smoking status: Former Smoker     Packs/day: 0.50     Years: 55.00     Pack years: 27.50     Types: Cigarettes     Last attempt to quit: 10/1/2019     Years since quittin.6    Smokeless tobacco: Never Used    Tobacco comment: 6-7 cigs/day   Substance and Sexual Activity    Alcohol use: Not Currently    Drug use: No    Sexual activity: Not on file         Review of Systems   Constitutional: Positive for appetite change,  diaphoresis, fatigue and unexpected weight change.   HENT: Positive for trouble swallowing.    Respiratory: Positive for cough, choking and shortness of breath.    Cardiovascular: Positive for palpitations and leg swelling.   Gastrointestinal: Negative.    Endocrine: Negative.    Genitourinary: Negative.    Musculoskeletal: Positive for arthralgias.   Neurological: Positive for weakness.   Psychiatric/Behavioral: Positive for sleep disturbance.     Objective:     Vital Signs (Most Recent):  Temp: 97.3 °F (36.3 °C) (05/18/20 1512)  Pulse: 87 (05/18/20 1512)  Resp: 20 (05/18/20 1512)  BP: (!) 110/55 (05/18/20 1512)  SpO2: (!) 93 % (05/18/20 1512) Vital Signs (24h Range):  Temp:  [96.6 °F (35.9 °C)-98.5 °F (36.9 °C)] 97.3 °F (36.3 °C)  Pulse:  [69-91] 87  Resp:  [15-20] 20  SpO2:  [91 %-97 %] 93 %  BP: ()/(49-66) 110/55     Weight: 56.5 kg (124 lb 9 oz)  Body mass index is 18.94 kg/m².      Intake/Output Summary (Last 24 hours) at 5/18/2020 1747  Last data filed at 5/18/2020 0828  Gross per 24 hour   Intake 800 ml   Output 1000 ml   Net -200 ml       Physical Exam   Constitutional: He is oriented to person, place, and time. He appears well-developed and well-nourished.   HENT:   Head: Normocephalic and atraumatic.   Eyes: Pupils are equal, round, and reactive to light. Conjunctivae are normal.   Neck: Neck supple. JVD present. No tracheal deviation present. No thyromegaly present.   Cardiovascular: Normal rate. A regularly irregular rhythm present. PMI is displaced.   Murmur heard.   Systolic murmur is present with a grade of 2/6.  Pulmonary/Chest: Effort normal. He has decreased breath sounds. He has rales in the right lower field and the left lower field.   Abdominal: Soft.   Musculoskeletal: Normal range of motion. He exhibits edema.   Lymphadenopathy:     He has no cervical adenopathy.   Neurological: He is alert and oriented to person, place, and time.   Skin: Skin is warm and dry.   Nursing note and vitals  reviewed.      Vents:  Oxygen Concentration (%): 36 (20 1241)    Lines/Drains/Airways     Peripheral Intravenous Line                 Peripheral IV - Single Lumen 20 1053 20 G Left Antecubital 2 days                Significant Labs:    CBC/Anemia Profile:  No results for input(s): WBC, HGB, HCT, PLT, MCV, RDW, IRON, FERRITIN, RETIC, FOLATE, [id number], OCCULTBLOOD in the last 48 hours.     Chemistries:  Recent Labs   Lab 20  0448 20  0427   * 144   K 4.6 4.5    102   CO2 32* 29   BUN 48* 57*   CREATININE 1.5* 1.5*   CALCIUM 9.0 8.8   MG 2.5  --        ABGs: No results for input(s): PH, PCO2, HCO3, POCSATURATED, BE in the last 48 hours.  BMP:   Recent Labs   Lab 208 20  0427   GLU 79 71   * 144   K 4.6 4.5    102   CO2 32* 29   BUN 48* 57*   CREATININE 1.5* 1.5*   CALCIUM 9.0 8.8   MG 2.5  --      CMP:   Recent Labs   Lab 208 20  0427   * 144   K 4.6 4.5    102   CO2 32* 29   GLU 79 71   BUN 48* 57*   CREATININE 1.5* 1.5*   CALCIUM 9.0 8.8   ANIONGAP 10 13   EGFRNONAA 46* 46*     All pertinent labs within the past 24 hours have been reviewed.    Significant Imaging:   I have reviewed all pertinent imaging results/findings within the past 24 hours.  I have reviewed and interpreted all pertinent imaging results/findings within the past 24 hours.     Radiology Result      Name:   :  Patient MRN:   Noble Tripp [id number]   Account Number: Room & Bed Accession Number:   [id number] [facility] [id number]   Authorizing Physician: Patient Class: Diagnosis:   Mckaylamera Presley OP- Observation     Procedure:  Exam Date: Reason for Exam:   CT Chest Without Contrast 2020 Shortness of breath             RESULTS:     EXAMINATION:  CT CHEST WITHOUT CONTRAST     CLINICAL HISTORY:  Shortness of breath;     TECHNIQUE:  CT scan of the chest was obtained without administration of contrast.    Coronal and  sagittal reconstructions were performed on these images.     COMPARISON:  2/5/2019     FINDINGS:  Interval development of masslike area of consolidation left suprahilar   region measuring 3 x 2.4 cm with patchy areas of alveolar consolidation   extending to the apex and interstitial thickening.  Peripheral areas of   consolidation within each lower lobe as well as the lingula.  Granuloma in   the right lower lobe.  No effusion or pneumothorax.  Airway is widely   patent.  Interval development of mediastinal adenopathy.  Enlarged   prevascular lymph node measures 2.0 x 2.0 cm enlarged left paratracheal   lymph node measures 2.2 x 1.7 cm.  There and innumerable mild of small   low-density lesions throughout the liver concerning for metastasis.    Remaining visualized abdominal organs are unremarkable.  Osseous   structures are intact.  No suspicious osseous lesions.  Mild compression   fracture and lytic changes of the superior endplate concerning for   pathologic fracture.     Impression:     1.  Findings concerning for suprahilar mass with lymphangitic   spread/postobstructive pneumonia left upper lobe.     2.  Metastatic mediastinal lymphadenopathy as well as innumerable number   of metastatic lesions throughout the partially visualized enlarged liver     3.  Findings concerning for pathologic T6 fracture with mild compression.    No retropulsion.     4.  Lower lobe and lingular infiltrates.     All CT scans at this facility are performed  using dose modulation   techniques as appropriate to performed exam including the following:    automated exposure control; adjustment of mA and/or kV according to the   patients size (this includes techniques or standardized protocols for   targeted exams where dose is matched to indication/reason for exam: i.e.   extremities or head);  iterative reconstruction technique.        Electronically signed by:     Kye Cunningham Jr., MD  Date:                                     05/16/2020  Time:                                   18:18                    Signed By:  Kye Cunningham Jr., MD on 5/16/2020  6:18 PM              Echo Color Flow Doppler? Yes  · Left ventricular systolic function. The estimated ejection fraction is   60%.  · Grade I (mild) left ventricular diastolic dysfunction consistent with   impaired relaxation.  · Normal right ventricular systolic function.

## 2020-05-18 NOTE — BRIEF OP NOTE
Inpatient Endoscopy Brief Operative Note      Admit Date: 5/16/2020    Procedure Date and Time:  5/18/2020 8:50 AM    Attending Physician: DAVID Rojas MD     Principal Admitting Diagnoses: Oropharyngeal aspiration         Discharge Diagnosis: The primary encounter diagnosis was Acute on chronic respiratory failure with hypoxia. Diagnoses of SOB (shortness of breath), Bilateral leg edema, Chronic combined systolic and diastolic heart failure, Acute renal failure, unspecified acute renal failure type, CHF (congestive heart failure), History of head and neck cancer, and Oropharyngeal aspiration, sequela were also pertinent to this visit.     Discharged Condition: Stable    Indication for Admission: Oropharyngeal aspiration       Procedure Performed: EGD, diagnostic    SEE PROVATIONS REPORTS FOR DETAILS. Failed PEG placement. Unable to safely place.     Pathology (if any):  Specimen (12h ago, onward)    None          Estimated Blood Loss: 0 ml.    Discussed with: patient.    Disposition: Return to hospital [facility].    Recommendations:   1. IR consult for IR placement of feeding tube  2. Keep NPO      Discussed with Dr. Rojas of [facility] medicine. Will sign off, please call if questions.       Debbie Soriano MD  Inpatient Gastroenterology  [facility]

## 2020-05-18 NOTE — PROGRESS NOTES
[facility] -   Cardiology  Progress Note    Patient Name: Noble Tripp  MRN: [id number]  Admission Date: 5/16/2020  Hospital Length of Stay: 1 days  Code Status: DNR   Attending Physician: DAVID Rojas MD   Primary Care Physician: Dwaine Davis MD  Expected Discharge Date:   Principal Problem:Oropharyngeal aspiration    Subjective:     Hospital Course:   5/18/2020:  Pt seen and examined. He is found sitting up on the side of the bed. He reports that his breathing is better today. Creatinine 1.5    Review of Systems   Constitution: Positive for malaise/fatigue. Negative for chills, diaphoresis, fever and night sweats.   Cardiovascular: Negative for chest pain, cyanosis, irregular heartbeat, leg swelling, near-syncope, orthopnea, palpitations, paroxysmal nocturnal dyspnea and syncope.   Respiratory: Positive for cough. Shortness of breath: improved.      Objective:     Vital Signs (Most Recent):  Temp: 98.2 °F (36.8 °C) (05/18/20 0800)  Pulse: 78 (05/18/20 0800)  Resp: 16 (05/18/20 0800)  BP: (!) 97/49 (05/18/20 0800)  SpO2: (!) 93 % (05/18/20 0800) Vital Signs (24h Range):  Temp:  [96.6 °F (35.9 °C)-98.5 °F (36.9 °C)] 98.2 °F (36.8 °C)  Pulse:  [69-91] 78  Resp:  [15-20] 16  SpO2:  [92 %-97 %] 93 %  BP: ()/(49-66) 97/49     Weight: 56.5 kg (124 lb 9 oz)  Body mass index is 18.94 kg/m².     SpO2: (!) 93 %  O2 Device (Oxygen Therapy): (pt not in room for check)      Intake/Output Summary (Last 24 hours) at 5/18/2020 0844  Last data filed at 5/18/2020 0828  Gross per 24 hour   Intake 800 ml   Output 1000 ml   Net -200 ml       Lines/Drains/Airways     Peripheral Intravenous Line                 Peripheral IV - Single Lumen 05/16/20 1053 20 G Left Antecubital 1 day                Physical Exam   Constitutional: He is oriented to person, place, and time. He appears well-developed and well-nourished. No distress.   Frail and cachectic.   HENT:   Head: Normocephalic and atraumatic.    Eyes: Conjunctivae are normal. No scleral icterus.   Neck: Neck supple. No JVD present. No tracheal deviation present. No thyromegaly present.   Cardiovascular: Normal rate, regular rhythm, normal heart sounds and intact distal pulses. Exam reveals no gallop and no friction rub.   No murmur heard.  Pulmonary/Chest: Effort normal. No respiratory distress. He has no wheezes. He has no rales. He exhibits no tenderness.   Coarse breath sounds bilaterally.   Abdominal: Soft. Bowel sounds are normal. He exhibits no distension and no mass. There is no tenderness. There is no rebound and no guarding.   Musculoskeletal: Normal range of motion. He exhibits no edema.   Lymphadenopathy:     He has no cervical adenopathy.   Neurological: He is alert and oriented to person, place, and time.   Skin: Skin is warm and dry. No rash noted. He is not diaphoretic. No erythema. No pallor.   Pink   Psychiatric: He has a normal mood and affect.       Significant Labs:   ABG: No results for input(s): PH, PCO2, HCO3, POCSATURATED, BE in the last 48 hours., Blood Culture:   Recent Labs   Lab 05/17/20  0946   LABBLOO No Growth to date   , BMP:   Recent Labs   Lab 05/16/20  1053 05/17/20  0448 05/18/20  0427   GLU 89 79 71    146* 144   K 4.6 4.6 4.5    104 102   CO2 27 32* 29   BUN 50* 48* 57*   CREATININE 1.7* 1.5* 1.5*   CALCIUM 9.1 9.0 8.8   MG  --  2.5  --    , CMP   Recent Labs   Lab 05/16/20  1053 05/17/20  0448 05/18/20  0427    146* 144   K 4.6 4.6 4.5    104 102   CO2 27 32* 29   GLU 89 79 71   BUN 50* 48* 57*   CREATININE 1.7* 1.5* 1.5*   CALCIUM 9.1 9.0 8.8   PROT 6.3  --   --    ALBUMIN 2.8*  --   --    BILITOT 0.8  --   --    ALKPHOS 391*  --   --    AST 95*  --   --    *  --   --    ANIONGAP 10 10 13   ESTGFRAFRICA 45* 53* 53*   EGFRNONAA 39* 46* 46*   , CBC   Recent Labs   Lab 05/16/20  1053   WBC 13.23*   HGB 11.3*   HCT 35.9*      , INR   Recent Labs   Lab 05/16/20  1053   INR 1.1   ,  Lipid Panel No results for input(s): CHOL, HDL, LDLCALC, TRIG, CHOLHDL in the last 48 hours. and Troponin   Recent Labs   Lab 05/16/20  1053 05/16/20  1711 05/16/20  2252   TROPONINI 0.044* 0.034* 0.038*     D-Dimer 5/17/2020: 6.22    BNP 5/16/2020: 254    Significant Imaging:     Echo 5/16/2020: Conclusion   · Left ventricular systolic function. The estimated ejection fraction is 60%.  · Grade I (mild) left ventricular diastolic dysfunction consistent with impaired relaxation.  · Normal right ventricular systolic function.         Assessment and Plan:     Brief HPI: 74 year old male with a PMHx of CAD, CHF, PSVT, PVD, JUANITO on CPAP, HLD, COPD and BPH admitted for acute on chronic diastolic CHF and pneumonia.  Pt has CAD not amenable to PCI, tx medically. Pt denies CP, has worsening SOB. EKG no changes, echo nml lv function, enzymes (-)      Acute on chronic diastolic congestive heart failure  74 year old male with a PMHx of CAD, CHF, PSVT, PVD, JUANITO on CPAP, HLD, COPD and BPH admitted for acute on chronic diastolic CHF and pneumonia.  Pt has CAD not amenable to PCI, tx medically. Pt denies CP, has worsening SOB. EKG no changes, echo nml lv function, enzymes (-). BP was fine.    Continue diuretics, pulmonary toilet and treatment for pneumonia.  Continue Coreg and Imdur.  End-stage heart failure: recommend palliative care/hospice after PEG.  Can follow up with Dr. Whitfield in one week.    Coronary artery disease involving native coronary artery of native heart without angina pectoris  5/18/2020:  Continue Coreg and Imdur.  Off ASA awaiting placement of G tube.        VTE Risk Mitigation (From admission, onward)         Ordered     IP VTE HIGH RISK PATIENT  Once      05/17/20 0937     Place sequential compression device  Until discontinued      05/16/20 7566                Sandra France NP  Cardiology  [facility]

## 2020-05-18 NOTE — HOSPITAL COURSE
5/18/2020:  Pt seen and examined. He is found sitting up on the side of the bed. He reports that his breathing is better today. Creatinine 1.5

## 2020-05-18 NOTE — CONSULTS
"  [facility]  Adult Nutrition  Consult Note    SUMMARY     Recommendations    Recommendation:  1. Once PEG/NG tube placed, slow continuous feeds of Peptamen AF @ 30mL/hr to reach a goal rate of 55mL/hr. Will provide 1584 calories, 100g protein, and 1072 free water. Water flushes per MD or NP 2' CHF.   2. At risk for refeeding syndrome. Replete electrolytes PRN.   3. Daily weights    Goals: 75% of EN goal at follow up  Nutrition Goal Status: new  Communication of RD Recs: other (comment)    Reason for Assessment    Reason For Assessment: Consult  Diagnosis: CHF, NEYMAR, Pneumonia, Stage 3 COPD, Oropharyngeal dysphagia and aspiration  Relevant Medical History: CAD, HTN,   General Information Comments:  5/18/20  RD consulted for CHF education, which was provided. Patient reports he has had decreased intake the last 2-3 months due to inability to swallow. Claims he was trying to meed his nutrition need with 3 Boosts per day. Per charts patient has been seen by ST during this stay in which they rec pureed foods with nectar thick liquids. Upon examination patient with severe muscle wasting and fat loss. Patient reports a UBW of 145 lbs 3 months ago (2/14/20) and  lbs, which is a 14.5% weight change in 3 months. Patient to have PEG tube placed this week.   Nutrition Discharge Planning: Pending    Nutrition Risk Screen    Nutrition Risk Screen: no indicators present    Nutrition/Diet History    Spiritual, Cultural Beliefs, Advent Practices, Values that Affect Care: no    Anthropometrics    Temp: 98.2 °F (36.8 °C)  Height Method: Stated  Height: 5' 8" (172.7 cm)  Height (inches): 68 in  Weight Method: Bed Scale  Weight: 56.5 kg (124 lb 9 oz)  Weight (lb): 124.56 lb  Ideal Body Weight (IBW), Male: 154 lb  % Ideal Body Weight, Male (lb): 82.18 %  BMI (Calculated): 18.9       Lab/Procedures/Meds    Pertinent Labs Reviewed: reviewed  BMP  Lab Results   Component Value Date     05/18/2020    K 4.5 " 05/18/2020     05/18/2020    CO2 29 05/18/2020    BUN 57 (H) 05/18/2020    CREATININE 1.5 (H) 05/18/2020    CALCIUM 8.8 05/18/2020    ANIONGAP 13 05/18/2020    ESTGFRAFRICA 53 (A) 05/18/2020    EGFRNONAA 46 (A) 05/18/2020     Lab Results   Component Value Date    CALCIUM 8.8 05/18/2020    PHOS 1.9 (L) 05/12/2020       Lab Results   Component Value Date    ALBUMIN 2.8 (L) 05/16/2020         Pertinent Medications Reviewed: reviewed    Physical Findings/Assessment     Patient with severe muscle and fat wasting.    Estimated/Assessed Needs    Weight Used For Calorie Calculations: 56.5 kg (124 lb 9 oz)  Energy Calorie Requirements (kcal): 1540  Energy Need Method: George-St Jeor(x1.2)  Protein Requirements: 57-84  Weight Used For Protein Calculations: 56.5 kg (124 lb 9 oz)     Estimated Fluid Requirement Method: RDA Method, other (see comments)(or per MD/NP)  RDA Method (mL): 1540         Nutrition Prescription Ordered    Current Diet Order: NPO    Evaluation of Received Nutrient/Fluid Intake          Intake/Output Summary (Last 24 hours) at 5/18/2020 1235  Last data filed at 5/18/2020 0828  Gross per 24 hour   Intake 800 ml   Output 1000 ml   Net -200 ml       % Intake of Estimated Energy Needs: 0 - 25 %  % Meal Intake: NPO    Nutrition Risk    Level of Risk/Frequency of Follow-up: high     Assessment and Plan    Nutrition Problem:?  Severe Protein-Calorie Malnutrition in the context of Chronic Illness/Injury  ?  Related to (etiology):?  Physiological causes resulting in diminished intake- oropharyngeal dysphagia and aspiration  ?  Signs and Symptoms (as evidenced by):?  Energy Intake: <50% of estimated energy requirement for 1 month  Muscle Mass Depletion: severe depletion of temples and clavicle region   Weight Loss: 13.5% x 3 months ?    ?  Interventions (treatment strategy):  Enteral nutrition recommendations    Nutrition Diagnosis Status:   New         Monitor and Evaluation    Food and Nutrient Intake:  energy intake, food and beverage intake  Anthropometric Measurements: weight  Biochemical Data, Medical Tests and Procedures: electrolyte and renal panel, gastrointestinal profile, glucose/endocrine profile  Nutrition-Focused Physical Findings: overall appearance     Malnutrition Assessment      Muscle Mass Depletion: severe depletion of temples and clavicle region   Weight Loss: 13.5% x 3 months ?    Nutrition Follow-Up    RD Follow-up?: Yes     Tonia Guthrie, RD, LDN

## 2020-05-18 NOTE — ANESTHESIA POSTPROCEDURE EVALUATION
Anesthesia Post Evaluation    Patient: Noble Tripp    Procedure(s) Performed: Procedure(s) (LRB):  INSERTION, PEG TUBE (N/A)    Final Anesthesia Type: MAC    Patient location during evaluation: PACU  Patient participation: Yes- Able to Participate  Level of consciousness: awake and alert  Post-procedure vital signs: reviewed and stable  Pain management: adequate  Airway patency: patent    PONV status at discharge: No PONV  Anesthetic complications: no      Cardiovascular status: hemodynamically stable  Respiratory status: spontaneous ventilation and nasal cannula  Hydration status: euvolemic  Follow-up not needed.          Vitals Value Taken Time   BP 97/49 5/18/2020  8:00 AM   Temp 36.8 °C (98.2 °F) 5/18/2020  8:00 AM   Pulse 78 5/18/2020  8:00 AM   Resp 16 5/18/2020  8:00 AM   SpO2 93 % 5/18/2020  8:00 AM         No case tracking events are documented in the log.      Pain/Tal Score: Pain Rating Prior to Med Admin: 7 (5/18/2020  5:35 AM)  Pain Rating Post Med Admin: 0 (5/17/2020  9:02 PM)  Tal Score: 7 (5/18/2020  8:30 AM)

## 2020-05-18 NOTE — CONSULTS
Chart reviewed and patient appears to be a candidate for a G tube if he can be off his blood thinners.  Due to his anatomy the procedure will be performed in CT in radiology.  I will place the order and also I will place the order for a NGT to be placed in the am prior to G tube placement.  Thank you for the consult.

## 2020-05-18 NOTE — NURSING
Spoke with Pat Feng. Patient and family wanted to know if they can still have the liver biopsy performed after peg tube placement and discharge as an Outpatient procedure, notified provider.

## 2020-05-18 NOTE — PROGRESS NOTES
Food & Nutrition  Education     Diet Education: Heart Failure  Time Spent: 15 min  Learners: Patient      Nutrition Education provided with handouts: Heart Failure Nutrition Therapy      Comments: Provided patient education and handouts in relation to CHF. A;so discussed overall nutrition. Find in consult note.         All questions and concerns answered. Dietitian's contact information provided.         Follow-Up: No     Please Re-consult as needed     Thanks!     Tonia Guthrie RD, LDN

## 2020-05-19 ENCOUNTER — TELEPHONE (OUTPATIENT)
Dept: INTERNAL MEDICINE | Facility: CLINIC | Age: 75
End: 2020-05-19

## 2020-05-19 VITALS
DIASTOLIC BLOOD PRESSURE: 57 MMHG | OXYGEN SATURATION: 92 % | WEIGHT: 130.75 LBS | BODY MASS INDEX: 19.82 KG/M2 | HEIGHT: 68 IN | HEART RATE: 86 BPM | SYSTOLIC BLOOD PRESSURE: 128 MMHG | RESPIRATION RATE: 20 BRPM | TEMPERATURE: 98 F

## 2020-05-19 PROBLEM — I50.32 CHRONIC DIASTOLIC HEART FAILURE: Chronic | Status: ACTIVE | Noted: 2020-05-19

## 2020-05-19 PROBLEM — J96.21 ACUTE ON CHRONIC RESPIRATORY FAILURE WITH HYPOXIA: Status: RESOLVED | Noted: 2020-05-16 | Resolved: 2020-05-19

## 2020-05-19 PROBLEM — J96.11 CHRONIC RESPIRATORY FAILURE WITH HYPOXIA: Chronic | Status: ACTIVE | Noted: 2020-05-19

## 2020-05-19 PROBLEM — I50.33 ACUTE ON CHRONIC DIASTOLIC CONGESTIVE HEART FAILURE: Chronic | Status: ACTIVE | Noted: 2019-12-09

## 2020-05-19 PROBLEM — I50.33 ACUTE ON CHRONIC DIASTOLIC CONGESTIVE HEART FAILURE: Chronic | Status: RESOLVED | Noted: 2019-12-09 | Resolved: 2020-05-19

## 2020-05-19 PROCEDURE — C9113 INJ PANTOPRAZOLE SODIUM, VIA: HCPCS | Performed by: INTERNAL MEDICINE

## 2020-05-19 PROCEDURE — 25000003 PHARM REV CODE 250: Performed by: FAMILY MEDICINE

## 2020-05-19 PROCEDURE — 99900035 HC TECH TIME PER 15 MIN (STAT)

## 2020-05-19 PROCEDURE — 25000242 PHARM REV CODE 250 ALT 637 W/ HCPCS: Performed by: INTERNAL MEDICINE

## 2020-05-19 PROCEDURE — 94640 AIRWAY INHALATION TREATMENT: CPT

## 2020-05-19 PROCEDURE — 27000221 HC OXYGEN, UP TO 24 HOURS

## 2020-05-19 PROCEDURE — 63600175 PHARM REV CODE 636 W HCPCS: Performed by: INTERNAL MEDICINE

## 2020-05-19 PROCEDURE — 94761 N-INVAS EAR/PLS OXIMETRY MLT: CPT

## 2020-05-19 PROCEDURE — 63600175 PHARM REV CODE 636 W HCPCS: Performed by: RADIOLOGY

## 2020-05-19 PROCEDURE — 25000003 PHARM REV CODE 250: Performed by: INTERNAL MEDICINE

## 2020-05-19 PROCEDURE — 25000003 PHARM REV CODE 250: Performed by: RADIOLOGY

## 2020-05-19 RX ORDER — LIDOCAINE HYDROCHLORIDE 10 MG/ML
INJECTION INFILTRATION; PERINEURAL CODE/TRAUMA/SEDATION MEDICATION
Status: COMPLETED | OUTPATIENT
Start: 2020-05-19 | End: 2020-05-19

## 2020-05-19 RX ORDER — FENTANYL CITRATE 50 UG/ML
INJECTION, SOLUTION INTRAMUSCULAR; INTRAVENOUS CODE/TRAUMA/SEDATION MEDICATION
Status: COMPLETED | OUTPATIENT
Start: 2020-05-19 | End: 2020-05-19

## 2020-05-19 RX ORDER — MIDAZOLAM HYDROCHLORIDE 1 MG/ML
INJECTION INTRAMUSCULAR; INTRAVENOUS CODE/TRAUMA/SEDATION MEDICATION
Status: COMPLETED | OUTPATIENT
Start: 2020-05-19 | End: 2020-05-19

## 2020-05-19 RX ORDER — AMOXICILLIN AND CLAVULANATE POTASSIUM 500; 125 MG/1; MG/1
1 TABLET, FILM COATED ORAL 2 TIMES DAILY
Qty: 28 TABLET | Refills: 0 | Status: SHIPPED | OUTPATIENT
Start: 2020-05-19 | End: 2020-06-02

## 2020-05-19 RX ORDER — FUROSEMIDE 40 MG/1
40 TABLET ORAL DAILY PRN
Start: 2020-05-19

## 2020-05-19 RX ORDER — OXYCODONE AND ACETAMINOPHEN 7.5; 325 MG/1; MG/1
1 TABLET ORAL EVERY 6 HOURS PRN
Start: 2020-05-19

## 2020-05-19 RX ORDER — AMOXICILLIN 500 MG/1
1000 TABLET, FILM COATED ORAL EVERY 12 HOURS
Qty: 56 TABLET | Refills: 0 | Status: SHIPPED | OUTPATIENT
Start: 2020-05-19 | End: 2020-06-02

## 2020-05-19 RX ORDER — GADOBUTROL 604.72 MG/ML
10 INJECTION INTRAVENOUS
Status: DISCONTINUED | OUTPATIENT
Start: 2020-05-19 | End: 2020-05-19 | Stop reason: HOSPADM

## 2020-05-19 RX ADMIN — LIDOCAINE HYDROCHLORIDE 20 ML: 10 INJECTION, SOLUTION INFILTRATION; PERINEURAL at 01:05

## 2020-05-19 RX ADMIN — OXYCODONE AND ACETAMINOPHEN 1 TABLET: 7.5; 325 TABLET ORAL at 06:05

## 2020-05-19 RX ADMIN — MIDAZOLAM HYDROCHLORIDE 0.5 MG: 1 INJECTION, SOLUTION INTRAMUSCULAR; INTRAVENOUS at 01:05

## 2020-05-19 RX ADMIN — FENTANYL CITRATE 25 MCG: 0.05 INJECTION, SOLUTION INTRAMUSCULAR; INTRAVENOUS at 01:05

## 2020-05-19 RX ADMIN — SODIUM CHLORIDE: 0.9 INJECTION, SOLUTION INTRAVENOUS at 12:05

## 2020-05-19 RX ADMIN — OXYCODONE AND ACETAMINOPHEN 1 TABLET: 7.5; 325 TABLET ORAL at 02:05

## 2020-05-19 RX ADMIN — PANTOPRAZOLE SODIUM 40 MG: 40 INJECTION, POWDER, LYOPHILIZED, FOR SOLUTION INTRAVENOUS at 06:05

## 2020-05-19 RX ADMIN — IPRATROPIUM BROMIDE 0.5 MG: 0.5 SOLUTION RESPIRATORY (INHALATION) at 07:05

## 2020-05-19 RX ADMIN — IPRATROPIUM BROMIDE 0.5 MG: 0.5 SOLUTION RESPIRATORY (INHALATION) at 12:05

## 2020-05-19 NOTE — ASSESSMENT & PLAN NOTE
05/18/2020  ASSESSMENT: Believed to be malignant.  PLAN:  He gave informed refusal for bronchoscopy or any other other further diagnostic evaluation, opting for palliative care with home hospice.

## 2020-05-19 NOTE — ASSESSMENT & PLAN NOTE
05/18/2020 He has Diastolic heart failure that is Chronic. ASSESSMENT: Stable. I'm unconvinced that his CHF is major contributor to his respiratory failure. He feels dehydrated.  PLAN:  D/C Lasix. IV fluids for comfort.  Temp:  [96.6 °F (35.9 °C)-98.2 °F (36.8 °C)] 97.3 °F (36.3 °C)  Pulse:  [69-87] 87  Resp:  [15-20] 20  SpO2:  [91 %-97 %] 93 %  BP: ()/(49-66) 110/55   Diuretics (From admission, onward)    None           Intake/Output Summary (Last 24 hours) at 5/18/2020 1905  Last data filed at 5/18/2020 0828  Gross per 24 hour   Intake 270 ml   Output 400 ml   Net -130 ml     Patient Vitals for the past 72 hrs (Last 3 readings):   Weight   05/18/20 0400 56.5 kg (124 lb 9 oz)   05/18/20 0335 56.5 kg (124 lb 9 oz)   05/17/20 0600 58.7 kg (129 lb 6.6 oz)      Recent Labs   Lab 05/12/20  0456 05/16/20  1053 05/17/20  0448 05/18/20  0427   BNP  --  254*  --   --    BUN 28* 50* 48* 57*   CREATININE 0.9 1.7* 1.5* 1.5*   ESTGFRAFRICA >60 45* 53* 53*   EGFRNONAA >60 39* 46* 46*    143 146* 144   K 4.1 4.6 4.6 4.5    106 104 102   CO2 27 27 32* 29   PHOS 1.9*  --   --   --    MG 2.4  --  2.5  --    HGB 11.2* 11.3*  --   --    HCT 35.5* 35.9*  --   --

## 2020-05-19 NOTE — INTERVAL H&P NOTE
The patient has been examined and the H&P has been reviewed:    I concur with the findings and no changes have occurred since H&P was written.    Anesthesia/Surgery risks, benefits and alternative options discussed and understood by patient/family.          Active Hospital Problems    Diagnosis  POA    *Oropharyngeal aspiration [T17.208A]  Yes    Severe malnutrition [E43]  Yes    Oropharyngeal dysphagia [R13.12]  Yes     Chronic    Other constipation [K59.09]  Yes     Chronic    DNR no code (do not resuscitate) [Z66]  Yes    Metastatic malignant neoplasm of unknown primary site [C79.9, C80.1]  Yes     CT CHEST WITHOUT CONTRAST 05/16/2020   IMPRESSION:  1.  Findings concerning for suprahilar mass with lymphangitic spread/postobstructive pneumonia left upper lobe.  2.  Metastatic mediastinal lymphadenopathy as well as innumerable number of metastatic lesions throughout the partially visualized enlarged liver  3.  Findings concerning for pathologic T6 fracture with mild compression.  No retropulsion.  4.  Lower lobe and lingular infiltrates.  FINDINGS:  Interval development of masslike area of consolidation left suprahilar region measuring 3 x 2.4 cm with patchy areas of alveolar consolidation extending to the apex and interstitial thickening.  Peripheral areas of consolidation within each lower lobe as well as the lingula.  Granuloma in the right lower lobe.  No effusion or pneumothorax.  Airway is widely patent.  Interval development of mediastinal adenopathy.  Enlarged prevascular lymph node measures 2.0 x 2.0 cm enlarged left paratracheal lymph node measures 2.2 x 1.7 cm.  There and innumerable mild of small low-density lesions throughout the liver concerning for metastasis.  Remaining visualized abdominal organs are unremarkable.  Osseous structures are intact.  No suspicious osseous lesions.  Mild compression fracture and lytic changes of the superior endplate concerning for pathologic fracture.      NEYMAR  (acute kidney injury) [N17.9]  Yes    Acute on chronic respiratory failure with hypoxia [J96.21]  Yes    Cirrhosis of liver without ascites [K74.60]  Yes     LIVER ULTRASOUND (US ABDOMEN LIMITED)  IMPRESSION: Enlarged, cirrhotic liver.  Benign-appearing 2 cm hepatic cyst.  No sonographic evidence of focal solid liver mass. Gallbladder wall thickening suspected to be related to 3rd spacing or reactive to the adjacent liver disease rather than acalculous cholecystitis. Borderline splenomegaly.  FINDINGS:  · Liver: Enlarged measuring 19.6 cm. Diffusely heterogeneous echotexture with surface lobularity compatible with cirrhosis.  There is a benign-appearing 2 cm cyst in the right hepatic lobe.  No suspicious focal solid liver masses identified.  Appropriate hepatopedal flow is seen in the main portal vein.  · Gallbladder: The gallbladder wall is thickened measuring up to 7 mm.  No pericholecystic fluid or gallstones identified.  · Biliary system: The common duct is not dilated, measuring 3 mm.  No intrahepatic ductal dilatation.  · Right kidney: Normal in size and echotexture, measuring 10.3 cm.  No hydronephrosis or nephrolithiasis identified.  · Pancreas: Visualized segments are unremarkable.  · Miscellaneous: No upper abdominal ascites.  The spleen is upper limits of normal in size measuring 12.6 cm.      Elevated troponin [R79.89]  Yes    Acute on chronic diastolic congestive heart failure [I50.33]  Yes       Echo Color Flow Doppler 05/16/20    Narrative · Left ventricular systolic function. The estimated ejection fraction is   60%.  · Grade I (mild) left ventricular diastolic dysfunction consistent with   impaired relaxation.  · Normal right ventricular systolic function.      .       Coronary artery disease involving native coronary artery of native heart without angina pectoris [I25.10]  Yes     Left Heart Cath 05/07/2020  ·LVEDP (Pre): 17  ·LVEDP (Post): 20  ·The ejection fraction is calculated to be  55%.  ·Prox LAD lesion , 70% stenosed.  ·Prox RCA to Mid RCA lesion , 99% stenosed.  ·Estimated blood loss: none  ·Two vessel coronary artery disease.  ·UNSUCCESSFUL ATTEMPT AT RECANALIZATION OF RCA       Pneumonia involving bilateral lungs [J18.9]  Yes    Mass of left lung -  new compared to prior evaluation in February 2019 [R91.8]  Yes    Stage 3 severe COPD by GOLD classification [J44.9]  Yes    JUANITO on CPAP [G47.33, Z99.89]  Not Applicable     Chronic     14 cm      Hypertension [I10]  Yes      Resolved Hospital Problems   No resolved problems to display.

## 2020-05-19 NOTE — ASSESSMENT & PLAN NOTE
05/18/2020 He has Acute on Chronic Hypoxic respiratory failure. ETIOLOGY = Pneumonia, COPD. He WAS previously on oxygen at home. ASSESSMENT: Modestly improved. D-dimer elevated, but he declined PE evaluation, explaining that he would not want anticoagulation. Calves are non-tender and reasonably symmetric. PLAN: Treat pneumonia and adjust management of supplemental oxygen and respiratory treatments as needed.  Flow (L/min): 4  Oxygen Concentration (%):  [36] 36  Temp:  [96.6 °F (35.9 °C)-98.2 °F (36.8 °C)] 97.3 °F (36.3 °C)  Pulse:  [69-87] 87  Resp:  [15-20] 20  SpO2:  [91 %-97 %] 93 %  BP: ()/(49-66) 110/55       Recent Labs   Lab 05/17/20  1228   DDIMER 6.22*

## 2020-05-19 NOTE — ASSESSMENT & PLAN NOTE
05/19/2020  ASSESSMENT: Stable. Treated with IV fluids overnight. PLAN:  Resume Lasix after PEG tube placement when adequate hydration can be assured.  Recent Labs   Lab 05/16/20  1053 05/17/20  0448 05/18/20  0427   BUN 50* 48* 57*   CREATININE 1.7* 1.5* 1.5*   ESTGFRAFRICA 45* 53* 53*   EGFRNONAA 39* 46* 46*    146* 144   K 4.6 4.6 4.5    104 102   CO2 27 32* 29   MG  --  2.5  --    HGB 11.3*  --   --    HCT 35.9*  --   --

## 2020-05-19 NOTE — ASSESSMENT & PLAN NOTE
05/18/2020  ASSESSMENT: GI was unsuccessful with PEG tube placement due to his anatomy. PLAN: Interventional radiology consulted for PEG tube placement in the morning.

## 2020-05-19 NOTE — SUBJECTIVE & OBJECTIVE
Interval History: 05/18/2020 GI was unsuccessful with PEG tube placement due to his anatomy. Interventional radiology consulted for PEG tube placement in the morning. His oral nutritional and fluid intake has been minimal because he has no appetite, although he says he feels dehydrated and wants IV fluids. IV fluids ordered. Anticipate discharge home with hospice tomorrow after PEG tube placement.    Review of Systems   Constitutional: Positive for appetite change. Negative for chills and fever.   Respiratory: Positive for cough and shortness of breath. Negative for chest tightness and wheezing.    Cardiovascular: Negative for chest pain.     Objective:     Vital Signs (Most Recent):  Temp: 97.3 °F (36.3 °C) (05/18/20 1512)  Pulse: 87 (05/18/20 1512)  Resp: 20 (05/18/20 1512)  BP: (!) 110/55 (05/18/20 1512)  SpO2: (!) 93 % (05/18/20 1512) Vital Signs (24h Range):  Temp:  [96.6 °F (35.9 °C)-98.2 °F (36.8 °C)] 97.3 °F (36.3 °C)  Pulse:  [69-87] 87  Resp:  [15-20] 20  SpO2:  [91 %-97 %] 93 %  BP: ()/(49-66) 110/55     Weight: 56.5 kg (124 lb 9 oz)  Body mass index is 18.94 kg/m².    Intake/Output Summary (Last 24 hours) at 5/18/2020 1902  Last data filed at 5/18/2020 0828  Gross per 24 hour   Intake 270 ml   Output 400 ml   Net -130 ml      Physical Exam   Constitutional: He is oriented to person, place, and time. No distress.   HENT:   Mouth/Throat: Oropharynx is clear and moist.   Eyes: No scleral icterus.   Cardiovascular: Normal rate, regular rhythm and normal heart sounds.   Pulmonary/Chest: Effort normal. No respiratory distress. He has no wheezes. He has no rales.   Mild scattered rhonchi. Adequate air movement.   Abdominal: Soft. He exhibits no distension. There is no tenderness.   Musculoskeletal: He exhibits no tenderness. Edema:  mild bilatera ankle edema.   Neurological: He is alert and oriented to person, place, and time.   Skin: Skin is warm and dry. He is not diaphoretic.   Psychiatric: He has a  normal mood and affect. His behavior is normal. Judgment and thought content normal.       Significant Labs: All pertinent labs within the past 24 hours have been reviewed.    Significant Imaging: I have reviewed all pertinent imaging results/findings within the past 24 hours.

## 2020-05-19 NOTE — PLAN OF CARE
Patient ready for D/C      05/19/20 3895   Post-Acute Status   Post-Acute Authorization Hospice   Hospice Status Set-up Complete     [facility].

## 2020-05-19 NOTE — NURSING
Pt transported to IR for PEG tube placement. Called family and spoke with Noble letting her know pt was going for PEG.

## 2020-05-19 NOTE — ASSESSMENT & PLAN NOTE
05/19/2020  ASSESSMENT: Clinically stable. PLAN:  He is choosing palliative management.    MELD-Na score: 11 at 5/18/2020  4:27 AM  MELD score: 11 at 5/18/2020  4:27 AM  Calculated from:  Serum Creatinine: 1.5 mg/dL at 5/18/2020  4:27 AM  Serum Sodium: 144 mmol/L (Rounded to 137 mmol/L) at 5/18/2020  4:27 AM  Total Bilirubin: 0.8 mg/dL (Rounded to 1 mg/dL) at 5/16/2020 10:53 AM  INR(ratio): 1.1 at 5/16/2020 10:53 AM  Age: 74 years   Recent Labs   Lab 05/12/20  0809 05/16/20  1053   AST  --  95*   ALT  --  100*   *  --    ALKPHOS  --  391*   BILITOT  --  0.8   INR  --  1.1   APTT  --  28.2   PLT  --  212   ALBUMIN  --  2.8*   LABPROT  --  12.0

## 2020-05-19 NOTE — ASSESSMENT & PLAN NOTE
05/19/2020  ASSESSMENT: Troponin stable. Suspect demand ischemia.  PLAN:  Continue beta-blocker, aspirin, plavix, isisorbide mononitrate. Not on statin due to statin intolerance.  Recent Labs   Lab 05/16/20  1053 05/16/20  1711 05/16/20  2252   TROPONINI 0.044* 0.034* 0.038*     Results for orders placed or performed during the hospital encounter of 05/16/20   EKG 12-lead    Collection Time: 05/16/20 10:23 AM    Narrative    Test Reason : R06.02,    Vent. Rate : 068 BPM     Atrial Rate : 068 BPM     P-R Int : 254 ms          QRS Dur : 092 ms      QT Int : 378 ms       P-R-T Axes : 013 -05 175 degrees     QTc Int : 401 ms    Sinus rhythm with 1st degree A-V block  Nonspecific ST abnormality  has not changed from previous ekgs  Abnormal ECG  When compared with ECG of 10-MAY-2020 03:34,  Questionable change in  Non-specific change in ST segment in Inferior leads  T wave inversion no longer evident in Inferior leads  T wave inversion less evident in Anterior leads  T wave inversion more evident in Lateral leads  Confirmed by ALPHONSE ANTONIO, CHEKO COWART ([id number]) on 5/17/2020 3:20:52 PM    Referred By: [email or web address]   SELF           Confirmed By:CHEKO CUNHA MD

## 2020-05-19 NOTE — ASSESSMENT & PLAN NOTE
05/19/2020 He has Diastolic heart failure that is Chronic. EF 60%. ASSESSMENT: Stable. I'm unconvinced that his CHF is major contributor to his respiratory failure.   PLAN:  Resume Lasix after PEG placed and he can ensure adequate hydration.  Temp:  [96.1 °F (35.6 °C)-98.2 °F (36.8 °C)] 97.4 °F (36.3 °C)  Pulse:  [70-88] 77  Resp:  [15-22] 15  SpO2:  [88 %-97 %] 97 %  BP: ()/(49-58) 121/56   Diuretics (From admission, onward)    None           Intake/Output Summary (Last 24 hours) at 5/19/2020 0746  Last data filed at 5/19/2020 0502  Gross per 24 hour   Intake 220 ml   Output 800 ml   Net -580 ml     Patient Vitals for the past 72 hrs (Last 3 readings):   Weight   05/19/20 0502 59.3 kg (130 lb 11.7 oz)   05/18/20 0400 56.5 kg (124 lb 9 oz)   05/18/20 0335 56.5 kg (124 lb 9 oz)      Recent Labs   Lab 05/16/20  1053 05/17/20  0448 05/18/20  0427   *  --   --    BUN 50* 48* 57*   CREATININE 1.7* 1.5* 1.5*   ESTGFRAFRICA 45* 53* 53*   EGFRNONAA 39* 46* 46*    146* 144   K 4.6 4.6 4.5    104 102   CO2 27 32* 29   MG  --  2.5  --    HGB 11.3*  --   --    HCT 35.9*  --   --

## 2020-05-19 NOTE — TELEPHONE ENCOUNTER
S/w pt, they request we call back to schedule the hospital F/U tomorrow, as the pt is still in the hospital/jj

## 2020-05-19 NOTE — ASSESSMENT & PLAN NOTE
05/18/2020  ASSESSMENT: Stable. PLAN:  D/C Lasix. IV fluids.  Recent Labs   Lab 05/12/20  0456 05/16/20  1053 05/17/20  0448 05/18/20  0427   BUN 28* 50* 48* 57*   CREATININE 0.9 1.7* 1.5* 1.5*   ESTGFRAFRICA >60 45* 53* 53*   EGFRNONAA >60 39* 46* 46*    143 146* 144   K 4.1 4.6 4.6 4.5    106 104 102   CO2 27 27 32* 29   PHOS 1.9*  --   --   --    MG 2.4  --  2.5  --    HGB 11.2* 11.3*  --   --    HCT 35.5* 35.9*  --   --

## 2020-05-19 NOTE — ASSESSMENT & PLAN NOTE
05/19/2020  ASSESSMENT: GI was unsuccessful with PEG tube placement due to his anatomy. PLAN: Interventional radiology to attempt PEG tube placement this morning.

## 2020-05-19 NOTE — TELEPHONE ENCOUNTER
----- Message from Swati Abrams RN sent at 5/19/2020  3:46 PM CDT -----  Per hospital discharge instructions, please contact patient in order to schedule a hospital follow-up appointment to take place within the next three days.     Please do not respond to this message as this inbasket is not monitored.     Sincerely,   Swati Abrams RN  Telemetry Unit  [facility]

## 2020-05-19 NOTE — ASSESSMENT & PLAN NOTE
05/18/2020  ASSESSMENT: Chronic condition related to malignancy and multiple comorbidities.  PLAN:  PEG tube placement planned for the morning.

## 2020-05-19 NOTE — SEDATION DOCUMENTATION
16F g tube placed to left abdomen procedure site, secured with sutures, gauze and tegaderm.  Pt tolerated well.  Vss.  Pt awake and alert, denied pain or discomfort at this time.  Report given to Leanne Page on tele.  Pt transferred to [facility] and transported to pt room.

## 2020-05-19 NOTE — PROGRESS NOTES
[facility] Hospital Medicine  Progress Note    Patient Name: Noble Tripp  MRN: [id number]  Patient Class: IP- Inpatient   Admission Date: 5/16/2020  Length of Stay: 1 days  Attending Physician: DAVID Rojas MD  Primary Care Provider: Dwaine Davis MD        Subjective:     Principal Problem:Oropharyngeal aspiration        HPI:  Noble Tripp is a 74 year old male with a PMHx of CAD, CHF, PSVT, PVD, JUANITO on CPAP, HLD, COPD and BPH who presented to the Emergency Department with c/o SOB on exertion. Pt reports SOB began last night. He reports having to increase his home O2 to 3.5LPM. Pt recently discharged from [facility] on 05/12/20 for same symptom complaint. CXR upon admit during that time showed mildly worsening patchy multifocal bilateral pulmonary infiltrates suspicious for atypical pneumonia. Hx of aspiration pna with swallowing difficulty sec to radiation treatment for head and neck cancer in 2011. Has declined PEG tube placement multiple times and refuses to follow puree diet with thickened liquids. He was treated with rocephin and levaquin. He was weaned down to 2LPM - qualified for home oxygen.   ED workup showed: WBC count 13.23K, Hgb/Hct 11.3/35.9, creatinine 1.7, troponin 0.044, , COVID negative. CXR stable compared to May 10. Pt received Lasix 60 mg IV x 1 in ED. Pt admitted for acute on chronic congestive heart failure.     Overview/Hospital Course:  05/17/2020 CT chest: 1.  Findings concerning for suprahilar mass with lymphangitic spread/postobstructive pneumonia left upper lobe. 2.  Metastatic mediastinal lymphadenopathy as well as innumerable number of metastatic lesions throughout the partially visualized enlarged liver. 3.  Findings concerning for pathologic T6 fracture with mild compression.  No retropulsion. 4.  Lower lobe and lingular infiltrates. I discussed his case with pulmonology and oncology. I reviewed his CT results with him. We had LENGTHY  discussion about the apparent diagnosis, differential diagnosis and risks and benefits of treatment options. He verbalized understanding. He has decided he does not want bronchoscopy or liver biopsy because he would not be willing to take chemotherapy or radiation therapy, regardless of the pathology results. I discussed advanced directives with him, and he wants to be DNR. I discussed treatment options, including palliative treatment options and hospice. He has decided that he wants to go home with hospice, but he does want PEG tube prior to discharge, which I arranged with GI to be done in the morning. Social work consult for home hospice ordered.  05/18/2020 GI was unsuccessful with PEG tube placement due to his anatomy. Interventional radiology consulted for PEG tube placement in the morning. His oral nutritional and fluid intake has been minimal because he has no appetite, although he says he feels dehydrated and wants IV fluids. IV fluids ordered. Anticipate discharge home with hospice tomorrow after PEG tube placement.      Interval History: 05/18/2020 GI was unsuccessful with PEG tube placement due to his anatomy. Interventional radiology consulted for PEG tube placement in the morning. His oral nutritional and fluid intake has been minimal because he has no appetite, although he says he feels dehydrated and wants IV fluids. IV fluids ordered. Anticipate discharge home with hospice tomorrow after PEG tube placement.    Review of Systems   Constitutional: Positive for appetite change. Negative for chills and fever.   Respiratory: Positive for cough and shortness of breath. Negative for chest tightness and wheezing.    Cardiovascular: Negative for chest pain.     Objective:     Vital Signs (Most Recent):  Temp: 97.3 °F (36.3 °C) (05/18/20 1512)  Pulse: 87 (05/18/20 1512)  Resp: 20 (05/18/20 1512)  BP: (!) 110/55 (05/18/20 1512)  SpO2: (!) 93 % (05/18/20 1512) Vital Signs (24h Range):  Temp:  [96.6 °F (35.9  "°C)-98.2 °F (36.8 °C)] 97.3 °F (36.3 °C)  Pulse:  [69-87] 87  Resp:  [15-20] 20  SpO2:  [91 %-97 %] 93 %  BP: ()/(49-66) 110/55     Weight: 56.5 kg (124 lb 9 oz)  Body mass index is 18.94 kg/m².    Intake/Output Summary (Last 24 hours) at 5/18/2020 1902  Last data filed at 5/18/2020 0828  Gross per 24 hour   Intake 270 ml   Output 400 ml   Net -130 ml      Physical Exam   Constitutional: He is oriented to person, place, and time. No distress.   HENT:   Mouth/Throat: Oropharynx is clear and moist.   Eyes: No scleral icterus.   Cardiovascular: Normal rate, regular rhythm and normal heart sounds.   Pulmonary/Chest: Effort normal. No respiratory distress. He has no wheezes. He has no rales.   Mild scattered rhonchi. Adequate air movement.   Abdominal: Soft. He exhibits no distension. There is no tenderness.   Musculoskeletal: He exhibits no tenderness. Edema:  mild bilatera ankle edema.   Neurological: He is alert and oriented to person, place, and time.   Skin: Skin is warm and dry. He is not diaphoretic.   Psychiatric: He has a normal mood and affect. His behavior is normal. Judgment and thought content normal.       Significant Labs: All pertinent labs within the past 24 hours have been reviewed.    Significant Imaging: I have reviewed all pertinent imaging results/findings within the past 24 hours.      Assessment/Plan:      * Oropharyngeal aspiration  05/18/2020 Refer to Assessment & Plan note for "Oropharyngeal dysphagia."       Acute on chronic respiratory failure with hypoxia  05/18/2020 He has Acute on Chronic Hypoxic respiratory failure. ETIOLOGY = Pneumonia, COPD. He WAS previously on oxygen at home. ASSESSMENT: Modestly improved. D-dimer elevated, but he declined PE evaluation, explaining that he would not want anticoagulation. Calves are non-tender and reasonably symmetric. PLAN: Treat pneumonia and adjust management of supplemental oxygen and respiratory treatments as needed.  Flow (L/min): 4  Oxygen " Concentration (%):  [36] 36  Temp:  [96.6 °F (35.9 °C)-98.2 °F (36.8 °C)] 97.3 °F (36.3 °C)  Pulse:  [69-87] 87  Resp:  [15-20] 20  SpO2:  [91 %-97 %] 93 %  BP: ()/(49-66) 110/55       Recent Labs   Lab 05/17/20  1228   DDIMER 6.22*        Metastatic malignant neoplasm of unknown primary site  05/18/2020  ASSESSMENT: We had LENGTHY discussion on 05/17/2020 about the apparent diagnosis, differential diagnosis and risks and benefits of treatment options. He verbalized understanding and is opting for palliative care with home hospice.   PLAN:  Social work consult for home hospice ordered.      DNR no code (do not resuscitate)  Advance Care Planning   05/17/2020 12:25PM - DAVID Rojas MD    [facility]  I engaged the patient and family (radha Munoz and daughter-in-law Pat) in a conversation about advance care planning and we specifically addressed what the goals of care would be moving forward, in light of the patient's change in clinical status, specifically apparent metastatic cancer.  We did specifically address the patient's likely prognosis, which is poor.  We explored the patient's values and preferences for future care.  The patient and family endorses that what is most important right now is to focus on spending time at home, avoiding the hospital, symptom/pain control and quality of life, even if it means sacrificing a little time    Accordingly, we have decided that the best plan to meet the patient's goals includes enrolling in hospice care.    I did explain the role for hospice care at this stage of the patient's illness, including its ability to help the patient live with the best quality of life possible.  We will be making a hospice referral.      Code Status  In light of the patients advanced and life limiting illness,I engaged the the patient and family (son Alexander and daughter-in-law Pat) in a conversation about the patient's preferences for care  at the very end of life. The patient wishes to  have a natural, peaceful death.  Along those lines, the patient does not wish to have CPR or other invasive treatments performed when his heart and/or breathing stops. I communicated to the patient and family that a DNR order would be placed in his medical record to reflect this preference and DNR form was completed and will be scanned into EPIC.    I spent a total of 60 minutes engaging the patient in this advance care planning discussion.       Pneumonia involving bilateral lungs  05/18/2020  ASSESSMENT: Modestly improved. (Post-obstructive +/- aspiration.)   PLAN:  Plan to transition to Augmentin upon discharge home with hospice.      Stage 3 severe COPD by GOLD classification  05/18/2020  ASSESSMENT: Chronic, stable.   PLAN:  Continue present treatment plan.        Oropharyngeal dysphagia  05/18/2020  ASSESSMENT: GI was unsuccessful with PEG tube placement due to his anatomy. PLAN: Interventional radiology consulted for PEG tube placement in the morning.     Severe malnutrition  05/18/2020  ASSESSMENT: Chronic condition related to malignancy and multiple comorbidities.  PLAN:  PEG tube placement planned for the morning.      Other constipation  05/18/2020  ASSESSMENT: Resolved.   PLAN:  Miralax as needed.      NEYMAR (acute kidney injury)  05/18/2020  ASSESSMENT: Stable. PLAN:  D/C Lasix. IV fluids.  Recent Labs   Lab 05/12/20  0456 05/16/20  1053 05/17/20  0448 05/18/20  0427   BUN 28* 50* 48* 57*   CREATININE 0.9 1.7* 1.5* 1.5*   ESTGFRAFRICA >60 45* 53* 53*   EGFRNONAA >60 39* 46* 46*    143 146* 144   K 4.1 4.6 4.6 4.5    106 104 102   CO2 27 27 32* 29   PHOS 1.9*  --   --   --    MG 2.4  --  2.5  --    HGB 11.2* 11.3*  --   --    HCT 35.5* 35.9*  --   --        Cirrhosis of liver without ascites  05/18/2020  ASSESSMENT: Clinically stable. PLAN:  He is choosing palliative management.    MELD-Na score: 11 at 5/18/2020  4:27 AM  MELD score: 11 at 5/18/2020  4:27 AM  Calculated from:  Serum Creatinine:  1.5 mg/dL at 5/18/2020  4:27 AM  Serum Sodium: 144 mmol/L (Rounded to 137 mmol/L) at 5/18/2020  4:27 AM  Total Bilirubin: 0.8 mg/dL (Rounded to 1 mg/dL) at 5/16/2020 10:53 AM  INR(ratio): 1.1 at 5/16/2020 10:53 AM  Age: 74 years   Recent Labs   Lab 05/12/20  0456 05/12/20  0809 05/16/20  1053   *  --  95*   *  --  100*   GGT  --  648*  --    ALKPHOS 324*  --  391*   BILITOT 0.7  --  0.8   INR  --   --  1.1   APTT  --   --  28.2   *  --  212   ALBUMIN 2.4*  --  2.8*   LABPROT  --   --  12.0        Elevated troponin  05/18/2020  ASSESSMENT: Troponin stable. Suspect demand ischemia.  PLAN:  Continue beta-blocker, aspirin, plavix, isisorbide mononitrate. Not on statin due to statin intolerance.  Recent Labs   Lab 05/16/20  1053 05/16/20  1711 05/16/20  2252   TROPONINI 0.044* 0.034* 0.038*     Results for orders placed or performed during the hospital encounter of 05/16/20   EKG 12-lead    Collection Time: 05/16/20 10:23 AM    Narrative    Test Reason : R06.02,    Vent. Rate : 068 BPM     Atrial Rate : 068 BPM     P-R Int : 254 ms          QRS Dur : 092 ms      QT Int : 378 ms       P-R-T Axes : 013 -05 175 degrees     QTc Int : 401 ms    Sinus rhythm with 1st degree A-V block  Nonspecific ST abnormality  has not changed from previous ekgs  Abnormal ECG  When compared with ECG of 10-MAY-2020 03:34,  Questionable change in  Non-specific change in ST segment in Inferior leads  T wave inversion no longer evident in Inferior leads  T wave inversion less evident in Anterior leads  T wave inversion more evident in Lateral leads  Confirmed by CHEKO CUNHA MD ([id number]) on 5/17/2020 3:20:52 PM    Referred By: [email or web address]   SELF           Confirmed By:CHEKO CUNHA MD          Acute on chronic diastolic congestive heart failure  05/18/2020 He has Diastolic heart failure that is Chronic. ASSESSMENT: Stable. I'm unconvinced that his CHF is major contributor to his respiratory failure. He feels dehydrated.   PLAN:  D/C Lasix. IV fluids for comfort.  Temp:  [96.6 °F (35.9 °C)-98.2 °F (36.8 °C)] 97.3 °F (36.3 °C)  Pulse:  [69-87] 87  Resp:  [15-20] 20  SpO2:  [91 %-97 %] 93 %  BP: ()/(49-66) 110/55   Diuretics (From admission, onward)    None           Intake/Output Summary (Last 24 hours) at 5/18/2020 1905  Last data filed at 5/18/2020 0828  Gross per 24 hour   Intake 270 ml   Output 400 ml   Net -130 ml     Patient Vitals for the past 72 hrs (Last 3 readings):   Weight   05/18/20 0400 56.5 kg (124 lb 9 oz)   05/18/20 0335 56.5 kg (124 lb 9 oz)   05/17/20 0600 58.7 kg (129 lb 6.6 oz)      Recent Labs   Lab 05/12/20  0456 05/16/20  1053 05/17/20  0448 05/18/20  0427   BNP  --  254*  --   --    BUN 28* 50* 48* 57*   CREATININE 0.9 1.7* 1.5* 1.5*   ESTGFRAFRICA >60 45* 53* 53*   EGFRNONAA >60 39* 46* 46*    143 146* 144   K 4.1 4.6 4.6 4.5    106 104 102   CO2 27 27 32* 29   PHOS 1.9*  --   --   --    MG 2.4  --  2.5  --    HGB 11.2* 11.3*  --   --    HCT 35.5* 35.9*  --   --        Coronary artery disease involving native coronary artery of native heart without angina pectoris  05/18/2020  ASSESSMENT: Clinically stable. No angina reported.  PLAN:  Continue beta-blocker, aspirin, plavix, isisorbide mononitrate. Not on statin due to statin intolerance.      Mass of left lung -  new compared to prior evaluation in February 2019 05/18/2020  ASSESSMENT: Believed to be malignant.  PLAN:  He gave informed refusal for bronchoscopy or any other other further diagnostic evaluation, opting for palliative care with home hospice.      JUANITO on CPAP  05/18/2020  ASSESSMENT: Chronic condition. Stable.    Hypertension  05/18/2020  ASSESSMENT: Adequately controlled.   PLAN:  Continue present treatment plan.  Temp:  [96.6 °F (35.9 °C)-98.2 °F (36.8 °C)] 97.3 °F (36.3 °C)  Pulse:  [69-87] 87  Resp:  [15-20] 20  SpO2:  [91 %-97 %] 93 %  BP: ()/(49-66) 110/55        VTE Risk Mitigation (From admission, onward)          Ordered     IP VTE HIGH RISK PATIENT  Once      05/17/20 0937     Place sequential compression device  Until discontinued      05/16/20 1456                      L Sundeep Rojas MD  Department of Hospital Medicine   [facility]

## 2020-05-19 NOTE — ASSESSMENT & PLAN NOTE
05/18/2020  ASSESSMENT: Troponin stable. Suspect demand ischemia.  PLAN:  Continue beta-blocker, aspirin, plavix, isisorbide mononitrate. Not on statin due to statin intolerance.  Recent Labs   Lab 05/16/20  1053 05/16/20  1711 05/16/20  2252   TROPONINI 0.044* 0.034* 0.038*     Results for orders placed or performed during the hospital encounter of 05/16/20   EKG 12-lead    Collection Time: 05/16/20 10:23 AM    Narrative    Test Reason : R06.02,    Vent. Rate : 068 BPM     Atrial Rate : 068 BPM     P-R Int : 254 ms          QRS Dur : 092 ms      QT Int : 378 ms       P-R-T Axes : 013 -05 175 degrees     QTc Int : 401 ms    Sinus rhythm with 1st degree A-V block  Nonspecific ST abnormality  has not changed from previous ekgs  Abnormal ECG  When compared with ECG of 10-MAY-2020 03:34,  Questionable change in  Non-specific change in ST segment in Inferior leads  T wave inversion no longer evident in Inferior leads  T wave inversion less evident in Anterior leads  T wave inversion more evident in Lateral leads  Confirmed by ALPHONSE ANTONIO, CHEKO COWART ([id number]) on 5/17/2020 3:20:52 PM    Referred By: [email or web address]   SELF           Confirmed By:CHEKO CUNHA MD

## 2020-05-19 NOTE — ASSESSMENT & PLAN NOTE
05/19/2020  ASSESSMENT: Clinically stable. No angina reported.  PLAN:  Continue beta-blocker, aspirin, plavix, isisorbide mononitrate. Not on statin due to statin intolerance.

## 2020-05-19 NOTE — ASSESSMENT & PLAN NOTE
05/19/2020  ASSESSMENT: Adequately controlled.   PLAN:  Continue present treatment plan.  Temp:  [96.1 °F (35.6 °C)-98.2 °F (36.8 °C)] 97.4 °F (36.3 °C)  Pulse:  [70-88] 77  Resp:  [15-22] 15  SpO2:  [88 %-97 %] 97 %  BP: ()/(49-58) 121/56

## 2020-05-19 NOTE — PLAN OF CARE
Staff spoke with Sheridan from [place], Stated all the equipment in the patient room and he is ready for discharge. Sw will send D/C orders when ready.

## 2020-05-19 NOTE — ASSESSMENT & PLAN NOTE
05/18/2020  ASSESSMENT: Clinically stable. PLAN:  He is choosing palliative management.    MELD-Na score: 11 at 5/18/2020  4:27 AM  MELD score: 11 at 5/18/2020  4:27 AM  Calculated from:  Serum Creatinine: 1.5 mg/dL at 5/18/2020  4:27 AM  Serum Sodium: 144 mmol/L (Rounded to 137 mmol/L) at 5/18/2020  4:27 AM  Total Bilirubin: 0.8 mg/dL (Rounded to 1 mg/dL) at 5/16/2020 10:53 AM  INR(ratio): 1.1 at 5/16/2020 10:53 AM  Age: 74 years   Recent Labs   Lab 05/12/20  0456 05/12/20  0809 05/16/20  1053   *  --  95*   *  --  100*   GGT  --  648*  --    ALKPHOS 324*  --  391*   BILITOT 0.7  --  0.8   INR  --   --  1.1   APTT  --   --  28.2   *  --  212   ALBUMIN 2.4*  --  2.8*   LABPROT  --   --  12.0

## 2020-05-19 NOTE — ASSESSMENT & PLAN NOTE
05/19/2020  ASSESSMENT: Modestly improved. (Post-obstructive +/- aspiration.)   PLAN:  Plan to transition to Augmentin upon discharge home with hospice.

## 2020-05-19 NOTE — DISCHARGE SUMMARY
[facility] Hospital Medicine  Discharge Summary      Patient Name: Noble Tripp  MRN: [id number]  Admission Date: 5/16/2020  Hospital Length of Stay: 2 days  Discharge Date and Time:  05/19/2020 5:16 PM  Attending Physician: DAVID Rojas MD   Discharging Provider: JEANNETTE Rojas MD  Primary Care Provider: Dwaine Davis MD      HPI:   Noble Tripp is a 74 year old male with a PMHx of CAD, CHF, PSVT, PVD, JUANITO on CPAP, HLD, COPD and BPH who presented to the Emergency Department with c/o SOB on exertion. Pt reports SOB began last night. He reports having to increase his home O2 to 3.5LPM. Pt recently discharged from [facility] on 05/12/20 for same symptom complaint. CXR upon admit during that time showed mildly worsening patchy multifocal bilateral pulmonary infiltrates suspicious for atypical pneumonia. Hx of aspiration pna with swallowing difficulty sec to radiation treatment for head and neck cancer in 2011. Has declined PEG tube placement multiple times and refuses to follow puree diet with thickened liquids. He was treated with rocephin and levaquin. He was weaned down to 2LPM - qualified for home oxygen.   ED workup showed: WBC count 13.23K, Hgb/Hct 11.3/35.9, creatinine 1.7, troponin 0.044, , COVID negative. CXR stable compared to May 10. Pt received Lasix 60 mg IV x 1 in ED. Pt admitted for acute on chronic congestive heart failure.     Procedure(s) (LRB):  INSERTION, PEG TUBE (N/A)      Hospital Course:   05/17/2020 CT chest: 1.  Findings concerning for suprahilar mass with lymphangitic spread/postobstructive pneumonia left upper lobe. 2.  Metastatic mediastinal lymphadenopathy as well as innumerable number of metastatic lesions throughout the partially visualized enlarged liver. 3.  Findings concerning for pathologic T6 fracture with mild compression.  No retropulsion. 4.  Lower lobe and lingular infiltrates. I discussed his case with pulmonology and oncology. I  reviewed his CT results with him. We had LENGTHY discussion about the apparent diagnosis, differential diagnosis and risks and benefits of treatment options. He verbalized understanding. He has decided he does not want bronchoscopy or liver biopsy because he would not be willing to take chemotherapy or radiation therapy, regardless of the pathology results. I discussed advanced directives with him, and he wants to be DNR. I discussed treatment options, including palliative treatment options and hospice. He has decided that he wants to go home with hospice, but he does want PEG tube prior to discharge, which I arranged with GI to be done in the morning. Social work consult for home hospice ordered.  05/18/2020 GI was unsuccessful with PEG tube placement due to his anatomy. Interventional radiology consulted for PEG tube placement in the morning. His oral nutritional and fluid intake has been minimal because he has no appetite, although he says he feels dehydrated and wants IV fluids. IV fluids ordered. Anticipate discharge home with hospice tomorrow after PEG tube placement.  05/19/2020 PEG tube successfully placed by IR. He says he feels better after IV fluids. He still want to go home with hospice. Home hospice arranged.       Consults:   Consults (From admission, onward)        Status Ordering Provider     Inpatient consult to Cardiology  Once     Provider:  Pranay Crespo MD    Completed CRIS BELL     Inpatient consult to Gastroenterology  Once     Provider:  Debbie Quesada MD    Completed DAVID MARIN     Inpatient consult to Interventional Radiology  Once     Provider:  (Not yet assigned)    Completed DAVID MARIN     Inpatient consult to Pulmonology  Once     Provider:  Sanket Wright MD    Acknowledged DEBBIE QUESADA     Inpatient consult to Registered Dietitian/Nutritionist  Once     Provider:  (Not yet assigned)    Completed CRIS BELL     Inpatient consult to Social  "Work  Once     Provider:  (Not yet assigned)    Completed DAVID ROJAS     Inpatient consult to Social Work/Case Management  Once     Provider:  (Not yet assigned)    Completed CRIS BELL          * Oropharyngeal aspiration  05/19/2020 Refer to Assessment & Plan note for "Oropharyngeal dysphagia."       Acute on chronic respiratory failure with hypoxia-resolved as of 5/19/2020 05/19/2020 He has Acute on Chronic Hypoxic respiratory failure. ETIOLOGY = Pneumonia, COPD. He WAS previously on oxygen at home. ASSESSMENT: Modestly improved. D-dimer elevated, but he declined PE evaluation, explaining that he would not want anticoagulation. Calves are non-tender and reasonably symmetric. PLAN: Treat pneumonia and adjust management of supplemental oxygen and respiratory treatments as needed.  Flow (L/min): 5  Oxygen Concentration (%):  [36-40] 40  Temp:  [96.1 °F (35.6 °C)-98.2 °F (36.8 °C)] 97.4 °F (36.3 °C)  Pulse:  [70-88] 77  Resp:  [15-22] 15  SpO2:  [88 %-97 %] 97 %  BP: ()/(49-58) 121/56       Recent Labs   Lab 05/17/20  1228   DDIMER 6.22*        Metastatic malignant neoplasm of unknown primary site  05/19/2020  ASSESSMENT: We had LENGTHY discussion on 05/17/2020 about the apparent diagnosis, differential diagnosis and risks and benefits of treatment options. He verbalized understanding and is opting for palliative care with home hospice.   PLAN:  Social work consult for home hospice ordered.      DNR no code (do not resuscitate)  Advance Care Planning   05/17/2020 12:25PM - DAVID Rojas MD    [facility]  I engaged the patient and family (son Alexander and daughter-in-law Pat) in a conversation about advance care planning and we specifically addressed what the goals of care would be moving forward, in light of the patient's change in clinical status, specifically apparent metastatic cancer.  We did specifically address the patient's likely prognosis, which is poor.  We explored the patient's values " and preferences for future care.  The patient and family endorses that what is most important right now is to focus on spending time at home, avoiding the hospital, symptom/pain control and quality of life, even if it means sacrificing a little time    Accordingly, we have decided that the best plan to meet the patient's goals includes enrolling in hospice care.    I did explain the role for hospice care at this stage of the patient's illness, including its ability to help the patient live with the best quality of life possible.  We will be making a hospice referral.      Code Status  In light of the patients advanced and life limiting illness,I engaged the the patient and family (son Alexander and daughter-in-law Pat) in a conversation about the patient's preferences for care  at the very end of life. The patient wishes to have a natural, peaceful death.  Along those lines, the patient does not wish to have CPR or other invasive treatments performed when his heart and/or breathing stops. I communicated to the patient and family that a DNR order would be placed in his medical record to reflect this preference and DNR form was completed and will be scanned into EPIC.    I spent a total of 60 minutes engaging the patient in this advance care planning discussion.       Pneumonia involving bilateral lungs  05/19/2020  ASSESSMENT: Modestly improved. (Post-obstructive +/- aspiration.)   PLAN:  Plan to transition to Augmentin upon discharge home with hospice.      Stage 3 severe COPD by GOLD classification  05/19/2020  ASSESSMENT: Chronic, stable.   PLAN:  Continue present treatment plan.        Oropharyngeal dysphagia  05/19/2020  ASSESSMENT: GI was unsuccessful with PEG tube placement due to his anatomy. PLAN: Interventional radiology to attempt PEG tube placement this morning.     Chronic diastolic heart failure (EF = 60% on 05/16/2020)  Echocardiogram 05/16/2020  ·Left ventricular systolic function. The estimated  ejection fraction is 60%.  ·Grade I (mild) left ventricular diastolic dysfunction consistent with impaired relaxation.  ·Normal right ventricular systolic function.    Chronic condition.    Chronic respiratory failure with hypoxia  Chronic condition.      Severe malnutrition  05/19/2020  ASSESSMENT: Chronic condition related to malignancy and multiple comorbidities.  PLAN:  IR to attempt PEG tube this morning.      Other constipation  05/19/2020  ASSESSMENT: Resolved.   PLAN:  Miralax as needed.      NEYMAR (acute kidney injury)  05/19/2020  ASSESSMENT: Stable. Treated with IV fluids overnight. PLAN:  Resume Lasix after PEG tube placement when adequate hydration can be assured.  Recent Labs   Lab 05/16/20  1053 05/17/20  0448 05/18/20  0427   BUN 50* 48* 57*   CREATININE 1.7* 1.5* 1.5*   ESTGFRAFRICA 45* 53* 53*   EGFRNONAA 39* 46* 46*    146* 144   K 4.6 4.6 4.5    104 102   CO2 27 32* 29   MG  --  2.5  --    HGB 11.3*  --   --    HCT 35.9*  --   --        Cirrhosis of liver without ascites  05/19/2020  ASSESSMENT: Clinically stable. PLAN:  He is choosing palliative management.    MELD-Na score: 11 at 5/18/2020  4:27 AM  MELD score: 11 at 5/18/2020  4:27 AM  Calculated from:  Serum Creatinine: 1.5 mg/dL at 5/18/2020  4:27 AM  Serum Sodium: 144 mmol/L (Rounded to 137 mmol/L) at 5/18/2020  4:27 AM  Total Bilirubin: 0.8 mg/dL (Rounded to 1 mg/dL) at 5/16/2020 10:53 AM  INR(ratio): 1.1 at 5/16/2020 10:53 AM  Age: 74 years   Recent Labs   Lab 05/12/20  0809 05/16/20  1053   AST  --  95*   ALT  --  100*   *  --    ALKPHOS  --  391*   BILITOT  --  0.8   INR  --  1.1   APTT  --  28.2   PLT  --  212   ALBUMIN  --  2.8*   LABPROT  --  12.0        Elevated troponin  05/19/2020  ASSESSMENT: Troponin stable. Suspect demand ischemia.  PLAN:  Continue beta-blocker, aspirin, plavix, isisorbide mononitrate. Not on statin due to statin intolerance.  Recent Labs   Lab 05/16/20  1053 05/16/20  1711 05/16/20  6679    TROPONINI 0.044* 0.034* 0.038*     Results for orders placed or performed during the hospital encounter of 05/16/20   EKG 12-lead    Collection Time: 05/16/20 10:23 AM    Narrative    Test Reason : R06.02,    Vent. Rate : 068 BPM     Atrial Rate : 068 BPM     P-R Int : 254 ms          QRS Dur : 092 ms      QT Int : 378 ms       P-R-T Axes : 013 -05 175 degrees     QTc Int : 401 ms    Sinus rhythm with 1st degree A-V block  Nonspecific ST abnormality  has not changed from previous ekgs  Abnormal ECG  When compared with ECG of 10-MAY-2020 03:34,  Questionable change in  Non-specific change in ST segment in Inferior leads  T wave inversion no longer evident in Inferior leads  T wave inversion less evident in Anterior leads  T wave inversion more evident in Lateral leads  Confirmed by ALPHONSE ANTONIO, CHEKO COWART ([id number]) on 5/17/2020 3:20:52 PM    Referred By: [email or web address]   SELF           Confirmed By:CHEKO CUNHA MD          Coronary artery disease involving native coronary artery of native heart without angina pectoris  05/19/2020  ASSESSMENT: Clinically stable. No angina reported.  PLAN:  Continue beta-blocker, aspirin, plavix, isisorbide mononitrate. Not on statin due to statin intolerance.      Mass of left lung -  new compared to prior evaluation in February 2019 05/19/2020  ASSESSMENT: Believed to be malignant.  PLAN:  He gave informed refusal for bronchoscopy or any other other further diagnostic evaluation, opting for palliative care with home hospice.      JUANITO on CPAP  05/19/2020  ASSESSMENT: Chronic condition. Stable.    Hypertension  05/19/2020  ASSESSMENT: Adequately controlled.   PLAN:  Continue present treatment plan.  Temp:  [96.1 °F (35.6 °C)-98.2 °F (36.8 °C)] 97.4 °F (36.3 °C)  Pulse:  [70-88] 77  Resp:  [15-22] 15  SpO2:  [88 %-97 %] 97 %  BP: ()/(49-58) 121/56      Acute on chronic diastolic congestive heart failure (EF = 60% on 5/16/2020)-resolved as of 5/19/2020 05/19/2020 He has Diastolic  heart failure that is Chronic. EF 60%. ASSESSMENT: Stable. I'm unconvinced that his CHF is major contributor to his respiratory failure.   PLAN:  Resume Lasix after PEG placed and he can ensure adequate hydration.  Temp:  [96.1 °F (35.6 °C)-98.2 °F (36.8 °C)] 97.4 °F (36.3 °C)  Pulse:  [70-88] 77  Resp:  [15-22] 15  SpO2:  [88 %-97 %] 97 %  BP: ()/(49-58) 121/56   Diuretics (From admission, onward)    None           Intake/Output Summary (Last 24 hours) at 5/19/2020 0746  Last data filed at 5/19/2020 0502  Gross per 24 hour   Intake 220 ml   Output 800 ml   Net -580 ml     Patient Vitals for the past 72 hrs (Last 3 readings):   Weight   05/19/20 0502 59.3 kg (130 lb 11.7 oz)   05/18/20 0400 56.5 kg (124 lb 9 oz)   05/18/20 0335 56.5 kg (124 lb 9 oz)      Recent Labs   Lab 05/16/20  1053 05/17/20  0448 05/18/20  0427   *  --   --    BUN 50* 48* 57*   CREATININE 1.7* 1.5* 1.5*   ESTGFRAFRICA 45* 53* 53*   EGFRNONAA 39* 46* 46*    146* 144   K 4.6 4.6 4.5    104 102   CO2 27 32* 29   MG  --  2.5  --    HGB 11.3*  --   --    HCT 35.9*  --   --          Final Active Diagnoses:    Diagnosis Date Noted POA    PRINCIPAL PROBLEM:  Oropharyngeal aspiration [T17.208A] 10/11/2015 Yes    DNR no code (do not resuscitate) [Z66] 05/17/2020 Yes    Metastatic malignant neoplasm of unknown primary site [C79.9, C80.1] 05/17/2020 Yes    Pneumonia involving bilateral lungs [J18.9] 10/09/2019 Yes    Stage 3 severe COPD by GOLD classification [J44.9] 02/02/2016 Yes    Oropharyngeal dysphagia [R13.12] 05/17/2020 Yes     Chronic    Chronic respiratory failure with hypoxia [J96.11] 05/19/2020 Yes     Chronic    Chronic diastolic heart failure (EF = 60% on 05/16/2020) [I50.32] 05/19/2020 Yes     Chronic    Severe malnutrition [E43] 05/18/2020 Yes    Other constipation [K59.09] 05/17/2020 Yes     Chronic    NEYMAR (acute kidney injury) [N17.9] 05/16/2020 Yes    Cirrhosis of liver without ascites [K74.60]  Yes     Elevated troponin [R79.89] 05/10/2020 Yes    Coronary artery disease involving native coronary artery of native heart without angina pectoris [I25.10] 10/23/2019 Yes    Mass of left lung -  new compared to prior evaluation in February 2019 [R91.8] 02/05/2019 Yes    JUANITO on CPAP [G47.33, Z99.89] 10/07/2014 Not Applicable     Chronic    Hypertension [I10]  Yes      Problems Resolved During this Admission:    Diagnosis Date Noted Date Resolved POA    Acute on chronic respiratory failure with hypoxia [J96.21] 05/16/2020 05/19/2020 Yes    Acute on chronic diastolic congestive heart failure (EF = 60% on 5/16/2020) [I50.33] 12/09/2019 05/19/2020 Yes     Chronic       Discharged Condition: stable    Disposition: Home or Self Care    Follow Up:  Follow-up Information     [facility].    Specialties:  Hospice and Palliative Medicine, Hospice Services  Contact information:  [address]  [facility] [id number] [phone]             Dwaine Davis MD. Schedule an appointment as soon as possible for a visit in 3 days.    Specialty:  Family Medicine  Contact information:  [address] [address] [place] [id number] [phone]                 Patient Instructions:      Ambulatory referral/consult to Outpatient Case Management   Referral Priority: Routine Referral Type: Consultation   Referral Reason: Specialty Services Required   Number of Visits Requested: 1     Diet Dysphagia Pureed   Order Comments: Feed by mouth ad adarsh  DIET: Puree, Nectar-Thick Liquids   Aspiration Precautions: 1 bite/sip at a time, Alternating bites/sips, Alternate means of nutrition/hydration, Frequent oral care, HOB to 90 degrees, Remain upright 30 minutes post meal, Small bites/sips and Standard aspiration precautions.     Notify your health care provider if you experience any of the following:   Order Comments: Severe abdominal pain     Tube Feedings/Formulas   Scheduling Instructions: Give 300 ml water via PEG  three times daily.  Flush PEG tube with 50 ml water before and after giving meds via PEG tube.  OK to start PEG tube feedings in the afternoon of Wednesday, May 19, 2020.  TUBE FEEDING: Jevity 1.5 Masoud High Protein With Fiber, give one 8 ounce can three times daily. Flush PEG tube with 50 ml water before and after each feeding.     Order Specific Question Answer Comments   Select Adult Formula: Other    Route: Gastrostomy      Activity as tolerated       Significant Diagnostic Studies: Labs:   CMP   Recent Labs   Lab 05/18/20  0427      K 4.5      CO2 29   GLU 71   BUN 57*   CREATININE 1.5*   CALCIUM 8.8   ANIONGAP 13   ESTGFRAFRICA 53*   EGFRNONAA 46*   , CBC No results for input(s): WBC, HGB, HCT, PLT in the last 48 hours., Troponin   Recent Labs   Lab 05/16/20  2252   TROPONINI 0.038*    and All labs within the past 24 hours have been reviewed    Pending Diagnostic Studies:     Procedure Component Value Units Date/Time    CT Biopsy Liver [[id number]]     Order Status:  Sent Lab Status:  No result     CT Guided Needle Placement [[id number]] Resulted:  05/19/20 1211    Order Status:  Sent Lab Status:  In process Updated:  05/19/20 1425    Fl Modified Barium Swallow Speech [[id number]]     Order Status:  Sent Lab Status:  No result     IR Gastrostomy Tube [[id number]] Resulted:  05/19/20 1211    Order Status:  Sent Lab Status:  In process Updated:  05/19/20 1511         Medications:  Reconciled Home Medications:      Medication List      START taking these medications    amoxicillin 500 MG Tab  Commonly known as:  AMOXIL  2 tablets (1,000 mg total) by Per G Tube route every 12 (twelve) hours. for 14 days     amoxicillin-clavulanate 500-125mg 500-125 mg Tab  Commonly known as:  AUGMENTIN  1 tablet (500 mg total) by Per G Tube route 2 (two) times daily. for 14 days        CHANGE how you take these medications    furosemide 40 MG tablet  Commonly known as:  LASIX  Take 1 tablet (40 mg total) by mouth daily as  needed (for swelling or lung congestion).  What changed:    · how much to take  · when to take this  · reasons to take this     oxyCODONE-acetaminophen 7.5-325 mg per tablet  Commonly known as:  PERCOCET  Take 1 tablet by mouth every 6 (six) hours as needed for Pain.  What changed:    · how much to take  · how to take this  · when to take this  · reasons to take this        CONTINUE taking these medications    albuterol 90 mcg/actuation inhaler  Commonly known as:  PROAIR HFA  INHALE 2 PUFFS BY MOUTH EVERY 4 HOURS AS NEEDED FOR WHEEZING OR  SHORTNESS  OF  BREATH     aspirin 81 mg Tab  Take 1 tablet by mouth Daily. Over the counter to help prevent stroke/heart attack     carvediloL 3.125 MG tablet  Commonly known as:  COREG  Take 1 tablet (3.125 mg total) by mouth 2 (two) times daily.     COMP-AIR NEBULIZER COMPRESSOR Bailey  Generic drug:  nebulizer and compressor  Use as directed     isosorbide mononitrate 30 MG 24 hr tablet  Commonly known as:  IMDUR  Take 1 tablet (30 mg total) by mouth once daily.     nystatin 100,000 unit/mL suspension  Commonly known as:  MYCOSTATIN  Take 4 mLs (400,000 Units total) by mouth 4 (four) times daily. Swish and spit for 7 days     pantoprazole 40 MG tablet  Commonly known as:  PROTONIX  TAKE ONE TABLET BY MOUTH ONCE DAILY     REPATHA SURECLICK 140 mg/mL Pnij  Generic drug:  evolocumab  Inject 1 mL (140 mg total) into the skin every 14 (fourteen) days.     SPIRIVA WITH HANDIHALER 18 mcg inhalation capsule  Generic drug:  tiotropium  INHALE 1 CAPSULE BY MOUTH ONCE DAILY        STOP taking these medications    clopidogreL 75 mg tablet  Commonly known as:  PLAVIX     ELDERBERRY FRUIT ORAL     gabapentin 800 MG tablet  Commonly known as:  NEURONTIN     levoFLOXacin 750 MG tablet  Commonly known as:  LEVAQUIN     losartan 25 MG tablet  Commonly known as:  COZAAR     PROBIOTIC COMPLEX ORAL            Indwelling Lines/Drains at time of discharge:  Percutaneous endoscopic gastrostomy (PEG)  LUQ feeding. IV, NG tube, and catheter were discontinued prior to discharge.    Time spent on the discharge of patient: 70 minutes    Patient was seen and examined on the date of discharge and determined to be suitable for discharge.      JEANNETTE Rojas MD  Department of Hospital Medicine  [facility]

## 2020-05-19 NOTE — NURSING
PEG site dressing saturated with blood. Dressing removed, site cleaned with sterile normal saline. No heavy bleeding noted from site, mostly dried blood and clots around insertion site. New dressing applied using drain sponge gauze and tegaderm as before.

## 2020-05-19 NOTE — ASSESSMENT & PLAN NOTE
05/19/2020 He has Acute on Chronic Hypoxic respiratory failure. ETIOLOGY = Pneumonia, COPD. He WAS previously on oxygen at home. ASSESSMENT: Modestly improved. D-dimer elevated, but he declined PE evaluation, explaining that he would not want anticoagulation. Calves are non-tender and reasonably symmetric. PLAN: Treat pneumonia and adjust management of supplemental oxygen and respiratory treatments as needed.  Flow (L/min): 5  Oxygen Concentration (%):  [36-40] 40  Temp:  [96.1 °F (35.6 °C)-98.2 °F (36.8 °C)] 97.4 °F (36.3 °C)  Pulse:  [70-88] 77  Resp:  [15-22] 15  SpO2:  [88 %-97 %] 97 %  BP: ()/(49-58) 121/56       Recent Labs   Lab 05/17/20  1228   DDIMER 6.22*

## 2020-05-19 NOTE — ASSESSMENT & PLAN NOTE
05/18/2020  ASSESSMENT: Clinically stable. No angina reported.  PLAN:  Continue beta-blocker, aspirin, plavix, isisorbide mononitrate. Not on statin due to statin intolerance.

## 2020-05-19 NOTE — ASSESSMENT & PLAN NOTE
Advance Care Planning   05/17/2020 12:25PM - DAVID Rojas MD    [facility]  I engaged the patient and family (son Alexander and daughter-in-law Pat) in a conversation about advance care planning and we specifically addressed what the goals of care would be moving forward, in light of the patient's change in clinical status, specifically apparent metastatic cancer.  We did specifically address the patient's likely prognosis, which is poor.  We explored the patient's values and preferences for future care.  The patient and family endorses that what is most important right now is to focus on spending time at home, avoiding the hospital, symptom/pain control and quality of life, even if it means sacrificing a little time    Accordingly, we have decided that the best plan to meet the patient's goals includes enrolling in hospice care.    I did explain the role for hospice care at this stage of the patient's illness, including its ability to help the patient live with the best quality of life possible.  We will be making a hospice referral.      Code Status  In light of the patients advanced and life limiting illness,I engaged the the patient and family (son Alexander and daughter-in-law Pat) in a conversation about the patient's preferences for care  at the very end of life. The patient wishes to have a natural, peaceful death.  Along those lines, the patient does not wish to have CPR or other invasive treatments performed when his heart and/or breathing stops. I communicated to the patient and family that a DNR order would be placed in his medical record to reflect this preference and DNR form was completed and will be scanned into EPIC.    I spent a total of 60 minutes engaging the patient in this advance care planning discussion.

## 2020-05-19 NOTE — ASSESSMENT & PLAN NOTE
05/18/2020  ASSESSMENT: We had LENGTHY discussion on 05/17/2020 about the apparent diagnosis, differential diagnosis and risks and benefits of treatment options. He verbalized understanding and is opting for palliative care with home hospice.   PLAN:  Social work consult for home hospice ordered.

## 2020-05-19 NOTE — SEDATION DOCUMENTATION
Pt in ct on table supine with bilateral arms above head, cm in place, vss.  Pt verbalized understanding of procedure.

## 2020-05-19 NOTE — ASSESSMENT & PLAN NOTE
Advance Care Planning   05/17/2020 12:25PM - DAVID Rojas MD    [facility]  I engaged the patient and family (son Alexander and daughter-in-law Pat) in a conversation about advance care planning and we specifically addressed what the goals of care would be moving forward, in light of the patient's change in clinical status, specifically apparent metastatic cancer.  We did specifically address the patient's likely prognosis, which is poor.  We explored the patient's values and preferences for future care.  The patient and family endorses that what is most important right now is to focus on spending time at home, avoiding the hospital, symptom/pain control and quality of life, even if it means sacrificing a little time    Accordingly, we have decided that the best plan to meet the patient's goals includes enrolling in hospice care.    I did explain the role for hospice care at this stage of the patient's illness, including its ability to help the patient live with the best quality of life possible.  We will be making a hospice referral.      Code Status  In light of the patients advanced and life limiting illness,I engaged the the patient and family (son Alexander and daughter-in-law Pat) in a conversation about the patient's preferences for care  at the very end of life. The patient wishes to have a natural, peaceful death.  Along those lines, the patient does not wish to have CPR or other invasive treatments performed when his heart and/or breathing stops. I communicated to the patient and family that a DNR order would be placed in his medical record to reflect this preference and DNR form was completed and will be scanned into EPIC.    I spent a total of 60 minutes engaging the patient in this advance care planning discussion.

## 2020-05-19 NOTE — PLAN OF CARE
AOx4.   Reviewed POC.   Able to verbalize understanding, needs & follow commands. .  VSS. Tele-monitoring continues.  Respirations even/unlabored. 5L O2 NC.  ABD firm & non-tender. Continent of b/b. Urinal at bedside. Cruz cath placed for urinary retention.  Minimal assistance required w/ ADLs.   Ambulates independently. Able to reposition self in bed. Tolerates movement/repositioning well. Educated on importance of changing position often.   20g PIV to LAC. Receiving NaCl at 100 mL/h. No s/s of infection noted to site.  Denies pain at this time, however c/o pain often which is controlled w/ scheduled prn meds.  NADN at this time. Resting quietly in bed.  Hourly rounding complete.   All safety measures in place. Free of falls. SR up x2; bed low & locked. Call light w/in reach.  Will continue to monitor throughout shift.

## 2020-05-19 NOTE — ASSESSMENT & PLAN NOTE
05/18/2020  ASSESSMENT: Adequately controlled.   PLAN:  Continue present treatment plan.  Temp:  [96.6 °F (35.9 °C)-98.2 °F (36.8 °C)] 97.3 °F (36.3 °C)  Pulse:  [69-87] 87  Resp:  [15-20] 20  SpO2:  [91 %-97 %] 93 %  BP: ()/(49-66) 110/55

## 2020-05-19 NOTE — ASSESSMENT & PLAN NOTE
05/19/2020  ASSESSMENT: Believed to be malignant.  PLAN:  He gave informed refusal for bronchoscopy or any other other further diagnostic evaluation, opting for palliative care with home hospice.

## 2020-05-19 NOTE — ASSESSMENT & PLAN NOTE
05/18/2020  ASSESSMENT: Modestly improved. (Post-obstructive +/- aspiration.)   PLAN:  Plan to transition to Augmentin upon discharge home with hospice.

## 2020-05-19 NOTE — CONSULTS
"Consulted to this 74 year old male patient for skin breakdown. Noble Tripp is a 74 year old male with a PMHx of CAD, CHF, PSVT, PVD, JUANITO on CPAP, HLD, COPD and BPH- admitted with oropharyngeal aspiration. He is awake and alert, lying on right side, assessment performed. Sacral foam dressing in place, peeled back for assessment. Stage 2 pressure injury to coccyx noted measuring 1x1cm. Periwound is red, nonblanchable with a small area of partial thickness tissue loss noted at the center, moist red wound bed. Foam dressing reinforced. Bilateral heels red but blanchable. Recommend continued pressure injury prevention measures.     Stage 2 pressure injury to coccyx:  1. Cleanse with saline  2. Pat dry  3. Paint periwound with cavilon  4. Apply sacral foam dressing  5. Change every 3-4 days or prn excess drainage    Skin Care Precautions / Pressure Injury Prevention:  1. Follow "Guidelines for Prevention of Pressure Ulcers in At Risk Patients"  These guidelines can be found on the [facility] Intranet by searching "Wound Care / Ostomy Resources"  2. Document wound assessment in [facility] using guidelines in [facility]'s "Assessment : Wound" procedure  3. Limit the amount of linen/underpad between patient and mattress surface to ONE fitted sheet and ONE covidien underpad - NO DIAPERS  4. Obtain Bath Wipes for providing mahesh care - avoid the use of wash cloths to areas affected by IAD.  5. Apply Moisture Barrier Paste to perineal / perirectal areas in a thin even layer to clean dry skin BID and after each episode of pericare  6. Apply sween 24 moisturizer cream to all dry skin after daily bath and prn  7. Obtain foam wedge from materials management to assist with maintaining proper position changes at least q 2hours and document actual position in EPIC q 2hours  8. Elevate heels off mattress on 2 separate pillows placed lengthwise under each leg supporting the leg from knee to ankle.  Document in EPIC flow sheet every 2 " hours.  9. .Do NOT elevate HOB greater than 30 degrees unless contraindicated.  10. Remove SCD/Plexi Pulses/JULITO's every 12 hours for 30 minutes and assess skin underneath these devices for breakdown

## 2020-05-19 NOTE — ASSESSMENT & PLAN NOTE
05/19/2020  ASSESSMENT: We had LENGTHY discussion on 05/17/2020 about the apparent diagnosis, differential diagnosis and risks and benefits of treatment options. He verbalized understanding and is opting for palliative care with home hospice.   PLAN:  Social work consult for home hospice ordered.

## 2020-05-20 ENCOUNTER — OUTPATIENT CASE MANAGEMENT (OUTPATIENT)
Dept: ADMINISTRATIVE | Facility: OTHER | Age: 75
End: 2020-05-20

## 2020-05-20 ENCOUNTER — NURSE TRIAGE (OUTPATIENT)
Dept: ADMINISTRATIVE | Facility: CLINIC | Age: 75
End: 2020-05-20

## 2020-05-20 NOTE — TELEPHONE ENCOUNTER
Additional Information   Negative: Caller is angry or rude (e.g., hangs up, verbally abusive, yelling)   Negative: Caller hangs up   Negative: Caller has already spoken with the PCP and has no further questions.   Negative: Caller has already spoken with another triager and has no further questions.   Negative: Caller has already spoken with another triager or PCP AND has further questions AND triager able to answer questions.   Negative: Busy signal.  First attempt to contact caller.  Follow-up call scheduled within 15 minutes.   Negative: No answer.  First attempt to contact caller.  Follow-up call scheduled within 15 minutes.   Negative: Message left on identified voice mail   Negative: Message left on unidentified voice mail.  Phone number verified.   Negative: Message left with person in household.   Negative: Wrong number reached.  Phone number verified.   Negative: Second attempt to contact family AND no contact made.  Phone number verified.   Negative: Cell phone out of range.  Phone number verified.   Negative: Pager number given.  Answering service notified.   Negative: Patient already left for the hospital/clinic.   Negative: Caller has cancelled the call before the first contact   Negative: Unable to complete triage due to phone connection issues   Negative: Non-urgent call redirected to PCP's office because it is open    Protocols used: NO CONTACT OR DUPLICATE CONTACT CALL-A-

## 2020-05-20 NOTE — DISCHARGE SUMMARY
[facility] -   Brief Operative Note    SUMMARY     Surgery Date: 5/19/2020     Gerardo Weinberg      Assisting Surgeon: None    Pre-op Diagnosis:    Metastatic nepplastic disease, suspected lung origin    Post-op Diagnosis:    same    * No procedures listed *    Anesthesia:   Moderate sedation     Description of Procedure:   CT and fluoro guided gastrostomy tube insertion     Description of the findings of the procedure:   Technically challenging placement due to significant hepatomegaly     Estimated Blood Loss:  5ml           Specimens:   Specimen (12h ago, onward)    None        Gerardo Weinberg MD IR  8:17 AM  05/20/2020

## 2020-05-20 NOTE — NURSING
Telemetry monitor removed and returned to monitor room. IV removed without incident. Compression dressing applied and patient instructed to leave on no longer than 30 minutes. Patient discharged to care of family. Reviewed discharge paperwork with family and all questions were answered to their satisfaction. Patient discharged in no acute distress.

## 2020-05-20 NOTE — PROGRESS NOTES
--Chart reviewed, noted that patient was discharged home to care of [facility]. Patient doesn't qualify for OPCM services at this time.

## 2020-05-21 NOTE — TELEPHONE ENCOUNTER
No additional calls necessary to contact patient. Patient's son denies that patient has cough, fever, difficulty breathing. Patient on hospice at this time, unable to speak.  Additional Information   Negative: [1] Caller is not with the adult (patient) AND [2] reporting urgent symptoms   Negative: Lab result questions   Negative: Medication questions   Negative: Caller can't be reached by phone   Negative: Caller has already spoken to PCP or another triager   Negative: RN needs further essential information from caller in order to complete triage   Negative: Requesting regular office appointment   Negative: [1] Caller requesting NON-URGENT health information AND [2] PCP's office is the best resource   Negative: Health Information question, no triage required and triager able to answer question   Negative: General information question, no triage required and triager able to answer question   Negative: Question about upcoming scheduled test, no triage required and triager able to answer question   Negative: [1] Caller is not with the adult (patient) AND [2] probable NON-URGENT symptoms   Negative: [1] Follow-up call to recent contact AND [2] information only call, no triage required    Protocols used: INFORMATION ONLY CALL-A-

## 2020-05-22 LAB — BACTERIA BLD CULT: NORMAL

## 2020-05-27 ENCOUNTER — PATIENT MESSAGE (OUTPATIENT)
Dept: GASTROENTEROLOGY | Facility: CLINIC | Age: 75
End: 2020-05-27

## 2020-05-29 DIAGNOSIS — U07.1 COVID-19: Primary | ICD-10-CM

## 2020-05-31 ENCOUNTER — EXTERNAL CHRONIC CARE MANAGEMENT (OUTPATIENT)
Dept: PRIMARY CARE CLINIC | Facility: CLINIC | Age: 75
End: 2020-05-31
Payer: MEDICARE

## 2020-05-31 ENCOUNTER — NURSE TRIAGE (OUTPATIENT)
Dept: ADMINISTRATIVE | Facility: CLINIC | Age: 75
End: 2020-05-31

## 2020-05-31 PROCEDURE — 99490 CHRNC CARE MGMT STAFF 1ST 20: CPT | Mod: PBBFAC | Performed by: FAMILY MEDICINE

## 2020-05-31 PROCEDURE — G2058 CCM ADD 20MIN: HCPCS | Mod: PBBFAC | Performed by: FAMILY MEDICINE

## 2020-05-31 NOTE — TELEPHONE ENCOUNTER
Pt contacted through the Post Procedural Symptom Tracker. No answer. No additional contact today as per post procedure protocol.    Reason for Disposition   No answer.  First attempt to contact caller.  Follow-up call scheduled within 15 minutes.    Additional Information   Negative: Caller is angry or rude (e.g., hangs up, verbally abusive, yelling)   Negative: Caller hangs up   Negative: Caller has already spoken with the PCP and has no further questions.   Negative: Caller has already spoken with another triager and has no further questions.   Negative: Caller has already spoken with another triager or PCP AND has further questions AND triager able to answer questions.   Negative: Busy signal.  First attempt to contact caller.  Follow-up call scheduled within 15 minutes.    Protocols used: NO CONTACT OR DUPLICATE CONTACT CALL-A-AH

## 2020-06-01 NOTE — TELEPHONE ENCOUNTER
Attempted to contact patient through post procedure symptom monitoring program. No contact made. No follow up call required.      Reason for Disposition   Second attempt to contact family AND no contact made. Phone number verified.    Additional Information   Negative: Caller has already spoken with the PCP (or office), and has no further questions   Negative: Caller has already spoken with another triager and has no further questions   Negative: Caller has already spoken with another triager or PCP (or office), and has further questions and triager able to answer questions.    Protocols used: NO CONTACT OR DUPLICATE CONTACT CALL-A-[place]

## 2020-12-09 NOTE — TELEPHONE ENCOUNTER
Patient's wife returned phone called placed.      Explained refill was ready for Repatha and copayment was now $80.00.  Pt and wife requested that patient be placed on patient assistance waiting list for Repatha now that copayment has increased.  Financials were obtained and once funding is open assistance will be obtained.    Patient's wife requested medication be shipped on Wednesday 1/4/17 for delivery on Thursday 1/5/17 and they opted to be billed at the end of the month.           You have chosen to receive care through a telephone visit. Do you consent to use a telephone visit for your medical care today? Yes    Chief complaint  Diabetes (Follow Up)    Subjective   Tashia Leon is a 64 y.o. female is here today for follow-up.  Diabetes mellitus type 2 diagnosed in mid 1990s.   Medications: Humalog and Toujeo.  Diabetic complications: Diabetic foot ulcer. Diab retinopathy. CKD - stable function.   Eye care: diabetic retinopathy by last exam, seeing eye dr regularly.  Podiatry visit - She underwent toe amputation in 1/2019, sees podiatrist regularly.   Patient was evaluated for the kidney transplant and went through testing. She is followed with nephrologist regularly. She started peritoneal dialysis. Her glucose is higher with the dialysis. She changed insulin schedule according to the dialysis      Hyperlipidemia with high LDL. She had Side effects on statins . Doing well on Praluent, which was started Nov 2015, LDL <100.  Patient is taking Toujeo Max and Humalog U-200.  She has a high       Patient started peritoneal dialysis and doing well. She noted that glucose goes up during the dialysis. Otherwise swelling improved and she is doing better    She has scheduled for carotid stent on Friday.     Medications    Current Outpatient Medications:   •  Alirocumab (Praluent) 75 MG/ML solution auto-injector, Inject 75 mg under the skin into the appropriate area as directed Every 14 (Fourteen) Days., Disp: 2 mL, Rfl: 5  •  aspirin 325 MG EC tablet, TAKE 1 TABLET BY MOUTH DAILY., Disp: 30 tablet, Rfl: 11  •  calcium acetate (PHOS BINDER,) 667 MG capsule capsule, , Disp: , Rfl:   •  clopidogrel (PLAVIX) 75 MG tablet, Take 1 tablet by mouth Daily., Disp: 30 tablet, Rfl: 5  •  Continuous Blood Gluc  (DEXCOM G6 ) device, 1 each Daily., Disp: 1 Device, Rfl: 0  •  Continuous Blood Gluc Sensor (Dexcom G6 Sensor), 1 each Take As Directed., Disp: 3 each, Rfl: 11  •  Continuous Blood  Gluc Transmit (Dexcom G6 Transmitter) misc, 1 each Take As Directed., Disp: 1 each, Rfl: 3  •  Dulaglutide (Trulicity) 1.5 MG/0.5ML solution pen-injector, Inject 1.5 mg under the skin into the appropriate area as directed 1 (One) Time Per Week. Tuesday, Disp: 4 pen, Rfl: 5  •  furosemide (LASIX) 40 MG tablet, TAKE 1 TABLET TWICE DAILY, Disp: 60 tablet, Rfl: 1  •  Global Ease Inject Pen Needles 31G X 5 MM misc, USE 6 TIMES DAILY, Disp: 200 each, Rfl: 5  •  HYDROcodone-acetaminophen (NORCO) 7.5-325 MG per tablet, Take 1 tablet by mouth Every 8 (Eight) Hours As Needed., Disp: , Rfl:   •  Iron-Vit C-Vit B12-Folic Acid (IRON 100 PLUS PO), Take  by mouth., Disp: , Rfl:   •  lansoprazole (PREVACID) 30 MG capsule, Take 30 mg by mouth Daily., Disp: , Rfl: 11  •  metOLazone (ZAROXOLYN) 5 MG tablet, Take 1-2 tablets by mouth Daily As Needed (dyspnea, weight gain)., Disp: , Rfl:   •  MOVANTIK 12.5 MG tablet, , Disp: , Rfl:   •  NovoLOG FlexPen 100 UNIT/ML solution pen-injector sc pen, 50 Units Three Times Daily with meals, Disp: 20 pen, Rfl: 11  •  sodium bicarbonate 325 MG tablet, Take 325 mg by mouth 4 (Four) Times a Day., Disp: , Rfl:   •  TOUJEO MAX SOLOSTAR 300 UNIT/ML solution pen-injector injection, INJECT 80 UNITS UNDER THE SKIN INTO THE APPROPRIATE AREA AS DIRECTED 2 TIMES A DAY., Disp: 6 mL, Rfl: 3  •  vitamin D (ERGOCALCIFEROL) 1.25 MG (12727 UT) capsule capsule, , Disp: , Rfl:   •  Continuous Blood Gluc  (Dexcom G6 ) device, 1 each 1 (One) Time for 1 dose., Disp: 1 Device, Rfl: 0    PMH  The following portions of the patient's history were reviewed and updated as appropriate: allergies, current medications, past family history, past medical history, past social history, past surgical history and problem list.    Review of systems  Review of Systems   Constitutional: Positive for fatigue.   Cardiovascular: Positive for leg swelling.   Neurological: Positive for weakness.   All other systems reviewed  and are negative.        Physical exam     Physical Exam   Physical Exam   Constitutional: She is oriented to person, place, and time.   Neurological: She is alert and oriented to person, place, and time.   Psychiatric: Mood and thought content normal.         LABS AND IMAGING  Results for orders placed or performed during the hospital encounter of 12/07/20   Duplex Carotid Ultrasound CAR   Result Value Ref Range    BSA 2.2 m^2    Prox CCA PSV 59.4 cm/sec    Prox CCA .0 cm/sec    Prox CCA EDV 14.4 cm/sec    Prox CCA EDV 25.3 cm/sec    left Mid CCA PSV 70.3 cm/sec    Right Mid CCA .0 cm/sec    left Mid CCA EDV 13.1 cm/sec    right Mid CCA EDV 18.0 cm/sec    Dist CCA  cm/sec    Dist CCA .1 cm/sec    Dist CCA EDV 22.7 cm/sec    Dist CCA EDV 22.8 cm/sec    Prox ECA  cm/sec    Prox ECA  cm/sec    Prox ECA EDV 35.9 cm/sec    Prox ECA EDV 20.1 cm/sec    Prox ICA  cm/sec    Prox ICA  cm/sec    Prox ICA EDV 25.8 cm/sec    Prox ICA  cm/sec    Mid ICA PSV 86.6 cm/sec    Mid ICA .4 cm/sec    Mid ICA EDV 24.8 cm/sec    Mid ICA EDV 66.0 cm/sec    Dist ICA PSV 63.5 cm/sec    Dist ICA EDV 21 cm/sec    Vertebral A PSV 23.1 cm/sec    Vertebral A .5 cm/sec    Vertebral A EDV 33.5 cm/sec    Prox SCLA  cm/sec    Prox SCLA .8 cm/sec    Prox SCLA EDV 23.9 cm/sec     CV ECHO RENE - BZI_BMI 36.6 kilograms/m^2     [facility]ENE - BSA[facility]) 2.3 m^2     CV ECHO RENE - BZI_METRIC_WEIGHT 106.1 kg     CV ECHO RENE - BZI_METRIC_HEIGHT 170.2 cm    Dist ICA  cm/sec    Dist ICA EDV 43 cm/sec    ICA/CCA ratio 2.8     ICA/CCA ratio 9.1     Left arm /63 mmHg    Right arm /57 mmHg       Assessment  Assessment/Plan   Problems Addressed this Visit        Endocrine    Diabetes mellitus with neurological manifestations, uncontrolled (CMS/HCC) - Primary    Diabetic polyneuropathy associated with type 2 diabetes mellitus (CMS/HCC)    Moderate  nonproliferative diabetic retinopathy without macular edema associated with type 2 diabetes mellitus (CMS/[facility])      Diagnoses       Codes Comments    Diabetes mellitus with neurological manifestations, uncontrolled (CMS/[facility])    -  Primary ICD-10-CM: E11.49, E11.65  ICD-9-CM: 250.62     Diabetic polyneuropathy associated with type 2 diabetes mellitus (CMS/[facility])     ICD-10-CM: E11.42  ICD-9-CM: 250.60, 357.2     Moderate nonproliferative diabetic retinopathy without macular edema associated with type 2 diabetes mellitus, unspecified laterality (CMS/[facility])     ICD-10-CM: E11.3399  ICD-9-CM: 250.50, 362.05           Plan  1.Diabetes mellitus type 2 with hx diabetic foot ulcer, retinopathy and 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  She is doing much better with the CGM and adjusts insulin based on glucose  Glucose went up when she was off the CGM    Toujeo Max and Humalog U-200 .   She is taking Toujeo 40 units bid and Humalog 15-40 units three times a day. Additional correction insulin  is given, 2-10 units in addition to meal 50 Units three times a day .   CGM analyzed and data reviewed.     2. Hyperlipidemia  Continue Praluent every 2 weeks.  She had reaction to crestor, livalo and lipitor and severe peripheral vascular disease. No alternatives exist.       Medications refilled. Hypoglycemia precautions and insulin instructions reviewed.     Follow-up in 3-4 months.     17 min spent for medical discussion.

## 2021-02-08 NOTE — PATIENT INSTRUCTIONS

## 2021-08-25 NOTE — ASSESSMENT & PLAN NOTE
05/19/2020  ASSESSMENT: Chronic condition related to malignancy and multiple comorbidities.  PLAN:  IR to attempt PEG tube this morning.     No deformities present

## 2021-10-29 NOTE — ASSESSMENT & PLAN NOTE
Follow with cardiology   patient slipped on step denies hitting head complaining of left wrist pain with abrasion to wrist

## 2022-01-06 NOTE — ASSESSMENT & PLAN NOTE
5/17/2020 overall appearance is suspicious for a left hilar lung mass/lung cancer.  Abnormalities on CT liver suggest possible metastasis.  Patient does have a history of abnormalities on the liver in the past due to a cyst.  Patient has reconsidered his options and at this time once more information concerning a liver biopsy.   Patrick's wife called and left message stating Patrick has a \"pocket near one of his teeth\" and his dentist wants to use Minocycline. Patrick's wife is asking if it's ok for him to have Minocycline given his current allergies. Message sent to Dr. Radford to review.

## 2022-04-20 NOTE — SUBJECTIVE & OBJECTIVE
"CALLED TO PT ROOM DUE TO RN HEARING A \"GURGLING\" SOUND COMING FROM PTS MOUTH.  TUBE PLACEMENT CHECK AND WAS AT 23CM ON RIGHT SIDE OF MOUTH.  ATTEMPTED TO DEFLATE CUFF AND RE-INFLATE BUT  BALLOON WAS NOT FILLING PROPERLY.  EXCESSIVE SECRETIONS COMING FROM PTS. MOUTH.  AT THIS TIME, PT WAS SUCTIONED ORALLY AND ETT WAS CHANGED VIA BOOGIE WITH #7.5 ETT AT 23CM LOIS AT LEFT SIDE OF MOUTH.  PREVIOUS ETT SHOWED  SMALL  TEAR IN BALLOON.  PT DID CLAMP DOWN DURING PROCEDURE BUT ETT WAS SUCCESSFULLY INSERTED.  CXR SHOWED PROPER TUBE PLACEMENT. PT'S O2 SAT, ETCO2 AND TIDAL VOLUMES  ARE GOOD.  " "Past Medical History:   Diagnosis Date    Adrenal mass     david;nl fxn w/u    Aspiration pneumonia 10/15    puree/honey    Benign prostate hyperplasia     CAD (coronary artery disease)     dr padilla    Chronic pain     dr santos    COPD (chronic obstructive pulmonary disease)     papi    Ex-smoker     11/13    Hyperlipidemia     Osteopenia 1/15 tran 1/18    PVD (peripheral vascular disease)     noobs 07 khuri    Pyriform sinus cancer     dr ponce radiation 1/2-14 dr king bryson    Sleep apnea     cpap    Squamous cell carcinoma of skin     roberto    Tongue cancer     "superficial" removed 11/14    Vocal cord cancer 2011    Xerostomia     radiation       Past Surgical History:   Procedure Laterality Date    APPENDECTOMY      Bronchoscopy Bilateral 2/5/2019    Performed by [facility]    COLONOSCOPY N/A 3/11/2014    Performed by Joe Goodman MD at [facility]    Due for screening colonoscopy N/A 5/1/2017    Performed by Anushka Yoder MD at [facility]    ESOPHAGOGASTRODUODENOSCOPY (EGD) N/A 8/29/2018    Performed by Audie Hill III, MD at [facility] ENDO    ESOPHAGOGASTRODUODENOSCOPY (EGD) N/A 5/29/2018    Performed by Audie Hill III, MD at [facility] END[facility]    Thoracentesis Right 2/25/2019    Performed by [facility]ozo MD at [facility]    Thoracentesis Left 8/7/2018    Performed by [facility] at [facility]    tongue cancer excision  11/14    dr vitale    VOCAL CORD LATERALIZATION, ENDOSCOPIC APPROACH W/ MLB      [facility]       Review of patient's allergies indicates:   Allergen Reactions    Contrast media     Iodinated contrast media      Other reaction(s): Itching  Other reaction(s): Hives    Neomycin-bacitracin-polymyxin      Other reaction(s): Rash  Other reaction(s): Rash    Pravastatin      Other reaction(s): fatigue  Other reaction(s): fatigue    Rosuvastatin      Other reaction(s): fatigue  Other reaction(s): fatigue    " Simvastatin      Other reaction(s): fatigue  Other reaction(s): fatigue    Statins-hmg-coa reductase inhibitors        No current facility-administered medications on file prior to encounter.      Current Outpatient Medications on File Prior to Encounter   Medication Sig    albuterol (PROAIR HFA) 90 mcg/actuation inhaler INL 2 PFS PO INTO THE LUNGS Q 4 H PRF WHZ OR SOB    albuterol (PROVENTIL) 2.5 mg /3 mL (0.083 %) nebulizer solution Take 3 mLs (2.5 mg total) by nebulization every 8 (eight) hours while awake.    aspirin 81 mg Tab Take 1 tablet by mouth Daily. Over the counter to help prevent stroke/heart attack    CHANTIX CONTINUING MONTH BOX 1 mg Tab TAKE 1 TABLET BY MOUTH ONCE DAILY    ELDERBERRY FRUIT AND FLOWER ORAL Take by mouth.    evolocumab (REPATHA SURECLICK) 140 mg/mL PnIj Inject 1 mL (140 mg total) into the skin every 14 (fourteen) days.    gabapentin (NEURONTIN) 800 MG tablet Take 1 tablet (800 mg total) by mouth 3 (three) times daily.    garlic 2,000 mg Cap Take 1 tablet by mouth once daily.     Lactobacillus rhamnosus GG (CULTURELLE) 10 billion cell capsule Take 1 capsule by mouth once daily.    nebulizer and compressor Bailey Use as directed    oxyCODONE-acetaminophen (PERCOCET) 7.5-325 mg per tablet Take 1 tablet by mouth every 8 (eight) hours as needed for Pain.    pantoprazole (PROTONIX) 40 MG tablet TAKE ONE TABLET BY MOUTH ONCE DAILY    pilocarpine (SALAGEN) 5 MG Tab  TAKE 1 TABLET BY MOUTH 30 MINUTES PRIOR TO EACH MEAL    tiotropium (SPIRIVA WITH HANDIHALER) 18 mcg inhalation capsule Inhale 1 capsule (18 mcg total) into the lungs once daily.    turmeric root extract 500 mg Cap Take 1 capsule by mouth 2 (two) times daily.     loratadine (CLARITIN) 10 mg tablet Take 1 tablet by mouth daily as needed.      Family History     Problem Relation (Age of Onset)    Cancer Father, Brother        Tobacco Use    Smoking status: Current Every Day Smoker     Packs/day: 0.50     Years: 55.00      Pack years: 27.50    Smokeless tobacco: Never Used    Tobacco comment: 6-7 cigs/day   Substance and Sexual Activity    Alcohol use: Yes     Comment: 2-3 glasses of whiskey every evening    Drug use: No    Sexual activity: Not Currently     Review of Systems   Constitutional: Negative.    HENT: Negative.    Eyes: Negative.    Respiratory: Positive for cough and shortness of breath.    Cardiovascular: Negative.    Gastrointestinal: Negative.    Endocrine: Negative.    Genitourinary: Negative.    Musculoskeletal: Negative.    Skin: Negative.    Allergic/Immunologic: Negative.    Neurological: Negative.    Hematological: Negative.    Psychiatric/Behavioral: Negative.      Objective:     Vital Signs (Most Recent):  Temp: 97.9 °F (36.6 °C) (09/13/19 1900)  Pulse: 100 (09/13/19 2130)  Resp: (!) 21 (09/13/19 2130)  BP: (!) 175/84 (09/13/19 2130)  SpO2: 96 % (09/13/19 2130) Vital Signs (24h Range):  Temp:  [97.8 °F (36.6 °C)-97.9 °F (36.6 °C)] 97.9 °F (36.6 °C)  Pulse:  [] 100  Resp:  [17-21] 21  SpO2:  [90 %-96 %] 96 %  BP: (149-182)/() 175/84     Weight: 70.7 kg (155 lb 13.8 oz)  Body mass index is 23.7 kg/m².    Physical Exam   Constitutional: He is oriented to person, place, and time.   Chronically sick appearing   HENT:   Head: Normocephalic and atraumatic.   Right Ear: External ear normal.   Left Ear: External ear normal.   Nose: Nose normal.   Mouth/Throat: Oropharynx is clear and moist. No oropharyngeal exudate.   Eyes: Pupils are equal, round, and reactive to light. Conjunctivae and EOM are normal.   Neck: Neck supple.   Cardiovascular: Normal rate, regular rhythm, normal heart sounds and intact distal pulses.   Pulmonary/Chest: Effort normal. No stridor. No respiratory distress. He has no wheezes.   Crackles of bilateral base with some rhonchi    Abdominal: Soft. Bowel sounds are normal.   Genitourinary:   Genitourinary Comments: deferred   Musculoskeletal: Normal range of motion.    Neurological: He is alert and oriented to person, place, and time.   Skin: Skin is warm and dry. Capillary refill takes less than 2 seconds.   Psychiatric: He has a normal mood and affect.   Nursing note and vitals reviewed.        CRANIAL NERVES     CN III, IV, VI   Pupils are equal, round, and reactive to light.  Extraocular motions are normal.        Significant Labs:   Recent Lab Results       09/13/19  1915   09/13/19  1844        Albumin   2.8     Alkaline Phosphatase   73     Allens Test Pass       ALT   6     Anion Gap   12     AST   16     Baso #   0.08     Basophil%   1.2     BILIRUBIN TOTAL   0.4  Comment:  For infants and newborns, interpretation of results should be based  on gestational age, weight and in agreement with clinical  observations.  Premature Infant recommended reference ranges:  Up to 24 hours.............<8.0 mg/dL  Up to 48 hours............<12.0 mg/dL  3-5 days..................<15.0 mg/dL  6-29 days.................<15.0 mg/dL       BNP   1,828  Comment:  Values of less than 100 pg/ml are consistent with non-CHF populations.     Site RR       BUN, Bld   12     Calcium   8.7     Chloride   104     CO2   24     Creatinine   0.7     DelSys Room Air       Differential Method   Automated     eGFR if    >60     eGFR if non    >60  Comment:  Calculation used to obtain the estimated glomerular filtration  rate (eGFR) is the CKD-EPI equation.        Eos #   0.1     Eosinophil%   1.7     FiO2 21       Glucose   91     Gran # (ANC)   4.6     Gran%   69.8     Hematocrit   42.5     Hemoglobin   14.0     Lymph #   1.1     Lymph%   17.2     MCH   32.3     MCHC   32.9     MCV   98     Mode SPONT       Mono #   0.7     Mono%   10.3     MPV   11.0     Platelets   279     POC BE 2       POC HCO3 26.2       POC PCO2 40.0       POC PH 7.424       POC PO2 57       POC SATURATED O2 90       Potassium   3.7     PROTEIN TOTAL   7.1     RBC   4.34     RDW   18.1     Sample  ARTERIAL       Sodium   140     Troponin I   0.038  Comment:  The reference interval for Troponin I represents the 99th percentile   cutoff   for our facility and is consistent with 3rd generation assay   performance.       WBC   6.63           Significant Imaging:   Imaging Results          X-Ray Chest 1 View (Final result)  Result time 09/13/19 19:39:33    Final result by Roly Hamilton MD (09/13/19 19:39:33)                 Impression:      1. New small to moderate size infiltrate at the right lung base with small right pleural effusion.  Pleural effusions slightly worse since 04/17/2019.  2. Stable indeterminate opacity left lung base with blunting of left costophrenic angle.  No change since 04/17/2019.      Electronically signed by: Roly Hamilton  Date:    09/13/2019  Time:    19:39             Narrative:    EXAMINATION:  XR CHEST 1 VIEW    CLINICAL HISTORY:  Shortness of breath    COMPARISON:  04/17/2019    FINDINGS:  Diffuse increased interstitial markings are present bilaterally which thought to be chronic.  A new small to moderate size infiltrate is identified at the right lung base.  Blunting of the right costophrenic angle is present compatible with a small right pleural effusion which is slightly worse.  Increased density and blunting of the left costophrenic angle is present.  Findings are unchanged since 04/17/2019.

## 2022-07-24 NOTE — OR NURSING
1700cc Pleural fluid drained from right  
Family updated as to patient's status.  
Mj/Ultrasound here  
clear

## 2022-09-15 NOTE — Clinical Note
The wire was unable to cross lesion. . PAST SURGICAL HISTORY:  Artificial pacemaker July 2005    History of right hip replacement right total hip replacement  2014    Open fracture of neck of femur Hip fracture requiring operative repair, right, open type I or II, initial encounter  2003    Presence of cardiac pacemaker Pacemaker

## 2022-10-27 NOTE — PATIENT INSTRUCTIONS
Your provider has scheduled you for a sleep study.   You should be receiving a phone call from the sleep lab shortly after your study has been approved by your insurance. Please make sure you have [facility]. If you do not hear from anyone in the next 10 business days, please call the sleep lab at [phone] to schedule your sleep study. The sleep studies are performed at [facility] seven nights a week.  When you are scheduling your sleep study, they will also make you a follow up appointment with your provider. This follow up appointment will be 10-14 days after your sleep study to review the results. If it is noted that you do have sleep apnea on your initial sleep study, you may receive a call back for a second night study with the CPAP before you come back to the office.   
declines

## 2022-12-01 NOTE — NURSING
Coughing and continuously clearing throat noted. Speech consulted and advised pt to remain NPO until further evaluation. He states that he has been seen numerous times for aspiration pneumonia. His previous physician has explained to him the possibility of a PEG tube.    - - -

## 2022-12-16 NOTE — PROGRESS NOTES
[facility] Hospital Medicine  Progress Note    Patient Name: Noble Tripp  MRN: [id number]  Patient Class: IP- Inpatient   Admission Date: 12/7/2017  Length of Stay: 1 days  Attending Physician: Stan Knowles MD  Primary Care Provider: Dwaine Davis MD        Subjective:     Principal Problem:COPD with acute exacerbation    HPI:  Noble Tripp is a 73 yo male with PMHx of COPD, Aspiration PNA, hx of Head/Neck Cancer, CAD and chronic lower back pain who presents to the ED with reports of SOB onset 5 days ago with associated cough, wheezing and fever Temp max 101. SOB worsened therefore, he presented for evaluation. He eats a regular diet and does have coughing with clear liquids. In the ED, respiratory rate = 22 and pulse ox 95 % on 2 L. ABGs found pH 7.456, PO2 55 and PCO2 34, CXR is negative for acute findings and VQ scan is pending as d dimer is elevated. Pt is admitted for supplemental oxygen and COPD Exacerbation.     Hospital Course:  Tx for L lung PNA, no PE by VQ scan.  Pt WBC improving, continune to require 4-5L O2, not on O2 at home, f/u home eval.  No fever, will taper steroids dose and resume avelox.  Pt not complinat with home inhalers, and requesting spacer for rescue inhaler (sent to pharmacy).          Review of Systems   Constitutional: Positive for activity change, appetite change, fatigue and fever.   HENT: Positive for congestion. Negative for ear pain, postnasal drip, rhinorrhea and sore throat.    Eyes: Negative for pain and redness.   Respiratory: Positive for cough, shortness of breath and wheezing.    Cardiovascular: Negative for chest pain, palpitations and leg swelling.   Gastrointestinal: Negative for abdominal pain, diarrhea, nausea and vomiting.   Genitourinary: Negative for dysuria, frequency and hematuria.   Musculoskeletal: Positive for back pain. Negative for gait problem.   Skin: Negative for pallor, rash and wound.   Neurological: Negative for  Improving dizziness, weakness, light-headedness and headaches.     Objective:     Vital Signs (Most Recent):  Temp: 97.7 °F (36.5 °C) (12/08/17 1958)  Pulse: 76 (12/08/17 1958)  Resp: 18 (12/08/17 1958)  BP: 129/60 (12/08/17 1958)  SpO2: (!) 93 % (12/08/17 1958) Vital Signs (24h Range):  Temp:  [97.3 °F (36.3 °C)-97.8 °F (36.6 °C)] 97.7 °F (36.5 °C)  Pulse:  [66-78] 76  Resp:  [18-20] 18  SpO2:  [91 %-97 %] 93 %  BP: (128-149)/(60-67) 129/60     Weight: 66.3 kg (146 lb 2.6 oz)  Body mass index is 22.22 kg/m².  No intake or output data in the 24 hours ending 12/08/17 0895   Physical Exam   Constitutional: He is oriented to person, place, and time. He appears well-developed and well-nourished. No distress.   HENT:   Head: Normocephalic and atraumatic.   Eyes: Conjunctivae are normal. No scleral icterus.   Neck: Normal range of motion. Neck supple.   Cardiovascular: Normal rate, regular rhythm and normal heart sounds.  Exam reveals no gallop and no friction rub.    No murmur heard.  Pulmonary/Chest: Effort normal. He has wheezes in the left upper field, the left middle field and the left lower field. He has rhonchi.   wheezing on expiration worse on R, + rhonchi, good flow volume to bilat bases   Abdominal: Soft. Bowel sounds are normal.   Musculoskeletal: Normal range of motion. He exhibits no edema or tenderness.   Neurological: He is alert and oriented to person, place, and time.   Skin: Skin is warm and dry.   Psychiatric: He has a normal mood and affect. His behavior is normal.   Nursing note and vitals reviewed.      Significant Labs: All pertinent labs within the past 24 hours have been reviewed.    Significant Imaging: I have reviewed and interpreted all pertinent imaging results/findings within the past 24 hours.    Assessment/Plan:      * COPD with acute exacerbation    COPD - oxygen therapy to maintain sats > 92 %, budesonide treatments, Solumedrol 80 mg IV every 8 hours, Avelox and Duo Nebs    12/08 - Rx for  albuterol with spacer sent, will begin to taper steroids          CAD (coronary artery disease)    Continue ASA          JUANITO on CPAP    Resume CPAP at night          Chronic pain    Continue prn analgesia            VTE Risk Mitigation         Ordered     enoxaparin injection 40 mg  Daily     Route:  Subcutaneous        12/07/17 1805     Medium Risk of VTE  Once      12/07/17 1728     Place JULITO hose  Until discontinued      12/07/17 1728     Place sequential compression device  Until discontinued      12/07/17 1728              Stan Knowles MD  Department of Hospital Medicine   [facility]    No Change No Change Improving Improving

## 2024-05-22 NOTE — PATIENT INSTRUCTIONS
Recommendations:  1. Continue ST services 1 time per week to address the goals described above.  2. Continue daily home practice as discussed in treatment sessions.  3. Continued follow-up with referring physician and/or PCP as needed for medical care/management.  4. Contact the [facility] at [phone] with any further questions or concerns.     Automatic Inhaler to Nebulizer Interchange    fluticasone/umeclidinium/vilanterol (Trelegy) 200 mcg/ 62.5 mcg/ 25 mcg changed to budesonide 1 mg twice daily AND ipratropium 0.5 mg every 6 hour scheduled (apart of duoneb) AND arformoterol 15 mcg twice daily per [facility] Automatic Therapeutic Substitutions Protocol.    Please contact pharmacy at extension [phone] with any questions.     Thank you,   Lorena Pitts Mai, PharmD

## 2024-09-15 NOTE — DISCHARGE INSTRUCTIONS
Problem: Diabetes  Goal: Achieves glycemic balance  Description: Goal is to maintain blood sugar within range with no episodes of hypoglycemia  Outcome: Adequate for discharge  Goal: Verbalizes/demonstrates understanding of NEW diagnosis of diabetes and management  Description: Document on Patient Education Activity  Outcome: Adequate for discharge  Goal: Verbalizes understanding of diabetes management including how to use HbA1C to evaluate status of blood sugar over time (Diabetes is NOT a new diagnosis)  Description: Diabetes Education  Outcome: Adequate for discharge  Goal: Demonstrates ability to self-administer insulin  Description: Document on Patient Education Activity  Outcome: Adequate for discharge     Problem: Impaired Physical Mobility  Goal: Functional status is maintained or returned to baseline during hospitalization  Outcome: Adequate for discharge  Goal: Tolerates activity for discharge setting with no abnormal symptoms  Outcome: Adequate for discharge     Problem: At Risk for Falls  Goal: Patient does not fall  Outcome: Adequate for discharge  Goal: Patient takes action to control fall-related risks  Outcome: Adequate for discharge     Problem: At Risk for Injury Due to Fall  Goal: Patient does not fall  Outcome: Adequate for discharge  Goal: Takes action to control condition specific risks  Outcome: Adequate for discharge  Goal: Verbalizes understanding of fall-related injury personal risks  Description: Document education using the patient education activity  Outcome: Adequate for discharge      "[facility]  [address] [address]  or  [address]  [phone] phone     [phone] fax  Hours of Operation: Monday- Friday 8:00am- 5:00pm  After hours phone  [phone]  Hematology / Oncology Physicians on call      Dr. Bautista Calzada, PEG Noel NP Tyesha Taylor, NP    Please call with any concerns regarding your appointment today.Support Groups/Classes    Support groups and classes are being offered at the   [facility] and [facility]!!    "Cooking with Cancer" (Nutrition Class):  Second Wednesday of each month   at noon at the [facility] [[email or web address]].  Metastatic Support Group:  Third Tuesday of each month   at noon at the [facility] [[email or web address]].  Next Steps Class/Group: Second and fourth Thursday of each month at noon at the [facility] [[email or web address]].  [facility] (Breast Cancer Support Group): First Tuesday of each month   at 5:30pm at the [facility] location.  [employer]: [facility] [[email or web address]]: Second and third Tuesday of each month from 7:30am - 2pm.  [facility]: First and fourth Tuesday of each month from 7:30am - 2pm    If you are interested in attending or would like more information please ask our social workers or your nurse!  "

## 2025-03-07 NOTE — PROGRESS NOTES
Clinical Pharmacy Progress Note: Vancomycin Dosing     Vancomycin Day #2   Estimated Creatinine Clearance: 77.7 mL/min (based on SCr of 0.9 mg/dL).   SCr trend: (steady)   Lab Results   Component Value Date    WBC 18.47 (H) 02/07/2019   WBC trend: (decreased x 1 day)         Tmax/24h: 98.7  Level:   VANCOMYCIN, TROUGH before 3rd dose [[id number]] (Abnormal) Collected: 02/07/19 1238   Specimen: Blood Updated: 02/07/19 1300    Vancomycin-Trough 9.0 Abnormally low  ug/mL    Dx. Pneumonia   Goal trough=15-20mcg/ml  Cultures:   Respiratory on 2/5- GPC, GNR   Respiratory on 2/5-many gram (-) rods  Blood on 2/5 NGTD   Plan: Pharmacy will change vancomycin dose to 1500 mg IV every 18 hours and draw a trough prior to the 3rd dose.   Next level due:  Vancomycin trough due 2/9/19 @ 0200.     Thank you for allowing us to participate in this patient's care.    Marine Monge, PharmD 2/7/2019 2:58 PM                      DCP:  @ [facility]  TAVON: 24-48 hours    Pt is anticipated to tx to 4W.

## (undated) DEVICE — OMNIPAQUE 350MG 150ML VIAL

## (undated) DEVICE — CATH IMPULSE PIGTAIL 6F 110CM

## (undated) DEVICE — ANGIOTOUCH KIT

## (undated) DEVICE — GUIDEWIRE SION BLU .014X300CM

## (undated) DEVICE — PACK CATH LAB CUSTOM BR

## (undated) DEVICE — KIT INTRO VSSL DIL 6FX11CM

## (undated) DEVICE — SHEATH GUID FLX ANSEL 6FR 45CM

## (undated) DEVICE — KIT MANIFOLD LOW PRESS TUBING

## (undated) DEVICE — WIRE GUIDE TEFLON 3CM .035 145

## (undated) DEVICE — GUIDEWIRE FIELDER XT.014X190CM

## (undated) DEVICE — KIT SYR REUSABLE

## (undated) DEVICE — MICROCATH TELEPORT 2.1FR 135CM

## (undated) DEVICE — GUIDEWIRE PILOT 50 300CM

## (undated) DEVICE — CATH DIAG IMPULSE 6FR FR4

## (undated) DEVICE — CATH DIAG IMPULSE 6FR FL4

## (undated) DEVICE — Device

## (undated) DEVICE — GUIDEWIRE RUNTHROUGH NS 300CM

## (undated) DEVICE — SHEATH INTRODUCER 6FR 11CM

## (undated) DEVICE — CATH IMPULSE 6FR MULTI-PAK